# Patient Record
Sex: MALE | Race: WHITE | Employment: OTHER | URBAN - METROPOLITAN AREA
[De-identification: names, ages, dates, MRNs, and addresses within clinical notes are randomized per-mention and may not be internally consistent; named-entity substitution may affect disease eponyms.]

---

## 2017-01-01 PROBLEM — E11.621 DIABETIC FOOT ULCER WITH OSTEOMYELITIS (HCC): Status: ACTIVE | Noted: 2017-01-01

## 2017-01-01 PROBLEM — L97.509 DIABETIC FOOT ULCER WITH OSTEOMYELITIS (HCC): Status: ACTIVE | Noted: 2017-01-01

## 2017-01-01 PROBLEM — E11.69 DIABETIC FOOT ULCER WITH OSTEOMYELITIS (HCC): Status: ACTIVE | Noted: 2017-01-01

## 2017-01-01 PROBLEM — L03.116 CELLULITIS OF FOOT, LEFT: Status: ACTIVE | Noted: 2017-01-01

## 2017-01-01 PROBLEM — M86.9 DIABETIC FOOT ULCER WITH OSTEOMYELITIS (HCC): Status: ACTIVE | Noted: 2017-01-01

## 2017-01-01 PROBLEM — I70.209 FEMORAL-POPLITEAL ATHEROSCLEROSIS (HCC): Status: ACTIVE | Noted: 2017-01-01

## 2017-01-11 PROBLEM — R33.9 URINARY RETENTION: Status: ACTIVE | Noted: 2017-01-11

## 2018-08-26 ENCOUNTER — HOSPITAL ENCOUNTER (INPATIENT)
Age: 78
LOS: 1 days | Discharge: LEFT BEFORE DISCHARGE | DRG: 300 | End: 2018-08-28
Attending: EMERGENCY MEDICINE | Admitting: INTERNAL MEDICINE
Payer: COMMERCIAL

## 2018-08-26 ENCOUNTER — APPOINTMENT (OUTPATIENT)
Dept: GENERAL RADIOLOGY | Age: 78
DRG: 300 | End: 2018-08-26
Payer: COMMERCIAL

## 2018-08-26 DIAGNOSIS — E11.622 TYPE 2 DIABETES MELLITUS WITH OTHER SKIN ULCER, UNSPECIFIED WHETHER LONG TERM INSULIN USE (HCC): Primary | ICD-10-CM

## 2018-08-26 DIAGNOSIS — E11.8 DIABETIC FOOT (HCC): ICD-10-CM

## 2018-08-26 DIAGNOSIS — I73.9 PERIPHERAL VASCULAR DISEASE OF LOWER EXTREMITY (HCC): ICD-10-CM

## 2018-08-26 DIAGNOSIS — L98.499 TYPE 2 DIABETES MELLITUS WITH OTHER SKIN ULCER, UNSPECIFIED WHETHER LONG TERM INSULIN USE (HCC): Primary | ICD-10-CM

## 2018-08-26 DIAGNOSIS — I16.0 ASYMPTOMATIC HYPERTENSIVE URGENCY: ICD-10-CM

## 2018-08-26 PROBLEM — L08.9 DIABETIC FOOT INFECTION (HCC): Status: ACTIVE | Noted: 2018-08-26

## 2018-08-26 PROBLEM — E11.628 DIABETIC FOOT INFECTION (HCC): Status: ACTIVE | Noted: 2018-08-26

## 2018-08-26 LAB
ANION GAP SERPL CALCULATED.3IONS-SCNC: 8 MMOL/L (ref 7–16)
BASOPHILS ABSOLUTE: 0.05 E9/L (ref 0–0.2)
BASOPHILS RELATIVE PERCENT: 0.5 % (ref 0–2)
BUN BLDV-MCNC: 17 MG/DL (ref 8–23)
CALCIUM SERPL-MCNC: 9.3 MG/DL (ref 8.6–10.2)
CHLORIDE BLD-SCNC: 97 MMOL/L (ref 98–107)
CO2: 28 MMOL/L (ref 22–29)
CREAT SERPL-MCNC: 0.8 MG/DL (ref 0.7–1.2)
EKG ATRIAL RATE: 68 BPM
EKG P AXIS: 69 DEGREES
EKG P-R INTERVAL: 170 MS
EKG Q-T INTERVAL: 388 MS
EKG QRS DURATION: 86 MS
EKG QTC CALCULATION (BAZETT): 412 MS
EKG R AXIS: 43 DEGREES
EKG T AXIS: 83 DEGREES
EKG VENTRICULAR RATE: 68 BPM
EOSINOPHILS ABSOLUTE: 0.16 E9/L (ref 0.05–0.5)
EOSINOPHILS RELATIVE PERCENT: 1.5 % (ref 0–6)
GFR AFRICAN AMERICAN: >60
GFR NON-AFRICAN AMERICAN: >60 ML/MIN/1.73
GLUCOSE BLD-MCNC: 199 MG/DL (ref 74–109)
HBA1C MFR BLD: 7.3 % (ref 4–5.6)
HCT VFR BLD CALC: 34.1 % (ref 37–54)
HEMOGLOBIN: 11.7 G/DL (ref 12.5–16.5)
IMMATURE GRANULOCYTES #: 0.03 E9/L
IMMATURE GRANULOCYTES %: 0.3 % (ref 0–5)
LACTIC ACID: 0.8 MMOL/L (ref 0.5–2.2)
LYMPHOCYTES ABSOLUTE: 1.36 E9/L (ref 1.5–4)
LYMPHOCYTES RELATIVE PERCENT: 13 % (ref 20–42)
MCH RBC QN AUTO: 30 PG (ref 26–35)
MCHC RBC AUTO-ENTMCNC: 34.3 % (ref 32–34.5)
MCV RBC AUTO: 87.4 FL (ref 80–99.9)
METER GLUCOSE: 215 MG/DL (ref 70–110)
MONOCYTES ABSOLUTE: 0.79 E9/L (ref 0.1–0.95)
MONOCYTES RELATIVE PERCENT: 7.6 % (ref 2–12)
NEUTROPHILS ABSOLUTE: 8.05 E9/L (ref 1.8–7.3)
NEUTROPHILS RELATIVE PERCENT: 77.1 % (ref 43–80)
PDW BLD-RTO: 13.6 FL (ref 11.5–15)
PLATELET # BLD: 169 E9/L (ref 130–450)
PMV BLD AUTO: 9.6 FL (ref 7–12)
POTASSIUM SERPL-SCNC: 4.8 MMOL/L (ref 3.5–5)
RBC # BLD: 3.9 E12/L (ref 3.8–5.8)
SEDIMENTATION RATE, ERYTHROCYTE: 65 MM/HR (ref 0–15)
SODIUM BLD-SCNC: 133 MMOL/L (ref 132–146)
TROPONIN: <0.01 NG/ML (ref 0–0.03)
WBC # BLD: 10.4 E9/L (ref 4.5–11.5)

## 2018-08-26 PROCEDURE — 99284 EMERGENCY DEPT VISIT MOD MDM: CPT

## 2018-08-26 PROCEDURE — 83605 ASSAY OF LACTIC ACID: CPT

## 2018-08-26 PROCEDURE — 80048 BASIC METABOLIC PNL TOTAL CA: CPT

## 2018-08-26 PROCEDURE — G0378 HOSPITAL OBSERVATION PER HR: HCPCS

## 2018-08-26 PROCEDURE — 85025 COMPLETE CBC W/AUTO DIFF WBC: CPT

## 2018-08-26 PROCEDURE — 87040 BLOOD CULTURE FOR BACTERIA: CPT

## 2018-08-26 PROCEDURE — 87186 SC STD MICRODIL/AGAR DIL: CPT

## 2018-08-26 PROCEDURE — 96375 TX/PRO/DX INJ NEW DRUG ADDON: CPT

## 2018-08-26 PROCEDURE — 73660 X-RAY EXAM OF TOE(S): CPT

## 2018-08-26 PROCEDURE — 87075 CULTR BACTERIA EXCEPT BLOOD: CPT

## 2018-08-26 PROCEDURE — 96365 THER/PROPH/DIAG IV INF INIT: CPT

## 2018-08-26 PROCEDURE — 93005 ELECTROCARDIOGRAM TRACING: CPT | Performed by: EMERGENCY MEDICINE

## 2018-08-26 PROCEDURE — 84484 ASSAY OF TROPONIN QUANT: CPT

## 2018-08-26 PROCEDURE — 82962 GLUCOSE BLOOD TEST: CPT

## 2018-08-26 PROCEDURE — 87070 CULTURE OTHR SPECIMN AEROBIC: CPT

## 2018-08-26 PROCEDURE — 73610 X-RAY EXAM OF ANKLE: CPT

## 2018-08-26 PROCEDURE — 73620 X-RAY EXAM OF FOOT: CPT

## 2018-08-26 PROCEDURE — 83036 HEMOGLOBIN GLYCOSYLATED A1C: CPT

## 2018-08-26 PROCEDURE — 2580000003 HC RX 258: Performed by: INTERNAL MEDICINE

## 2018-08-26 PROCEDURE — 2580000003 HC RX 258: Performed by: EMERGENCY MEDICINE

## 2018-08-26 PROCEDURE — 6360000002 HC RX W HCPCS: Performed by: EMERGENCY MEDICINE

## 2018-08-26 PROCEDURE — 85651 RBC SED RATE NONAUTOMATED: CPT

## 2018-08-26 RX ORDER — DEXTROSE MONOHYDRATE 25 G/50ML
12.5 INJECTION, SOLUTION INTRAVENOUS PRN
Status: DISCONTINUED | OUTPATIENT
Start: 2018-08-26 | End: 2018-08-28 | Stop reason: HOSPADM

## 2018-08-26 RX ORDER — NICOTINE POLACRILEX 4 MG
15 LOZENGE BUCCAL PRN
Status: DISCONTINUED | OUTPATIENT
Start: 2018-08-26 | End: 2018-08-28 | Stop reason: HOSPADM

## 2018-08-26 RX ORDER — ONDANSETRON 2 MG/ML
4 INJECTION INTRAMUSCULAR; INTRAVENOUS EVERY 6 HOURS PRN
Status: DISCONTINUED | OUTPATIENT
Start: 2018-08-26 | End: 2018-08-28 | Stop reason: HOSPADM

## 2018-08-26 RX ORDER — TAMSULOSIN HYDROCHLORIDE 0.4 MG/1
0.4 CAPSULE ORAL DAILY
Status: DISCONTINUED | OUTPATIENT
Start: 2018-08-26 | End: 2018-08-28 | Stop reason: HOSPADM

## 2018-08-26 RX ORDER — LISINOPRIL 10 MG/1
10 TABLET ORAL DAILY
Status: DISCONTINUED | OUTPATIENT
Start: 2018-08-26 | End: 2018-08-27

## 2018-08-26 RX ORDER — CILOSTAZOL 50 MG/1
100 TABLET ORAL 2 TIMES DAILY
Status: DISCONTINUED | OUTPATIENT
Start: 2018-08-26 | End: 2018-08-28 | Stop reason: HOSPADM

## 2018-08-26 RX ORDER — SODIUM CHLORIDE 0.9 % (FLUSH) 0.9 %
10 SYRINGE (ML) INJECTION PRN
Status: DISCONTINUED | OUTPATIENT
Start: 2018-08-26 | End: 2018-08-26 | Stop reason: SDUPTHER

## 2018-08-26 RX ORDER — INSULIN GLARGINE 100 [IU]/ML
30 INJECTION, SOLUTION SUBCUTANEOUS NIGHTLY
Status: DISCONTINUED | OUTPATIENT
Start: 2018-08-26 | End: 2018-08-28 | Stop reason: HOSPADM

## 2018-08-26 RX ORDER — FAMOTIDINE 20 MG/1
20 TABLET, FILM COATED ORAL 2 TIMES DAILY
Status: DISCONTINUED | OUTPATIENT
Start: 2018-08-26 | End: 2018-08-27

## 2018-08-26 RX ORDER — ACETAMINOPHEN 325 MG/1
650 TABLET ORAL EVERY 4 HOURS PRN
Status: DISCONTINUED | OUTPATIENT
Start: 2018-08-26 | End: 2018-08-28 | Stop reason: HOSPADM

## 2018-08-26 RX ORDER — HYDRALAZINE HYDROCHLORIDE 20 MG/ML
5 INJECTION INTRAMUSCULAR; INTRAVENOUS ONCE
Status: COMPLETED | OUTPATIENT
Start: 2018-08-26 | End: 2018-08-26

## 2018-08-26 RX ORDER — SODIUM CHLORIDE 9 MG/ML
INJECTION, SOLUTION INTRAVENOUS CONTINUOUS
Status: DISCONTINUED | OUTPATIENT
Start: 2018-08-26 | End: 2018-08-27

## 2018-08-26 RX ORDER — SODIUM CHLORIDE 0.9 % (FLUSH) 0.9 %
10 SYRINGE (ML) INJECTION EVERY 12 HOURS SCHEDULED
Status: DISCONTINUED | OUTPATIENT
Start: 2018-08-26 | End: 2018-08-28 | Stop reason: HOSPADM

## 2018-08-26 RX ORDER — SODIUM CHLORIDE 0.9 % (FLUSH) 0.9 %
10 SYRINGE (ML) INJECTION PRN
Status: DISCONTINUED | OUTPATIENT
Start: 2018-08-26 | End: 2018-08-28 | Stop reason: HOSPADM

## 2018-08-26 RX ORDER — SODIUM CHLORIDE 0.9 % (FLUSH) 0.9 %
10 SYRINGE (ML) INJECTION EVERY 12 HOURS SCHEDULED
Status: DISCONTINUED | OUTPATIENT
Start: 2018-08-26 | End: 2018-08-26 | Stop reason: SDUPTHER

## 2018-08-26 RX ORDER — DEXTROSE MONOHYDRATE 50 MG/ML
100 INJECTION, SOLUTION INTRAVENOUS PRN
Status: DISCONTINUED | OUTPATIENT
Start: 2018-08-26 | End: 2018-08-28 | Stop reason: HOSPADM

## 2018-08-26 RX ADMIN — HYDRALAZINE HYDROCHLORIDE 5 MG: 20 INJECTION INTRAMUSCULAR; INTRAVENOUS at 16:42

## 2018-08-26 RX ADMIN — MEROPENEM 1 G: 1 INJECTION, POWDER, FOR SOLUTION INTRAVENOUS at 18:18

## 2018-08-26 RX ADMIN — SODIUM CHLORIDE: 9 INJECTION, SOLUTION INTRAVENOUS at 18:52

## 2018-08-26 RX ADMIN — VANCOMYCIN HYDROCHLORIDE 1.5 G: 10 INJECTION, POWDER, LYOPHILIZED, FOR SOLUTION INTRAVENOUS at 16:10

## 2018-08-26 ASSESSMENT — ENCOUNTER SYMPTOMS
DIARRHEA: 0
ABDOMINAL PAIN: 0
BLOOD IN STOOL: 0
RHINORRHEA: 0
COLOR CHANGE: 0
BACK PAIN: 0
NAUSEA: 0
CHEST TIGHTNESS: 0
SHORTNESS OF BREATH: 0
VOMITING: 0
SORE THROAT: 0
EYE PAIN: 0
COUGH: 0

## 2018-08-26 ASSESSMENT — PAIN SCALES - GENERAL
PAINLEVEL_OUTOF10: 0
PAINLEVEL_OUTOF10: 0

## 2018-08-26 NOTE — ED PROVIDER NOTES
the hospital encounter of 08/26/18   CBC Auto Differential   Result Value Ref Range    WBC 10.4 4.5 - 11.5 E9/L    RBC 3.90 3.80 - 5.80 E12/L    Hemoglobin 11.7 (L) 12.5 - 16.5 g/dL    Hematocrit 34.1 (L) 37.0 - 54.0 %    MCV 87.4 80.0 - 99.9 fL    MCH 30.0 26.0 - 35.0 pg    MCHC 34.3 32.0 - 34.5 %    RDW 13.6 11.5 - 15.0 fL    Platelets 071 262 - 166 E9/L    MPV 9.6 7.0 - 12.0 fL    Neutrophils % 77.1 43.0 - 80.0 %    Immature Granulocytes % 0.3 0.0 - 5.0 %    Lymphocytes % 13.0 (L) 20.0 - 42.0 %    Monocytes % 7.6 2.0 - 12.0 %    Eosinophils % 1.5 0.0 - 6.0 %    Basophils % 0.5 0.0 - 2.0 %    Neutrophils # 8.05 (H) 1.80 - 7.30 E9/L    Immature Granulocytes # 0.03 E9/L    Lymphocytes # 1.36 (L) 1.50 - 4.00 E9/L    Monocytes # 0.79 0.10 - 0.95 E9/L    Eosinophils # 0.16 0.05 - 0.50 E9/L    Basophils # 0.05 0.00 - 0.20 E8/I   Basic Metabolic Panel   Result Value Ref Range    Sodium 133 132 - 146 mmol/L    Potassium 4.8 3.5 - 5.0 mmol/L    Chloride 97 (L) 98 - 107 mmol/L    CO2 28 22 - 29 mmol/L    Anion Gap 8 7 - 16 mmol/L    Glucose 199 (H) 74 - 109 mg/dL    BUN 17 8 - 23 mg/dL    CREATININE 0.8 0.7 - 1.2 mg/dL    GFR Non-African American >60 >=60 mL/min/1.73    GFR African American >60     Calcium 9.3 8.6 - 10.2 mg/dL   Lactic Acid, Plasma   Result Value Ref Range    Lactic Acid 0.8 0.5 - 2.2 mmol/L   Troponin   Result Value Ref Range    Troponin <0.01 0.00 - 0.03 ng/mL   EKG 12 Lead   Result Value Ref Range    Ventricular Rate 68 BPM    Atrial Rate 68 BPM    P-R Interval 170 ms    QRS Duration 86 ms    Q-T Interval 388 ms    QTc Calculation (Bazett) 412 ms    P Axis 69 degrees    R Axis 43 degrees    T Axis 83 degrees       Radiology  XR ANKLE RIGHT (MIN 3 VIEWS)    (Results Pending)   XR TOE RIGHT (MIN 2 VIEWS)    (Results Pending)       EKG: This EKG is signed and interpreted by me.     Rate: 68  Rhythm: Sinus  Interpretation: Sinus rhythm, nonspecific ST-T wave changes  Comparison: stable as compared to

## 2018-08-26 NOTE — ED NOTES
Skin abnormal:  Several ulcers to right and left feet. Right lower leg 4x2- proximal, right distal leg 4x3, right lateral ankle 10x5, right top of foot 6 x1, left bottom of foot 4 x 3. Patient states that he has been treating these at home.       Hope Cedillo RN  08/26/18 [id number]

## 2018-08-27 ENCOUNTER — APPOINTMENT (OUTPATIENT)
Dept: MRI IMAGING | Age: 78
DRG: 300 | End: 2018-08-27
Payer: COMMERCIAL

## 2018-08-27 PROBLEM — I10 HTN (HYPERTENSION), BENIGN: Chronic | Status: ACTIVE | Noted: 2018-08-27

## 2018-08-27 PROBLEM — I70.209 FEMORAL-POPLITEAL ATHEROSCLEROSIS (HCC): Chronic | Status: ACTIVE | Noted: 2017-01-01

## 2018-08-27 PROBLEM — E11.628 DIABETIC FOOT INFECTION (HCC): Status: ACTIVE | Noted: 2018-08-27

## 2018-08-27 PROBLEM — R33.9 URINARY RETENTION: Status: RESOLVED | Noted: 2017-01-11 | Resolved: 2018-08-27

## 2018-08-27 PROBLEM — L08.9 DIABETIC FOOT INFECTION (HCC): Status: ACTIVE | Noted: 2018-08-27

## 2018-08-27 LAB
ANION GAP SERPL CALCULATED.3IONS-SCNC: 6 MMOL/L (ref 7–16)
BASOPHILS ABSOLUTE: 0.06 E9/L (ref 0–0.2)
BASOPHILS RELATIVE PERCENT: 0.7 % (ref 0–2)
BUN BLDV-MCNC: 14 MG/DL (ref 8–23)
C-REACTIVE PROTEIN: 3.7 MG/DL (ref 0–0.4)
CALCIUM SERPL-MCNC: 8.8 MG/DL (ref 8.6–10.2)
CHLORIDE BLD-SCNC: 102 MMOL/L (ref 98–107)
CO2: 29 MMOL/L (ref 22–29)
CREAT SERPL-MCNC: 0.9 MG/DL (ref 0.7–1.2)
EOSINOPHILS ABSOLUTE: 0.16 E9/L (ref 0.05–0.5)
EOSINOPHILS RELATIVE PERCENT: 2 % (ref 0–6)
GFR AFRICAN AMERICAN: >60
GFR NON-AFRICAN AMERICAN: >60 ML/MIN/1.73
GLUCOSE BLD-MCNC: 172 MG/DL (ref 74–109)
HBA1C MFR BLD: 7.2 % (ref 4–5.6)
HCT VFR BLD CALC: 32.7 % (ref 37–54)
HEMOGLOBIN: 11.2 G/DL (ref 12.5–16.5)
IMMATURE GRANULOCYTES #: 0.03 E9/L
IMMATURE GRANULOCYTES %: 0.4 % (ref 0–5)
LYMPHOCYTES ABSOLUTE: 1.38 E9/L (ref 1.5–4)
LYMPHOCYTES RELATIVE PERCENT: 16.9 % (ref 20–42)
MCH RBC QN AUTO: 30.1 PG (ref 26–35)
MCHC RBC AUTO-ENTMCNC: 34.3 % (ref 32–34.5)
MCV RBC AUTO: 87.9 FL (ref 80–99.9)
METER GLUCOSE: 138 MG/DL (ref 70–110)
METER GLUCOSE: 168 MG/DL (ref 70–110)
METER GLUCOSE: 170 MG/DL (ref 70–110)
METER GLUCOSE: 206 MG/DL (ref 70–110)
MONOCYTES ABSOLUTE: 0.67 E9/L (ref 0.1–0.95)
MONOCYTES RELATIVE PERCENT: 8.2 % (ref 2–12)
NEUTROPHILS ABSOLUTE: 5.87 E9/L (ref 1.8–7.3)
NEUTROPHILS RELATIVE PERCENT: 71.8 % (ref 43–80)
PDW BLD-RTO: 13.6 FL (ref 11.5–15)
PLATELET # BLD: 160 E9/L (ref 130–450)
PMV BLD AUTO: 9.8 FL (ref 7–12)
POTASSIUM REFLEX MAGNESIUM: 4.1 MMOL/L (ref 3.5–5)
RBC # BLD: 3.72 E12/L (ref 3.8–5.8)
SODIUM BLD-SCNC: 137 MMOL/L (ref 132–146)
WBC # BLD: 8.2 E9/L (ref 4.5–11.5)

## 2018-08-27 PROCEDURE — 97162 PT EVAL MOD COMPLEX 30 MIN: CPT

## 2018-08-27 PROCEDURE — 36415 COLL VENOUS BLD VENIPUNCTURE: CPT

## 2018-08-27 PROCEDURE — 6360000004 HC RX CONTRAST MEDICATION: Performed by: RADIOLOGY

## 2018-08-27 PROCEDURE — 83036 HEMOGLOBIN GLYCOSYLATED A1C: CPT

## 2018-08-27 PROCEDURE — G0378 HOSPITAL OBSERVATION PER HR: HCPCS

## 2018-08-27 PROCEDURE — 73723 MRI JOINT LWR EXTR W/O&W/DYE: CPT

## 2018-08-27 PROCEDURE — 86140 C-REACTIVE PROTEIN: CPT

## 2018-08-27 PROCEDURE — 99221 1ST HOSP IP/OBS SF/LOW 40: CPT | Performed by: SURGERY

## 2018-08-27 PROCEDURE — 6370000000 HC RX 637 (ALT 250 FOR IP): Performed by: SPECIALIST

## 2018-08-27 PROCEDURE — 1200000000 HC SEMI PRIVATE

## 2018-08-27 PROCEDURE — 80048 BASIC METABOLIC PNL TOTAL CA: CPT

## 2018-08-27 PROCEDURE — 2580000003 HC RX 258: Performed by: INTERNAL MEDICINE

## 2018-08-27 PROCEDURE — G8979 MOBILITY GOAL STATUS: HCPCS

## 2018-08-27 PROCEDURE — 82962 GLUCOSE BLOOD TEST: CPT

## 2018-08-27 PROCEDURE — G8978 MOBILITY CURRENT STATUS: HCPCS

## 2018-08-27 PROCEDURE — A9579 GAD-BASE MR CONTRAST NOS,1ML: HCPCS | Performed by: RADIOLOGY

## 2018-08-27 PROCEDURE — 85025 COMPLETE CBC W/AUTO DIFF WBC: CPT

## 2018-08-27 RX ORDER — LEVOFLOXACIN 500 MG/1
500 TABLET, FILM COATED ORAL DAILY
Status: DISCONTINUED | OUTPATIENT
Start: 2018-08-27 | End: 2018-08-28 | Stop reason: HOSPADM

## 2018-08-27 RX ORDER — HYDRALAZINE HYDROCHLORIDE 20 MG/ML
10 INJECTION INTRAMUSCULAR; INTRAVENOUS EVERY 6 HOURS PRN
Status: DISCONTINUED | OUTPATIENT
Start: 2018-08-27 | End: 2018-08-28 | Stop reason: HOSPADM

## 2018-08-27 RX ORDER — LISINOPRIL 20 MG/1
20 TABLET ORAL DAILY
Status: DISCONTINUED | OUTPATIENT
Start: 2018-08-28 | End: 2018-08-28 | Stop reason: HOSPADM

## 2018-08-27 RX ORDER — DOXYCYCLINE HYCLATE 100 MG/1
100 CAPSULE ORAL EVERY 12 HOURS SCHEDULED
Status: DISCONTINUED | OUTPATIENT
Start: 2018-08-27 | End: 2018-08-28 | Stop reason: HOSPADM

## 2018-08-27 RX ADMIN — Medication 10 ML: at 20:55

## 2018-08-27 RX ADMIN — LEVOFLOXACIN 500 MG: 500 TABLET, FILM COATED ORAL at 16:21

## 2018-08-27 RX ADMIN — DOXYCYCLINE HYCLATE 100 MG: 100 CAPSULE, GELATIN COATED ORAL at 20:55

## 2018-08-27 RX ADMIN — GADOTERIDOL 13 ML: 279.3 INJECTION, SOLUTION INTRAVENOUS at 11:31

## 2018-08-27 ASSESSMENT — PAIN SCALES - GENERAL
PAINLEVEL_OUTOF10: 0

## 2018-08-27 NOTE — CARE COORDINATION
Social Work / Discharge planning : SW noted consult for discharge planning: SW met with patient and explained role as discharge planner/ transition of care. Patient states he resides with his spouse. Patient does not use a device and independent. Patient has a hx at [facility] and with [facility]. Patient PCP is DR Pasha Rose and he uuses [facility] in [place]. Therapy is ordered. Patient states plan is to return home with [facility]. SW did make referral [facility] [place] [facility]. Patient has wounds on his legs. ID is also following and await ID recommendations as well.  SW to follow Electronically signed by SILVIA Pendleton on 8/27/2018 at 3:25 PM

## 2018-08-27 NOTE — CONSULTS
Negative  GI: Negative  : Negative  Musculoskeletal: As in the HPI  Skin: As in the HPI . The patient has a chronic rash on this patient was seen in dermatologist for this. PHYSICAL EXAM:    Vitals:   BP (!) 167/73   Pulse 67   Temp 98 °F (36.7 °C) (Oral)   Resp 18   Ht 5' 7\" (1.702 m)   Wt 141 lb 3.2 oz (64 kg)   SpO2 96%   BMI 22.12 kg/m²   Constitutional: The patient is awake, alert, and oriented. No distress. Skin: Warm and dry. Erythematous, maculopapular rash over face and forehead. HEENT: Eyes show round, and reactive pupils. No jaundice. Moist mucous membranes, no ulcerations, no thrush. Neck: Supple to movements. No lymphadenopathy. Chest: No use of accessory muscles to breathe. Symmetrical expansion. Auscultation reveals no wheezing, crackles, or rhonchi. Cardiovascular: S1 and S2 are rhythmic and regular. No murmurs appreciated. Abdomen: Positive bowel sounds to auscultation. Benign to palpation. No masses felt. No hepatosplenomegaly. Extremities: No clubbing, no cyanosis, no edema. Poor pulses. We will demarcated ulcerated lesions on right leg. The largest one is above the medial malleolus. He has a yellow slough on it. It does not undermined. The edges are raised. Couple of wounds above the right lateral malleolus. Diffuse erythema with desquamation noted on right leg. The left 2nd toe is missing.  There is a chronic ulcer on the bottom of the left foot under the head of the 2nd metatarsal.  Lines: peripheral      CBC+dif:  Recent Labs      08/26/18   1546  08/27/18   0600   WBC  10.4  8.2   HGB  11.7*  11.2*   HCT  34.1*  32.7*   MCV  87.4  87.9   PLT  169  160   NEUTROABS  8.05*  5.87     Lab Results   Component Value Date    CRP <0.1 03/16/2017    CRP 12.3 (H) 12/30/2016      No results found for: CRPHS  Lab Results   Component Value Date    SEDRATE 65 (H) 08/26/2018    SEDRATE 14 03/16/2017    SEDRATE 84 (H) 12/30/2016     Lab Results   Component Value Date    ALT 17 01/12/2017 discussing this case with the treating physicians.     Idania Paz  3:43 PM  8/27/2018

## 2018-08-27 NOTE — PROGRESS NOTES
unsteady gait to bathroom. Pt performed stand <> sit transfers from commode with Supervision. Pt ambulated back to bedside and refused to ambulate any further. Pt performed sit to supine transfer and was left in bed with alarm activated at conclusion of session. Pts/ family goals   To return home. Patient and or family understand(s) diagnosis, prognosis, and plan of care:  Yes. PLAN  PT care will be provided in accordance with the objectives noted above. Whenever appropriate, clear delegation orders will be provided for nursing staff. Exercises and functional mobility practice will be used as well as appropriate assistive devices or modalities to obtain goals. Patient and family education will also be administered as needed. Frequency of treatments: 2-5x/week x 2-3 days.     Time in: 1440  Time out: [address], [address] [place] [address]  [id number]

## 2018-08-27 NOTE — PROGRESS NOTES
Spoke to Dr. Saeid Zuniga via perfect serve regarding consult. Stated to let residents know. Message sent to surgical resident.

## 2018-08-27 NOTE — PLAN OF CARE
Problem: Falls - Risk of:  Goal: Will remain free from falls  Will remain free from falls   Outcome: Met This Shift      Problem: Skin Integrity:  Goal: Will show no infection signs and symptoms  Will show no infection signs and symptoms  Outcome: Met This Shift

## 2018-08-27 NOTE — PROGRESS NOTES
ADL retraining []   Equipment needs []   Neuromuscular re-education [] Energy Conservation Techniques []  Functional Transfer training [] Patient and/or Family Education []  Functional Mobility training []  Environmental Modifications []  Cognitive re-training []   Compensatory techniques for ADLs []  Splinting Needs []                             Therapeutic Activities []  Positioning/joint protection []             Therapeutic Strengthening  []      Other: []      Rehab Potential: Good    Patient / Family Goal: Return Home       Eval Complexity: Low     Patient and/or family educated on safety awareness.    NO family present     Time in: [address]  Time out: Suzanna Knight 4 OTR/L [id number]

## 2018-08-27 NOTE — H&P
Latex; Aspirin; Pcn [penicillins]; and Other    Social History:    reports that he has quit smoking. He has never used smokeless tobacco. He reports that he does not drink alcohol or use drugs. Family History:   family history includes Heart Disease in his father and mother. REVIEW OF SYSTEMS:  As above in the HPI, otherwise negative    PHYSICAL EXAM:    Vitals:  BP (!) 167/73   Pulse 67   Temp 98 °F (36.7 °C) (Oral)   Resp 18   Ht 5' 7\" (1.702 m)   Wt 141 lb 3.2 oz (64 kg)   SpO2 96%   BMI 22.12 kg/m²     General:  Awake, alert, oriented X 3. Well developed, well nourished, well groomed. No apparent distress. HEENT:  Normocephalic, atraumatic. No scleral icterus. No conjunctival injection. Normal lips, teeth, and gums. No nasal discharge. Neck:  Supple  Heart:  RRR, no murmurs, gallops, or rubs  Lungs:  CTA bilaterally, bilat symmetrical expansion, no wheeze, rales, or rhonchi  Abdomen:   Bowel sounds present, soft, nontender, no masses, no organomegaly, no peritoneal signs  Extremities:  No clubbing, cyanosis, or edema; legs thin and atrophic  Skin:  Warm and dry, large open ulcer with underlying subcutaneous layer exposed on right medial lower leg with other areas of skin breakdown in lateral right lower leg; callus with skin breakdown on plantar aspect left foot at 2nd/3rd metatarsal  Neuro:  Cranial nerves 2-12 intact, no focal deficits  Breast: deferred  Rectal: deferred  Genitalia:  deferred    LABS:  CBC:   Lab Results   Component Value Date    WBC 8.2 08/27/2018    RBC 3.72 08/27/2018    HGB 11.2 08/27/2018    HCT 32.7 08/27/2018    MCV 87.9 08/27/2018    MCH 30.1 08/27/2018    MCHC 34.3 08/27/2018    RDW 13.6 08/27/2018     08/27/2018    MPV 9.8 08/27/2018     BMP:    Lab Results   Component Value Date     08/27/2018    K 4.1 08/27/2018     08/27/2018    CO2 29 08/27/2018    BUN 14 08/27/2018    CREATININE 0.9 08/27/2018    CALCIUM 8.8 08/27/2018    GFRAA >60

## 2018-08-27 NOTE — PROGRESS NOTES
Attempted to call podiatry consult. Was told by answering service Dr. Henok Hill is no longer affiliated with the hospital. Called and left message with Dr. Luis Garza. Awaiting call back/ orders.

## 2018-08-27 NOTE — CONSULTS
Vascular Surgery Consultation Note    Reason for Consult:  Non healing leg wounds    HPI:    This is a 65 y.o. male who is admitted to the hospital for treatment of diabetic leg wounds. Vascular surgery is consulted for evaluation and treatment of non healing leg wounds. He came into the ED for wound check and was admitted due to possible wound infection. He has a history of uncontrolled diabetes and previous osteomyelitis of left toe s/p amputation in 2017. Vascular saw him at that time and had no intervention. He has never had stents in the past. He is a previous smoker. He had ABIs at that time which showed . 64 on the left and .82 on the right. He states he continues to hit his right leg against corners and that's why the wounds have no healed correctly. He has a small pressure wound on hie left foot which has been healing and he has been taking weight off of that. He denies fevers or chills or drainage from the area.      ROS: Negative if blank [], Positive if [x]  General Vascular   [] Fevers [x] Claudication (Blocks)   [] Chills [] Rest Pain   [] Weight Loss [x] Tissue Loss   [] Chest Pain [] Clotting Disorder   [] SOB at rest [x] Leg Swelling   [x] SOB with exertion [] DVT/PE      [] Nausea    [] Vomiting [] Stroke/TIA   [] Abdominal Pain [] Focal weakness   [] Melena [] Slurred Speech   [] Hematochezia [] Vision Changes   [] Hematuria    [] Dysuria [] Hx of Central Catheters   [x] Wears Glasses/Contacts [] Dialysis and If so date initiated   [] Blindness    [] Right Hand Dominant   [] Difficulty swallowing        Past Medical History:   Diagnosis Date    Blood circulation, collateral     Cellulitis of foot, left 1/1/2017    Diabetes mellitus ([address]     Diabetic foot ulcer with osteomyelitis ([address] 1/1/2017    Femoral-popliteal atherosclerosis ([address] 1/1/2017    Hypertension         Past Surgical History:   Procedure Laterality Date    FOOT DEBRIDEMENT Left 01/04/2017    wound debridement and delayed absent       5/5 Strength    R LE Edema present     Incisions absent    Varicose veins absent    Wounds present- x3 chronic venous stasis ulcers, stage 1, no purulent drainage, surround erythema    normalcaprefill   4/5 Strength   Neuropathy is present    L LE Edema present     Incisions absent    Varicose veins absent    Wounds present - healing third metatarsal    normalcaprefill   4/5 Strength   Neuropathy is present    R brachial 2 L brachial 2   R radial 2 L radial 2   R femoral 1 L femoral 1   R popliteal biphasic L popliteal biphasic   R posterior tibial monophasic L posterior tibial biphasic   R dorsalis pedis monophasic L dorsalis pedis biphasic       LABS:    Lab Results   Component Value Date    WBC 8.2 2018    HGB 11.2 (L) 2018    HCT 32.7 (L) 2018     2018    PROTIME 15.0 (H) 2016    INR 1.4 2016    K 4.1 2018    BUN 14 2018    CREATININE 0.9 2018       RADIOLOGY:  Xr Ankle Right (min 3 Views)    Result Date: 2018  Patient MRN:  [id number] : 1940 Age: 65 years Gender: Male Order Date:  2018 5:00 PM EXAM: XR ANKLE RIGHT (MIN 3 VIEWS) NUMBER OF IMAGES:  4 views INDICATION: Diabetic soft tissue ulcerations COMPARISON: None FINDINGS: Bony structures are intact with preservation of alignment and joint spacing. There appears to be a degree of diffuse osteopenia, but there is no evidence of focal bony ulceration or sclerotic change to suggest advanced osteomyelitis involvement. No focal soft tissue abnormalities are identified. There is generalized soft tissue swelling about the ankle, which may be infectious or represent dependent edema. Findings are most prominent on the medial aspect. Osteopenia and soft tissue swelling about the ankle without evidence of trauma, infectious bony changes, or soft tissue gas.     Xr Foot Right (2 Views)    Result Date: 2018  Patient MRN:  [id number] : 1940 Age: 65 years Gender: Male months of intermittent improvement and regression. Secondary to these issues he sought medical attention. He also has a history of a left 2nd toe amputation which is healed. He has a small malperforans ulcer with callus on the plantar surface. He denies any significant drainage or pain to the area. He follows up with podiatry for his venous stasis disease as well as his recent digital amputation. Gen. : Alert and oriented answers questions appropriately he is in no acute distress  Skin: Is warm and dry: No jaundice is noted. No sclera icterus. On the right lower extremity he has multiple ulcers with a fairly large ulcer located over the medial malleolus with exudate its present. Left lower extremity demonstrates a malperforans ulcer with callus around the edges. There is no evidence of draining. There is no tenderness there is no fluctuance. HEENT head is normocephalic atraumatic trachea is midline no jugular venous distention  Cardiac: Is currently regular rate and rhythm  Pulmonary: Currently demonstrates no labored breathing no audible wheezing  Abdomen: Soft nontender no rebound or guarding I cannot appreciate any pulsatile mass, positive bowel sounds  Extremities: Palpable bilateral femoral pulses are noted. DP and PT signals are present. I think I can feel up palpable posterior tibial pulse. Sensation and motor are grossly intact bilaterally. He has multiple ulcerations of the right lower extremity please refer to the skin examination as well as on the left extremity please refer to the skin examination. Neuro: Is grossly intact bilaterally without any gross focal deficits that are appreciated. Prior KO dating 1/1/17 demonstrated bilateral ABIs in the 0.8 range. Please disregard the residence above statement of an KO 0.63    The current KO from date 8/28/2018 demonstrates on the right side and KO 0.50 and a digital pressure 43.  On the left side he has an KO 0.98 and a digital pressure 77  This

## 2018-08-28 ENCOUNTER — APPOINTMENT (OUTPATIENT)
Dept: ULTRASOUND IMAGING | Age: 78
DRG: 300 | End: 2018-08-28
Payer: COMMERCIAL

## 2018-08-28 ENCOUNTER — APPOINTMENT (OUTPATIENT)
Dept: INTERVENTIONAL RADIOLOGY/VASCULAR | Age: 78
DRG: 300 | End: 2018-08-28
Payer: COMMERCIAL

## 2018-08-28 VITALS
TEMPERATURE: 97.7 F | WEIGHT: 138.19 LBS | RESPIRATION RATE: 16 BRPM | HEIGHT: 70 IN | BODY MASS INDEX: 19.78 KG/M2 | SYSTOLIC BLOOD PRESSURE: 164 MMHG | DIASTOLIC BLOOD PRESSURE: 70 MMHG | HEART RATE: 63 BPM | OXYGEN SATURATION: 98 %

## 2018-08-28 LAB
ANAEROBIC CULTURE: NORMAL
ANION GAP SERPL CALCULATED.3IONS-SCNC: 9 MMOL/L (ref 7–16)
ANTISTREPTOLYSIN-O: 27 IU/ML (ref 0–200)
BASOPHILS ABSOLUTE: 0.07 E9/L (ref 0–0.2)
BASOPHILS RELATIVE PERCENT: 0.9 % (ref 0–2)
BUN BLDV-MCNC: 22 MG/DL (ref 8–23)
CALCIUM SERPL-MCNC: 9 MG/DL (ref 8.6–10.2)
CHLORIDE BLD-SCNC: 102 MMOL/L (ref 98–107)
CO2: 27 MMOL/L (ref 22–29)
CREAT SERPL-MCNC: 0.8 MG/DL (ref 0.7–1.2)
EOSINOPHILS ABSOLUTE: 0.21 E9/L (ref 0.05–0.5)
EOSINOPHILS RELATIVE PERCENT: 2.8 % (ref 0–6)
GFR AFRICAN AMERICAN: >60
GFR NON-AFRICAN AMERICAN: >60 ML/MIN/1.73
GLUCOSE BLD-MCNC: 150 MG/DL (ref 74–109)
HCT VFR BLD CALC: 33.3 % (ref 37–54)
HEMOGLOBIN: 11.3 G/DL (ref 12.5–16.5)
IMMATURE GRANULOCYTES #: 0.03 E9/L
IMMATURE GRANULOCYTES %: 0.4 % (ref 0–5)
LYMPHOCYTES ABSOLUTE: 1.62 E9/L (ref 1.5–4)
LYMPHOCYTES RELATIVE PERCENT: 21.3 % (ref 20–42)
MCH RBC QN AUTO: 29.9 PG (ref 26–35)
MCHC RBC AUTO-ENTMCNC: 33.9 % (ref 32–34.5)
MCV RBC AUTO: 88.1 FL (ref 80–99.9)
METER GLUCOSE: 148 MG/DL (ref 70–110)
METER GLUCOSE: 176 MG/DL (ref 70–110)
METER GLUCOSE: 189 MG/DL (ref 70–110)
MONOCYTES ABSOLUTE: 0.61 E9/L (ref 0.1–0.95)
MONOCYTES RELATIVE PERCENT: 8 % (ref 2–12)
NEUTROPHILS ABSOLUTE: 5.08 E9/L (ref 1.8–7.3)
NEUTROPHILS RELATIVE PERCENT: 66.6 % (ref 43–80)
ORGANISM: ABNORMAL
PDW BLD-RTO: 13.7 FL (ref 11.5–15)
PLATELET # BLD: 174 E9/L (ref 130–450)
PMV BLD AUTO: 10.1 FL (ref 7–12)
POTASSIUM SERPL-SCNC: 4.5 MMOL/L (ref 3.5–5)
RBC # BLD: 3.78 E12/L (ref 3.8–5.8)
SEDIMENTATION RATE, ERYTHROCYTE: 39 MM/HR (ref 0–15)
SODIUM BLD-SCNC: 138 MMOL/L (ref 132–146)
WBC # BLD: 7.6 E9/L (ref 4.5–11.5)
WOUND/ABSCESS: ABNORMAL
WOUND/ABSCESS: ABNORMAL

## 2018-08-28 PROCEDURE — 36415 COLL VENOUS BLD VENIPUNCTURE: CPT

## 2018-08-28 PROCEDURE — 93925 LOWER EXTREMITY STUDY: CPT

## 2018-08-28 PROCEDURE — 85651 RBC SED RATE NONAUTOMATED: CPT

## 2018-08-28 PROCEDURE — 86060 ANTISTREPTOLYSIN O TITER: CPT

## 2018-08-28 PROCEDURE — G0378 HOSPITAL OBSERVATION PER HR: HCPCS

## 2018-08-28 PROCEDURE — 82962 GLUCOSE BLOOD TEST: CPT

## 2018-08-28 PROCEDURE — 85025 COMPLETE CBC W/AUTO DIFF WBC: CPT

## 2018-08-28 PROCEDURE — 2580000003 HC RX 258: Performed by: INTERNAL MEDICINE

## 2018-08-28 PROCEDURE — 6370000000 HC RX 637 (ALT 250 FOR IP): Performed by: INTERNAL MEDICINE

## 2018-08-28 PROCEDURE — 93922 UPR/L XTREMITY ART 2 LEVELS: CPT

## 2018-08-28 PROCEDURE — 80048 BASIC METABOLIC PNL TOTAL CA: CPT

## 2018-08-28 PROCEDURE — 6370000000 HC RX 637 (ALT 250 FOR IP): Performed by: SPECIALIST

## 2018-08-28 RX ORDER — DOXYCYCLINE HYCLATE 100 MG/1
100 CAPSULE ORAL EVERY 12 HOURS SCHEDULED
Qty: 28 CAPSULE | Refills: 0 | Status: SHIPPED | OUTPATIENT
Start: 2018-08-28 | End: 2018-09-11

## 2018-08-28 RX ORDER — LEVOFLOXACIN 500 MG/1
500 TABLET, FILM COATED ORAL DAILY
Qty: 10 TABLET | Refills: 0 | Status: SHIPPED | OUTPATIENT
Start: 2018-08-29 | End: 2018-09-08

## 2018-08-28 RX ADMIN — DOXYCYCLINE HYCLATE 100 MG: 100 CAPSULE, GELATIN COATED ORAL at 09:47

## 2018-08-28 RX ADMIN — LEVOFLOXACIN 500 MG: 500 TABLET, FILM COATED ORAL at 09:47

## 2018-08-28 RX ADMIN — Medication 10 ML: at 09:48

## 2018-08-28 RX ADMIN — LISINOPRIL 20 MG: 20 TABLET ORAL at 09:47

## 2018-08-28 ASSESSMENT — PAIN SCALES - GENERAL: PAINLEVEL_OUTOF10: 0

## 2018-08-28 NOTE — PROGRESS NOTES
hepatosplenomegaly. Extremities: No clubbing, no cyanosis, no edema. Poor pulses. We will demarcated ulcerated lesions on right leg. The largest one is above the medial malleolus. He has a yellow slough on it. It does not undermined. The edges are raised. Couple of wounds above the right lateral malleolus. Diffuse erythema with desquamation noted on right leg. The left 2nd toe is missing. There is a chronic ulcer on the bottom of the left foot under the head of the 2nd metatarsal.  Lines: peripheral    Laboratory and Tests Review:  Lab Results   Component Value Date    WBC 7.6 08/28/2018    WBC 8.2 08/27/2018    WBC 10.4 08/26/2018    HGB 11.3 (L) 08/28/2018    HCT 33.3 (L) 08/28/2018    MCV 88.1 08/28/2018     08/28/2018     Lab Results   Component Value Date    NEUTROABS 5.08 08/28/2018    NEUTROABS 5.87 08/27/2018    NEUTROABS 8.05 (H) 08/26/2018     Lab Results   Component Value Date    CRP 3.7 (H) 08/27/2018    CRP <0.1 03/16/2017    CRP 12.3 (H) 12/30/2016     No results found for: CRPHS  Lab Results   Component Value Date    SEDRATE 39 (H) 08/28/2018    SEDRATE 65 (H) 08/26/2018    SEDRATE 14 03/16/2017     Lab Results   Component Value Date    ALT 17 01/12/2017    AST 18 01/12/2017    ALKPHOS 84 01/12/2017    BILITOT 0.5 01/12/2017     Lab Results   Component Value Date     08/28/2018    K 4.5 08/28/2018    K 4.1 08/27/2018     08/28/2018    CO2 27 08/28/2018    BUN 22 08/28/2018    CREATININE 0.8 08/28/2018    CREATININE 0.9 08/27/2018    CREATININE 0.8 08/26/2018    GFRAA >60 08/28/2018    LABGLOM >60 08/28/2018    GLUCOSE 150 08/28/2018    PROT 6.8 01/12/2017    LABALBU 2.8 01/12/2017    CALCIUM 9.0 08/28/2018    BILITOT 0.5 01/12/2017    ALKPHOS 84 01/12/2017    AST 18 01/12/2017    ALT 17 01/12/2017     Radiology:      Microbiology:   Culture MRSA, Proteus, Corynebacterium    ASSESSMENT:  · Chronic ulcers right leg. Ulcers seem to be ischemic.  Colonized with Staphylococcus aureus  · Possible cellulitis  · Chronic left foot diabetic ulcer ulcer  · Peripheral vascular disease    PLAN:  · Continue Doxycycline and Levofloxacin.  Reconciled  · Patient can be discharged from [facility]  1:57 PM  8/28/2018

## 2018-08-28 NOTE — PROGRESS NOTES
are intact with preservation of alignment and joint spacing. There appears to be a degree of diffuse osteopenia, but there is no evidence of focal bony ulceration or sclerotic change to suggest advanced osteomyelitis involvement. No focal soft tissue abnormalities are identified. There is generalized soft tissue swelling about the ankle, which may be infectious or represent dependent edema. Findings are most prominent on the medial aspect. Osteopenia and soft tissue swelling about the ankle without evidence of trauma, infectious bony changes, or soft tissue gas. Xr Foot Right (2 Views)    Result Date: 2018  Patient MRN:  [id number] : 1940 Age: 65 years Gender: Male Order Date:  2018 5:30 PM EXAM: XR FOOT RIGHT (2 VIEWS) NUMBER OF IMAGES:  3 views INDICATION: Diabetic soft tissue ulceration COMPARISON: None FINDINGS: Bony structures are intact with preservation of alignment and joint spacing. There appears to be diffuse osteopenia, with degenerative change of the first MTP joint and mild flexion changes of the second through fourth MTP joints. There is a small plantar traction spur of the calcaneus. No discrete/advanced osteomyelitic changes are identified. No focal soft tissue abnormalities are identified. No evidence of traumatic or infectious skeletal findings. Diffuse osteopenia. No evidence of soft tissue gas or radiopaque foreign bod there is mild forefoot soft tissue swelling. Y.    Nallely Media Toe Left (min 2 Views)    Result Date: 2018  Patient MRN:  [id number] : 1940 Age: 65 years Gender: Male Order Date:  2018 5:30 PM EXAM: XR TOE LEFT (MIN 2 VIEWS) NUMBER OF IMAGES: views INDICATION: E11.8 Diabetic foot (HCC) soft tissue ulcerations COMPARISON: None FINDINGS: There is evidence of distal second metatarsal amputation, but remaining digits show normal mineralization, alignment, and joint spacing.  There is some generalized forefoot soft tissue swelling with a regular

## 2018-08-28 NOTE — PATIENT CARE CONFERENCE
[facility] Quality Flow/Interdisciplinary Rounds Progress Note        Quality Flow Rounds held on August 28, 2018    Disciplines Attending:  Bedside Nurse, ,  and Nursing Unit Leadership    Yas Pappas was admitted on 8/26/2018  3:15 PM    Anticipated Discharge Date:  Expected Discharge Date: 08/27/18    Disposition:    Renaldo Score:  Renaldo Scale Score: 19    Readmission Risk              Risk of Unplanned Readmission:        11             Discussed patient goal for the day, patient clinical progression, and barriers to discharge.   The following Goal(s) of the Day/Commitment(s) have been identified:  Labs - Report Results      Angela Horton  August 28, 2018

## 2018-08-31 LAB
BLOOD CULTURE, ROUTINE: NORMAL
CULTURE, BLOOD 2: NORMAL

## 2019-04-05 ENCOUNTER — HOSPITAL ENCOUNTER (OUTPATIENT)
Dept: HOSPITAL 83 - WOUNDCARE | Age: 79
Discharge: HOME | End: 2019-04-05
Attending: NURSE PRACTITIONER
Payer: COMMERCIAL

## 2019-04-05 DIAGNOSIS — Z89.422: ICD-10-CM

## 2019-04-05 DIAGNOSIS — E11.621: ICD-10-CM

## 2019-04-05 DIAGNOSIS — E11.51: ICD-10-CM

## 2019-04-05 DIAGNOSIS — E11.622: Primary | ICD-10-CM

## 2019-04-05 DIAGNOSIS — L97.511: ICD-10-CM

## 2019-04-05 DIAGNOSIS — L97.812: ICD-10-CM

## 2019-04-05 DIAGNOSIS — L97.522: ICD-10-CM

## 2019-04-05 DIAGNOSIS — L97.412: ICD-10-CM

## 2019-04-05 DIAGNOSIS — L97.311: ICD-10-CM

## 2019-04-12 ENCOUNTER — HOSPITAL ENCOUNTER (OUTPATIENT)
Dept: GENERAL RADIOLOGY | Age: 79
Discharge: HOME | End: 2019-04-14
Payer: MEDICARE

## 2019-04-12 ENCOUNTER — HOSPITAL ENCOUNTER (OUTPATIENT)
Dept: ULTRASOUND IMAGING | Age: 79
Discharge: HOME | End: 2019-04-14
Payer: MEDICARE

## 2019-04-12 ENCOUNTER — HOSPITAL ENCOUNTER (OUTPATIENT)
Dept: INTERVENTIONAL RADIOLOGY/VASCULAR | Age: 79
Discharge: HOME | End: 2019-04-14
Payer: MEDICARE

## 2019-04-12 DIAGNOSIS — L97.519 ULCER OF RIGHT FOOT, UNSPECIFIED ULCER STAGE (HCC): ICD-10-CM

## 2019-04-12 DIAGNOSIS — I73.9 PERIPHERAL VASCULAR DISEASE, UNSPECIFIED (HCC): ICD-10-CM

## 2019-04-12 PROCEDURE — 93970 EXTREMITY STUDY: CPT

## 2019-04-12 PROCEDURE — 93925 LOWER EXTREMITY STUDY: CPT

## 2019-04-12 PROCEDURE — 73630 X-RAY EXAM OF FOOT: CPT

## 2019-04-20 ENCOUNTER — HOSPITAL ENCOUNTER (INPATIENT)
Age: 79
LOS: 3 days | Discharge: ANOTHER HOSPITAL | DRG: 638 | End: 2019-04-23
Attending: EMERGENCY MEDICINE | Admitting: INTERNAL MEDICINE
Payer: MEDICARE

## 2019-04-20 DIAGNOSIS — E11.621 DIABETIC ULCER OF OTHER PART OF RIGHT FOOT ASSOCIATED WITH TYPE 2 DIABETES MELLITUS, UNSPECIFIED ULCER STAGE (HCC): Primary | ICD-10-CM

## 2019-04-20 DIAGNOSIS — L97.519 DIABETIC ULCER OF OTHER PART OF RIGHT FOOT ASSOCIATED WITH TYPE 2 DIABETES MELLITUS, UNSPECIFIED ULCER STAGE (HCC): Primary | ICD-10-CM

## 2019-04-20 PROBLEM — M86.9 OSTEOMYELITIS (HCC): Status: ACTIVE | Noted: 2019-04-20

## 2019-04-20 PROBLEM — L08.9 DIABETIC FOOT INFECTION (HCC): Status: RESOLVED | Noted: 2018-08-27 | Resolved: 2019-04-20

## 2019-04-20 PROBLEM — M86.9 DIABETIC FOOT ULCER WITH OSTEOMYELITIS (HCC): Status: RESOLVED | Noted: 2017-01-01 | Resolved: 2019-04-20

## 2019-04-20 PROBLEM — L97.509 DIABETIC FOOT ULCER WITH OSTEOMYELITIS (HCC): Status: RESOLVED | Noted: 2017-01-01 | Resolved: 2019-04-20

## 2019-04-20 PROBLEM — E11.69 DIABETIC FOOT ULCER WITH OSTEOMYELITIS (HCC): Status: RESOLVED | Noted: 2017-01-01 | Resolved: 2019-04-20

## 2019-04-20 PROBLEM — E11.628 DIABETIC FOOT INFECTION (HCC): Status: RESOLVED | Noted: 2018-08-27 | Resolved: 2019-04-20

## 2019-04-20 LAB
ANION GAP SERPL CALCULATED.3IONS-SCNC: 9 MMOL/L (ref 7–16)
BASOPHILS ABSOLUTE: 0.05 E9/L (ref 0–0.2)
BASOPHILS RELATIVE PERCENT: 0.3 % (ref 0–2)
BUN BLDV-MCNC: 24 MG/DL (ref 8–23)
C-REACTIVE PROTEIN: 13 MG/DL (ref 0–0.4)
CALCIUM SERPL-MCNC: 9.5 MG/DL (ref 8.6–10.2)
CHLORIDE BLD-SCNC: 99 MMOL/L (ref 98–107)
CO2: 30 MMOL/L (ref 22–29)
CREAT SERPL-MCNC: 1 MG/DL (ref 0.7–1.2)
EOSINOPHILS ABSOLUTE: 0.21 E9/L (ref 0.05–0.5)
EOSINOPHILS RELATIVE PERCENT: 1.3 % (ref 0–6)
GFR AFRICAN AMERICAN: >60
GFR NON-AFRICAN AMERICAN: >60 ML/MIN/1.73
GLUCOSE BLD-MCNC: 222 MG/DL (ref 74–99)
HCT VFR BLD CALC: 33.2 % (ref 37–54)
HEMOGLOBIN: 10.7 G/DL (ref 12.5–16.5)
IMMATURE GRANULOCYTES #: 0.11 E9/L
IMMATURE GRANULOCYTES %: 0.7 % (ref 0–5)
LYMPHOCYTES ABSOLUTE: 1.29 E9/L (ref 1.5–4)
LYMPHOCYTES RELATIVE PERCENT: 8.1 % (ref 20–42)
MCH RBC QN AUTO: 30.1 PG (ref 26–35)
MCHC RBC AUTO-ENTMCNC: 32.2 % (ref 32–34.5)
MCV RBC AUTO: 93.3 FL (ref 80–99.9)
METER GLUCOSE: 207 MG/DL (ref 74–99)
MONOCYTES ABSOLUTE: 0.9 E9/L (ref 0.1–0.95)
MONOCYTES RELATIVE PERCENT: 5.6 % (ref 2–12)
NEUTROPHILS ABSOLUTE: 13.4 E9/L (ref 1.8–7.3)
NEUTROPHILS RELATIVE PERCENT: 84 % (ref 43–80)
PDW BLD-RTO: 13.5 FL (ref 11.5–15)
PLATELET # BLD: 244 E9/L (ref 130–450)
PMV BLD AUTO: 9.9 FL (ref 7–12)
POTASSIUM REFLEX MAGNESIUM: 4.4 MMOL/L (ref 3.5–5)
RBC # BLD: 3.56 E12/L (ref 3.8–5.8)
SEDIMENTATION RATE, ERYTHROCYTE: 90 MM/HR (ref 0–15)
SODIUM BLD-SCNC: 138 MMOL/L (ref 132–146)
WBC # BLD: 16 E9/L (ref 4.5–11.5)

## 2019-04-20 PROCEDURE — 87070 CULTURE OTHR SPECIMN AEROBIC: CPT

## 2019-04-20 PROCEDURE — 2580000003 HC RX 258: Performed by: INTERNAL MEDICINE

## 2019-04-20 PROCEDURE — 87040 BLOOD CULTURE FOR BACTERIA: CPT

## 2019-04-20 PROCEDURE — 80048 BASIC METABOLIC PNL TOTAL CA: CPT

## 2019-04-20 PROCEDURE — 85651 RBC SED RATE NONAUTOMATED: CPT

## 2019-04-20 PROCEDURE — 87186 SC STD MICRODIL/AGAR DIL: CPT

## 2019-04-20 PROCEDURE — 1200000000 HC SEMI PRIVATE

## 2019-04-20 PROCEDURE — 85025 COMPLETE CBC W/AUTO DIFF WBC: CPT

## 2019-04-20 PROCEDURE — 99284 EMERGENCY DEPT VISIT MOD MDM: CPT

## 2019-04-20 PROCEDURE — 86140 C-REACTIVE PROTEIN: CPT

## 2019-04-20 PROCEDURE — 82962 GLUCOSE BLOOD TEST: CPT

## 2019-04-20 RX ORDER — TAMSULOSIN HYDROCHLORIDE 0.4 MG/1
0.4 CAPSULE ORAL DAILY
Status: DISCONTINUED | OUTPATIENT
Start: 2019-04-21 | End: 2019-04-23 | Stop reason: HOSPADM

## 2019-04-20 RX ORDER — SODIUM CHLORIDE 0.9 % (FLUSH) 0.9 %
10 SYRINGE (ML) INJECTION EVERY 12 HOURS SCHEDULED
Status: DISCONTINUED | OUTPATIENT
Start: 2019-04-20 | End: 2019-04-23 | Stop reason: HOSPADM

## 2019-04-20 RX ORDER — DEXTROSE MONOHYDRATE 25 G/50ML
12.5 INJECTION, SOLUTION INTRAVENOUS PRN
Status: DISCONTINUED | OUTPATIENT
Start: 2019-04-20 | End: 2019-04-23 | Stop reason: HOSPADM

## 2019-04-20 RX ORDER — CILOSTAZOL 100 MG/1
100 TABLET ORAL 2 TIMES DAILY
Status: DISCONTINUED | OUTPATIENT
Start: 2019-04-20 | End: 2019-04-23 | Stop reason: HOSPADM

## 2019-04-20 RX ORDER — NICOTINE POLACRILEX 4 MG
15 LOZENGE BUCCAL PRN
Status: DISCONTINUED | OUTPATIENT
Start: 2019-04-20 | End: 2019-04-23 | Stop reason: HOSPADM

## 2019-04-20 RX ORDER — SODIUM CHLORIDE 9 MG/ML
INJECTION, SOLUTION INTRAVENOUS CONTINUOUS
Status: ACTIVE | OUTPATIENT
Start: 2019-04-20 | End: 2019-04-21

## 2019-04-20 RX ORDER — INSULIN GLARGINE 100 [IU]/ML
30 INJECTION, SOLUTION SUBCUTANEOUS NIGHTLY
Status: DISCONTINUED | OUTPATIENT
Start: 2019-04-20 | End: 2019-04-23 | Stop reason: HOSPADM

## 2019-04-20 RX ORDER — DEXTROSE MONOHYDRATE 50 MG/ML
100 INJECTION, SOLUTION INTRAVENOUS PRN
Status: DISCONTINUED | OUTPATIENT
Start: 2019-04-20 | End: 2019-04-23 | Stop reason: HOSPADM

## 2019-04-20 RX ORDER — LISINOPRIL 10 MG/1
10 TABLET ORAL DAILY
Status: DISCONTINUED | OUTPATIENT
Start: 2019-04-21 | End: 2019-04-23 | Stop reason: HOSPADM

## 2019-04-20 RX ORDER — SODIUM CHLORIDE 0.9 % (FLUSH) 0.9 %
10 SYRINGE (ML) INJECTION PRN
Status: DISCONTINUED | OUTPATIENT
Start: 2019-04-20 | End: 2019-04-23 | Stop reason: HOSPADM

## 2019-04-20 RX ADMIN — Medication 10 ML: at 22:28

## 2019-04-20 RX ADMIN — SODIUM CHLORIDE: 9 INJECTION, SOLUTION INTRAVENOUS at 22:28

## 2019-04-20 ASSESSMENT — ENCOUNTER SYMPTOMS
EYE PAIN: 0
VOMITING: 0
DIARRHEA: 0
SORE THROAT: 0
BACK PAIN: 0
NAUSEA: 0
COUGH: 0
EYE REDNESS: 0
SINUS PRESSURE: 0
EYE DISCHARGE: 0
ABDOMINAL PAIN: 0
SHORTNESS OF BREATH: 0
WHEEZING: 0

## 2019-04-20 ASSESSMENT — PAIN SCALES - GENERAL: PAINLEVEL_OUTOF10: 0

## 2019-04-20 NOTE — ED PROVIDER NOTES
Patient is a 16-year-old male with a history of diabetes. He is presenting with multiple diabetic foot ulcers on the right foot. He is currently being treated by wound care specialist. They have a follow-up appointment on Tuesday. However, the wife was concerned because his left toe turned \"black overnight\". She was afraid it needs to be seen prior to her appointment on Tuesday. Patient denies any fevers, chills or any systemic symptoms. The history is provided by the patient. Wound Check    He was treated in the ED 5 to 10 days ago. Prior ED Treatment: Diabetic Foot Ulcers. Treatments since wound repair include a wound recheck. Fever duration: No Fevers. Review of Systems   Constitutional: Negative for chills and fever. HENT: Negative for ear pain, sinus pressure and sore throat. Eyes: Negative for pain, discharge and redness. Respiratory: Negative for cough, shortness of breath and wheezing. Cardiovascular: Negative for chest pain. Gastrointestinal: Negative for abdominal pain, diarrhea, nausea and vomiting. Genitourinary: Negative for dysuria and frequency. Musculoskeletal: Negative for arthralgias and back pain. Skin: Negative for rash and wound. Neurological: Negative for weakness and headaches. Hematological: Negative for adenopathy. All other systems reviewed and are negative. Physical Exam   Constitutional: He is oriented to person, place, and time. He appears well-developed and well-nourished. HENT:   Head: Normocephalic and atraumatic. Eyes: Pupils are equal, round, and reactive to light. Neck: Normal range of motion. Neck supple. Cardiovascular: Normal rate, regular rhythm and normal heart sounds. Pulmonary/Chest: Effort normal and breath sounds normal. No respiratory distress. He has no wheezes. He has no rales. Abdominal: Soft. Bowel sounds are normal. There is no tenderness. There is no rebound and no guarding.    Musculoskeletal: He exhibits no edema. Neurological: He is alert and oriented to person, place, and time. No cranial nerve deficit. Coordination normal.   Skin: Skin is warm and dry. Multiple foot ulcers on the right foot. Malodorous. 2nd Metatarsal is purple and 2nd/3rd metatarsals have small necrotic looking patches on the dorsal aspect of them. Nursing note and vitals reviewed. Procedures    MDM  Number of Diagnoses or Management Options  Diagnosis management comments: Patient is a 26-year-old male with history of diabetes. He is presenting with multiple diabetic foot ulcers on the right foot. He has been seen by wound care was here on the 12th have an x-ray and ultrasound of the foot. Wife states that overnight his 2nd metatarsal turn black. On exam it appears more purple and just appears little devascularized. The 2nd 3rd toe have black possibly necrotic patches on the dorsal aspect of them. Wife states that those have been there and have looked like that before. Patient denies any systemic symptoms. We will get some basic labs as well as ESR, CRP and some blood cultures to r/o acute infection. ED Course as of Apr 20 2020   Sat Apr 20, 2019 1928 Will obtain blood and wound cultures and provide the patient with a dose of Vanc/Zosyn. Consult out to Ariel Aponte for admission. [AL]      ED Course User Index  [AL] Unique Mora DO     ED Course as of Apr 20 2021   Sat Apr 20, 2019 1928 Will obtain blood and wound cultures and provide the patient with a dose of Vanc/Zosyn. Consult out to Ariel Aponte for admission. [AL]      ED Course User Index  [AL] Unique Mora DO       --------------------------------------------- PAST HISTORY ---------------------------------------------  Past Medical History:  has a past medical history of Blood circulation, collateral, Cellulitis of foot, left, Diabetes mellitus ([address] Diabetic foot ulcer with osteomyelitis ([facility] [address] Femoral-popliteal atherosclerosis ([facility] [address] and Hypertension.     Past Surgical mg/dL       RADIOLOGY:  No orders to display     ------------------------- NURSING NOTES AND VITALS REVIEWED ---------------------------  Date / Time Roomed:  4/20/2019  6:16 PM  ED Bed Assignment:  05/05    The nursing notes within the ED encounter and vital signs as below have been reviewed. Patient Vitals for the past 24 hrs:   BP Temp Temp src Pulse Resp SpO2 Height Weight   04/20/19 1929 (!) 163/74 98.5 °F (36.9 °C) Oral 77 16 97 % -- --   04/20/19 1814 (!) 146/66 98.7 °F (37.1 °C) -- 83 14 96 % 5' 10\" (1.778 m) 138 lb (62.6 kg)       Oxygen Saturation Interpretation: Normal    Counseling:  I have spoken with the patient and discussed todays results, in addition to providing specific details for the plan of care and counseling regarding the diagnosis and prognosis. Their questions are answered at this time and they are agreeable with the plan of admission.    --------------------------------- ADDITIONAL PROVIDER NOTES ---------------------------------  Consultations:  Time: 2015. Spoke with Dr. Camila Arora. Discussed case. They will admit the patient. This patient's ED course included: a personal history and physicial examination, re-evaluation prior to disposition, multiple bedside re-evaluations and IV medications    This patient has remained hemodynamically stable during their ED course. Diagnosis:  1. Diabetic ulcer of other part of right foot associated with type 2 diabetes mellitus, unspecified ulcer stage ([facility] [address]        Disposition:  Patient's disposition: Admit to med/surg floor  Patient's condition is good.          Alfa Espino DO  Resident  04/20/19 2021

## 2019-04-21 ENCOUNTER — APPOINTMENT (OUTPATIENT)
Dept: GENERAL RADIOLOGY | Age: 79
DRG: 638 | End: 2019-04-21
Payer: MEDICARE

## 2019-04-21 LAB
ALBUMIN SERPL-MCNC: 2.6 G/DL (ref 3.5–5.2)
ALP BLD-CCNC: 116 U/L (ref 40–129)
ALT SERPL-CCNC: 10 U/L (ref 0–40)
ANION GAP SERPL CALCULATED.3IONS-SCNC: 8 MMOL/L (ref 7–16)
AST SERPL-CCNC: 12 U/L (ref 0–39)
BASOPHILS ABSOLUTE: 0.04 E9/L (ref 0–0.2)
BASOPHILS RELATIVE PERCENT: 0.3 % (ref 0–2)
BILIRUB SERPL-MCNC: 0.5 MG/DL (ref 0–1.2)
BUN BLDV-MCNC: 21 MG/DL (ref 8–23)
CALCIUM SERPL-MCNC: 8.7 MG/DL (ref 8.6–10.2)
CHLORIDE BLD-SCNC: 101 MMOL/L (ref 98–107)
CO2: 27 MMOL/L (ref 22–29)
CREAT SERPL-MCNC: 0.8 MG/DL (ref 0.7–1.2)
EOSINOPHILS ABSOLUTE: 0.22 E9/L (ref 0.05–0.5)
EOSINOPHILS RELATIVE PERCENT: 1.8 % (ref 0–6)
GFR AFRICAN AMERICAN: >60
GFR NON-AFRICAN AMERICAN: >60 ML/MIN/1.73
GLUCOSE BLD-MCNC: 287 MG/DL (ref 74–99)
HBA1C MFR BLD: 7 % (ref 4–5.6)
HCT VFR BLD CALC: 28.3 % (ref 37–54)
HEMOGLOBIN: 9 G/DL (ref 12.5–16.5)
IMMATURE GRANULOCYTES #: 0.05 E9/L
IMMATURE GRANULOCYTES %: 0.4 % (ref 0–5)
LACTIC ACID, SEPSIS: 0.9 MMOL/L (ref 0.5–1.9)
LYMPHOCYTES ABSOLUTE: 1.43 E9/L (ref 1.5–4)
LYMPHOCYTES RELATIVE PERCENT: 11.4 % (ref 20–42)
MAGNESIUM: 1.9 MG/DL (ref 1.6–2.6)
MCH RBC QN AUTO: 29.4 PG (ref 26–35)
MCHC RBC AUTO-ENTMCNC: 31.8 % (ref 32–34.5)
MCV RBC AUTO: 92.5 FL (ref 80–99.9)
METER GLUCOSE: 209 MG/DL (ref 74–99)
METER GLUCOSE: 251 MG/DL (ref 74–99)
METER GLUCOSE: 257 MG/DL (ref 74–99)
METER GLUCOSE: 314 MG/DL (ref 74–99)
MONOCYTES ABSOLUTE: 0.82 E9/L (ref 0.1–0.95)
MONOCYTES RELATIVE PERCENT: 6.5 % (ref 2–12)
NEUTROPHILS ABSOLUTE: 10 E9/L (ref 1.8–7.3)
NEUTROPHILS RELATIVE PERCENT: 79.6 % (ref 43–80)
PDW BLD-RTO: 13.6 FL (ref 11.5–15)
PLATELET # BLD: 203 E9/L (ref 130–450)
PMV BLD AUTO: 9.6 FL (ref 7–12)
POTASSIUM SERPL-SCNC: 4 MMOL/L (ref 3.5–5)
RBC # BLD: 3.06 E12/L (ref 3.8–5.8)
SODIUM BLD-SCNC: 136 MMOL/L (ref 132–146)
TOTAL PROTEIN: 6.7 G/DL (ref 6.4–8.3)
WBC # BLD: 12.6 E9/L (ref 4.5–11.5)

## 2019-04-21 PROCEDURE — 82962 GLUCOSE BLOOD TEST: CPT

## 2019-04-21 PROCEDURE — 80053 COMPREHEN METABOLIC PANEL: CPT

## 2019-04-21 PROCEDURE — 83605 ASSAY OF LACTIC ACID: CPT

## 2019-04-21 PROCEDURE — 73630 X-RAY EXAM OF FOOT: CPT

## 2019-04-21 PROCEDURE — 99223 1ST HOSP IP/OBS HIGH 75: CPT | Performed by: SURGERY

## 2019-04-21 PROCEDURE — 83735 ASSAY OF MAGNESIUM: CPT

## 2019-04-21 PROCEDURE — 6360000002 HC RX W HCPCS: Performed by: SPECIALIST

## 2019-04-21 PROCEDURE — 83036 HEMOGLOBIN GLYCOSYLATED A1C: CPT

## 2019-04-21 PROCEDURE — 6370000000 HC RX 637 (ALT 250 FOR IP): Performed by: INTERNAL MEDICINE

## 2019-04-21 PROCEDURE — 36415 COLL VENOUS BLD VENIPUNCTURE: CPT

## 2019-04-21 PROCEDURE — 1200000000 HC SEMI PRIVATE

## 2019-04-21 PROCEDURE — 2580000003 HC RX 258: Performed by: INTERNAL MEDICINE

## 2019-04-21 PROCEDURE — 2580000003 HC RX 258: Performed by: SPECIALIST

## 2019-04-21 PROCEDURE — 85025 COMPLETE CBC W/AUTO DIFF WBC: CPT

## 2019-04-21 RX ADMIN — MEROPENEM 1 G: 1 INJECTION, POWDER, FOR SOLUTION INTRAVENOUS at 23:13

## 2019-04-21 RX ADMIN — MEROPENEM 1 G: 1 INJECTION, POWDER, FOR SOLUTION INTRAVENOUS at 15:16

## 2019-04-21 RX ADMIN — SODIUM CHLORIDE: 9 INJECTION, SOLUTION INTRAVENOUS at 03:59

## 2019-04-21 RX ADMIN — VANCOMYCIN HYDROCHLORIDE 1250 MG: 10 INJECTION, POWDER, LYOPHILIZED, FOR SOLUTION INTRAVENOUS at 20:23

## 2019-04-21 RX ADMIN — SODIUM CHLORIDE: 9 INJECTION, SOLUTION INTRAVENOUS at 17:26

## 2019-04-21 RX ADMIN — LISINOPRIL 10 MG: 10 TABLET ORAL at 09:49

## 2019-04-21 ASSESSMENT — PAIN SCALES - GENERAL: PAINLEVEL_OUTOF10: 0

## 2019-04-21 NOTE — CONSULTS
Vascular Surgery Inpatient Consultation Note      Reason for Consultation:  PVD    HISTORY OF PRESENT ILLNESS:                The patient is a 77 y.o. male who is admitted to the hospital for treatment of right foot infection. Pt has history of long-standing diabetes mellitus. 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 His wife noticed discoloration of his right second toe and he came into the hospital.  He was hyperglycemic. He had an elevated white count and a culture was obtained. The patient is being treated with antibiotics. He is followed by podiatry. Vascular surgery is consulted for evaluation and treatment. The patient is known to our practice and has seen Dr. Elias Cortés in the past for peripheral vascular disease. IMPRESSION:  Diabetic foot infection involving the right second toe. I agree with the current treatment with intravenous antibiotics and local wound care. The patient has popliteal pulses bilaterally and arterial studies that show flow to both feet. He certainly has tibial disease from his long-standing diabetes however I do not feel that his infection is due to arterial insufficiency in origin. Healing will certainly be affected by perfusion. The patient does not require any urgent vascular intervention at this time. Okay for toe amputation from vascular surgery standpoint if required. I will have him follow up with Dr. Elias Cortés in the office or Dr. Giovanni Kee for an arteriogram if needed.         Patient Active Problem List   Diagnosis Code    Diabetes mellitus type 2, uncontrolled ([address] E11.65    Femoral-popliteal atherosclerosis ([address] I70.209    HTN (hypertension), benign I10    Osteomyelitis ([address] M86.9       Past Medical History:   Diagnosis Date    Blood circulation, collateral     Cellulitis of foot, left 1/1/2017    Diabetes mellitus ([address]     Diabetic foot ulcer with osteomyelitis ([address] 1/1/2017    Femoral-popliteal atherosclerosis ([facility] [address] 1/1/2017    Hypertension         Past Surgical History:   Procedure Laterality Date    FOOT DEBRIDEMENT Left 01/04/2017    wound debridement and delayed primary closure    PROSTATE BIOPSY  04/2016    TOE AMPUTATION Left 12/31/2016    2nd       Current Medications:    dextrose      sodium chloride 150 mL/hr at 04/21/19 0359      sodium chloride flush, glucose, dextrose, glucagon (rDNA), dextrose    vancomycin  1,250 mg Intravenous Q24H    meropenem  1 g Intravenous Q8H    aspirin  325 mg Oral Daily    cilostazol  100 mg Oral BID    insulin glargine  30 Units Subcutaneous Nightly    lisinopril  10 mg Oral Daily    tamsulosin  0.4 mg Oral Daily    sodium chloride flush  10 mL Intravenous 2 times per day    enoxaparin  40 mg Subcutaneous Daily    insulin lispro  0-12 Units Subcutaneous TID WC    insulin lispro  0-6 Units Subcutaneous Nightly        Allergies:  Latex;  Aspirin; Pcn [penicillins]; and Other    Social History     Socioeconomic History    Marital status:      Spouse name: Not on file    Number of children: Not on file    Years of education: Not on file    Highest education level: Not on file   Occupational History    Not on file   Social Needs    Financial resource strain: Not on file    Food insecurity:     Worry: Not on file     Inability: Not on file    Transportation needs:     Medical: Not on file     Non-medical: Not on file   Tobacco Use    Smoking status: Former Smoker    Smokeless tobacco: Never Used   Substance and Sexual Activity    Alcohol use: No    Drug use: No    Sexual activity: Not on file   Lifestyle    Physical activity:     Days per week: Not on file     Minutes per session: Not on file    Stress: Not on file   Relationships    Social connections:     Talks on phone: Not on file     Gets together: Not on file     Attends Worship service: Not on file     Active member of club or organization: Not on file     Attends meetings of clubs or organizations: Not on file     Relationship status: Not on file    Intimate partner violence:     Fear of current or ex partner: Not on file     Emotionally abused: Not on file     Physically abused: Not on file     Forced sexual activity: Not on file   Other Topics Concern    Not on file   Social History Narrative    Not on file        Family History   Problem Relation Age of Onset    Heart Disease Mother     Heart Disease Father        REVIEW OF SYSTEMS:      Eyes:      Blurred vision:  No [x]/Yes []               Diplopia:   No [x]/Yes []               Vision loss:       No [x]/Yes []   Ears, nose, throat:             Hearing loss:    No [x]/Yes []      Vertigo:   No [x]/Yes []                       Swallowing problem:  No [x]/Yes []               Nose bleeds:   No [x]/Yes []      Voice hoarseness:  No [x]/Yes []  Respiratory:             Cough:   No [x]/Yes []      Pleuritic chest pain:  No [x]/Yes []                        Dyspnea:   No [x]/Yes []      Wheezing:   No [x]/Yes []  Cardiovascular:             Angina:   No [x]/Yes []      Palpitations:   No [x]/Yes []          Claudication:    No [x]/Yes []      Leg swelling:   No [x]/Yes []  Gastrointestinal:             Nausea or vomiting:  No [x]/Yes []               Abdominal pain:  No [x]/Yes []                     Intestinal bleeding: No [x]/Yes []  Musculoskeletal:             Leg pain:   No [x]/Yes []      Back pain:   No [x]/Yes []                    Weakness:   No []/Yes [x]  Neurologic:             Numbness:   No []/Yes [x]      Paralysis:   No [x]/Yes []                       Headaches:   No [x]/Yes []  Hematologic, lymphatic:   Anemia:   No [x]/Yes []              Bleeding or bruising:  No [x]/Yes []              Fevers or chills: No [x]/Yes []  Endocrine:             Temp intolerance:   No [x]/Yes []                       Polydipsia, polyuria:  No [x]/Yes []  Skin:              Rash:    No [x]/Yes []      Ulcers:   No []/Yes [x] Abnorm pigment: No [x]/Yes []  :              Frequency/urgency:  No [x]/Yes []      Hematuria:    No [x]/Yes []                      Incontinence:    No [x]/Yes []    PHYSICAL EXAM:  Vitals:    04/21/19 0745   BP: (!) 172/80   Pulse: 75   Resp: 16   Temp: 98.4 °F (36.9 °C)   SpO2: 97%     General Appearance: alert and oriented to person, place and time, well developed and well- nourished, in no acute distress  Skin: warm and dry, no rash or erythema, brownish pigmentation lower legs. Head: normocephalic and atraumatic  Eyes: extraocular eye movements intact, conjunctivae normal  ENT: external ear and ear canal normal bilaterally, nose without deformity  Pulmonary/Chest: normal air movement, no respiratory distress  Cardiovascular: normal rate, regular rhythm  Abdomen: soft, non-tender, non-distended, no masses or organomegaly  Musculoskeletal: normal range of motion, no joint deformity or tenderness  Neurologic: no cranial nerve deficit, speech normal  Extremities: right lower leg edema, amputated left 2nd toe. Right foot wrapped. 2nd toe tip cyanotic. PULSE EXAM      Right      Left   Brachial     Radial 3 3   Femoral     Popliteal 2 2   Dorsalis Pedis  0   Posterior Tibial 0 0   (3=normal, 2=diminished, 1=barely palpable, 4=widened)      LABS:    Lab Results   Component Value Date    WBC 12.6 (H) 04/21/2019    HGB 9.0 (L) 04/21/2019    HCT 28.3 (L) 04/21/2019     04/21/2019    PROTIME 15.0 (H) 12/30/2016    INR 1.4 12/30/2016    K 4.0 04/21/2019    BUN 21 04/21/2019    CREATININE 0.8 04/21/2019       RADIOLOGY:    Arterial duplex reviewed.

## 2019-04-21 NOTE — H&P
[address] [facility]  Attending History and Physical      CHIEF COMPLAINT:  Right foot infection      HISTORY OF PRESENT ILLNESS:      The patient is a 77 y.o. male patient of dr Xiomara babcock who presents with complains of right foot infection. Patient has been battling wounds and infections to his lower extremities for awhile. He follows with podiatry, ID and vascular surgery as an outpatient. He is currently having his lower extremity vascular supply evaluated. Nonetheless, he had worsening discoloration of his toes. He had discharge from wounds. He presented to the ED. He denied chest pain, shortness of breath, abdominal pain, nausea, vomiting, fevers, chills and diaphoresis. He did not take anything at home for the symptoms. He is resting comfortably in bed at this time.          Past Medical History:    Past Medical History:   Diagnosis Date    Blood circulation, collateral     Cellulitis of foot, left 1/1/2017    Diabetes mellitus ([address]     Diabetic foot ulcer with osteomyelitis ([facility] [address] 1/1/2017    Femoral-popliteal atherosclerosis ([facility] [address] 1/1/2017    Hypertension        Past Surgical History:    Past Surgical History:   Procedure Laterality Date    FOOT DEBRIDEMENT Left 01/04/2017    wound debridement and delayed primary closure    PROSTATE BIOPSY  04/2016    TOE AMPUTATION Left 12/31/2016    2nd       Medications Prior to Admission:    Medications Prior to Admission: [DISCONTINUED] aspirin 325 MG EC tablet, Take 1 tablet by mouth daily  [DISCONTINUED] insulin glargine (LANTUS) 100 UNIT/ML injection vial, Inject 30 Units into the skin nightly  [DISCONTINUED] insulin lispro (HUMALOG) 100 UNIT/ML injection vial, Inject 0-12 Units into the skin 3 times daily (with meals)  [DISCONTINUED] insulin lispro (HUMALOG) 100 UNIT/ML injection vial, Inject 0-6 Units into the skin nightly  [DISCONTINUED] tamsulosin (FLOMAX) 0.4 MG capsule, Take 1 capsule by mouth daily  [DISCONTINUED] cilostazol Component Value Date     04/21/2019    K 4.0 04/21/2019    K 4.4 04/20/2019     04/21/2019    CO2 27 04/21/2019    BUN 21 04/21/2019    CREATININE 0.8 04/21/2019    GFRAA >60 04/21/2019    LABGLOM >60 04/21/2019    GLUCOSE 287 04/21/2019    PROT 6.7 04/21/2019    LABALBU 2.6 04/21/2019    CALCIUM 8.7 04/21/2019    BILITOT 0.5 04/21/2019    ALKPHOS 116 04/21/2019    AST 12 04/21/2019    ALT 10 04/21/2019     BMP:    Lab Results   Component Value Date     04/21/2019    K 4.0 04/21/2019    K 4.4 04/20/2019     04/21/2019    CO2 27 04/21/2019    BUN 21 04/21/2019    LABALBU 2.6 04/21/2019    CREATININE 0.8 04/21/2019    CALCIUM 8.7 04/21/2019    GFRAA >60 04/21/2019    LABGLOM >60 04/21/2019    GLUCOSE 287 04/21/2019     Magnesium:    Lab Results   Component Value Date    MG 1.9 04/21/2019     Phosphorus:  No results found for: PHOS  PT/INR:    Lab Results   Component Value Date    PROTIME 15.0 12/30/2016    INR 1.4 12/30/2016     PTT:    Lab Results   Component Value Date    APTT 26.1 12/30/2016   [APTT}  Troponin:    Lab Results   Component Value Date    TROPONINI <0.01 08/26/2018     Last 3 Troponin:    Lab Results   Component Value Date    TROPONINI <0.01 08/26/2018     U/A:    Lab Results   Component Value Date    COLORU Yellow 01/11/2017    PROTEINU 30 01/11/2017    PHUR 6.5 01/11/2017    LABCAST FEW 12/30/2016    WBCUA 1-3 01/11/2017    RBCUA >20 01/11/2017    BACTERIA RARE 01/11/2017    CLARITYU SL CLOUDY 01/11/2017    SPECGRAV 1.020 01/11/2017    LEUKOCYTESUR Negative 01/11/2017    UROBILINOGEN 0.2 01/11/2017    BILIRUBINUR Negative 01/11/2017    BLOODU LARGE 01/11/2017    GLUCOSEU 250 01/11/2017     HgBA1c:    Lab Results   Component Value Date    LABA1C 7.0 04/21/2019     FLP:  No results found for: TRIG, HDL, LDLCALC, LDLDIRECT, LABVLDL  TSH:  No results found for: TSH    ASSESSMENT:      Patient Active Problem List   Diagnosis    [facility]    Femoral-popliteal atherosclerosis ([facility]    HTN (hypertension), benign    Osteomyelitis ([facility])         PLAN:    Blood glucose ok, continue to adjust basal/bolus insulin therapy  Vascular surgery to evaluate blood supply. Blood pressure ok, continue current medications  ID and podiatry evaluations.     Pt/Ot evaluations for discharge planning    Sawyer Lo MD  9:03 AM  4/21/2019

## 2019-04-21 NOTE — CONSULTS
Department of Podiatry  Resident Consult Note      Reason for Consult:  R foot wounds  Requesting Physician: Dr. Cora Lockhart:  R foot wounds    HISTORY OF PRESENT ILLNESS:                The patient is a 77 y.o.diabetic male who presented for chronic right foot wounds and a recent development of his R second digit turning dark. The patient says that he has been walking around with his padded dressings (which his wife changed 2xdaily) in his CAM boot still while at home. He says that he is in no pain when in bed and off his feet. He recently went to [facility] for evaluation of a new fish wound dressing he saw advertised because he felt that he needed to try something new. They had informed him that he needed continued work-up by vascular surgery and infectious disease. The patient had a follow-up for evaluation of his R foot on Tuesday but his wife felt that he needed to be seen more urgently so he came to the ER at [facility] on 4/20 for work-up. He says that he normally controls his BS by his diet but has had difficulty with keeping it controlled over the past several weeks. He has a history of a left 2nd digit amputation-no current complaints with the left foot. He denies any N/V/F but relates recently having diaphoresis.       Past Medical History:        Diagnosis Date    Blood circulation, collateral     Cellulitis of foot, left 1/1/2017    Diabetes mellitus ([facility]     Diabetic foot ulcer with osteomyelitis ([facility] [address] 1/1/2017    Femoral-popliteal atherosclerosis ([facility] [address] 1/1/2017    Hypertension      Past Surgical History:        Procedure Laterality Date    FOOT DEBRIDEMENT Left 01/04/2017    wound debridement and delayed primary closure    PROSTATE BIOPSY  04/2016    TOE AMPUTATION Left 12/31/2016    2nd     Current Medications:    Current Facility-Administered Medications: vancomycin (VANCOCIN) 1,250 mg in dextrose 5 % 250 mL IVPB, 1,250 mg, Intravenous, Q24H  meropenem (MERREM) 1 base and serous drainage   Encompassing entire heel with a mixed fibro/necrotic woundbed and + probe to bone with serous drainage  Dorso-medial arch with fibrotic base measuring 3.0x2.0x0.2 and 1.5x0.5x0.2, serous drainage   Dorsal 1st IPJ measuring 1.5x0.8.0.2 with dry fibrotic woundbed. Plantar-medial 1st MTPJ 1.0x0.7x0.2 with fibrotic base  Lateral to R 5th metatarsal head measuring 1.8x1.0x0.3 with mixed fibro-granular base and serous drainage  Dorsal 2nd and third digits across the PIPJ each measuring 1.0x0.5x0.2. R 2nd digit appears cyanotic in nature and is cool to the touch to level of the MTPJ- no purulent drainage or streaking proximal on the foot noted, + probe to bone through woundbed   NAILS:  Remaining nails to toes 1-5R and 1,3-5L appear thickened with subunugal debris   NEUROLOGIC: Diminished to fine touch micyk  VASCULAR: Non-palpable DP micky, R non-palpable PT, L PT artery lightly palpable. Digital hair diminished. CFT's are sluggish >3s to remaining digits. MUSCULOSKELETAL:  Strength at LE muscle groups crossing the ankle are 5/5 in sagittal and frontal plans. 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   - Recommend ABX per ID- currently merrem and vanc   - Re-dressed with betadine, aquacell, and DSD  - Ordered offloading heel boots to be worn by patient at all times in bed   - Patient made NWB to R Foot   - Pending vascular surgery input to guide further treatment.    - Micky arterial US studies performed on 4/12 showing multiple levels of LE stenosis micky.   - Ordered additional arterial studies; segmental's, KO, TBI's   - Patient is aware that surgery will likely be required in the near future for either vascular intervention in addition to podiatric intervention.   - New radiographs ordered for R foot, as previous study was taken 1 week prior with no suspected osteomyelitis mentioned by radiology (supicion for lysis of bone at the PIPJ of R 2nd digit)     Patient's care discussed with Dr. Melanie Valverde

## 2019-04-21 NOTE — CONSULTS
Inpatient consult to Infectious Diseases  Consult performed by: Kathi Bose MD  Consult ordered by: Mikey Jason MD        [address] [place] [address] [facility]  Consultation Note     Admit Date: 4/20/2019  6:16 PM    Reason for Consult:   Diabetic ulcer, osteomyelitis    Attending Physician:  Mikey Jason MD    HISTORY OF PRESENT ILLNESS:             The history is obtained from extensive review of available past medical records. The patient is a 77 y.o. male who is known to the service. The patient has a history of ischemic ulcers on legs. Last treated in August 2018. The patient presents to the ED on 4/20/19 with multiple ulcers on the right foot. He was being treated by a his podiatrist with med E Honey and collagen pads. He says he was doing well and had not been on any antibiotics for the last few months. The right 2nd toe turned black overnight so he decided to come to the hospital. His blood sugars were running high for the last couple of days. The patient was evaluated in the ED. His white count was elevated. A superficial wound culture was taken. Podiatry is here to see him. He will be going to the or for probable amputation of the right 2nd toe. Past Medical History:        Diagnosis Date    Blood circulation, collateral     Cellulitis of foot, left 1/1/2017    Diabetes mellitus ([address]     Diabetic foot ulcer with osteomyelitis ([address] 1/1/2017    Femoral-popliteal atherosclerosis ([facility] 1/1/2017    Hypertension      August 2018. Patient was nursing ulcers at home on his own. Encouraged by his wife to come to the hospital. Admitted to [facility] and seen by ID for diabetic foot ulcers. The ulcers looked ischemic and/or colonized with multiple organisms, including MRSA, Proteus and Corynebacterium. Treated with Doxycycline and Levofloxacin. December 2016.  Admitted to [facility] with a limb threatening left diabetic foot infection and impending gangrene of the Lifestyle    Physical activity:     Days per week: None     Minutes per session: None    Stress: None   Relationships    Social connections:     Talks on phone: None     Gets together: None     Attends Muslim service: None     Active member of club or organization: None     Attends meetings of clubs or organizations: None     Relationship status: None    Intimate partner violence:     Fear of current or ex partner: None     Emotionally abused: None     Physically abused: None     Forced sexual activity: None   Other Topics Concern    None   Social History Narrative    None     Pets: No  Travel: None  Patient lives at home with his wife. He retired from [employer] [id number] History:       Problem Relation Age of Onset    Heart Disease Mother     Heart Disease Father    . Otherwise non-pertinent to the chief complaint. REVIEW OF SYSTEMS:    Constitutional: Negative for fevers, chills, occasional diaphoresis  Neurologic: Neuropathy  Psychiatric: Negative  Rheumatologic: Negative   Endocrine: Diabetes  Hematologic: Negative  Immunologic: Negative. Did not get influenza vaccine this season  ENT: Negative  Respiratory: Negative   Cardiovascular: Peripheral arterial disease  GI: Negative  : Negative  Musculoskeletal: As in HPI  Skin: No rashes. PHYSICAL EXAM:    Vitals:   BP (!) 172/80   Pulse 75   Temp 98.4 °F (36.9 °C) (Oral)   Resp 16   Ht 5' 10\" (1.778 m)   Wt 138 lb 2 oz (62.7 kg)   SpO2 97%   BMI 19.82 kg/m²   Constitutional: The patient is awake, alert, and oriented. No distress. Seen with podiatry. Skin: Warm and dry. No rashes were noted. HEENT: Eyes show round, and reactive pupils. No jaundice. Moist mucous membranes, no ulcerations, no thrush. Neck: Supple to movements. No lymphadenopathy. Chest: No use of accessory muscles to breathe. Symmetrical expansion. Auscultation reveals no wheezing, crackles, or rhonchi. Cardiovascular: S1 and S2 are rhythmic and regular.  No murmurs appreciated. Abdomen: Positive bowel sounds to auscultation. Benign to palpation. No masses felt. No hepatosplenomegaly. Extremities: The right leg and foot show multiple punched out ischemic ulcers. No surrounding erythema. A few ulcers on the toes noted. The right 2nd toe is almost necrotic. No odor. No drainage. Lines: peripheral      CBC+dif:  Recent Labs     04/20/19  1903 04/21/19  0401   WBC 16.0* 12.6*   HGB 10.7* 9.0*   HCT 33.2* 28.3*   MCV 93.3 92.5    203   NEUTROABS 13.40* 10.00*     Lab Results   Component Value Date    CRP 13.0 (H) 04/20/2019    CRP 3.7 (H) 08/27/2018    CRP <0.1 03/16/2017      No results found for: CRPHS  Lab Results   Component Value Date    SEDRATE 90 (H) 04/20/2019    SEDRATE 39 (H) 08/28/2018    SEDRATE 65 (H) 08/26/2018     Lab Results   Component Value Date    ALT 10 04/21/2019    AST 12 04/21/2019    ALKPHOS 116 04/21/2019    BILITOT 0.5 04/21/2019     Lab Results   Component Value Date     04/21/2019    K 4.0 04/21/2019    K 4.4 04/20/2019     04/21/2019    CO2 27 04/21/2019    BUN 21 04/21/2019    CREATININE 0.8 04/21/2019    GFRAA >60 04/21/2019    LABGLOM >60 04/21/2019    GLUCOSE 287 04/21/2019    PROT 6.7 04/21/2019    LABALBU 2.6 04/21/2019    CALCIUM 8.7 04/21/2019    BILITOT 0.5 04/21/2019    ALKPHOS 116 04/21/2019    AST 12 04/21/2019    ALT 10 04/21/2019       Lab Results   Component Value Date    PROTIME 15.0 12/30/2016    INR 1.4 12/30/2016       No results found for: TSH    Lab Results   Component Value Date    COLORU Yellow 01/11/2017    PHUR 6.5 01/11/2017    LABCAST FEW 12/30/2016    WBCUA 1-3 01/11/2017    RBCUA >20 01/11/2017    BACTERIA RARE 01/11/2017    CLARITYU SL CLOUDY 01/11/2017    SPECGRAV 1.020 01/11/2017    LEUKOCYTESUR Negative 01/11/2017    UROBILINOGEN 0.2 01/11/2017    BILIRUBINUR Negative 01/11/2017    BLOODU LARGE 01/11/2017    GLUCOSEU 250 01/11/2017       Radiology:  No X-rays were done.     Microbiology:  Pending  No results for input(s): BC in the last 72 hours. No results for input(s): ORG in the last 72 hours. No results for input(s): Swathi Alu in the last 72 hours. No results for input(s): STREPNEUMAGU in the last 72 hours. No results for input(s): LP1UAG in the last 72 hours. No results for input(s): ASO in the last 72 hours. No results for input(s): CULTRESP in the last 72 hours. No results for input(s): PROCAL in the last 72 hours. Assessment:  · Severe peripheral arterial disease right leg  · Ischemic ulcers right leg and foot  · Gangrene right 2nd toe  · Leukocytosis associated to the above  · Uncontrolled diabetes    Plan:    · Start Meropenem and Vancomycin to treat Pseudomonas, anaerobes and MRSA  · Check cultures, baseline ESR, CRP  · Monitor labs  · Will follow with you    Thank you for having us see this patient in consultation. The case was discussed with Podiatry.     Kathi Vanegas  12:31 PM  4/21/2019

## 2019-04-21 NOTE — PROGRESS NOTES
Explained to patient the process of having an infection and it affecting his blood sugar level. Patient still refusing insulin     Patient also states he takes a variety of natural supplements like garlic and charcoal . He no longer takes any of the pharmaceutical medications he was prescribed.

## 2019-04-22 ENCOUNTER — APPOINTMENT (OUTPATIENT)
Dept: INTERVENTIONAL RADIOLOGY/VASCULAR | Age: 79
DRG: 638 | End: 2019-04-22
Payer: MEDICARE

## 2019-04-22 ENCOUNTER — ANESTHESIA EVENT (OUTPATIENT)
Dept: OPERATING ROOM | Age: 79
End: 2019-04-22

## 2019-04-22 LAB
ANTISTREPTOLYSIN-O: 40 IU/ML (ref 0–200)
AT-III ACTIVITY: 62 % ACTIVITY (ref 83–121)
INR BLD: 1.3
METER GLUCOSE: 236 MG/DL (ref 74–99)
METER GLUCOSE: 246 MG/DL (ref 74–99)
METER GLUCOSE: 294 MG/DL (ref 74–99)
METER GLUCOSE: 394 MG/DL (ref 74–99)
PROTHROMBIN TIME: 14.7 SEC (ref 9.3–12.4)

## 2019-04-22 PROCEDURE — 93923 UPR/LXTR ART STDY 3+ LVLS: CPT

## 2019-04-22 PROCEDURE — 82962 GLUCOSE BLOOD TEST: CPT

## 2019-04-22 PROCEDURE — 2580000003 HC RX 258: Performed by: SPECIALIST

## 2019-04-22 PROCEDURE — 6360000002 HC RX W HCPCS: Performed by: SPECIALIST

## 2019-04-22 PROCEDURE — 2580000003 HC RX 258: Performed by: INTERNAL MEDICINE

## 2019-04-22 PROCEDURE — 1200000000 HC SEMI PRIVATE

## 2019-04-22 PROCEDURE — 85300 ANTITHROMBIN III ACTIVITY: CPT

## 2019-04-22 PROCEDURE — 85610 PROTHROMBIN TIME: CPT

## 2019-04-22 PROCEDURE — 86060 ANTISTREPTOLYSIN O TITER: CPT

## 2019-04-22 PROCEDURE — 99232 SBSQ HOSP IP/OBS MODERATE 35: CPT | Performed by: SURGERY

## 2019-04-22 PROCEDURE — 36415 COLL VENOUS BLD VENIPUNCTURE: CPT

## 2019-04-22 RX ADMIN — Medication 10 ML: at 21:23

## 2019-04-22 RX ADMIN — VANCOMYCIN HYDROCHLORIDE 1250 MG: 10 INJECTION, POWDER, LYOPHILIZED, FOR SOLUTION INTRAVENOUS at 21:23

## 2019-04-22 RX ADMIN — Medication 10 ML: at 07:03

## 2019-04-22 RX ADMIN — MEROPENEM 1 G: 1 INJECTION, POWDER, FOR SOLUTION INTRAVENOUS at 16:29

## 2019-04-22 RX ADMIN — Medication 10 ML: at 16:28

## 2019-04-22 RX ADMIN — MEROPENEM 1 G: 1 INJECTION, POWDER, FOR SOLUTION INTRAVENOUS at 07:03

## 2019-04-22 ASSESSMENT — PAIN SCALES - GENERAL
PAINLEVEL_OUTOF10: 0
PAINLEVEL_OUTOF10: 0

## 2019-04-22 NOTE — PROGRESS NOTES
Podiatry Progress Note  4/22/2019   [facility]       SUBJECTIVE: This is a 77 y.o. male seen bedside for multiple right foot wounds. Patient denies any acute overnight events. Patient denies F,N,V,D,CP,SOB. Patient has no other pedal complaints          OBJECTIVE:      Pt is AAOx3    Vitals:    04/21/19 0745 04/21/19 2015 04/22/19 0424 04/22/19 0835   BP: (!) 172/80 (!) 164/72  (!) 155/81   Pulse: 75 71  80   Resp: 16 18  16   Temp: 98.4 °F (36.9 °C) 98.3 °F (36.8 °C)  98.5 °F (36.9 °C)   TempSrc: Oral Oral  Oral   SpO2: 97% 96%  97%   Weight:   140 lb 4.8 oz (63.6 kg)    Height:             EXAM:  VASCULAR: Non-palpable DP humberto, R non-palpable PT, L PT artery lightly palpable. Digital hair diminished. CFT's are sluggish >3s to remaining digits. DERM:  Cool, dry, mildly xerotic humberto. There are multiple ulcerations to the R foot: Mild localized erythema is present to each woundbed however no purulent drainage or proximal erythematous streaking noted. Medial lower leg just proximal and anterior to the medial malleolus measuring approximaly 4.0x3.0x0.3 with fibrotic base and serous drainage   Encompassing entire heel with a mixed fibro/necrotic woundbed and + probe to bone with serous drainage  Dorso-medial arch with fibrotic base measuring 3.0x2.0x0.2 and 1.5x0.5x0.2, serous drainage   Dorsal 1st IPJ measuring 1.5x0.8.0.2 with dry fibrotic woundbed. Plantar-medial 1st MTPJ 1.0x0.7x0.2 with fibrotic base  Lateral to R 5th metatarsal head measuring 1.8x1.0x0.3 with mixed fibro-granular base and serous drainage  Dorsal 2nd and third digits across the PIPJ each measuring 1.0x0.5x0.2.    R 2nd digit appears cyanotic in nature and is cool to the touch to level of the MTPJ- no purulent drainage or streaking proximal on the foot noted, + probe to bone through woundbed   NEUROLOGIC: Diminished to fine touch humberto   MUSCULOSKELETAL:  Strength at LE muscle groups crossing the ankle are 5/5 in sagittal and frontal plans. 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 appreciated           Current Facility-Administered Medications   Medication Dose Route Frequency Provider Last Rate Last Dose    vancomycin (VANCOCIN) 1,250 mg in dextrose 5 % 250 mL IVPB  1,250 mg Intravenous Q24H Marivel Andino MD   Stopped at 04/21/19 2310    meropenem (MERREM) 1 g in sodium chloride 0.9 % 100 mL IVPB (mini-bag)  1 g Intravenous Q8H Marivel Andino MD 25 mL/hr at 04/22/19 1629 1 g at 04/22/19 1629    aspirin EC tablet 325 mg  325 mg Oral Daily Prudencio Lyons MD        cilostazol (PLETAL) tablet 100 mg  100 mg Oral BID Prudencio Lyons MD        insulin glargine (LANTUS) injection vial 30 Units  30 Units Subcutaneous Nightly Prudencio Lyons MD        lisinopril (PRINIVIL;ZESTRIL) tablet 10 mg  10 mg Oral Daily Prudencio Lyons MD   10 mg at 04/21/19 0949    tamsulosin (FLOMAX) capsule 0.4 mg  0.4 mg Oral Daily Prudencio Lyons MD        sodium chloride flush 0.9 % injection 10 mL  10 mL Intravenous 2 times per day Prudencio Lyons MD   10 mL at 04/22/19 0703    sodium chloride flush 0.9 % injection 10 mL  10 mL Intravenous PRN Prudencio Lyons MD   10 mL at 04/22/19 1628    enoxaparin (LOVENOX) injection 40 mg  40 mg Subcutaneous Daily Prudencio Lyons MD        glucose (GLUTOSE) 40 % oral gel 15 g  15 g Oral PRN Prudencio Lyons MD        dextrose 50 % solution 12.5 g  12.5 g Intravenous PRN Prudencio Lyons MD        glucagon (rDNA) injection 1 mg  1 mg Intramuscular PRN Prudencio Lyons MD        dextrose 5 % solution  100 mL/hr Intravenous PRN Prudecnio Lyons MD        insulin lispro (HUMALOG) injection vial 0-12 Units  0-12 Units Subcutaneous TID WC Prudencio Lyons MD        insulin lispro (HUMALOG) injection vial 0-6 Units  0-6 Units Subcutaneous Nightly Prudencio Lyons MD            Lab Results   Component Value Date    WBC 12.6 (H) 04/21/2019    HCT 28.3 (L) 04/21/2019    HGB 9.0 (L) 04/21/2019     04/21/2019     04/21/2019    K 4.0 04/21/2019     04/21/2019    CO2 27 04/21/2019    BUN 21 04/21/2019    CREATININE 0.8 04/21/2019    GLUCOSE 287 (H) 04/21/2019    CRP 13.0 (H) 04/20/2019         Radiographs:    ASSESEMENT:  -multiple right foot wound  -cyanotic right 2nd toe  -PVD        PLAN:  Evaluation and Management  -Patient was evaluated, all service notes reviewed  -patient was initially scheduled for surgery tomorrow, but will be going for angiogram tomorrow at [facility] tomorrow.  -surgery is cancelled and will be rescheduled for Wednesday or Thursday.   -ID following  -patient discussed with DAYRON Washington   4/22/2019   5:48 PM

## 2019-04-22 NOTE — PATIENT CARE CONFERENCE
[facility] Quality Flow/Interdisciplinary Rounds Progress Note        Quality Flow Rounds held on April 22, 2019    Disciplines Attending:  Bedside Nurse, ,  and Nursing Unit Leadership    Nanci Phillips was admitted on 4/20/2019  6:16 PM    Anticipated Discharge Date:  Expected Discharge Date: 04/24/19    Disposition:    Renaldo Score:  Renaldo Scale Score: 19    Readmission Risk              Risk of Unplanned Readmission:        8           Discussed patient goal for the day, patient clinical progression, and barriers to discharge.   The following Goal(s) of the Day/Commitment(s) have been identified:  Labs - Report Results      Jo Ann Ibrahim  April 22, 2019

## 2019-04-22 NOTE — PROGRESS NOTES
Spoke with Per Ureña at [facility], instructed that pt needed picked up to be downtown at main in cath lab for 1100 am tomorrow, he states Nevaeh Cabezas will be here tomorrow at 1030, for pick-up

## 2019-04-22 NOTE — PROGRESS NOTES
Wound / ostomy consulted for this Pt admitted with Osteomyelitis. Podiatry is following and planning surgery tomorrow.

## 2019-04-22 NOTE — PROGRESS NOTES
[address] [place] [address] [facility]  Progress Note    SUBJECTIVE:  Chief Complaint   Patient presents with    Other     right 2nd metatarsal discoloration(blackening), loss of sensation, had XRAY & US on      No new complaints today. Tolerating antibiotics. Remains afebrile. Going to the OR tomorrow  Nausea or vomiting. Review of systems:  As stated above in the chief complaint, otherwise negative. Medications:  Scheduled Meds:   vancomycin  1,250 mg Intravenous Q24H    meropenem  1 g Intravenous Q8H    aspirin  325 mg Oral Daily    cilostazol  100 mg Oral BID    insulin glargine  30 Units Subcutaneous Nightly    lisinopril  10 mg Oral Daily    tamsulosin  0.4 mg Oral Daily    sodium chloride flush  10 mL Intravenous 2 times per day    enoxaparin  40 mg Subcutaneous Daily    insulin lispro  0-12 Units Subcutaneous TID WC    insulin lispro  0-6 Units Subcutaneous Nightly     Continuous Infusions:   dextrose       PRN Meds:sodium chloride flush, glucose, dextrose, glucagon (rDNA), dextrose    OBJECTIVE:  BP (!) 155/81   Pulse 80   Temp 98.5 °F (36.9 °C) (Oral)   Resp 16   Ht 5' 10\" (1.778 m)   Wt 140 lb 4.8 oz (63.6 kg)   SpO2 97%   BMI 20.13 kg/m²   Temp  Av.4 °F (36.9 °C)  Min: 98.3 °F (36.8 °C)  Max: 98.5 °F (36.9 °C)  Constitutional: The patient is awake, alert, and oriented. Distress. Skin: No rash. HEENT: Pupils. No jaundice. No ulcerations or thrush. Neck: Supple. Chest: Good breath sounds bilaterally. No wheezing, crackles or rhonchi. Cardiovascular: Heart sounds with thickened regular. Abdomen: Round, soft and benign to palpation. Extremities: Multiple punched-out ischemic ulcers over the right leg and foot. Right 2nd toe becoming necrotic. No odor. No drainage.   Lines: peripheral    Laboratory and Tests Review:  Lab Results   Component Value Date    WBC 12.6 (H) 2019    WBC 16.0 (H) 2019    WBC 7.6 2018    HGB 9.0 (L) 2019 HCT 28.3 (L) 04/21/2019    MCV 92.5 04/21/2019     04/21/2019     Lab Results   Component Value Date    NEUTROABS 10.00 (H) 04/21/2019    NEUTROABS 13.40 (H) 04/20/2019    NEUTROABS 5.08 08/28/2018     Lab Results   Component Value Date    CRP 13.0 (H) 04/20/2019    CRP 3.7 (H) 08/27/2018    CRP <0.1 03/16/2017     No results found for: CRPHS  Lab Results   Component Value Date    SEDRATE 90 (H) 04/20/2019    SEDRATE 39 (H) 08/28/2018    SEDRATE 65 (H) 08/26/2018     Lab Results   Component Value Date    ALT 10 04/21/2019    AST 12 04/21/2019    ALKPHOS 116 04/21/2019    BILITOT 0.5 04/21/2019     Lab Results   Component Value Date     04/21/2019    K 4.0 04/21/2019    K 4.4 04/20/2019     04/21/2019    CO2 27 04/21/2019    BUN 21 04/21/2019    CREATININE 0.8 04/21/2019    CREATININE 1.0 04/20/2019    CREATININE 0.8 08/28/2018    GFRAA >60 04/21/2019    LABGLOM >60 04/21/2019    GLUCOSE 287 04/21/2019    PROT 6.7 04/21/2019    LABALBU 2.6 04/21/2019    CALCIUM 8.7 04/21/2019    BILITOT 0.5 04/21/2019    ALKPHOS 116 04/21/2019    AST 12 04/21/2019    ALT 10 04/21/2019     Radiology:      Microbiology:   Blood cultures 4/2017 negative so far  Wound culture right 2nd toe GPO, M_SA     ASSESSMENT:  · Severe peripheral arterial disease right lower extremity  · Ischemic ulcers of the right leg and foot associated to PAD  · Gangrene right 2nd toe  · Probable chronic osteomyelitis right 2nd toe  · Leukocytosis associated to gangrene and right 2nd toe infection     PLAN:  · Continue Meropenem and Vancomycin.  Check trough tomorrow  · Check final cultures  · Patient going for amputation tomorrow  · Wound care per podiatry  · Repeat CBC and BMP for tomorrow    Discussed with vascular    Marisol Howe  1:06 PM  4/22/2019

## 2019-04-22 NOTE — PROGRESS NOTES
Subjective:  Feeling better No CP, SOB, F, V, D, P     Objective:    BP (!) 155/81   Pulse 80   Temp 98.5 °F (36.9 °C) (Oral)   Resp 16   Ht 5' 10\" (1.778 m)   Wt 140 lb 4.8 oz (63.6 kg)   SpO2 97%   BMI 20.13 kg/m²     24HR INTAKE/OUTPUT:      Intake/Output Summary (Last 24 hours) at 4/22/2019 1008  Last data filed at 4/22/2019 0330  Gross per 24 hour   Intake 3410 ml   Output 100 ml   Net 3310 ml     nad  Heart:  RRR, no murmurs, gallops, or rubs. Lungs:  CTA bilaterally, no wheeze, rales or rhonchi  Abd: bowel sounds present, nontender, nondistended, no masses  Extrem:  No clubbing, cyanosis,+ edema  -wounds dressed    Most Recent Labs  Lab Results   Component Value Date    WBC 12.6 (H) 04/21/2019    HGB 9.0 (L) 04/21/2019    HCT 28.3 (L) 04/21/2019     04/21/2019     04/21/2019    K 4.0 04/21/2019     04/21/2019    CREATININE 0.8 04/21/2019    BUN 21 04/21/2019    CO2 27 04/21/2019    GLUCOSE 287 (H) 04/21/2019    ALT 10 04/21/2019    AST 12 04/21/2019    INR 1.3 04/22/2019    LABA1C 7.0 (H) 04/21/2019     Recent Labs     04/21/19  0401   MG 1.9     Lab Results   Component Value Date    CALCIUM 8.7 04/21/2019        XR FOOT RIGHT (MIN 3 VIEWS)   Final Result      NO ACUTE FRACTURE OR DISLOCATION RIGHT FOOT      Other findings as described above. VL LOWER EXTREMITY ARTERIAL SEGMENTAL PRESSURES W PPG BILATERAL    (Results Pending)       Assessment    Principal Problem:    Osteomyelitis ([address]  Active Problems:    Diabetes mellitus type 2, uncontrolled (HCC)    Femoral-popliteal atherosclerosis (HCC)    HTN (hypertension), benign  Resolved Problems:    * No resolved hospital problems.  *      Plan:  Adm  IV abx per ID  vasc surg- no interventions  Podiatry for OR I&D  Increase sliding scale  PT/OT  DVT PPx  DC planning       Electronically signed by Frank Arias MD on 4/22/2019 at 10:08 AM

## 2019-04-22 NOTE — CONSULTS
bleeding disorders  Psych: Negative for Depression or anxiety  Ortho: Negative for pain in the joints, please see history of present illness  Vascular: Positive for bilateral lower extremity wounds. The left lower extremity has healed. He had a left plantar wound is essentially is resolved. He now has persistent and worsening right lower extremity wounds. PHYSICAL EXAM:    Vitals:    04/22/19 0835   BP: (!) 155/81   Pulse: 80   Resp: 16   Temp: 98.5 °F (36.9 °C)   SpO2: 97%     GENERAL APPEARANCE:  Well-developed well-nourished alert and oriented answers questions appropriately the patient does not appear in any acute distress. HEAD: Head is normocephalic atraumatic, with Normal range of motion. EYES: Inspection of the conjunctiva and lids demonstrated no abnormalities, no jaundice, no scleral icterus, PERRL, EOMI, and vision are grossly intact. EARS:  External auditory canals demonstrate no abnormalities. Ears are well set hearing is grossly intact. SKIN: On the right side he has a medial malleolar wound with exudates present. Some granulation tissues noted. He has dry gangrenous changes of his right heel, he has a right 2nd and 3rd toe the demonstrate gangrene and drainage. He has additional multiple wounds of his right foot of varying depths. There is a fair amount of drainage from most of the wounds. Currently there is no active signs of cellulitis above the level of the ankle  NECK: Supple, nontender no lymphadenopathy trachea is midline no jugular venous distention no carotid bruits auscultated. LUNGS:  Clear to auscultation bilaterally no wheezes rales or rhonchi good respiratory changes noted. CARDIOVASCULAR: Currently regular rate and rhythm no murmur rub or gallop that I could appreciate. ABDOMEN: Soft nontender no rebound or guarding, positive bowel sounds no pulsatile abdominal masses. No organosplenomegaly that I can appreciate.   EXTREMITIES: Bilateral palpable brachial and radial venous and arterial ulcerations. From our standpoint his ABIs are decreased on the right side my suspicion based on the physical examination most of this is tibial arterial disease. He has extensive tissue loss of the right extremity far worse than he had in the past. My concern is whether or not this is going to be a salvageable extremity based on the multiple ulcerations and depth of the ulcerations. Were planning on arteriogram with possible intervention secondary to his extensive tissue loss tomorrow. I have read my partner Dr. Gabriela Babb consultation. I have also talked with Dr. Jacky Magallon. I have discussed risk benefits and alternatives with the patient including but not limited to bleeding, infection, arteriovenous nerve injury, myocardial infarction, respiratory failure, anesthetic reaction, pain swelling or tenderness, injury to the left leg or the right leg distal embolization contrast reaction kidney failure limb loss and/or death he understands and wishes to proceed.             Electronically signed by Deuce Grover MD on 4/22/2019 at 7:49 PM

## 2019-04-22 NOTE — PROGRESS NOTES
Spoke with Dr Bailee Bernal updated him that Dr Mabel Cheung placed orders for pt to go to [facility] tomorrow for an angiogram, stated ok and he will cancel his procedure for tomorrow and resced Wednesday or thursday

## 2019-04-22 NOTE — ANESTHESIA PRE PROCEDURE
Department of Anesthesiology  Preprocedure Note       Name:  Juliana Whyte   Age:  77 y.o.  :  1940                                          MRN:  [id number]         Date:  2019      Surgeon: Wander La):  Cassius Nogueira DPM    Procedure: RIGHT SECOND TOE AMPUTATION RIGHT HEEL WOUND DEBRIDEMENT BONE BIOPSY WOUND VAC  ++LATEX ALLERGY++ (Right )    Medications prior to admission:   Prior to Admission medications    Not on File       Current medications:    Current Facility-Administered Medications   Medication Dose Route Frequency Provider Last Rate Last Dose    vancomycin (VANCOCIN) 1,250 mg in dextrose 5 % 250 mL IVPB  1,250 mg Intravenous Q24H Mohinder Fisher MD   Stopped at 19 2310    meropenem (MERREM) 1 g in sodium chloride 0.9 % 100 mL IVPB (mini-bag)  1 g Intravenous Q8H Mohinder Fisehr MD   Stopped at 19 1103    aspirin EC tablet 325 mg  325 mg Oral Daily Minor Acevedo MD        cilostazol (PLETAL) tablet 100 mg  100 mg Oral BID Minor Acevedo MD        insulin glargine (LANTUS) injection vial 30 Units  30 Units Subcutaneous Nightly Minor Acevedo MD        lisinopril (PRINIVIL;ZESTRIL) tablet 10 mg  10 mg Oral Daily Minor Acevedo MD   10 mg at 19 0949    tamsulosin (FLOMAX) capsule 0.4 mg  0.4 mg Oral Daily Minor Acevedo MD        sodium chloride flush 0.9 % injection 10 mL  10 mL Intravenous 2 times per day Minor Acevedo MD   10 mL at 19 0703    sodium chloride flush 0.9 % injection 10 mL  10 mL Intravenous PRN Minor Acevedo MD        enoxaparin (LOVENOX) injection 40 mg  40 mg Subcutaneous Daily Minor Acevedo MD        glucose (GLUTOSE) 40 % oral gel 15 g  15 g Oral PRN Minor Acevedo MD        dextrose 50 % solution 12.5 g  12.5 g Intravenous PRN Minor Acevedo MD        glucagon (rDNA) injection 1 mg  1 mg Intramuscular PRN Minor Acevedo MD        dextrose 5 % solution  100 mL/hr Intravenous PRN Minor Acevedo MD        insulin lispro (HUMALOG) injection vial 0-12 Units  0-12 Units Subcutaneous TID WC Emelia Santana MD        insulin lispro (HUMALOG) injection vial 0-6 Units  0-6 Units Subcutaneous Nightly Emelia Santana MD           Allergies: Allergies   Allergen Reactions    Latex Other (See Comments)     Pt unsure of reaction    Aspirin Other (See Comments)     \"It affects my kidneys. \"   Concepcion Pointer [Penicillins] Other (See Comments)     \"I just know my body doesn't like it. \" \"I had a reaction a long time ago, I know it affects my kidneys. \"    Other Rash     \"I get a rash on just my face if I eat Cumin or Chili. \"       Problem List:    Patient Active Problem List   Diagnosis Code    Diabetes mellitus type 2, uncontrolled ([address] E11.65    Femoral-popliteal atherosclerosis ([address] I70.209    HTN (hypertension), benign I10    Osteomyelitis ([address] M86.9       Past Medical History:        Diagnosis Date    Blood circulation, collateral     Cellulitis of foot, left 1/1/2017    Diabetes mellitus ([address]     Diabetic foot ulcer with osteomyelitis ([address] 1/1/2017    Femoral-popliteal atherosclerosis ([address] 1/1/2017    Hypertension        Past Surgical History:        Procedure Laterality Date    FOOT DEBRIDEMENT Left 01/04/2017    wound debridement and delayed primary closure    PROSTATE BIOPSY  04/2016    TOE AMPUTATION Left 12/31/2016    2nd       Social History:    Social History     Tobacco Use    Smoking status: Former Smoker    Smokeless tobacco: Never Used   Substance Use Topics    Alcohol use:  No                                Counseling given: Not Answered      Vital Signs (Current):   Vitals:    04/21/19 0745 04/21/19 2015 04/22/19 0424 04/22/19 0835   BP: (!) 172/80 (!) 164/72  (!) 155/81   Pulse: 75 71  80   Resp: 16 18  16   Temp: 36.9 °C (98.4 °F) 36.8 °C (98.3 °F)  36.9 °C (98.5 °F)   TempSrc: Oral Oral  Oral   SpO2: 97% 96%  97%   Weight:   140 lb 4.8 oz (63.6 kg)    Height: BP Readings from Last 3 Encounters:   04/22/19 (!) 155/81   08/28/18 (!) 164/70   01/12/17 132/61       NPO Status:                                                                                 BMI:   Wt Readings from Last 3 Encounters:   04/22/19 140 lb 4.8 oz (63.6 kg)   08/28/18 138 lb 3 oz (62.7 kg)   01/12/17 149 lb (67.6 kg)     Body mass index is 20.13 kg/m². CBC:   Lab Results   Component Value Date    WBC 12.6 04/21/2019    RBC 3.06 04/21/2019    HGB 9.0 04/21/2019    HCT 28.3 04/21/2019    MCV 92.5 04/21/2019    RDW 13.6 04/21/2019     04/21/2019       CMP:   Lab Results   Component Value Date     04/21/2019    K 4.0 04/21/2019    K 4.4 04/20/2019     04/21/2019    CO2 27 04/21/2019    BUN 21 04/21/2019    CREATININE 0.8 04/21/2019    GFRAA >60 04/21/2019    LABGLOM >60 04/21/2019    GLUCOSE 287 04/21/2019    PROT 6.7 04/21/2019    CALCIUM 8.7 04/21/2019    BILITOT 0.5 04/21/2019    ALKPHOS 116 04/21/2019    AST 12 04/21/2019    ALT 10 04/21/2019       POC Tests: No results for input(s): POCGLU, POCNA, POCK, POCCL, POCBUN, POCHEMO, POCHCT in the last 72 hours. Coags:   Lab Results   Component Value Date    PROTIME 14.7 04/22/2019    INR 1.3 04/22/2019    APTT 26.1 12/30/2016       HCG (If Applicable): No results found for: PREGTESTUR, PREGSERUM, HCG, HCGQUANT     ABGs: No results found for: PHART, PO2ART, TAS6CLS, BED3ILH, BEART, A9LTTFFA     Type & Screen (If Applicable):  No results found for: LABABO, [address]    EKG 8/26/18  Narrative   Performed by: [facility]   Normal sinus rhythm  Nonspecific T wave abnormality  Abnormal ECG  When compared with ECG of 30-DEC-2016 12:34,  No significant change was found     CXR Limited 12/30/16  Impression   No evidence of acute pulmonary disease.      XR R foot 4/21/19  Impression       NO ACUTE FRACTURE OR DISLOCATION RIGHT FOOT       Other findings as described above.           Anesthesia Evaluation  Patient summary reviewed and Nursing notes reviewed no history of anesthetic complications:   Airway: Mallampati: III  TM distance: >3 FB   Neck ROM: full  Mouth opening: > = 3 FB Dental:      Comment: Pt has poor dentition     Pulmonary:Negative Pulmonary ROS   (+) decreased breath sounds,                             Cardiovascular:    (+) hypertension:,       ECG reviewed  Rhythm: regular  Rate: normal           Beta Blocker:  Not on Beta Blocker         Neuro/Psych:   Negative Neuro/Psych ROS              GI/Hepatic/Renal: Neg GI/Hepatic/Renal ROS            Endo/Other:    (+) DiabetesType II DM, poorly controlled, using insulin, . ROS comment: Osteomyelitis  Abdominal:       Abdomen: soft. Vascular:   + PVD, aortic or cerebral, . Anesthesia Plan      MAC     ASA 3       Induction: intravenous. MIPS: Prophylactic antiemetics administered. Anesthetic plan and risks discussed with patient. Use of blood products discussed with patient whom consented to blood products. Plan discussed with CRNA and attending.               Leonora Arredondo RN   4/22/2019

## 2019-04-22 NOTE — PROGRESS NOTES
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 appreciated        Current Facility-Administered Medications   Medication Dose Route Frequency Provider Last Rate Last Dose    vancomycin (VANCOCIN) 1,250 mg in dextrose 5 % 250 mL IVPB  1,250 mg Intravenous Q24H Sacha Wilder MD   Stopped at 04/21/19 2310    meropenem (MERREM) 1 g in sodium chloride 0.9 % 100 mL IVPB (mini-bag)  1 g Intravenous Q8H Sacha Wilder MD   Stopped at 04/22/19 1103    aspirin EC tablet 325 mg  325 mg Oral Daily Emelia Santana MD        cilostazol (PLETAL) tablet 100 mg  100 mg Oral BID Emelia Santana MD        insulin glargine (LANTUS) injection vial 30 Units  30 Units Subcutaneous Nightly Emelia Santana MD        lisinopril (PRINIVIL;ZESTRIL) tablet 10 mg  10 mg Oral Daily Emelia Santana MD   10 mg at 04/21/19 0949    tamsulosin (FLOMAX) capsule 0.4 mg  0.4 mg Oral Daily Emelia Santana MD        sodium chloride flush 0.9 % injection 10 mL  10 mL Intravenous 2 times per day Emelia Santana MD   10 mL at 04/22/19 0703    sodium chloride flush 0.9 % injection 10 mL  10 mL Intravenous PRN Emelia Santana MD        enoxaparin (LOVENOX) injection 40 mg  40 mg Subcutaneous Daily Emelia Santana MD        glucose (GLUTOSE) 40 % oral gel 15 g  15 g Oral PRN Emelia Santana MD        dextrose 50 % solution 12.5 g  12.5 g Intravenous PRN Emelia Santana MD        glucagon (rDNA) injection 1 mg  1 mg Intramuscular PRN Emelia Santana MD        dextrose 5 % solution  100 mL/hr Intravenous PRN Emelia Santana MD        insulin lispro (HUMALOG) injection vial 0-12 Units  0-12 Units Subcutaneous TID WC Emelia Santana MD        insulin lispro (HUMALOG) injection vial 0-6 Units  0-6 Units Subcutaneous Nightly Emelia Santana MD            Lab Results   Component Value Date    WBC 12.6 (H) 04/21/2019    HCT 28.3 (L) 04/21/2019    HGB 9.0 (L) 04/21/2019     04/21/2019     04/21/2019    K 4.0 04/21/2019     04/21/2019    CO2 27 04/21/2019    BUN 21 04/21/2019    CREATININE 0.8 04/21/2019    GLUCOSE 287 (H) 04/21/2019    CRP 13.0 (H) 04/20/2019         Radiographs:    ASSESEMENT:  -multiple right foot wound  -cyanotic right 2nd toe  -PVD       PLAN:  Evaluation and Management  -Patient was evaluated, all service notes reviewed  -patient cleared for toe amputation by vascular surgery  -patient will be scheduled for right 2nd toe amputation, wound debridement, bone biopsy and application of wound vac to the right heel tomorrow by Dr. Debra Drew after midnight  -ID following  -patient discussed with DAYRON Marin   4/22/2019   10:07 AM

## 2019-04-23 ENCOUNTER — ANESTHESIA (OUTPATIENT)
Dept: OPERATING ROOM | Age: 79
End: 2019-04-23

## 2019-04-23 ENCOUNTER — HOSPITAL ENCOUNTER (OUTPATIENT)
Age: 79
Discharge: HOME | End: 2019-04-23
Payer: MEDICARE

## 2019-04-23 ENCOUNTER — HOSPITAL ENCOUNTER (INPATIENT)
Dept: CARDIAC CATH/INVASIVE PROCEDURES | Age: 79
LOS: 4 days | Discharge: NURSING, REHAB OR LONG-TERM CARE | DRG: 253 | End: 2019-04-29
Attending: SURGERY | Admitting: INTERNAL MEDICINE
Payer: MEDICARE

## 2019-04-23 ENCOUNTER — HOSPITAL ENCOUNTER (OUTPATIENT)
Dept: WOUND CARE | Age: 79
Discharge: HOME | End: 2019-04-23
Payer: MEDICARE

## 2019-04-23 VITALS
WEIGHT: 140.3 LBS | SYSTOLIC BLOOD PRESSURE: 169 MMHG | HEIGHT: 70 IN | HEART RATE: 62 BPM | TEMPERATURE: 97.6 F | DIASTOLIC BLOOD PRESSURE: 70 MMHG | OXYGEN SATURATION: 96 % | BODY MASS INDEX: 20.09 KG/M2 | RESPIRATION RATE: 16 BRPM

## 2019-04-23 DIAGNOSIS — I73.9 PVD (PERIPHERAL VASCULAR DISEASE) (HCC): ICD-10-CM

## 2019-04-23 PROBLEM — E44.0 MODERATE PROTEIN-CALORIE MALNUTRITION (HCC): Chronic | Status: ACTIVE | Noted: 2019-04-23

## 2019-04-23 LAB
ABO/RH: NORMAL
ANION GAP SERPL CALCULATED.3IONS-SCNC: 7 MMOL/L (ref 7–16)
ANTIBODY SCREEN: NORMAL
APTT: 29.8 SEC (ref 24.5–35.1)
BUN BLDV-MCNC: 17 MG/DL (ref 8–23)
CALCIUM SERPL-MCNC: 8.8 MG/DL (ref 8.6–10.2)
CHLORIDE BLD-SCNC: 101 MMOL/L (ref 98–107)
CO2: 27 MMOL/L (ref 22–29)
CREAT SERPL-MCNC: 0.8 MG/DL (ref 0.7–1.2)
GFR AFRICAN AMERICAN: >60
GFR NON-AFRICAN AMERICAN: >60 ML/MIN/1.73
GLUCOSE BLD-MCNC: 265 MG/DL (ref 74–99)
HCT VFR BLD CALC: 34.2 % (ref 37–54)
HEMOGLOBIN: 10.9 G/DL (ref 12.5–16.5)
MCH RBC QN AUTO: 29.4 PG (ref 26–35)
MCHC RBC AUTO-ENTMCNC: 31.9 % (ref 32–34.5)
MCV RBC AUTO: 92.2 FL (ref 80–99.9)
METER GLUCOSE: 199 MG/DL (ref 74–99)
METER GLUCOSE: 255 MG/DL (ref 74–99)
METER GLUCOSE: 279 MG/DL (ref 74–99)
ORGANISM: ABNORMAL
PDW BLD-RTO: 13.4 FL (ref 11.5–15)
PLATELET # BLD: 250 E9/L (ref 130–450)
PMV BLD AUTO: 9.6 FL (ref 7–12)
POC ACT LR: 286 SECONDS
POTASSIUM SERPL-SCNC: 3.9 MMOL/L (ref 3.5–5)
RBC # BLD: 3.71 E12/L (ref 3.8–5.8)
SODIUM BLD-SCNC: 135 MMOL/L (ref 132–146)
WBC # BLD: 12.1 E9/L (ref 4.5–11.5)
WOUND/ABSCESS: ABNORMAL
WOUND/ABSCESS: ABNORMAL

## 2019-04-23 PROCEDURE — 6360000002 HC RX W HCPCS

## 2019-04-23 PROCEDURE — G0378 HOSPITAL OBSERVATION PER HR: HCPCS

## 2019-04-23 PROCEDURE — 75716 ARTERY X-RAYS ARMS/LEGS: CPT | Performed by: SURGERY

## 2019-04-23 PROCEDURE — A0425 GROUND MILEAGE: HCPCS

## 2019-04-23 PROCEDURE — 75625 CONTRAST EXAM ABDOMINL AORTA: CPT | Performed by: SURGERY

## 2019-04-23 PROCEDURE — C1769 GUIDE WIRE: HCPCS

## 2019-04-23 PROCEDURE — 85730 THROMBOPLASTIN TIME PARTIAL: CPT

## 2019-04-23 PROCEDURE — 80048 BASIC METABOLIC PNL TOTAL CA: CPT

## 2019-04-23 PROCEDURE — 6360000002 HC RX W HCPCS: Performed by: INTERNAL MEDICINE

## 2019-04-23 PROCEDURE — 2580000003 HC RX 258: Performed by: INTERNAL MEDICINE

## 2019-04-23 PROCEDURE — 2500000003 HC RX 250 WO HCPCS

## 2019-04-23 PROCEDURE — 37224 PR REVSC OPN/PRG FEM/POP W/ANGIOPLASTY UNI: CPT | Performed by: SURGERY

## 2019-04-23 PROCEDURE — 85347 COAGULATION TIME ACTIVATED: CPT

## 2019-04-23 PROCEDURE — 96366 THER/PROPH/DIAG IV INF ADDON: CPT

## 2019-04-23 PROCEDURE — 82962 GLUCOSE BLOOD TEST: CPT

## 2019-04-23 PROCEDURE — 96365 THER/PROPH/DIAG IV INF INIT: CPT

## 2019-04-23 PROCEDURE — 6360000002 HC RX W HCPCS: Performed by: SPECIALIST

## 2019-04-23 PROCEDURE — 2580000003 HC RX 258: Performed by: SURGERY

## 2019-04-23 PROCEDURE — 36415 COLL VENOUS BLD VENIPUNCTURE: CPT

## 2019-04-23 PROCEDURE — 37228 HC TIB PER TERRITORY PLASTY: CPT | Performed by: SURGERY

## 2019-04-23 PROCEDURE — 6370000000 HC RX 637 (ALT 250 FOR IP): Performed by: INTERNAL MEDICINE

## 2019-04-23 PROCEDURE — 85027 COMPLETE CBC AUTOMATED: CPT

## 2019-04-23 PROCEDURE — 86850 RBC ANTIBODY SCREEN: CPT

## 2019-04-23 PROCEDURE — A0428 BLS: HCPCS

## 2019-04-23 PROCEDURE — 2709999900 HC NON-CHARGEABLE SUPPLY

## 2019-04-23 PROCEDURE — 96372 THER/PROPH/DIAG INJ SC/IM: CPT

## 2019-04-23 PROCEDURE — 86901 BLOOD TYPING SEROLOGIC RH(D): CPT

## 2019-04-23 PROCEDURE — C1894 INTRO/SHEATH, NON-LASER: HCPCS

## 2019-04-23 PROCEDURE — 37228 PR REVSC OPN/PRQ TIB/PERO W/ANGIOPLASTY UNI: CPT | Performed by: SURGERY

## 2019-04-23 PROCEDURE — C1760 CLOSURE DEV, VASC: HCPCS

## 2019-04-23 PROCEDURE — 2580000003 HC RX 258: Performed by: SPECIALIST

## 2019-04-23 PROCEDURE — 86900 BLOOD TYPING SEROLOGIC ABO: CPT

## 2019-04-23 PROCEDURE — 37224 HC FEM POP TERRITORY PLASTY: CPT | Performed by: SURGERY

## 2019-04-23 PROCEDURE — 75774 ARTERY X-RAY EACH VESSEL: CPT | Performed by: SURGERY

## 2019-04-23 PROCEDURE — C1725 CATH, TRANSLUMIN NON-LASER: HCPCS

## 2019-04-23 PROCEDURE — C1887 CATHETER, GUIDING: HCPCS

## 2019-04-23 RX ORDER — SODIUM CHLORIDE 0.9 % (FLUSH) 0.9 %
10 SYRINGE (ML) INJECTION EVERY 12 HOURS SCHEDULED
Status: DISCONTINUED | OUTPATIENT
Start: 2019-04-23 | End: 2019-04-29 | Stop reason: HOSPADM

## 2019-04-23 RX ORDER — ONDANSETRON 2 MG/ML
4 INJECTION INTRAMUSCULAR; INTRAVENOUS EVERY 6 HOURS PRN
Status: DISCONTINUED | OUTPATIENT
Start: 2019-04-23 | End: 2019-04-29 | Stop reason: HOSPADM

## 2019-04-23 RX ORDER — ACETAMINOPHEN 325 MG/1
650 TABLET ORAL EVERY 4 HOURS PRN
Status: DISCONTINUED | OUTPATIENT
Start: 2019-04-23 | End: 2019-04-29 | Stop reason: HOSPADM

## 2019-04-23 RX ORDER — SODIUM CHLORIDE 0.9 % (FLUSH) 0.9 %
10 SYRINGE (ML) INJECTION PRN
Status: DISCONTINUED | OUTPATIENT
Start: 2019-04-23 | End: 2019-04-29 | Stop reason: SDUPTHER

## 2019-04-23 RX ORDER — DEXTROSE MONOHYDRATE 25 G/50ML
12.5 INJECTION, SOLUTION INTRAVENOUS PRN
Status: DISCONTINUED | OUTPATIENT
Start: 2019-04-23 | End: 2019-04-29 | Stop reason: HOSPADM

## 2019-04-23 RX ORDER — DEXTROSE MONOHYDRATE 50 MG/ML
100 INJECTION, SOLUTION INTRAVENOUS PRN
Status: DISCONTINUED | OUTPATIENT
Start: 2019-04-23 | End: 2019-04-29 | Stop reason: HOSPADM

## 2019-04-23 RX ORDER — SODIUM CHLORIDE 9 MG/ML
INJECTION, SOLUTION INTRAVENOUS ONCE
Status: COMPLETED | OUTPATIENT
Start: 2019-04-23 | End: 2019-04-23

## 2019-04-23 RX ORDER — NICOTINE POLACRILEX 4 MG
15 LOZENGE BUCCAL PRN
Status: DISCONTINUED | OUTPATIENT
Start: 2019-04-23 | End: 2019-04-29 | Stop reason: HOSPADM

## 2019-04-23 RX ADMIN — VANCOMYCIN HYDROCHLORIDE 1000 MG: 1 INJECTION, POWDER, LYOPHILIZED, FOR SOLUTION INTRAVENOUS at 20:29

## 2019-04-23 RX ADMIN — MEROPENEM 1 G: 1 INJECTION, POWDER, FOR SOLUTION INTRAVENOUS at 20:29

## 2019-04-23 RX ADMIN — SODIUM CHLORIDE: 9 INJECTION, SOLUTION INTRAVENOUS at 11:51

## 2019-04-23 RX ADMIN — INSULIN LISPRO 2 UNITS: 100 INJECTION, SOLUTION INTRAVENOUS; SUBCUTANEOUS at 18:19

## 2019-04-23 RX ADMIN — Medication 10 ML: at 20:29

## 2019-04-23 RX ADMIN — MEROPENEM 1 G: 1 INJECTION, POWDER, FOR SOLUTION INTRAVENOUS at 09:21

## 2019-04-23 RX ADMIN — ACETAMINOPHEN 650 MG: 325 TABLET, FILM COATED ORAL at 20:40

## 2019-04-23 RX ADMIN — Medication 10 ML: at 09:22

## 2019-04-23 RX ADMIN — MEROPENEM 1 G: 1 INJECTION, POWDER, FOR SOLUTION INTRAVENOUS at 00:51

## 2019-04-23 RX ADMIN — ENOXAPARIN SODIUM 40 MG: 40 INJECTION SUBCUTANEOUS at 18:19

## 2019-04-23 ASSESSMENT — PAIN SCALES - GENERAL
PAINLEVEL_OUTOF10: 0
PAINLEVEL_OUTOF10: 1

## 2019-04-23 ASSESSMENT — PAIN DESCRIPTION - DESCRIPTORS: DESCRIPTORS: ACHING;CONSTANT;DISCOMFORT

## 2019-04-23 ASSESSMENT — PAIN DESCRIPTION - LOCATION: LOCATION: FOOT

## 2019-04-23 ASSESSMENT — PAIN DESCRIPTION - PAIN TYPE: TYPE: CHRONIC PAIN

## 2019-04-23 ASSESSMENT — PAIN DESCRIPTION - ORIENTATION: ORIENTATION: RIGHT

## 2019-04-23 NOTE — PROGRESS NOTES
Subjective:  Feeling better No CP, SOB, F, V, D, P     Objective:    BP (!) 169/70   Pulse 62   Temp 97.6 °F (36.4 °C) (Oral)   Resp 16   Ht 5' 10\" (1.778 m)   Wt 140 lb 4.8 oz (63.6 kg)   SpO2 96%   BMI 20.13 kg/m²     24HR INTAKE/OUTPUT:      Intake/Output Summary (Last 24 hours) at 4/23/2019 0907  Last data filed at 4/22/2019 1221  Gross per 24 hour   Intake 180 ml   Output --   Net 180 ml     nad  Heart:  RRR, no murmurs, gallops, or rubs. Lungs:  CTA bilaterally, no wheeze, rales or rhonchi  Abd: bowel sounds present, nontender, nondistended, no masses  Extrem:  No clubbing, cyanosis,+ edema  -wounds dressed    Most Recent Labs  Lab Results   Component Value Date    WBC 12.1 (H) 04/23/2019    HGB 10.9 (L) 04/23/2019    HCT 34.2 (L) 04/23/2019     04/23/2019     04/23/2019    K 3.9 04/23/2019     04/23/2019    CREATININE 0.8 04/23/2019    BUN 17 04/23/2019    CO2 27 04/23/2019    GLUCOSE 265 (H) 04/23/2019    ALT 10 04/21/2019    AST 12 04/21/2019    INR 1.3 04/22/2019    LABA1C 7.0 (H) 04/21/2019     Recent Labs     04/21/19  0401   MG 1.9     Lab Results   Component Value Date    CALCIUM 8.8 04/23/2019        VL LOWER EXTREMITY ARTERIAL SEGMENTAL PRESSURES W PPG BILATERAL   Final Result   1. Right leg: KO 0.55 with distribution of disease suggested to be   infrapopliteal and possibly popliteal.   2. Left leg: KO 0.84 with distribution of disease suggested to be   infrapopliteal    3. TBI 0.56 and 0.53 right and left respectively. XR FOOT RIGHT (MIN 3 VIEWS)   Final Result      NO ACUTE FRACTURE OR DISLOCATION RIGHT FOOT      Other findings as described above.              Assessment    Principal Problem:    Osteomyelitis ([address]  Active Problems:    Diabetic ulcer of right foot associated with type 2 diabetes mellitus ([address]    Diabetes mellitus type 2, uncontrolled ([address]    Femoral-popliteal atherosclerosis ([facility])    HTN (hypertension), benign  Resolved Problems:    * No resolved hospital problems.  *      Plan:  Adm  IV abx per ID  vasc surg- plan for arteriogram  Podiatry for OR I&D after PAD addressed  Increase sliding scale  PT/OT  DVT PPx  DC planning       Electronically signed by Chel Bhat MD on 4/23/2019 at 9:07 AM

## 2019-04-23 NOTE — PROGRESS NOTES
Nutrition Assessment    Type and Reason for Visit: Initial, Positive Nutrition Screen, Consult(Wounds)    Nutrition Recommendations: Re-Start Diet once NPO no longer needed for test/procedure. Start Ajred Wound Healing ONS BID once on diet. Pt declines all other supplement options at this time. Nutrition Assessment: Pt moderately malnourished AEB mild muscle loss and mild fat loss d/t insufficient energy intake. At further risk d/t increased metabolic demand for energy/protein 2/2 healing of multiple diabetic ulcers. Malnutrition Assessment:  · Malnutrition Status: Meets the criteria for moderate malnutrition  · Context: Chronic illness  · Findings of the 6 clinical characteristics of malnutrition (Minimum of 2 out of 6 clinical characteristics is required to make the diagnosis of moderate or severe Protein Calorie Malnutrition based on AND/ASPEN Guidelines):  1. Energy Intake-Less than or equal to 75% of estimated energy requirement, Greater than or equal to 1 month    2. Weight Loss-No significant weight loss    3. Fat Loss-Mild subcutaneous fat loss, Orbital, Triceps, Fat overlying the ribs  4. Muscle Loss-Mild muscle mass loss, Temples (temporalis muscle), Clavicles (pectoralis and deltoids), Scapula (trapezius)  5. Fluid Accumulation-No significant fluid accumulation    6.  Strength-Not measured    Nutrition Risk Level: Moderate    Nutrient Needs:  · Estimated Daily Total Kcal: 4598-6704(65-57 kcals/kg CBW)  · Estimated Daily Protein (g): 85-95(1.3-1.5 gm/kg CBW)  · Estimated Daily Total Fluid (ml/day): 7975-3876(1 ml/kcal)    Nutrition Diagnosis:   · Problem:  Moderate malnutrition, In context of chronic illness  · Etiology: related to Insufficient energy/nutrient consumption     Signs and symptoms:  as evidenced by Diet history of poor intake, Presence of wounds, Mild loss of subcutaneous fat, Mild muscle loss, Localized or generalized fluid accumulation, Lab values    Objective Information:  · Nutrition-Focused Physical Findings: A&O, poor dentition, Abd/BS WDL, +2 RLE edema, +I/O's  · Wound Type: Multiple, Diabetic Ulcer  · Current Nutrition Therapies:  · Oral Diet Orders: NPO   · Oral Diet intake: %(average p/t NPO)  · Oral Nutrition Supplement (ONS) Orders: None  · ONS intake: (none)  · Anthropometric Measures:  · Ht: 5' 10\" (177.8 cm)   · Current Body Wt: 140 lb (63.5 kg)(act 4/22)  · Admission Body Wt: 138 lb (62.6 kg)(bed 4/20)  · Usual Body Wt: 138 lb (62.6 kg)(act 8/26/18 per EMR hx, pt confirms)  · % Weight Change: no noted wt loss per EMR and per pt  · Ideal Body Wt: 166 lb (75.3 kg), % Ideal Body 84%  · BMI Classification: BMI 18.5 - 24.9 Normal Weight    Nutrition Interventions:   Start oral diet, Start ONS(Re-Start Diet once NPO no longer needed for test/procedure. Start Jared Wound Healing ONS BID once on diet.   Pt declines all other supplement options at this time.  )  Continued Inpatient Monitoring, Education Initiated, Coordination of Care(ONS options/benefits)    Nutrition Evaluation:   · Evaluation: Goals set   · Goals: PO intake >75% of meals/ONS    · Monitoring: Meal Intake, Supplement Intake, Diet Tolerance, Skin Integrity, Wound Healing, I&O, Weight, Pertinent Labs, Chewing/Swallowing, Monitor Hemodynamic Status      Electronically signed by Minna Hathaway RD, LD on 4/23/19 at 1:03 PM    Contact Number: ext [phone]

## 2019-04-23 NOTE — OP NOTE
were discussed with the patient including but not limited to bleeding, infection, arterial venous nerve injury, myocardial infarction, respiratory failure, anesthetic reaction, pain swelling or tenderness, arterial injury, distal embolization, failure the procedure, recurrence of disease, wound complications need for further intervention, contrast reaction contrast reaction leading to kidney failure, hemodialysis, inability inhale still require amputation limb loss of either extremity and her death he understood and wished to proceed. The day of the procedures brought to the Cath Lab, placed in supine position. He was prepped and draped in sterile sterile fashion     A timeout was taken to verify the person the operative site as well as the procedure. Ultrasound was used identify the common femoral vessel on the left side this was free from disease. Fluoroscopy was used identify the femoral head. Local infiltration of skin and subcutaneous tissue was performed with lidocaine and Marcaine mixture. We then proceeded with access in the common femoral vessel with a micropuncture needle followed by placement micropuncture wire micropuncture sheath. A Rai wire was then placed the abdominal aorta followed by upsizing to a 4-Citizen of [place] sheath flash and flushed with heparinized saline solution. A pigtail catheter was brought up to level super renal aortic position images were taken brought down to the infrarenal aorta with a left oblique images taken no significant disease was noted. See above we crossed over the aortic bifurcation with a Rai wire and a pigtail catheter images were then taken via the right common femoral vessel. At this point this demonstrated popliteal artery occlusion and proximal tibial vessel disease.  With occlusion of the posterior tibial dominant runoff being the anterior tibial followed by the peroneal.     We then placed a Glidewire advantage into the superficial femoral vessel exchange the 4-Tamazight for a 7-Tamazight Ansell sheath followed by placing the hands on the contralateral common femoral. The patient was then also heparinized with 6000 units of intravenous heparin once ACT was appropriate we then delivered a angled Trailblazer catheter. Within [facility] I was able to traverse into the anterior tibial vessel followed by the child laser catheter confirming placement. Once this was done I exchange the 035 wire for 014 PT choice wire. We then placed a bre wire into the peroneal vessel as well. This also consisted of a 014 PT choice wire balloon angioplasty was performed at the anterior tibial vessel with 3 x 4 balloon in multiple segments. This appeared to be adequate size for the popliteal segment-complete wall apposition as well as the anterior tibial vessel. Initial balloon angioplasty was done for 2 minutes at a pressure of 12-14 garcia. Completion images demonstrating a popliteal to demonstrated no evidence of stenosis. The anterior tibial was patent in the prone he was patent. In the proximal anterior tibial vessel there was an additional stenoses. The balloon was then advanced into the anterior tibial proximal one 3rd of the vessel and balloon angioplasty was performed in a similar fashion. Completion images demonstrated no significant stenosis no evidence of complicating features the popliteal was widely patent with no evidence of recurrent stenosis the anterior tibial was widely patent and the peroneal is widely patent. Distally there is disease is present in the anterior tibial and peroneal but nonocclusive and it was brisk runoff onto the arch. At this point I was very pleased with the results all catheters and wires were then removed images were taken of the left extremity via the sheath in the common femoral extremity iliac vessel. We in good position for a Pro-glide closure.  This was done over wire without any difficulty with good apposition and no evidence of complicating

## 2019-04-23 NOTE — CARE COORDINATION
Social Work / Discharge Planning : SW and CM met with patient and explained role as discharge planner/ transition of care. Patient recalled SW from previous visits. Patient states his plan at discharge is either : Home with spouse with [facility] [place] [facility] or SNF. Patient has had a past stay at [facility] [place] and was very happy. However, if he needs a SNF at discharge, this time  he prefers [facility]. Patient states he has to wait and see exact plan before he can determine his finalized discharge plan. Today he is going downtown to have vascular studies done. Patient also has ID on board as well as podiatry. Patient is aware that wound vac may need to be placed and patient stated DR Kenneth Mcdonald is following him as well and he is currently on two IV antibiotics. Await ID and Podiatry plan. SW will follow and assist wiht finalizing discharge plan once all treatments have been finalized and put in place. [facility] [place] [facility] choices discussed but patient has not decided yet of choice. SW to follow.  Electronically signed by SILVIA Carreno on 4/23/19 at 9:45 AM

## 2019-04-23 NOTE — PLAN OF CARE
Problem: Falls - Risk of:  Goal: Will remain free from falls  Description  Will remain free from falls  Outcome: Met This Shift  Goal: Absence of physical injury  Description  Absence of physical injury  Outcome: Met This Shift
Problem: Malnutrition  (NI-5.2)  Goal: Food and/or Nutrient Delivery  Re-Start Diet once NPO no longer needed for test/procedure. Start Jared Wound Healing ONS BID once on diet. Description  Individualized approach for food/nutrient provision.   Outcome: Not Met This Shift
Problem: Skin Integrity:  Goal: Absence of new skin breakdown  Description  Absence of new skin breakdown  4/23/2019 0048 by Yazmin Giordano RN  Outcome: Met This Shift  4/22/2019 1856 by Leandra Sandhu RN  Outcome: Met This Shift  Goal: Skin integrity will be maintained  Description  Skin integrity will be maintained  4/23/2019 0048 by Yazmin Giordano RN  Outcome: Met This Shift  4/22/2019 1856 by Leandra Sandhu RN  Outcome: Met This Shift  Goal: Skin integrity will improve  Description  Skin integrity will improve  4/23/2019 0048 by Yazmin Giordano RN  Outcome: Met This Shift  4/22/2019 1856 by Leandra Sandhu RN  Outcome: Not Met This Shift     Problem: Physical Regulation:  Goal: Complications related to the disease process, condition or treatment will be avoided or minimized  Description  Complications related to the disease process, condition or treatment will be avoided or minimized  4/23/2019 0048 by Yazmin Giordano RN  Outcome: Met This Shift  4/22/2019 1856 by Leandra Sandhu RN  Outcome: Ongoing     Problem: Sensory:  Goal: Pain level will decrease  Description  Pain level will decrease  4/23/2019 0048 by Yazmin Giordano RN  Outcome: Met This Shift  4/22/2019 1856 by Leandra Sandhu RN  Outcome: Met This Shift     Problem: Falls - Risk of:  Goal: Will remain free from falls  Description  Will remain free from falls  4/23/2019 0048 by Yazmin Giordano RN  Outcome: Met This Shift  4/22/2019 1856 by Leandra Sandhu RN  Outcome: Met This Shift  Goal: Absence of physical injury  Description  Absence of physical injury  4/23/2019 0048 by Yazmin Giordano RN  Outcome: Met This Shift  4/22/2019 1856 by Leandra Sandhu RN  Outcome: Met This Shift
Problem: Skin Integrity:  Goal: Will show no infection signs and symptoms  Description  Will show no infection signs and symptoms  Outcome: Not Met This Shift  Goal: Absence of new skin breakdown  Description  Absence of new skin breakdown  Outcome: Met This Shift  Goal: Skin integrity will be maintained  Description  Skin integrity will be maintained  Outcome: Met This Shift  Goal: Skin integrity will improve  Description  Skin integrity will improve  Outcome: Not Met This Shift     Problem: Falls - Risk of:  Goal: Will remain free from falls  Description  Will remain free from falls  Outcome: Met This Shift  Goal: Absence of physical injury  Description  Absence of physical injury  Outcome: Met This Shift
Problem: Risk for Impaired Skin Integrity  Goal: Tissue integrity - skin and mucous membranes  Description  Structural intactness and normal physiological function of skin and  mucous membranes.   4/23/2019 1254 by Jovita Landon RN  Outcome: Met This Shift

## 2019-04-23 NOTE — PATIENT CARE CONFERENCE
[facility] Quality Flow/Interdisciplinary Rounds Progress Note        Quality Flow Rounds held on April 23, 2019     Disciplines Attending:  Bedside Nurse, ,  and Nursing Unit Leadership    Santiago Issa was admitted on 4/20/2019  6:16 PM    Anticipated Discharge Date:  Expected Discharge Date: 04/24/19    Disposition:    Renaldo Score:  Renaldo Scale Score: 19    Readmission Risk              Risk of Unplanned Readmission:        9           Discussed patient goal for the day, patient clinical progression, and barriers to discharge.   The following Goal(s) of the Day/Commitment(s) have been identified:  IV Antibiotics    Gigi Mcadams  April 23, 2019

## 2019-04-23 NOTE — PROGRESS NOTES
Patient transferred from [facility], [place]. According to nurses and Dr. Alanna Florence they were under the impression the patient would be transferring back after vascular procedure. Dr. Alanna Florence notified of patient's new room number and need for admission orders.

## 2019-04-24 LAB
ANION GAP SERPL CALCULATED.3IONS-SCNC: 7 MMOL/L (ref 7–16)
BUN BLDV-MCNC: 15 MG/DL (ref 8–23)
CALCIUM SERPL-MCNC: 8.9 MG/DL (ref 8.6–10.2)
CHLORIDE BLD-SCNC: 104 MMOL/L (ref 98–107)
CO2: 29 MMOL/L (ref 22–29)
CREAT SERPL-MCNC: 0.8 MG/DL (ref 0.7–1.2)
GFR AFRICAN AMERICAN: >60
GFR NON-AFRICAN AMERICAN: >60 ML/MIN/1.73
GLUCOSE BLD-MCNC: 210 MG/DL (ref 74–99)
HCT VFR BLD CALC: 30.7 % (ref 37–54)
HEMOGLOBIN: 9.8 G/DL (ref 12.5–16.5)
MCH RBC QN AUTO: 29.2 PG (ref 26–35)
MCHC RBC AUTO-ENTMCNC: 31.9 % (ref 32–34.5)
MCV RBC AUTO: 91.4 FL (ref 80–99.9)
METER GLUCOSE: 165 MG/DL (ref 74–99)
METER GLUCOSE: 181 MG/DL (ref 74–99)
METER GLUCOSE: 210 MG/DL (ref 74–99)
METER GLUCOSE: 241 MG/DL (ref 74–99)
PDW BLD-RTO: 13.6 FL (ref 11.5–15)
PLATELET # BLD: 217 E9/L (ref 130–450)
PMV BLD AUTO: 9.6 FL (ref 7–12)
POTASSIUM REFLEX MAGNESIUM: 3.9 MMOL/L (ref 3.5–5)
RBC # BLD: 3.36 E12/L (ref 3.8–5.8)
SODIUM BLD-SCNC: 140 MMOL/L (ref 132–146)
WBC # BLD: 9 E9/L (ref 4.5–11.5)

## 2019-04-24 PROCEDURE — 6360000002 HC RX W HCPCS: Performed by: INTERNAL MEDICINE

## 2019-04-24 PROCEDURE — 97162 PT EVAL MOD COMPLEX 30 MIN: CPT

## 2019-04-24 PROCEDURE — 99232 SBSQ HOSP IP/OBS MODERATE 35: CPT | Performed by: SURGERY

## 2019-04-24 PROCEDURE — 97530 THERAPEUTIC ACTIVITIES: CPT

## 2019-04-24 PROCEDURE — 36415 COLL VENOUS BLD VENIPUNCTURE: CPT

## 2019-04-24 PROCEDURE — 6370000000 HC RX 637 (ALT 250 FOR IP): Performed by: INTERNAL MEDICINE

## 2019-04-24 PROCEDURE — 82962 GLUCOSE BLOOD TEST: CPT

## 2019-04-24 PROCEDURE — 96372 THER/PROPH/DIAG INJ SC/IM: CPT

## 2019-04-24 PROCEDURE — 2580000003 HC RX 258: Performed by: INTERNAL MEDICINE

## 2019-04-24 PROCEDURE — G0378 HOSPITAL OBSERVATION PER HR: HCPCS

## 2019-04-24 PROCEDURE — 85027 COMPLETE CBC AUTOMATED: CPT

## 2019-04-24 PROCEDURE — 97166 OT EVAL MOD COMPLEX 45 MIN: CPT

## 2019-04-24 PROCEDURE — 80048 BASIC METABOLIC PNL TOTAL CA: CPT

## 2019-04-24 PROCEDURE — 96376 TX/PRO/DX INJ SAME DRUG ADON: CPT

## 2019-04-24 RX ORDER — LISINOPRIL 10 MG/1
10 TABLET ORAL DAILY
Status: DISCONTINUED | OUTPATIENT
Start: 2019-04-24 | End: 2019-04-29 | Stop reason: HOSPADM

## 2019-04-24 RX ORDER — TAMSULOSIN HYDROCHLORIDE 0.4 MG/1
0.4 CAPSULE ORAL NIGHTLY
Status: DISCONTINUED | OUTPATIENT
Start: 2019-04-24 | End: 2019-04-29 | Stop reason: HOSPADM

## 2019-04-24 RX ORDER — CLOPIDOGREL BISULFATE 75 MG/1
75 TABLET ORAL DAILY
Status: DISCONTINUED | OUTPATIENT
Start: 2019-04-24 | End: 2019-04-29 | Stop reason: HOSPADM

## 2019-04-24 RX ADMIN — MEROPENEM 1 G: 1 INJECTION, POWDER, FOR SOLUTION INTRAVENOUS at 17:08

## 2019-04-24 RX ADMIN — CLOPIDOGREL 75 MG: 75 TABLET, FILM COATED ORAL at 17:08

## 2019-04-24 RX ADMIN — Medication 10 ML: at 21:03

## 2019-04-24 RX ADMIN — MEROPENEM 1 G: 1 INJECTION, POWDER, FOR SOLUTION INTRAVENOUS at 00:44

## 2019-04-24 RX ADMIN — MEROPENEM 1 G: 1 INJECTION, POWDER, FOR SOLUTION INTRAVENOUS at 08:53

## 2019-04-24 RX ADMIN — INSULIN LISPRO 4 UNITS: 100 INJECTION, SOLUTION INTRAVENOUS; SUBCUTANEOUS at 18:19

## 2019-04-24 RX ADMIN — INSULIN LISPRO 1 UNITS: 100 INJECTION, SOLUTION INTRAVENOUS; SUBCUTANEOUS at 21:07

## 2019-04-24 RX ADMIN — INSULIN LISPRO 4 UNITS: 100 INJECTION, SOLUTION INTRAVENOUS; SUBCUTANEOUS at 06:38

## 2019-04-24 RX ADMIN — TAMSULOSIN HYDROCHLORIDE 0.4 MG: 0.4 CAPSULE ORAL at 21:03

## 2019-04-24 RX ADMIN — Medication 10 ML: at 00:44

## 2019-04-24 RX ADMIN — Medication 10 ML: at 08:53

## 2019-04-24 RX ADMIN — LISINOPRIL 10 MG: 10 TABLET ORAL at 17:56

## 2019-04-24 RX ADMIN — ENOXAPARIN SODIUM 40 MG: 40 INJECTION SUBCUTANEOUS at 09:26

## 2019-04-24 ASSESSMENT — PAIN SCALES - GENERAL
PAINLEVEL_OUTOF10: 0

## 2019-04-24 NOTE — PROGRESS NOTES
progressed)  Standing: Mod A x2 w/ ww     Activity Tolerance Fair-  Fair+   Visual/  Perceptual Glasses: readers                Hand dominance: R   Strength ROM Additional Info:    RUE  3+/5  WFL good  and wfl FMC/dexterity noted during ADL tasks       LUE 3+/5  WFL good  and wfl FMC/dexterity noted during ADL tasks       Hearing: WFL   Sensation:  No c/o numbness or tingling   Tone: WFL   Edema: none noted                            Comments/Treatment: Upon arrival, patient lying in bed. Prior to activity, reinforced NWB R LE. Therapist facilitated bed mobility (supine><sit EOB, scooting. Education/cues for body mechanics and to maintain NWB R LE. Increased time and effort for scooting to EOB), sitting balance training EOB (heavy posterior lean - education/cues to correct provided), functional transfers (education/cues for safety/hand placement and NWB R LE), standing tolerance tasks (addressing posture, balance, NWB R LE and activity tolerance w/ ww) and light functional ambulation task with ww (Facilitated steps forward/backward w/ skilled cuing on ww management, NWB R LE, posture, body mechanics and safety). Therapist facilitated self-care retraining: UB/LB self-care tasks (gown, sock) and seated simulated grooming task while educating pt on modified techniques, posture, safety and energy conservation techniques. Skilled monitoring of HR, O2 sats and pts response to treatment. At end of session, patient lying in bed (B LE's elevated. Bed alarm on) with call light and phone within reach, all lines and tubes intact. Pt would benefit from continued skilled OT to increase functional independence and quality of life.     mod  Profile and History- med (extensive chart review)  Assessment of Occupational Performance and Identification of Deficits- med  Clinical Decision Making- med    Evaluation time includes thorough review of current medical information, gathering information on past medical history/social history and prior level of function, completion of standardized testing/informal observation of tasks, assessment of data, and development of POC/Goals. Assessment of current deficits   Functional mobility [x]  ADLs [x] Strength [x]  Cognition []  Functional transfers  [x] IADLs [] Safety Awareness [x]  Endurance [x]  Fine Motor Coordination [] Balance [x] Vision/perception [] Sensation []   Gross Motor Coordination [] ROM [] Delirium []                  Motor Control []    Plan of Care:   ADL retraining [x]   Equipment needs [x]   Neuromuscular re-education [x] Energy Conservation Techniques [x]  Functional Transfer training [x] Patient and/or Family Education [x]  Functional Mobility training [x]  Environmental Modifications [x]  Cognitive re-training []   Compensatory techniques for ADLs [x]  Splinting Needs []   Positioning to improve overall function [x]   Therapeutic Activity [x]  Therapeutic Exercise  [x]  Visual/Perceptual: []    Delirium prevention/treatment  []   Other:  []    Rehab Potential: Good for established goals    Patient / Family Goal:  Not stated     Patient and/or family were instructed diagnosis, prognosis/goals and plan of care. Demonstrated fair understanding. [] Malnutrition indicators have been identified and nursing has been notified to ensure a dietitian consult is ordered.        Mod Evaluation completed +  Timed Treatment: 10 minutes  Tx Time in: 09:11  TxTime out: 09:21        Juju Hernandez OTR/L #[id number]

## 2019-04-24 NOTE — PROGRESS NOTES
Daily Progress Note      SUBJECTIVE:  Seen in follow up righ foot wounds, awake, alert, no N/V/F/C, SOB or CP. No right le pain at present. OBJECTIVE:  Dressings intact, skin warm, loss of protective sensation. Areas right foot stable at present, no active drainage or odor. No pain. Medications    Current Facility-Administered Medications: meropenem (MERREM) 1 g in sodium chloride 0.9 % 100 mL IVPB (mini-bag), 1 g, Intravenous, Q8H  sodium chloride flush 0.9 % injection 10 mL, 10 mL, Intravenous, 2 times per day  sodium chloride flush 0.9 % injection 10 mL, 10 mL, Intravenous, PRN  magnesium hydroxide (MILK OF MAGNESIA) 400 MG/5ML suspension 30 mL, 30 mL, Oral, Daily PRN  ondansetron (ZOFRAN) injection 4 mg, 4 mg, Intravenous, Q6H PRN  enoxaparin (LOVENOX) injection 40 mg, 40 mg, Subcutaneous, Daily  acetaminophen (TYLENOL) tablet 650 mg, 650 mg, Oral, Q4H PRN  insulin lispro (HUMALOG) injection vial 0-12 Units, 0-12 Units, Subcutaneous, TID WC  insulin lispro (HUMALOG) injection vial 0-6 Units, 0-6 Units, Subcutaneous, Nightly  glucose (GLUTOSE) 40 % oral gel 15 g, 15 g, Oral, PRN  dextrose 50 % solution 12.5 g, 12.5 g, Intravenous, PRN  glucagon (rDNA) injection 1 mg, 1 mg, Intramuscular, PRN  dextrose 5 % solution, 100 mL/hr, Intravenous, PRN  Physical    VITALS:  BP (!) 144/70   Pulse 68   Temp 98.4 °F (36.9 °C)   Resp 18   Ht 5' 10\" (1.778 m)   Wt 140 lb (63.5 kg)   SpO2 96%   BMI 20.09 kg/m²   TEMPERATURE:  Current - Temp: 98.4 °F (36.9 °C);  Max - Temp  Av °F (36.7 °C)  Min: 97.6 °F (36.4 °C)  Max: 98.4 °F (36.9 °C)  RESPIRATIONS RANGE: Resp  Av.3  Min: 16  Max: 18  PULSE RANGE: Pulse  Av.7  Min: 68  Max: 71  BLOOD PRESSURE RANGE:  Systolic (51TDZ), GDJ:333 , Min:140 , ALS:223   ; Diastolic (05NHZ), KZR:34, Min:64, Max:89    PULSE OXIMETRY RANGE: SpO2  Av.3 %  Min: 96 %  Max: 97 %  24HR INTAKE/OUTPUT:      Intake/Output Summary (Last 24 hours) at 2019 1451  Last data filed at 4/24/2019 1432  Gross per 24 hour   Intake 560 ml   Output 1000 ml   Net -440 ml           Data    CBC with Differential:    Lab Results   Component Value Date    WBC 9.0 04/24/2019    RBC 3.36 04/24/2019    HGB 9.8 04/24/2019    HCT 30.7 04/24/2019     04/24/2019    MCV 91.4 04/24/2019    MCH 29.2 04/24/2019    MCHC 31.9 04/24/2019    RDW 13.6 04/24/2019    LYMPHOPCT 11.4 04/21/2019    MONOPCT 6.5 04/21/2019    BASOPCT 0.3 04/21/2019    MONOSABS 0.82 04/21/2019    LYMPHSABS 1.43 04/21/2019    EOSABS 0.22 04/21/2019    BASOSABS 0.04 04/21/2019     CMP:    Lab Results   Component Value Date     04/24/2019    K 3.9 04/24/2019     04/24/2019    CO2 29 04/24/2019    BUN 15 04/24/2019    CREATININE 0.8 04/24/2019    GFRAA >60 04/24/2019    LABGLOM >60 04/24/2019    GLUCOSE 210 04/24/2019    PROT 6.7 04/21/2019    LABALBU 2.6 04/21/2019    CALCIUM 8.9 04/24/2019    BILITOT 0.5 04/21/2019    ALKPHOS 116 04/21/2019    AST 12 04/21/2019    ALT 10 04/21/2019       ASSESSMENT AND PLAN:  Wounds gangrenous changes, PVD. DM, neuropathy. Dressing changed. Offload heel, will get prevalon. OR soon.

## 2019-04-24 NOTE — H&P
History and Physical      CHIEF COMPLAINT:  Wound right foot      HISTORY OF PRESENT ILLNESS:      The patient is a 77 y.o. male patient of Dr Pasha Rose who presents with right foot wound. Has a history of lower extremity wounds related to long-standing diabetes. Recently had seen a podiatrist and did not feel that the wounds were regressing satisfactorily. He recently presented as an outpatient with right lower extremity ulcerations and infected gangrenous toes. An outpatient evaluation. Has significant peripheral vascular disease and was admitted here for vascular evaluation and potential intervention. Angiography revealed short segment popliteal artery occlusion with reconstitution. No high-grade stenoses. He was seen by podiatry and local wound care was initiated with plans for surgery in the near future. He is also been seen by infectious disease for antibiotic management. He denies any fever or chills nausea vomiting chest pain palpitations or diarrhea. Past Medical History:    Past Medical History:   Diagnosis Date    Blood circulation, collateral     Cellulitis of foot, left 1/1/2017    Diabetes mellitus ([address]     Diabetic foot ulcer with osteomyelitis ([address] 1/1/2017    Femoral-popliteal atherosclerosis ([facility] [facility] 1/1/2017    Hypertension        Past Surgical History:    Past Surgical History:   Procedure Laterality Date    FOOT DEBRIDEMENT Left 01/04/2017    wound debridement and delayed primary closure    OTHER SURGICAL HISTORY  04/23/2019    Dr. Jessica Arteaga- PTA ant tibial    PROSTATE BIOPSY  04/2016    TOE AMPUTATION Left 12/31/2016    2nd       Medications Prior to Admission:    No medications prior to admission. Allergies:    Latex; Aspirin; Pcn [penicillins]; and Other    Social History:    reports that he has quit smoking. He has never used smokeless tobacco. He reports that he does not drink alcohol or use drugs.     Family History:   family history includes Heart Disease in his father and mother. REVIEW OF SYSTEMS    Constitutional: negative for chills and fevers  Eyes: negative for icterus and redness  Ears, nose, mouth, throat, and face: negative for epistaxis, hearing loss, nasal congestion, sore mouth, sore throat and tinnitus  Respiratory: negative for cough and hemoptysis  Cardiovascular: negative for chest pain and palpitations  Gastrointestinal: negative for abdominal pain and vomiting  Genitourinary:negative for dysuria and frequency  Integument/breast: negative for pruritus and rash  Hematologic/lymphatic: negative for bleeding and easy bruising  Musculoskeletal:negative for arthralgias and back pain  Neurological: negative for coordination problems and dizziness  Behavioral/Psych: negative for anxiety and depression  Endocrine: negative for temperature intolerance  Allergic/Immunologic: negative for anaphylaxis and angioedema    PHYSICAL EXAM:    Vitals:  BP (!) 144/70   Pulse 68   Temp 98.4 °F (36.9 °C)   Resp 18   Ht 5' 10\" (1.778 m)   Wt 140 lb (63.5 kg)   SpO2 96%   BMI 20.09 kg/m²     General appearance: alert, appears stated age and cooperative  Head: Normocephalic, without obvious abnormality, atraumatic  Eyes: conjunctivae/corneas clear. PERRL, EOM's intact. Fundi benign. Ears: normal TM's and external ear canals both ears  Nose: Nares normal. Septum midline. Mucosa normal. No drainage or sinus tenderness.   Throat: lips, mucosa, and tongue normal; teeth and gums normal  Neck: no adenopathy, no carotid bruit, no JVD, supple, symmetrical, trachea midline and thyroid not enlarged, symmetric, no tenderness/mass/nodules  Lungs: clear to auscultation bilaterally  Heart: regular rate and rhythm, S1, S2 normal, no murmur, click, rub or gallop  Abdomen: soft, non-tender; bowel sounds normal; no masses,  no organomegaly  Extremities: Right lower extremity dressing in place  Pulses: Diminished bilaterally  Skin: Skin color, texture, turgor normal. No rashes or lesions  Neurologic: Grossly normal    LABS:    CBC with Differential:    Lab Results   Component Value Date    WBC 9.0 2019    RBC 3.36 2019    HGB 9.8 2019    HCT 30.7 2019     2019    MCV 91.4 2019    MCH 29.2 2019    MCHC 31.9 2019    RDW 13.6 2019    LYMPHOPCT 11.4 2019    MONOPCT 6.5 2019    BASOPCT 0.3 2019    MONOSABS 0.82 2019    LYMPHSABS 1.43 2019    EOSABS 0.22 2019    BASOSABS 0.04 2019     CMP:    Lab Results   Component Value Date     2019    K 3.9 2019     2019    CO2 29 2019    BUN 15 2019    CREATININE 0.8 2019    GFRAA >60 2019    LABGLOM >60 2019    GLUCOSE 210 2019    PROT 6.7 2019    LABALBU 2.6 2019    CALCIUM 8.9 2019    BILITOT 0.5 2019    ALKPHOS 116 2019    AST 12 2019    ALT 10 2019     Narrative   Patient MRN: [id number]   : 1940   Age:  71 years   Gender: Male   Order Date: 2019 12:45 PM   Exam: XR FOOT RIGHT (MIN 3 VIEWS)   Number of Images: 3 views   Indication:   Multiple chronic wounds; Heel, prox to medial mal.,   dorsal 2nd&3rd digits, lateral 5th met-head, sub 1st MTPJ    Multiple chronic wounds; Heel, prox to medial mal., dorsal 2nd&3rd   digits, lateral 5th met-head, sub 1st MTPJ    Comparison: Prior study from 2019 is available       Findings: There is severe juxta-articular osteopenia with erosive changes most   pronounced in the metatarsal phalangeal joint of the fifth fourth and   third digit. There is lateral displacement of the all 5 metatarsal   phalangeal joint. . There is no evidence of fracture. . There is soft   tissue swelling. .            Impression       NO ACUTE FRACTURE OR DISLOCATION RIGHT FOOT       Other findings as described above.          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 Discussed with vascular surgery, we'll start Plavix    2. Continue lisinopril has a home    3. Continue Flomax    4.  Await further surgical intervention per podiatry    Billy Lara D.O., [facility]  3:19 PM  4/24/2019

## 2019-04-24 NOTE — PROGRESS NOTES
Podiatry  Attending Consult Note      Reason for Consult:  Right foot wounds     Requesting Physician:  Dr. Anuj Reyes:  Right foot wounds    HISTORY OF PRESENT ILLNESS:      The patient is a 77 y.o. male with significant past medical history of diabetes, PVD seen today for evaluation and treatment right foot wounds, chronic, patient had a-gram with angioplasty today. At present denies any pain, no N/V/F/C. He has had multiple treatment to his wounds including self which he was applying medihoney that he purchased at a health food store. Past Medical History:    Past Medical History:   Diagnosis Date    Blood circulation, collateral     Cellulitis of foot, left 1/1/2017    Diabetes mellitus ([address]     Diabetic foot ulcer with osteomyelitis [address] 1/1/2017    Femoral-popliteal atherosclerosis ([facility] 1/1/2017    Hypertension      Past Surgical History:    Past Surgical History:   Procedure Laterality Date    FOOT DEBRIDEMENT Left 01/04/2017    wound debridement and delayed primary closure    OTHER SURGICAL HISTORY  04/23/2019    Dr. Powers [facility]- PTA ant tibial    PROSTATE BIOPSY  04/2016    TOE AMPUTATION Left 12/31/2016    2nd     Current Medications:     meropenem  1 g Intravenous Q8H    sodium chloride flush  10 mL Intravenous 2 times per day    enoxaparin  40 mg Subcutaneous Daily    insulin lispro  0-12 Units Subcutaneous TID WC    insulin lispro  0-6 Units Subcutaneous Nightly      dextrose       sodium chloride flush, magnesium hydroxide, ondansetron, acetaminophen, glucose, dextrose, glucagon (rDNA), dextrose    Allergies:  Latex;  Aspirin; Pcn [penicillins]; and Other    Social History:    Social History     Socioeconomic History    Marital status:      Spouse name: Not on file    Number of children: Not on file    Years of education: Not on file    Highest education level: Not on file   Occupational History    Not on file   Social Needs    Financial resource strain: Not on file    Food insecurity:     Worry: Not on file     Inability: Not on file    Transportation needs:     Medical: Not on file     Non-medical: Not on file   Tobacco Use    Smoking status: Former Smoker    Smokeless tobacco: Never Used   Substance and Sexual Activity    Alcohol use: No    Drug use: No    Sexual activity: Not on file   Lifestyle    Physical activity:     Days per week: Not on file     Minutes per session: Not on file    Stress: Not on file   Relationships    Social connections:     Talks on phone: Not on file     Gets together: Not on file     Attends Jehovah's witness service: Not on file     Active member of club or organization: Not on file     Attends meetings of clubs or organizations: Not on file     Relationship status: Not on file    Intimate partner violence:     Fear of current or ex partner: Not on file     Emotionally abused: Not on file     Physically abused: Not on file     Forced sexual activity: Not on file   Other Topics Concern    Not on file   Social History Narrative    Not on file     Family History:       Problem Relation Age of Onset    Heart Disease Mother     Heart Disease Father      PHYSICAL EXAM:      Vitals:    BP (!) 197/81   Pulse 69   Temp 98.4 °F (36.9 °C) (Temporal)   Resp 18   Ht 5' 10\" (1.778 m)   Wt 140 lb (63.5 kg)   BMI 20.09 kg/m²     DERM: multiple areas right foot, medial ankle, stable, heel with some beefy tissue, medial ankle fibrous base, remainder eschar. 2nd toe gangrenous, dorsal medial 3rd with dry eschar at presnt. NEUROLOGIC: LOPS  VASCULAR:  Pedal pulses noted  MUSCULOSKELETAL: MS 4/5    Wound Care Documentation:  Wound 12/30/16 Other (Comment) (Active)   Number of days: 844       Wound 12/30/16 Foot Plantar (Active)   Number of days: 844       Incision 12/31/16 Other (Comment) Left (Active)   Number of days: 843       Wound 04/20/19 Foot Right;Plantar (Active)   Dressing Status Clean;Dry; Intact 4/23/2019  8:15 PM Dressing Changed Changed/New 4/23/2019  6:00 PM   Dressing/Treatment ABD; Non adherent 4/23/2019  8:15 PM   Wound Cleansed Rinsed/Irrigated with saline 4/23/2019  6:00 PM   Dressing Change Due 04/24/19 4/23/2019  8:15 PM   Wound Length (cm) 1 cm 4/23/2019  6:00 PM   Wound Width (cm) 2.5 cm 4/23/2019  6:00 PM   Wound Depth (cm) 0.1 cm 4/23/2019  6:00 PM   Wound Surface Area (cm^2) 2.5 cm^2 4/23/2019  6:00 PM   Change in Wound Size % (l*w) 1.19 4/23/2019  6:00 PM   Wound Volume (cm^3) 0.25 cm^3 4/23/2019  6:00 PM   Wound Assessment SENIA 4/23/2019  8:15 PM   Drainage Amount None 4/22/2019  4:38 AM   Odor Strong 4/22/2019  8:35 AM   Latonya-wound Assessment Swelling;Red 4/20/2019  9:13 PM   Number of days: 3       Wound 04/20/19 Foot Right;Mid (Active)   Dressing Status Clean;Dry; Intact 4/23/2019  8:15 PM   Dressing Changed Changed/New 4/23/2019  6:00 PM   Dressing/Treatment ABD; Non adherent 4/23/2019  8:15 PM   Wound Cleansed Rinsed/Irrigated with saline 4/23/2019  6:00 PM   Dressing Change Due 04/24/19 4/23/2019  8:15 PM   Wound Length (cm) 2 cm 4/23/2019  6:00 PM   Wound Width (cm) 4 cm 4/23/2019  6:00 PM   Wound Depth (cm) 0.2 cm 4/23/2019  6:00 PM   Wound Surface Area (cm^2) 8 cm^2 4/23/2019  6:00 PM   Change in Wound Size % (l*w) -122.22 4/23/2019  6:00 PM   Wound Volume (cm^3) 1.6 cm^3 4/23/2019  6:00 PM   Wound Assessment SENIA 4/23/2019  8:15 PM   Drainage Amount None 4/22/2019  4:38 AM   Odor Strong 4/22/2019  8:00 PM   Latonya-wound Assessment Pink 4/20/2019  9:13 PM   Number of days: 2       Wound 04/20/19 Ankle Anterior;Right (Active)   Wound Diabetic 4/20/2019  9:13 PM   Dressing Status Clean;Dry; Intact 4/23/2019  8:15 PM   Dressing Changed Changed/New 4/23/2019  6:00 PM   Dressing/Treatment ABD; Non adherent 4/23/2019  8:15 PM   Wound Cleansed Rinsed/Irrigated with saline 4/23/2019  6:00 PM   Dressing Change Due 04/24/19 4/23/2019  8:15 PM   Wound Length (cm) 5 cm 4/23/2019  6:00 PM   Wound Width (cm) 3 cm 4/23/2019  6:00 PM   Wound Depth (cm) 0.2 cm 4/23/2019  6:00 PM   Wound Surface Area (cm^2) 15 cm^2 4/23/2019  6:00 PM   Change in Wound Size % (l*w) -4.17 4/23/2019  6:00 PM   Wound Volume (cm^3) 3 cm^3 4/23/2019  6:00 PM   Wound Assessment SENIA 4/23/2019  8:15 PM   Drainage Amount None 4/22/2019  8:00 PM   Odor Strong 4/22/2019  8:35 AM   Number of days: 3       Wound 04/20/19 Foot Anterior;Right (Active)   Wound Diabetic 4/20/2019  9:13 PM   Dressing Status Clean;Dry; Intact 4/23/2019  8:15 PM   Dressing Changed Changed/New 4/23/2019  6:00 PM   Dressing/Treatment ABD; Non adherent 4/23/2019  8:15 PM   Wound Cleansed Rinsed/Irrigated with saline 4/23/2019  6:00 PM   Dressing Change Due 04/24/19 4/23/2019  8:15 PM   Wound Length (cm) 2.5 cm 4/23/2019  6:00 PM   Wound Width (cm) 1 cm 4/23/2019  6:00 PM   Wound Depth (cm) 0.2 cm 4/23/2019  6:00 PM   Wound Surface Area (cm^2) 2.5 cm^2 4/23/2019  6:00 PM   Change in Wound Size % (l*w) 50.98 4/23/2019  6:00 PM   Wound Volume (cm^3) 0.5 cm^3 4/23/2019  6:00 PM   Wound Assessment SENIA 4/23/2019  8:15 PM   Drainage Amount None 4/22/2019  8:00 PM   Odor Strong 4/22/2019  8:35 AM   Number of days: 3       Wound 04/20/19 Toe (Comment  which one) Anterior;Right BIG TOE (Active)   Wound Diabetic 4/20/2019  9:13 PM   Dressing Status Clean;Dry; Intact 4/23/2019  8:15 PM   Dressing Changed Changed/New 4/23/2019  6:00 PM   Dressing/Treatment ABD; Non adherent 4/23/2019  8:15 PM   Wound Cleansed Rinsed/Irrigated with saline 4/23/2019  6:00 PM   Dressing Change Due 04/24/19 4/23/2019  8:15 PM   Wound Length (cm) 0.5 cm 4/23/2019  6:00 PM   Wound Width (cm) 0.5 cm 4/23/2019  6:00 PM   Wound Depth (cm) 0.1 cm 4/23/2019  6:00 PM   Wound Surface Area (cm^2) 0.25 cm^2 4/23/2019  6:00 PM   Change in Wound Size % (l*w) 93.39 4/23/2019  6:00 PM   Wound Volume (cm^3) 0.02 cm^3 4/23/2019  6:00 PM   Wound Assessment SENIA 4/23/2019  8:15 PM   Drainage Amount None 4/22/2019  8:00 PM   Odor Strong 4/22/2019  8:35 AM   Number of days: 3       Wound 04/20/19 Toe (Comment  which one) Anterior;Right SECOND TOE (Active)   Wound Diabetic 4/20/2019  9:13 PM   Dressing Status Clean;Dry; Intact 4/23/2019  8:15 PM   Dressing Changed Changed/New 4/23/2019  6:00 PM   Dressing/Treatment ABD; Non adherent 4/23/2019  8:15 PM   Wound Cleansed Rinsed/Irrigated with saline 4/23/2019  6:00 PM   Dressing Change Due 04/24/19 4/23/2019  8:15 PM   Wound Length (cm) 1.5 cm 4/23/2019  6:00 PM   Wound Width (cm) 1 cm 4/23/2019  6:00 PM   Wound Depth (cm) 0.1 cm 4/23/2019  6:00 PM   Wound Surface Area (cm^2) 1.5 cm^2 4/23/2019  6:00 PM   Change in Wound Size % (l*w) -114.29 4/23/2019  6:00 PM   Wound Volume (cm^3) 0.15 cm^3 4/23/2019  6:00 PM   Wound Assessment SENIA 4/23/2019  8:15 PM   Drainage Amount None 4/22/2019  4:38 AM   Number of days: 3       Wound 04/20/19 Toe (Comment  which one) Anterior;Right THIRD TOE (Active)   Wound Diabetic 4/20/2019  9:13 PM   Dressing Status Clean;Dry; Intact 4/23/2019  8:15 PM   Dressing Changed Changed/New 4/23/2019  6:00 PM   Dressing/Treatment ABD; Non adherent 4/23/2019  8:15 PM   Wound Cleansed Rinsed/Irrigated with saline 4/23/2019  6:00 PM   Dressing Change Due 04/24/19 4/23/2019  8:15 PM   Wound Length (cm) 0.5 cm 4/23/2019  6:00 PM   Wound Width (cm) 0.5 cm 4/23/2019  6:00 PM   Wound Depth (cm) 0.1 cm 4/23/2019  6:00 PM   Wound Surface Area (cm^2) 0.25 cm^2 4/23/2019  6:00 PM   Change in Wound Size % (l*w) 93.75 4/23/2019  6:00 PM   Wound Volume (cm^3) 0.02 cm^3 4/23/2019  6:00 PM   Wound Assessment SENIA 4/23/2019  8:15 PM   Drainage Amount Small 4/22/2019  8:35 AM   Drainage Description Yellow 4/22/2019  8:35 AM   Odor Strong 4/22/2019  8:35 AM   Latonya-wound Assessment Pink 4/20/2019  9:13 PM   Number of days: 3       Wound 04/20/19 Toe (Comment  which one) Anterior;Right (Active)   Wound Diabetic 4/20/2019  9:13 PM   Dressing Status Clean;Dry; Intact 4/23/2019  8:15 PM   Dressing Changed Changed/New 4/22/2019  8:35 AM   Dressing/Treatment ABD; Non adherent 4/23/2019  8:15 PM   Wound Cleansed Rinsed/Irrigated with saline 4/22/2019  8:35 AM   Dressing Change Due 04/24/19 4/23/2019  8:15 PM   Wound Length (cm) 1.9 cm 4/20/2019  9:13 PM   Wound Width (cm) 0.4 cm 4/20/2019  9:13 PM   Wound Surface Area (cm^2) 0.76 cm^2 4/20/2019  9:13 PM   Wound Assessment SENIA 4/23/2019  8:15 PM   Drainage Amount Moderate 4/22/2019  8:35 AM   Drainage Description Yellow 4/22/2019  8:35 AM   Odor Strong 4/20/2019  9:13 PM   Latonya-wound Assessment Red 4/20/2019  9:13 PM   Number of days: 3       Wound 04/20/19 Ankle Right;Dorsal (Active)   Wound Diabetic 4/20/2019  9:13 PM   Dressing Status Clean;Dry; Intact 4/23/2019  8:15 PM   Dressing Changed Changed/New 4/23/2019  6:00 PM   Dressing/Treatment ABD; Non adherent 4/23/2019  8:15 PM   Wound Cleansed Rinsed/Irrigated with saline 4/23/2019  6:00 PM   Dressing Change Due 04/24/19 4/23/2019  8:15 PM   Wound Length (cm) 3 cm 4/23/2019  6:00 PM   Wound Width (cm) 3.5 cm 4/23/2019  6:00 PM   Wound Depth (cm) 0.2 cm 4/23/2019  6:00 PM   Wound Surface Area (cm^2) 10.5 cm^2 4/23/2019  6:00 PM   Change in Wound Size % (l*w) 23.25 4/23/2019  6:00 PM   Wound Volume (cm^3) 2.1 cm^3 4/23/2019  6:00 PM   Wound Assessment SENIA 4/23/2019  8:15 PM   Drainage Amount Moderate 4/22/2019  8:35 AM   Drainage Description Yellow 4/22/2019  8:35 AM   Odor Strong 4/22/2019  8:35 AM   Latonya-wound Assessment Pink 4/20/2019  9:13 PM   Number of days: 3       Wound 04/20/19 Heel Right (Active)   Wound Diabetic 4/20/2019  9:13 PM   Dressing Status Clean;Dry; Intact 4/23/2019  8:15 PM   Dressing Changed Changed/New 4/23/2019  6:00 PM   Dressing/Treatment ABD; Non adherent 4/23/2019  8:15 PM   Wound Cleansed Rinsed/Irrigated with saline 4/23/2019  6:00 PM   Dressing Change Due 04/24/19 4/23/2019  8:15 PM   Wound Length (cm) 5 cm 4/23/2019  6:00 PM   Wound Width (cm) 5 cm 4/23/2019  6:00 PM   Wound Depth (cm) 0.3 cm

## 2019-04-24 NOTE — PROGRESS NOTES
Physical Therapy    Initial Assessment     Name: Joslyn White  : 1940  MRN: [id number]    Date of Service: 2019    Evaluating PT:  Carolyn Brandon PT, DPT [id number]    Room #:  9157/6757-T  Diagnosis:  PVD  PMHx:  Cellulitis of L foot, DM, diabetic foot ulcer with osteomyelitis, femoral-popliteal atherosclerosis, HTN   Precautions:  Falls, NWB R foot, Heels offloaded in bed, Bed alarm   Equipment Needs:  TBD    Pt lives with wife in a 1 story home with ramp entry. Bedroom and bathroom are on the main level. Pt ambulated with no AD and with Unna boot PTA. Pt states he has not been driving but is independent with ADLs. Pt owns [employer]. Initial Evaluation  Date: 19 Treatment Short Term/ Long Term   Goals   AM-PAC 6 Clicks 87/22     Was pt agreeable to Eval/treatment? Yes     Does pt have pain? No c/o pain      Bed Mobility  Rolling: NT  Supine to sit: Mod A  Sit to supine: Min A   Scooting: Mod A to EOB  Independent    Transfers Sit to stand: Mod A x2  Stand to sit: Mod A x2  Stand pivot: NT  Sit to stand: Independent   Stand to sit: Independent   Stand pivot: Modified Independent     Ambulation    5 feet fwd/bwd with [employer] Mod A x2  >100 feet with AAD Modified Independent    Stair negotiation: ascended and descended  NT  >2 steps with 2 rail Min A   ROM BUE:  Per OT eval   BLE:  WFL     Strength BUE:  Per OT eval   BLE:  Grossly 4/5      Balance Sitting EOB:  Min A  Dynamic Standing:   Mod A x2 with [employer]  Sitting EOB:  Independent   Dynamic Standing:  Modified Independent       Pt is A & O x 4  Sensation:  Pt denies numbness and tingling to extremities  Edema:  Unremarkable     Patient education  Pt educated on PT role, safety during functional mobility, hand placement during bed mobility and transfers, sitting balance, sitting posture, NWB precautions of R foot, [employer] approximation/negotiation/sequencing, gait training     Patient response to education:   Pt verbalized understanding Pt demonstrated skill Pt requires further education in this area   Yes Partial with  Cues  Yes      Comments:  Pt received supine and agreeable to PT evaluation with OT collaboration. Pt cleared for participation by RN prior to session. Pt instructed on NWB of R foot prior to mobility. Assist of trunk during supine>sit. Poor sitting balance with posterior lean noted, verbal cues and tactile cues to correct. Pt educated on sequencing of gait and using [employer] prior to mobility. STS performed with assistance. Pt ambulated forward and backward with cues for [employer] management, physical assist of [employer], and physical assist to steady. Returned to sitting and assisted back to supine. Pt Able to scoot up in bed using bridge and scoot with BUE and L foot. Pt positioned in supine with HOB raised and BLE elevated off bed. Pt left with call button in reach, lines attached, and needs met. Pts/ family goals   1. Home     Patient and or family understand(s) diagnosis, prognosis, and plan of care. Yes     PLAN  PT care will be provided in accordance with the objectives noted above. Whenever appropriate, clear delegation orders will be provided for nursing staff. Exercises and functional mobility practice will be used as well as appropriate assistive devices or modalities to obtain goals. Patient and family education will also be administered as needed. Frequency of treatments: 2-5x/week x 7-10 days.     Time in  0900  Time out  [address], PT, DPKENNETH  [id number]

## 2019-04-24 NOTE — CONSULTS
[facility]     Consult Note                                 [address] [facility] [address], [place], [address]                   Phone [phone]     Fax [phone]        Date:   4/24/2019  Patient name:  Santiago Issa  Date of admission:  4/23/2019  4:40 PM  MRN:   [id number]  YOB: 1940    Reason for Consult:   Right lower extremity ulcerations, infection    CC: No chief complaint on file. Right foot pain    HISTORY OF PRESENT ILLNESS:                The patient is a 77 y.o. male known to infectious disease service, seen by Dr. Marty Araujo recently at [facility] presenting right foot ulcerations. He has a right foot ischemic ulcers and some of them including his right 2nd toe have been turning black and he decided to come to the hospital he has been on and off antibiotics in the last few months. Started on vancomycin plus meropenem because of concern for systemic infection as well as raised ESR and CRP. He was recommended by podiatry for amputation of his toes however he refused  and then requested for another podiatry opinion. Yesterday on 4/23/2019 he underwent alone angioplasty of the left popliteal and anterior tibial artery. Superficial culture showed growth of MSSA and gram-negative rods    Past Medical History:   has a past medical history of Blood circulation, collateral, Cellulitis of foot, left, Diabetes mellitus ([address] Diabetic foot ulcer with osteomyelitis ([address] Femoral-popliteal atherosclerosis ([facility] and Hypertension. August 2018. Patient was nursing ulcers at home on his own. Encouraged by his wife to come to the hospital. Admitted to [facility] and seen by ID for diabetic foot ulcers. The ulcers looked ischemic and/or colonized with multiple organisms, including MRSA, Proteus and Corynebacterium. Treated with Doxycycline and Levofloxacin.     December 2016. Admitted to [facility] with a limb threatening left diabetic foot infection and impending gangrene of the left 2nd toe. Treated with Zosyn and Vancomycin. Cultures of the wound grew Streptococcus viridans, Corynebacterium, MSSA and Enterobacter cloacae. Blood cultures were positive for Streptococcus mitis/oralis. Patient eventually underwent debridement biopsy amputation of the left 2nd toe. He was treated with Meropenem and discharged to a nursing facility on 1/6/174. Patient returned to the ER with urinary retention on 1/11/17. Antibiotics were resumed and completed around 1/21/17        Past Surgical History:   has a past surgical history that includes Prostate biopsy (04/2016); Toe amputation (Left, 12/31/2016); Foot Debridement (Left, 01/04/2017); and other surgical history (04/23/2019). Home Medications:    Prior to Admission medications    Not on File       Allergies:  Latex; Aspirin; Pcn [penicillins]; and Other    Social History:   reports that he has quit smoking. He has never used smokeless tobacco. He reports that he does not drink alcohol or use drugs. Family History: family history includes Heart Disease in his father and mother. REVIEW OF SYSTEMS:    12 point ROS has been done and pertinent neg and positive has been included in HPI and rest are non contributory          PHYSICAL EXAM:    BP (!) 180/80   Pulse 69   Temp 97.1 °F (36.2 °C) (Temporal)   Resp 18   Ht 5' 10\" (1.778 m)   Wt 140 lb (63.5 kg)   SpO2 96%   BMI 20.09 kg/m²    VENT SETTINGS:        General appearance: Alert, Awake, Oriented times 3, no distress  Skin: Warm and dry  Eyes: Pink palpebral conjunctivae. PERRL  Ears: External ears normal.  Nose/Sinuses: Nares normal. Septum midline. Mucosa normal. No sinus tenderness. Oropharynx: Oropharynx clear with no exudates seen  Neck: Neck supple. No jugular venous distension, lymphadenopathy or thyromegaly Trachea midline  Lungs: Lungs clear to auscultation bilaterally. microbiology                    Luan Booth MD

## 2019-04-24 NOTE — PROGRESS NOTES
Department of Podiatry  Resident Progress Note    Subjective  Patient seen bedside for multiple wounds on the right foot. Patient had his angio done today. No acute events events during the day. Patient states that he would like to be transferred to another podiatrist. Patient denies any N/V/D/F/C/SOB/CP and has no other complaints at this time. Objective    Past Medical History:   Diagnosis Date    Blood circulation, collateral     Cellulitis of foot, left 1/1/2017    Diabetes mellitus ([facility]     Diabetic foot ulcer with osteomyelitis ([facility] [address] 1/1/2017    Femoral-popliteal atherosclerosis ([facility] 1/1/2017    Hypertension        Past Surgical History:   Procedure Laterality Date    FOOT DEBRIDEMENT Left 01/04/2017    wound debridement and delayed primary closure    OTHER SURGICAL HISTORY  04/23/2019    Dr. Eugene Gonzalez- PTA ant tibial    PROSTATE BIOPSY  04/2016    TOE AMPUTATION Left 12/31/2016    2nd       Family History   Problem Relation Age of Onset    Heart Disease Mother     Heart Disease Father        Allergies   Allergen Reactions    Latex Other (See Comments)     Pt unsure of reaction    Aspirin Other (See Comments)     \"It affects my kidneys. \"   Sarah Simran [Penicillins] Other (See Comments)     \"I just know my body doesn't like it. \" \"I had a reaction a long time ago, I know it affects my kidneys. \"    Other Rash     \"I get a rash on just my face if I eat Cumin or Chili. \"       EXAM:  VASCULAR: Non-palpable DP humberto, R non-palpable PT, L PT artery lightly palpable. Digital hair diminished. CFT's are sluggish >3s to remaining digits. DERM:  Cool, dry, mildly xerotic humberto. There are multiple ulcerations to the R foot: Mild localized erythema is present to each woundbed however no purulent drainage or proximal erythematous streaking noted.    Medial lower leg just proximal and anterior to the medial malleolus measuring approximaly 4.0x3.0x0.3 with fibrotic base and serous drainage   Encompassing entire heel with a mixed fibro/necrotic woundbed and + probe to bone with serous drainage  Dorso-medial arch with fibrotic base measuring 3.0x2.0x0.2 and 1.5x0.5x0.2, serous drainage   Dorsal 1st IPJ measuring 1.5x0.8.0.2 with dry fibrotic woundbed. Plantar-medial 1st MTPJ 1.0x0.7x0.2 with fibrotic base  Lateral to R 5th metatarsal head measuring 1.8x1.0x0.3 with mixed fibro-granular base and serous drainage  Dorsal 2nd and third digits across the PIPJ each measuring 1.0x0.5x0.2. R 2nd digit appears cyanotic in nature and is cool to the touch to level of the MTPJ- no purulent drainage or streaking proximal on the foot noted, + probe to bone through woundbed   NEUROLOGIC: Diminished to fine touch humberto   MUSCULOSKELETAL:  Strength at LE muscle groups crossing the ankle are 5/5 in sagittal and frontal plans.  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 appreciated        CBC with Differential:    Lab Results   Component Value Date    WBC 12.1 04/23/2019    RBC 3.71 04/23/2019    HGB 10.9 04/23/2019    HCT 34.2 04/23/2019     04/23/2019    MCV 92.2 04/23/2019    MCH 29.4 04/23/2019    MCHC 31.9 04/23/2019    RDW 13.4 04/23/2019    LYMPHOPCT 11.4 04/21/2019    MONOPCT 6.5 04/21/2019    BASOPCT 0.3 04/21/2019    MONOSABS 0.82 04/21/2019    LYMPHSABS 1.43 04/21/2019    EOSABS 0.22 04/21/2019    BASOSABS 0.04 04/21/2019     Hemoglobin/Hematocrit:    Lab Results   Component Value Date    HGB 10.9 04/23/2019    HCT 34.2 04/23/2019     BMP:    Lab Results   Component Value Date     04/23/2019    K 3.9 04/23/2019    K 4.4 04/20/2019     04/23/2019    CO2 27 04/23/2019    BUN 17 04/23/2019    LABALBU 2.6 04/21/2019    CREATININE 0.8 04/23/2019    CALCIUM 8.8 04/23/2019    GFRAA >60 04/23/2019    LABGLOM >60 04/23/2019    GLUCOSE 265 04/23/2019       Scheduled Meds:   meropenem  1 g Intravenous Q8H    vancomycin  1,000 mg Intravenous Once    sodium chloride flush  10 mL Intravenous 2 times per day    enoxaparin  40 mg Subcutaneous Daily    insulin lispro  0-12 Units Subcutaneous TID WC    insulin lispro  0-6 Units Subcutaneous Nightly     Continuous Infusions:   dextrose       PRN Meds:.sodium chloride flush, magnesium hydroxide, ondansetron, acetaminophen, glucose, dextrose, glucagon (rDNA), dextrose    Diagnostics      ASSESEMENT:  -multiple right foot wound  -cyanotic right 2nd toe  -PVD        PLAN:  Evaluation and Management  -Patient was evaluated, all service notes reviewed  -we spoke with patient regarding his surgery, and we are transferring his care to Dr. Keaton Duarte  -surgery by Dr. Tyree Arredondo DPM is cancelled   -patient discussed with Dr. Tyree Arredondo DPM

## 2019-04-25 ENCOUNTER — APPOINTMENT (OUTPATIENT)
Dept: INTERVENTIONAL RADIOLOGY/VASCULAR | Age: 79
DRG: 253 | End: 2019-04-25
Attending: SURGERY
Payer: MEDICARE

## 2019-04-25 ENCOUNTER — APPOINTMENT (OUTPATIENT)
Dept: GENERAL RADIOLOGY | Age: 79
DRG: 253 | End: 2019-04-25
Attending: SURGERY
Payer: MEDICARE

## 2019-04-25 ENCOUNTER — APPOINTMENT (OUTPATIENT)
Dept: ULTRASOUND IMAGING | Age: 79
DRG: 253 | End: 2019-04-25
Attending: SURGERY
Payer: MEDICARE

## 2019-04-25 PROBLEM — I70.229 CRITICAL LOWER LIMB ISCHEMIA (HCC): Status: ACTIVE | Noted: 2019-04-25

## 2019-04-25 PROBLEM — I70.239 ATHEROSCLEROSIS OF NATIVE ARTERY OF RIGHT LOWER EXTREMITY WITH ULCERATION (HCC): Status: ACTIVE | Noted: 2019-04-25

## 2019-04-25 LAB
BLOOD CULTURE, ROUTINE: NORMAL
CULTURE, BLOOD 2: NORMAL
METER GLUCOSE: 168 MG/DL (ref 74–99)
METER GLUCOSE: 218 MG/DL (ref 74–99)
METER GLUCOSE: 231 MG/DL (ref 74–99)
METER GLUCOSE: 258 MG/DL (ref 74–99)
PROSTATE SPECIFIC ANTIGEN: 2.97 NG/ML (ref 0–4)

## 2019-04-25 PROCEDURE — 2580000003 HC RX 258: Performed by: INTERNAL MEDICINE

## 2019-04-25 PROCEDURE — 51702 INSERT TEMP BLADDER CATH: CPT

## 2019-04-25 PROCEDURE — 6360000002 HC RX W HCPCS: Performed by: INTERNAL MEDICINE

## 2019-04-25 PROCEDURE — 6370000000 HC RX 637 (ALT 250 FOR IP): Performed by: INTERNAL MEDICINE

## 2019-04-25 PROCEDURE — G0103 PSA SCREENING: HCPCS

## 2019-04-25 PROCEDURE — 93922 UPR/L XTREMITY ART 2 LEVELS: CPT

## 2019-04-25 PROCEDURE — 82962 GLUCOSE BLOOD TEST: CPT

## 2019-04-25 PROCEDURE — 36415 COLL VENOUS BLD VENIPUNCTURE: CPT

## 2019-04-25 PROCEDURE — 74018 RADEX ABDOMEN 1 VIEW: CPT

## 2019-04-25 PROCEDURE — 99232 SBSQ HOSP IP/OBS MODERATE 35: CPT | Performed by: SURGERY

## 2019-04-25 PROCEDURE — 6370000000 HC RX 637 (ALT 250 FOR IP): Performed by: UROLOGY

## 2019-04-25 PROCEDURE — 2060000000 HC ICU INTERMEDIATE R&B

## 2019-04-25 PROCEDURE — 76770 US EXAM ABDO BACK WALL COMP: CPT

## 2019-04-25 RX ORDER — ATROPA BELLADONNA AND OPIUM 16.2; 6 MG/1; MG/1
60 SUPPOSITORY RECTAL EVERY 8 HOURS PRN
Status: DISCONTINUED | OUTPATIENT
Start: 2019-04-25 | End: 2019-04-29 | Stop reason: HOSPADM

## 2019-04-25 RX ORDER — FINASTERIDE 5 MG/1
5 TABLET, FILM COATED ORAL DAILY
Status: DISCONTINUED | OUTPATIENT
Start: 2019-04-25 | End: 2019-04-29 | Stop reason: HOSPADM

## 2019-04-25 RX ADMIN — Medication 10 ML: at 01:42

## 2019-04-25 RX ADMIN — TAMSULOSIN HYDROCHLORIDE 0.4 MG: 0.4 CAPSULE ORAL at 21:17

## 2019-04-25 RX ADMIN — Medication 10 ML: at 21:17

## 2019-04-25 RX ADMIN — CLOPIDOGREL 75 MG: 75 TABLET, FILM COATED ORAL at 10:19

## 2019-04-25 RX ADMIN — MEROPENEM 1 G: 1 INJECTION, POWDER, FOR SOLUTION INTRAVENOUS at 11:05

## 2019-04-25 RX ADMIN — ENOXAPARIN SODIUM 40 MG: 40 INJECTION SUBCUTANEOUS at 10:19

## 2019-04-25 RX ADMIN — MEROPENEM 1 G: 1 INJECTION, POWDER, FOR SOLUTION INTRAVENOUS at 17:03

## 2019-04-25 RX ADMIN — LISINOPRIL 10 MG: 10 TABLET ORAL at 10:19

## 2019-04-25 RX ADMIN — INSULIN LISPRO 2 UNITS: 100 INJECTION, SOLUTION INTRAVENOUS; SUBCUTANEOUS at 06:16

## 2019-04-25 RX ADMIN — Medication 10 ML: at 11:05

## 2019-04-25 RX ADMIN — INSULIN LISPRO 2 UNITS: 100 INJECTION, SOLUTION INTRAVENOUS; SUBCUTANEOUS at 21:18

## 2019-04-25 RX ADMIN — FINASTERIDE 5 MG: 5 TABLET, FILM COATED ORAL at 16:34

## 2019-04-25 RX ADMIN — INSULIN LISPRO 6 UNITS: 100 INJECTION, SOLUTION INTRAVENOUS; SUBCUTANEOUS at 16:27

## 2019-04-25 RX ADMIN — MEROPENEM 1 G: 1 INJECTION, POWDER, FOR SOLUTION INTRAVENOUS at 01:41

## 2019-04-25 ASSESSMENT — PAIN SCALES - GENERAL
PAINLEVEL_OUTOF10: 0
PAINLEVEL_OUTOF10: 0

## 2019-04-25 NOTE — PLAN OF CARE
Problem: Risk for Impaired Skin Integrity  Goal: Tissue integrity - skin and mucous membranes  Description  Structural intactness and normal physiological function of skin and  mucous membranes.   Outcome: Met This Shift     Problem: Falls - Risk of:  Goal: Will remain free from falls  Description  Will remain free from falls  Outcome: Met This Shift     Problem: Pain:  Goal: Control of acute pain  Description  Control of acute pain  Outcome: Met This Shift

## 2019-04-25 NOTE — CONSULTS
[address]                  [address] [facility],  [facility]                                  CONSULTATION    PATIENT NAME: Adeline BAIRES                   :        1940  MED REC NO:   [id number]                            ROOM:  ACCOUNT NO:   [de-identified]                           ADMIT DATE: 2019  PROVIDER:     Paul Carson MD    CONSULT DATE:  2019    CHIEF COMPLAINT:  Inability to empty bladder. HISTORY OF PRESENT ILLNESS:  This 69-year-old male who has been admitted  to the hospital because of vascular problems and ulceration on his right  foot. The patient is to have debridement of his foot tomorrow on  2019. The patient has been noticing that he has been having increasing  difficulty emptying his bladder. He was found to have urinary retention  with measured 1750 mL of urine removed when the Bocanegra catheter was  inserted. The urine was clear. There was no difficulty passing the  catheter. The patient is known to our office since . At that time he had a  biopsy of his prostate for a hard-feeling prostate, was found to have  chronic prostatitis. He also was seen in 2017 for urinary retention. He was treated with Bocanegra catheter, was started on Flomax. The catheter  was removed. He was able to void but was carrying a large amount of  postvoid residual.  He was advised to consider intermittent self  catheterization which he did not do. He also discontinued his Flomax. He was not seen in the office again since . His PSA at that time  was in the range of 2.6. The patient is now with the serum creatinine  of 0.8. PAST MEDICAL HISTORY:  Significant for diabetes mellitus. He has had  multiple vascular problems and has had a femoral-popliteal bypass. He  is hypertensive and has been treated for cellulitis of his right foot. PAST SURGICAL TREATMENT:  He has had foot debridement on the left.

## 2019-04-25 NOTE — PROGRESS NOTES
Patient complained of not being able to fully empty his bladder. Abdomen is hard and distended. Will bladder scan.

## 2019-04-25 NOTE — PROGRESS NOTES
Patient is complaining of pain in his penis and lower abdomen. Bocanegra is still draining urine. Urology has been consulted.   (Dr Austin Whitfield)

## 2019-04-25 NOTE — PROGRESS NOTES
Daily Progress Note      SUBJECTIVE:  [facility], awake, alert, no N/V/F/C, SOB or CP. No right le pain at present. OBJECTIVE:  Dressings intact, skin warm, loss of protective sensation. Areas right foot stable at present, no active drainage or odor. No pain. Medications    Current Facility-Administered Medications: opium-belladonna (B&O SUPPRETTES) 16.2-60 MG suppository 60 mg, 60 mg, Rectal, Q8H PRN  finasteride (PROSCAR) tablet 5 mg, 5 mg, Oral, Daily  clopidogrel (PLAVIX) tablet 75 mg, 75 mg, Oral, Daily  lisinopril (PRINIVIL;ZESTRIL) tablet 10 mg, 10 mg, Oral, Daily  tamsulosin (FLOMAX) capsule 0.4 mg, 0.4 mg, Oral, Nightly  meropenem (MERREM) 1 g in sodium chloride 0.9 % 100 mL IVPB (mini-bag), 1 g, Intravenous, Q8H  sodium chloride flush 0.9 % injection 10 mL, 10 mL, Intravenous, 2 times per day  sodium chloride flush 0.9 % injection 10 mL, 10 mL, Intravenous, PRN  magnesium hydroxide (MILK OF MAGNESIA) 400 MG/5ML suspension 30 mL, 30 mL, Oral, Daily PRN  ondansetron (ZOFRAN) injection 4 mg, 4 mg, Intravenous, Q6H PRN  enoxaparin (LOVENOX) injection 40 mg, 40 mg, Subcutaneous, Daily  acetaminophen (TYLENOL) tablet 650 mg, 650 mg, Oral, Q4H PRN  insulin lispro (HUMALOG) injection vial 0-12 Units, 0-12 Units, Subcutaneous, TID WC  insulin lispro (HUMALOG) injection vial 0-6 Units, 0-6 Units, Subcutaneous, Nightly  glucose (GLUTOSE) 40 % oral gel 15 g, 15 g, Oral, PRN  dextrose 50 % solution 12.5 g, 12.5 g, Intravenous, PRN  glucagon (rDNA) injection 1 mg, 1 mg, Intramuscular, PRN  dextrose 5 % solution, 100 mL/hr, Intravenous, PRN  Physical    VITALS:  BP (!) 177/77   Pulse 68   Temp 98.1 °F (36.7 °C) (Temporal)   Resp 18   Ht 5' 10\" (1.778 m)   Wt 140 lb (63.5 kg)   SpO2 97%   BMI 20.09 kg/m²   TEMPERATURE:  Current - Temp: 98.1 °F (36.7 °C);  Max - Temp  Av.7 °F (36.5 °C)  Min: 97.1 °F (36.2 °C)  Max: 98.2 °F (36.8 °C)  RESPIRATIONS RANGE: Resp  Av  Min: 18 Max: 18  PULSE RANGE: Pulse  Av  Min: 65  Max: 69  BLOOD PRESSURE RANGE:  Systolic (87FYT), AEF:480 , Min:177 , RTF:674   ; Diastolic (13OIJ), LIX:18, Min:75, Max:86    PULSE OXIMETRY RANGE: SpO2  Av.3 %  Min: 96 %  Max: 97 %  24HR INTAKE/OUTPUT:      Intake/Output Summary (Last 24 hours) at 2019 1638  Last data filed at 2019 1343  Gross per 24 hour   Intake 585 ml   Output 4050 ml   Net -3465 ml           Data    CBC with Differential:    Lab Results   Component Value Date    WBC 9.0 2019    RBC 3.36 2019    HGB 9.8 2019    HCT 30.7 2019     2019    MCV 91.4 2019    MCH 29.2 2019    MCHC 31.9 2019    RDW 13.6 2019    LYMPHOPCT 11.4 2019    MONOPCT 6.5 2019    BASOPCT 0.3 2019    MONOSABS 0.82 2019    LYMPHSABS 1.43 2019    EOSABS 0.22 2019    BASOSABS 0.04 2019     CMP:    Lab Results   Component Value Date     2019    K 3.9 2019     2019    CO2 29 2019    BUN 15 2019    CREATININE 0.8 2019    GFRAA >60 2019    LABGLOM >60 2019    GLUCOSE 210 2019    PROT 6.7 2019    LABALBU 2.6 2019    CALCIUM 8.9 2019    BILITOT 0.5 2019    ALKPHOS 116 2019    AST 12 2019    ALT 10 2019       ASSESSMENT AND PLAN:  Wounds gangrenous changes, PVD. DM, neuropathy. OR tomorrow for amputation right 2nd toe, debridement of wounds. Discussed in detail with patient, salvage type procedure, end result loss of limb. Discussed with Dr. Raegan Sanchez.

## 2019-04-25 NOTE — PROGRESS NOTES
Nutrition Assessment    Type and Reason for Visit: Initial, Positive Nutrition Screen    Nutrition Recommendations: Continue current diet, Continue current ONS. Nutrition Assessment: Pt. moderately malnourished on admit,muscle and fat wasting, decreased appetite now at further nutritional compromise d/t increased nutrient needs for multiple wound healing. Malnutrition Assessment:  · Malnutrition Status: Meets the criteria for moderate malnutrition  · Context: Chronic illness  · Findings of the 6 clinical characteristics of malnutrition (Minimum of 2 out of 6 clinical characteristics is required to make the diagnosis of moderate or severe Protein Calorie Malnutrition based on AND/ASPEN Guidelines):  1. Energy Intake-Less than or equal to 75% of estimated energy requirement, Greater than or equal to 1 month    2. Weight Loss-No significant weight loss,    3. Fat Loss-Mild subcutaneous fat loss, Orbital  4. Muscle Loss-Mild muscle mass loss, Temples (temporalis muscle), Clavicles (pectoralis and deltoids)  5. Fluid Accumulation-No significant fluid accumulation,    6.  Strength-Not measured    Nutrition Risk Level: Moderate    Nutrient Needs:  · Estimated Daily Total Kcal: 1448-0222(CBW REE 1363 x 1.2 SF)  · Estimated Daily Protein (g): 85 - 100(CBW 1.3-1.5 g/kg)  · Estimated Daily Total Fluid (ml/day): 5616-0580(1 ml/kcal)    Nutrition Diagnosis:   · Problem:  Moderate malnutrition, In context of chronic illness  · Etiology: related to Increased demand for energy/nutrients     Signs and symptoms:  as evidenced by Presence of wounds, BMI, Diet history of poor intake, Mild loss of subcutaneous fat, Mild muscle loss    Objective Information:  · Nutrition-Focused Physical Findings: alert, poor dentition, Abdomen WDL, + BS, + 2 pitting,  non healing wounds, - I/O  · Wound Type: Multiple, Diabetic Ulcer  · Current Nutrition Therapies:  · Oral Diet Orders: Carb Control 5 Carbs/Meal   · Oral Diet intake: %  · Oral Nutrition Supplement (ONS) Orders: Diabetic Oral Supplement  · ONS intake: 0%  · Anthropometric Measures:  · Ht: 5' 10\" (177.8 cm)   · Current Body Wt: 140 lb (63.5 kg)(4/25 bed scale)  · Admission Body Wt: 140 lb (63.5 kg)  · Usual Body Wt: 140 lb (63.5 kg)(from EMR 8/2018 bed scale)  · % Weight Change:  ,  no significant weight loss x ~ 8 months  · Ideal Body Wt: 166 lb (75.3 kg), % Ideal Body 84%  · BMI Classification: BMI 18.5 - 24.9 Normal Weight    Nutrition Interventions:   Continue current diet, Continue current ONS  Continued Inpatient Monitoring, Education not appropriate at this time, Coordination of Care    Nutrition Evaluation:   · Evaluation: Goals set   · Goals: Consume >75% of meals/ONS.     · Monitoring: Meal Intake, Supplement Intake, Diet Tolerance, Skin Integrity, Wound Healing, I&O, Weight, Pertinent Labs, Chewing/Swallowing, Monitor Bowel Function      Electronically signed by Tony Bocanegra RD, LD on 4/25/19 at 2:43 PM    Contact Number:  Ext [phone]

## 2019-04-25 NOTE — PROGRESS NOTES
Vascular        Chief complaint : Patient known to me from the past, a few years ago, admitted with a diabetic wounds of the right foot, with cellulitis, ischemic 2nd toe, transfer from [facility], underwent balloon angioplasty of the popliteal artery and anterior tibial artery on 23 April by Dr. Alvin Warren, underwent evaluation by her podiatrist Dr. Clara Bonilla    Has history of diabetes mellitus with neuropathy      Subjective: No new C/O, patient feeling better, still has some discomfort    Objective:    BP (!) 177/77   Pulse 68   Temp 98.1 °F (36.7 °C) (Temporal)   Resp 18   Ht 5' 10\" (1.778 m)   Wt 140 lb (63.5 kg)   SpO2 97%   BMI 20.09 kg/m²     General: alert and oriented. Neck:  Carotid bruits: Right No  Left No    Respiratory:  No rales or rhonchi     Cardiovascular:  Normal Sinus Rhythm. No murmur    Abdomen:  Soft, no masses palpable. No tenderness    Extremities:  No lower extremity edema. Both the feet are warm to touch    Patient does have multiple superficial wounds of the right foot along with the discoloration, but to the 2nd toe with ulceration with erythema, but overall dry without any significant drainage at the present time     The patient has multiple ulcers, over the dorsal aspect of the right foot, also has a heel eschar, the ulcers of the foot to have some exudate          Pulses Right  Left    Radial       Brachial 3 3  3=normal   Femoral 2-3 2-3  2=diminished   Popliteal 2   1=barely palpable   Dorsalis pedis    0=absent   Posterior tibial 0 1  4=aneurysmal         Neuro: The speech is intact. Moving all extremities. No evidence of any acute focal lateralizing neurological deficit. Other pertinent information:    1. The angiogram and angioplasty reports were reviewed    2. The vascular surgery and podiatry progress towards were reviewed    3.   The lower extremity artery Doppler study was personally reviewed by me    Narrative   Normal ankle arm index with Doppler evidence of bilateral femoral   popliteal arterial occlusive disease, with the adequate collateral   flow to both feet based upon the pulse volume recordings of the   metatarsals        Adequate collateral flow noted to the toes also based upon the pulse   volume recording, except it is significantly diminished over the right   second toe     Plan:    Discussed in detail with the patient regarding the results of the ankle brachial index, overall improved collateral flow, with good pulse volume recordings or the metatarsals on the right side and adequate collaterals noted to the toes slightly except the 2nd toe    Okay to proceed with amputation 2nd toe by podiatry service, patient scheduled for tomorrow, patient was recommended to offload the right foot    All his questions were answered    Discussed with his podiatrist Dr. Chata Johnson and with Bartlett Bamberger

## 2019-04-25 NOTE — PROGRESS NOTES
ID Progress Note                [address] [facility], [address] [facility]            Phone [phone]     Fax [phone]      Chief complaint   Left foot ulcers    Subjective: The patient is awake and alert. Tolerating medications. Reports no side effects. Afebrile. 10 ROS otherwise negative unless otherwise specified above. Objective:    Vitals:    04/25/19 1015   BP: (!) 189/86   Pulse: 66   Resp: 18   Temp: 98.2 °F (36.8 °C)   SpO2: 96%     General appearance: Alert, Awake, Oriented times 3, no distress  Skin: Warm and dry  Eyes: Pink palpebral conjunctivae. PERRL  Ears: External ears normal.  Nose/Sinuses: Nares normal. Septum midline. Mucosa normal. No sinus tenderness. Oropharynx: Oropharynx clear with no exudates seen  Neck: Neck supple. No jugular venous distension, lymphadenopathy or thyromegaly Trachea midline  Lungs: Lungs clear to auscultation bilaterally. No rhonchi, crackles or wheezes  Heart: S1 S2  Regular rate and rhythm. No rub, murmur or gallop  Abdomen: Abdomen soft, non-tender.  BS normal. No masses, organomegaly  Extremities: No edema, Peripheral pulses palpable  Musculoskeletal:      Left foot with a different ulcers  Medial lower leg has a clean-based sharp demarcated ulcer around 4x 3 cm, no discharge, no smell  Left heel partly fibrotic, unstageable, foul-smelling, old healed blood  Left 2nd toe mostly necrotic/fibrotic, probes to bone  Other small superficial ulcers both on the plantar and dorsal aspect, not infected        Labs:  Recent Labs     04/23/19  0600 04/24/19  0845   WBC 12.1* 9.0   RBC 3.71* 3.36*   HGB 10.9* 9.8*   HCT 34.2* 30.7*   MCV 92.2 91.4   MCH 29.4 29.2   MCHC 31.9* 31.9*   RDW 13.4 13.6    217   MPV 9.6 9.6     CMP:    Lab Results   Component Value Date     04/24/2019    K 3.9 04/24/2019     04/24/2019    CO2 29 04/24/2019    BUN 15 04/24/2019    CREATININE 0.8 04/24/2019    GFRAA >60 04/24/2019    LABGLOM >60

## 2019-04-26 ENCOUNTER — ANESTHESIA EVENT (OUTPATIENT)
Dept: OPERATING ROOM | Age: 79
DRG: 253 | End: 2019-04-26
Payer: MEDICARE

## 2019-04-26 ENCOUNTER — ANESTHESIA (OUTPATIENT)
Dept: OPERATING ROOM | Age: 79
DRG: 253 | End: 2019-04-26
Payer: MEDICARE

## 2019-04-26 VITALS — DIASTOLIC BLOOD PRESSURE: 65 MMHG | OXYGEN SATURATION: 100 % | SYSTOLIC BLOOD PRESSURE: 134 MMHG

## 2019-04-26 LAB
ALBUMIN SERPL-MCNC: 2.4 G/DL (ref 3.5–5.2)
ALP BLD-CCNC: 104 U/L (ref 40–129)
ALT SERPL-CCNC: 10 U/L (ref 0–40)
ANION GAP SERPL CALCULATED.3IONS-SCNC: 8 MMOL/L (ref 7–16)
AST SERPL-CCNC: 15 U/L (ref 0–39)
BILIRUB SERPL-MCNC: 0.4 MG/DL (ref 0–1.2)
BUN BLDV-MCNC: 16 MG/DL (ref 8–23)
CALCIUM SERPL-MCNC: 8.7 MG/DL (ref 8.6–10.2)
CHLORIDE BLD-SCNC: 103 MMOL/L (ref 98–107)
CO2: 31 MMOL/L (ref 22–29)
CREAT SERPL-MCNC: 0.8 MG/DL (ref 0.7–1.2)
GFR AFRICAN AMERICAN: >60
GFR NON-AFRICAN AMERICAN: >60 ML/MIN/1.73
GLUCOSE BLD-MCNC: 185 MG/DL (ref 74–99)
HCT VFR BLD CALC: 30.8 % (ref 37–54)
HEMOGLOBIN: 9.9 G/DL (ref 12.5–16.5)
MCH RBC QN AUTO: 29.2 PG (ref 26–35)
MCHC RBC AUTO-ENTMCNC: 32.1 % (ref 32–34.5)
MCV RBC AUTO: 90.9 FL (ref 80–99.9)
METER GLUCOSE: 174 MG/DL (ref 74–99)
METER GLUCOSE: 186 MG/DL (ref 74–99)
METER GLUCOSE: 222 MG/DL (ref 74–99)
PDW BLD-RTO: 13.7 FL (ref 11.5–15)
PLATELET # BLD: 212 E9/L (ref 130–450)
PMV BLD AUTO: 9.9 FL (ref 7–12)
POTASSIUM SERPL-SCNC: 4.2 MMOL/L (ref 3.5–5)
RBC # BLD: 3.39 E12/L (ref 3.8–5.8)
SODIUM BLD-SCNC: 142 MMOL/L (ref 132–146)
TOTAL PROTEIN: 6.4 G/DL (ref 6.4–8.3)
WBC # BLD: 6.7 E9/L (ref 4.5–11.5)

## 2019-04-26 PROCEDURE — 7100000000 HC PACU RECOVERY - FIRST 15 MIN: Performed by: PODIATRIST

## 2019-04-26 PROCEDURE — 3700000000 HC ANESTHESIA ATTENDED CARE: Performed by: PODIATRIST

## 2019-04-26 PROCEDURE — 36415 COLL VENOUS BLD VENIPUNCTURE: CPT

## 2019-04-26 PROCEDURE — 87206 SMEAR FLUORESCENT/ACID STAI: CPT

## 2019-04-26 PROCEDURE — 87205 SMEAR GRAM STAIN: CPT

## 2019-04-26 PROCEDURE — 88311 DECALCIFY TISSUE: CPT

## 2019-04-26 PROCEDURE — 2060000000 HC ICU INTERMEDIATE R&B

## 2019-04-26 PROCEDURE — 88305 TISSUE EXAM BY PATHOLOGIST: CPT

## 2019-04-26 PROCEDURE — 6370000000 HC RX 637 (ALT 250 FOR IP): Performed by: INTERNAL MEDICINE

## 2019-04-26 PROCEDURE — 3700000001 HC ADD 15 MINUTES (ANESTHESIA): Performed by: PODIATRIST

## 2019-04-26 PROCEDURE — 6370000000 HC RX 637 (ALT 250 FOR IP): Performed by: PODIATRIST

## 2019-04-26 PROCEDURE — 3600000012 HC SURGERY LEVEL 2 ADDTL 15MIN: Performed by: PODIATRIST

## 2019-04-26 PROCEDURE — 2580000003 HC RX 258: Performed by: INTERNAL MEDICINE

## 2019-04-26 PROCEDURE — 6360000002 HC RX W HCPCS: Performed by: INTERNAL MEDICINE

## 2019-04-26 PROCEDURE — 6360000002 HC RX W HCPCS: Performed by: PODIATRIST

## 2019-04-26 PROCEDURE — 2500000003 HC RX 250 WO HCPCS: Performed by: PODIATRIST

## 2019-04-26 PROCEDURE — 97535 SELF CARE MNGMENT TRAINING: CPT

## 2019-04-26 PROCEDURE — 6360000002 HC RX W HCPCS

## 2019-04-26 PROCEDURE — 2580000003 HC RX 258: Performed by: PODIATRIST

## 2019-04-26 PROCEDURE — 85027 COMPLETE CBC AUTOMATED: CPT

## 2019-04-26 PROCEDURE — 87075 CULTR BACTERIA EXCEPT BLOOD: CPT

## 2019-04-26 PROCEDURE — 80053 COMPREHEN METABOLIC PANEL: CPT

## 2019-04-26 PROCEDURE — 2709999900 HC NON-CHARGEABLE SUPPLY: Performed by: PODIATRIST

## 2019-04-26 PROCEDURE — 87070 CULTURE OTHR SPECIMN AEROBIC: CPT

## 2019-04-26 PROCEDURE — 87015 SPECIMEN INFECT AGNT CONCNTJ: CPT

## 2019-04-26 PROCEDURE — 047M3ZZ DILATION OF RIGHT POPLITEAL ARTERY, PERCUTANEOUS APPROACH: ICD-10-PCS | Performed by: SURGERY

## 2019-04-26 PROCEDURE — 0JBQ0ZZ EXCISION OF RIGHT FOOT SUBCUTANEOUS TISSUE AND FASCIA, OPEN APPROACH: ICD-10-PCS | Performed by: PODIATRIST

## 2019-04-26 PROCEDURE — 3600000002 HC SURGERY LEVEL 2 BASE: Performed by: PODIATRIST

## 2019-04-26 PROCEDURE — 87186 SC STD MICRODIL/AGAR DIL: CPT

## 2019-04-26 PROCEDURE — 2580000003 HC RX 258

## 2019-04-26 PROCEDURE — 87116 MYCOBACTERIA CULTURE: CPT

## 2019-04-26 PROCEDURE — 82962 GLUCOSE BLOOD TEST: CPT

## 2019-04-26 PROCEDURE — 0Y6R0Z0 DETACHMENT AT RIGHT 2ND TOE, COMPLETE, OPEN APPROACH: ICD-10-PCS | Performed by: PODIATRIST

## 2019-04-26 PROCEDURE — 7100000001 HC PACU RECOVERY - ADDTL 15 MIN: Performed by: PODIATRIST

## 2019-04-26 PROCEDURE — 6370000000 HC RX 637 (ALT 250 FOR IP): Performed by: UROLOGY

## 2019-04-26 RX ORDER — PROPOFOL 10 MG/ML
INJECTION, EMULSION INTRAVENOUS CONTINUOUS PRN
Status: DISCONTINUED | OUTPATIENT
Start: 2019-04-26 | End: 2019-04-26 | Stop reason: SDUPTHER

## 2019-04-26 RX ORDER — SODIUM CHLORIDE 9 MG/ML
INJECTION, SOLUTION INTRAVENOUS CONTINUOUS PRN
Status: DISCONTINUED | OUTPATIENT
Start: 2019-04-26 | End: 2019-04-26 | Stop reason: SDUPTHER

## 2019-04-26 RX ORDER — FENTANYL CITRATE 50 UG/ML
INJECTION, SOLUTION INTRAMUSCULAR; INTRAVENOUS PRN
Status: DISCONTINUED | OUTPATIENT
Start: 2019-04-26 | End: 2019-04-26 | Stop reason: SDUPTHER

## 2019-04-26 RX ORDER — MEPERIDINE HYDROCHLORIDE 50 MG/ML
12.5 INJECTION INTRAMUSCULAR; INTRAVENOUS; SUBCUTANEOUS EVERY 5 MIN PRN
Status: DISCONTINUED | OUTPATIENT
Start: 2019-04-26 | End: 2019-04-26 | Stop reason: HOSPADM

## 2019-04-26 RX ORDER — BUPIVACAINE HYDROCHLORIDE 5 MG/ML
INJECTION, SOLUTION EPIDURAL; INTRACAUDAL PRN
Status: DISCONTINUED | OUTPATIENT
Start: 2019-04-26 | End: 2019-04-26 | Stop reason: ALTCHOICE

## 2019-04-26 RX ORDER — PROMETHAZINE HYDROCHLORIDE 25 MG/ML
6.25 INJECTION, SOLUTION INTRAMUSCULAR; INTRAVENOUS
Status: DISCONTINUED | OUTPATIENT
Start: 2019-04-26 | End: 2019-04-26 | Stop reason: HOSPADM

## 2019-04-26 RX ORDER — MORPHINE SULFATE 2 MG/ML
2 INJECTION, SOLUTION INTRAMUSCULAR; INTRAVENOUS EVERY 5 MIN PRN
Status: DISCONTINUED | OUTPATIENT
Start: 2019-04-26 | End: 2019-04-26 | Stop reason: HOSPADM

## 2019-04-26 RX ORDER — ONDANSETRON 2 MG/ML
4 INJECTION INTRAMUSCULAR; INTRAVENOUS
Status: DISCONTINUED | OUTPATIENT
Start: 2019-04-26 | End: 2019-04-26 | Stop reason: HOSPADM

## 2019-04-26 RX ADMIN — SODIUM CHLORIDE: 9 INJECTION, SOLUTION INTRAVENOUS at 13:20

## 2019-04-26 RX ADMIN — ACETAMINOPHEN 650 MG: 325 TABLET, FILM COATED ORAL at 01:49

## 2019-04-26 RX ADMIN — TAMSULOSIN HYDROCHLORIDE 0.4 MG: 0.4 CAPSULE ORAL at 20:10

## 2019-04-26 RX ADMIN — Medication 10 ML: at 10:12

## 2019-04-26 RX ADMIN — FINASTERIDE 5 MG: 5 TABLET, FILM COATED ORAL at 08:43

## 2019-04-26 RX ADMIN — MEROPENEM 1 G: 1 INJECTION, POWDER, FOR SOLUTION INTRAVENOUS at 10:12

## 2019-04-26 RX ADMIN — PROPOFOL 75 MCG/KG/MIN: 10 INJECTION, EMULSION INTRAVENOUS at 13:30

## 2019-04-26 RX ADMIN — INSULIN LISPRO 2 UNITS: 100 INJECTION, SOLUTION INTRAVENOUS; SUBCUTANEOUS at 16:30

## 2019-04-26 RX ADMIN — LISINOPRIL 10 MG: 10 TABLET ORAL at 08:43

## 2019-04-26 RX ADMIN — MEROPENEM 1 G: 1 INJECTION, POWDER, FOR SOLUTION INTRAVENOUS at 01:42

## 2019-04-26 RX ADMIN — INSULIN LISPRO 2 UNITS: 100 INJECTION, SOLUTION INTRAVENOUS; SUBCUTANEOUS at 20:10

## 2019-04-26 RX ADMIN — MEROPENEM 1 G: 1 INJECTION, POWDER, FOR SOLUTION INTRAVENOUS at 16:30

## 2019-04-26 RX ADMIN — Medication 10 ML: at 20:10

## 2019-04-26 RX ADMIN — FENTANYL CITRATE 25 MCG: 50 INJECTION, SOLUTION INTRAMUSCULAR; INTRAVENOUS at 13:30

## 2019-04-26 RX ADMIN — Medication: at 17:53

## 2019-04-26 ASSESSMENT — PULMONARY FUNCTION TESTS
PIF_VALUE: 0
PIF_VALUE: 1
PIF_VALUE: 0
PIF_VALUE: 1
PIF_VALUE: 0

## 2019-04-26 ASSESSMENT — PAIN SCALES - GENERAL
PAINLEVEL_OUTOF10: 0
PAINLEVEL_OUTOF10: 1
PAINLEVEL_OUTOF10: 0

## 2019-04-26 NOTE — PROGRESS NOTES
ID Progress Note                [address] [facility] [address] [place], [address] [facility]            Phone [phone]     Fax [phone]      Chief complaint   Left foot ulcers    Subjective:  S/p amputation of the right 2nd digit MTP joint  The patient is awake and alert. Tolerating medications. Reports no side effects. Afebrile. 10 ROS otherwise negative unless otherwise specified above. Objective:    Vitals:    04/26/19 1545   BP: (!) 172/80   Pulse: 61   Resp: 18   Temp: 97 °F (36.1 °C)   SpO2: 97%     General appearance: Alert, Awake, Oriented times 3, no distress  Skin: Warm and dry  Eyes: Pink palpebral conjunctivae. PERRL  Ears: External ears normal.  Nose/Sinuses: Nares normal. Septum midline. Mucosa normal. No sinus tenderness. Oropharynx: Oropharynx clear with no exudates seen  Neck: Neck supple. No jugular venous distension, lymphadenopathy or thyromegaly Trachea midline  Lungs: Lungs clear to auscultation bilaterally. No rhonchi, crackles or wheezes  Heart: S1 S2  Regular rate and rhythm. No rub, murmur or gallop  Abdomen: Abdomen soft, non-tender.  BS normal. No masses, organomegaly  Extremities: No edema, Peripheral pulses palpable  Musculoskeletal:      Left foot in dressing postop        Labs:  Recent Labs     04/24/19  0845 04/26/19  0658   WBC 9.0 6.7   RBC 3.36* 3.39*   HGB 9.8* 9.9*   HCT 30.7* 30.8*   MCV 91.4 90.9   MCH 29.2 29.2   MCHC 31.9* 32.1   RDW 13.6 13.7    212   MPV 9.6 9.9     CMP:    Lab Results   Component Value Date     04/26/2019    K 4.2 04/26/2019    K 3.9 04/24/2019     04/26/2019    CO2 31 04/26/2019    BUN 16 04/26/2019    CREATININE 0.8 04/26/2019    GFRAA >60 04/26/2019    LABGLOM >60 04/26/2019    GLUCOSE 185 04/26/2019    PROT 6.4 04/26/2019    LABALBU 2.4 04/26/2019    CALCIUM 8.7 04/26/2019    BILITOT 0.4 04/26/2019    ALKPHOS 104 04/26/2019    AST 15 04/26/2019    ALT 10 04/26/2019          Microbiology :  No results for input(s):

## 2019-04-26 NOTE — PROGRESS NOTES
OT BEDSIDE TREATMENT NOTE      Date:2019  Patient Name: Juliana Whyte  MRN: [id number]  : 1940  Room: [address], OTR/L [id number]     AM-PAC Daily Activity Raw Score:   Recommended Adaptive Equipment: TBD      Diagnosis: PVD (peripheral vascular disease) ([facility] [address] [I73.9]     Procedure: angioplasty of the popliteal artery and anterior tibial artery on 19     Pertinent Medical History: cellulitis L foot, DM, diabetic foot ulcer w/ OM, HTN     Precautions:  Falls, NWB R foot, bed alarm     Home Living: Pt lives with spouse in 1 floor condo. Ramped entry  Bath/bed on 1st floor  Bathroom setup: walk in shower with built in chair   Equipment owned: ww     Prior Level of Function: independent with ADLs , independent with IADLs; ambulated independently w/o AD  Driving: no        Per OT Eval:      Pain: no c/o pain     Cognition: A&O: /; Follows basic step directions              Memory:  good               Sequencing:  fair               Problem solving:  fair -              Judgement/safety:  fair -        Functional Assessment:     Initial Eval Status  Date: 19 Treatment Status  Date: 19 Short Term Goals  Treatment frequency: PRN    Feeding Independent  N/t   -   Grooming Minimal Assist  MIN A  pt long sitting in bed to wash face  Supervision    UB Dressing Minimal Assist   n/t Supervision    LB Dressing Dependent   Socks n/t Moderate Assist    Bathing Maximal Assist n/t Moderate Assist    Toileting Dependent  n/t Moderate Assist    Bed Mobility  Supine to sit: Moderate Assist   Sit to supine: Minimal Assist  N/t  Supine to sit: Supervision   Sit to supine: Supervision    Functional Transfers Mod A x2  Sit><stand EOB n/t Min A   Functional Mobility Mod A x2  Facilitated steps forward/backward w/ ww. Education/cues for safety, ww management and to maintain NWB R LE.  Assist also provided for management of AD n/t Min A   Balance Sitting: varying assist levels (Initially pt required min to mod A d/t posterior lean. Improved as progressed)  Standing: Mod A x2 w/ ww n/t     Activity Tolerance Fair-   Fair+   Visual/  Perceptual Glasses: readers            Pt declined to sit EOB and participate in functional therapy tasks due to pending procedure. Comments: Upon arrival pt supine in bed. Pt educated with regards to grooming tasks, importance of participation in therapy. At end of session pt remained supine in bed all lines and tubes intact, call light within reach. · Pt has made fair- progress towards set goals.    · Continue with current plan of care    Time in: 0930   Time KRISTOPHER:0281  Total Tx Time: 15 minutes     Yael NEW/L [id number]

## 2019-04-26 NOTE — ANESTHESIA PRE PROCEDURE
Department of Anesthesiology  Preprocedure Note       Name:  Hawa Ca   Age:  77 y.o.  :  1940                                          MRN:  [id number]         Date:  2019      Surgeon: Sheridan Cortez):  Bella Monaco DPM    Procedure: AMPUTATION RIGHT SECOND TOE,FOOT DEBRIDEMENT (Right )    Medications prior to admission:   Prior to Admission medications    Not on File       Current medications:    Current Facility-Administered Medications   Medication Dose Route Frequency Provider Last Rate Last Dose    opium-belladonna (B&O SUPPRETTES) 16.2-60 MG suppository 60 mg  60 mg Rectal Q8H PRN Josefina Baker MD        finasteride (PROSCAR) tablet 5 mg  5 mg Oral Daily Nkechi Bustamante MD   5 mg at 19 0843    clopidogrel (PLAVIX) tablet 75 mg  75 mg Oral Daily Pat Villasenor, DO   75 mg at 19 1019    lisinopril (PRINIVIL;ZESTRIL) tablet 10 mg  10 mg Oral Daily Pat Villasenor, DO   10 mg at 19 0843    tamsulosin (FLOMAX) capsule 0.4 mg  0.4 mg Oral Nightly Pat Villasenor, DO   0.4 mg at 19    meropenem (MERREM) 1 g in sodium chloride 0.9 % 100 mL IVPB (mini-bag)  1 g Intravenous Q8H Mihaela Ramirez MD   Stopped at 19 0253    sodium chloride flush 0.9 % injection 10 mL  10 mL Intravenous 2 times per day Mihaela Ramirez MD   10 mL at 19    sodium chloride flush 0.9 % injection 10 mL  10 mL Intravenous PRN Mihaela Ramirez MD   10 mL at 19 0142    magnesium hydroxide (MILK OF MAGNESIA) 400 MG/5ML suspension 30 mL  30 mL Oral Daily PRN Mihaela Ramirez MD        ondansetron [facility]) injection 4 mg  4 mg Intravenous Q6H PRN Mihaela Ramirez MD        enoxaparin (LOVENOX) injection 40 mg  40 mg Subcutaneous Daily Mihaela Ramirez MD   40 mg at 19 1019    acetaminophen (TYLENOL) tablet 650 mg  650 mg Oral Q4H PRN Mihaela Ramirez MD   650 mg at 19 0149    insulin lispro (HUMALOG) injection vial 0-12 Units 104 04/26/2019    AST 15 04/26/2019    ALT 10 04/26/2019       POC Tests: No results for input(s): POCGLU, POCNA, POCK, POCCL, POCBUN, POCHEMO, POCHCT in the last 72 hours. Coags:   Lab Results   Component Value Date    PROTIME 14.7 04/22/2019    INR 1.3 04/22/2019    APTT 29.8 04/23/2019       HCG (If Applicable): No results found for: PREGTESTUR, PREGSERUM, HCG, HCGQUANT     ABGs: No results found for: PHART, PO2ART, BOC1DYK, ZQB5ENS, BEART, G2WLXLCK     Type & Screen (If Applicable):  No results found for: LABABO, [address]     EKG 8/26/2018  Narrative   Normal sinus rhythm- rate 68  Nonspecific T wave abnormality  Abnormal ECG  When compared with ECG of 30-DEC-2016 12:34,  No significant change was found  Confirmed by Kesha Beach ([id number]) on 8/26/2018 5:40:13 PM     XR Right foot 4/21/2019  Findings: There is severe juxta-articular osteopenia with erosive changes most   pronounced in the metatarsal phalangeal joint of the fifth fourth and   third digit. There is lateral displacement of the all 5 metatarsal   phalangeal joint. . There is no evidence of fracture. . There is soft   tissue swelling. Alok Gu MRI Foot right 8/27/2018      Impression       1. No evidence for acute fracture or dislocation or bone marrow   contusion. 2. No evidence for osteomyelitis or septic arthritis. 3. Diffuse subcutaneous venous collaterals. 4. Diffuse skin thickening and mild subcutaneous signal changes could   represent edema. No identifiable abscess or hematoma.          Anesthesia Evaluation  Patient summary reviewed and Nursing notes reviewed no history of anesthetic complications:   Airway: Mallampati: II  TM distance: >3 FB   Neck ROM: full  Mouth opening: > = 3 FB Dental:      Comment: Overall poor dentition, denies loose teeth     Pulmonary:normal exam                              ROS comment: Former smoker   Cardiovascular:    (+) hypertension: no interval change,       ECG reviewed  Rhythm: regular  Rate: normal           Beta Blocker:  Not on Beta Blocker         Neuro/Psych:                ROS comment: Bilateral lower extremity neuropathy GI/Hepatic/Renal: Neg GI/Hepatic/Renal ROS            Endo/Other:    (+) DiabetesType II DM, poorly controlled, , .                  ROS comment: Osteomyelitis Abdominal:           Vascular:   + PVD, aortic or cerebral, . ROS comment: Diabetic ulcer of right foot associated with type 2 diabetes mellitus   Femoral-popliteal atherosclerosis  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  Critical lower limb ischemia. Anesthesia Plan      MAC     ASA 3     (R FA 20)  Induction: intravenous. Anesthetic plan and risks discussed with patient. Use of blood products discussed with patient whom consented to blood products. Plan discussed with CRNA and attending.                 Elizabeth Pike RN   4/26/2019

## 2019-04-26 NOTE — OP NOTE
Operative Note    Patient Name  Regina Pina   Sex  male     1940  [id number]       Author Type: Physician     DATE OF PROCEDURE:  2019      SURGEON:  Candace Cerrato D.P.M. ASSISTANT:None      PREOPERATIVE DIAGNOSIS:   Gangrene right 2nd toe. Multiple wounds/ulcers right foot and ankle      POSTOPERATIVE DIAGNOSIS:   Same      OPERATION:   Amputation right 2nd toe. Excisional debridement multiple wounds/ulcers right foot and ankle    ANESTHESIA:  LMAC        ESTIMATED BLOOD LOSS:  Minimal, less than 50 mL. COMPLICATIONS: None         HISTORY: Patient with multiple medical problems including diabetes with neuropathy chronic wounds history of osteomyelitis critical limb ischemia was brought to the OR for the above-noted procedures. Patient initially underwent vascular intervention by Dr. Roman Ibanez. Physical exam demonstrated audible pulses. Skin temperature warm. Multiple wounds appreciated right medial heel, anterior foot, as well as right ankle with devitalized nonviable tissue. The right 2nd toe is gangrenous, contractures of the digits. 1st webspace there is also an open wound that does probe down to the 2nd MPJ. There is no purulence however. No odor. The foot is without any erythema. Risks were discussed with patient in detail which included but not limited to persistence, recurrence, multiple procedures, delayed healing, loss of limb/life. Patient is fully aware that this is a limb salvage type procedure and due to the multiple wounds as well as his vascular issues that ultimately an result may be a more proximal type amputation. Fully understood all still wished to proceed. PROCEDURE IN DETAIL:  Following satisfactory preop evaluation patient was brought to OR and placed on the OR table in supine position. Anesthesia was administered per anesthesia department. 0.5% Marcaine plain was utilized for local anesthesia and a regional block type fashion.  The right lower extremity wife was not present. Specimen will be sent to microbiology as well as pathology for appropriate workup. I will discuss with vascular as well.           Electronically signed by Ivory Faulkner DPM on 4/26/2019 at 2:20 PM

## 2019-04-26 NOTE — PROGRESS NOTES
Collected Lab   PSA 2.97  0.00 - 4.00 ng/mL Final 2019  2:57 PM [address] [place] [address] Retroperitoneal Complete 19  Narrative   Patient MRN:  [id number]   : 1940   Age: 77 years   Gender: Male       Order Date:  2019 8:15 PM       EXAM: US RETROPERITONEAL COMPLETE       COMPARISON: None       INDICATION: Neurogenic bladder        FINDINGS:       Right kidney measures 9.6 x 4.4 x 4.9 cm. Left kidney measures 10.7 x   4.3 x 4.6 cm. No evidence of hydronephrosis or shadowing calculus. Rubio catheter is seen in the urinary bladder. Spleen appears mildly   enlarged measuring up to 15 cm.           Impression       1. No hydronephrosis or shadowing renal calculus.       2. Spleen appears enlarged measuring up to 15 cm. Clinical correlation   recommended. Assessment/Plan:  1.  Urinary Retention probably associated with neurogenic bladder      - continue rubio catheter until patient is more ambulatory after his       surgery and at that time he can undergo a voiding trial      - continue flomax and proscar     Manuel Elena APRN - CNP   [facility]

## 2019-04-26 NOTE — FLOWSHEET NOTE
Inpatient Wound Care(initial evaluation) 7413    Admit Date: 4/23/2019  4:40 PM    Reason for consult:  Fight foot and heel wounds    Wound history:    Admitted with wounds from nursing home  Appointment scheduled in wound care center but did not arrive  Patient scheduled for surgery today by podiatry  Wound care center faxed face sheet to [phone]    Findings:    04/26/19 0900   Skin Integrity   Location BLE   Skin Integrity   (vascular discoloration, dry flaky)   Skin Integrity Site 2   Skin Integrity Location 2   (thick dry skin right dorsal toes and foot, BLE)   Skin Integrity Site 3   Skin Integrity Location 3   (callous, fissure)    Location 3 left plantar foot   Wound 04/20/19 Foot Right;Plantar   Date First Assessed/Time First Assessed: 04/20/19 2218   Present on Hospital Admission: Yes  Primary Wound Type: Diabetic Ulcer  Location: Foot  Wound Location Orientation: Right;Plantar   Wound Image    Wound Diabetic   Dressing/Treatment Dry Dressing   Wound Length (cm) 1.2 cm   Wound Width (cm) 2.8 cm   Wound Depth (cm)   (unable to determine)   Wound Surface Area (cm^2) 3.36 cm^2   Change in Wound Size % (l*w) -32.81   Wound Assessment   (black eschar)   Drainage Amount None   Latonya-wound Assessment Intact   Black%Wound Bed 100   Wound 04/20/19 Foot Right;Medial   Date First Assessed/Time First Assessed: 04/20/19 2221   Present on Hospital Admission: Yes  Location: Foot  Wound Location Orientation: Right;Medial   Wound Image    Dressing/Treatment Dry Dressing;Xeroform   Wound Cleansed Rinsed/Irrigated with saline   Wound Length (cm) 2.6 cm   Wound Width (cm) 1.6 cm   Wound Depth (cm) 0.1 cm   Wound Surface Area (cm^2) 4.16 cm^2   Change in Wound Size % (l*w) -15.56   Wound Volume (cm^3) 0.42 cm^3   Wound Healing % 74   Wound Assessment Red   Drainage Amount Scant   Drainage Description Serosanguinous   Odor None   Latonya-wound Assessment   (dry)   Red%Wound Bed 100   Wound 04/20/19 Ankle Right;Medial   Date First Assessment   (unable to visualize wound bed)   Drainage Amount Scant   Drainage Description Serosanguinous   Odor None   Latonya-wound Assessment Fragile   Wound 04/20/19  Right SECOND TOE   Date First Assessed/Time First Assessed: 04/20/19 2113   Present on Hospital Admission: Yes  Location: (c)   Wound Location Orientation: Right  Wound Description (Comments): SECOND TOE   Dressing/Treatment Dry Dressing   Wound Assessment   (gangrene- purplish red, soft)   Wound 04/20/19  Right THIRD TOE   Date First Assessed/Time First Assessed: 04/20/19 2113   Present on Hospital Admission: Yes  Location: (c)   Wound Location Orientation: Right  Wound Description (Comments): THIRD TOE   Dressing Changed Changed/New   Dressing/Treatment Dry Dressing   Wound Cleansed Rinsed/Irrigated with saline   Wound Length (cm) 1 cm   Wound Width (cm) 1 cm   Wound Depth (cm)   (dried eschar)   Wound Surface Area (cm^2) 1 cm^2   Change in Wound Size % (l*w) 75   Wound Assessment   (black eschar)   Drainage Amount None   Black%Wound Bed 100   Wound 04/20/19 Foot Right;Lateral   Date First Assessed/Time First Assessed: 04/20/19 2113   Present on Hospital Admission: Yes  Primary Wound Type: Diabetic Ulcer  Location: Foot  Wound Location Orientation: Right;Lateral   Wound Image    Dressing Changed Changed/New   Dressing/Treatment Dry Dressing   Wound Cleansed Rinsed/Irrigated with saline   Wound Length (cm) 2 cm   Wound Width (cm) 1.8 cm   Wound Depth (cm)   (black eschar)   Wound Surface Area (cm^2) 3.6 cm^2   Change in Wound Size % (l*w) 73.68   Wound Assessment Black   Drainage Amount None   Latonya-wound Assessment Dry   Black%Wound Bed 100   Wound 04/20/19 Heel Right   Date First Assessed/Time First Assessed: 04/20/19 2133   Present on Hospital Admission: Yes  Primary Wound Type: Diabetic Ulcer  Location: Heel  Wound Location Orientation: Right   Wound Image    Wound Pressure Stage  3   Dressing Changed Changed/New   Dressing/Treatment Dry Dressing;Xeroform   Wound Cleansed Rinsed/Irrigated with saline   Wound Length (cm) 9 cm   Wound Width (cm) 5.6 cm   Wound Depth (cm) 0.8 cm   Wound Surface Area (cm^2) 50.4 cm^2   Change in Wound Size % (l*w) -64.71   Wound Volume (cm^3) 40.32 cm^3   Wound Healing % -438   Wound Assessment Red;Yellow   Drainage Amount Small   Drainage Description Serosanguinous   Odor None   Latonya-wound Assessment Fragile   Red%Wound Bed 40   Yellow%Wound Bed 30   Black%Wound Bed 30     **Informed Consent**    The patient has given verbal consent to have photos taken of wounds and inserted into their chart as part of their permanent medical record for purposes of documentation, treatment management and/or medical review. All Images taken on 4/26/19 of patient name: Jenni Vieira were transmitted and stored on secured [facility] by a registered [employer] Mobile Application Device.      Plan:  Patient scheduled for surgery opticell placed between toes and in wound first web space  xeroform applied to heel and foot wounds  podiatry will write for order after surgery  dietary consult  Will follow as needed      Williams Vincent 4/26/2019 12:07 PM

## 2019-04-26 NOTE — PROGRESS NOTES
Subjective:    Complains of lower abdominal pain     Developed urinary retention    Objective:    BP (!) 177/77   Pulse 68   Temp 98.1 °F (36.7 °C) (Temporal)   Resp 18   Ht 5' 10\" (1.778 m)   Wt 140 lb (63.5 kg)   SpO2 97%   BMI 20.09 kg/m²   Skin: Warm and dry  Neck: Supple, no JVD  Heart:  RRR, no murmurs, gallops, or rubs. Lungs:  CTA bilaterally, no wheeze, rales or rhonchi  Abd: bowel sounds present, slightly tender, distended, no masses  Extrem:  Wounds   unchanged    I/O last 3 completed shifts: In: 972 [P.O.:585]  Out: 1375 [Urine:4050]    Results      Component Value Units   US RETROPERITONEAL COMPLETE [[id number]] Resulted: 19   Updated: 19    Narrative:     Patient MRN:  [id number]  : 1940  Age: 77 years  Gender: Male    Order Date:  2019 8:15 PM    EXAM: US RETROPERITONEAL COMPLETE    COMPARISON: None    INDICATION: Neurogenic bladder     FINDINGS:    Right kidney measures 9.6 x 4.4 x 4.9 cm. Left kidney measures 10.7 x  4.3 x 4.6 cm. No evidence of hydronephrosis or shadowing calculus. Bocanegra catheter is seen in the urinary bladder. Spleen appears mildly  enlarged measuring up to 15 cm. Impression:       1. No hydronephrosis or shadowing renal calculus. 2. Spleen appears enlarged measuring up to 15 cm. Clinical correlation  recommended.        VL KO BILATERAL LIMITED 1-2 LEVELS [[id number]] Resulted: 19   Updated: 19    Narrative:     Normal ankle arm index with Doppler evidence of bilateral femoral  popliteal arterial occlusive disease, with the adequate collateral  flow to both feet based upon the pulse volume recordings of the  metatarsals     Adequate collateral flow noted to the toes also based upon the pulse  volume recording, except it is significantly diminished over the right  second toe   Psa screening [[id number]] Collected: 19 2477   Updated: 19 1630    Specimen Source: Blood     PSA 2.97 ng/mL   POCT Glucose [[id number]] (Abnormal) Collected: 04/25/19 1622   Updated: 04/25/19 1626     Meter Glucose 258High  mg/dL   POCT Glucose [[id number]] (Abnormal) Collected: 04/25/19 1212   Updated: 04/25/19 1214     Meter Glucose 218High  mg/dL   POCT Glucose [[id number]] (Abnormal) Collected: 04/25/19 0615   Updated: 04/25/19 0619     Meter Glucose 168High  mg/dL   POCT Glucose [[id number]] (Abnormal) Collected: 04/24/19 2103   Updated: 04/24/19 2107     Meter Glucose 165High       Results for Ralph Mcrae (MRN [id number]) as of 4/25/2019 20:47   Ref.  Range 4/21/2019 04:01   Hemoglobin A1C Latest Ref Range: 4.0 - 5.6 % 7.0 (H)       Current Facility-Administered Medications   Medication Dose Route Frequency Provider Last Rate Last Dose    opium-belladonna (B&O SUPPRETTES) 16.2-60 MG suppository 60 mg  60 mg Rectal Q8H PRN Muriel Figueroa MD        finasteride (PROSCAR) tablet 5 mg  5 mg Oral Daily Dannial Jose Bustamante MD   5 mg at 04/25/19 1634    clopidogrel (PLAVIX) tablet 75 mg  75 mg Oral Daily Aleja Perch, DO   75 mg at 04/25/19 1019    lisinopril (PRINIVIL;ZESTRIL) tablet 10 mg  10 mg Oral Daily Aleja Perch, DO   10 mg at 04/25/19 1019    tamsulosin (FLOMAX) capsule 0.4 mg  0.4 mg Oral Nightly Aleja Perch, DO   0.4 mg at 04/24/19 2103    meropenem (MERREM) 1 g in sodium chloride 0.9 % 100 mL IVPB (mini-bag)  1 g Intravenous Q8H Ponce Nash MD   Stopped at 04/25/19 1747    sodium chloride flush 0.9 % injection 10 mL  10 mL Intravenous 2 times per day Ponce Nash MD   10 mL at 04/25/19 1105    sodium chloride flush 0.9 % injection 10 mL  10 mL Intravenous PRN Ponce Nash MD   10 mL at 04/25/19 0142    magnesium hydroxide (MILK OF MAGNESIA) 400 MG/5ML suspension 30 mL  30 mL Oral Daily PRN Ponce Nash MD        ondansetron [facility]) injection 4 mg  4 mg Intravenous Q6H PRN Ponce Nash MD        enoxaparin (LOVENOX) injection 40 mg  40 mg Subcutaneous Daily Valentino Osuna MD   40 mg at 04/25/19 1019    acetaminophen (TYLENOL) tablet 650 mg  650 mg Oral Q4H PRN Valentino Osuna MD   650 mg at 04/23/19 2040    insulin lispro (HUMALOG) injection vial 0-12 Units  0-12 Units Subcutaneous TID WC Valentino Osuna MD   6 Units at 04/25/19 1627    insulin lispro (HUMALOG) injection vial 0-6 Units  0-6 Units Subcutaneous Nightly Valentino Osuna MD   1 Units at 04/24/19 2107    glucose (GLUTOSE) 40 % oral gel 15 g  15 g Oral PRN Valentino Osuna MD        dextrose 50 % solution 12.5 g  12.5 g Intravenous PRN Valentino Osuna MD        glucagon (rDNA) injection 1 mg  1 mg Intramuscular PRN Valentino Osuna MD        dextrose 5 % solution  100 mL/hr Intravenous PRN Valentino Osuna MD           Problem list:    Patient Active Problem List   Diagnosis    Diabetic ulcer of right foot associated with type 2 diabetes mellitus ([address]    Diabetes mellitus type 2, uncontrolled ([address]    Femoral-popliteal atherosclerosis ([address]    HTN (hypertension), benign    Osteomyelitis ([address]    Moderate protein-calorie malnutrition ([address]    PVD (peripheral vascular disease) ([address]    Critical lower limb ischemia    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 ([address]       Assessment:        Diabetic ulcer of right foot associated with type 2 diabetes mellitus ([address]    Diabetes mellitus type 2, uncontrolled ([address]    Femoral-popliteal atherosclerosis ([address]    HTN (hypertension), benign    Osteomyelitis ([address]    Moderate protein-calorie malnutrition ([address]    PVD (peripheral vascular disease) ([address]   Urinary retention due to DM neurogenic bladder        Plan:    1. Continue Bocanegra    2. Antibiotics per ID    3. For OR tomorrow    4.  Continue SSI      Chris Collins D.O.  8:43 PM  4/25/2019

## 2019-04-26 NOTE — PROGRESS NOTES
FLOOR CALLED. NURSE TO NURSE GIVEN. SPOKE WITH AMYTHE PATIENT'S TEST RESULTS REVIEWED, VITAL SIGNS REPORTED TO RECEIVING NURSE. ANY AND ALL IMPORTANT INFORMATION REGARDING PT DISCLOSED.

## 2019-04-27 LAB
GRAM STAIN ORDERABLE: NORMAL
METER GLUCOSE: 150 MG/DL (ref 74–99)
METER GLUCOSE: 220 MG/DL (ref 74–99)
METER GLUCOSE: 225 MG/DL (ref 74–99)
METER GLUCOSE: 236 MG/DL (ref 74–99)

## 2019-04-27 PROCEDURE — 6370000000 HC RX 637 (ALT 250 FOR IP): Performed by: PODIATRIST

## 2019-04-27 PROCEDURE — 2580000003 HC RX 258: Performed by: PODIATRIST

## 2019-04-27 PROCEDURE — 6360000002 HC RX W HCPCS: Performed by: INTERNAL MEDICINE

## 2019-04-27 PROCEDURE — 2060000000 HC ICU INTERMEDIATE R&B

## 2019-04-27 PROCEDURE — 82962 GLUCOSE BLOOD TEST: CPT

## 2019-04-27 PROCEDURE — 6360000002 HC RX W HCPCS: Performed by: PODIATRIST

## 2019-04-27 PROCEDURE — 2580000003 HC RX 258: Performed by: INTERNAL MEDICINE

## 2019-04-27 RX ADMIN — MEROPENEM 1 G: 1 INJECTION, POWDER, FOR SOLUTION INTRAVENOUS at 09:23

## 2019-04-27 RX ADMIN — INSULIN LISPRO 1 UNITS: 100 INJECTION, SOLUTION INTRAVENOUS; SUBCUTANEOUS at 11:39

## 2019-04-27 RX ADMIN — Medication 10 ML: at 09:23

## 2019-04-27 RX ADMIN — Medication 10 ML: at 19:34

## 2019-04-27 RX ADMIN — MEROPENEM 1 G: 1 INJECTION, POWDER, FOR SOLUTION INTRAVENOUS at 01:22

## 2019-04-27 RX ADMIN — FINASTERIDE 5 MG: 5 TABLET, FILM COATED ORAL at 09:23

## 2019-04-27 RX ADMIN — INSULIN LISPRO 4 UNITS: 100 INJECTION, SOLUTION INTRAVENOUS; SUBCUTANEOUS at 18:21

## 2019-04-27 RX ADMIN — ACETAMINOPHEN 650 MG: 325 TABLET, FILM COATED ORAL at 03:34

## 2019-04-27 RX ADMIN — TAMSULOSIN HYDROCHLORIDE 0.4 MG: 0.4 CAPSULE ORAL at 19:33

## 2019-04-27 RX ADMIN — CLOPIDOGREL 75 MG: 75 TABLET, FILM COATED ORAL at 09:23

## 2019-04-27 RX ADMIN — ENOXAPARIN SODIUM 40 MG: 40 INJECTION SUBCUTANEOUS at 09:24

## 2019-04-27 RX ADMIN — LISINOPRIL 10 MG: 10 TABLET ORAL at 09:23

## 2019-04-27 RX ADMIN — Medication 10 ML: at 01:22

## 2019-04-27 RX ADMIN — VANCOMYCIN HYDROCHLORIDE 1000 MG: 1 INJECTION, POWDER, LYOPHILIZED, FOR SOLUTION INTRAVENOUS at 17:12

## 2019-04-27 RX ADMIN — INSULIN LISPRO 2 UNITS: 100 INJECTION, SOLUTION INTRAVENOUS; SUBCUTANEOUS at 21:47

## 2019-04-27 RX ADMIN — INSULIN LISPRO 4 UNITS: 100 INJECTION, SOLUTION INTRAVENOUS; SUBCUTANEOUS at 07:12

## 2019-04-27 ASSESSMENT — PAIN SCALES - GENERAL
PAINLEVEL_OUTOF10: 0
PAINLEVEL_OUTOF10: 5

## 2019-04-27 ASSESSMENT — PAIN DESCRIPTION - PAIN TYPE: TYPE: CHRONIC PAIN

## 2019-04-27 ASSESSMENT — PAIN DESCRIPTION - PROGRESSION: CLINICAL_PROGRESSION: NOT CHANGED

## 2019-04-27 ASSESSMENT — PAIN DESCRIPTION - DESCRIPTORS: DESCRIPTORS: ACHING;DISCOMFORT;NAGGING

## 2019-04-27 ASSESSMENT — PAIN DESCRIPTION - LOCATION: LOCATION: GROIN

## 2019-04-27 ASSESSMENT — PAIN DESCRIPTION - ONSET: ONSET: PROGRESSIVE

## 2019-04-27 ASSESSMENT — PAIN - FUNCTIONAL ASSESSMENT: PAIN_FUNCTIONAL_ASSESSMENT: ACTIVITIES ARE NOT PREVENTED

## 2019-04-27 ASSESSMENT — PAIN DESCRIPTION - FREQUENCY: FREQUENCY: INTERMITTENT

## 2019-04-27 ASSESSMENT — PAIN DESCRIPTION - ORIENTATION: ORIENTATION: MID

## 2019-04-27 NOTE — PROGRESS NOTES
Subjective:    Right foot pain tolerable    Bladder spasms resolved    Complains of intermittent sensation of being hot    No shortness of breath or chest pain    Tolerating diet    Would like to be out of bed    Objective:    BP (!) 146/60 Comment: manual  Pulse 69   Temp 98.3 °F (36.8 °C) (Temporal)   Resp 16   Ht 5' 10\" (1.778 m)   Wt 129 lb 3 oz (58.6 kg)   SpO2 97%   BMI 18.54 kg/m²   Skin: Warm and dry  Neck: Supple, no JVD  Heart:  RRR, no murmurs, gallops, or rubs. Lungs:  CTA bilaterally, no wheeze, rales or rhonchi  Abd: bowel sounds present, nontender, nondistended, no masses  Extrem:  Dressing right foot clean dry and intact    I/O last 3 completed shifts: In: 80 [P.O.:180; I.V.:300; IV Piggyback:100]  Out: 2120 [Urine:2120]    Results      Component Value Units   Surgical Culture [[id number]] Collected: 04/26/19 1347   Updated: 04/27/19 0710    Specimen Type: Bone    Specimen Source: Toe     Gram Stain Result Refer to ordered Gram stain for results   Narrative:     Source: TOE       Site: &Bone             Gram Stain [[id number]] Collected: 04/26/19 1347   Updated: 04/27/19 0710    Specimen Source:  Toe     Gram Stain Orderable --    Gram stain performed from tissue touch prep   Polymorphonuclear leukocytes not seen   Epithelial cells not seen   No organisms seen    Narrative:     Source: TOE       Site: &Bone             POCT Glucose [[id number]] (Abnormal) Collected: 04/27/19 0654   Updated: 04/27/19 0701     Meter Glucose 220High  mg/dL   POCT Glucose [[id number]] (Abnormal) Collected: 04/26/19 2009   Updated: 04/26/19 2017     Meter Glucose 222High  mg/dL   POCT Glucose [[id number]] (Abnormal) Collected: 04/26/19 1555   Updated: 04/26/19 1607     Meter Glucose 174High  mg/dL         Current Facility-Administered Medications   Medication Dose Route Frequency Provider Last Rate Last Dose    opium-belladonna (B&O SUPPRETTES) 16.2-60 MG suppository 60 mg  60 mg Rectal Q8H PRN Bree Caldera DPM        finasteride (PROSCAR) tablet 5 mg  5 mg Oral Daily Jay Skiff, DPM   5 mg at 04/27/19 7438    clopidogrel (PLAVIX) tablet 75 mg  75 mg Oral Daily Jay Skiff, DPM   75 mg at 04/27/19 3938    lisinopril (PRINIVIL;ZESTRIL) tablet 10 mg  10 mg Oral Daily Jay Skiff, DPM   10 mg at 04/27/19 7391    tamsulosin (FLOMAX) capsule 0.4 mg  0.4 mg Oral Nightly Jay Skiff, DPM   0.4 mg at 04/26/19 2010    meropenem (MERREM) 1 g in sodium chloride 0.9 % 100 mL IVPB (mini-bag)  1 g Intravenous Q8H Randon Skiff,  mL/hr at 04/27/19 0923 1 g at 04/27/19 5088    sodium chloride flush 0.9 % injection 10 mL  10 mL Intravenous 2 times per day Randon Skiff, DPM   10 mL at 04/27/19 3756    sodium chloride flush 0.9 % injection 10 mL  10 mL Intravenous PRN Randon Skiff, DPM   10 mL at 04/27/19 0122    magnesium hydroxide (MILK OF MAGNESIA) 400 MG/5ML suspension 30 mL  30 mL Oral Daily PRN Jay Gutierrezff, DPM        ondansetron [facility]) injection 4 mg  4 mg Intravenous Q6H PRN Jay Skiff, DPM        enoxaparin (LOVENOX) injection 40 mg  40 mg Subcutaneous Daily Randon Skiff, DPM   40 mg at 04/27/19 0924    acetaminophen (TYLENOL) tablet 650 mg  650 mg Oral Q4H PRN Jay Skiff, DPM   650 mg at 04/27/19 0334    insulin lispro (HUMALOG) injection vial 0-12 Units  0-12 Units Subcutaneous TID WC Jay Skiff, DPM   4 Units at 04/27/19 7056    insulin lispro (HUMALOG) injection vial 0-6 Units  0-6 Units Subcutaneous Nightly Randon Skiff, DPM   2 Units at 04/26/19 2010    glucose (GLUTOSE) 40 % oral gel 15 g  15 g Oral PRN Jay Gutierrezff, DPM        dextrose 50 % solution 12.5 g  12.5 g Intravenous PRN Jay Skiff, DPM        glucagon (rDNA) injection 1 mg  1 mg Intramuscular PRN Jay Skiff, DPM        dextrose 5 % solution  100 mL/hr Intravenous PRN Jay Skiff, DPM           Problem list:    Patient Active Problem List   Diagnosis    Diabetic ulcer of right foot

## 2019-04-27 NOTE — PROGRESS NOTES
[facility]  PROGRESS NOTE    Chief Complaint:  Urinary retention/BPH/Possible neurogenic bladder  HPI:  He is very tired. He is not ambulatory since his toe surgery. His Bocanegra catheter was placed for approximately 1700 mL PVR. He felt relief and is currently comfortable with the catheter. He hopes to go to rehab soon. Vitals:    04/27/19 0750   BP: (!) 146/60   Pulse: 69   Resp: 16   Temp: 98.3 °F (36.8 °C)   SpO2: 97%       Allergies: Latex;  Aspirin; Pcn [penicillins]; and Other    PAST MEDICAL HISTORY:   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 ([facility] 4/25/2019    Blood circulation, collateral     Cellulitis of foot, left 1/1/2017    Diabetes mellitus ([address]     Diabetic foot ulcer with osteomyelitis ([address] 1/1/2017    Femoral-popliteal atherosclerosis ([facility] 1/1/2017    Hypertension    BPH    PAST SURGICAL HISTORY:   Past Surgical History:   Procedure Laterality Date    FOOT DEBRIDEMENT Left 01/04/2017    wound debridement and delayed primary closure    OTHER SURGICAL HISTORY  04/23/2019    Dr. Donna Cole- PTA ant tibial    PROSTATE BIOPSY  04/2016    TOE AMPUTATION Left 12/31/2016    2nd        PAST FAMILY HISTORY:    Family History   Problem Relation Age of Onset    Heart Disease Mother     Heart Disease Father        PAST SOCIAL HISTORY:    Social History     Socioeconomic History    Marital status:      Spouse name: None    Number of children: None    Years of education: None    Highest education level: None   Occupational History    None   Social Needs    Financial resource strain: None    Food insecurity:     Worry: None     Inability: None    Transportation needs:     Medical: None     Non-medical: None   Tobacco Use    Smoking status: Former Smoker    Smokeless tobacco: Never Used   Substance and Sexual Activity    Alcohol use: No    Drug use: No    Sexual activity: None   Lifestyle    Physical urine      Assessment and Plan:  Urinary retention/BPH/Possible neurogenic bladder  -Renal ultrasound was normal. No evidence of upper tract stone disease or hydronephrosis. Creatinine is stable at 0.8  -Continue both Flomax and Proscar  -He will be given a voiding trial at the rehab facility once he is more ambulatory medically stable. -Stable for discharge.  Follow-up as an outpatient        Cecil Reyes MD  4/27/2019  11:46 AM

## 2019-04-27 NOTE — PLAN OF CARE
Problem: Pain:  Goal: Pain level will decrease  Description  Pain level will decrease  4/26/2019 1804 by Paola Ruiz RN  Outcome: Met This Shift

## 2019-04-27 NOTE — PROGRESS NOTES
Daily Progress Note      SUBJECTIVE:  Patient seen in follow-up. Doing well. Currently no pain. OBJECTIVE:  Alert and oriented. I see intact, with removal surgical site distal aspect ×2 sutures intact. No active drainage. Wounds wounds foot, heel as well as medial ankle are relatively clean. No surrounding erythema or drainage. Medications    Current Facility-Administered Medications: opium-belladonna (B&O SUPPRETTES) 16.2-60 MG suppository 60 mg, 60 mg, Rectal, Q8H PRN  finasteride (PROSCAR) tablet 5 mg, 5 mg, Oral, Daily  clopidogrel (PLAVIX) tablet 75 mg, 75 mg, Oral, Daily  lisinopril (PRINIVIL;ZESTRIL) tablet 10 mg, 10 mg, Oral, Daily  tamsulosin (FLOMAX) capsule 0.4 mg, 0.4 mg, Oral, Nightly  meropenem (MERREM) 1 g in sodium chloride 0.9 % 100 mL IVPB (mini-bag), 1 g, Intravenous, Q8H  sodium chloride flush 0.9 % injection 10 mL, 10 mL, Intravenous, 2 times per day  sodium chloride flush 0.9 % injection 10 mL, 10 mL, Intravenous, PRN  magnesium hydroxide (MILK OF MAGNESIA) 400 MG/5ML suspension 30 mL, 30 mL, Oral, Daily PRN  ondansetron (ZOFRAN) injection 4 mg, 4 mg, Intravenous, Q6H PRN  enoxaparin (LOVENOX) injection 40 mg, 40 mg, Subcutaneous, Daily  acetaminophen (TYLENOL) tablet 650 mg, 650 mg, Oral, Q4H PRN  insulin lispro (HUMALOG) injection vial 0-12 Units, 0-12 Units, Subcutaneous, TID WC  insulin lispro (HUMALOG) injection vial 0-6 Units, 0-6 Units, Subcutaneous, Nightly  glucose (GLUTOSE) 40 % oral gel 15 g, 15 g, Oral, PRN  dextrose 50 % solution 12.5 g, 12.5 g, Intravenous, PRN  glucagon (rDNA) injection 1 mg, 1 mg, Intramuscular, PRN  dextrose 5 % solution, 100 mL/hr, Intravenous, PRN  Physical    VITALS:  BP (!) 146/60 Comment: manual  Pulse 69   Temp 98.3 °F (36.8 °C) (Temporal)   Resp 16   Ht 5' 10\" (1.778 m)   Wt 129 lb 3 oz (58.6 kg)   SpO2 97%   BMI 18.54 kg/m²   TEMPERATURE:  Current - Temp: 98.3 °F (36.8 °C);  Max - Temp  Av.4 °F (36.3 °C)  Min: 97 °F (36.1 °C)  Max: 98.3 °F (36.8 °C)  RESPIRATIONS RANGE: Resp  Av.1  Min: 0  Max: 22  PULSE RANGE: Pulse  Av.9  Min: 56  Max: 74  BLOOD PRESSURE RANGE:  Systolic (28ABF), CJN:165 , Min:118 , PBQ:000   ; Diastolic (25IFU), SIE:31, Min:60, Max:107    PULSE OXIMETRY RANGE: SpO2  Av.4 %  Min: 96 %  Max: 100 %  24HR INTAKE/OUTPUT:      Intake/Output Summary (Last 24 hours) at 2019 1242  Last data filed at 2019 0947  Gross per 24 hour   Intake 1000 ml   Output 1220 ml   Net -220 ml           Data    CBC with Differential:    Lab Results   Component Value Date    WBC 8.1 2019    RBC 3.35 2019    HGB 9.9 2019    HCT 30.7 2019     2019    MCV 91.6 2019    MCH 29.6 2019    MCHC 32.2 2019    RDW 13.5 2019    LYMPHOPCT 11.4 2019    MONOPCT 6.5 2019    BASOPCT 0.3 2019    MONOSABS 0.82 2019    LYMPHSABS 1.43 2019    EOSABS 0.22 2019    BASOSABS 0.04 2019     CMP:    Lab Results   Component Value Date     2019    K 4.2 2019    K 3.9 2019     2019    CO2 28 2019    BUN 20 2019    CREATININE 0.9 2019    GFRAA >60 2019    LABGLOM >60 2019    GLUCOSE 180 2019    PROT 6.4 2019    LABALBU 2.4 2019    CALCIUM 8.6 2019    BILITOT 0.4 2019    ALKPHOS 104 2019    AST 15 2019    ALT 10 2019       ASSESSMENT AND PLAN:  Dressing changed. Continue same, orders written. Reinforced no pressure. Continue to follow.  We'll discuss with vascular

## 2019-04-27 NOTE — PROGRESS NOTES
Pharmacy Consultation Note  (Antibiotic Dosing and Monitoring)    Initial consult date: 19   Consulting physician: Finn Fulton  Drug(s):  Vancomycin   Indication:  Osteomyelitis     Ht Readings from Last 1 Encounters:   19 5' 10\" (1.778 m)     Wt Readings from Last 1 Encounters:   19 129 lb 3 oz (58.6 kg)       Age/  Gender IBW DW  Allergy Information   77 y.o.     male 73 kg 58.6 kg  Latex; Aspirin; Pcn [penicillins]; and Other                 Date  WBC BUN/CR Drug/Dose Time   Given Level(s)   (Time) Comments    6.7 16/0.8 Vancomycin 1000 IV IV q18h 1640                                  Estimated Creatinine Clearance: 62 mL/min (based on SCr of 0.8 mg/dL). Intake/Output Summary (Last 24 hours) at 2019 1519  Last data filed at 2019 1449  Gross per 24 hour   Intake 1240 ml   Output 1625 ml   Net -385 ml       Temp max: Temp (24hrs), Av.5 °F (36.4 °C), Min:97 °F (36.1 °C), Max:98.3 °F (36.8 °C)      Cultures:  available culture and sensitivity results were reviewed in [facility]  Wound Culture (): Staph aureus (sensitive: oxacillin, vanco, clinda, doxy, gentamicin, bactrim, erythromycin)  Bone Culture (): Staph aureus (sensitivities to follow)  Anaerobic Culture (): Pending    Assessment:  · Patient presented with diabetic ulcer of right foot with gangrene  · Possible osteomyelitis of calcaneal bone  · Patient underwent amputation of right 2nd toe   · Bone culture positive for Staph aureus  · Goal trough level:  15-20 mcg/mL    Plan:  · Vancomycin 1000mg IV Q18H  · Continue to monitor renal function and draw trough if necessary  · Pharmacist will follow and monitor/adjust dosing as necessary      Bishop Angel PharmD.  Candidate 2020 2019 3:49 PM

## 2019-04-27 NOTE — PROGRESS NOTES
Subjective:    Awake and alert. No problems overnight. Denies chest pain or dyspnea. Resolved abdominal pain. Tolerating diet. No nausea or vomiting. Objective:    BP (!) 172/80   Pulse 61   Temp 97 °F (36.1 °C) (Temporal)   Resp 18   Ht 5' 10\" (1.778 m)   Wt 140 lb (63.5 kg)   SpO2 97%   BMI 20.09 kg/m²   Skin: Warm and dry  Neck: Supple, no JVD  Heart:  RRR, no murmurs, gallops, or rubs. Lungs:  CTA bilaterally, no wheeze, rales or rhonchi  Abd: bowel sounds present, nontender, nondistended, no masses  Extrem:  Dressing clean and dry    I/O last 3 completed shifts: In: 5 [P.O.:120;  I.V.:300]  Out: 5835 [Urine:3020]    Laboratory:     CBC with Differential:    Lab Results   Component Value Date    WBC 6.7 04/26/2019    RBC 3.39 04/26/2019    HGB 9.9 04/26/2019    HCT 30.8 04/26/2019     04/26/2019    MCV 90.9 04/26/2019    MCH 29.2 04/26/2019    MCHC 32.1 04/26/2019    RDW 13.7 04/26/2019    LYMPHOPCT 11.4 04/21/2019    MONOPCT 6.5 04/21/2019    BASOPCT 0.3 04/21/2019    MONOSABS 0.82 04/21/2019    LYMPHSABS 1.43 04/21/2019    EOSABS 0.22 04/21/2019    BASOSABS 0.04 04/21/2019     CMP:    Lab Results   Component Value Date     04/26/2019    K 4.2 04/26/2019    K 3.9 04/24/2019     04/26/2019    CO2 31 04/26/2019    BUN 16 04/26/2019    CREATININE 0.8 04/26/2019    GFRAA >60 04/26/2019    LABGLOM >60 04/26/2019    GLUCOSE 185 04/26/2019    PROT 6.4 04/26/2019    LABALBU 2.4 04/26/2019    CALCIUM 8.7 04/26/2019    BILITOT 0.4 04/26/2019    ALKPHOS 104 04/26/2019    AST 15 04/26/2019    ALT 10 04/26/2019     Hepatic Function Panel:    Lab Results   Component Value Date    ALKPHOS 104 04/26/2019    ALT 10 04/26/2019    AST 15 04/26/2019    PROT 6.4 04/26/2019    BILITOT 0.4 04/26/2019    LABALBU 2.4 04/26/2019        Current Facility-Administered Medications   Medication Dose Route Frequency Provider Last Rate Last Dose    opium-belladonna (B&O SUPPRETTES) 16.2-60 MG suppository 60 mg  60 mg Rectal Q8H PRN Cleveland Beer, DPM        finasteride (PROSCAR) tablet 5 mg  5 mg Oral Daily Cleveland Beer, DPM   5 mg at 04/26/19 0843    clopidogrel (PLAVIX) tablet 75 mg  75 mg Oral Daily Cleveland Beer, DPM   75 mg at 04/25/19 1019    lisinopril (PRINIVIL;ZESTRIL) tablet 10 mg  10 mg Oral Daily Cleveland Beer, DPM   10 mg at 04/26/19 0843    tamsulosin (FLOMAX) capsule 0.4 mg  0.4 mg Oral Nightly Cleveland Beer, DPM   0.4 mg at 04/26/19 2010    meropenem (MERREM) 1 g in sodium chloride 0.9 % 100 mL IVPB (mini-bag)  1 g Intravenous Q8H David Beer, DPM   Stopped at 04/26/19 1700    sodium chloride flush 0.9 % injection 10 mL  10 mL Intravenous 2 times per day Cleveland Beer, DPM   10 mL at 04/26/19 2010    sodium chloride flush 0.9 % injection 10 mL  10 mL Intravenous PRN Cleveland Beer, DPM   10 mL at 04/25/19 0142    magnesium hydroxide (MILK OF MAGNESIA) 400 MG/5ML suspension 30 mL  30 mL Oral Daily PRN David Beer, DPM        ondansetron [facility]) injection 4 mg  4 mg Intravenous Q6H PRN Cleveland Beer, DPM        enoxaparin (LOVENOX) injection 40 mg  40 mg Subcutaneous Daily David Beer, DPM   40 mg at 04/25/19 1019    acetaminophen (TYLENOL) tablet 650 mg  650 mg Oral Q4H PRN Cleveland Beer, DPM   650 mg at 04/26/19 0149    insulin lispro (HUMALOG) injection vial 0-12 Units  0-12 Units Subcutaneous TID WC David Beer, DPM   2 Units at 04/26/19 1630    insulin lispro (HUMALOG) injection vial 0-6 Units  0-6 Units Subcutaneous Nightly David Beer, DPM   2 Units at 04/26/19 2010    glucose (GLUTOSE) 40 % oral gel 15 g  15 g Oral PRN Cleveland Beer, DPM        dextrose 50 % solution 12.5 g  12.5 g Intravenous PRN David Beer, DPM        glucagon (rDNA) injection 1 mg  1 mg Intramuscular PRN Cleveland Beer, DPM        dextrose 5 % solution  100 mL/hr Intravenous PRN Cleveland Beer, DPM           Problem list:    Patient Active Problem List   Diagnosis  Diabetic ulcer of right foot associated with type 2 diabetes mellitus ([address]    Diabetes mellitus type 2, uncontrolled ([address]    Femoral-popliteal atherosclerosis ([address]    HTN (hypertension), benign    Osteomyelitis (HCC)    Moderate protein-calorie malnutrition ([address]    PVD (peripheral vascular disease) ([address]    Critical lower limb ischemia    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 ([address]       Assessment:     Diabetic ulcer of right foot associated with type 2 diabetes mellitus ([address]    Diabetes mellitus type 2, uncontrolled ([address]    Femoral-popliteal atherosclerosis ([address]    HTN (hypertension), benign    Osteomyelitis ([address]    Moderate protein-calorie malnutrition ([address]    PVD (peripheral vascular disease) ([address]   Urinary retention due to DM neurogenic bladder   Status post right second excisional debridement wounds right foot and ankle             Plan:    1. Continue Bocanegra     2. Meropenum  per ID    3. Continue post op care     4.  Continue SSI          Dilip Silva D.O., [facility]  9:42 PM  4/26/2019

## 2019-04-27 NOTE — PROGRESS NOTES
ID Progress Note                [address] [address], [facility], [id number] [facility]            Phone [phone]     Fax [phone]      Chief complaint   Left foot ulcers - improving     Subjective:  S/p amputation of the right 2nd digit MTP joint  Seen and examined at bedside with Dr Estrellita Hall for dressing change  The patient is awake, alert and tolerating medications well  He reports no side effects and denies pain. The wounds to left foot are improving with local wound care and amputation site is healing well. Afebrile with no leukocytosis. 10 ROS otherwise negative unless otherwise specified above. Objective:    Vitals:    04/27/19 0750   BP: (!) 146/60   Pulse: 69   Resp: 16   Temp: 98.3 °F (36.8 °C)   SpO2: 97%     General appearance: Alert, Awake, Oriented times 3, no distress  Skin: Warm and dry  Eyes: Pink palpebral conjunctivae. PERRL  Ears: External ears normal.  Nose/Sinuses: Nares normal. Septum midline. Mucosa normal. No sinus tenderness. Oropharynx: Oropharynx clear with no exudates seen  Neck: Neck supple. No jugular venous distension, lymphadenopathy or thyromegaly Trachea midline  Lungs: Lungs clear to auscultation bilaterally. No rhonchi, crackles or wheezes  Heart: S1 S2  Regular rate and rhythm. No rub, murmur or gallop  Abdomen: Abdomen soft, non-tender. BS normal. No masses, organomegaly  Extremities: No edema, Peripheral pulses palpable  Musculoskeletal: normalROM, sensation intact. Left foot with multiple ulcers of varying degrees - healing well per podiatry. 2nd digit MTP joint amputation to left foot - sutures intact, healing well. Calcaneal ulcer visualized - no bone exposed.     Labs:  Recent Labs     04/26/19  0658   WBC 6.7   RBC 3.39*   HGB 9.9*   HCT 30.8*   MCV 90.9   MCH 29.2   MCHC 32.1   RDW 13.7      MPV 9.9     CMP:    Lab Results   Component Value Date     04/26/2019    K 4.2 04/26/2019    K 3.9 04/24/2019     04/26/2019    CO2 31 04/26/2019    BUN 16 04/26/2019    CREATININE 0.8 04/26/2019    GFRAA >60 04/26/2019    LABGLOM >60 04/26/2019    GLUCOSE 185 04/26/2019    PROT 6.4 04/26/2019    LABALBU 2.4 04/26/2019    CALCIUM 8.7 04/26/2019    BILITOT 0.4 04/26/2019    ALKPHOS 104 04/26/2019    AST 15 04/26/2019    ALT 10 04/26/2019          Microbiology :  4/26/19 Bone culture STAPH AUREUS     Radiology :  XR ABDOMEN (KUB) (SINGLE AP VIEW)   Final Result   No evidence of bowel obstruction or free air. US RETROPERITONEAL COMPLETE   Final Result      1. No hydronephrosis or shadowing renal calculus. 2. Spleen appears enlarged measuring up to 15 cm. Clinical correlation   recommended. VL KO BILATERAL LIMITED 1-2 LEVELS   Final Result            URINARY CATHETER OUTPUT (Bocanegra):  Urethral Catheter Non-latex 16 fr-Output (mL): 400 mL    Assessment and Plan:      · Severe peripheral arterial disease right lower extremity  · Ischemic ulcers of the right leg and foot associated to PAD  · Gangrene right 2nd toe with concern for osteomyelitis   · BONE CULTURE + STAPH AUREUS  · Leukocytosis associated to gangrene and right 2nd toe infection      PLAN:  · D/C merrem due to cultures + Staph aureus, start on Vancomycin and monitor trough  · Discussed with podiatry he underwent right 2nd digit amputation at the MTP joint however he has multiple other ulcers especially his calcaneal ulcer is concerning for osteomyelitis. patient refused further amputation. · Will treat for calcaneal osteomyelitis, monitor bone biopsy and cultures - will need PICC      Electronically signed by JUNE Martines NP on 4/27/2019 at 2:37 PM            I had a face-to-face encounter with the patient at the bedside. I agree with the nurse practitioner's note and assessment and plan as detailed above. Necessary editing and changes made to the note by myself.     Mumtaz PORTILLO

## 2019-04-28 LAB
ANAEROBIC CULTURE: NORMAL
ANION GAP SERPL CALCULATED.3IONS-SCNC: 8 MMOL/L (ref 7–16)
BUN BLDV-MCNC: 20 MG/DL (ref 8–23)
CALCIUM SERPL-MCNC: 8.6 MG/DL (ref 8.6–10.2)
CHLORIDE BLD-SCNC: 101 MMOL/L (ref 98–107)
CO2: 28 MMOL/L (ref 22–29)
CREAT SERPL-MCNC: 0.9 MG/DL (ref 0.7–1.2)
CULTURE SURGICAL: ABNORMAL
GFR AFRICAN AMERICAN: >60
GFR NON-AFRICAN AMERICAN: >60 ML/MIN/1.73
GLUCOSE BLD-MCNC: 180 MG/DL (ref 74–99)
GRAM STAIN RESULT: ABNORMAL
HCT VFR BLD CALC: 30.7 % (ref 37–54)
HEMOGLOBIN: 9.9 G/DL (ref 12.5–16.5)
MCH RBC QN AUTO: 29.6 PG (ref 26–35)
MCHC RBC AUTO-ENTMCNC: 32.2 % (ref 32–34.5)
MCV RBC AUTO: 91.6 FL (ref 80–99.9)
METER GLUCOSE: 154 MG/DL (ref 74–99)
METER GLUCOSE: 173 MG/DL (ref 74–99)
METER GLUCOSE: 184 MG/DL (ref 74–99)
METER GLUCOSE: 215 MG/DL (ref 74–99)
ORGANISM: ABNORMAL
ORGANISM: ABNORMAL
PDW BLD-RTO: 13.5 FL (ref 11.5–15)
PLATELET # BLD: 197 E9/L (ref 130–450)
PMV BLD AUTO: 9.7 FL (ref 7–12)
POTASSIUM SERPL-SCNC: 4.2 MMOL/L (ref 3.5–5)
RBC # BLD: 3.35 E12/L (ref 3.8–5.8)
SODIUM BLD-SCNC: 137 MMOL/L (ref 132–146)
WBC # BLD: 8.1 E9/L (ref 4.5–11.5)

## 2019-04-28 PROCEDURE — 2580000003 HC RX 258: Performed by: PODIATRIST

## 2019-04-28 PROCEDURE — 6370000000 HC RX 637 (ALT 250 FOR IP): Performed by: PODIATRIST

## 2019-04-28 PROCEDURE — 97535 SELF CARE MNGMENT TRAINING: CPT

## 2019-04-28 PROCEDURE — 2060000000 HC ICU INTERMEDIATE R&B

## 2019-04-28 PROCEDURE — 80048 BASIC METABOLIC PNL TOTAL CA: CPT

## 2019-04-28 PROCEDURE — 82962 GLUCOSE BLOOD TEST: CPT

## 2019-04-28 PROCEDURE — 2580000003 HC RX 258: Performed by: INTERNAL MEDICINE

## 2019-04-28 PROCEDURE — 6360000002 HC RX W HCPCS: Performed by: INTERNAL MEDICINE

## 2019-04-28 PROCEDURE — 97530 THERAPEUTIC ACTIVITIES: CPT

## 2019-04-28 PROCEDURE — 6360000002 HC RX W HCPCS: Performed by: PODIATRIST

## 2019-04-28 PROCEDURE — 85027 COMPLETE CBC AUTOMATED: CPT

## 2019-04-28 PROCEDURE — 36415 COLL VENOUS BLD VENIPUNCTURE: CPT

## 2019-04-28 RX ORDER — DIPHENHYDRAMINE HCL 25 MG
25 TABLET ORAL EVERY 6 HOURS PRN
Status: DISCONTINUED | OUTPATIENT
Start: 2019-04-28 | End: 2019-04-29 | Stop reason: HOSPADM

## 2019-04-28 RX ADMIN — CLOPIDOGREL 75 MG: 75 TABLET, FILM COATED ORAL at 07:54

## 2019-04-28 RX ADMIN — VANCOMYCIN HYDROCHLORIDE 1000 MG: 1 INJECTION, POWDER, LYOPHILIZED, FOR SOLUTION INTRAVENOUS at 12:25

## 2019-04-28 RX ADMIN — INSULIN LISPRO 4 UNITS: 100 INJECTION, SOLUTION INTRAVENOUS; SUBCUTANEOUS at 12:25

## 2019-04-28 RX ADMIN — ENOXAPARIN SODIUM 40 MG: 40 INJECTION SUBCUTANEOUS at 07:54

## 2019-04-28 RX ADMIN — INSULIN LISPRO 2 UNITS: 100 INJECTION, SOLUTION INTRAVENOUS; SUBCUTANEOUS at 07:07

## 2019-04-28 RX ADMIN — FINASTERIDE 5 MG: 5 TABLET, FILM COATED ORAL at 07:54

## 2019-04-28 RX ADMIN — INSULIN LISPRO 1 UNITS: 100 INJECTION, SOLUTION INTRAVENOUS; SUBCUTANEOUS at 21:40

## 2019-04-28 RX ADMIN — Medication 2 G: at 15:55

## 2019-04-28 RX ADMIN — Medication 10 ML: at 21:53

## 2019-04-28 RX ADMIN — TAMSULOSIN HYDROCHLORIDE 0.4 MG: 0.4 CAPSULE ORAL at 21:40

## 2019-04-28 RX ADMIN — ACETAMINOPHEN 650 MG: 325 TABLET, FILM COATED ORAL at 00:26

## 2019-04-28 RX ADMIN — INSULIN LISPRO 2 UNITS: 100 INJECTION, SOLUTION INTRAVENOUS; SUBCUTANEOUS at 18:22

## 2019-04-28 RX ADMIN — LISINOPRIL 10 MG: 10 TABLET ORAL at 07:54

## 2019-04-28 RX ADMIN — Medication 2 G: at 21:40

## 2019-04-28 ASSESSMENT — PAIN DESCRIPTION - DIRECTION: RADIATING_TOWARDS: LEGS

## 2019-04-28 ASSESSMENT — PAIN SCALES - GENERAL
PAINLEVEL_OUTOF10: 0
PAINLEVEL_OUTOF10: 6
PAINLEVEL_OUTOF10: 0

## 2019-04-28 ASSESSMENT — PAIN DESCRIPTION - PROGRESSION: CLINICAL_PROGRESSION: NOT CHANGED

## 2019-04-28 ASSESSMENT — PAIN DESCRIPTION - FREQUENCY: FREQUENCY: INTERMITTENT

## 2019-04-28 ASSESSMENT — PAIN DESCRIPTION - DESCRIPTORS: DESCRIPTORS: ACHING;DISCOMFORT;DULL

## 2019-04-28 ASSESSMENT — PAIN DESCRIPTION - ORIENTATION: ORIENTATION: RIGHT;LEFT

## 2019-04-28 ASSESSMENT — PAIN - FUNCTIONAL ASSESSMENT: PAIN_FUNCTIONAL_ASSESSMENT: PREVENTS OR INTERFERES WITH MANY ACTIVE NOT PASSIVE ACTIVITIES

## 2019-04-28 ASSESSMENT — PAIN DESCRIPTION - PAIN TYPE: TYPE: CHRONIC PAIN;ACUTE PAIN

## 2019-04-28 ASSESSMENT — PAIN DESCRIPTION - ONSET: ONSET: GRADUAL

## 2019-04-28 ASSESSMENT — PAIN DESCRIPTION - LOCATION: LOCATION: FOOT

## 2019-04-28 NOTE — PROGRESS NOTES
Pharmacy Consultation Note  (Antibiotic Dosing and Monitoring)     Initial consult date: 04/27/19     Consulting physician: Georgi Will  Drug(s):  Vancomycin   Indication:  Osteomyelitis      Bone culture positive for MSSA. Vancomycin was D/C'd and de-escalated to Cefazolin 2g . Defer further abx management to ID service. Please re-consult pharmacy if needed. Manuel Rodarte, Pharm. D Candidate 2020 4/28/2019 2:54 PM

## 2019-04-28 NOTE — PROGRESS NOTES
Subjective:    Awake and alert. Glad to be sitting up in a chair out of bed  Requesting Benadryl due to pruritus at IV site  Denies chest pain or dyspnea. Denies abdominal pain. Tolerating diet. No nausea or vomiting. Objective:    BP (!) 148/78 Comment: manual  Pulse 76   Temp 97.1 °F (36.2 °C) (Temporal)   Resp 15   Ht 5' 10\" (1.778 m)   Wt 132 lb 7.9 oz (60.1 kg)   SpO2 97%   BMI 19.01 kg/m²   Skin: Warm and dry  Neck: Supple, no JVD  Heart:  RRR, no murmurs, gallops, or rubs. Lungs:  CTA bilaterally, no wheeze, rales or rhonchi  Abd: bowel sounds present, nontender, nondistended, no masses  Extrem:  Dressing right foot clean dry and intact    I/O last 3 completed shifts: In: 5813 [P.O.:1560]  Out: 2200 [Urine:2200]    Results      Component Value Units   POCT Glucose [[id number]] (Abnormal) Collected: 04/28/19 1153   Updated: 04/28/19 1157     Meter Glucose 215High  mg/dL   Surgical Culture [[id number]] (Abnormal)  Collected: 04/26/19 1347   Updated: 04/28/19 0747    Specimen Type: Bone    Specimen Source: Toe     Culture Surgical --    Gram Stain Result Refer to ordered Gram stain for results    Organism Staphylococcus aureusAbnormal     Culture Surgical Rare growth    Organism Corynebacterium speciesAbnormal     Culture Surgical Rare growth   Narrative:     Source: TOE       Site: &Bone             POCT Glucose [[id number]] (Abnormal) Collected: 04/28/19 0708   Updated: 04/28/19 0712     Meter Glucose 184High  mg/dL   Basic Metabolic Panel [[id number]] (Abnormal) Collected: 04/28/19 0524   Updated: 04/28/19 7931    Specimen Source: Blood     Sodium 137 mmol/L    Potassium 4.2 mmol/L    Chloride 101 mmol/L    CO2 28 mmol/L    Anion Gap 8 mmol/L    Glucose 180High  mg/dL    BUN 20 mg/dL    CREATININE 0.9 mg/dL    GFR Non-African American >60 mL/min/1.73    Comment: Chronic Kidney Disease: less than 60 ml/min/1.73 sq. m.         Kidney Failure: less than 15 ml/min/1.73 sq.m.    Results valid for patients 18 years and older.         GFR African American >60    Calcium 8.6 mg/dL   CBC [[id number]] (Abnormal) Collected: 04/28/19 0524   Updated: 04/28/19 0606    Specimen Source: Blood     WBC 8.1 E9/L    RBC 3.35Low  E12/L    Hemoglobin 9.9Low  g/dL    Hematocrit 30.7Low  %    MCV 91.6 fL    MCH 29.6 pg    MCHC 32.2 %    RDW 13.5 fL    Platelets 865 V3/J    MPV 9.7 fL         Current Facility-Administered Medications   Medication Dose Route Frequency Provider Last Rate Last Dose    diphenhydrAMINE (BENADRYL) tablet 25 mg  25 mg Oral Q6H PRN Lucreciaevangelina Kennedymo, DO        vancomycin 1000 mg IVPB in 250 mL D5W addavial  1,000 mg Intravenous Q18H JUNE Lancaster - NP   Stopped at 04/27/19 1827    opium-belladonna (B&O SUPPRETTES) 16.2-60 MG suppository 60 mg  60 mg Rectal Q8H PRN Brenda Belt, DPM        finasteride (PROSCAR) tablet 5 mg  5 mg Oral Daily Brenda Belt, DPM   5 mg at 04/28/19 0754    clopidogrel (PLAVIX) tablet 75 mg  75 mg Oral Daily Brenda Belt, DPM   75 mg at 04/28/19 0754    lisinopril (PRINIVIL;ZESTRIL) tablet 10 mg  10 mg Oral Daily Rosston Belt, DPM   10 mg at 04/28/19 0754    tamsulosin (FLOMAX) capsule 0.4 mg  0.4 mg Oral Nightly Rosston Belt, DPM   0.4 mg at 04/27/19 1933    sodium chloride flush 0.9 % injection 10 mL  10 mL Intravenous 2 times per day Rosston Belt, DPM   10 mL at 04/27/19 1934    sodium chloride flush 0.9 % injection 10 mL  10 mL Intravenous PRN Rosston Belt, DPM   10 mL at 04/27/19 0122    magnesium hydroxide (MILK OF MAGNESIA) 400 MG/5ML suspension 30 mL  30 mL Oral Daily PRN Rosston Belt, DPM        ondansetron [facility]) injection 4 mg  4 mg Intravenous Q6H PRN Brenda Belt, DPM        enoxaparin (LOVENOX) injection 40 mg  40 mg Subcutaneous Daily Rosston Gary, DPM   40 mg at 04/28/19 0754    acetaminophen (TYLENOL) tablet 650 mg  650 mg Oral Q4H PRN Brenda Vega, DPM   650 mg at 04/28/19 0026    insulin lispro (HUMALOG) injection vial 0-12 Units  0-12 Units Subcutaneous TID WC Sequeira Motto, DPM   2 Units at 04/28/19 3876    insulin lispro (HUMALOG) injection vial 0-6 Units  0-6 Units Subcutaneous Nightly Sequeira Motto, DPM   2 Units at 04/27/19 2147    glucose (GLUTOSE) 40 % oral gel 15 g  15 g Oral PRN Sequeira Motto, DPM        dextrose 50 % solution 12.5 g  12.5 g Intravenous PRN Sequeira Motto, DPM        glucagon (rDNA) injection 1 mg  1 mg Intramuscular PRN Sequeira Motto, DPM        dextrose 5 % solution  100 mL/hr Intravenous PRN Sequeira Motto, DPM           Problem list:    Patient Active Problem List   Diagnosis    Diabetic ulcer of right foot associated with type 2 diabetes mellitus ([address]    Diabetes mellitus type 2, uncontrolled ([address]    Femoral-popliteal atherosclerosis ([address]    HTN (hypertension), benign    Osteomyelitis ([address]    Moderate protein-calorie malnutrition ([address]    PVD (peripheral vascular disease) ([address]    Critical lower limb ischemia    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 ([address]       Assessment:     Diabetic ulcer of right foot associated with type 2 diabetes mellitus ([address]    Diabetes mellitus type 2, uncontrolled ([address]    Femoral-popliteal atherosclerosis ([address]    HTN (hypertension), benign    Osteomyelitis ([address]    Moderate protein-calorie malnutrition ([address]    PVD (peripheral vascular disease) ([address]   Urinary retention due to DM neurogenic bladder   Status post right second excisional debridement wounds right foot and ankle   Gangrene right 2nd toe with osteomyelitis-staph aureus          Plan:    1. Anticipate voiding trial at rehab facility    2. Continue Flomax and Proscar    3. Antibiotic changed from meropenem to vancomycin per ID, monitor levels    4. Podiatry to reassess wound prior to discharge    5.  Benadryl 25 mg every 6 hours as needed      Daly Rowland D.O., [facility]  12:06 PM  4/28/2019

## 2019-04-28 NOTE — PROGRESS NOTES
OT BEDSIDE TREATMENT NOTE      Date:2019  Patient Name: Chayito Garcia  MRN: [id number]  : 1940  Room: [address] [place], [place] [address]B     Evaluating [facility], OTR/L #[id number]     AM-PAC Daily Activity Raw Score:   Recommended Adaptive Equipment: TBD      Diagnosis: PVD (peripheral vascular disease) ([facility] [I73.9]     Procedure: angioplasty of the popliteal artery and anterior tibial artery on 19     Pertinent Medical History: cellulitis L foot, DM, diabetic foot ulcer w/ OM, HTN     Precautions:  Falls, NWB R foot, bed alarm     Home Living: Pt lives with spouse in 1 floor condo.  Ramped entry  Bath/bed on 1st floor  Bathroom setup: walk in shower with built in chair   Equipment owned: ww     Prior Level of Function: independent with ADLs , independent with IADLs; ambulated independently w/o AD  Driving: no    Pain: denied any pain, but reports \"I know my R foot is there\"    Cognition: A&O: ; Follows basic step directions, appears flat, required encouragement to get out bed & increased his overall activity to progress towards his goals               Memory:  good               Sequencing:  fair               Problem solving:  fair -              Judgement/safety: Fair (Good recall & follow through with NWB R LE)    Functional Assessment:   Initial Eval Status  Date: 19 Treatment Status  Date: 19 Short Term Goals  Treatment frequency: PRN    Feeding Independent  ---- -   Grooming Minimal Assist  Set up  Simulated grooming tasks seated in chair  Supervision    UB Dressing Minimal Assist  Min A  Around the back to manage his gown & to tie  Supervision    LB Dressing Dependent   Socks Max A   Joshua L sock while supine in bed Moderate Assist    Bathing Maximal Assist n/t Moderate Assist    Toileting Dependent  Max A  Catheter present, recommending BSC, instead of bed pan Moderate Assist    Bed Mobility  Supine to sit: Moderate Assist   Sit to supine: Minimal Assist  SBA  Supine to sit using bed rail Supine to sit: Supervision   Sit to supine: Supervision    Functional Transfers Mod A x2  Sit><stand EOB Min A  Stand < > sit  Stand pivot using walker cues for technique  Min A   Functional Mobility Mod A x2  Facilitated steps forward/backward w/ ww. Education/cues for safety, ww management and to maintain NWB R LE. Assist also provided for management of AD Min A  Using walker, shuffling & small hop from EOB to bedside chair with cues for technique, Good follow through with NWB R LE Min A   Balance Sitting: varying assist levels (Initially pt required min to mod A d/t posterior lean. Improved as progressed)  Standing: Mod A x2 w/ ww Sitting: SBA   Standing: Min A  Using walker      Activity Tolerance Fair-  Fair  With light tasks  Fair+   Visual/  Perceptual Glasses: readers            Comments: Upon arrival pt supine in bed & agreeable to therapy & participation in tasks after encouragement & education on importance of getting out of bed, increasing activities & completing ADL tasks as much as possible on his own with Fair understanding, continue to educate. At end of session pt requested to return to bed, again educated pt on importance of getting out of bed, R LE was elevated with stool & pillow positioned underneath for comfort, all lines and tubes intact, call light within reach. Nsg aware pt was up in the chair & encouraged to stay up for an hour. Requested a walker for pt to use & BSC. · Pt has made Good progress towards set goals. · Continue with current plan of care    Time in: 10:05am  Time out: 10:28am  Total Tx Time: 23 minutes     Yojana Hood.  [address] [place] [address], [address] [place] [address]

## 2019-04-28 NOTE — PROGRESS NOTES
ID Progress Note                [address] [address], [facility], [address] [facility]            Phone [phone]     Fax [phone]      Chief complaint   Left foot ulcers - improving     Subjective:  S/p amputation of the right 2nd digit MTP joint  Seen and examined at bedside  The patient is awake, alert and tolerating medications well  He reports no side effects and denies pain. The wounds to left foot are improving with local wound care and amputation site is healing well with sutures intact, no drainage. Afebrile with no leukocytosis. 10 ROS otherwise negative unless otherwise specified above. Objective:    Vitals:    04/28/19 1030   BP: (!) 148/78   Pulse:    Resp:    Temp:    SpO2:      General appearance: Alert, Awake, Oriented times 3, no distress  Skin: Warm and dry  Eyes: Pink palpebral conjunctivae. PERRL  Ears: External ears normal.  Nose/Sinuses: Nares normal. Septum midline. Mucosa normal. No sinus tenderness. Oropharynx: Oropharynx clear with no exudates seen  Neck: Neck supple. No jugular venous distension, lymphadenopathy or thyromegaly Trachea midline  Lungs: Lungs clear to auscultation bilaterally. No rhonchi, crackles or wheezes  Heart: S1 S2  Regular rate and rhythm. No rub, murmur or gallop  Abdomen: Abdomen soft, non-tender. BS normal. No masses, organomegaly  Extremities: No edema, Peripheral pulses palpable  Musculoskeletal: normalROM, sensation intact. Left foot with multiple ulcers of varying degrees - healing well per podiatry. 2nd digit MTP joint amputation to left foot - sutures intact, healing well. Calcaneal ulcer visualized - no bone exposed.     Labs:  Recent Labs     04/26/19  0658 04/28/19  0524   WBC 6.7 8.1   RBC 3.39* 3.35*   HGB 9.9* 9.9*   HCT 30.8* 30.7*   MCV 90.9 91.6   MCH 29.2 29.6   MCHC 32.1 32.2   RDW 13.7 13.5    197   MPV 9.9 9.7     CMP:    Lab Results   Component Value Date     04/28/2019    K 4.2 04/28/2019    K 3.9 04/24/2019  04/28/2019    CO2 28 04/28/2019    BUN 20 04/28/2019    CREATININE 0.9 04/28/2019    GFRAA >60 04/28/2019    LABGLOM >60 04/28/2019    GLUCOSE 180 04/28/2019    PROT 6.4 04/26/2019    LABALBU 2.4 04/26/2019    CALCIUM 8.6 04/28/2019    BILITOT 0.4 04/26/2019    ALKPHOS 104 04/26/2019    AST 15 04/26/2019    ALT 10 04/26/2019          Microbiology :  4/26/19 Bone culture MSSA/ corynebacterium     Radiology :  XR ABDOMEN (KUB) (SINGLE AP VIEW)   Final Result   No evidence of bowel obstruction or free air. US RETROPERITONEAL COMPLETE   Final Result      1. No hydronephrosis or shadowing renal calculus. 2. Spleen appears enlarged measuring up to 15 cm. Clinical correlation   recommended. VL KO BILATERAL LIMITED 1-2 LEVELS   Final Result            URINARY CATHETER OUTPUT (Bocanegra):  Urethral Catheter Non-latex 16 fr-Output (mL): 500 mL    Assessment and Plan:      · Severe peripheral arterial disease right lower extremity  · Ischemic ulcers of the right leg and foot associated to PAD  · Gangrene right 2nd toe with concern for osteomyelitis   · BONE CULTURE + mssa/corynebacterium  · Leukocytosis associated to gangrene and right 2nd toe infection      PLAN:  · D/c vanc and start Ancef 2 g q8. Dr Jeremias Jain to decide on long term ATB or po options for home. · Follow cultures   · Discussed with podiatry he underwent right 2nd digit amputation at the MTP joint however he has multiple other ulcers especially his calcaneal ulcer is concerning for osteomyelitis. patient refused further amputation. Electronically signed by JUNE Calvo NP on 4/28/2019 at 12:32 PM    I had a face-to-face encounter with the patient at the bedside. I agree with the nurse practitioner's note and assessment and plan as detailed above. Necessary editing and changes made to the note by myself.     Hector PORTILLO

## 2019-04-28 NOTE — PROGRESS NOTES
Physical Therapy  Facility/Department: [place]  Daily Treatment Note  NAME: Nati Del Angel  : 1940  MRN: [id number]    Date of Service: 2019    Evaluating PT:  Yuliana Pacheco, PT, DPT [id number]     Room #:  4863/6313-Y  Diagnosis:  PVD  PMHx:  Cellulitis of L foot, DM, diabetic foot ulcer with osteomyelitis, femoral-popliteal atherosclerosis, HTN   Procedures: Amputation R second toe ()  Precautions:  Falls, NWB R foot, Heels offloaded in bed, Bed alarm   Equipment Needs:  TBD     Pt lives with wife in a 1 story home with ramp entry. Bedroom and bathroom are on the main level. Pt ambulated with no AD and with Unna boot PTA. Pt states he has not been driving but is independent with ADLs. Pt owns [employer].                 Initial Evaluation  Date: 19 Treatment Date: 19 Short Term/ Long Term   Goals   AM-PAC 6 Clicks 66/31       Was pt agreeable to Eval/treatment? Yes Yes     Does pt have pain? No c/o pain  No current complaints of pain     Bed Mobility  Rolling: NT  Supine to sit: Mod A  Sit to supine: Min A   Scooting: Mod A to EOB Rolling: SBA  Supine to sit: SBA  Sit to supine: NT  Scooting: SBA toward EOB Independent    Transfers Sit to stand: Mod A x2  Stand to sit: Mod A x2  Stand pivot: NT Sit to stand: Min A  Stand to sit: Min A  Stand pivot: Min A with [employer] Sit to stand: Independent   Stand to sit: Independent   Stand pivot: Modified Independent     Ambulation    5 feet fwd/bwd with [employer] Mod A x2 2 feet forward with [employer] with Min A >100 feet with AAD Modified Independent    Stair negotiation: ascended and descended  NT NT  >2 steps with 2 rail Min A   ROM BUE:  Per OT eval   BLE:  WFL NT     Strength BUE:  Per OT eval   BLE:  Grossly 4/5  NT     Balance Sitting EOB:  Min A  Dynamic Standing:   Mod A x2 with [employer] Sitting EOB: SBA  Dynamic Standing: Min A with [employer] Sitting EOB:  Independent   Dynamic Standing:  Modified Independent       Patient education  Pt educated on NWB status of R LE    Patient response to education:   Pt verbalized understanding Pt demonstrated skill Pt requires further education in this area   Yes Yes Reinforce     Comments:   Pt was supine in bed upon room entry, agreeable to PT treatment with OT collaboration. Pt performed supine to sit transfer without need for physical assistance. Pt performed sit to stand transfer with good technique. Pt pivoted to bedside chair and was good at maintaining NWB R LE. Pt was seated in bedside chair. Pt verbalized understanding to call for assistance when he wishes to return to bed. Pt was left seated in bedside chair with all needs met at conclusion of session. Patient is making good progress toward established physical therapy goals. Continue with physical therapy current plan of care.     Time in: 1005  Time out: [address], [place]  [id number]

## 2019-04-29 VITALS
HEART RATE: 71 BPM | RESPIRATION RATE: 18 BRPM | SYSTOLIC BLOOD PRESSURE: 168 MMHG | HEIGHT: 70 IN | BODY MASS INDEX: 19.06 KG/M2 | DIASTOLIC BLOOD PRESSURE: 64 MMHG | TEMPERATURE: 97.6 F | OXYGEN SATURATION: 99 % | WEIGHT: 133.16 LBS

## 2019-04-29 LAB
METER GLUCOSE: 160 MG/DL (ref 74–99)
METER GLUCOSE: 194 MG/DL (ref 74–99)
METER GLUCOSE: 284 MG/DL (ref 74–99)

## 2019-04-29 PROCEDURE — 76937 US GUIDE VASCULAR ACCESS: CPT

## 2019-04-29 PROCEDURE — 2580000003 HC RX 258: Performed by: INTERNAL MEDICINE

## 2019-04-29 PROCEDURE — 6360000002 HC RX W HCPCS: Performed by: PODIATRIST

## 2019-04-29 PROCEDURE — 82962 GLUCOSE BLOOD TEST: CPT

## 2019-04-29 PROCEDURE — 97530 THERAPEUTIC ACTIVITIES: CPT

## 2019-04-29 PROCEDURE — 2580000003 HC RX 258: Performed by: PODIATRIST

## 2019-04-29 PROCEDURE — 99231 SBSQ HOSP IP/OBS SF/LOW 25: CPT | Performed by: SURGERY

## 2019-04-29 PROCEDURE — 36569 INSJ PICC 5 YR+ W/O IMAGING: CPT

## 2019-04-29 PROCEDURE — C1751 CATH, INF, PER/CENT/MIDLINE: HCPCS

## 2019-04-29 PROCEDURE — 6370000000 HC RX 637 (ALT 250 FOR IP): Performed by: INTERNAL MEDICINE

## 2019-04-29 PROCEDURE — 6370000000 HC RX 637 (ALT 250 FOR IP): Performed by: PODIATRIST

## 2019-04-29 PROCEDURE — 6360000002 HC RX W HCPCS: Performed by: INTERNAL MEDICINE

## 2019-04-29 RX ORDER — FINASTERIDE 5 MG/1
5 TABLET, FILM COATED ORAL DAILY
Qty: 30 TABLET | Refills: 3 | DISCHARGE
Start: 2019-04-30 | End: 2019-06-27 | Stop reason: ALTCHOICE

## 2019-04-29 RX ORDER — SODIUM CHLORIDE 0.9 % (FLUSH) 0.9 %
10 SYRINGE (ML) INJECTION PRN
Status: DISCONTINUED | OUTPATIENT
Start: 2019-04-29 | End: 2019-04-29 | Stop reason: HOSPADM

## 2019-04-29 RX ORDER — LIDOCAINE HYDROCHLORIDE 10 MG/ML
5 INJECTION, SOLUTION EPIDURAL; INFILTRATION; INTRACAUDAL; PERINEURAL ONCE
Status: DISCONTINUED | OUTPATIENT
Start: 2019-04-29 | End: 2019-04-29 | Stop reason: HOSPADM

## 2019-04-29 RX ORDER — LISINOPRIL 10 MG/1
10 TABLET ORAL DAILY
Qty: 30 TABLET | Refills: 3 | DISCHARGE
Start: 2019-04-30 | End: 2019-06-27 | Stop reason: ALTCHOICE

## 2019-04-29 RX ORDER — CLOPIDOGREL BISULFATE 75 MG/1
75 TABLET ORAL DAILY
Qty: 30 TABLET | Refills: 3 | DISCHARGE
Start: 2019-04-30 | End: 2019-07-11

## 2019-04-29 RX ORDER — HEPARIN SODIUM (PORCINE) LOCK FLUSH IV SOLN 100 UNIT/ML 100 UNIT/ML
3 SOLUTION INTRAVENOUS PRN
Status: DISCONTINUED | OUTPATIENT
Start: 2019-04-29 | End: 2019-04-29 | Stop reason: HOSPADM

## 2019-04-29 RX ORDER — HEPARIN SODIUM (PORCINE) LOCK FLUSH IV SOLN 100 UNIT/ML 100 UNIT/ML
3 SOLUTION INTRAVENOUS EVERY 12 HOURS SCHEDULED
Status: DISCONTINUED | OUTPATIENT
Start: 2019-04-29 | End: 2019-04-29 | Stop reason: HOSPADM

## 2019-04-29 RX ORDER — DIPHENHYDRAMINE HCL 25 MG
25 TABLET ORAL EVERY 6 HOURS PRN
DISCHARGE
Start: 2019-04-29 | End: 2019-05-29

## 2019-04-29 RX ORDER — TAMSULOSIN HYDROCHLORIDE 0.4 MG/1
0.4 CAPSULE ORAL NIGHTLY
Qty: 30 CAPSULE | Refills: 3 | DISCHARGE
Start: 2019-04-29 | End: 2019-11-21 | Stop reason: ALTCHOICE

## 2019-04-29 RX ADMIN — CLOPIDOGREL 75 MG: 75 TABLET, FILM COATED ORAL at 08:24

## 2019-04-29 RX ADMIN — Medication 10 ML: at 08:25

## 2019-04-29 RX ADMIN — INSULIN LISPRO 2 UNITS: 100 INJECTION, SOLUTION INTRAVENOUS; SUBCUTANEOUS at 06:47

## 2019-04-29 RX ADMIN — LISINOPRIL 10 MG: 10 TABLET ORAL at 08:24

## 2019-04-29 RX ADMIN — DIPHENHYDRAMINE HCL 25 MG: 25 TABLET ORAL at 00:01

## 2019-04-29 RX ADMIN — DIPHENHYDRAMINE HCL 25 MG: 25 TABLET ORAL at 08:24

## 2019-04-29 RX ADMIN — ACETAMINOPHEN 650 MG: 325 TABLET, FILM COATED ORAL at 00:02

## 2019-04-29 RX ADMIN — FINASTERIDE 5 MG: 5 TABLET, FILM COATED ORAL at 08:25

## 2019-04-29 RX ADMIN — Medication 2 G: at 06:43

## 2019-04-29 RX ADMIN — INSULIN LISPRO 6 UNITS: 100 INJECTION, SOLUTION INTRAVENOUS; SUBCUTANEOUS at 16:09

## 2019-04-29 RX ADMIN — ENOXAPARIN SODIUM 40 MG: 40 INJECTION SUBCUTANEOUS at 08:25

## 2019-04-29 RX ADMIN — INSULIN LISPRO 2 UNITS: 100 INJECTION, SOLUTION INTRAVENOUS; SUBCUTANEOUS at 11:30

## 2019-04-29 RX ADMIN — Medication 2 G: at 14:01

## 2019-04-29 ASSESSMENT — PAIN SCALES - GENERAL
PAINLEVEL_OUTOF10: 4
PAINLEVEL_OUTOF10: 0
PAINLEVEL_OUTOF10: 0

## 2019-04-29 ASSESSMENT — PAIN - FUNCTIONAL ASSESSMENT: PAIN_FUNCTIONAL_ASSESSMENT: PREVENTS OR INTERFERES WITH MANY ACTIVE NOT PASSIVE ACTIVITIES

## 2019-04-29 ASSESSMENT — PAIN DESCRIPTION - PAIN TYPE: TYPE: CHRONIC PAIN;SURGICAL PAIN

## 2019-04-29 ASSESSMENT — PAIN DESCRIPTION - ORIENTATION: ORIENTATION: RIGHT

## 2019-04-29 ASSESSMENT — PAIN DESCRIPTION - ONSET: ONSET: GRADUAL

## 2019-04-29 ASSESSMENT — PAIN DESCRIPTION - DIRECTION: RADIATING_TOWARDS: LEGS

## 2019-04-29 ASSESSMENT — PAIN DESCRIPTION - DESCRIPTORS: DESCRIPTORS: ACHING;DISCOMFORT;TENDER

## 2019-04-29 ASSESSMENT — PAIN DESCRIPTION - FREQUENCY: FREQUENCY: INTERMITTENT

## 2019-04-29 ASSESSMENT — PAIN DESCRIPTION - LOCATION: LOCATION: FOOT

## 2019-04-29 ASSESSMENT — PAIN DESCRIPTION - PROGRESSION: CLINICAL_PROGRESSION: NOT CHANGED

## 2019-04-29 NOTE — PROGRESS NOTES
Physical Therapy  Facility/Department: [facility]  Daily Treatment Note  NAME: Nati Del Angel  : 1940  MRN: [id number]    Date of Service: 2019    Evaluating PT:  Yuliana Pacheco, PT, DPT [id number]     Room #:  6400/4202-A  Diagnosis:  PVD  PMHx:  Cellulitis of L foot, DM, diabetic foot ulcer with osteomyelitis, femoral-popliteal atherosclerosis, HTN   Procedures: Amputation R second toe ()  Precautions:  Falls, NWB R foot, Heels offloaded in bed, Bed alarm   Equipment Needs:  TBD     Pt lives with wife in a 1 story home with ramp entry. Bedroom and bathroom are on the main level. Pt ambulated with no AD and with Unna boot PTA. Pt states he has not been driving but is independent with ADLs. Pt owns [employer].                 Initial Evaluation  Date: 19 Treatment Date: 19 Short Term/ Long Term   Goals   AM-PAC 6 Clicks 41/43 60/73      Was pt agreeable to Eval/treatment? Yes Yes     Does pt have pain? No c/o pain  No current complaints of pain     Bed Mobility  Rolling: NT  Supine to sit: Mod A  Sit to supine: Min A   Scooting: Mod A to EOB Rolling: SBA  Supine to sit: SBA  Sit to supine: SBA  Scooting: SBA toward EOB Independent    Transfers Sit to stand: Mod A x2  Stand to sit: Mod A x2  Stand pivot: NT Sit to stand: Min A  Stand to sit: Min A  Stand pivot: Min A with [employer] Sit to stand: Independent   Stand to sit: Independent   Stand pivot: Modified Independent     Ambulation    5 feet fwd/bwd with [employer] Mod A x2 3 feet forward and lateral with WW with Min A >100 feet with AAD Modified Independent    Stair negotiation: ascended and descended  NT NT  >2 steps with 2 rail Min A   ROM BUE:  Per OT eval   BLE:  WFL NT     Strength BUE:  Per OT eval   BLE:  Grossly 4/5  NT     Balance Sitting EOB:  Min A  Dynamic Standing:   Mod A x2 with [employer] Sitting EOB: supervision  Dynamic Standing: Min A with [employer] Sitting EOB:  Independent   Dynamic Standing:  Modified Independent       Patient education  Pt

## 2019-04-29 NOTE — PROGRESS NOTES
ID Progress Note                [address] [facility] [address], [place], [address] [facility]            Phone [phone]     Fax [phone]      Chief complaint   Left foot ulcers - improving     Subjective:  S/p amputation of the right 2nd digit MTP joint  Seen and examined at bedside  The patient is awake, alert and tolerating medications well  He reports no side effects and denies pain. The wounds to left foot are improving with local wound care and amputation site is healing well with sutures intact, no drainage. Afebrile with no leukocytosis. 10 ROS otherwise negative unless otherwise specified above. Objective:    Vitals:    04/29/19 0745   BP: (!) 164/72   Pulse: 65   Resp: 18   Temp: 97.7 °F (36.5 °C)   SpO2: 98%     General appearance: Alert, Awake, Oriented times 3, no distress  Skin: Warm and dry  Eyes: Pink palpebral conjunctivae. PERRL  Ears: External ears normal.  Nose/Sinuses: Nares normal. Septum midline. Mucosa normal. No sinus tenderness. Oropharynx: Oropharynx clear with no exudates seen  Neck: Neck supple. No jugular venous distension, lymphadenopathy or thyromegaly Trachea midline  Lungs: Lungs clear to auscultation bilaterally. No rhonchi, crackles or wheezes  Heart: S1 S2  Regular rate and rhythm. No rub, murmur or gallop  Abdomen: Abdomen soft, non-tender. BS normal. No masses, organomegaly  Extremities: No edema, Peripheral pulses palpable  Musculoskeletal: normalROM, sensation intact. Left foot with multiple ulcers of varying degrees - healing well per podiatry. 2nd digit MTP joint amputation to left foot - sutures intact, healing well. Calcaneal ulcer visualized - no bone exposed.     Labs:  Recent Labs     04/28/19  0524   WBC 8.1   RBC 3.35*   HGB 9.9*   HCT 30.7*   MCV 91.6   MCH 29.6   MCHC 32.2   RDW 13.5      MPV 9.7     CMP:    Lab Results   Component Value Date     04/28/2019    K 4.2 04/28/2019    K 3.9 04/24/2019     04/28/2019    CO2 28 04/28/2019 BUN 20 04/28/2019    CREATININE 0.9 04/28/2019    GFRAA >60 04/28/2019    LABGLOM >60 04/28/2019    GLUCOSE 180 04/28/2019    PROT 6.4 04/26/2019    LABALBU 2.4 04/26/2019    CALCIUM 8.6 04/28/2019    BILITOT 0.4 04/26/2019    ALKPHOS 104 04/26/2019    AST 15 04/26/2019    ALT 10 04/26/2019          Microbiology :  4/26/19 Bone culture MSSA/ corynebacterium     Radiology :  XR ABDOMEN (KUB) (SINGLE AP VIEW)   Final Result   No evidence of bowel obstruction or free air. US RETROPERITONEAL COMPLETE   Final Result      1. No hydronephrosis or shadowing renal calculus. 2. Spleen appears enlarged measuring up to 15 cm. Clinical correlation   recommended.                VL KO BILATERAL LIMITED 1-2 LEVELS   Final Result            URINARY CATHETER OUTPUT (Bocanegra):  Urethral Catheter Non-latex 16 fr-Output (mL): 775 mL    Assessment and Plan:      · Severe peripheral arterial disease right lower extremity  · Ischemic ulcers of the right leg and foot associated to PAD  · Gangrene right 2nd toe with concern for osteomyelitis   · BONE CULTURE + mssa/corynebacterium  · Leukocytosis associated to gangrene and right 2nd toe infection      PLAN:  Will treat for calcaneal osteomyelitis, follow bone path   PICC line  Cefazolin for 6 weeks from the day of debridement (4/26/2019)  Med rec done  end date- 6/5/2019  Pt follows up with Dr Yaneth Ashton            Electronically signed by Francesco Sands MD on 4/29/2019 at 12:23 PM

## 2019-04-29 NOTE — CARE COORDINATION
4/29/2019 social work:discharge planning  Patient received insurance approval to go to [facility].

## 2019-04-29 NOTE — PROGRESS NOTES
Sent message to dr Mirtha Call regarding cath removal. Keep the cath until discharge and follow up with voiding trials at a later time per urology

## 2019-04-29 NOTE — PROGRESS NOTES
POWER PICC Placement 4/29/2019    Product number: [id number]   Lot Number: [id number]      Ultrasound: YES WITH VPS   Anatomy; iv placement [id number] R Brachial      Upper Arm Circumference: 23 CM    Size: 4 FR SL    Exposed Length: 0 CM    Internal Length: 37 CM   Cut: 13 CM   Vein Measurement: 0.53 CM    Christelle Barber  4/29/2019  3:38 PM        PLACED WITH ULTRASOUND AND VPS TIP CONFIRMATION SYSTEM. Stevo Mcintosh TIP IN LOWER 1/3 SVC/CAJ. Stevo Mcintosh

## 2019-04-29 NOTE — PROGRESS NOTES
Vascular        Chief complaint : Patient known to me from the past, a few years ago, admitted with a diabetic wounds of the right foot, with cellulitis, ischemic 2nd toe, transfer from [facility], underwent balloon angioplasty of the popliteal artery and anterior tibial artery on 23 April by Dr. Per Cano, underwent evaluation by her podiatrist Dr. Andre Mcknight , status post amputation of the 2nd toe last week   Has history of diabetes mellitus with neuropathy      Subjective: No new C/O, patient feeling better, still has some discomfort    Objective:    BP (!) 168/64 Comment: manual  Pulse 71   Temp 97.6 °F (36.4 °C) (Temporal)   Resp 18   Ht 5' 10\" (1.778 m)   Wt 133 lb 2.5 oz (60.4 kg)   SpO2 99%   BMI 19.11 kg/m²     General: alert and oriented. Neck:  Carotid bruits: Right No  Left No    Respiratory:  No rales or rhonchi     Cardiovascular:  Normal Sinus Rhythm. No murmur    Abdomen:  Soft, no masses palpable. No tenderness    Extremities:  No lower extremity edema. Both the feet are warm to touch     The 2nd toe amputation site is healing well   The patient has multiple ulcers, over the dorsal aspect of the right foot, also has a heel eschar, also the dorsal aspect of the 3rd toe with black eschar without any obvious cellulitis           Pulses Right  Left    Radial       Brachial 3 3  3=normal   Femoral 2-3 2-3  2=diminished   Popliteal 2   1=barely palpable   Dorsalis pedis    0=absent   Posterior tibial 0 1  4=aneurysmal         Neuro: The speech is intact. Moving all extremities. No evidence of any acute focal lateralizing neurological deficit. Other pertinent information:    1. The angiogram and angioplasty reports were reviewed    2. The vascular surgery and podiatry progress towards were reviewed    3.   The lower extremity artery Doppler study was personally reviewed by me    Narrative   Normal ankle arm index with Doppler evidence of bilateral femoral   popliteal arterial occlusive disease, with the adequate collateral   flow to both feet based upon the pulse volume recordings of the   metatarsals        Adequate collateral flow noted to the toes also based upon the pulse   volume recording, except it is significantly diminished over the right   second toe     Plan:    Discussed in detail with the patient regarding all options, for now follow conservatively from a vascular point with wound care of the foot by Dr. Justo Gallo, antibiotics per ID consultants    All his questions were answered          Bhavik Botello

## 2019-04-29 NOTE — PROGRESS NOTES
Daily Progress Note      SUBJECTIVE:  Patient seen in follow-up. Doing well. Currently no pain. OBJECTIVE:  Alert and oriented. Dressing intact. Clean, dry at present, just changed by nursing.     Medications    Current Facility-Administered Medications: lidocaine PF 1 % injection 5 mL, 5 mL, Intradermal, Once  sodium chloride flush 0.9 % injection 10 mL, 10 mL, Intravenous, PRN  heparin flush 100 UNIT/ML injection 300 Units, 3 mL, Intravenous, 2 times per day  heparin flush 100 UNIT/ML injection 300 Units, 3 mL, Intracatheter, PRN  diphenhydrAMINE (BENADRYL) tablet 25 mg, 25 mg, Oral, Q6H PRN  ceFAZolin (ANCEF) 2 g in dextrose 5 % 50 mL IVPB, 2 g, Intravenous, Q8H  opium-belladonna (B&O SUPPRETTES) 16.2-60 MG suppository 60 mg, 60 mg, Rectal, Q8H PRN  finasteride (PROSCAR) tablet 5 mg, 5 mg, Oral, Daily  clopidogrel (PLAVIX) tablet 75 mg, 75 mg, Oral, Daily  lisinopril (PRINIVIL;ZESTRIL) tablet 10 mg, 10 mg, Oral, Daily  tamsulosin (FLOMAX) capsule 0.4 mg, 0.4 mg, Oral, Nightly  sodium chloride flush 0.9 % injection 10 mL, 10 mL, Intravenous, 2 times per day  magnesium hydroxide (MILK OF MAGNESIA) 400 MG/5ML suspension 30 mL, 30 mL, Oral, Daily PRN  ondansetron (ZOFRAN) injection 4 mg, 4 mg, Intravenous, Q6H PRN  enoxaparin (LOVENOX) injection 40 mg, 40 mg, Subcutaneous, Daily  acetaminophen (TYLENOL) tablet 650 mg, 650 mg, Oral, Q4H PRN  insulin lispro (HUMALOG) injection vial 0-12 Units, 0-12 Units, Subcutaneous, TID WC  insulin lispro (HUMALOG) injection vial 0-6 Units, 0-6 Units, Subcutaneous, Nightly  glucose (GLUTOSE) 40 % oral gel 15 g, 15 g, Oral, PRN  dextrose 50 % solution 12.5 g, 12.5 g, Intravenous, PRN  glucagon (rDNA) injection 1 mg, 1 mg, Intramuscular, PRN  dextrose 5 % solution, 100 mL/hr, Intravenous, PRN  Physical    VITALS:  BP (!) 168/64 Comment: manual  Pulse 71   Temp 97.6 °F (36.4 °C) (Temporal)   Resp 18   Ht 5' 10\" (1.778 m)   Wt 133 lb 2.5 oz (60.4 kg)   SpO2 99%   BMI 19.11

## 2019-04-29 NOTE — DISCHARGE SUMMARY
Physician Discharge Summary     Patient ID:  Hawa Ca  [id number]  8 y.o.  1940    Admit date: 4/23/2019    Discharge date and time: 4/29/2019  6:01 PM     Admission Diagnoses: PVD (peripheral vascular disease) ([facility] [I73.9]  Critical lower limb ischemia [I99.8]    Discharge Diagnoses:      Diabetes mellitus type 2, uncontrolled [facility]    Femoral-popliteal atherosclerosis ([facility]    HTN (hypertension), benign    Osteomyelitis ([facility]    Moderate protein-calorie malnutrition ([facility]    PVD (peripheral vascular disease) ([facility]   Urinary retention due to DM neurogenic bladder   Status post right second excisional debridement wounds right foot and ankle   Gangrene right 2nd toe with osteomyelitis-staph aureus             Consults: ID, vascular surgery and podiatry    Procedures:   Bella Monaco DPM   Podiatrist   Podiatry   Brief Op Note   Signed   Date of Service:  4/26/2019  1:20 PM            Procedure: AMPUTATION RIGHT SECOND TOE, FOOT DEBRIDEMENT Case Time: 4/26/2019  1:20 PM Surgeon: Bella Monaco DPM            Signed                   Show:Clear all  [x]Manual[x]Template[]Copied    Added by:  [x]Antonio Orozco      []Adamaris for details      Brief Postoperative Note  ______________________________________________________________     Patient: Hawa Ca  YOB: 1940  MRN: [id number]  Date of Procedure: 4/26/2019     Pre-Op Diagnosis: GANGRENE RIGHT 2ND TOE, MULTIPLE WOUNDS      Post-Op Diagnosis: Same       Procedure(s):  AMPUTATION RIGHT SECOND EXCSIIONAL DEBRIDEMENT WOUNDS RIGHT FOOT AND ANKLE     Anesthesia: Monitor Anesthesia Care     Surgeon(s):  Bella Monaco DPM     Assistant:      Estimated Blood Loss (mL): less than 50      Complications: None     Specimens:   ID Type Source Tests Collected by Time Destination   1 : 2nd toe Bone Toe ANAEROBIC CULTURE, GRAM STAIN, SURGICAL CULTURE, ACID FAST CULTURE WITH SMEAR Bella Monaco DPM 4/26/2019 1347     A : 2nd toe Specimen debridement; underwent debridement with good results; seen by ID and IV antibiotics recommended; improved and discharged to rehab for ongoing management of wound and for IV antibiotics    Discharge Exam:  See progress note from today    Disposition: SNF    Patient Instructions:   Current Discharge Medication List      START taking these medications    Details   ceFAZolin (ANCEF) infusion Infuse 2,000 mg intravenously every 8 hours Compound per protocol  Qty: 222 g, Refills: 0      clopidogrel (PLAVIX) 75 MG tablet Take 1 tablet by mouth daily  Qty: 30 tablet, Refills: 3      tamsulosin (FLOMAX) 0.4 MG capsule Take 1 capsule by mouth nightly  Qty: 30 capsule, Refills: 3      finasteride (PROSCAR) 5 MG tablet Take 1 tablet by mouth daily  Qty: 30 tablet, Refills: 3      lisinopril (PRINIVIL;ZESTRIL) 10 MG tablet Take 1 tablet by mouth daily  Qty: 30 tablet, Refills: 3      diphenhydrAMINE (BENADRYL) 25 MG tablet Take 1 tablet by mouth every 6 hours as needed for Itching      !! insulin lispro (HUMALOG) 100 UNIT/ML injection vial Inject 0-12 Units into the skin 3 times daily (with meals)  Qty: 1 vial, Refills: 3      !! insulin lispro (HUMALOG) 100 UNIT/ML injection vial Inject 0-6 Units into the skin nightly  Qty: 1 vial, Refills: 3       !! - Potential duplicate medications found. Please discuss with provider. Activity: activity as tolerated  Diet: diabetic diet    Follow-up with Dr Lyle Malone in 1 month. Note that over 30 minutes was spent in preparing discharge papers, discussing discharge with patient, medication review, etc.    Signed:  MARY Soares  4/29/2019  3:54 PM

## 2019-04-29 NOTE — CARE COORDINATION
4/29/2019  Patient to discharge to [place] [address] today at 6 pm via [address]( [facility] patient informed and wishes to notify family

## 2019-04-29 NOTE — DISCHARGE INSTR - COC
Continuity of Care Form    Patient Name: Chandni Preciado   :  1940  MRN:  [id number]    Admit date:  2019  Discharge date:  ***    Code Status Order: Full Code   Advance Directives:   Advance Care Flowsheet Documentation     Date/Time Healthcare Directive Type of Healthcare Directive Copy in [address] Agent's Name Healthcare Agent's Phone Number    19 [phone]  No, patient does not have an advance directive for healthcare treatment -- -- -- -- --    19 2221  No, patient does not have an advance directive for healthcare treatment -- -- -- -- --          Admitting Physician:  Allen Wade DO  PCP: Nathalie Bay MD    Discharging Nurse: [facility] Unit/Room#: 8606/4260-W  Discharging Unit Phone Number: ***    Emergency Contact:   Extended Emergency Contact Information  Primary Emergency Contact: Mickie Locke  Address: [address] [place] [address] Phone: [phone]  Mobile Phone: [phone]  Relation: Spouse    Past Surgical History:  Past Surgical History:   Procedure Laterality Date    FOOT DEBRIDEMENT Left 2017    wound debridement and delayed primary closure    OTHER SURGICAL HISTORY  2019    Dr. Dulce Caruso- PTA ant tibial    PROSTATE BIOPSY  2016    TOE AMPUTATION Left 2016    2nd    TOE AMPUTATION Right 2019    AMPUTATION RIGHT SECOND TOE, FOOT DEBRIDEMENT performed by Ivory Faulkner DPM at [address] [place] [address]       Immunization History: There is no immunization history on file for this patient.     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 ([facility]) I70.239       Isolation/Infection:   Isolation          No Isolation            Nurse Assessment:  Last Vital Signs: BP (!) 168/64 Comment: manual  Pulse 71   Temp 97.6 °F (36.4 °C) (Temporal)   Resp 18   Ht 5' 10\" (1.778 m)   Wt 133 lb 2.5 oz (60.4 kg)   SpO2 99%   BMI 19.11 kg/m²     Last [facility] pain score (0-10 scale): Pain Level: 0  Last Weight:   Wt Readings from Last 1 Encounters:   04/29/19 133 lb 2.5 oz (60.4 kg)     Mental Status:  {IP PT MENTAL STATUS:33543}    IV Access:  { REYNA IV ACCESS:351854062}    Nursing Mobility/ADLs:  Walking   {P DME JCIS:487087356}  Transfer  {P DME KXFB:995889119}  Bathing  {P DME EFVD:775872612}  Dressing  {CHP DME GDWY:913294116}  Toileting  {CHP DME HCFK:804116316}  Feeding  {P DME YKQL:516444267}  Med Admin  {P DME SSLN:382191832}  Med Delivery   { REYNA MED Delivery:376806637}    Wound Care Documentation and Therapy:  Wound 12/30/16 Other (Comment) (Active)   Number of days: 850       Wound 12/30/16 Foot Plantar (Active)   Number of days: 849       Incision 12/31/16 Other (Comment) Left (Active)   Number of days: 849       Puncture 04/27/19 Groin Anterior; Left (Active)   Wound Assessment Light purple 4/27/2019  3:53 AM   Number of days: 2       Wound 04/20/19 Foot Right;Plantar (Active)   Wound Image   4/26/2019  9:00 AM   Wound Diabetic 4/26/2019  9:00 AM   Dressing Status Clean;Dry; Intact; Changed 4/29/2019 12:00 PM   Dressing Changed Changed/New 4/29/2019 12:00 PM   Dressing/Treatment Other (comment) 4/29/2019 12:00 PM   Wound Cleansed Rinsed/Irrigated with saline 4/28/2019  1:04 PM   Dressing Change Due 04/29/19 4/28/2019  1:04 PM   Wound Length (cm) 1.2 cm 4/26/2019  9:00 AM   Wound Width (cm) 2.8 cm 4/26/2019  9:00 AM   Wound Depth (cm) 0.1 cm 4/23/2019  6:00 PM Wound Surface Area (cm^2) 3.36 cm^2 4/26/2019  9:00 AM   Change in Wound Size % (l*w) -32.81 4/26/2019  9:00 AM   Wound Volume (cm^3) 0.25 cm^3 4/23/2019  6:00 PM   Wound Assessment Clean; Intact 4/29/2019 12:00 PM   Drainage Amount Small 4/29/2019 12:00 PM   Drainage Description Sanguinous 4/29/2019 12:00 PM   Odor None 4/28/2019  1:04 PM   Latonya-wound Assessment Intact 4/26/2019  9:00 AM   Black%Wound Bed 100 4/26/2019  9:00 AM   Number of days: 8       Wound 04/20/19 Foot Right;Medial (Active)   Wound Image   4/26/2019  9:00 AM   Dressing Status Clean;Dry; Intact 4/29/2019 12:00 PM   Dressing Changed Changed/New 4/29/2019 12:00 PM   Dressing/Treatment Other (comment) 4/29/2019 12:00 PM   Wound Cleansed Rinsed/Irrigated with saline 4/28/2019  1:04 PM   Dressing Change Due 04/29/19 4/28/2019  1:04 PM   Wound Length (cm) 2.6 cm 4/26/2019  9:00 AM   Wound Width (cm) 1.6 cm 4/26/2019  9:00 AM   Wound Depth (cm) 0.1 cm 4/26/2019  9:00 AM   Wound Surface Area (cm^2) 4.16 cm^2 4/26/2019  9:00 AM   Change in Wound Size % (l*w) -15.56 4/26/2019  9:00 AM   Wound Volume (cm^3) 0.42 cm^3 4/26/2019  9:00 AM   Wound Healing % 74 4/26/2019  9:00 AM   Wound Assessment Red 4/26/2019  9:00 AM   Drainage Amount Small 4/29/2019 12:00 PM   Drainage Description Sanguinous 4/29/2019 12:00 PM   Odor None 4/26/2019  9:00 AM   Latonya-wound Assessment Pink 4/20/2019  9:13 PM   Red%Wound Bed 100 4/26/2019  9:00 AM   Number of days: 8       Wound 04/20/19 Ankle Right;Medial (Active)   Wound Image   4/26/2019  9:00 AM   Wound Pressure Stage  3 4/26/2019  9:00 AM   Dressing Status Clean;Dry; Intact; Changed 4/29/2019 12:00 PM   Dressing Changed Changed/New 4/29/2019 12:00 PM   Dressing/Treatment Other (comment) 4/29/2019 12:00 PM   Wound Cleansed Rinsed/Irrigated with saline 4/28/2019  1:04 PM   Dressing Change Due 04/29/19 4/28/2019  1:04 PM   Wound Length (cm) 5 cm 4/26/2019  9:00 AM   Wound Width (cm) 2.6 cm 4/26/2019  9:00 AM   Wound Depth (cm) 0.2 cm 4/26/2019  9:00 AM   Wound Surface Area (cm^2) 13 cm^2 4/26/2019  9:00 AM   Change in Wound Size % (l*w) 9.72 4/26/2019  9:00 AM   Wound Volume (cm^3) 2.6 cm^3 4/26/2019  9:00 AM   Wound Healing % 13 4/26/2019  9:00 AM   Wound Assessment SENIA 4/26/2019  7:45 AM   Drainage Amount Small 4/29/2019 12:00 PM   Drainage Description Sanguinous 4/29/2019 12:00 PM   Odor None 4/28/2019  1:04 PM   Latonya-wound Assessment Dry 4/26/2019  9:00 AM   Red%Wound Bed 100 4/26/2019  9:00 AM   Number of days: 8       Wound 04/20/19 Foot Right;Dorsal (Active)   Wound Image   4/26/2019  9:00 AM   Wound Diabetic 4/20/2019  9:13 PM   Dressing Status Clean;Dry; Intact; Changed 4/29/2019 12:00 PM   Dressing Changed Changed/New 4/29/2019 12:00 PM   Dressing/Treatment Other (comment) 4/29/2019 12:00 PM   Wound Cleansed Rinsed/Irrigated with saline 4/28/2019  1:04 PM   Dressing Change Due 04/29/19 4/28/2019  1:04 PM   Wound Length (cm) 2 cm 4/26/2019  9:00 AM   Wound Width (cm) 4 cm 4/26/2019  9:00 AM   Wound Depth (cm) 0.1 cm 4/26/2019  9:00 AM   Wound Surface Area (cm^2) 8 cm^2 4/26/2019  9:00 AM   Change in Wound Size % (l*w) -56.86 4/26/2019  9:00 AM   Wound Volume (cm^3) 0.8 cm^3 4/26/2019  9:00 AM   Wound Healing % -60 4/26/2019  9:00 AM   Wound Assessment Red 4/26/2019  9:00 AM   Drainage Amount Small 4/29/2019 12:00 PM   Drainage Description Sanguinous 4/29/2019 12:00 PM   Odor None 4/28/2019  1:04 PM   Latonya-wound Assessment Dry 4/26/2019  9:00 AM   Red%Wound Bed 95 4/26/2019  9:00 AM   Yellow%Wound Bed 5 4/26/2019  9:00 AM   Number of days: 9       Wound 04/20/19  Right first web space (Active)   Wound Diabetic 4/20/2019  9:13 PM   Dressing Status Clean;Dry; Intact; Changed 4/29/2019 12:00 PM   Dressing Changed Changed/New 4/29/2019 12:00 PM   Dressing/Treatment Other (comment) 4/29/2019 12:00 PM   Wound Cleansed Rinsed/Irrigated with saline 4/28/2019  1:04 PM   Dressing Change Due 04/29/19 4/28/2019  1:04 PM   Wound Length (cm) 0.6 cm 9:00 AM   Change in Wound Size % (l*w) 75 4/26/2019  9:00 AM   Wound Volume (cm^3) 0.02 cm^3 4/23/2019  6:00 PM   Wound Assessment SENIA 4/26/2019  7:45 AM   Drainage Amount Small 4/29/2019 12:00 PM   Drainage Description Sanguinous 4/29/2019 12:00 PM   Odor None 4/28/2019  1:04 PM   Latonya-wound Assessment Pink 4/20/2019  9:13 PM   Black%Wound Bed 100 4/26/2019  9:00 AM   Number of days: 8       Wound 04/20/19 Foot Right;Lateral (Active)   Wound Image   4/26/2019  9:00 AM   Wound Diabetic 4/20/2019  9:13 PM   Dressing Status Clean;Dry; Intact 4/29/2019 12:00 PM   Dressing Changed Changed/New 4/29/2019 12:00 PM   Dressing/Treatment Other (comment) 4/29/2019 12:00 PM   Wound Cleansed Rinsed/Irrigated with saline 4/26/2019  9:00 AM   Dressing Change Due 04/26/19 4/26/2019  7:45 AM   Wound Length (cm) 2 cm 4/26/2019  9:00 AM   Wound Width (cm) 1.8 cm 4/26/2019  9:00 AM   Wound Depth (cm) 0.2 cm 4/23/2019  6:00 PM   Wound Surface Area (cm^2) 3.6 cm^2 4/26/2019  9:00 AM   Change in Wound Size % (l*w) 73.68 4/26/2019  9:00 AM   Wound Volume (cm^3) 2.1 cm^3 4/23/2019  6:00 PM   Wound Assessment Black 4/26/2019  9:00 AM   Drainage Amount Small 4/29/2019 12:00 PM   Drainage Description Sanguinous 4/29/2019 12:00 PM   Odor Strong 4/22/2019  8:35 AM   Latonya-wound Assessment Dry 4/26/2019  9:00 AM   Black%Wound Bed 100 4/26/2019  9:00 AM   Number of days: 8       Wound 04/20/19 Heel Right (Active)   Wound Image   4/26/2019  9:00 AM   Wound Pressure Stage  3 4/26/2019  9:00 AM   Dressing Status Clean;Dry; Intact 4/29/2019 12:00 PM   Dressing Changed Changed/New 4/29/2019 12:00 PM   Dressing/Treatment Dry Dressing;Xeroform 4/29/2019 12:00 PM   Wound Cleansed Rinsed/Irrigated with saline 4/26/2019  9:00 AM   Dressing Change Due 04/26/19 4/26/2019  7:45 AM   Wound Length (cm) 9 cm 4/26/2019  9:00 AM   Wound Width (cm) 5.6 cm 4/26/2019  9:00 AM   Wound Depth (cm) 0.8 cm 4/26/2019  9:00 AM   Wound Surface Area (cm^2) 50.4 cm^2 4/26/2019 9:00 AM   Change in Wound Size % (l*w) -64.71 2019  9:00 AM   Wound Volume (cm^3) 40.32 cm^3 2019  9:00 AM   Wound Healing % -438 2019  9:00 AM   Wound Assessment Red;Yellow 2019  9:00 AM   Drainage Amount Small 2019 12:00 PM   Drainage Description Sanguinous 2019 12:00 PM   Odor None 2019  9:00 AM   Latonya-wound Assessment Fragile 2019  9:00 AM   Red%Wound Bed 40 2019  9:00 AM   Yellow%Wound Bed 30 2019  9:00 AM   Black%Wound Bed 30 2019  9:00 AM   Number of days: 8        Elimination:  Continence:   · Bowel: {YES / AS:58832}  · Bladder: {YES / GF:83746}  Urinary Catheter: {Urinary Catheter:469172526}   Colostomy/Ileostomy/Ileal Conduit: {YES / VD:05694}       Date of Last BM: ***    Intake/Output Summary (Last 24 hours) at 2019 1554  Last data filed at 2019 1445  Gross per 24 hour   Intake 940 ml   Output 4250 ml   Net -3310 ml     I/O last 3 completed shifts:   In: 940 [P.O.:840; IV Piggyback:100]  Out: 4250 [Urine:4250]    Safety Concerns:     [address] Safety Concerns:720437125}    Impairments/Disabilities:      [address] Impairments/Disabilities:604096525}    Nutrition Therapy:  Current Nutrition Therapy:   [address] Diet List:864260970}    Routes of Feeding: {CHP DME Other Feedings:422002862}  Liquids: {Slp liquid thickness:76152}  Daily Fluid Restriction: {CHP DME Yes amt example:823611048}  Last Modified Barium Swallow with Video (Video Swallowing Test): {Done Not Done XAKY:740929528}    Treatments at the Time of Hospital Discharge:   Respiratory Treatments: ***  Oxygen Therapy:  {Therapy; copd oxygen:95463}  Ventilator:    {MH CC Vent QJQA:026886020}    Rehab Therapies: {THERAPEUTIC INTERVENTION:9928688636}  Weight Bearing Status/Restrictions: [address] JACKSON Weight Bearin}  Other Medical Equipment (for information only, NOT a DME order):  {EQUIPMENT:173432016}  Other Treatments: ***    Patient's personal belongings (please select all that are sent with patient):  {CHP DME Belongings:572653719}    RN SIGNATURE:  {Esignature:633995424}    CASE MANAGEMENT/SOCIAL WORK SECTION    Inpatient Status Date: ***    Readmission Risk Assessment Score:  Readmission Risk              Risk of Unplanned Readmission:        13           Discharging to Facility/ Agency   · Name:   · Address:  · Phone:  · Fax:    Dialysis Facility (if applicable)   · Name:  · Address:  · Dialysis Schedule:  · Phone:  · Fax:    / signature: {Esignature:151001086}    PHYSICIAN SECTION    Prognosis: {Prognosis:9554391287}    Condition at Discharge: [facility] Patient Condition:528625461}    Rehab Potential (if transferring to Rehab): {Prognosis:2838901556}    Recommended Labs or Other Treatments After Discharge: ***    Physician Certification: I certify the above information and transfer of Pan Vital  is necessary for the continuing treatment of the diagnosis listed and that he requires {Admit to Appropriate Level of Care:89679} for {GREATER/LESS:360715479} 30 days.      Update Admission H&P: {CHP DME Changes in YJPRS:752687099}    PHYSICIAN SIGNATURE:  Electronically signed by Brandy Burkett DO on 4/29/19 at 3:54 PM

## 2019-05-06 ENCOUNTER — HOSPITAL ENCOUNTER (OUTPATIENT)
Dept: WOUND CARE | Age: 79
Discharge: HOME | End: 2019-05-06
Payer: MEDICARE

## 2019-05-06 ENCOUNTER — TELEPHONE (OUTPATIENT)
Dept: VASCULAR SURGERY | Age: 79
End: 2019-05-06

## 2019-05-06 VITALS
BODY MASS INDEX: 19.04 KG/M2 | RESPIRATION RATE: 16 BRPM | HEIGHT: 70 IN | TEMPERATURE: 97.7 F | DIASTOLIC BLOOD PRESSURE: 60 MMHG | SYSTOLIC BLOOD PRESSURE: 168 MMHG | WEIGHT: 133 LBS | HEART RATE: 80 BPM

## 2019-05-06 DIAGNOSIS — L97.412 HEEL ULCER, RIGHT, WITH FAT LAYER EXPOSED (HCC): ICD-10-CM

## 2019-05-06 DIAGNOSIS — L97.912 ULCER OF RIGHT LEG, WITH FAT LAYER EXPOSED (HCC): ICD-10-CM

## 2019-05-06 PROCEDURE — 99214 OFFICE O/P EST MOD 30 MIN: CPT | Performed by: SURGERY

## 2019-05-06 PROCEDURE — 11045 DBRDMT SUBQ TISS EACH ADDL: CPT

## 2019-05-06 PROCEDURE — 99214 OFFICE O/P EST MOD 30 MIN: CPT

## 2019-05-06 PROCEDURE — 11045 DBRDMT SUBQ TISS EACH ADDL: CPT | Performed by: SURGERY

## 2019-05-06 PROCEDURE — 11042 DBRDMT SUBQ TIS 1ST 20SQCM/<: CPT | Performed by: SURGERY

## 2019-05-06 PROCEDURE — 11042 DBRDMT SUBQ TIS 1ST 20SQCM/<: CPT

## 2019-05-06 RX ORDER — CILOSTAZOL 100 MG/1
100 TABLET ORAL 2 TIMES DAILY
Qty: 60 TABLET | Refills: 11 | Status: SHIPPED | OUTPATIENT
Start: 2019-05-06 | End: 2019-06-13

## 2019-05-06 RX ORDER — LIDOCAINE HYDROCHLORIDE 20 MG/ML
JELLY TOPICAL ONCE
Status: DISCONTINUED | OUTPATIENT
Start: 2019-05-06 | End: 2019-05-07 | Stop reason: HOSPADM

## 2019-05-06 RX ORDER — ACETAMINOPHEN 325 MG/1
650 TABLET ORAL EVERY 4 HOURS PRN
COMMUNITY
End: 2019-08-08

## 2019-05-06 NOTE — TELEPHONE ENCOUNTER
----- Message from Oral Landon MD sent at 5/5/2019  5:52 PM EDT -----  Please give him appt to see me 2 months  In the meanwhile follow with Juhi Reyna at the wound care center and call me PRN if the foot is any worse

## 2019-05-06 NOTE — PROGRESS NOTES
 Heart Disease Father      Social History     Tobacco Use    Smoking status: Former Smoker    Smokeless tobacco: Never Used   Substance Use Topics    Alcohol use: No    Drug use: No     Allergies   Allergen Reactions    Latex Other (See Comments)     Pt unsure of reaction    Aspirin Other (See Comments)     \"It affects my kidneys. \"   Dianaie Curie [Penicillins] Other (See Comments)     \"I just know my body doesn't like it. \" \"I had a reaction a long time ago, I know it affects my kidneys. \"    Other Rash     \"I get a rash on just my face if I eat Cumin or Chili. \"     Current Outpatient Medications on File Prior to Encounter   Medication Sig Dispense Refill    acetaminophen (TYLENOL) 325 MG tablet Take 650 mg by mouth every 4 hours as needed for Pain or Fever      ceFAZolin (ANCEF) infusion Infuse 2,000 mg intravenously every 8 hours Compound per protocol 222 g 0    clopidogrel (PLAVIX) 75 MG tablet Take 1 tablet by mouth daily 30 tablet 3    tamsulosin (FLOMAX) 0.4 MG capsule Take 1 capsule by mouth nightly 30 capsule 3    finasteride (PROSCAR) 5 MG tablet Take 1 tablet by mouth daily 30 tablet 3    lisinopril (PRINIVIL;ZESTRIL) 10 MG tablet Take 1 tablet by mouth daily (Patient taking differently: Take 10 mg by mouth daily Hold for SBP<100 or pulse <60) 30 tablet 3    diphenhydrAMINE (BENADRYL) 25 MG tablet Take 1 tablet by mouth every 6 hours as needed for Itching      insulin lispro (HUMALOG) 100 UNIT/ML injection vial Inject 0-12 Units into the skin 3 times daily (with meals) 1 vial 3    insulin lispro (HUMALOG) 100 UNIT/ML injection vial Inject 0-6 Units into the skin nightly 1 vial 3    magnesium hydroxide (MILK OF MAGNESIA) 400 MG/5ML suspension Take 30 mLs by mouth daily as needed for Constipation       No current facility-administered medications on file prior to encounter.         REVIEW OF SYSTEMS   ROS : All others Negative if blank [], Positive if [x]  General Urinary   [] Fevers [] Hematuria [] Chills [] Dysuria   [] Weight Loss Vascular   Skin [] Claudication   [x] Tissue Loss [x] Rest Pain   Eyes Neurologic   [] Wears Glasses/Contacts [] Stroke/TIA   [] Vision Changes [] Focal weakness   Respiratory [] Slurred Speech    [] Shortness of breath ENT   Cardiovascular [] Difficulty swallowing   [] Chest Pain Gastrointestinal   [] Shortness of breath with exertion [] Abdominal Pain   Patient has diabetes mellitus, retinopathy, peripheral vascular disease, post angioplasty of the popliteal and tibial arteries, 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, post amputation, multiple ulcers or wounds of right foot, with fat layer exposed  [] Melena       [] Hematochezia               Objective:    BP (!) 168/60   Pulse 80   Temp 97.7 °F (36.5 °C) (Oral)   Resp 16   Ht 5' 10\" (1.778 m)   Wt 133 lb (60.3 kg)   BMI 19.08 kg/m²   Wt Readings from Last 3 Encounters:   05/06/19 133 lb (60.3 kg)   04/29/19 133 lb 2.5 oz (60.4 kg)   04/22/19 140 lb 4.8 oz (63.6 kg)       PHYSICAL EXAM  CONSTITUTIONAL:   Awake, alert, cooperative   PSYCHIATRIC :  Oriented to time, place and person      normal insight to disease process  ENT:  External ears and nose without lesions    Hearing deficits is not noted  NECK: Supple, symmetrical, trachea midline    Thyroid goiter not appreciated    Carotid bruit is not noted bilaterally  LUNGS:  No increased work of breathing                  Clear to auscultation bilaterally   CARDIOVASCULAR:  regular rate and rhythm   ABDOMEN:  soft, non-distended, non-tender    Hernias is not noted   Aorta is not palpable   Lymphatics : Cervical lymphadenopathy is not noted     Femoral lymphadenopathy is not noted  SKIN:   Skin color is normal   Texture and turgor is  normal   Induration is not noted  EXTREMITIES:   R UE Edema is not noted  L UE Edema is not noted  R LE Edema is not noted  L LE Edema is not noted  R femoral 2 L femoral 2   R dorsalis pedis 0 L dorsalis pedis 1   R posterior tibial 0 L posterior tibial 0 Patient has multiple ulcers right foot, large right heel plantar ulcer 5x5 cm, multiple sutures dorsum of the foot, please look at the wound care center pictures and notes, 2nd toe amputation site is healing      Assessment:     1. The right femoral angiogram and the procedure notes were reviewed    2. Lower extremity arterial Doppler study was reviewed personally by me    Narrative   Normal ankle arm index with Doppler evidence of bilateral femoral   popliteal arterial occlusive disease, with the adequate collateral   flow to both feet based upon the pulse volume recordings of the   metatarsals        Adequate collateral flow noted to the toes also based upon the pulse   volume recording, except it is significantly diminished over the right   second toe         Problem List Items Addressed This Visit     Ulcer of right leg, with fat layer exposed ([address]    Heel ulcer, right, with fat layer exposed ([address]      3. 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 Performed by: Kaushal Warner MD    Consent obtained:  Yes    Time out taken:  Yes    Pain Control: Anesthetic  Anesthetic: 2% Lidocaine Gel Topical     Debridement:Excisional Debridement    Using curette, #15 blade scalpel, scissors and forceps the wound(s)/ulcer(s) was/were sharply debrided down through and including the removal of subcutaneous tissue.         Devitalized Tissue Debrided:  biofilm    Pre Debridement Measurements:  Are located in the Wound/Ulcer Documentation Flow Sheet    Wound/Ulcer #: 1, 2, 3, 4, 5, 6, 7 and 8    Post Debridement Measurements:  Wound/Ulcer Descriptions are Pre Debridement except measurements:    Wound 12/30/16 Other (Comment) (Active)   Number of days: 856 Wound 12/30/16 Foot Plantar (Active)   Number of days: 856       Incision 12/31/16 Other (Comment) Left (Active)   Number of days: 855       Puncture 04/27/19 Groin Anterior; Left (Active)   Wound Assessment Light purple 4/27/2019  3:53 AM   Number of days: 9       Wound 05/06/19 Foot Right;Dorsal #1 acquired 8-1-18 (Active)   Wound Image   5/6/2019  9:28 AM   Wound Arterial 5/6/2019  9:28 AM   Wound Length (cm) 1.4 cm 5/6/2019  9:28 AM   Wound Width (cm) 2.7 cm 5/6/2019  9:28 AM   Wound Depth (cm) 0.2 cm 5/6/2019  9:28 AM   Wound Surface Area (cm^2) 3.78 cm^2 5/6/2019  9:28 AM   Wound Volume (cm^3) 0.76 cm^3 5/6/2019  9:28 AM   Post-Procedure Length (cm) 1.5 cm 5/6/2019 10:39 AM   Post-Procedure Width (cm) 2.8 cm 5/6/2019 10:39 AM   Post-Procedure Depth (cm) 0.2 cm 5/6/2019 10:39 AM   Post-Procedure Surface Area (cm^2) 4.2 cm^2 5/6/2019 10:39 AM   Post-Procedure Volume (cm^3) 0.84 cm^3 5/6/2019 10:39 AM   Wound Assessment Red;Yellow 5/6/2019  9:28 AM   Drainage Amount Moderate 5/6/2019  9:28 AM   Drainage Description Serosanguinous; Serous 5/6/2019  9:28 AM   Odor None 5/6/2019  9:28 AM   Latonya-wound Assessment Dry; Intact 5/6/2019  9:28 AM   Number of days: 0       Wound 05/06/19 Ankle Right;Medial #2 acquired 8-1-18 (Active)   Wound Image   5/6/2019  9:28 AM   Wound Arterial 5/6/2019  9:28 AM   Wound Length (cm) 4.3 cm 5/6/2019  9:28 AM   Wound Width (cm) 2.1 cm 5/6/2019  9:28 AM   Wound Depth (cm) 0.1 cm 5/6/2019  9:28 AM   Wound Surface Area (cm^2) 9.03 cm^2 5/6/2019  9:28 AM   Wound Volume (cm^3) 0.9 cm^3 5/6/2019  9:28 AM   Post-Procedure Length (cm) 4.5 cm 5/6/2019 10:39 AM   Post-Procedure Width (cm) 2.2 cm 5/6/2019 10:39 AM   Post-Procedure Depth (cm) 0.1 cm 5/6/2019 10:39 AM   Post-Procedure Surface Area (cm^2) 9.9 cm^2 5/6/2019 10:39 AM   Post-Procedure Volume (cm^3) 0.99 cm^3 5/6/2019 10:39 AM   Wound Assessment Brown;Pink;Red;Yellow 5/6/2019  9:28 AM   Drainage Amount Small 5/6/2019  9:28 AM   Drainage acquired 8-1-18 (Active)   Wound Image   5/6/2019  9:28 AM   Wound Arterial 5/6/2019  9:28 AM   Wound Length (cm) 4 cm 5/6/2019  9:28 AM   Wound Width (cm) 5.3 cm 5/6/2019  9:28 AM   Wound Depth (cm) 0.3 cm 5/6/2019  9:28 AM   Wound Surface Area (cm^2) 21.2 cm^2 5/6/2019  9:28 AM   Wound Volume (cm^3) 6.36 cm^3 5/6/2019  9:28 AM   Post-Procedure Length (cm) 4.2 cm 5/6/2019 10:39 AM   Post-Procedure Width (cm) 5.5 cm 5/6/2019 10:39 AM   Post-Procedure Depth (cm) 0.3 cm 5/6/2019 10:39 AM   Post-Procedure Surface Area (cm^2) 23.1 cm^2 5/6/2019 10:39 AM   Post-Procedure Volume (cm^3) 6.93 cm^3 5/6/2019 10:39 AM   Wound Assessment Red;Yellow;Black; Tan 5/6/2019  9:28 AM   Drainage Amount Large 5/6/2019  9:28 AM   Drainage Description Serosanguinous; Serous 5/6/2019  9:28 AM   Odor None 5/6/2019  9:28 AM   Latonya-wound Assessment Black;Calloused;Dry 5/6/2019  9:28 AM   Number of days: 0       Wound 05/06/19 Toe (Comment  which one) Right;Dorsal #8 acquired 8-1-18 3rd toe (Active)   Wound Image   5/6/2019  9:28 AM   Wound Arterial 5/6/2019  9:28 AM   Wound Length (cm) 1 cm 5/6/2019  9:28 AM   Wound Width (cm) 1.3 cm 5/6/2019  9:28 AM   Wound Depth (cm) 0.3 cm 5/6/2019  9:28 AM   Wound Surface Area (cm^2) 1.3 cm^2 5/6/2019  9:28 AM   Wound Volume (cm^3) 0.39 cm^3 5/6/2019  9:28 AM   Post-Procedure Length (cm) 1.2 cm 5/6/2019 10:39 AM   Post-Procedure Width (cm) 1.5 cm 5/6/2019 10:39 AM   Post-Procedure Depth (cm) 0.3 cm 5/6/2019 10:39 AM   Post-Procedure Surface Area (cm^2) 1.8 cm^2 5/6/2019 10:39 AM   Post-Procedure Volume (cm^3) 0.54 cm^3 5/6/2019 10:39 AM   Wound Assessment Red;Yellow 5/6/2019  9:28 AM   Drainage Amount Small 5/6/2019  9:28 AM   Drainage Description Serosanguinous 5/6/2019  9:28 AM   Odor None 5/6/2019  9:28 AM   Latonya-wound Assessment Intact 5/6/2019  9:28 AM   Number of days: 0       Percent of Wound/Ulcer Debrided: 80%    Total Surface Area Debrided:  40 sq cm     Estimated Blood Loss: Minimal    Hemostasis Achieved:  by pressure    Procedural Pain:  2  / 10     Post Procedural Pain:  3 / 10     Response to treatment:  Well tolerated by patient. A culture was not done. Plan:     I had a long and detailed discussion the patient, and his wife in the room, who is unaware of the seriousness of the situation of the compromise of blood flow to the right foot with extensive ulcerations, 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, proximal amputation 2nd toe, multiple wounds  Very clearly explained to the patient and his wife in spite of angioplasty, wound care, antibiotic therapy, if the situation worsens, this may necessitate a proximal amputation    As the patient has no cardiac history, no history of congestive heart failure, recommend a trial of Pletal also to help the microcirculation    All the questions were answered    Patient will continue with the  under the supervision his podiatrist Dr. Delores Mchugh    . Also I'll ask Mr. Emir Ramos to see the patient next week to monitor the status of the right foot to see in the future, patient might be a candidate for hyperbaric oxygen therapy    Pt is not a smoker     In my professional opinion and based off the information that is available at this time this patient has appropriate indication for HBO Therapy: No    Treatment Note please see attached Discharge Instructions    Written patient dismissal instructions given to patient and signed by patient or POA. Discharge Instructions       Visit Discharge/Physician Orders    Discharge condition: Stable    Assessment of pain at discharge:[id number]    Anesthetic used: LIDOCAINE 2%    Discharge to: [facility]    Left via:ambulance    Accompanied by: WIFE    [facility]: [facility]    Dressing Orders:WOUND LOCATION TO RIGHT FOOT, TOES, AND LEG CLEANSE WOUND WITH NORMAL SALINE APPLY XEROFORM TO ALL AREAS, PAD WELL AND SECURE. CHANGE DAILY. MOISTURIZE DAILY.     Treatment Orders:FOLLOW A NUTRITIOUS DIET HIGH IN PROTEIN AND

## 2019-05-16 ENCOUNTER — HOSPITAL ENCOUNTER (OUTPATIENT)
Dept: WOUND CARE | Age: 79
Discharge: HOME | End: 2019-05-16
Payer: MEDICARE

## 2019-05-16 VITALS
HEART RATE: 85 BPM | BODY MASS INDEX: 19.04 KG/M2 | WEIGHT: 133 LBS | SYSTOLIC BLOOD PRESSURE: 130 MMHG | HEIGHT: 70 IN | DIASTOLIC BLOOD PRESSURE: 66 MMHG | RESPIRATION RATE: 20 BRPM | TEMPERATURE: 97.6 F

## 2019-05-16 DIAGNOSIS — I73.9 PVD (PERIPHERAL VASCULAR DISEASE) (HCC): ICD-10-CM

## 2019-05-16 DIAGNOSIS — E11.621 DIABETIC ULCER OF OTHER PART OF RIGHT FOOT ASSOCIATED WITH TYPE 2 DIABETES MELLITUS, WITH FAT LAYER EXPOSED (HCC): ICD-10-CM

## 2019-05-16 DIAGNOSIS — L97.412 HEEL ULCER, RIGHT, WITH FAT LAYER EXPOSED (HCC): ICD-10-CM

## 2019-05-16 DIAGNOSIS — L97.912 ULCER OF RIGHT LEG, WITH FAT LAYER EXPOSED (HCC): Primary | ICD-10-CM

## 2019-05-16 DIAGNOSIS — L97.512 DIABETIC ULCER OF OTHER PART OF RIGHT FOOT ASSOCIATED WITH TYPE 2 DIABETES MELLITUS, WITH FAT LAYER EXPOSED (HCC): ICD-10-CM

## 2019-05-16 PROCEDURE — 11042 DBRDMT SUBQ TIS 1ST 20SQCM/<: CPT

## 2019-05-16 ASSESSMENT — PAIN SCALES - GENERAL: PAINLEVEL_OUTOF10: 0

## 2019-05-16 NOTE — PROGRESS NOTES
[facility] Followup Visit Note    Referring Physician : Vonda Viera MD  [address] RECORD NUMBER:  [id number]  AGE: 77 y.o. GENDER: male  : 1940  EPISODE DATE:  2019    Subjective:     Chief Complaint   Patient presents with    Wound Check     rt leg       HISTORY of PRESENT ILLNESS DELLA Whyte is a 77 y.o. male who presents today in regards to follow up evaluation and treatment of wound/ulcer. That patient's past medical, family and social hx were reviewed and changes were made if present. Patient at present no complaints. Currently at [facility]. Tolerating dressing changes as well as physical therapy. He denies any nausea, vomiting, fever or chills. No shortness of breath or chest pain.     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 ([address] 2019    Blood circulation, collateral     Cellulitis of foot, left 2017    Diabetes mellitus ([address]     Diabetic foot ulcer with osteomyelitis ([address] 2017    Femoral-popliteal atherosclerosis ([address] 2017    Heel ulcer, right, with fat layer exposed ([address] 2019    Hypertension     Ulcer of right leg, with fat layer exposed ([address] 2019     Past Surgical History:   Procedure Laterality Date    COLONOSCOPY      EYE SURGERY  's    lense implants bilaterally    FOOT DEBRIDEMENT Left 2017    wound debridement and delayed primary closure    OTHER SURGICAL HISTORY  2019    Dr. Gio Byrnes- [facility] ant tibial    PROSTATE BIOPSY  04/2016    SKIN BIOPSY  sept of 2018 last time    head and ears    TOE AMPUTATION Left 12/31/2016    2nd    TOE AMPUTATION Right 4/26/2019    AMPUTATION RIGHT SECOND TOE, FOOT DEBRIDEMENT performed by Gris Alvarez DPM at [facility] OR    VASCULAR SURGERY       Family History   Problem Relation Age of Onset    Heart Disease Mother     Heart Disease Father      Social History     Tobacco Use    Smoking status: Former Smoker    Smokeless tobacco: Never Used   Substance Use Topics    Alcohol use: No    Drug use: No     Allergies   Allergen Reactions    Latex Other (See Comments)     Pt unsure of reaction    Aspirin Other (See Comments)     \"It affects my kidneys. \"   Solmon Yisel [Penicillins] Other (See Comments)     \"I just know my body doesn't like it. \" \"I had a reaction a long time ago, I know it affects my kidneys. \"    Other Rash     \"I get a rash on just my face if I eat Cumin or Chili. \"    Peanut-Containing Drug Products Itching     Current Outpatient Medications on File Prior to Encounter   Medication Sig Dispense Refill    acetaminophen (TYLENOL) 325 MG tablet Take 650 mg by mouth every 4 hours as needed for Pain or Fever      cilostazol (PLETAL) 100 MG tablet Take 1 tablet by mouth 2 times daily 60 tablet 11    ceFAZolin (ANCEF) infusion Infuse 2,000 mg intravenously every 8 hours Compound per protocol 222 g 0    clopidogrel (PLAVIX) 75 MG tablet Take 1 tablet by mouth daily 30 tablet 3    tamsulosin (FLOMAX) 0.4 MG capsule Take 1 capsule by mouth nightly 30 capsule 3    finasteride (PROSCAR) 5 MG tablet Take 1 tablet by mouth daily 30 tablet 3    lisinopril (PRINIVIL;ZESTRIL) 10 MG tablet Take 1 tablet by mouth daily (Patient taking differently: Take 10 mg by mouth daily Hold for SBP<100 or pulse <60) 30 tablet 3    diphenhydrAMINE (BENADRYL) 25 MG tablet Take 1 tablet by mouth every 6 hours as needed for Itching      insulin lispro (HUMALOG) 100 UNIT/ML injection vial Inject 0-12 Units into the skin 3 times daily (with meals) 1 vial 3    insulin lispro (HUMALOG) 100 UNIT/ML injection vial Inject 0-6 Units into the skin nightly 1 vial 3    magnesium hydroxide (MILK OF MAGNESIA) 400 MG/5ML suspension Take 30 mLs by mouth daily as needed for Constipation       No current facility-administered medications on file prior to encounter. REVIEW OF SYSTEMS See HPI    Objective:    /66   Pulse 85   Temp 97.6 °F (36.4 °C) (Oral)   Resp 20   Ht 5' 10\" (1.778 m)   Wt 133 lb (60.3 kg)   BMI 19.08 kg/m²   Wt Readings from Last 3 Encounters:   05/16/19 133 lb (60.3 kg)   05/06/19 133 lb (60.3 kg)   04/29/19 133 lb 2.5 oz (60.4 kg)     PHYSICAL EXAM  CONSTITUTIONAL:   Awake, alert, cooperative   EYES:  lids and lashes normal   ENT: external ears and nose without lesions   NECK:  supple, symmetrical, trachea midline   SKIN:  Open wounds right lower extremity including foot, heel and leg. Areas with approximately 80% devitalized nonviable tissue. There is no purulence or odor. No surrounding erythema, fluctuance, crepitus or increase in temperature. Through subcutaneous tissue. 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     Performed by: Palak Herron DPM    Consent obtained:  Yes    Time out taken:  Yes    Pain Control: Anesthetic  Anesthetic: 2% Lidocaine Gel Topical     Debridement:Excisional Debridement    Using curette and # 10 blade scalpel the wound(s)/ulcer(s) was/were sharply debrided down through and including the removal of subcutaneous tissue. Devitalized Tissue Debrided:  fibrin, biofilm, slough and necrotic/eschar to stimulate bleeding to promote healing, post debridement good bleeding base and wound edges noted    Pre Debridement Measurements:  Are located in the Wound/Ulcer Documentation Flow Sheet    Wound/Ulcer #: 1, 2, 3, 5 and 6    Post Debridement Measurements:  Wound/Ulcer Descriptions are Pre Debridement except measurements:    Wound 05/06/19 Foot Right;Dorsal #1 acquired 8-1-18 (Active)   Wound Image   5/16/2019  1:33 PM   Wound Arterial 5/6/2019  9:28 AM   Dressing Status Clean;Dry; Intact 5/6/2019 11:11 AM   Dressing Changed Changed/New 5/6/2019 11:11 AM   Dressing/Treatment Xeroform;Dry Dressing 5/6/2019 11:11 AM   Wound Cleansed Rinsed/Irrigated with saline 5/6/2019 11:11 AM   Wound Length (cm) 0.8 cm 5/16/2019  1:33 PM   Wound Width (cm) 1.7 cm 5/16/2019  1:33 PM   Wound Depth (cm) 0.1 cm 5/16/2019  1:33 PM   Wound Surface Area (cm^2) 1.36 cm^2 5/16/2019  1:33 PM   Change in Wound Size % (l*w) 64.02 5/16/2019  1:33 PM   Wound Volume (cm^3) 0.14 cm^3 5/16/2019  1:33 PM   Wound Healing % 82 5/16/2019  1:33 PM   Post-Procedure Length (cm) 0.8 cm 5/16/2019  2:19 PM   Post-Procedure Width (cm) 1.7 cm 5/16/2019  2:19 PM   Post-Procedure Depth (cm) 0.2 cm 5/16/2019  2:19 PM   Post-Procedure Surface Area (cm^2) 1.36 cm^2 5/16/2019  2:19 PM   Post-Procedure Volume (cm^3) 0.27 cm^3 5/16/2019  2:19 PM   Wound Assessment Red 5/16/2019  1:33 PM   Drainage Amount Moderate 5/16/2019  1:33 PM   Drainage Description Serosanguinous 5/16/2019  1:33 PM   Odor None 5/16/2019  1:33 PM   Latonya-wound Assessment Dry; Intact 5/16/2019  1:33 PM   Number of days: 10       Wound 05/06/19 Ankle Right;Medial #2 acquired 8-1-18 (Active)   Wound Image   5/16/2019  1:33 PM   Wound Arterial 5/6/2019  9:28 AM   Dressing Status Clean;Dry; Intact 5/6/2019 11:11 AM   Dressing Changed Changed/New 5/6/2019 11:11 AM Dressing/Treatment Xeroform;Dry Dressing 5/6/2019 11:11 AM   Wound Cleansed Rinsed/Irrigated with saline 5/6/2019 11:11 AM   Wound Length (cm) 2.7 cm 5/16/2019  1:33 PM   Wound Width (cm) 1.1 cm 5/16/2019  1:33 PM   Wound Depth (cm) 0.1 cm 5/16/2019  1:33 PM   Wound Surface Area (cm^2) 2.97 cm^2 5/16/2019  1:33 PM   Change in Wound Size % (l*w) 67.11 5/16/2019  1:33 PM   Wound Volume (cm^3) 0.3 cm^3 5/16/2019  1:33 PM   Wound Healing % 67 5/16/2019  1:33 PM   Post-Procedure Length (cm) 2.7 cm 5/16/2019  2:19 PM   Post-Procedure Width (cm) 1.1 cm 5/16/2019  2:19 PM   Post-Procedure Depth (cm) 0.2 cm 5/16/2019  2:19 PM   Post-Procedure Surface Area (cm^2) 2.97 cm^2 5/16/2019  2:19 PM   Post-Procedure Volume (cm^3) 0.59 cm^3 5/16/2019  2:19 PM   Wound Assessment Red 5/16/2019  1:33 PM   Drainage Amount Small 5/16/2019  1:33 PM   Drainage Description Serosanguinous 5/16/2019  1:33 PM   Odor None 5/16/2019  1:33 PM   Latonya-wound Assessment Clean;Dry 5/16/2019  1:33 PM   Number of days: 10       Wound 05/06/19 Foot Right;Medial #3 acquired 8-1-18 (Active)   Wound Image   5/16/2019  1:33 PM   Wound Arterial 5/6/2019  9:28 AM   Dressing Status Clean;Dry; Intact 5/6/2019 11:11 AM   Dressing Changed Changed/New 5/6/2019 11:11 AM   Dressing/Treatment Xeroform;Dry Dressing 5/6/2019 11:11 AM   Wound Cleansed Rinsed/Irrigated with saline 5/6/2019 11:11 AM   Wound Length (cm) 1.2 cm 5/16/2019  1:33 PM   Wound Width (cm) 1 cm 5/16/2019  1:33 PM   Wound Depth (cm) 0.1 cm 5/16/2019  1:33 PM   Wound Surface Area (cm^2) 1.2 cm^2 5/16/2019  1:33 PM   Change in Wound Size % (l*w) -185.71 5/16/2019  1:33 PM   Wound Volume (cm^3) 0.12 cm^3 5/16/2019  1:33 PM   Wound Healing % -200 5/16/2019  1:33 PM   Post-Procedure Length (cm) 1.2 cm 5/16/2019  2:19 PM   Post-Procedure Width (cm) 1 cm 5/16/2019  2:19 PM   Post-Procedure Depth (cm) 0.1 cm 5/16/2019  2:19 PM   Post-Procedure Surface Area (cm^2) 1.2 cm^2 5/16/2019  2:19 PM   Post-Procedure Volume (cm^3) 0.12 cm^3 5/16/2019  2:19 PM   Wound Assessment Black 5/16/2019  1:33 PM   Drainage Amount None 5/16/2019  1:33 PM   Drainage Description Serosanguinous 5/6/2019  9:28 AM   Odor None 5/16/2019  1:33 PM   Latonya-wound Assessment Dry 5/16/2019  1:33 PM   Number of days: 10       Wound 05/06/19 Other (Comment) Right #4 acquired 4-25-19 2nd toe amp site (Active)   Wound Image   5/16/2019  1:33 PM   Wound Arterial 5/6/2019  9:28 AM   Dressing Status Clean;Dry; Intact 5/6/2019 11:11 AM   Dressing Changed Changed/New 5/6/2019 11:11 AM   Dressing/Treatment Xeroform;Dry Dressing 5/6/2019 11:11 AM   Wound Cleansed Rinsed/Irrigated with saline 5/6/2019 11:11 AM   Wound Length (cm) 1.2 cm 5/16/2019  1:33 PM   Wound Width (cm) 0.3 cm 5/16/2019  1:33 PM   Wound Depth (cm) 1.4 cm 5/16/2019  1:33 PM   Wound Surface Area (cm^2) 0.36 cm^2 5/16/2019  1:33 PM   Change in Wound Size % (l*w) 85 5/16/2019  1:33 PM   Wound Volume (cm^3) 0.5 cm^3 5/16/2019  1:33 PM   Wound Healing % 88 5/16/2019  1:33 PM   Post-Procedure Length (cm) 1.2 cm 5/16/2019  2:19 PM   Post-Procedure Width (cm) 0.3 cm 5/16/2019  2:19 PM   Post-Procedure Depth (cm) 1.4 cm 5/16/2019  2:19 PM   Post-Procedure Surface Area (cm^2) 0.36 cm^2 5/16/2019  2:19 PM   Post-Procedure Volume (cm^3) 0.5 cm^3 5/16/2019  2:19 PM   Distance Tunneling (cm) 2.3 cm 5/6/2019  9:28 AM   Tunneling Position ___ O'Clock 12 5/6/2019  9:28 AM   Wound Assessment Red;Yellow 5/16/2019  1:33 PM   Drainage Amount Small 5/16/2019  1:33 PM   Drainage Description Serous; Serosanguinous 5/16/2019  1:33 PM   Odor None 5/16/2019  1:33 PM   Latonya-wound Assessment Dry; Intact 5/6/2019  9:28 AM   Number of days: 10       Wound 05/06/19 Foot Plantar;Right #5 acquired 8-1-18 1st met (Active)   Wound Image   5/16/2019  1:33 PM   Wound Arterial 5/6/2019  9:28 AM   Dressing Status Clean;Dry; Intact 5/6/2019 11:11 AM   Dressing Changed Changed/New 5/6/2019 11:11 AM   Dressing/Treatment Xeroform;Dry Dressing 5/6/2019 11:11 AM   Wound Cleansed Rinsed/Irrigated with saline 5/6/2019 11:11 AM   Wound Length (cm) 1.7 cm 5/16/2019  1:33 PM   Wound Width (cm) 2.8 cm 5/16/2019  1:33 PM   Wound Depth (cm) 0.2 cm 5/16/2019  1:33 PM   Wound Surface Area (cm^2) 4.76 cm^2 5/16/2019  1:33 PM   Change in Wound Size % (l*w) -30.77 5/16/2019  1:33 PM   Wound Volume (cm^3) 0.95 cm^3 5/16/2019  1:33 PM   Wound Healing % -164 5/16/2019  1:33 PM   Post-Procedure Length (cm) 1.7 cm 5/16/2019  2:19 PM   Post-Procedure Width (cm) 2.8 cm 5/16/2019  2:19 PM   Post-Procedure Depth (cm) 0.2 cm 5/16/2019  2:19 PM   Post-Procedure Surface Area (cm^2) 4.76 cm^2 5/16/2019  2:19 PM   Post-Procedure Volume (cm^3) 0.95 cm^3 5/16/2019  2:19 PM   Wound Assessment Black; Red 5/16/2019  1:33 PM   Drainage Amount Moderate 5/16/2019  1:33 PM   Drainage Description Serosanguinous 5/16/2019  1:33 PM   Odor None 5/16/2019  1:33 PM   Latonya-wound Assessment Fragile 5/16/2019  1:33 PM   Number of days: 10       Wound 05/06/19 Foot Right;Lateral #6 acquired 8-1-18 lateral 5th met. (Active)   Wound Image   5/16/2019  1:33 PM   Wound Arterial 5/6/2019  9:28 AM   Dressing Status Clean;Dry; Intact 5/6/2019 11:11 AM   Dressing Changed Changed/New 5/6/2019 11:11 AM   Dressing/Treatment Xeroform;Dry Dressing 5/6/2019 11:11 AM   Wound Cleansed Rinsed/Irrigated with saline 5/6/2019 11:11 AM   Wound Length (cm) 3 cm 5/16/2019  1:33 PM   Wound Width (cm) 3 cm 5/16/2019  1:33 PM   Wound Depth (cm) 0.1 cm 5/16/2019  1:33 PM   Wound Surface Area (cm^2) 9 cm^2 5/16/2019  1:33 PM   Change in Wound Size % (l*w) -108.33 5/16/2019  1:33 PM   Wound Volume (cm^3) 0.9 cm^3 5/16/2019  1:33 PM   Wound Healing % 31 5/16/2019  1:33 PM   Post-Procedure Length (cm) 3 cm 5/16/2019  2:19 PM   Post-Procedure Width (cm) 3 cm 5/16/2019  2:19 PM   Post-Procedure Depth (cm) 0.2 cm 5/16/2019  2:19 PM   Post-Procedure Surface Area (cm^2) 9 cm^2 5/16/2019  2:19 PM   Post-Procedure Volume (cm^3) 1.8 cm^3 5/16/2019  2:19 PM   Wound Assessment Black; Red 5/16/2019  1:33 PM   Drainage Amount Moderate 5/16/2019  1:33 PM   Drainage Description Serosanguinous; Serous 5/16/2019  1:33 PM   Odor None 5/16/2019  1:33 PM   Latonya-wound Assessment Pink 5/16/2019  1:33 PM   Number of days: 10       Wound 05/06/19 Heel Right;Plantar #7 acquired 8-1-18 (Active)   Wound Image   5/16/2019  1:33 PM   Wound Arterial 5/6/2019  9:28 AM   Dressing Status Clean;Dry; Intact 5/6/2019 11:11 AM   Dressing Changed Changed/New 5/6/2019 11:11 AM   Dressing/Treatment Xeroform;ABD;Dry Dressing 5/6/2019 11:11 AM   Wound Cleansed Rinsed/Irrigated with saline 5/6/2019 11:11 AM   Wound Length (cm) 4 cm 5/16/2019  1:33 PM   Wound Width (cm) 5 cm 5/16/2019  1:33 PM   Wound Depth (cm) 0.1 cm 5/16/2019  1:33 PM   Wound Surface Area (cm^2) 20 cm^2 5/16/2019  1:33 PM   Change in Wound Size % (l*w) 5.66 5/16/2019  1:33 PM   Wound Volume (cm^3) 2 cm^3 5/16/2019  1:33 PM   Wound Healing % 69 5/16/2019  1:33 PM   Post-Procedure Length (cm) 4 cm 5/16/2019  2:19 PM   Post-Procedure Width (cm) 5 cm 5/16/2019  2:19 PM   Post-Procedure Depth (cm) 0.2 cm 5/16/2019  2:19 PM   Post-Procedure Surface Area (cm^2) 20 cm^2 5/16/2019  2:19 PM   Post-Procedure Volume (cm^3) 4 cm^3 5/16/2019  2:19 PM   Wound Assessment Red;Yellow 5/16/2019  1:33 PM   Drainage Amount Large 5/16/2019  1:33 PM   Drainage Description Serosanguinous; Serous 5/16/2019  1:33 PM   Odor None 5/16/2019  1:33 PM   Latonya-wound Assessment Calloused 5/16/2019  1:33 PM   Number of days: 10       Wound 05/06/19 Toe (Comment  which one) Right;Dorsal #8 acquired 8-1-18 3rd toe (Active)   Wound Image   5/16/2019  1:33 PM   Wound Arterial 5/6/2019  9:28 AM   Dressing Status Clean;Dry; Intact 5/6/2019 11:11 AM   Dressing Changed Changed/New 5/6/2019 11:11 AM   Dressing/Treatment Xeroform;Dry Dressing 5/6/2019 11:11 AM   Wound Cleansed Rinsed/Irrigated with saline 5/6/2019 11:11 AM   Wound Length (cm) 0.7 cm 5/16/2019  1:33 PM   Wound Width (cm) 1.1 cm 5/16/2019  1:33 PM   Wound Depth (cm) 0.1 cm 5/16/2019  1:33 PM   Wound Surface Area (cm^2) 0.77 cm^2 5/16/2019  1:33 PM   Change in Wound Size % (l*w) 40.77 5/16/2019  1:33 PM   Wound Volume (cm^3) 0.08 cm^3 5/16/2019  1:33 PM   Wound Healing % 79 5/16/2019  1:33 PM   Post-Procedure Length (cm) 0.7 cm 5/16/2019  2:19 PM   Post-Procedure Width (cm) 1.1 cm 5/16/2019  2:19 PM   Post-Procedure Depth (cm) 0.2 cm 5/16/2019  2:19 PM   Post-Procedure Surface Area (cm^2) 0.77 cm^2 5/16/2019  2:19 PM   Post-Procedure Volume (cm^3) 0.15 cm^3 5/16/2019  2:19 PM   Wound Assessment Red;Yellow 5/16/2019  1:33 PM   Drainage Amount Small 5/16/2019  1:33 PM   Drainage Description Serosanguinous 5/16/2019  1:33 PM   Odor None 5/16/2019  1:33 PM   Latonya-wound Assessment Intact 5/16/2019  1:33 PM   Number of days: 10       Wound 05/16/19 Heel Left # 9 aquired 4-16-19 (Active)   Wound Image   5/16/2019  1:33 PM   Wound Pressure Stage  2 5/16/2019  1:33 PM   Post-Procedure Length (cm) 1.5 cm 5/16/2019  1:33 PM   Post-Procedure Width (cm) 1.5 cm 5/16/2019  1:33 PM   Post-Procedure Depth (cm) 0.2 cm 5/16/2019  1:33 PM   Post-Procedure Surface Area (cm^2) 2.25 cm^2 5/16/2019  1:33 PM   Post-Procedure Volume (cm^3) 0.45 cm^3 5/16/2019  1:33 PM   Distance Tunneling (cm) 1.5 cm 5/16/2019  2:19 PM   Tunneling Position ___ O'Clock 1.5 5/16/2019  2:19 PM   Undermining Starts ___ O'Clock 0.3 5/16/2019  2:19 PM   Wound Assessment Pink;Red 5/16/2019  1:33 PM   Drainage Amount Moderate 5/16/2019  1:33 PM   Drainage Description Serosanguinous 5/16/2019  1:33 PM   Odor None 5/16/2019  1:33 PM   Latonya-wound Assessment Calloused 5/16/2019  1:33 PM   Number of days: 0     Percent of Wound/Ulcer Debrided: 100%    Total Surface Area Debrided:  19 sq cm     Estimated Blood Loss:  Minimal  Hemostasis Achieved:  by pressure    Procedural Pain:  0  / 10   Post Procedural Pain:  0 / 10     Response to treatment:  Well tolerated by patient. Plan:   Treatment Note please see attached Discharge Instructions. Patient advised if any questions or concerns contact wound care center or myself. Written patient dismissal instructions given to patient and signed by patient or POA. Discharge Instructions         Visit Discharge/Physician Orders     Discharge condition: Stable     Assessment of pain at discharge:N0NE     Anesthetic used: LIDOCAINE 2%     Discharge to: ECF     Left via:ambulance     Accompanied by: WIFE     [facility]     Dressing Orders:WOUND LOCATION TO RIGHT FOOT, TOES, AND LEG CLEANSE WOUND WITH NORMAL SALINE APPLY XEROFORM TO ALL AREAS, PAD WELL AND SECURE. CHANGE DAILY.     MOISTURIZE DAILY.     Treatment Orders:FOLLOW A NUTRITIOUS DIET HIGH IN PROTEIN AND VITAMIN C TO PROMOTE WOUND HEALING. TAKE A MULTIVITAMIN DAILY.     KEEP PRESSURE OFF ALL WOUNDS AT ALL TIMES      PLETAL AS ORDERED      SEND TO DR Patrick Simon AS NEEDED        [facility] followup visit _______1 WEEK DR [facility]ILL[facility]______________________  (Please note your next appointment above and if you are unable to keep, kindly give a 24 hour notice.  Thank you.)     Physician signature:__________________________        If you experience any of the following, please call the [facility] during business hours:     * Increase in Pain  * Temperature over 101  * Increase in drainage from your wound  * Drainage with a foul odor  * Bleeding  * Increase in swelling  * Need for compression bandage changes due to slippage, breakthrough drainage.     If you need medical attention outside of the business hours of the [facility] please contact your PCP or go to the nearest emergency room.                   Electronically signed by Marce Dietz DPM on 5/16/2019 at 4:45 PM

## 2019-05-23 ENCOUNTER — HOSPITAL ENCOUNTER (OUTPATIENT)
Dept: WOUND CARE | Age: 79
Discharge: HOME | End: 2019-05-23
Payer: MEDICARE

## 2019-05-23 VITALS
SYSTOLIC BLOOD PRESSURE: 160 MMHG | HEART RATE: 88 BPM | TEMPERATURE: 98.9 F | BODY MASS INDEX: 19.76 KG/M2 | RESPIRATION RATE: 18 BRPM | WEIGHT: 138 LBS | HEIGHT: 70 IN | DIASTOLIC BLOOD PRESSURE: 60 MMHG

## 2019-05-23 DIAGNOSIS — L97.412 HEEL ULCER, RIGHT, WITH FAT LAYER EXPOSED (HCC): ICD-10-CM

## 2019-05-23 DIAGNOSIS — I73.9 PVD (PERIPHERAL VASCULAR DISEASE) (HCC): ICD-10-CM

## 2019-05-23 DIAGNOSIS — L97.912 ULCER OF RIGHT LEG, WITH FAT LAYER EXPOSED (HCC): Primary | ICD-10-CM

## 2019-05-23 DIAGNOSIS — L97.511 DIABETIC ULCER OF OTHER PART OF RIGHT FOOT ASSOCIATED WITH TYPE 2 DIABETES MELLITUS, LIMITED TO BREAKDOWN OF SKIN (HCC): ICD-10-CM

## 2019-05-23 DIAGNOSIS — E11.621 DIABETIC ULCER OF OTHER PART OF RIGHT FOOT ASSOCIATED WITH TYPE 2 DIABETES MELLITUS, LIMITED TO BREAKDOWN OF SKIN (HCC): ICD-10-CM

## 2019-05-23 PROCEDURE — 11042 DBRDMT SUBQ TIS 1ST 20SQCM/<: CPT

## 2019-05-23 RX ORDER — LIDOCAINE HYDROCHLORIDE 20 MG/ML
JELLY TOPICAL ONCE
Status: DISCONTINUED | OUTPATIENT
Start: 2019-05-23 | End: 2019-05-24 | Stop reason: HOSPADM

## 2019-05-23 ASSESSMENT — PAIN DESCRIPTION - PROGRESSION: CLINICAL_PROGRESSION: NOT CHANGED

## 2019-05-23 ASSESSMENT — PAIN SCALES - GENERAL: PAINLEVEL_OUTOF10: 0

## 2019-05-23 NOTE — PROGRESS NOTES
[facility] Followup Visit Note    Referring Physician : Alysa Raphael MD  [address] RECORD NUMBER:  [id number]  AGE: 77 y.o. GENDER: male  : 1940  EPISODE DATE:  2019    Subjective:     Chief Complaint   Patient presents with    Wound Check     rt foot       HISTORY of PRESENT ILLNESS HPI   Olivia Webster is a 77 y.o. male who presents today with his wife in regards to follow up evaluation and treatment of wound/ulcer. That patient's past medical, family and social hx were reviewed and changes were made if present. Patient overall doing well. Tolerating dressing changes without pain. Tolerating physical therapy. No nausea, vomiting, fever or chills. No shortness of breath or chest pain.     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 ([address] 2019    Blood circulation, collateral     Cellulitis of foot, left 2017    Diabetes mellitus ([address]     Diabetic foot ulcer with osteomyelitis ([address] 2017    Femoral-popliteal atherosclerosis ([address] 2017    Heel ulcer, right, with fat layer exposed ([address] 2019    Hypertension     Ulcer of right leg, with fat layer exposed ([address] 2019     Past Surgical History:   Procedure Laterality Date    COLONOSCOPY      EYE SURGERY      lense implants bilaterally    FOOT DEBRIDEMENT Left 2017    wound debridement and delayed primary closure    OTHER SURGICAL HISTORY  2019    Dr. Alvin Warren- [facility] ant tablet Take 1 tablet by mouth every 6 hours as needed for Itching      insulin lispro (HUMALOG) 100 UNIT/ML injection vial Inject 0-12 Units into the skin 3 times daily (with meals) 1 vial 3    insulin lispro (HUMALOG) 100 UNIT/ML injection vial Inject 0-6 Units into the skin nightly 1 vial 3     No current facility-administered medications on file prior to encounter. REVIEW OF SYSTEMS See HPI    Objective:    BP (!) 160/60   Pulse 88   Temp 98.9 °F (37.2 °C) (Oral)   Resp 18   Ht 5' 10\" (1.778 m)   Wt 138 lb (62.6 kg)   BMI 19.80 kg/m²   Wt Readings from Last 3 Encounters:   05/23/19 138 lb (62.6 kg)   05/16/19 133 lb (60.3 kg)   05/06/19 133 lb (60.3 kg)     PHYSICAL EXAM  CONSTITUTIONAL:   Awake, alert, cooperative   EYES:  lids and lashes normal   ENT: external ears and nose without lesions   NECK:  supple, symmetrical, trachea midline   SKIN:  Open wounds right lower extremity including foot, heel and leg. Areas with approximately 50% devitalized nonviable tissue. Some are actually improved quite well. There is no purulence or odor. No surrounding erythema, fluctuance, crepitus or increase in temperature. Through subcutaneous tissue. 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     Performed by: Domingo Scott DPM    Consent obtained:  Yes    Time out taken:  Yes    Pain Control: Anesthetic  Anesthetic: 2% Lidocaine Gel Topical     Debridement:Excisional Debridement    Using curette the wound(s)/ulcer(s) was/were sharply debrided down through and including the removal of subcutaneous tissue. Devitalized Tissue Debrided:  biofilm, slough and necrotic/eschar to stimulate bleeding to promote healing, post debridement good bleeding base and wound edges noted    Pre Debridement Measurements:  Are located in the Wound/Ulcer Documentation Flow Sheet    Wound/Ulcer #: 2, 5, 6 and 7    Post Debridement Measurements:  Wound/Ulcer Descriptions are Pre Debridement except measurements:    Wound 05/06/19 Foot Right;Dorsal #1 acquired 8-1-18 (Active)   Wound Image   5/16/2019  1:33 PM   Wound Arterial 5/6/2019  9:28 AM   Dressing Status Clean;Dry; Intact 5/23/2019 10:08 AM   Dressing Changed Changed/New 5/23/2019 10:08 AM   Dressing/Treatment Xeroform;Dry Dressing 5/23/2019 10:08 AM   Wound Cleansed Rinsed/Irrigated with saline 5/23/2019 10:08 AM   Wound Length (cm) 0.2 cm 5/23/2019  9:40 AM   Wound Width (cm) 0.4 cm 5/23/2019  9:40 AM   Wound Depth (cm) 0.1 cm 5/23/2019  9:40 AM   Wound Surface Area (cm^2) 0.08 cm^2 5/23/2019  9:40 AM   Change in Wound Size % (l*w) 97.88 5/23/2019  9:40 AM   Wound Volume (cm^3) 0.01 cm^3 5/23/2019  9:40 AM   Wound Healing % 99 5/23/2019  9:40 AM   Post-Procedure Length (cm) 0.2 cm 5/23/2019  9:57 AM   Post-Procedure Width (cm) 0.4 cm 5/23/2019  9:57 AM   Post-Procedure Depth (cm) 0.2 cm 5/23/2019  9:57 AM   Post-Procedure Surface Area (cm^2) 0.08 cm^2 5/23/2019  9:57 AM   Post-Procedure Volume (cm^3) 0.02 cm^3 5/23/2019  9:57 AM   Wound Assessment Red 5/23/2019  9:40 AM   Drainage Amount None 5/23/2019  9:40 AM   Drainage Description Serosanguinous 5/16/2019  1:33 PM   Odor None 5/23/2019  9:40 AM   Latonya-wound Assessment Dry; Intact 5/23/2019  9:40 AM   Number of days: 17       Wound 05/06/19 Ankle Right;Medial #2 acquired 8-1-18 (Active)   Wound Image   5/16/2019  1:33 PM   Wound Arterial 5/6/2019  9:28 AM   Dressing Status Clean;Dry; Intact 5/23/2019 10:08 AM   Dressing Changed Changed/New 5/23/2019 10:08 AM   Dressing/Treatment Xeroform;Dry Dressing 5/23/2019 10:08 AM   Wound Cleansed Rinsed/Irrigated with saline 5/23/2019 10:08 AM   Wound Length (cm) 2.2 cm 5/23/2019  9:40 AM   Wound Width (cm) 0.3 cm 5/23/2019  9:40 AM   Wound Depth (cm) 0.1 cm 5/23/2019  9:40 AM   Wound Surface Area (cm^2) 0.66 cm^2 5/23/2019  9:40 AM   Change in Wound Size % (l*w) 92.69 5/23/2019  9:40 AM   Wound Volume (cm^3) 0.07 cm^3 5/23/2019  9:40 AM   Wound Healing % 92 5/23/2019  9:40 AM   Post-Procedure Length (cm) 2.2 cm 5/23/2019  9:57 AM   Post-Procedure Width (cm) 0.3 cm 5/23/2019  9:57 AM   Post-Procedure Depth (cm) 0.1 cm 5/23/2019  9:57 AM   Post-Procedure Surface Area (cm^2) 0.66 cm^2 5/23/2019  9:57 AM   Post-Procedure Volume (cm^3) 0.07 cm^3 5/23/2019  9:57 AM   Wound Assessment Red 5/23/2019  9:40 AM   Drainage Amount Small 5/23/2019  9:40 AM   Drainage Description Serosanguinous 5/23/2019  9:40 AM   Odor None 5/23/2019  9:40 AM   Latonya-wound Assessment Clean;Dry 5/23/2019  9:40 AM   Number of days: 17       Wound 05/06/19 Other (Comment) Right #4 acquired 4-25-19 2nd toe amp site (Active)   Wound Image   5/16/2019  1:33 PM   Wound Arterial 5/6/2019  9:28 AM   Dressing Status Clean;Dry; Intact 5/23/2019 10:08 AM   Dressing Changed Changed/New 5/23/2019 10:08 AM   Dressing/Treatment Xeroform;Dry Dressing 5/23/2019 10:08 AM   Wound Cleansed Rinsed/Irrigated with saline; Wound cleanser 5/23/2019 10:08 AM   Wound Length (cm) 0.2 cm 5/23/2019  9:40 AM   Wound Width (cm) 0.3 cm 5/23/2019  9:40 AM   Wound Depth (cm) 0.1 cm 5/23/2019  9:40 AM   Wound Surface Area (cm^2) 0.06 cm^2 5/23/2019  9:40 AM   Change in Wound Size % (l*w) 97.5 5/23/2019  9:40 AM   Wound Volume (cm^3) 0.01 cm^3 5/23/2019  9:40 AM   Wound Healing % 100 5/23/2019  9:40 AM   Post-Procedure Length (cm) 0.2 cm 5/23/2019  9:57 AM   Post-Procedure Width (cm) 0.3 cm 5/23/2019  9:57 AM   Post-Procedure Depth (cm) 0.2 cm 5/23/2019  9:57 AM   Post-Procedure Surface Area (cm^2) 0.06 cm^2 5/23/2019  9:57 AM   Post-Procedure Volume (cm^3) 0.01 cm^3 5/23/2019  9:57 AM   Distance Tunneling (cm) 2.3 cm 5/6/2019  9:28 AM   Tunneling Position ___ O'Clock 12 5/6/2019  9:28 AM   Wound Assessment Red 5/23/2019  9:40 AM   Drainage Amount None 5/23/2019  9:40 AM   Drainage Description Serous; Serosanguinous 5/16/2019  1:33 PM   Odor None 5/23/2019  9:40 AM   Latonya-wound Assessment Dry; Intact 5/23/2019  9:40 AM   Number of days: 17       Wound 05/06/19 Foot Plantar;Right #5 acquired 8-1-18 1st met (Active)   Wound Image   5/16/2019  1:33 PM   Wound Arterial 5/6/2019  9:28 AM   Dressing Status Clean;Dry; Intact 5/23/2019 10:08 AM   Dressing Changed Changed/New 5/23/2019 10:08 AM   Dressing/Treatment Xeroform;Dry Dressing 5/23/2019 10:08 AM   Wound Cleansed Rinsed/Irrigated with saline 5/23/2019 10:08 AM   Wound Length (cm) 1.5 cm 5/23/2019  9:40 AM   Wound Width (cm) 2.5 cm 5/23/2019  9:40 AM   Wound Depth (cm) 0.2 cm 5/23/2019  9:40 AM   Wound Surface Area (cm^2) 3.75 cm^2 5/23/2019  9:40 AM   Change in Wound Size % (l*w) -3.02 5/23/2019  9:40 AM   Wound Volume (cm^3) 0.75 cm^3 5/23/2019  9:40 AM   Wound Healing % -108 5/23/2019  9:40 AM   Post-Procedure Length (cm) 1.5 cm 5/23/2019  9:57 AM   Post-Procedure Width (cm) 2.5 cm 5/23/2019  9:57 AM   Post-Procedure Depth (cm) 0.2 cm 5/23/2019  9:57 AM   Post-Procedure Surface Area (cm^2) 3.75 cm^2 5/23/2019  9:57 AM   Post-Procedure Volume (cm^3) 0.75 cm^3 5/23/2019  9:57 AM   Wound Assessment Black; Red 5/23/2019  9:40 AM   Drainage Amount Moderate 5/23/2019  9:40 AM   Drainage Description Serosanguinous 5/23/2019  9:40 AM   Odor None 5/23/2019  9:40 AM   Latonya-wound Assessment Fragile 5/23/2019  9:40 AM   Number of days: 17       Wound 05/06/19 Foot Right;Lateral #6 acquired 8-1-18 lateral 5th met. (Active)   Wound Image   5/16/2019  1:33 PM   Wound Arterial 5/6/2019  9:28 AM   Dressing Status Clean;Dry; Intact 5/23/2019 10:08 AM   Dressing Changed Changed/New 5/23/2019 10:08 AM   Dressing/Treatment Xeroform;Dry Dressing 5/23/2019 10:08 AM   Wound Cleansed Rinsed/Irrigated with saline 5/23/2019 10:08 AM   Wound Length (cm) 2.2 cm 5/23/2019  9:40 AM   Wound Width (cm) 1.1 cm 5/23/2019  9:40 AM   Wound Depth (cm) 0.3 cm 5/23/2019  9:40 AM   Wound Surface Area (cm^2) 2.42 cm^2 5/23/2019  9:40 AM   Change in Wound Size % (l*w) 43.98 5/23/2019  9:40 AM   Wound Volume (cm^3) 0.73 cm^3 5/23/2019  9:40 AM   Wound Healing % 44 5/23/2019  9:40 AM   Post-Procedure Length (cm) 2.2 cm 5/23/2019  9:57 AM   Post-Procedure Width (cm) 1.1 cm 5/23/2019  9:57 AM   Post-Procedure Depth (cm) 0.3 cm 5/23/2019  9:57 AM   Post-Procedure Surface Area (cm^2) 2.42 cm^2 5/23/2019  9:57 AM   Post-Procedure Volume (cm^3) 0.73 cm^3 5/23/2019  9:57 AM   Wound Assessment Black; Red 5/23/2019  9:40 AM   Drainage Amount Moderate 5/23/2019  9:40 AM   Drainage Description Serosanguinous; Serous 5/23/2019  9:40 AM   Odor None 5/23/2019  9:40 AM   Latonya-wound Assessment Pink 5/23/2019  9:40 AM   Number of days: 17       Wound 05/06/19 Heel Right;Plantar #7 acquired 8-1-18 (Active)   Wound Image   5/16/2019  1:33 PM   Wound Arterial 5/6/2019  9:28 AM   Dressing Status Clean;Dry; Intact 5/23/2019 10:08 AM   Dressing Changed Changed/New 5/23/2019 10:08 AM   Dressing/Treatment Xeroform;ABD;Dry Dressing 5/23/2019 10:08 AM   Wound Cleansed Rinsed/Irrigated with saline 5/23/2019 10:08 AM   Wound Length (cm) 3.4 cm 5/23/2019  9:40 AM   Wound Width (cm) 4.6 cm 5/23/2019  9:40 AM   Wound Depth (cm) 0.2 cm 5/23/2019  9:40 AM   Wound Surface Area (cm^2) 15.64 cm^2 5/23/2019  9:40 AM   Change in Wound Size % (l*w) 26.23 5/23/2019  9:40 AM   Wound Volume (cm^3) 3.13 cm^3 5/23/2019  9:40 AM   Wound Healing % 51 5/23/2019  9:40 AM   Post-Procedure Length (cm) 3.4 cm 5/23/2019  9:57 AM   Post-Procedure Width (cm) 4.6 cm 5/23/2019  9:57 AM   Post-Procedure Depth (cm) 0.2 cm 5/23/2019  9:57 AM   Post-Procedure Surface Area (cm^2) 15.64 cm^2 5/23/2019  9:57 AM   Post-Procedure Volume (cm^3) 3.13 cm^3 5/23/2019  9:57 AM   Wound Assessment Red;Yellow;Black 5/23/2019  9:40 AM   Drainage Amount Large 5/23/2019  9:40 AM   Drainage Description Serosanguinous; Serous 5/23/2019  9:40 AM   Odor None 5/23/2019  9:40 AM   Latonya-wound Assessment Calloused 5/23/2019  9:40 AM   Number of days: 17       Wound 05/06/19 Toe (Comment  which one) Right;Dorsal #8 acquired 8-1-18 3rd toe (Active)   Wound Image   5/16/2019  1:33 PM   Wound Arterial 5/6/2019  9:28 AM   Dressing Status Clean;Dry; Intact 5/23/2019 10:08 AM   Dressing Changed Changed/New 5/23/2019 10:08 AM   Dressing/Treatment Xeroform;Dry Dressing 5/23/2019 10:08 AM   Wound Cleansed Wound cleanser 5/23/2019 10:08 AM   Wound Length (cm) 1 cm 5/23/2019  9:40 AM   Wound Width (cm) 0.5 cm 5/23/2019  9:40 AM   Wound Depth (cm) 1.4 cm 5/23/2019  9:40 AM   Wound Surface Area (cm^2) 0.5 cm^2 5/23/2019  9:40 AM   Change in Wound Size % (l*w) 61.54 5/23/2019  9:40 AM   Wound Volume (cm^3) 0.7 cm^3 5/23/2019  9:40 AM   Wound Healing % -79 5/23/2019  9:40 AM   Post-Procedure Length (cm) 1 cm 5/23/2019  9:57 AM   Post-Procedure Width (cm) 0.5 cm 5/23/2019  9:57 AM   Post-Procedure Depth (cm) 1.4 cm 5/23/2019  9:57 AM   Post-Procedure Surface Area (cm^2) 0.5 cm^2 5/23/2019  9:57 AM   Post-Procedure Volume (cm^3) 0.7 cm^3 5/23/2019  9:57 AM   Wound Assessment Red;Yellow 5/23/2019  9:40 AM   Drainage Amount Moderate 5/23/2019  9:40 AM   Drainage Description Serosanguinous 5/23/2019  9:40 AM   Odor None 5/23/2019  9:40 AM   Latonya-wound Assessment Intact 5/23/2019  9:40 AM   Number of days: 17       Wound 05/16/19 Heel Left # 9 aquired 4-16-19 (Active)   Wound Image   5/16/2019  1:33 PM   Wound Pressure Stage  2 5/16/2019  1:33 PM   Dressing Status Old drainage 5/23/2019  9:40 AM   Dressing Changed Changed/New 5/23/2019 10:08 AM   Dressing/Treatment ABD;Xeroform 5/23/2019 10:08 AM   Wound Cleansed Rinsed/Irrigated with saline 5/23/2019 10:08 AM   Wound Length (cm) 0.8 cm 5/23/2019  9:40 AM   Wound Width (cm) 1.4 cm 5/23/2019  9:40 AM   Wound Depth (cm) 0.1 cm 5/23/2019  9:40 AM   Wound Surface Area (cm^2) 1.12 cm^2 5/23/2019  9:40 AM   Wound Volume (cm^3) 0.11 cm^3 5/23/2019  9:40 AM   Post-Procedure Length (cm) 0.8 cm 5/23/2019  9:57 AM   Post-Procedure Width (cm) 1.4 cm 5/23/2019  9:57 AM   Post-Procedure Depth (cm) 0.1 cm 5/23/2019  9:57 AM   Post-Procedure Surface Area (cm^2) 1.12 cm^2 5/23/2019  9:57 AM   Post-Procedure Volume (cm^3) 0.11 cm^3 5/23/2019  9:57 AM   Distance Tunneling (cm) 1.5 cm 5/16/2019  2:19 PM   Tunneling Position ___ O'Clock 1.5 5/16/2019  2:19 PM   Undermining Starts ___ O'Clock 0.3 5/16/2019  2:19 PM   Wound Assessment Red 5/23/2019  9:40 AM   Drainage Amount Moderate 5/23/2019  9:40 AM   Drainage Description Serosanguinous 5/23/2019  9:40 AM   Odor None 5/23/2019  9:40 AM   Latonya-wound Assessment Clean 5/23/2019  9:40 AM   Number of days: 6     Percent of Wound/Ulcer Debrided: 60%    Total Surface Area Debrided:  12 sq cm     Estimated Blood Loss:  Minimal  Hemostasis Achieved:  by pressure    Procedural Pain:  0  / 10   Post Procedural Pain:  0 / 10     Response to treatment:  Well tolerated by patient. Plan:   Treatment Note please see attached Discharge Instructions. Continue local care. Reinforced no pressure. Questions or concerns contact wound care center    Written patient dismissal instructions given to patient and signed by patient or POA. Discharge Instructions         Visit Discharge/Physician Orders     Discharge condition: Stable     Assessment of pain at discharge:N0NE     Anesthetic used: LIDOCAINE 2%     Discharge to: [facility]CF     Left via:ambulance     Accompanied by: WIFE     [facility]: [facility]     Dressing Orders:WOUND LOCATION TO RIGHT FOOT, TOES, AND LEG CLEANSE WOUND WITH NORMAL SALINE APPLY XEROFORM TO ALL AREAS, PAD WELL AND SECURE.  CHANGE DAILY.     MOISTURIZE DAILY.     Treatment Orders:FOLLOW A NUTRITIOUS DIET HIGH IN PROTEIN AND VITAMIN C TO PROMOTE WOUND HEALING. TAKE A MULTIVITAMIN DAILY.     KEEP PRESSURE OFF ALL WOUNDS AT ALL TIMES      PLETAL AS ORDERED      SEND TO DR Zaid Damico AS NEEDED        [facility] followup visit _______1 WEEK DR MILLER______________________  (Please note your next appointment above and if you are unable to keep, kindly give a 24 hour notice.  Thank you.)     Physician signature:__________________________        If you experience any of the following, please call the [facility] during business hours:     * Increase in Pain  * Temperature over 101  * Increase in drainage from your wound  * Drainage with a foul odor  * Bleeding  * Increase in swelling  * Need for compression bandage changes due to slippage, breakthrough drainage.     If you need medical attention outside of the business hours of the [facility] please contact your PCP or go to the nearest emergency room.                                Electronically signed by Lonnie Walsh DPM on 5/23/2019 at 10:33 AM

## 2019-05-30 ENCOUNTER — HOSPITAL ENCOUNTER (OUTPATIENT)
Dept: WOUND CARE | Age: 79
Discharge: HOME | End: 2019-05-30
Payer: MEDICARE

## 2019-05-30 VITALS
DIASTOLIC BLOOD PRESSURE: 72 MMHG | SYSTOLIC BLOOD PRESSURE: 140 MMHG | WEIGHT: 138 LBS | TEMPERATURE: 98.7 F | BODY MASS INDEX: 19.8 KG/M2 | RESPIRATION RATE: 18 BRPM | HEART RATE: 80 BPM

## 2019-05-30 DIAGNOSIS — L97.512 DIABETIC ULCER OF OTHER PART OF RIGHT FOOT ASSOCIATED WITH TYPE 2 DIABETES MELLITUS, WITH FAT LAYER EXPOSED (HCC): ICD-10-CM

## 2019-05-30 DIAGNOSIS — L97.412 HEEL ULCER, RIGHT, WITH FAT LAYER EXPOSED (HCC): Primary | ICD-10-CM

## 2019-05-30 DIAGNOSIS — E11.621 DIABETIC ULCER OF OTHER PART OF RIGHT FOOT ASSOCIATED WITH TYPE 2 DIABETES MELLITUS, WITH FAT LAYER EXPOSED (HCC): ICD-10-CM

## 2019-05-30 DIAGNOSIS — I73.9 PVD (PERIPHERAL VASCULAR DISEASE) (HCC): ICD-10-CM

## 2019-05-30 DIAGNOSIS — L97.912 ULCER OF RIGHT LEG, WITH FAT LAYER EXPOSED (HCC): ICD-10-CM

## 2019-05-30 PROCEDURE — 11042 DBRDMT SUBQ TIS 1ST 20SQCM/<: CPT

## 2019-05-30 RX ORDER — LIDOCAINE HYDROCHLORIDE 20 MG/ML
JELLY TOPICAL ONCE
Status: DISCONTINUED | OUTPATIENT
Start: 2019-05-30 | End: 2019-05-31 | Stop reason: HOSPADM

## 2019-05-30 NOTE — PROGRESS NOTES
[facility] Followup Visit Note    Referring Physician : Alysa Raphael MD  [address] RECORD NUMBER:  [id number]  AGE: 77 y.o. GENDER: male  : 1940  EPISODE DATE:  2019    Subjective:     Chief Complaint   Patient presents with    Wound Check     MULTIPLE WOUNDS      HISTORY of PRESENT ILLNESS HPI   Olivia Webster is a 77 y.o. male who presents today with his wife in regards to follow up evaluation and treatment of wound/ulcer. That patient's past medical, family and social hx were reviewed and changes were made if present. Patient continues to tolerate physical therapy. No nausea, vomiting, fever or chills. No shortness of breath or chest pain. He relates a callused area on his left foot, he has been maintaining weight to the left due to wounds on her right.     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 ([address] 2019    Blood circulation, collateral     Cellulitis of foot, left 2017    Diabetes mellitus ([address]     Diabetic foot ulcer with osteomyelitis ([address] 2017    Femoral-popliteal atherosclerosis ([address] 2017    Heel ulcer, right, with fat layer exposed ([address] 2019    Hypertension     Ulcer of right leg, with fat layer exposed ([address] 2019     Past Surgical History:   Procedure Laterality Date    COLONOSCOPY      EYE SURGERY      lense implants bilaterally    FOOT DEBRIDEMENT Left 2017    wound debridement and delayed primary closure    OTHER SURGICAL HISTORY  04/23/2019    Dr. Florentin Arroyo- PTA ant tibial    PROSTATE BIOPSY  04/2016    SKIN BIOPSY  sept of 2018 last time    head and ears    TOE AMPUTATION Left 12/31/2016    2nd    TOE AMPUTATION Right 4/26/2019    AMPUTATION RIGHT SECOND TOE, FOOT DEBRIDEMENT performed [facility] Justo Drew DPM [facility] OR    VASCULAR SURGERY       Family History   Problem Relation Age of Onset    Heart Disease Mother     Heart Disease Father      Social History     Tobacco Use    Smoking status: Former Smoker    Smokeless tobacco: Never Used   Substance Use Topics    Alcohol use: No    Drug use: No     Allergies   Allergen Reactions    Latex Other (See Comments)     Pt unsure of reaction    Aspirin Other (See Comments)     \"It affects my kidneys. \"   [place] [Penicillins] Other (See Comments)     \"I just know my body doesn't like it. \" \"I had a reaction a long time ago, I know it affects my kidneys. \"    Other Rash     \"I get a rash on just my face if I eat Cumin or Chili. \"    Peanut-Containing Drug Products Itching     Current Outpatient Medications on File Prior to Encounter   Medication Sig Dispense Refill    acetaminophen (TYLENOL) 325 MG tablet Take 650 mg by mouth every 4 hours as needed for Pain or Fever      cilostazol (PLETAL) 100 MG tablet Take 1 tablet by mouth 2 times daily 60 tablet 11    ceFAZolin (ANCEF) infusion Infuse 2,000 mg intravenously every 8 hours Compound per protocol 222 g 0    clopidogrel (PLAVIX) 75 MG tablet Take 1 tablet by mouth daily 30 tablet 3    tamsulosin (FLOMAX) 0.4 MG capsule Take 1 capsule by mouth nightly 30 capsule 3    finasteride (PROSCAR) 5 MG tablet Take 1 tablet by mouth daily 30 tablet 3    lisinopril (PRINIVIL;ZESTRIL) 10 MG tablet Take 1 tablet by mouth daily (Patient taking differently: Take 10 mg by mouth daily Hold for SBP<100 or pulse <60) 30 tablet 3    insulin lispro (HUMALOG) 100 UNIT/ML injection vial Inject 0-12 Units into the skin DPM    Consent obtained:  Yes    Time out taken:  Yes    Pain Control: Anesthetic  Anesthetic: 2% Lidocaine Gel Topical     Debridement:Excisional Debridement    Using curette and # 10 blade scalpel the wound(s)/ulcer(s) was/were sharply debrided down through and including the removal of subcutaneous tissue. Devitalized Tissue Debrided:  slough, necrotic/eschar and callus to stimulate bleeding to promote healing, post debridement good bleeding base and wound edges noted    Pre Debridement Measurements:  Are located in the Wound/Ulcer Documentation Flow Sheet    Wound/Ulcer #: 5, 6, 7 and 9    Post Debridement Measurements:  Wound/Ulcer Descriptions are Pre Debridement except measurements:    Wound 05/06/19 Foot Right;Dorsal #1 acquired 8-1-18 (Active)   Wound Image   5/16/2019  1:33 PM   Wound Arterial 5/6/2019  9:28 AM   Dressing Status Clean;Dry; Intact 5/30/2019 10:32 AM   Dressing Changed Changed/New 5/30/2019 10:32 AM   Dressing/Treatment Dry Dressing;Xeroform 5/30/2019 10:32 AM   Wound Cleansed Rinsed/Irrigated with saline 5/30/2019 10:32 AM   Wound Length (cm) 0.3 cm 5/30/2019  9:51 AM   Wound Width (cm) 0.3 cm 5/30/2019  9:51 AM   Wound Depth (cm) 0.1 cm 5/30/2019  9:51 AM   Wound Surface Area (cm^2) 0.09 cm^2 5/30/2019  9:51 AM   Change in Wound Size % (l*w) 97.62 5/30/2019  9:51 AM   Wound Volume (cm^3) 0.01 cm^3 5/30/2019  9:51 AM   Wound Healing % 99 5/30/2019  9:51 AM   Post-Procedure Length (cm) 0.3 cm 5/30/2019 10:14 AM   Post-Procedure Width (cm) 0.3 cm 5/30/2019 10:14 AM   Post-Procedure Depth (cm) 0.1 cm 5/30/2019 10:14 AM   Post-Procedure Surface Area (cm^2) 0.09 cm^2 5/30/2019 10:14 AM   Post-Procedure Volume (cm^3) 0.01 cm^3 5/30/2019 10:14 AM   Wound Assessment Red 5/30/2019  9:51 AM   Drainage Amount None 5/30/2019  9:51 AM   Drainage Description Serosanguinous 5/16/2019  1:33 PM   Odor None 5/30/2019  9:51 AM   Latonya-wound Assessment Intact 5/30/2019  9:51 AM   Number of days: 24 Wound 05/06/19 Ankle Right;Medial #2 acquired 8-1-18 (Active)   Wound Image   5/16/2019  1:33 PM   Wound Arterial 5/6/2019  9:28 AM   Dressing Status Clean;Dry; Intact 5/30/2019 10:32 AM   Dressing Changed Changed/New 5/30/2019 10:32 AM   Dressing/Treatment Dry Dressing;Xeroform 5/30/2019 10:32 AM   Wound Cleansed Rinsed/Irrigated with saline 5/30/2019 10:32 AM   Wound Length (cm) 0.2 cm 5/30/2019  9:51 AM   Wound Width (cm) 0.3 cm 5/30/2019  9:51 AM   Wound Depth (cm) 0.1 cm 5/30/2019  9:51 AM   Wound Surface Area (cm^2) 0.06 cm^2 5/30/2019  9:51 AM   Change in Wound Size % (l*w) 99.34 5/30/2019  9:51 AM   Wound Volume (cm^3) 0.01 cm^3 5/30/2019  9:51 AM   Wound Healing % 99 5/30/2019  9:51 AM   Post-Procedure Length (cm) 0.2 cm 5/30/2019 10:14 AM   Post-Procedure Width (cm) 0.3 cm 5/30/2019 10:14 AM   Post-Procedure Depth (cm) 0.2 cm 5/30/2019 10:14 AM   Post-Procedure Surface Area (cm^2) 0.06 cm^2 5/30/2019 10:14 AM   Post-Procedure Volume (cm^3) 0.01 cm^3 5/30/2019 10:14 AM   Wound Assessment Red 5/30/2019  9:51 AM   Drainage Amount None 5/30/2019  9:51 AM   Drainage Description Serosanguinous 5/23/2019  9:40 AM   Odor None 5/30/2019  9:51 AM   Latonya-wound Assessment Pink 5/30/2019  9:51 AM   Number of days: 24       Wound 05/06/19 Other (Comment) Right #4 acquired 4-25-19 2nd toe amp site (Active)   Wound Image   5/16/2019  1:33 PM   Wound Arterial 5/6/2019  9:28 AM   Dressing Status Clean;Dry; Intact 5/30/2019 10:32 AM   Dressing Changed Changed/New 5/30/2019 10:32 AM   Dressing/Treatment Dry Dressing;Xeroform 5/30/2019 10:32 AM   Wound Cleansed Rinsed/Irrigated with saline 5/30/2019 10:32 AM   Wound Length (cm) 0.6 cm 5/30/2019  9:51 AM   Wound Width (cm) 0.5 cm 5/30/2019  9:51 AM   Wound Depth (cm) 0.1 cm 5/30/2019  9:51 AM   Wound Surface Area (cm^2) 0.3 cm^2 5/30/2019  9:51 AM   Change in Wound Size % (l*w) 87.5 5/30/2019  9:51 AM   Wound Volume (cm^3) 0.03 cm^3 5/30/2019  9:51 AM   Wound Healing % 99 5/30/2019  9:51 AM   Post-Procedure Length (cm) 0.6 cm 5/30/2019 10:14 AM   Post-Procedure Width (cm) 0.5 cm 5/30/2019 10:14 AM   Post-Procedure Depth (cm) 0.1 cm 5/30/2019 10:14 AM   Post-Procedure Surface Area (cm^2) 0.3 cm^2 5/30/2019 10:14 AM   Post-Procedure Volume (cm^3) 0.03 cm^3 5/30/2019 10:14 AM   Distance Tunneling (cm) 2.3 cm 5/6/2019  9:28 AM   Tunneling Position ___ O'Clock 12 5/6/2019  9:28 AM   Wound Assessment Red;Yellow 5/30/2019  9:51 AM   Drainage Amount Scant 5/30/2019  9:51 AM   Drainage Description [place];Yellow 5/30/2019  9:51 AM   Odor None 5/30/2019  9:51 AM   Latonya-wound Assessment Intact 5/30/2019  9:51 AM   Number of days: 24       Wound 05/06/19 Foot Plantar;Right #5 acquired 8-1-18 1st met (Active)   Wound Image   5/16/2019  1:33 PM   Wound Arterial 5/6/2019  9:28 AM   Dressing Status Clean;Dry; Intact 5/30/2019 10:32 AM   Dressing Changed Changed/New 5/30/2019 10:32 AM   Dressing/Treatment Dry Dressing;Xeroform 5/30/2019 10:32 AM   Wound Cleansed Rinsed/Irrigated with saline 5/30/2019 10:32 AM   Wound Length (cm) 1.2 cm 5/30/2019  9:51 AM   Wound Width (cm) 2.5 cm 5/30/2019  9:51 AM   Wound Depth (cm) 0.2 cm 5/30/2019  9:51 AM   Wound Surface Area (cm^2) 3 cm^2 5/30/2019  9:51 AM   Change in Wound Size % (l*w) 17.58 5/30/2019  9:51 AM   Wound Volume (cm^3) 0.6 cm^3 5/30/2019  9:51 AM   Wound Healing % -67 5/30/2019  9:51 AM   Post-Procedure Length (cm) 1.2 cm 5/30/2019 10:14 AM   Post-Procedure Width (cm) 2.5 cm 5/30/2019 10:14 AM   Post-Procedure Depth (cm) 0.2 cm 5/30/2019 10:14 AM   Post-Procedure Surface Area (cm^2) 3 cm^2 5/30/2019 10:14 AM   Post-Procedure Volume (cm^3) 0.6 cm^3 5/30/2019 10:14 AM   Wound Assessment Black; Red 5/30/2019  9:51 AM   Drainage Amount Small 5/30/2019  9:51 AM   Drainage Description Serosanguinous 5/30/2019  9:51 AM   Odor None 5/30/2019  9:51 AM   Latonya-wound Assessment Intact 5/30/2019  9:51 AM   Number of days: 24       Wound 05/06/19 Foot Right;Lateral #6 acquired 8-1-18 lateral [facility] met. (Active)   Wound Image   5/16/2019  1:33 PM   Wound Arterial 5/6/2019  9:28 AM   Dressing Status Clean;Dry; Intact 5/30/2019 10:32 AM   Dressing Changed Changed/New 5/30/2019 10:32 AM   Dressing/Treatment Dry Dressing;Xeroform 5/30/2019 10:32 AM   Wound Cleansed Rinsed/Irrigated with saline 5/30/2019 10:32 AM   Wound Length (cm) 1.8 cm 5/30/2019  9:51 AM   Wound Width (cm) 3 cm 5/30/2019  9:51 AM   Wound Depth (cm) 0.2 cm 5/30/2019  9:51 AM   Wound Surface Area (cm^2) 5.4 cm^2 5/30/2019  9:51 AM   Change in Wound Size % (l*w) -25 5/30/2019  9:51 AM   Wound Volume (cm^3) 1.08 cm^3 5/30/2019  9:51 AM   Wound Healing % 17 5/30/2019  9:51 AM   Post-Procedure Length (cm) 1.8 cm 5/30/2019 10:14 AM   Post-Procedure Width (cm) 3 cm 5/30/2019 10:14 AM   Post-Procedure Depth (cm) 0.2 cm 5/30/2019 10:14 AM   Post-Procedure Surface Area (cm^2) 5.4 cm^2 5/30/2019 10:14 AM   Post-Procedure Volume (cm^3) 1.08 cm^3 5/30/2019 10:14 AM   Wound Assessment Black;Red;Yellow 5/30/2019  9:51 AM   Drainage Amount Small 5/30/2019  9:51 AM   Drainage Description Serosanguinous 5/30/2019  9:51 AM   Odor None 5/30/2019  9:51 AM   Latonya-wound Assessment Dry 5/30/2019  9:51 AM   Number of days: 24       Wound 05/06/19 Heel Right;Plantar #7 acquired 8-1-18 (Active)   Wound Image   5/16/2019  1:33 PM   Wound Arterial 5/6/2019  9:28 AM   Dressing Status Clean;Dry; Intact 5/30/2019 10:32 AM   Dressing Changed Changed/New 5/30/2019 10:32 AM   Dressing/Treatment Dry Dressing;Xeroform 5/30/2019 10:32 AM   Wound Cleansed Rinsed/Irrigated with saline 5/30/2019 10:32 AM   Wound Length (cm) 3 cm 5/30/2019  9:51 AM   Wound Width (cm) 4.6 cm 5/30/2019  9:51 AM   Wound Depth (cm) 0.2 cm 5/30/2019  9:51 AM   Wound Surface Area (cm^2) 13.8 cm^2 5/30/2019  9:51 AM   Change in Wound Size % (l*w) 34.91 5/30/2019  9:51 AM   Wound Volume (cm^3) 2.76 cm^3 5/30/2019  9:51 AM   Wound Healing % 57 5/30/2019  9:51 AM   Post-Procedure Length (cm) 3 cm 5/30/2019 10:14 AM   Post-Procedure Width (cm) 4.6 cm 5/30/2019 10:14 AM   Post-Procedure Depth (cm) 0.2 cm 5/30/2019 10:14 AM   Post-Procedure Surface Area (cm^2) 13.8 cm^2 5/30/2019 10:14 AM   Post-Procedure Volume (cm^3) 2.76 cm^3 5/30/2019 10:14 AM   Wound Assessment Red;Yellow;Gray 5/30/2019  9:51 AM   Drainage Amount Large 5/30/2019  9:51 AM   Drainage Description Serosanguinous 5/30/2019  9:51 AM   Odor None 5/23/2019  9:40 AM   Latonya-wound Assessment Calloused 5/30/2019  9:51 AM   Number of days: 24       Wound 05/06/19 Toe (Comment  which one) Right;Dorsal #8 acquired 8-1-18 3rd toe (Active)   Wound Image   5/16/2019  1:33 PM   Wound Arterial 5/6/2019  9:28 AM   Dressing Status Clean;Dry; Intact 5/30/2019 10:32 AM   Dressing Changed Changed/New 5/30/2019 10:32 AM   Dressing/Treatment Dry Dressing;Xeroform 5/30/2019 10:32 AM   Wound Cleansed Rinsed/Irrigated with saline 5/30/2019 10:32 AM   Wound Length (cm) 0.9 cm 5/30/2019  9:51 AM   Wound Width (cm) 1.2 cm 5/30/2019  9:51 AM   Wound Depth (cm) 0 cm 5/30/2019  9:51 AM   Wound Surface Area (cm^2) 1.08 cm^2 5/30/2019  9:51 AM   Change in Wound Size % (l*w) 16.92 5/30/2019  9:51 AM   Wound Volume (cm^3) 0 cm^3 5/30/2019  9:51 AM   Wound Healing % 100 5/30/2019  9:51 AM   Post-Procedure Length (cm) 0.9 cm 5/30/2019 10:14 AM   Post-Procedure Width (cm) 1.2 cm 5/30/2019 10:14 AM   Post-Procedure Depth (cm) 0.1 cm 5/30/2019 10:14 AM   Post-Procedure Surface Area (cm^2) 1.08 cm^2 5/30/2019 10:14 AM   Post-Procedure Volume (cm^3) 0.11 cm^3 5/30/2019 10:14 AM   Wound Assessment Brown 5/30/2019  9:51 AM   Drainage Amount Small 5/30/2019  9:51 AM   Drainage Description Serosanguinous 5/30/2019  9:51 AM   Odor None 5/30/2019  9:51 AM   Latonya-wound Assessment Intact 5/30/2019  9:51 AM   Number of days: 24       Wound 05/16/19 Heel Left # 9 aquired 4-16-19 (Active)   Wound Image   5/16/2019  1:33 PM   Wound Pressure Stage  2 5/16/2019  1:33 PM   Dressing Post-Procedure Width (cm) 0.8 cm 5/30/2019 10:32 AM   Post-Procedure Depth (cm) 0.2 cm 5/30/2019 10:32 AM   Post-Procedure Surface Area (cm^2) 0.48 cm^2 5/30/2019 10:32 AM   Post-Procedure Volume (cm^3) 0.1 cm^3 5/30/2019 10:32 AM   Wound Assessment Red 5/30/2019 10:14 AM   Drainage Amount None 5/30/2019 10:14 AM   Odor None 5/30/2019 10:14 AM   Latonya-wound Assessment Calloused 5/30/2019 10:14 AM   Number of days: 0     Percent of Wound/Ulcer Debrided: 100%    Total Surface Area Debrided:  7 sq cm     Estimated Blood Loss:  Minimal  Hemostasis Achieved:  by pressure    Procedural Pain:  0  / 10   Post Procedural Pain:  0 / 10     Response to treatment:  Well tolerated by patient. Plan:   Treatment Note please see attached Discharge Instructions. We'll start dressing changes to the left foot. See orders. Questions or concerns contact wound care center    Written patient dismissal instructions given to patient and signed by patient or POA. Discharge Instructions         Visit Discharge/Physician Orders     Discharge condition: Stable     Assessment of pain at discharge:N0NE     Anesthetic used: LIDOCAINE 2%     Discharge to: ECF     Left via:ambulance     Accompanied by: WIFE     [facility]: [facility]     Dressing Orders:WOUND LOCATION TO LEFT FOOT/ RIGHT FOOT, TOES, AND LEG CLEANSE WOUND WITH NORMAL SALINE APPLY XEROFORM TO ALL AREAS, PAD WELL AND SECURE. CHANGE DAILY.     MOISTURIZE DAILY.     Treatment Orders:FOLLOW A NUTRITIOUS DIET HIGH IN PROTEIN AND VITAMIN C TO PROMOTE WOUND HEALING. TAKE A MULTIVITAMIN DAILY.     KEEP PRESSURE OFF ALL WOUNDS AT ALL TIMES      PLETAL AS ORDERED      SEND TO DR Arna Boxer AS NEEDED        [facility] followup visit _______1 WEEK DR MILLER______________________  (Please note your next appointment above and if you are unable to keep, kindly give a 24 hour notice.  Thank you.)     Physician signature:__________________________        If you experience any of the following, please call the [facility] during business hours:     * Increase in Pain  * Temperature over 101  * Increase in drainage from your wound  * Drainage with a foul odor  * Bleeding  * Increase in swelling  * Need for compression bandage changes due to slippage, breakthrough drainage.     If you need medical attention outside of the business hours of the [facility] please contact your PCP or go to the nearest emergency room.                                               Electronically signed by Ishaan Pereyra DPM on 5/30/2019 at 11:15 AM

## 2019-06-06 ENCOUNTER — HOSPITAL ENCOUNTER (OUTPATIENT)
Dept: WOUND CARE | Age: 79
Discharge: HOME | End: 2019-06-06
Payer: MEDICARE

## 2019-06-06 VITALS — SYSTOLIC BLOOD PRESSURE: 132 MMHG | RESPIRATION RATE: 18 BRPM | HEART RATE: 88 BPM | DIASTOLIC BLOOD PRESSURE: 70 MMHG

## 2019-06-06 PROCEDURE — 11042 DBRDMT SUBQ TIS 1ST 20SQCM/<: CPT

## 2019-06-06 RX ORDER — M-VIT,TX,IRON,MINS/CALC/FOLIC 27MG-0.4MG
1 TABLET ORAL DAILY
COMMUNITY

## 2019-06-06 NOTE — PROGRESS NOTES
04/2016    SKIN BIOPSY  sept of 2018 last time    head and ears    TOE AMPUTATION Left 12/31/2016    2nd    TOE AMPUTATION Right 4/26/2019    AMPUTATION RIGHT SECOND TOE, FOOT DEBRIDEMENT performed by Lonnie Walsh DPM at [facility] OR    VASCULAR SURGERY       Family History   Problem Relation Age of Onset    Heart Disease Mother     Heart Disease Father      Social History     Tobacco Use    Smoking status: Former Smoker    Smokeless tobacco: Never Used   Substance Use Topics    Alcohol use: No    Drug use: No     Allergies   Allergen Reactions    Latex Other (See Comments)     Pt unsure of reaction    Aspirin Other (See Comments)     \"It affects my kidneys. \"   [place] [Penicillins] Other (See Comments)     \"I just know my body doesn't like it. \" \"I had a reaction a long time ago, I know it affects my kidneys. \"    Other Rash     \"I get a rash on just my face if I eat Cumin or Chili. \"    Peanut-Containing Drug Products Itching     Current Outpatient Medications on File Prior to Encounter   Medication Sig Dispense Refill    Multiple Vitamins-Minerals (THERAPEUTIC MULTIVITAMIN-MINERALS) tablet Take 1 tablet by mouth daily      magnesium hydroxide (MILK OF MAGNESIA) 400 MG/5ML suspension Take 30 mLs by mouth daily as needed for Constipation      acetaminophen (TYLENOL) 325 MG tablet Take 650 mg by mouth every 4 hours as needed for Pain or Fever      clopidogrel (PLAVIX) 75 MG tablet Take 1 tablet by mouth daily 30 tablet 3    tamsulosin (FLOMAX) 0.4 MG capsule Take 1 capsule by mouth nightly 30 capsule 3    finasteride (PROSCAR) 5 MG tablet Take 1 tablet by mouth daily 30 tablet 3    lisinopril (PRINIVIL;ZESTRIL) 10 MG tablet Take 1 tablet by mouth daily (Patient taking differently: Take 10 mg by mouth daily Hold for SBP<100 or pulse <60) 30 tablet 3    insulin lispro (HUMALOG) 100 UNIT/ML injection vial Inject 0-12 Units into the skin 3 times daily (with meals) 1 vial 3    insulin lispro (HUMALOG) 100 located in the [place]  Documentation Flow Sheet    Wound/Ulcer #: 5.6.7    Post Debridement Measurements:  Wound/Ulcer Descriptions are Pre Debridement except measurements:    Wound 05/06/19 Ankle Right;Medial #2 acquired 8-1-18 (Active)   Wound Image   5/16/2019  1:33 PM   Wound Arterial 5/6/2019  9:28 AM   Dressing Status Clean;Dry; Intact 6/6/2019  2:17 PM   Dressing Changed Changed/New 6/6/2019  2:17 PM   Dressing/Treatment Dry Dressing;Xeroform 6/6/2019  2:17 PM   Wound Cleansed Rinsed/Irrigated with saline 6/6/2019  2:17 PM   Wound Length (cm) 0.8 cm 6/6/2019  1:34 PM   Wound Width (cm) 0.4 cm 6/6/2019  1:34 PM   Wound Depth (cm) 0.1 cm 6/6/2019  1:34 PM   Wound Surface Area (cm^2) 0.32 cm^2 6/6/2019  1:34 PM   Change in Wound Size % (l*w) 96.46 6/6/2019  1:34 PM   Wound Volume (cm^3) 0.03 cm^3 6/6/2019  1:34 PM   Wound Healing % 97 6/6/2019  1:34 PM   Post-Procedure Length (cm) 0.8 cm 6/6/2019  2:02 PM   Post-Procedure Width (cm) 0.4 cm 6/6/2019  2:02 PM   Post-Procedure Depth (cm) 0.2 cm 6/6/2019  2:02 PM   Post-Procedure Surface Area (cm^2) 0.32 cm^2 6/6/2019  2:02 PM   Post-Procedure Volume (cm^3) 0.06 cm^3 6/6/2019  2:02 PM   Wound Assessment Brown;Dry 6/6/2019  1:34 PM   Drainage Amount None 5/30/2019  9:51 AM   Drainage Description Serosanguinous 5/23/2019  9:40 AM   Odor None 5/30/2019  9:51 AM   Latonya-wound Assessment Purple 6/6/2019  1:34 PM   Number of days: 31       Wound 05/06/19 Other (Comment) Right #4 acquired 4-25-19 2nd toe amp site (Active)   Wound Image   5/16/2019  1:33 PM   Wound Arterial 5/6/2019  9:28 AM   Dressing Status Clean;Dry; Intact 6/6/2019  2:17 PM   Dressing Changed Changed/New 6/6/2019  2:17 PM   Dressing/Treatment Dry Dressing;Xeroform 6/6/2019  2:17 PM   Wound Cleansed Rinsed/Irrigated with saline 6/6/2019  2:17 PM   Wound Length (cm) 0.7 cm 6/6/2019  1:34 PM   Wound Width (cm) 0.6 cm 6/6/2019  1:34 PM   Wound Depth (cm) 1.2 cm 6/6/2019  1:34 PM   Wound Surface Area (cm^2) 0.42 cm^2 6/6/2019  1:34 PM   Change in Wound Size % (l*w) 82.5 6/6/2019  1:34 PM   Wound Volume (cm^3) 0.5 cm^3 6/6/2019  1:34 PM   Wound Healing % 88 6/6/2019  1:34 PM   Post-Procedure Length (cm) 0.7 cm 6/6/2019  2:02 PM   Post-Procedure Width (cm) 0.6 cm 6/6/2019  2:02 PM   Post-Procedure Depth (cm) 1.2 cm 6/6/2019  2:02 PM   Post-Procedure Surface Area (cm^2) 0.42 cm^2 6/6/2019  2:02 PM   Post-Procedure Volume (cm^3) 0.5 cm^3 6/6/2019  2:02 PM   Distance Tunneling (cm) 2.3 cm 5/6/2019  9:28 AM   Tunneling Position ___ O'Clock 12 5/6/2019  9:28 AM   Wound Assessment Yellow;Pink;Slough 6/6/2019  1:34 PM   Drainage Amount Small 6/6/2019  1:34 PM   Drainage Description Serosanguinous 6/6/2019  1:34 PM   Odor None 6/6/2019  1:34 PM   Latonya-wound Assessment Intact 6/6/2019  1:34 PM   Number of days: 31       Wound 05/06/19 Foot Plantar;Right #5 acquired 8-1-18 1st met (Active)   Wound Image   5/16/2019  1:33 PM   Wound Arterial 5/6/2019  9:28 AM   Dressing Status Clean;Dry; Intact 6/6/2019  2:17 PM   Dressing Changed Changed/New 6/6/2019  2:17 PM   Dressing/Treatment Dry Dressing;Xeroform 6/6/2019  2:17 PM   Wound Cleansed Rinsed/Irrigated with saline 6/6/2019  2:17 PM   Wound Length (cm) 1.4 cm 6/6/2019  1:34 PM   Wound Width (cm) 1.8 cm 6/6/2019  1:34 PM   Wound Depth (cm) 0.3 cm 6/6/2019  1:34 PM   Wound Surface Area (cm^2) 2.52 cm^2 6/6/2019  1:34 PM   Change in Wound Size % (l*w) 30.77 6/6/2019  1:34 PM   Wound Volume (cm^3) 0.76 cm^3 6/6/2019  1:34 PM   Wound Healing % -111 6/6/2019  1:34 PM   Post-Procedure Length (cm) 1.4 cm 6/6/2019  2:02 PM   Post-Procedure Width (cm) 1.8 cm 6/6/2019  2:02 PM   Post-Procedure Depth (cm) 0.3 cm 6/6/2019  2:02 PM   Post-Procedure Surface Area (cm^2) 2.52 cm^2 6/6/2019  2:02 PM   Post-Procedure Volume (cm^3) 0.76 cm^3 6/6/2019  2:02 PM   Wound Assessment Red;Pink;Black 6/6/2019  1:34 PM   Drainage Amount Small 5/30/2019  9:51 AM   Drainage Description Serosanguinous 5/30/2019 PM   Wound Volume (cm^3) 1.35 cm^3 6/6/2019  1:34 PM   Wound Healing % 79 6/6/2019  1:34 PM   Post-Procedure Length (cm) 3 cm 6/6/2019  2:02 PM   Post-Procedure Width (cm) 4.5 cm 6/6/2019  2:02 PM   Post-Procedure Depth (cm) 0.2 cm 6/6/2019  2:02 PM   Post-Procedure Surface Area (cm^2) 13.5 cm^2 6/6/2019  2:02 PM   Post-Procedure Volume (cm^3) 2.7 cm^3 6/6/2019  2:02 PM   Wound Assessment Red 6/6/2019  1:34 PM   Drainage Amount Moderate 6/6/2019  1:34 PM   Drainage Description Serosanguinous 6/6/2019  1:34 PM   Odor None 5/23/2019  9:40 AM   Latonya-wound Assessment Black 6/6/2019  1:34 PM   Number of days: 31       Wound 05/06/19 Toe (Comment  which one) Right;Dorsal #8 acquired 8-1-18 3rd toe (Active)   Wound Image   5/16/2019  1:33 PM   Wound Arterial 5/6/2019  9:28 AM   Dressing Status Clean;Dry; Intact 6/6/2019  2:17 PM   Dressing Changed Changed/New 6/6/2019  2:17 PM   Dressing/Treatment Dry Dressing;Xeroform 6/6/2019  2:17 PM   Wound Cleansed Rinsed/Irrigated with saline 6/6/2019  2:17 PM   Wound Length (cm) 0.5 cm 6/6/2019  1:34 PM   Wound Width (cm) 0.7 cm 6/6/2019  1:34 PM   Wound Depth (cm) 0 cm 6/6/2019  1:34 PM   Wound Surface Area (cm^2) 0.35 cm^2 6/6/2019  1:34 PM   Change in Wound Size % (l*w) 73.08 6/6/2019  1:34 PM   Wound Volume (cm^3) 0 cm^3 6/6/2019  1:34 PM   Wound Healing % 100 6/6/2019  1:34 PM   Post-Procedure Length (cm) 0.5 cm 6/6/2019  2:02 PM   Post-Procedure Width (cm) 0.74 cm 6/6/2019  2:02 PM   Post-Procedure Depth (cm) 0.1 cm 6/6/2019  2:02 PM   Post-Procedure Surface Area (cm^2) 0.37 cm^2 6/6/2019  2:02 PM   Post-Procedure Volume (cm^3) 0.04 cm^3 6/6/2019  2:02 PM   Wound Assessment Brown 6/6/2019  1:34 PM   Drainage Amount Small 6/6/2019  1:34 PM   Drainage Description Serosanguinous 6/6/2019  1:34 PM   Odor None 6/6/2019  1:34 PM   Latonya-wound Assessment Intact 6/6/2019  1:34 PM   Number of days: 31       Wound 05/30/19 Foot Plantar;Left #10 ACQUIRE 5-30-19 (Active)   Wound Image SECURE. CHANGE DAILY.     MOISTURIZE DAILY.     Treatment Orders:FOLLOW A NUTRITIOUS DIET HIGH IN PROTEIN AND VITAMIN C TO PROMOTE WOUND HEALING. TAKE A MULTIVITAMIN DAILY.     KEEP PRESSURE OFF ALL WOUNDS AT ALL TIMES      PLETAL AS ORDERED      SEND TO DR Oleksandr Toledo AS NEEDED        [facility] followup visit _______1 WEEK DR MILLER______________________  (Please note your next appointment above and if you are unable to keep, kindly give a 24 hour notice. Thank you.)    Physician signature:__________________________      If you experience any of the following, please call the [facility] during business hours:    * Increase in Pain  * Temperature over 101  * Increase in drainage from your wound  * Drainage with a foul odor  * Bleeding  * Increase in swelling  * Need for compression bandage changes due to slippage, breakthrough drainage. If you need medical attention outside of the business hours of the [facility] please contact your PCP or go to the nearest emergency room.         Electronically signed by Eduardo Uribe DPM on 6/6/2019 at 2:21 PM

## 2019-06-13 ENCOUNTER — HOSPITAL ENCOUNTER (OUTPATIENT)
Dept: WOUND CARE | Age: 79
Discharge: HOME | End: 2019-06-13
Payer: MEDICARE

## 2019-06-13 VITALS
DIASTOLIC BLOOD PRESSURE: 60 MMHG | TEMPERATURE: 97.9 F | SYSTOLIC BLOOD PRESSURE: 150 MMHG | BODY MASS INDEX: 19.76 KG/M2 | RESPIRATION RATE: 16 BRPM | HEIGHT: 70 IN | WEIGHT: 138 LBS | HEART RATE: 84 BPM

## 2019-06-13 PROCEDURE — 11042 DBRDMT SUBQ TIS 1ST 20SQCM/<: CPT

## 2019-06-13 RX ORDER — LIDOCAINE HYDROCHLORIDE 20 MG/ML
JELLY TOPICAL ONCE
Status: DISCONTINUED | OUTPATIENT
Start: 2019-06-13 | End: 2019-06-14 | Stop reason: HOSPADM

## 2019-06-13 NOTE — PROGRESS NOTES
[facility] Followup Visit Note    Referring Physician : Caro Gonzalez MD  [address] RECORD NUMBER:  [id number]  AGE: 77 y.o. GENDER: male  : 1940  EPISODE DATE:  2019    Subjective:     Chief Complaint   Patient presents with    Wound Check     patient here for treatment of  bilateral plantar ulcers, right heel and multiple ulcers of right foot and toes      HISTORY of PRESENT ILLNESS HPI   Jenni Vieira is a 77 y.o. male who presents today with his wife in regards to follow up evaluation and treatment of wound/ulcer. That patient's past medical, family and social hx were reviewed and changes were made if present. She is tolerating dressing changes without issues. No pain. No nausea, vomiting, fever or chills. No shortness of breath or chest pain. Wife relates that insurance company wants patient discharged to home. Pt. has been strict nonweightbearing as instructed.     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 ([address] 2019    Blood circulation, collateral     Cellulitis of foot, left 2017    Diabetes mellitus ([address]     Diabetic foot ulcer with osteomyelitis ([address] 2017    Femoral-popliteal atherosclerosis ([address] 2017    Heel ulcer, right, with fat layer exposed ([address] 2019    Hypertension     Ulcer of right leg, with fat layer exposed ([address] 2019     Past Surgical History:   Procedure Laterality Date    COLONOSCOPY      EYE SURGERY Inject 0-12 Units into the skin 3 times daily (with meals) 1 vial 3    insulin lispro (HUMALOG) 100 UNIT/ML injection vial Inject 0-6 Units into the skin nightly 1 vial 3     No current facility-administered medications on file prior to encounter. REVIEW OF SYSTEMS See HPI    Objective:    BP (!) 150/60   Pulse 84   Temp 97.9 °F (36.6 °C) (Oral)   Resp 16   Ht 5' 10\" (1.778 m)   Wt 138 lb (62.6 kg)   BMI 19.80 kg/m²   Wt Readings from Last 3 Encounters:   06/13/19 138 lb (62.6 kg)   05/30/19 138 lb (62.6 kg)   05/23/19 138 lb (62.6 kg)     PHYSICAL EXAM  CONSTITUTIONAL:   Awake, alert, cooperative   EYES:  lids and lashes normal   ENT: external ears and nose without lesions   NECK:  supple, symmetrical, trachea midline   SKIN:  Plantar heel approximately 20% devitalized nonviable tissue. No purulence or odor. No surrounding erythema or fluctuance. Through subcutaneous tissue. Left plantar distal foot, 50% as well as lateral 70% with devitalized nonviable tissue. Through subcutaneous tissue. No purulence or odor. Vascular intact. 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 Performed by: Raad Abel DPM    Consent obtained:  Yes    Time out taken:  Yes    Pain Control: Anesthetic  Anesthetic: 2% Lidocaine Gel Topical     Debridement:Excisional Debridement    Using curette and tissue nippers the wound(s)/ulcer(s) was/were sharply debrided down through and including the removal of subcutaneous tissue.         Devitalized Tissue Debrided:  fibrin, biofilm, slough and necrotic/eschar to stimulate bleeding to promote healing, post debridement good bleeding base and wound edges noted    Pre Debridement Measurements:  Are located in the Wound/Ulcer Documentation Flow Sheet    Wound/Ulcer #: 5, 6 and 7    Post Debridement Measurements:  Wound/Ulcer Descriptions are Pre Debridement except measurements:    Wound 05/06/19 Ankle Right;Medial #2 acquired 8-1-18 (Active)   Wound Image   6/13/2019 10:18 AM   Wound Arterial 5/6/2019  9:28 AM   Dressing Status Clean;Dry; Intact 6/6/2019  2:17 PM   Dressing Changed Changed/New 6/6/2019  2:17 PM   Dressing/Treatment Dry Dressing;Xeroform 6/6/2019  2:17 PM   Wound Cleansed Rinsed/Irrigated with saline 6/6/2019  2:17 PM   Wound Length (cm) 0 cm 6/13/2019 10:18 AM   Wound Width (cm) 0 cm 6/13/2019 10:18 AM   Wound Depth (cm) 0 cm 6/13/2019 10:18 AM   Wound Surface Area (cm^2) 0 cm^2 6/13/2019 10:18 AM   Change in Wound Size % (l*w) 100 6/13/2019 10:18 AM   Wound Volume (cm^3) 0 cm^3 6/13/2019 10:18 AM   Wound Healing % 100 6/13/2019 10:18 AM   Post-Procedure Length (cm) 0.8 cm 6/6/2019  2:02 PM   Post-Procedure Width (cm) 0.4 cm 6/6/2019  2:02 PM   Post-Procedure Depth (cm) 0.2 cm 6/6/2019  2:02 PM   Post-Procedure Surface Area (cm^2) 0.32 cm^2 6/6/2019  2:02 PM   Post-Procedure Volume (cm^3) 0.06 cm^3 6/6/2019  2:02 PM   Wound Assessment Brown;Dry 6/6/2019  1:34 PM   Drainage Amount None 5/30/2019  9:51 AM   Drainage Description Serosanguinous 5/23/2019  9:40 AM   Odor None 5/30/2019  9:51 AM   Latonya-wound Assessment Purple 6/6/2019  1:34 PM   Number of days: 38       Wound 05/06/19 Other (Comment) Right #4 acquired 4-25-19 2nd toe amp site (Active)   Wound Image   5/16/2019  1:33 PM   Wound Arterial 5/6/2019  9:28 AM   Dressing Status Clean;Dry; Intact 6/6/2019  2:17 PM   Dressing Changed Changed/New 6/6/2019  2:17 PM   Dressing/Treatment Dry Dressing;Xeroform 6/6/2019  2:17 PM   Wound Cleansed Rinsed/Irrigated with saline 6/6/2019  2:17 PM   Wound Length (cm) 1 cm 6/13/2019 10:18 AM   Wound Width (cm) 0.6 cm 6/13/2019 10:18 AM   Wound Depth (cm) 0.7 cm 6/13/2019 10:18 AM   Wound Surface Area (cm^2) 0.6 cm^2 6/13/2019 10:18 AM   Change in Wound Size % (l*w) 75 6/13/2019 10:18 AM   Wound Volume (cm^3) 0.42 cm^3 6/13/2019 10:18 AM   Wound Healing % 90 6/13/2019 10:18 AM   Post-Procedure Length (cm) 1 cm 6/13/2019 10:40 AM   Post-Procedure Width (cm) 0.6 cm 6/13/2019 10:40 AM   Post-Procedure Depth (cm) 0.7 cm 6/13/2019 10:40 AM   Post-Procedure Surface Area (cm^2) 0.6 cm^2 6/13/2019 10:40 AM   Post-Procedure Volume (cm^3) 0.42 cm^3 6/13/2019 10:40 AM   Distance Tunneling (cm) 2.3 cm 5/6/2019  9:28 AM   Tunneling Position ___ O'Clock 12 5/6/2019  9:28 AM   Wound Assessment Pink;Yellow 6/13/2019 10:18 AM   Drainage Amount Moderate 6/13/2019 10:18 AM   Drainage Description Serosanguinous 6/13/2019 10:18 AM   Odor None 6/13/2019 10:18 AM   Latonya-wound Assessment Intact 6/13/2019 10:18 AM   Number of days: 38       Wound 05/06/19 Foot Plantar;Right #5 acquired 8-1-18 1st met (Active)   Wound Image   5/16/2019  1:33 PM   Wound Arterial 5/6/2019  9:28 AM   Dressing Status Clean;Dry; Intact 6/6/2019  2:17 PM   Dressing Changed Changed/New 6/6/2019  2:17 PM   Dressing/Treatment Dry Dressing;Xeroform 6/6/2019  2:17 PM   Wound Cleansed Rinsed/Irrigated with saline 6/6/2019  2:17 PM   Wound Length (cm) 1.2 cm 6/13/2019 10:18 AM   Wound Width (cm) 1.7 cm 6/13/2019 10:18 AM   Wound Depth (cm) 0.3 cm 6/13/2019 10:18 AM   Wound Surface Area (cm^2) 2.04 cm^2 6/13/2019 10:18 AM   Change in Wound Size % (l*w) 43.96 6/13/2019 10:18 AM   Wound Volume (cm^3) 0.61 cm^3 6/13/2019 10:18 AM   Wound Healing % -69 6/13/2019 10:18 AM   Post-Procedure Length (cm) 1.2 cm 6/13/2019 10:40 AM   Post-Procedure Width (cm) 1.7 cm 6/13/2019 10:40 AM   Post-Procedure Depth (cm) 0.3 cm 6/13/2019 10:40 AM   Post-Procedure Surface Area (cm^2) 2.04 cm^2 6/13/2019 10:40 AM   Post-Procedure Volume (cm^3) Changed/New 6/6/2019  2:17 PM   Dressing/Treatment Dry Dressing;Xeroform 6/6/2019  2:17 PM   Wound Cleansed Rinsed/Irrigated with saline 6/6/2019  2:17 PM   Wound Length (cm) 2 cm 6/13/2019 10:18 AM   Wound Width (cm) 3.8 cm 6/13/2019 10:18 AM   Wound Depth (cm) 0 cm 6/13/2019 10:18 AM   Wound Surface Area (cm^2) 7.6 cm^2 6/13/2019 10:18 AM   Change in Wound Size % (l*w) 64.15 6/13/2019 10:18 AM   Wound Volume (cm^3) 0 cm^3 6/13/2019 10:18 AM   Wound Healing % 100 6/13/2019 10:18 AM   Post-Procedure Length (cm) 2 cm 6/13/2019 10:40 AM   Post-Procedure Width (cm) 3.8 cm 6/13/2019 10:40 AM   Post-Procedure Depth (cm) 0.1 cm 6/13/2019 10:40 AM   Post-Procedure Surface Area (cm^2) 7.6 cm^2 6/13/2019 10:40 AM   Post-Procedure Volume (cm^3) 0.76 cm^3 6/13/2019 10:40 AM   Wound Assessment Red 6/13/2019 10:18 AM   Drainage Amount Moderate 6/13/2019 10:18 AM   Drainage Description Serosanguinous 6/13/2019 10:18 AM   Odor None 5/23/2019  9:40 AM   Latonya-wound Assessment Dark edges;Dry; Intact 6/13/2019 10:18 AM   Number of days: 38       Wound 05/06/19 Toe (Comment  which one) Right;Dorsal #8 acquired 8-1-18 3rd toe (Active)   Wound Image   5/16/2019  1:33 PM   Wound Arterial 5/6/2019  9:28 AM   Dressing Status Clean;Dry; Intact 6/6/2019  2:17 PM   Dressing Changed Changed/New 6/6/2019  2:17 PM   Dressing/Treatment Dry Dressing;Xeroform 6/6/2019  2:17 PM   Wound Cleansed Rinsed/Irrigated with saline 6/6/2019  2:17 PM   Wound Length (cm) 0.3 cm 6/13/2019 10:18 AM   Wound Width (cm) 0.5 cm 6/13/2019 10:18 AM   Wound Depth (cm) 0.1 cm 6/13/2019 10:18 AM   Wound Surface Area (cm^2) 0.15 cm^2 6/13/2019 10:18 AM   Change in Wound Size % (l*w) 88.46 6/13/2019 10:18 AM   Wound Volume (cm^3) 0.02 cm^3 6/13/2019 10:18 AM   Wound Healing % 95 6/13/2019 10:18 AM   Post-Procedure Length (cm) 0.3 cm 6/13/2019 10:40 AM   Post-Procedure Width (cm) 0.5 cm 6/13/2019 10:40 AM   Post-Procedure Depth (cm) 0.2 cm 6/13/2019 10:40 AM   Post-Procedure Surface Area (cm^2) 0.15 cm^2 6/13/2019 10:40 AM   Post-Procedure Volume (cm^3) 0.03 cm^3 6/13/2019 10:40 AM   Wound Assessment Pink 6/13/2019 10:18 AM   Drainage Amount Small 6/13/2019 10:18 AM   Drainage Description Serous 6/13/2019 10:18 AM   Odor None 6/13/2019 10:18 AM   Latonya-wound Assessment Dry; Intact 6/13/2019 10:18 AM   Number of days: 38       Wound 05/30/19 Foot Plantar;Left #10 ACQUIRE 5-30-19 (Active)   Wound Image   5/30/2019 10:14 AM   Wound Diabetic Dale 1 5/30/2019 10:14 AM   Dressing Status Clean;Dry; Intact 6/6/2019  2:16 PM   Dressing Changed Changed/New 6/6/2019  2:16 PM   Dressing/Treatment Xeroform;Dry Dressing 6/6/2019  2:16 PM   Wound Cleansed Rinsed/Irrigated with saline 6/6/2019  2:16 PM   Wound Length (cm) 0.3 cm 6/13/2019 10:18 AM   Wound Width (cm) 0.3 cm 6/13/2019 10:18 AM   Wound Depth (cm) 0.1 cm 6/13/2019 10:18 AM   Wound Surface Area (cm^2) 0.09 cm^2 6/13/2019 10:18 AM   Change in Wound Size % (l*w) 81.25 6/13/2019 10:18 AM   Wound Volume (cm^3) 0.01 cm^3 6/13/2019 10:18 AM   Wound Healing % 90 6/13/2019 10:18 AM   Post-Procedure Length (cm) 0.3 cm 6/13/2019 10:40 AM   Post-Procedure Width (cm) 0.3 cm 6/13/2019 10:40 AM   Post-Procedure Depth (cm) 0.2 cm 6/13/2019 10:40 AM   Post-Procedure Surface Area (cm^2) 0.09 cm^2 6/13/2019 10:40 AM   Post-Procedure Volume (cm^3) 0.02 cm^3 6/13/2019 10:40 AM   Wound Assessment Pink 6/13/2019 10:18 AM   Drainage Amount None 6/13/2019 10:18 AM   Odor None 6/13/2019 10:18 AM   Latonya-wound Assessment Dry;Calloused 6/13/2019 10:18 AM   Number of days: 14     Percent of Wound/Ulcer Debrided: 100%    Total Surface Area Debrided:  10 sq cm     Estimated Blood Loss:  Minimal  Hemostasis Achieved:  by pressure    Procedural Pain:  0  / 10   Post Procedural Pain:  0 / 10     Response to treatment:  Well tolerated by patient. Plan:   Treatment Note please see attached Discharge Instructions.   At this present time patient responding and doing quite well at facility, he is strict nonweightbearing. He is not a candidate to be discharged to home, wife is unable to take this on on her own. She herself actually utilizes assistance with ambulation. Written patient dismissal instructions given to patient and signed by patient or POA. Discharge Instructions         Visit Discharge/Physician Orders     Discharge condition: Stable     Assessment of pain at discharge:N0NE     Anesthetic used: LIDOCAINE 2%     Discharge to: ECF     Left via:ambulance     Accompanied by: WIFE     [facility]: [facility]     Dressing Orders:WOUND LOCATION TO LEFT FOOT/ RIGHT FOOT, TOES, AND LEG CLEANSE WOUND WITH NORMAL SALINE APPLY XEROFORM TO ALL AREAS, PAD WELL AND SECURE. CHANGE DAILY.     MOISTURIZE DAILY.     Treatment Orders:FOLLOW A NUTRITIOUS DIET HIGH IN PROTEIN AND VITAMIN C TO PROMOTE WOUND HEALING. TAKE A MULTIVITAMIN DAILY.     KEEP PRESSURE OFF ALL WOUNDS AT ALL TIMES      PLETAL AS ORDERED      SEND TO DR Ariel Walsh AS NEEDED      Strict non-weightbearing right heeel    [facility] followup visit _______1 WEEK DR MILLER______________________  (Please note your next appointment above and if you are unable to keep, kindly give a 24 hour notice.  Thank you.)     Physician signature:__________________________        If you experience any of the following, please call the [facility] during business hours:     * Increase in Pain  * Temperature over 101  * Increase in drainage from your wound  * Drainage with a foul odor  * Bleeding  * Increase in swelling  * Need for compression bandage changes due to slippage, breakthrough drainage.     If you need medical attention outside of the business hours of the [facility] please contact your PCP or go to the nearest emergency room.                   Electronically signed by Vivi Masters DPM on 6/13/2019 at 10:59 AM

## 2019-06-18 LAB
AFB CULTURE (MYCOBACTERIA): NORMAL
AFB SMEAR: NORMAL

## 2019-06-20 ENCOUNTER — HOSPITAL ENCOUNTER (OUTPATIENT)
Dept: WOUND CARE | Age: 79
Discharge: HOME | End: 2019-06-20
Payer: MEDICARE

## 2019-06-20 VITALS
BODY MASS INDEX: 19.76 KG/M2 | DIASTOLIC BLOOD PRESSURE: 64 MMHG | WEIGHT: 138 LBS | TEMPERATURE: 97.9 F | HEIGHT: 70 IN | RESPIRATION RATE: 18 BRPM | SYSTOLIC BLOOD PRESSURE: 138 MMHG | HEART RATE: 78 BPM

## 2019-06-20 PROCEDURE — 11042 DBRDMT SUBQ TIS 1ST 20SQCM/<: CPT

## 2019-06-20 RX ORDER — LIDOCAINE HYDROCHLORIDE 20 MG/ML
JELLY TOPICAL ONCE
Status: DISCONTINUED | OUTPATIENT
Start: 2019-06-20 | End: 2019-06-21 | Stop reason: HOSPADM

## 2019-06-20 ASSESSMENT — PAIN SCALES - GENERAL: PAINLEVEL_OUTOF10: 0

## 2019-06-20 NOTE — PROGRESS NOTES
[facility] Followup Visit Note    Referring Physician : Rafael Viveros MD  [address] RECORD NUMBER:  [id number]  AGE: 77 y.o. GENDER: male  : 1940  EPISODE DATE:  2019    Subjective:     Chief Complaint   Patient presents with    Wound Check     RIGHT AND LEFT FOOT WOUND      HISTORY of PRESENT ILLNESS HPI   Nanci Phillips is a 77 y.o. male who presents today in regards to follow up evaluation and treatment of wound/ulcer. That patient's past medical, family and social hx were reviewed and changes were made if present.     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 ([address] 2019    Blood circulation, collateral     Cellulitis of foot, left 2017    Diabetes mellitus ([address]     Diabetic foot ulcer with osteomyelitis ([address] 2017    Femoral-popliteal atherosclerosis ([address] 2017    Heel ulcer, right, with fat layer exposed ([address] 2019    Hypertension     Ulcer of right leg, with fat layer exposed ([address] 2019     Past Surgical History:   Procedure Laterality Date    COLONOSCOPY      EYE SURGERY      lense implants bilaterally    FOOT DEBRIDEMENT Left 2017    wound debridement and delayed primary closure    OTHER SURGICAL HISTORY  2019    Dr. Aden Edwards- PTA ant tibial    PROSTATE BIOPSY  2016    SKIN BIOPSY   last time    head and ears    TOE AMPUTATION Left 2016    2nd    TOE AMPUTATION Right 2019    AMPUTATION RIGHT SECOND TOE, FOOT DEBRIDEMENT performed by Raad Abel DPM at [address] OR    VASCULAR SURGERY       Family History   Problem Relation Age of Onset    Heart Disease Mother     Heart Disease Father      Social History     Tobacco Use    Smoking status: Former Smoker    Smokeless tobacco: Never Used   Substance Use Topics    Alcohol use: No    Drug use: No     Allergies   Allergen Reactions    Latex Other (See Comments)     Pt unsure of reaction    Aspirin Other (See Comments)     \"It affects my kidneys. \"   Olvin Dung [Penicillins] Other (See Comments)     \"I just know my body doesn't like it. \" \"I had a reaction a long time ago, I know it affects my kidneys. \"    Other Rash     \"I get a rash on just my face if I eat Cumin or Chili. \"    Peanut-Containing Drug Products Itching     Current Outpatient Medications on File Prior to Encounter   Medication Sig Dispense Refill    Multiple Vitamins-Minerals (THERAPEUTIC MULTIVITAMIN-MINERALS) tablet Take 1 tablet by mouth daily      acetaminophen (TYLENOL) 325 MG tablet Take 650 mg by mouth every 4 hours as needed for Pain or Fever      clopidogrel (PLAVIX) 75 MG tablet Take 1 tablet by mouth daily 30 tablet 3    tamsulosin (FLOMAX) 0.4 MG capsule Take 1 capsule by mouth nightly 30 capsule 3    finasteride (PROSCAR) 5 MG tablet Take 1 tablet by mouth daily 30 tablet 3    lisinopril (PRINIVIL;ZESTRIL) 10 MG tablet Take 1 tablet by mouth daily (Patient taking differently: Take 10 mg by mouth daily Hold for SBP<100 or pulse <60) 30 tablet 3    insulin lispro (HUMALOG) 100 UNIT/ML injection vial Inject 0-12 Units into the skin 3 times daily (with meals) 1 vial 3    insulin lispro (HUMALOG) 100 UNIT/ML injection vial Inject 0-6 Units into the skin nightly 1 vial 3     No current facility-administered medications on file prior to encounter.         REVIEW OF SYSTEMS See HPI    Objective:    /64   Pulse 78   Temp 97.9 °F (36.6 °C) (Oral)   Resp 18   Ht 5' 10\" 6/13/2019 10:18 AM   Wound Volume (cm^3) 0 cm^3 6/13/2019 10:18 AM   Wound Healing % 100 6/13/2019 10:18 AM   Post-Procedure Length (cm) 0.8 cm 6/6/2019  2:02 PM   Post-Procedure Width (cm) 0.4 cm 6/6/2019  2:02 PM   Post-Procedure Depth (cm) 0.2 cm 6/6/2019  2:02 PM   Post-Procedure Surface Area (cm^2) 0.32 cm^2 6/6/2019  2:02 PM   Post-Procedure Volume (cm^3) 0.06 cm^3 6/6/2019  2:02 PM   Wound Assessment Brown;Dry 6/6/2019  1:34 PM   Drainage Amount None 5/30/2019  9:51 AM   Drainage Description Serosanguinous 5/23/2019  9:40 AM   Odor None 5/30/2019  9:51 AM   Latonya-wound Assessment Purple 6/6/2019  1:34 PM   Number of days: 45       Wound 05/06/19 Other (Comment) Right #4 acquired 4-25-19 2nd toe amp site (Active)   Wound Image   5/16/2019  1:33 PM   Wound Arterial 5/6/2019  9:28 AM   Dressing Status Clean;Dry; Intact 6/6/2019  2:17 PM   Dressing Changed Changed/New 6/6/2019  2:17 PM   Dressing/Treatment Dry Dressing;Xeroform 6/6/2019  2:17 PM   Wound Cleansed Rinsed/Irrigated with saline 6/6/2019  2:17 PM   Wound Length (cm) 0.9 cm 6/20/2019 10:29 AM   Wound Width (cm) 0.8 cm 6/20/2019 10:29 AM   Wound Depth (cm) 0.7 cm 6/20/2019 10:29 AM   Wound Surface Area (cm^2) 0.72 cm^2 6/20/2019 10:29 AM   Change in Wound Size % (l*w) 70 6/20/2019 10:29 AM   Wound Volume (cm^3) 0.5 cm^3 6/20/2019 10:29 AM   Wound Healing % 88 6/20/2019 10:29 AM   Post-Procedure Length (cm) 0.9 cm 6/20/2019 11:17 AM   Post-Procedure Width (cm) 0.8 cm 6/20/2019 11:17 AM   Post-Procedure Depth (cm) 0.7 cm 6/20/2019 11:17 AM   Post-Procedure Surface Area (cm^2) 0.72 cm^2 6/20/2019 11:17 AM   Post-Procedure Volume (cm^3) 0.5 cm^3 6/20/2019 11:17 AM   Distance Tunneling (cm) 2.3 cm 5/6/2019  9:28 AM   Tunneling Position ___ O'Clock 12 5/6/2019  9:28 AM   Wound Assessment Pale;Pink;Yellow 6/20/2019 10:29 AM   Drainage Amount Moderate 6/20/2019 10:29 AM   Drainage Description Serosanguinous 6/20/2019 10:29 AM   Odor None 6/20/2019 10:29 AM Latonya-wound Assessment Maceration 6/20/2019 10:29 AM   Number of days: 45       Wound 05/06/19 Foot Plantar;Right #5 acquired 8-1-18 1st met (Active)   Wound Image   5/16/2019  1:33 PM   Wound Arterial 5/6/2019  9:28 AM   Dressing Status Clean;Dry; Intact 6/6/2019  2:17 PM   Dressing Changed Changed/New 6/6/2019  2:17 PM   Dressing/Treatment Dry Dressing;Xeroform 6/6/2019  2:17 PM   Wound Cleansed Rinsed/Irrigated with saline 6/6/2019  2:17 PM   Wound Length (cm) 1.3 cm 6/20/2019 10:29 AM   Wound Width (cm) 1.8 cm 6/20/2019 10:29 AM   Wound Depth (cm) 0.2 cm 6/20/2019 10:29 AM   Wound Surface Area (cm^2) 2.34 cm^2 6/20/2019 10:29 AM   Change in Wound Size % (l*w) 35.71 6/20/2019 10:29 AM   Wound Volume (cm^3) 0.47 cm^3 6/20/2019 10:29 AM   Wound Healing % -31 6/20/2019 10:29 AM   Post-Procedure Length (cm) 1.3 cm 6/20/2019 11:17 AM   Post-Procedure Width (cm) 1.8 cm 6/20/2019 11:17 AM   Post-Procedure Depth (cm) 0.3 cm 6/20/2019 11:17 AM   Post-Procedure Surface Area (cm^2) 2.34 cm^2 6/20/2019 11:17 AM   Post-Procedure Volume (cm^3) 0.7 cm^3 6/20/2019 11:17 AM   Wound Assessment Brown;Pink;Slough; Tan 6/20/2019 10:29 AM   Drainage Amount Moderate 6/20/2019 10:29 AM   Drainage Description Serosanguinous; Yellow 6/20/2019 10:29 AM   Odor None 6/20/2019 10:29 AM   Latonya-wound Assessment Maceration 6/20/2019 10:29 AM   Number of days: 45       Wound 05/06/19 Foot Right;Lateral #6 acquired 8-1-18 lateral 5th met. (Active)   Wound Image   5/16/2019  1:33 PM   Wound Arterial 5/6/2019  9:28 AM   Dressing Status Clean;Dry; Intact 6/6/2019  2:17 PM   Dressing Changed Changed/New 6/6/2019  2:17 PM   Dressing/Treatment Dry Dressing;Xeroform 6/6/2019  2:17 PM   Wound Cleansed Rinsed/Irrigated with saline 6/6/2019  2:17 PM   Wound Length (cm) 2 cm 6/20/2019 10:29 AM   Wound Width (cm) 2.5 cm 6/20/2019 10:29 AM   Wound Depth (cm) 0.2 cm 6/20/2019 10:29 AM   Wound Surface Area (cm^2) 5 cm^2 6/20/2019 10:29 AM   Change in Wound Size % (l*w) -15.74 6/20/2019 10:29 AM   Wound Volume (cm^3) 1 cm^3 6/20/2019 10:29 AM   Wound Healing % 23 6/20/2019 10:29 AM   Post-Procedure Length (cm) 2 cm 6/20/2019 11:17 AM   Post-Procedure Width (cm) 2.5 cm 6/20/2019 11:17 AM   Post-Procedure Depth (cm) 0.3 cm 6/20/2019 11:17 AM   Post-Procedure Surface Area (cm^2) 5 cm^2 6/20/2019 11:17 AM   Post-Procedure Volume (cm^3) 1.5 cm^3 6/20/2019 11:17 AM   Undermining Starts ___ O'Clock 9 6/13/2019 10:18 AM   Undermining Ends___ O'Clock 3 6/13/2019 10:18 AM   Undermining Maxium Distance (cm) 1.1 6/13/2019 10:18 AM   Wound Assessment Brown;Red;Tan;Yellow 6/20/2019 10:29 AM   Drainage Amount Moderate 6/20/2019 10:29 AM   Drainage Description Serosanguinous; Yellow 6/20/2019 10:29 AM   Odor None 6/20/2019 10:29 AM   Latonya-wound Assessment Maceration 6/20/2019 10:29 AM   Number of days: 45       Wound 05/06/19 Heel Right;Plantar #7 acquired 8-1-18 (Active)   Wound Image   5/16/2019  1:33 PM   Wound Arterial 5/6/2019  9:28 AM   Dressing Status Clean;Dry; Intact 6/6/2019  2:17 PM   Dressing Changed Changed/New 6/6/2019  2:17 PM   Dressing/Treatment Dry Dressing;Xeroform 6/6/2019  2:17 PM   Wound Cleansed Rinsed/Irrigated with saline 6/6/2019  2:17 PM   Wound Length (cm) 2.2 cm 6/20/2019 10:29 AM   Wound Width (cm) 3.2 cm 6/20/2019 10:29 AM   Wound Depth (cm) 0.1 cm 6/20/2019 10:29 AM   Wound Surface Area (cm^2) 7.04 cm^2 6/20/2019 10:29 AM   Change in Wound Size % (l*w) 66.79 6/20/2019 10:29 AM   Wound Volume (cm^3) 0.7 cm^3 6/20/2019 10:29 AM   Wound Healing % 89 6/20/2019 10:29 AM   Post-Procedure Length (cm) 2.2 cm 6/20/2019 11:17 AM   Post-Procedure Width (cm) 3.3 cm 6/20/2019 11:17 AM   Post-Procedure Depth (cm) 0.2 cm 6/20/2019 11:17 AM   Post-Procedure Surface Area (cm^2) 7.26 cm^2 6/20/2019 11:17 AM   Post-Procedure Volume (cm^3) 1.45 cm^3 6/20/2019 11:17 AM   Wound Assessment Pink;Red;Yellow 6/20/2019 10:29 AM   Drainage Amount Moderate 6/20/2019 10:29 AM   Drainage Description Serosanguinous; Yellow 6/20/2019 10:29 AM   Odor None 6/20/2019 10:29 AM   Latonya-wound Assessment Calloused 6/20/2019 10:29 AM   Number of days: 45       Wound 05/06/19 Toe (Comment  which one) Right;Dorsal #8 acquired 8-1-18 3rd toe (Active)   Wound Image   5/16/2019  1:33 PM   Wound Arterial 5/6/2019  9:28 AM   Dressing Status Clean;Dry; Intact 6/6/2019  2:17 PM   Dressing Changed Changed/New 6/6/2019  2:17 PM   Dressing/Treatment Dry Dressing;Xeroform 6/6/2019  2:17 PM   Wound Cleansed Rinsed/Irrigated with saline 6/6/2019  2:17 PM   Wound Length (cm) 0.3 cm 6/20/2019 10:29 AM   Wound Width (cm) 0.5 cm 6/20/2019 10:29 AM   Wound Depth (cm) 0.1 cm 6/20/2019 10:29 AM   Wound Surface Area (cm^2) 0.15 cm^2 6/20/2019 10:29 AM   Change in Wound Size % (l*w) 88.46 6/20/2019 10:29 AM   Wound Volume (cm^3) 0.02 cm^3 6/20/2019 10:29 AM   Wound Healing % 95 6/20/2019 10:29 AM   Post-Procedure Length (cm) 0.3 cm 6/20/2019 11:17 AM   Post-Procedure Width (cm) 0.5 cm 6/20/2019 11:17 AM   Post-Procedure Depth (cm) 0.2 cm 6/20/2019 11:17 AM   Post-Procedure Surface Area (cm^2) 0.15 cm^2 6/20/2019 11:17 AM   Post-Procedure Volume (cm^3) 0.03 cm^3 6/20/2019 11:17 AM   Wound Assessment Yellow 6/20/2019 10:29 AM   Drainage Amount Small 6/20/2019 10:29 AM   Drainage Description Serosanguinous 6/20/2019 10:29 AM   Odor None 6/20/2019 10:29 AM   Latonya-wound Assessment Purple 6/20/2019 10:29 AM   Number of days: 45       Wound 05/30/19 Foot Plantar;Left #10 ACQUIRE 5-30-19 (Active)   Wound Image   5/30/2019 10:14 AM   Wound Diabetic Dale 1 5/30/2019 10:14 AM   Dressing Status Clean;Dry; Intact 6/6/2019  2:16 PM   Dressing Changed Changed/New 6/6/2019  2:16 PM   Dressing/Treatment Xeroform;Dry Dressing 6/6/2019  2:16 PM   Wound Cleansed Rinsed/Irrigated with saline 6/6/2019  2:16 PM   Wound Length (cm) 0.2 cm 6/20/2019 10:29 AM   Wound Width (cm) 0.2 cm 6/20/2019 10:29 AM   Wound Depth (cm) 0.3 cm 6/20/2019 10:29 AM   Wound Surface Area (cm^2) 0.04 cm^2 6/20/2019 10:29 AM   Change in Wound Size % (l*w) 91.67 6/20/2019 10:29 AM   Wound Volume (cm^3) 0.01 cm^3 6/20/2019 10:29 AM   Wound Healing % 90 6/20/2019 10:29 AM   Post-Procedure Length (cm) 0.2 cm 6/20/2019 11:17 AM   Post-Procedure Width (cm) 0.2 cm 6/20/2019 11:17 AM   Post-Procedure Depth (cm) 0.3 cm 6/20/2019 11:17 AM   Post-Procedure Surface Area (cm^2) 0.04 cm^2 6/20/2019 11:17 AM   Post-Procedure Volume (cm^3) 0.01 cm^3 6/20/2019 11:17 AM   Wound Assessment Red 6/20/2019 10:29 AM   Drainage Amount Moderate 6/20/2019 10:29 AM   Drainage Description Serosanguinous 6/20/2019 10:29 AM   Odor None 6/20/2019 10:29 AM   Latonya-wound Assessment Calloused 6/20/2019 10:29 AM   Number of days: 21       Wound 06/20/19 Leg Left;Posterior #11  (Active)   Wound Length (cm) 2.3 cm 6/20/2019 10:29 AM   Wound Width (cm) 2.3 cm 6/20/2019 10:29 AM   Wound Depth (cm) 0.1 cm 6/20/2019 10:29 AM   Wound Surface Area (cm^2) 5.29 cm^2 6/20/2019 10:29 AM   Wound Volume (cm^3) 0.53 cm^3 6/20/2019 10:29 AM   Post-Procedure Length (cm) 2.3 cm 6/20/2019 11:17 AM   Post-Procedure Width (cm) 2.3 cm 6/20/2019 11:17 AM   Post-Procedure Depth (cm) 0.2 cm 6/20/2019 11:17 AM   Post-Procedure Surface Area (cm^2) 5.29 cm^2 6/20/2019 11:17 AM   Post-Procedure Volume (cm^3) 1.06 cm^3 6/20/2019 11:17 AM   Wound Assessment Pink;Yellow 6/20/2019 10:29 AM   Drainage Amount Moderate 6/20/2019 10:29 AM   Drainage Description Serosanguinous 6/20/2019 10:29 AM   Odor None 6/20/2019 10:29 AM   Latonya-wound Assessment Intact 6/20/2019 10:29 AM   Number of days: 0     Percent of Wound/Ulcer Debrided: 100%    Total Surface Area Debrided:  20 sq cm     Estimated Blood Loss:  Minimal  Hemostasis Achieved:  by pressure    Procedural Pain:  0  / 10   Post Procedural Pain:  0 / 10     Response to treatment:  Well tolerated by patient.      Plan:   Treatment Note please see attached Discharge Instructions    Written patient dismissal instructions given to patient and signed by patient or POA. Discharge Instructions         Visit Discharge/Physician Orders     Discharge condition: Stable     Assessment of pain at discharge:N0NE     Anesthetic used: LIDOCAINE 2%     Discharge to: ECF     Left via:ambulance     Accompanied by: WIFE     ECF/H[facility]: [facility]     Dressing Orders:WOUND LOCATION TO LEFT FOOT/ RIGHT FOOT, TOES, AND LEG CLEANSE WOUND WITH NORMAL SALINE APPLY XEROFORM TO ALL AREAS, PAD WELL AND SECURE. CHANGE DAILY.     MOISTURIZE DAILY.     Treatment Orders:FOLLOW A NUTRITIOUS DIET HIGH IN PROTEIN AND VITAMIN C TO PROMOTE WOUND HEALING. TAKE A MULTIVITAMIN DAILY.     KEEP PRESSURE OFF ALL WOUNDS AT ALL TIMES      PLETAL AS ORDERED      SEND TO DR Bonnie Lewis AS NEEDED      Strict non-weightbearing right heeel     [facility] followup visit _______1 WEEK DR REYNOLDS_____________________  (Please note your next appointment above and if you are unable to keep, kindly give a 24 hour notice.  Thank you.)     Physician signature:__________________________        If you experience any of the following, please call the [facility] during business hours:     * Increase in Pain  * Temperature over 101  * Increase in drainage from your wound  * Drainage with a foul odor  * Bleeding  * Increase in swelling  * Need for compression bandage changes due to slippage, breakthrough drainage.     If you need medical attention outside of the business hours of the [facility] please contact your PCP or go to the nearest emergency room.                                Electronically signed by Bryce Alvarado DPM on 6/20/2019 at 11:29 AM

## 2019-06-26 NOTE — DISCHARGE SUMMARY
Physician Discharge Summary     Patient ID:  Roscoe Rojas  [id number]  35 y.o.  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  *      Consults: IP CONSULT TO INTERNAL MEDICINE  IP CONSULT TO VASCULAR SURGERY  IP CONSULT TO PODIATRY  IP CONSULT TO INFECTIOUS DISEASES  IP CONSULT TO DIETITIAN  IP CONSULT TO Vamsi Buitrago Course: see chart    Discharge Exam:  See progress note from today    Condition:  Stable    Disposition: [facility]    Patient Instructions:   Discharge Medication List as of 4/23/2019  4:34 PM      STOP taking these medications       aspirin 325 MG EC tablet Comments:   Reason for Stopping:         insulin glargine (LANTUS) 100 UNIT/ML injection vial Comments:   Reason for Stopping:         insulin lispro (HUMALOG) 100 UNIT/ML injection vial Comments:   Reason for Stopping:         insulin lispro (HUMALOG) 100 UNIT/ML injection vial Comments:   Reason for Stopping:

## 2019-06-27 ENCOUNTER — HOSPITAL ENCOUNTER (OUTPATIENT)
Dept: WOUND CARE | Age: 79
Discharge: HOME | End: 2019-06-27
Payer: MEDICARE

## 2019-06-27 VITALS
DIASTOLIC BLOOD PRESSURE: 58 MMHG | HEART RATE: 82 BPM | TEMPERATURE: 97.9 F | SYSTOLIC BLOOD PRESSURE: 138 MMHG | RESPIRATION RATE: 18 BRPM

## 2019-06-27 PROCEDURE — 11042 DBRDMT SUBQ TIS 1ST 20SQCM/<: CPT

## 2019-06-27 NOTE — PROGRESS NOTES
[facility] Followup Visit Note    Referring Physician : Caro Gonzalez MD  [address] RECORD NUMBER:  [id number]  AGE: 77 y.o. GENDER: male  : 1940  EPISODE DATE:  2019    Subjective:     No chief complaint on file. HISTORY of PRESENT ILLNESS HPI   Jenni Vieira is a 77 y.o. male who presents today in regards to follow up evaluation and treatment of wound/ulcer. That patient's past medical, family and social hx were reviewed and changes were made if present.     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 ([address] 2019    Blood circulation, collateral     Cellulitis of foot, left 2017    Diabetes mellitus ([address]     Diabetic foot ulcer with osteomyelitis ([address] 2017    Femoral-popliteal atherosclerosis ([address] 2017    Heel ulcer, right, with fat layer exposed ([address] 2019    Hypertension     Ulcer of right leg, with fat layer exposed ([address] 2019     Past Surgical History:   Procedure Laterality Date    COLONOSCOPY      EYE SURGERY      lense implants bilaterally    FOOT DEBRIDEMENT Left 2017    wound debridement and delayed primary closure    OTHER SURGICAL HISTORY  2019    Dr. Sam [facility] ant tibial    PROSTATE BIOPSY  2016    SKIN BIOPSY   last time    head and ears    TOE AMPUTATION Left 2016    2nd    TOE AMPUTATION Right 2019    AMPUTATION RIGHT SECOND TOE, FOOT DEBRIDEMENT performed by Ivory Faulkner DPM at [facility] OR    VASCULAR SURGERY       Family History   Problem Relation Age of Onset    Heart Disease Mother     Heart Disease Father      Social History     Tobacco Use    Smoking status: Former Smoker    Smokeless tobacco: Never Used   Substance Use Topics    Alcohol use: No    Drug use: No     Allergies   Allergen Reactions    Latex Other (See Comments)     Pt unsure of reaction    Aspirin Other (See Comments)     \"It affects my kidneys. \"   Quillian Gurdeep [Penicillins] Other (See Comments)     \"I just know my body doesn't like it. \" \"I had a reaction a long time ago, I know it affects my kidneys. \"    Other Rash     \"I get a rash on just my face if I eat Cumin or Chili. \"    Peanut-Containing Drug Products Itching     Current Outpatient Medications on File Prior to Encounter   Medication Sig Dispense Refill    Multiple Vitamins-Minerals (THERAPEUTIC MULTIVITAMIN-MINERALS) tablet Take 1 tablet by mouth daily      tamsulosin (FLOMAX) 0.4 MG capsule Take 1 capsule by mouth nightly 30 capsule 3    acetaminophen (TYLENOL) 325 MG tablet Take 650 mg by mouth every 4 hours as needed for Pain or Fever      clopidogrel (PLAVIX) 75 MG tablet Take 1 tablet by mouth daily (Patient not taking: Reported on 6/27/2019) 30 tablet 3    insulin lispro (HUMALOG) 100 UNIT/ML injection vial Inject 0-12 Units into the skin 3 times daily (with meals) 1 vial 3    insulin lispro (HUMALOG) 100 UNIT/ML injection vial Inject 0-6 Units into the skin nightly 1 vial 3     No current facility-administered medications on file prior to encounter.         REVIEW OF SYSTEMS See HPI    Objective:    BP (!) 138/58   Pulse 82   Temp 97.9 °F (36.6 °C) (Oral)   Resp 18   Wt Readings from Last 3 Encounters:   06/20/19 138 lb (62.6 kg)   06/13/19 138 lb (62.6 kg)   05/30/19 138 lb (62.6 kg)     PHYSICAL EXAM  CONSTITUTIONAL:   Awake, alert, cooperative   EYES:  lids and lashes normal   ENT: external ears and nose Post-Procedure Surface Area (cm^2) 0.9 cm^2 6/27/2019 11:07 AM   Post-Procedure Volume (cm^3) 0.54 cm^3 6/27/2019 11:07 AM   Distance Tunneling (cm) 2.3 cm 5/6/2019  9:28 AM   Tunneling Position ___ O'Clock 12 5/6/2019  9:28 AM   Wound Assessment Red;Black 6/27/2019 10:17 AM   Drainage Amount Moderate 6/27/2019 10:17 AM   Drainage Description Serosanguinous 6/27/2019 10:17 AM   Odor None 6/27/2019 10:17 AM   Latonya-wound Assessment Intact 6/27/2019 10:17 AM   Number of days: 52       Wound 05/06/19 Foot Plantar;Right #5 acquired 8-1-18 1st met (Active)   Wound Image   6/27/2019 10:17 AM   Wound Arterial 5/6/2019  9:28 AM   Dressing Status Clean;Dry; Intact 6/6/2019  2:17 PM   Dressing Changed Changed/New 6/6/2019  2:17 PM   Dressing/Treatment Dry Dressing;Xeroform 6/6/2019  2:17 PM   Wound Cleansed Rinsed/Irrigated with saline 6/6/2019  2:17 PM   Wound Length (cm) 0.7 cm 6/27/2019 10:17 AM   Wound Width (cm) 1.2 cm 6/27/2019 10:17 AM   Wound Depth (cm) 0.1 cm 6/27/2019 10:17 AM   Wound Surface Area (cm^2) 0.84 cm^2 6/27/2019 10:17 AM   Change in Wound Size % (l*w) 76.92 6/27/2019 10:17 AM   Wound Volume (cm^3) 0.08 cm^3 6/27/2019 10:17 AM   Wound Healing % 78 6/27/2019 10:17 AM   Post-Procedure Length (cm) 0.7 cm 6/27/2019 11:07 AM   Post-Procedure Width (cm) 1.2 cm 6/27/2019 11:07 AM   Post-Procedure Depth (cm) 0.1 cm 6/27/2019 11:07 AM   Post-Procedure Surface Area (cm^2) 0.84 cm^2 6/27/2019 11:07 AM   Post-Procedure Volume (cm^3) 0.08 cm^3 6/27/2019 11:07 AM   Wound Assessment Brown;Pink;Slough; Tan 6/20/2019 10:29 AM   Drainage Amount Moderate 6/20/2019 10:29 AM   Drainage Description Serosanguinous; Yellow 6/20/2019 10:29 AM   Odor None 6/20/2019 10:29 AM   Latonya-wound Assessment Dry;Calloused 6/27/2019 10:17 AM   Number of days: 52       Wound 05/06/19 Foot Right;Lateral #6 acquired 8-1-18 lateral 5th met.  (Active)   Wound Image   6/27/2019 10:17 AM   Wound Arterial 5/6/2019  9:28 AM   Dressing Status Clean;Dry; Intact 6/6/2019  2:17 PM   Dressing Changed Changed/New 6/6/2019  2:17 PM   Dressing/Treatment Dry Dressing;Xeroform 6/6/2019  2:17 PM   Wound Cleansed Rinsed/Irrigated with saline 6/6/2019  2:17 PM   Wound Length (cm) 1.9 cm 6/27/2019 10:17 AM   Wound Width (cm) 2.5 cm 6/27/2019 10:17 AM   Wound Depth (cm) 0.1 cm 6/27/2019 10:17 AM   Wound Surface Area (cm^2) 4.75 cm^2 6/27/2019 10:17 AM   Change in Wound Size % (l*w) -9.95 6/27/2019 10:17 AM   Wound Volume (cm^3) 0.48 cm^3 6/27/2019 10:17 AM   Wound Healing % 63 6/27/2019 10:17 AM   Post-Procedure Length (cm) 1.9 cm 6/27/2019 11:07 AM   Post-Procedure Width (cm) 2.5 cm 6/27/2019 11:07 AM   Post-Procedure Depth (cm) 0.1 cm 6/27/2019 11:07 AM   Post-Procedure Surface Area (cm^2) 4.75 cm^2 6/27/2019 11:07 AM   Post-Procedure Volume (cm^3) 0.48 cm^3 6/27/2019 11:07 AM   Undermining Starts ___ O'Clock 9 6/13/2019 10:18 AM   Undermining Ends___ O'Clock 3 6/13/2019 10:18 AM   Undermining Maxium Distance (cm) 1.1 6/13/2019 10:18 AM   Wound Assessment Brown;Red;Pink 6/27/2019 10:17 AM   Drainage Amount Small 6/27/2019 10:17 AM   Drainage Description Serosanguinous; Yellow 6/27/2019 10:17 AM   Odor None 6/27/2019 10:17 AM   Latonya-wound Assessment Intact; Pink 6/27/2019 10:17 AM   Number of days: 52       Wound 05/06/19 Heel Right;Plantar #7 acquired 8-1-18 (Active)   Wound Image   6/27/2019 10:17 AM   Wound Arterial 5/6/2019  9:28 AM   Dressing Status Clean;Dry; Intact 6/6/2019  2:17 PM   Dressing Changed Changed/New 6/6/2019  2:17 PM   Dressing/Treatment Dry Dressing;Xeroform 6/6/2019  2:17 PM   Wound Cleansed Rinsed/Irrigated with saline 6/6/2019  2:17 PM   Wound Length (cm) 4 cm 6/27/2019 10:17 AM   Wound Width (cm) 4.4 cm 6/27/2019 10:17 AM   Wound Depth (cm) 0.1 cm 6/27/2019 10:17 AM   Wound Surface Area (cm^2) 17.6 cm^2 6/27/2019 10:17 AM   Change in Wound Size % (l*w) 16.98 6/27/2019 10:17 AM   Wound Volume (cm^3) 1.76 cm^3 6/27/2019 10:17 AM   Wound Healing % Diabetic Dale 1 5/30/2019 10:14 AM   Dressing Status Clean;Dry; Intact 6/6/2019  2:16 PM   Dressing Changed Changed/New 6/6/2019  2:16 PM   Dressing/Treatment Xeroform;Dry Dressing 6/6/2019  2:16 PM   Wound Cleansed Rinsed/Irrigated with saline 6/6/2019  2:16 PM   Wound Length (cm) 0.2 cm 6/27/2019 10:17 AM   Wound Width (cm) 0.2 cm 6/27/2019 10:17 AM   Wound Depth (cm) 0.3 cm 6/27/2019 10:17 AM   Wound Surface Area (cm^2) 0.04 cm^2 6/27/2019 10:17 AM   Change in Wound Size % (l*w) 91.67 6/27/2019 10:17 AM   Wound Volume (cm^3) 0.01 cm^3 6/27/2019 10:17 AM   Wound Healing % 90 6/27/2019 10:17 AM   Post-Procedure Length (cm) 0.2 cm 6/20/2019 11:17 AM   Post-Procedure Width (cm) 0.2 cm 6/20/2019 11:17 AM   Post-Procedure Depth (cm) 0.3 cm 6/20/2019 11:17 AM   Post-Procedure Surface Area (cm^2) 0.04 cm^2 6/20/2019 11:17 AM   Post-Procedure Volume (cm^3) 0.01 cm^3 6/20/2019 11:17 AM   Wound Assessment Red 6/27/2019 10:17 AM   Drainage Amount Small 6/27/2019 10:17 AM   Drainage Description Serosanguinous 6/27/2019 10:17 AM   Odor None 6/27/2019 10:17 AM   Latonya-wound Assessment Dark edges;Calloused 6/27/2019 10:17 AM   Number of days: 28       Wound 06/20/19 Leg Left;Posterior #11  (Active)   Wound Image   6/27/2019 10:17 AM   Wound Length (cm) 2.4 cm 6/27/2019 10:17 AM   Wound Width (cm) 2.3 cm 6/27/2019 10:17 AM   Wound Depth (cm) 0.1 cm 6/27/2019 10:17 AM   Wound Surface Area (cm^2) 5.52 cm^2 6/27/2019 10:17 AM   Change in Wound Size % (l*w) -4.35 6/27/2019 10:17 AM   Wound Volume (cm^3) 0.55 cm^3 6/27/2019 10:17 AM   Wound Healing % -4 6/27/2019 10:17 AM   Post-Procedure Length (cm) 2.4 cm 6/27/2019 11:07 AM   Post-Procedure Width (cm) 2.3 cm 6/27/2019 11:07 AM   Post-Procedure Depth (cm) 0.1 cm 6/27/2019 11:07 AM   Post-Procedure Surface Area (cm^2) 5.52 cm^2 6/27/2019 11:07 AM   Post-Procedure Volume (cm^3) 0.55 cm^3 6/27/2019 11:07 AM   Wound Assessment Pink;Yellow 6/27/2019 10:17 AM   Drainage Amount Small 6/27/2019 10:17 AM   Drainage Description Serosanguinous 6/27/2019 10:17 AM   Odor None 6/27/2019 10:17 AM   Latonya-wound Assessment Intact; Purple 6/27/2019 10:17 AM   Number of days: 7     Percent of Wound/Ulcer Debrided: 100%    Total Surface Area Debrided:  30 sq cm     Estimated Blood Loss:  Minimal  Hemostasis Achieved:  by pressure    Procedural Pain:  0  / 10   Post Procedural Pain:  0 / 10     Response to treatment:  Well tolerated by patient. Plan:   Treatment Note please see attached Discharge Instructions    Written patient dismissal instructions given to patient and signed by patient or POA. Discharge Instructions       Visit Discharge/Physician Orders     Discharge condition: Stable     Assessment of pain at discharge:N0NE     Anesthetic used: LIDOCAINE 2%     Discharge to: ECF     Left via:ambulance     Accompanied by: WIFE     [facility]     Dressing Orders:WOUND LOCATION TO LEFT FOOT/ RIGHT FOOT, TOES, AND LEG CLEANSE WOUND WITH NORMAL SALINE APPLY XEROFORM TO ALL AREAS, PAD WELL AND SECURE. CHANGE DAILY.     MOISTURIZE DAILY.     Treatment Orders:FOLLOW A NUTRITIOUS DIET HIGH IN PROTEIN AND VITAMIN C TO PROMOTE WOUND HEALING. TAKE A MULTIVITAMIN DAILY.     KEEP PRESSURE OFF ALL WOUNDS AT ALL TIMES      PLETAL AS ORDERED      SEND TO DR Laura Call AS NEEDED      Strict non-weightbearing right heeel     C followup visit _______2 WEEKS DR REYNOLDS_____________________  (Please note your next appointment above and if you are unable to keep, kindly give a 24 hour notice.  Thank you.)     Physician signature:__________________________        If you experience any of the following, please call the [address] [place] [address] during business hours:     * Increase in Pain  * Temperature over 101  * Increase in drainage from your wound  * Drainage with a foul odor  * Bleeding  * Increase in swelling  * Need for compression bandage changes due to slippage, breakthrough drainage.     If you need

## 2019-07-10 ENCOUNTER — OFFICE VISIT (OUTPATIENT)
Dept: VASCULAR SURGERY | Age: 79
End: 2019-07-10
Payer: MEDICARE

## 2019-07-10 DIAGNOSIS — I70.235 ATHEROSCLEROSIS OF NATIVE ARTERY OF RIGHT LOWER EXTREMITY WITH ULCERATION OF OTHER PART OF FOOT (HCC): ICD-10-CM

## 2019-07-10 DIAGNOSIS — I70.209 FEMORAL-POPLITEAL ATHEROSCLEROSIS (HCC): Primary | Chronic | ICD-10-CM

## 2019-07-10 PROCEDURE — 99213 OFFICE O/P EST LOW 20 MIN: CPT | Performed by: SURGERY

## 2019-07-11 ENCOUNTER — HOSPITAL ENCOUNTER (OUTPATIENT)
Dept: WOUND CARE | Age: 79
Discharge: HOME | End: 2019-07-11
Payer: MEDICARE

## 2019-07-11 VITALS
TEMPERATURE: 97.5 F | HEART RATE: 88 BPM | HEIGHT: 70 IN | BODY MASS INDEX: 19.76 KG/M2 | SYSTOLIC BLOOD PRESSURE: 132 MMHG | DIASTOLIC BLOOD PRESSURE: 54 MMHG | RESPIRATION RATE: 16 BRPM | WEIGHT: 138 LBS

## 2019-07-11 PROCEDURE — 11042 DBRDMT SUBQ TIS 1ST 20SQCM/<: CPT

## 2019-07-11 RX ORDER — LIDOCAINE HYDROCHLORIDE 20 MG/ML
JELLY TOPICAL ONCE
Status: DISCONTINUED | OUTPATIENT
Start: 2019-07-11 | End: 2019-07-12 | Stop reason: HOSPADM

## 2019-07-11 NOTE — PROGRESS NOTES
feet, covered with devitalized nonviable tissue. No purulence or odor. Vasc intact, LOPS. Assessment:     DM with ulcers. Neuropathy. PVD. Procedure Note  Indications:  Based on my examination of this patient's wound(s)/ulcer(s) today, debridement is required to promote healing and evaluate the wound base. Performed by: Wendi Carrel, DPM    Consent obtained:  Yes    Time out taken:  Yes    Pain Control: Anesthetic  Anesthetic: 2% Lidocaine Gel Topical     Debridement:Excisional Debridement    Using curette and # 10 blade scalpel the wound(s)/ulcer(s) was/were sharply debrided down through and including the removal of subcutaneous tissue. Devitalized Tissue Debrided:  biofilm, slough and necrotic/eschar to stimulate bleeding to promote healing, post debridement good bleeding base and wound edges noted    Pre Debridement Measurements:  Are located in the [place]  Documentation Flow Sheet    Wound/Ulcer #: 7 and 10    Post Debridement Measurements:  Wound/Ulcer Descriptions are Pre Debridement except measurements:    Wound 05/06/19 Other (Comment) Right #4 acquired 4-25-19 2nd toe amp site (Active)   Wound Image   6/27/2019 10:17 AM   Wound Arterial 5/6/2019  9:28 AM   Dressing Status Clean;Dry; Intact 6/6/2019  2:17 PM   Dressing Changed Changed/New 7/11/2019  2:37 PM   Dressing/Treatment Xeroform 7/11/2019  2:37 PM   Wound Cleansed Rinsed/Irrigated with saline 7/11/2019  2:37 PM   Wound Length (cm) 0.7 cm 7/11/2019 11:06 AM   Wound Width (cm) 0.8 cm 7/11/2019 11:06 AM   Wound Depth (cm) 0.7 cm 7/11/2019 11:06 AM   Wound Surface Area (cm^2) 0.56 cm^2 7/11/2019 11:06 AM   Change in Wound Size % (l*w) 76.67 7/11/2019 11:06 AM   Wound Volume (cm^3) 0.39 cm^3 7/11/2019 11:06 AM   Wound Healing % 91 7/11/2019 11:06 AM   Post-Procedure Length (cm) 1 cm 6/27/2019 11:07 AM   Post-Procedure Width (cm) 0.9 cm 6/27/2019 11:07 AM   Post-Procedure Depth (cm) 0.6 cm 6/27/2019 11:07 AM   Post-Procedure Surface

## 2019-07-18 ENCOUNTER — HOSPITAL ENCOUNTER (OUTPATIENT)
Dept: WOUND CARE | Age: 79
Discharge: HOME | End: 2019-07-18
Payer: MEDICARE

## 2019-07-18 VITALS
HEIGHT: 70 IN | SYSTOLIC BLOOD PRESSURE: 118 MMHG | RESPIRATION RATE: 16 BRPM | DIASTOLIC BLOOD PRESSURE: 68 MMHG | HEART RATE: 72 BPM | TEMPERATURE: 97.6 F | BODY MASS INDEX: 19.76 KG/M2 | WEIGHT: 138 LBS

## 2019-07-18 PROCEDURE — 11042 DBRDMT SUBQ TIS 1ST 20SQCM/<: CPT

## 2019-07-18 RX ORDER — LIDOCAINE HYDROCHLORIDE 20 MG/ML
JELLY TOPICAL ONCE
Status: DISCONTINUED | OUTPATIENT
Start: 2019-07-18 | End: 2019-07-19 | Stop reason: HOSPADM

## 2019-07-18 ASSESSMENT — PAIN SCALES - GENERAL: PAINLEVEL_OUTOF10: 0

## 2019-07-18 ASSESSMENT — PAIN DESCRIPTION - PROGRESSION: CLINICAL_PROGRESSION: NOT CHANGED

## 2019-07-18 NOTE — PROGRESS NOTES
12/31/2016    2nd    TOE AMPUTATION Right 4/26/2019    AMPUTATION RIGHT SECOND TOE, FOOT DEBRIDEMENT performed by Dorthy Snellen, DPM at [facility] OR    VASCULAR SURGERY       Family History   Problem Relation Age of Onset    Heart Disease Mother     Heart Disease Father      Social History     Tobacco Use    Smoking status: Former Smoker    Smokeless tobacco: Never Used   Substance Use Topics    Alcohol use: No    Drug use: No     Allergies   Allergen Reactions    Latex Other (See Comments)     Pt unsure of reaction    Aspirin Other (See Comments)     \"It affects my kidneys. \"   Ofilia Hussein [Penicillins] Other (See Comments)     \"I just know my body doesn't like it. \" \"I had a reaction a long time ago, I know it affects my kidneys. \"    Other Rash     \"I get a rash on just my face if I eat Cumin or Chili. \"    Peanut-Containing Drug Products Itching     Current Outpatient Medications on File Prior to Encounter   Medication Sig Dispense Refill    Multiple Vitamins-Minerals (THERAPEUTIC MULTIVITAMIN-MINERALS) tablet Take 1 tablet by mouth daily      acetaminophen (TYLENOL) 325 MG tablet Take 650 mg by mouth every 4 hours as needed for Pain or Fever      tamsulosin (FLOMAX) 0.4 MG capsule Take 1 capsule by mouth nightly 30 capsule 3     No current facility-administered medications on file prior to encounter. REVIEW OF SYSTEMS See HPI    Objective:    /68   Pulse 72   Temp 97.6 °F (36.4 °C) (Oral)   Resp 16   Ht 5' 10\" (1.778 m)   Wt 138 lb (62.6 kg)   BMI 19.80 kg/m²   Wt Readings from Last 3 Encounters:   07/18/19 138 lb (62.6 kg)   07/11/19 138 lb (62.6 kg)   06/20/19 138 lb (62.6 kg)     PHYSICAL EXAM  CONSTITUTIONAL:   Awake, alert, cooperative   EYES:  lids and lashes normal   ENT: external ears and nose without lesions   NECK:  supple, symmetrical, trachea midline   SKIN:  Open wounds right lateral, 20% devitalized nonviable tissue. Left plantar 50% devitalized nonviable tissue.   Both areas Width (cm) 0.4 cm 7/18/2019 11:05 AM   Post-Procedure Depth (cm) 0.2 cm 7/18/2019 11:05 AM   Post-Procedure Surface Area (cm^2) 0.12 cm^2 7/18/2019 11:05 AM   Post-Procedure Volume (cm^3) 0.02 cm^3 7/18/2019 11:05 AM   Distance Tunneling (cm) 2.3 cm 5/6/2019  9:28 AM   Tunneling Position ___ O'Clock 12 5/6/2019  9:28 AM   Wound Assessment Pale;Pink;Red 7/18/2019 10:29 AM   Drainage Amount Moderate 7/18/2019 10:29 AM   Drainage Description Serosanguinous 7/18/2019 10:29 AM   Odor None 7/18/2019 10:29 AM   Latonya-wound Assessment Brown; Intact; [place] 7/18/2019 10:29 AM   Number of days: 73       Wound 05/06/19 Foot Plantar;Right #5 acquired 8-1-18 1st met (Active)   Wound Image   6/27/2019 10:17 AM   Wound Arterial 5/6/2019  9:28 AM   Dressing Status Clean;Dry; Intact 6/6/2019  2:17 PM   Dressing Changed Changed/New 7/11/2019  2:37 PM   Dressing/Treatment Xeroform 7/11/2019  2:37 PM   Wound Cleansed Rinsed/Irrigated with saline 7/11/2019  2:37 PM   Wound Length (cm) 0.5 cm 7/18/2019 10:29 AM   Wound Width (cm) 0.4 cm 7/18/2019 10:29 AM   Wound Depth (cm) 0.1 cm 7/18/2019 10:29 AM   Wound Surface Area (cm^2) 0.2 cm^2 7/18/2019 10:29 AM   Change in Wound Size % (l*w) 94.51 7/18/2019 10:29 AM   Wound Volume (cm^3) 0.02 cm^3 7/18/2019 10:29 AM   Wound Healing % 94 7/18/2019 10:29 AM   Post-Procedure Length (cm) 0.5 cm 7/18/2019 11:05 AM   Post-Procedure Width (cm) 0.4 cm 7/18/2019 11:05 AM   Post-Procedure Depth (cm) 0.1 cm 7/18/2019 11:05 AM   Post-Procedure Surface Area (cm^2) 0.2 cm^2 7/18/2019 11:05 AM   Post-Procedure Volume (cm^3) 0.02 cm^3 7/18/2019 11:05 AM   Wound Assessment Red 7/18/2019 10:29 AM   Drainage Amount None 7/18/2019 10:29 AM   Drainage Description Serosanguinous; Yellow 6/20/2019 10:29 AM   Odor None 7/18/2019 10:29 AM   Latonya-wound Assessment Dry;Calloused 7/18/2019 10:29 AM   Number of days: 73       Wound 05/06/19 Foot Right;Lateral #6 acquired 8-1-18 lateral 5th met.  (Active)   Wound Image   6/27/2019 7/18/2019 10:29 AM   Wound Healing % 100 7/18/2019 10:29 AM   Post-Procedure Length (cm) 0.4 cm 7/18/2019 11:05 AM   Post-Procedure Width (cm) 0.4 cm 7/18/2019 11:05 AM   Post-Procedure Depth (cm) 0.1 cm 7/18/2019 11:05 AM   Post-Procedure Surface Area (cm^2) 0.16 cm^2 7/18/2019 11:05 AM   Post-Procedure Volume (cm^3) 0.02 cm^3 7/18/2019 11:05 AM   Wound Assessment Red;Pink;Yellow 7/18/2019 10:29 AM   Drainage Amount Small 7/18/2019 10:29 AM   Drainage Description Serosanguinous 7/18/2019 10:29 AM   Odor None 7/18/2019 10:29 AM   Latonya-wound Assessment Intact 7/18/2019 10:29 AM   Number of days: 73       Wound 05/06/19 Toe (Comment  which one) Right;Dorsal #8 acquired 8-1-18 3rd toe (Active)   Wound Image   6/27/2019 10:17 AM   Wound Arterial 5/6/2019  9:28 AM   Dressing Status Clean;Dry; Intact 6/6/2019  2:17 PM   Dressing Changed Changed/New 7/11/2019  2:37 PM   Dressing/Treatment Xeroform 7/11/2019  2:37 PM   Wound Cleansed Rinsed/Irrigated with saline 7/11/2019  2:37 PM   Wound Length (cm) 0.1 cm 7/18/2019 10:29 AM   Wound Width (cm) 0.1 cm 7/18/2019 10:29 AM   Wound Depth (cm) 0.1 cm 7/18/2019 10:29 AM   Wound Surface Area (cm^2) 0.01 cm^2 7/18/2019 10:29 AM   Change in Wound Size % (l*w) 99.23 7/18/2019 10:29 AM   Wound Volume (cm^3) 0 cm^3 7/18/2019 10:29 AM   Wound Healing % 100 7/18/2019 10:29 AM   Post-Procedure Length (cm) 0.1 cm 7/18/2019 11:05 AM   Post-Procedure Width (cm) 0.1 cm 7/18/2019 11:05 AM   Post-Procedure Depth (cm) 0.1 cm 7/18/2019 11:05 AM   Post-Procedure Surface Area (cm^2) 0.01 cm^2 7/18/2019 11:05 AM   Post-Procedure Volume (cm^3) 0 cm^3 7/18/2019 11:05 AM   Wound Assessment Pink 7/18/2019 10:29 AM   Drainage Amount None 7/11/2019 11:06 AM   Drainage Description Serosanguinous 6/27/2019 10:17 AM   Odor None 7/18/2019 10:29 AM   Latonya-wound Assessment Intact;Dry 7/18/2019 10:29 AM   Number of days: 73       Wound 05/30/19 Foot Plantar;Left #10 ACQUIRE 5-30-19 (Active)   Wound Image 6/27/2019 10:17 AM   Wound Diabetic Dale 1 5/30/2019 10:14 AM   Dressing Status Clean;Dry; Intact 6/6/2019  2:16 PM   Dressing Changed Changed/New 7/11/2019  2:37 PM   Dressing/Treatment Xeroform 7/11/2019  2:37 PM   Wound Cleansed Rinsed/Irrigated with saline 7/11/2019  2:37 PM   Wound Length (cm) 0.1 cm 7/18/2019 10:29 AM   Wound Width (cm) 0.3 cm 7/18/2019 10:29 AM   Wound Depth (cm) 0.1 cm 7/18/2019 10:29 AM   Wound Surface Area (cm^2) 0.03 cm^2 7/18/2019 10:29 AM   Change in Wound Size % (l*w) 93.75 7/18/2019 10:29 AM   Wound Volume (cm^3) 0 cm^3 7/18/2019 10:29 AM   Wound Healing % 100 7/18/2019 10:29 AM   Post-Procedure Length (cm) 0.1 cm 7/18/2019 11:05 AM   Post-Procedure Width (cm) 0.3 cm 7/18/2019 11:05 AM   Post-Procedure Depth (cm) 0.1 cm 7/18/2019 11:05 AM   Post-Procedure Surface Area (cm^2) 0.03 cm^2 7/18/2019 11:05 AM   Post-Procedure Volume (cm^3) 0 cm^3 7/18/2019 11:05 AM   Wound Assessment Red 7/18/2019 10:29 AM   Drainage Amount None 7/18/2019 10:29 AM   Drainage Description Serosanguinous 7/11/2019 10:56 AM   Odor None 7/18/2019 10:29 AM   Latonya-wound Assessment Calloused;Dark edges 7/18/2019 10:29 AM   Number of days: 49     Percent of Wound/Ulcer Debrided: 100%    Total Surface Area Debrided:  2 sq cm     Estimated Blood Loss:  Minimal  Hemostasis Achieved:  by pressure    Procedural Pain:  0  / 10   Post Procedural Pain:  0 / 10     Response to treatment:  Well tolerated by patient. Plan:   Treatment Note please see attached Discharge Instructions. If any questions or concerns contact wound care center    Written patient dismissal instructions given to patient and signed by patient or POA.          Discharge Instructions       Visit Discharge/Physician Orders     Discharge condition: Stable     Assessment of pain at discharge:N0NE     Anesthetic used: LIDOCAINE 2%     Discharge to: ECF     Left via:ambulance     Accompanied by: WIFE     ECF/A: [id number]

## 2019-07-25 ENCOUNTER — HOSPITAL ENCOUNTER (OUTPATIENT)
Dept: WOUND CARE | Age: 79
Discharge: HOME | End: 2019-07-25
Payer: MEDICARE

## 2019-07-25 VITALS
DIASTOLIC BLOOD PRESSURE: 68 MMHG | RESPIRATION RATE: 14 BRPM | HEART RATE: 72 BPM | BODY MASS INDEX: 18.18 KG/M2 | TEMPERATURE: 97.5 F | HEIGHT: 70 IN | WEIGHT: 127 LBS | SYSTOLIC BLOOD PRESSURE: 132 MMHG

## 2019-07-25 PROCEDURE — 11042 DBRDMT SUBQ TIS 1ST 20SQCM/<: CPT

## 2019-07-25 NOTE — PROGRESS NOTES
fluctuance. No increase in temperature or pain. Vascular intact. Loss of protective sensation. Assessment:     DM ulcer, neuropathy. PVD. Procedure Note  Indications:  Based on my examination of this patient's wound(s)/ulcer(s) today, debridement is required to promote healing and evaluate the wound base. Performed by: Lyle Burroughs DPM    Consent obtained:  Yes    Time out taken:  Yes    Pain Control: Anesthetic  Anesthetic: 2% Lidocaine Gel Topical     Debridement:Excisional Debridement    Using curette and tissue nippers the wound(s)/ulcer(s) was/were sharply debrided down through and including the removal of subcutaneous tissue. Devitalized Tissue Debrided:  slough and necrotic/eschar to stimulate bleeding to promote healing, post debridement good bleeding base and wound edges noted    Pre Debridement Measurements:  Are located in the [facility]  Documentation Flow Sheet    Wound/Ulcer #: 6    Post Debridement Measurements:  Wound/Ulcer Descriptions are Pre Debridement except measurements:    Wound 05/06/19 Other (Comment) Right #4 acquired 4-25-19 2nd toe amp site (Active)   Wound Image   7/25/2019 11:59 AM   Wound Arterial 5/6/2019  9:28 AM   Dressing Status Clean;Dry; Intact 6/6/2019  2:17 PM   Dressing Changed Changed/New 7/18/2019 11:25 AM   Dressing/Treatment Xeroform;Dry Dressing 7/18/2019 11:25 AM   Wound Cleansed Rinsed/Irrigated with saline 7/18/2019 11:25 AM   Wound Length (cm) 0.7 cm 7/25/2019 11:59 AM   Wound Width (cm) 0.6 cm 7/25/2019 11:59 AM   Wound Depth (cm) 0.6 cm 7/25/2019 11:59 AM   Wound Surface Area (cm^2) 0.42 cm^2 7/25/2019 11:59 AM   Change in Wound Size % (l*w) 82.5 7/25/2019 11:59 AM   Wound Volume (cm^3) 0.25 cm^3 7/25/2019 11:59 AM   Wound Healing % 94 7/25/2019 11:59 AM   Post-Procedure Length (cm) 0.7 cm 7/25/2019 12:20 PM   Post-Procedure Width (cm) 0.7 cm 7/25/2019 12:20 PM   Post-Procedure Depth (cm) 0.6 cm 7/25/2019 12:20 PM   Post-Procedure Surface Area 7/25/2019 12:20 PM   Post-Procedure Width (cm) 0.6 cm 7/25/2019 12:20 PM   Post-Procedure Depth (cm) 0.1 cm 7/25/2019 12:20 PM   Post-Procedure Surface Area (cm^2) 0.3 cm^2 7/25/2019 12:20 PM   Post-Procedure Volume (cm^3) 0.03 cm^3 7/25/2019 12:20 PM   Wound Assessment Pink;Red;Yellow 7/25/2019 11:59 AM   Drainage Amount Small 7/25/2019 11:59 AM   Drainage Description Serosanguinous 7/25/2019 11:59 AM   Odor None 7/25/2019 11:59 AM   Latonya-wound Assessment Intact 7/18/2019 10:29 AM   Number of days: 80       Wound 05/06/19 Toe (Comment  which one) Right;Dorsal #8 acquired 8-1-18 3rd toe (Active)   Wound Image   7/25/2019 11:59 AM   Wound Arterial 5/6/2019  9:28 AM   Dressing Status Clean;Dry; Intact 6/6/2019  2:17 PM   Dressing Changed Changed/New 7/18/2019 11:25 AM   Dressing/Treatment Xeroform;Dry Dressing 7/18/2019 11:25 AM   Wound Cleansed Rinsed/Irrigated with saline 7/18/2019 11:25 AM   Wound Length (cm) 0.2 cm 7/25/2019 11:59 AM   Wound Width (cm) 0.3 cm 7/25/2019 11:59 AM   Wound Depth (cm) 0.1 cm 7/25/2019 11:59 AM   Wound Surface Area (cm^2) 0.06 cm^2 7/25/2019 11:59 AM   Change in Wound Size % (l*w) 95.38 7/25/2019 11:59 AM   Wound Volume (cm^3) 0.01 cm^3 7/25/2019 11:59 AM   Wound Healing % 97 7/25/2019 11:59 AM   Post-Procedure Length (cm) 0.2 cm 7/25/2019 12:20 PM   Post-Procedure Width (cm) 0.3 cm 7/25/2019 12:20 PM   Post-Procedure Depth (cm) 0.1 cm 7/25/2019 12:20 PM   Post-Procedure Surface Area (cm^2) 0.06 cm^2 7/25/2019 12:20 PM   Post-Procedure Volume (cm^3) 0.01 cm^3 7/25/2019 12:20 PM   Wound Assessment Pink;Yellow 7/25/2019 11:59 AM   Drainage Amount Scant 7/25/2019 11:59 AM   Drainage Description Yellow 7/25/2019 11:59 AM   Odor None 7/25/2019 11:59 AM   Latonya-wound Assessment Pink 7/25/2019 11:59 AM   Number of days: 80       Wound 05/30/19 Foot Plantar;Left #10 ACQUIRE 5-30-19 (Active)   Wound Image   7/25/2019 11:59 AM   Wound Diabetic Dale 1 5/30/2019 10:14 AM   Dressing Status

## 2019-08-01 ENCOUNTER — HOSPITAL ENCOUNTER (OUTPATIENT)
Dept: WOUND CARE | Age: 79
Discharge: HOME | End: 2019-08-01
Payer: MEDICARE

## 2019-08-08 ENCOUNTER — HOSPITAL ENCOUNTER (OUTPATIENT)
Dept: WOUND CARE | Age: 79
Discharge: HOME | End: 2019-08-08
Payer: MEDICARE

## 2019-08-08 VITALS
TEMPERATURE: 98.2 F | DIASTOLIC BLOOD PRESSURE: 68 MMHG | HEART RATE: 76 BPM | BODY MASS INDEX: 18.18 KG/M2 | HEIGHT: 70 IN | SYSTOLIC BLOOD PRESSURE: 108 MMHG | WEIGHT: 127 LBS | RESPIRATION RATE: 16 BRPM

## 2019-08-08 PROCEDURE — 11042 DBRDMT SUBQ TIS 1ST 20SQCM/<: CPT

## 2019-08-15 ENCOUNTER — HOSPITAL ENCOUNTER (OUTPATIENT)
Dept: WOUND CARE | Age: 79
Discharge: HOME | End: 2019-08-15
Payer: MEDICARE

## 2019-08-15 VITALS
TEMPERATURE: 97.6 F | HEART RATE: 84 BPM | WEIGHT: 127 LBS | DIASTOLIC BLOOD PRESSURE: 62 MMHG | RESPIRATION RATE: 16 BRPM | SYSTOLIC BLOOD PRESSURE: 124 MMHG | HEIGHT: 70 IN | BODY MASS INDEX: 18.18 KG/M2

## 2019-08-15 PROCEDURE — 11042 DBRDMT SUBQ TIS 1ST 20SQCM/<: CPT

## 2019-08-15 RX ORDER — LIDOCAINE HYDROCHLORIDE 20 MG/ML
JELLY TOPICAL ONCE
Status: DISCONTINUED | OUTPATIENT
Start: 2019-08-15 | End: 2019-08-16 | Stop reason: HOSPADM

## 2019-08-15 NOTE — PROGRESS NOTES
(cm^3) 1.76 cm^3 8/15/2019  9:54 AM   Wound Healing % 72 8/15/2019  9:54 AM   Post-Procedure Length (cm) 2.4 cm 8/15/2019 10:22 AM   Post-Procedure Width (cm) 4 cm 8/15/2019 10:22 AM   Post-Procedure Depth (cm) 0.3 cm 8/15/2019 10:22 AM   Post-Procedure Surface Area (cm^2) 9.6 cm^2 8/15/2019 10:22 AM   Post-Procedure Volume (cm^3) 2.88 cm^3 8/15/2019 10:22 AM   Wound Assessment Red;Tan;Yellow 8/15/2019  9:54 AM   Drainage Amount Small 8/15/2019  9:54 AM   Drainage Description Serosanguinous; Yellow 8/15/2019  9:54 AM   Odor None 8/15/2019  9:54 AM   Latonya-wound Assessment Black;Dry 8/15/2019  9:54 AM   Number of days: 101     Percent of Wound/Ulcer Debrided: 100%    Total Surface Area Debrided:  10 sq cm     Estimated Blood Loss:  Minimal  Hemostasis Achieved:  by pressure    Procedural Pain:  0  / 10   Post Procedural Pain:  0 / 10     Response to treatment:  Well tolerated by patient. Plan:   Treatment Note please see attached Discharge Instructions. Discussed compliance, have to follow wound care directions no Betadine. Written patient dismissal instructions given to patient and signed by patient or POA. Discharge Instructions         Visit Discharge/Physician Orders     Discharge condition: Stable     Assessment of pain at discharge:N0NE     Anesthetic used: LIDOCAINE 2%     Discharge to: home     Left via:ambulance     Accompanied by: WIFE     ECF/HHA:       Dressing Orders:WOUND LOCATION  RIGHT FOOT, TOES CLEANSE WOUND WITH NORMAL SALINE APPLY XEROFORM TO ALL AREAS, PAD WELL AND SECURE. CHANGE DAILY.     MOISTURIZE DAILY.     Treatment Orders:FOLLOW A NUTRITIOUS DIET HIGH IN PROTEIN AND VITAMIN C TO PROMOTE WOUND HEALING.  TAKE A MULTIVITAMIN DAILY.     KEEP PRESSURE OFF ALL WOUNDS AT ALL TIMES      PLETAL AS ORDERED      SEND TO DR Tracy Garcia AS NEEDED      Strict non-weightbearing right heeel     [facility] followup visit _______1 WEEK Dr. Whitfield_____________________  (Please note your next

## 2019-08-22 ENCOUNTER — HOSPITAL ENCOUNTER (OUTPATIENT)
Dept: WOUND CARE | Age: 79
Discharge: HOME | End: 2019-08-22
Payer: MEDICARE

## 2019-08-22 VITALS
HEIGHT: 70 IN | SYSTOLIC BLOOD PRESSURE: 142 MMHG | TEMPERATURE: 97.4 F | HEART RATE: 85 BPM | WEIGHT: 137 LBS | BODY MASS INDEX: 19.61 KG/M2 | DIASTOLIC BLOOD PRESSURE: 60 MMHG | RESPIRATION RATE: 16 BRPM

## 2019-08-22 PROCEDURE — 11042 DBRDMT SUBQ TIS 1ST 20SQCM/<: CPT

## 2019-08-22 RX ORDER — LIDOCAINE HYDROCHLORIDE 20 MG/ML
JELLY TOPICAL ONCE
Status: DISCONTINUED | OUTPATIENT
Start: 2019-08-22 | End: 2019-08-23 | Stop reason: HOSPADM

## 2019-08-22 ASSESSMENT — PAIN SCALES - GENERAL: PAINLEVEL_OUTOF10: 0

## 2019-08-22 NOTE — PROGRESS NOTES
purulence or odor. No surrounding erythema. Vascular intact. Loss of protective sensation. Assessment:     Problem List Items Addressed This Visit     None        Procedure Note  Indications:  Based on my examination of this patient's wound(s)/ulcer(s) today, debridement is required to promote healing and evaluate the wound base. Performed by: Brooks Lacy DPM    Consent obtained:  Yes    Time out taken:  Yes    Pain Control: Anesthetic  Anesthetic: 2% Lidocaine Gel Topical     Debridement:Excisional Debridement    Using curette and #15 blade scalpel the wound(s)/ulcer(s) was/were sharply debrided down through and including the removal of subcutaneous tissue. Devitalized Tissue Debrided:  fibrin, biofilm, slough and necrotic/eschar to stimulate bleeding to promote healing, post debridement good bleeding base and wound edges noted    Pre Debridement Measurements:  Are located in the [facility]  Documentation Flow Sheet    Wound/Ulcer #: 6 and 7    Post Debridement Measurements:  Wound/Ulcer Descriptions are Pre Debridement except measurements:    Wound 05/06/19 Other (Comment) Right #4 acquired 4-25-19 2nd toe amp site (Active)   Wound Image   8/8/2019 11:36 AM   Wound Arterial 5/6/2019  9:28 AM   Dressing Status Clean;Dry; Intact 8/15/2019 11:02 AM   Dressing Changed Changed/New 8/15/2019 11:02 AM   Dressing/Treatment Xeroform;Dry Dressing 8/15/2019 11:02 AM   Wound Cleansed Rinsed/Irrigated with saline 8/15/2019 11:02 AM   Wound Length (cm) 0.3 cm 8/22/2019 10:55 AM   Wound Width (cm) 0.3 cm 8/22/2019 10:55 AM   Wound Depth (cm) 0.3 cm 8/22/2019 10:55 AM   Wound Surface Area (cm^2) 0.09 cm^2 8/22/2019 10:55 AM   Change in Wound Size % (l*w) 96.25 8/22/2019 10:55 AM   Wound Volume (cm^3) 0.03 cm^3 8/22/2019 10:55 AM   Wound Healing % 99 8/22/2019 10:55 AM   Post-Procedure Length (cm) 0.3 cm 8/22/2019 11:14 AM   Post-Procedure Width (cm) 0.3 cm 8/22/2019 11:14 AM   Post-Procedure Depth (cm) 0.3 cm 8/22/2019 11:14 AM   Post-Procedure Surface Area (cm^2) 0.09 cm^2 8/22/2019 11:14 AM   Post-Procedure Volume (cm^3) 0.03 cm^3 8/22/2019 11:14 AM   Distance Tunneling (cm) 2.3 cm 5/6/2019  9:28 AM   Tunneling Position ___ O'Clock 12 5/6/2019  9:28 AM   Wound Assessment Pink 8/22/2019 10:55 AM   Drainage Amount Scant 8/22/2019 10:55 AM   Drainage Description Serous 8/22/2019 10:55 AM   Odor None 8/22/2019 10:55 AM   Latonya-wound Assessment Intact 8/22/2019 10:55 AM   Number of days: 108       Wound 05/06/19 Foot Right;Lateral #6 acquired 8-1-18 lateral 5th met. (Active)   Wound Image   8/8/2019 11:36 AM   Wound Arterial 5/6/2019  9:28 AM   Dressing Status Clean;Dry; Intact 8/15/2019 11:02 AM   Dressing Changed Changed/New 8/15/2019 11:02 AM   Dressing/Treatment Dry Dressing;Xeroform 8/15/2019 11:02 AM   Wound Cleansed Rinsed/Irrigated with saline 8/15/2019 11:02 AM   Wound Length (cm) 0.8 cm 8/22/2019 10:55 AM   Wound Width (cm) 0.7 cm 8/22/2019 10:55 AM   Wound Depth (cm) 0.1 cm 8/22/2019 10:55 AM   Wound Surface Area (cm^2) 0.56 cm^2 8/22/2019 10:55 AM   Change in Wound Size % (l*w) 87.04 8/22/2019 10:55 AM   Wound Volume (cm^3) 0.06 cm^3 8/22/2019 10:55 AM   Wound Healing % 95 8/22/2019 10:55 AM   Post-Procedure Length (cm) 0.8 cm 8/22/2019 11:14 AM   Post-Procedure Width (cm) 0.7 cm 8/22/2019 11:14 AM   Post-Procedure Depth (cm) 0.2 cm 8/22/2019 11:14 AM   Post-Procedure Surface Area (cm^2) 0.56 cm^2 8/22/2019 11:14 AM   Post-Procedure Volume (cm^3) 0.11 cm^3 8/22/2019 11:14 AM   Undermining Starts ___ O'Clock 9 6/13/2019 10:18 AM   Undermining Ends___ O'Clock 3 6/13/2019 10:18 AM   Undermining Maxium Distance (cm) 0 8/8/2019 11:36 AM   Wound Assessment Purple;Red 8/22/2019 10:55 AM   Drainage Amount Scant 8/22/2019 10:55 AM   Drainage Description Serous 8/22/2019 10:55 AM   Odor None 8/22/2019 10:55 AM   Latonya-wound Assessment Dark edges 8/22/2019 10:55 AM   Number of days: 108       Wound 05/06/19 Heel Right;Plantar TOES CLEANSE WOUND WITH NORMAL SALINE APPLY XEROFORM TO ALL AREAS, PAD WELL AND SECURE. CHANGE DAILY.     MOISTURIZE DAILY.     Treatment Orders:FOLLOW A NUTRITIOUS DIET HIGH IN PROTEIN AND VITAMIN C TO PROMOTE WOUND HEALING. TAKE A MULTIVITAMIN DAILY.     KEEP PRESSURE OFF ALL WOUNDS AT ALL TIMES      PLETAL AS ORDERED      SEND TO DR Hesham Fuller AS NEEDED      Strict non-weightbearing right heeel     [facility] followup visit _______1 WEEK Dr. Whitfield_____________________  (Please note your next appointment above and if you are unable to keep, kindly give a 24 hour notice.  Thank you.)     Physician signature:__________________________        If you experience any of the following, please call the [facility] during business hours:     * Increase in Pain  * Temperature over 101  * Increase in drainage from your wound  * Drainage with a foul odor  * Bleeding  * Increase in swelling  * Need for compression bandage changes due to slippage, breakthrough drainage.     If you need medical attention outside of the business hours of the [facility] please contact your PCP or go to the nearest emergency room.                                                                                               Electronically signed by Isabell Kapadia DPM on 8/22/2019 at 11:47 AM

## 2019-08-29 ENCOUNTER — HOSPITAL ENCOUNTER (OUTPATIENT)
Dept: WOUND CARE | Age: 79
Discharge: HOME | End: 2019-08-29
Payer: MEDICARE

## 2019-08-29 VITALS
TEMPERATURE: 97.9 F | HEART RATE: 64 BPM | SYSTOLIC BLOOD PRESSURE: 132 MMHG | WEIGHT: 137 LBS | BODY MASS INDEX: 19.61 KG/M2 | HEIGHT: 70 IN | DIASTOLIC BLOOD PRESSURE: 62 MMHG | RESPIRATION RATE: 18 BRPM

## 2019-08-29 PROCEDURE — 11042 DBRDMT SUBQ TIS 1ST 20SQCM/<: CPT

## 2019-08-29 RX ORDER — LIDOCAINE HYDROCHLORIDE 20 MG/ML
JELLY TOPICAL ONCE
Status: DISCONTINUED | OUTPATIENT
Start: 2019-08-29 | End: 2019-08-30 | Stop reason: HOSPADM

## 2019-08-29 NOTE — PROGRESS NOTES
Both areas are without any purulence or odor.  No surrounding erythema.  Vascular intact.  Loss of protective sensation. Assessment:     Diabetic with ulcers. Neuropathy. PVD. Procedure Note  Indications:  Based on my examination of this patient's wound(s)/ulcer(s) today, debridement is required to promote healing and evaluate the wound base. Performed by: Wendi Carrel, DPM    Consent obtained:  Yes    Time out taken:  Yes    Pain Control: Anesthetic  Anesthetic: 2% Lidocaine Gel Topical     Debridement:Excisional Debridement    Using curette and # 10 blade scalpel the wound(s)/ulcer(s) was/were sharply debrided down through and including the removal of subcutaneous tissue. Devitalized Tissue Debrided:  biofilm, slough and necrotic/eschar to stimulate bleeding to promote healing, post debridement good bleeding base and wound edges noted    Pre Debridement Measurements:  Are located in the [facility]  Documentation Flow Sheet    Wound/Ulcer #: 6 and 7    Post Debridement Measurements:  Wound/Ulcer Descriptions are Pre Debridement except measurements:    Wound 05/06/19 Other (Comment) Right #4 acquired 4-25-19 2nd toe amp site (Active)   Wound Image   8/8/2019 11:36 AM   Wound Arterial 5/6/2019  9:28 AM   Offloading for Diabetic Foot Ulcers Other (comment) 8/29/2019 10:54 AM   Dressing Status Clean;Dry; Intact 8/29/2019 11:34 AM   Dressing Changed Changed/New 8/29/2019 11:34 AM   Dressing/Treatment Dry Dressing;Xeroform 8/29/2019 11:34 AM   Wound Cleansed Rinsed/Irrigated with saline 8/29/2019 11:34 AM   Wound Length (cm) 0.4 cm 8/29/2019 10:54 AM   Wound Width (cm) 0.3 cm 8/29/2019 10:54 AM   Wound Depth (cm) 0.3 cm 8/29/2019 10:54 AM   Wound Surface Area (cm^2) 0.12 cm^2 8/29/2019 10:54 AM   Change in Wound Size % (l*w) 95 8/29/2019 10:54 AM   Wound Volume (cm^3) 0.04 cm^3 8/29/2019 10:54 AM   Wound Healing % 99 8/29/2019 10:54 AM   Post-Procedure Length (cm) 0.4 cm 8/29/2019 11:06 AM Serosanguinous 8/29/2019 10:54 AM   Odor None 8/29/2019 10:54 AM   Latonya-wound Assessment Dark edges 8/29/2019 10:54 AM   Number of days: 115       Wound 05/06/19 Heel Right;Plantar #7 acquired 8-1-18 (Active)   Wound Image   8/8/2019 11:36 AM   Wound Arterial 5/6/2019  9:28 AM   Offloading for Diabetic Foot Ulcers Other (comment) 8/29/2019 10:54 AM   Dressing Status Clean;Dry; Intact 8/29/2019 11:34 AM   Dressing Changed Changed/New 8/29/2019 11:34 AM   Dressing/Treatment ABD; Dry Dressing;Xeroform 8/29/2019 11:34 AM   Wound Cleansed Rinsed/Irrigated with saline 8/29/2019 11:34 AM   Wound Length (cm) 1 cm 8/29/2019 10:54 AM   Wound Width (cm) 0.5 cm 8/29/2019 10:54 AM   Wound Depth (cm) 0.1 cm 8/29/2019 10:54 AM   Wound Surface Area (cm^2) 0.5 cm^2 8/29/2019 10:54 AM   Change in Wound Size % (l*w) 97.64 8/29/2019 10:54 AM   Wound Volume (cm^3) 0.05 cm^3 8/29/2019 10:54 AM   Wound Healing % 99 8/29/2019 10:54 AM   Post-Procedure Length (cm) 1 cm 8/29/2019 11:06 AM   Post-Procedure Width (cm) 0.5 cm 8/29/2019 11:06 AM   Post-Procedure Depth (cm) 0.1 cm 8/29/2019 11:06 AM   Post-Procedure Surface Area (cm^2) 0.5 cm^2 8/29/2019 11:06 AM   Post-Procedure Volume (cm^3) 0.05 cm^3 8/29/2019 11:06 AM   Wound Assessment Pink;Yellow 8/29/2019 10:54 AM   Drainage Amount Small 8/29/2019 10:54 AM   Drainage Description Serosanguinous 8/29/2019 10:54 AM   Odor None 8/29/2019 10:54 AM   Latonya-wound Assessment Dark edges;Calloused 8/29/2019 10:54 AM   Number of days: 115     Percent of Wound/Ulcer Debrided: 100%    Total Surface Area Debrided:  1.5 sq cm     Estimated Blood Loss:  Minimal  Hemostasis Achieved:  by pressure    Procedural Pain:  0  / 10   Post Procedural Pain:  0 / 10     Response to treatment:  Well tolerated by patient. Plan:   Treatment Note please see attached Discharge Instructions    Written patient dismissal instructions given to patient and signed by patient or POA.          Discharge Instructions       Visit

## 2019-09-05 ENCOUNTER — HOSPITAL ENCOUNTER (OUTPATIENT)
Dept: WOUND CARE | Age: 79
Discharge: HOME | End: 2019-09-05
Payer: MEDICARE

## 2019-09-05 VITALS
SYSTOLIC BLOOD PRESSURE: 124 MMHG | TEMPERATURE: 97.7 F | HEART RATE: 72 BPM | DIASTOLIC BLOOD PRESSURE: 68 MMHG | BODY MASS INDEX: 19.61 KG/M2 | HEIGHT: 70 IN | RESPIRATION RATE: 18 BRPM | WEIGHT: 137 LBS

## 2019-09-05 PROCEDURE — 11042 DBRDMT SUBQ TIS 1ST 20SQCM/<: CPT

## 2019-09-05 RX ORDER — LIDOCAINE HYDROCHLORIDE 20 MG/ML
JELLY TOPICAL ONCE
Status: DISCONTINUED | OUTPATIENT
Start: 2019-09-05 | End: 2019-09-06 | Stop reason: HOSPADM

## 2019-09-05 NOTE — PROGRESS NOTES
SKIN BIOPSY  sept of 2018 last time    head and ears    TOE AMPUTATION Left 12/31/2016    2nd    TOE AMPUTATION Right 4/26/2019    AMPUTATION RIGHT SECOND TOE, FOOT DEBRIDEMENT performed by Joel [facility] DPM [facility] OR    VASCULAR SURGERY       Family History   Problem Relation Age of Onset    Heart Disease Mother     Heart Disease Father      Social History     Tobacco Use    Smoking status: Former Smoker    Smokeless tobacco: Never Used   Substance Use Topics    Alcohol use: No    Drug use: No     Allergies   Allergen Reactions    Latex Other (See Comments)     Pt unsure of reaction    Aspirin Other (See Comments)     \"It affects my kidneys. \"   Michelle Lav [Penicillins] Other (See Comments)     \"I just know my body doesn't like it. \" \"I had a reaction a long time ago, I know it affects my kidneys. \"    Other Rash     \"I get a rash on just my face if I eat Cumin or Chili. \"    Peanut-Containing Drug Products Itching     Current Outpatient Medications on File Prior to Encounter   Medication Sig Dispense Refill    Multiple Vitamins-Minerals (THERAPEUTIC MULTIVITAMIN-MINERALS) tablet Take 1 tablet by mouth daily      tamsulosin (FLOMAX) 0.4 MG capsule Take 1 capsule by mouth nightly 30 capsule 3     No current facility-administered medications on file prior to encounter. REVIEW OF SYSTEMS See HPI    Objective:    /68   Pulse 72   Temp 97.7 °F (36.5 °C) (Oral)   Resp 18   Ht 5' 10\" (1.778 m)   Wt 137 lb (62.1 kg)   BMI 19.66 kg/m²   Wt Readings from Last 3 Encounters:   09/05/19 137 lb (62.1 kg)   08/29/19 137 lb (62.1 kg)   08/22/19 137 lb (62.1 kg)     PHYSICAL EXAM  CONSTITUTIONAL:   Awake, alert, cooperative   EYES:  lids and lashes normal   ENT: external ears and nose without lesions   NECK:  supple, symmetrical, trachea midline   SKIN:  Open wounds covered with devitalized nonviable tissue, continue to improve. There is no purulence or odor. No surrounding erythema. Vascular intact. Loss of protective sensation. Assessment:     DM.  Ulcer. Neuropathy. PVD. Procedure Note  Indications:  Based on my examination of this patient's wound(s)/ulcer(s) today, debridement is required to promote healing and evaluate the wound base. Performed by: Shaka Spann DPM    Consent obtained:  Yes    Time out taken:  Yes    Pain Control: Anesthetic  Anesthetic: 2% Lidocaine Gel Topical     Debridement:Excisional Debridement    Using curette and # 10 blade scalpel the wound(s)/ulcer(s) was/were sharply debrided down through and including the removal of subcutaneous tissue. Devitalized Tissue Debrided:  fibrin, biofilm, slough and necrotic/eschar to stimulate bleeding to promote healing, post debridement good bleeding base and wound edges noted    Pre Debridement Measurements:  Are located in the [place]  Documentation Flow Sheet    Wound/Ulcer #: 6 and 7    Post Debridement Measurements:  Wound/Ulcer Descriptions are Pre Debridement except measurements:    Wound 05/06/19 Other (Comment) Right #4 acquired 4-25-19 2nd toe amp site (Active)   Wound Image   9/5/2019 11:07 AM   Wound Arterial 5/6/2019  9:28 AM   Offloading for Diabetic Foot Ulcers Other (comment) 8/29/2019 10:54 AM   Dressing Status Clean;Dry; Intact 9/5/2019 12:02 PM   Dressing Changed Changed/New 9/5/2019 12:02 PM   Dressing/Treatment Dry Dressing;Xeroform 9/5/2019 12:02 PM   Wound Cleansed Rinsed/Irrigated with saline 9/5/2019 12:02 PM   Wound Length (cm) 0.2 cm 9/5/2019 11:07 AM   Wound Width (cm) 0.2 cm 9/5/2019 11:07 AM   Wound Depth (cm) 0.2 cm 9/5/2019 11:07 AM   Wound Surface Area (cm^2) 0.04 cm^2 9/5/2019 11:07 AM   Change in Wound Size % (l*w) 98.33 9/5/2019 11:07 AM   Wound Volume (cm^3) 0.01 cm^3 9/5/2019 11:07 AM   Wound Healing % 100 9/5/2019 11:07 AM   Post-Procedure Length (cm) 0.2 cm 9/5/2019 11:28 AM   Post-Procedure Width (cm) 0.2 cm 9/5/2019 11:28 AM   Post-Procedure Depth (cm) 0.2 cm 9/5/2019 11:28 AM Wound 05/06/19 Heel Right;Plantar #7 acquired 8-1-18 (Active)   Wound Image   9/5/2019 11:07 AM   Wound Arterial 5/6/2019  9:28 AM   Offloading for Diabetic Foot Ulcers Other (comment) 8/29/2019 10:54 AM   Dressing Status Clean;Dry; Intact 9/5/2019 12:02 PM   Dressing Changed Changed/New 9/5/2019 12:02 PM   Dressing/Treatment Dry Dressing;Xeroform 9/5/2019 12:02 PM   Wound Cleansed Rinsed/Irrigated with saline 9/5/2019 12:02 PM   Wound Length (cm) 0.3 cm 9/5/2019 11:07 AM   Wound Width (cm) 0.1 cm 9/5/2019 11:07 AM   Wound Depth (cm) 0.2 cm 9/5/2019 11:07 AM   Wound Surface Area (cm^2) 0.03 cm^2 9/5/2019 11:07 AM   Change in Wound Size % (l*w) 99.86 9/5/2019 11:07 AM   Wound Volume (cm^3) 0.01 cm^3 9/5/2019 11:07 AM   Wound Healing % 100 9/5/2019 11:07 AM   Post-Procedure Length (cm) 0.3 cm 9/5/2019 11:28 AM   Post-Procedure Width (cm) 0.2 cm 9/5/2019 11:28 AM   Post-Procedure Depth (cm) 0.2 cm 9/5/2019 11:28 AM   Post-Procedure Surface Area (cm^2) 0.06 cm^2 9/5/2019 11:28 AM   Post-Procedure Volume (cm^3) 0.01 cm^3 9/5/2019 11:28 AM   Wound Assessment Pink 9/5/2019 11:07 AM   Drainage Amount Scant 9/5/2019 11:07 AM   Drainage Description Serous 9/5/2019 11:07 AM   Odor None 9/5/2019 11:07 AM   Latonya-wound Assessment Dark edges 9/5/2019 11:07 AM   Number of days: 122     Percent of Wound/Ulcer Debrided: 100%    Total Surface Area Debrided:  1 sq cm     Estimated Blood Loss:  Minimal  Hemostasis Achieved:  by pressure    Procedural Pain:  0  / 10   Post Procedural Pain:  0 / 10     Response to treatment:  Well tolerated by patient. Plan:   Treatment Note please see attached Discharge Instructions. Reinforced no pressure. Any questions or concerns contact wound care center    Written patient dismissal instructions given to patient and signed by patient or POA.          Discharge Instructions       Visit Discharge/Physician Orders     Discharge condition: Stable     Assessment of pain at discharge:N0NE     Anesthetic used: LIDOCAINE 2%     Discharge to: home     Left via:ambulance     Accompanied by: WIFE     ECF/HHA:       Dressing Orders:WOUND LOCATION  RIGHT FOOT, TOES CLEANSE WOUND WITH NORMAL SALINE APPLY XEROFORM TO ALL AREAS, PAD WELL AND SECURE. CHANGE DAILY.     MOISTURIZE DAILY.     Treatment Orders:FOLLOW A NUTRITIOUS DIET HIGH IN PROTEIN AND VITAMIN C TO PROMOTE WOUND HEALING. TAKE A MULTIVITAMIN DAILY.     KEEP PRESSURE OFF ALL WOUNDS AT ALL TIMES      PLETAL AS ORDERED      SEND TO DR Hesham Fuller AS NEEDED      Strict non-weightbearing right eel     [facility] followup visit _______1 WEEK Dr. Whitfield_____________________  (Please note your next appointment above and if you are unable to keep, kindly give a 24 hour notice.  Thank you.)     Physician signature:__________________________        If you experience any of the following, please call the [facility] during business hours:     * Increase in Pain  * Temperature over 101  * Increase in drainage from your wound  * Drainage with a foul odor  * Bleeding  * Increase in swelling  * Need for compression bandage changes due to slippage, breakthrough drainage.     If you need medical attention outside of the business hours of the [facility] please contact your PCP or go to the nearest emergency room.                                                                                                                                         Electronically signed by Isabell Kapadia DPM on 9/5/2019 at 12:26 PM

## 2019-09-12 ENCOUNTER — HOSPITAL ENCOUNTER (OUTPATIENT)
Dept: WOUND CARE | Age: 79
Discharge: HOME | End: 2019-09-12
Payer: MEDICARE

## 2019-09-12 VITALS
DIASTOLIC BLOOD PRESSURE: 64 MMHG | RESPIRATION RATE: 16 BRPM | BODY MASS INDEX: 19.61 KG/M2 | SYSTOLIC BLOOD PRESSURE: 150 MMHG | WEIGHT: 137 LBS | HEART RATE: 68 BPM | HEIGHT: 70 IN | TEMPERATURE: 97.9 F

## 2019-09-12 PROCEDURE — 11042 DBRDMT SUBQ TIS 1ST 20SQCM/<: CPT

## 2019-09-12 RX ORDER — LIDOCAINE HYDROCHLORIDE 20 MG/ML
JELLY TOPICAL ONCE
Status: DISCONTINUED | OUTPATIENT
Start: 2019-09-12 | End: 2019-09-13 | Stop reason: HOSPADM

## 2019-09-19 ENCOUNTER — HOSPITAL ENCOUNTER (OUTPATIENT)
Dept: WOUND CARE | Age: 79
Discharge: HOME | End: 2019-09-19
Payer: MEDICARE

## 2019-09-19 VITALS
SYSTOLIC BLOOD PRESSURE: 128 MMHG | DIASTOLIC BLOOD PRESSURE: 74 MMHG | TEMPERATURE: 97.6 F | BODY MASS INDEX: 19.61 KG/M2 | RESPIRATION RATE: 16 BRPM | HEIGHT: 70 IN | WEIGHT: 137 LBS | HEART RATE: 76 BPM

## 2019-09-19 PROCEDURE — 11042 DBRDMT SUBQ TIS 1ST 20SQCM/<: CPT

## 2019-09-19 RX ORDER — LIDOCAINE HYDROCHLORIDE 20 MG/ML
JELLY TOPICAL ONCE
Status: DISCONTINUED | OUTPATIENT
Start: 2019-09-19 | End: 2019-09-20 | Stop reason: HOSPADM

## 2019-09-19 RX ORDER — GENTAMICIN SULFATE 1 MG/G
CREAM TOPICAL
Qty: 1 TUBE | Refills: 0 | Status: SHIPPED | OUTPATIENT
Start: 2019-09-19 | End: 2019-10-22

## 2019-09-19 NOTE — PROGRESS NOTES
purulence or odor. No surrounding erythema. Vascular intact. Diminished protective sensation. Medial ankle wound is clean, there is no purulence or odor. No surrounding erythema. Assessment:     Diabetic with ulcer. Neuropathy. PVD. Procedure Note  Indications:  Based on my examination of this patient's wound(s)/ulcer(s) today, debridement is required to promote healing and evaluate the wound base. Performed by: Isidoro Doyle DPM    Consent obtained:  Yes    Time out taken:  Yes    Pain Control: Anesthetic  Anesthetic: 2% Lidocaine Gel Topical     Debridement:Excisional Debridement    Using curette and # 10 blade scalpel the wound(s)/ulcer(s) was/were sharply debrided down through and including the removal of subcutaneous tissue. Devitalized Tissue Debrided:  biofilm, slough and necrotic/eschar to stimulate bleeding to promote healing, post debridement good bleeding base and wound edges noted    Pre Debridement Measurements:  Are located in the [facility]  Documentation Flow Sheet    Wound/Ulcer #: 6 and 7    Post Debridement Measurements:  Wound/Ulcer Descriptions are Pre Debridement except measurements:    Wound 05/06/19 Other (Comment) Right #4 acquired 4-25-19 2nd toe amp site (Active)   Wound Image   9/12/2019  3:36 PM   Wound Arterial 5/6/2019  9:28 AM   Offloading for Diabetic Foot Ulcers Other (comment) 9/12/2019  3:28 PM   Dressing Status Clean;Dry; Intact 9/5/2019 12:02 PM   Dressing Changed Changed/New 9/5/2019 12:02 PM   Dressing/Treatment Dry Dressing;Xeroform 9/5/2019 12:02 PM   Wound Cleansed Rinsed/Irrigated with saline 9/5/2019 12:02 PM   Wound Length (cm) 0.3 cm 9/19/2019  2:57 PM   Wound Width (cm) 0.2 cm 9/19/2019  2:57 PM   Wound Depth (cm) 0.2 cm 9/19/2019  2:57 PM   Wound Surface Area (cm^2) 0.06 cm^2 9/19/2019  2:57 PM   Change in Wound Size % (l*w) 97.5 9/19/2019  2:57 PM   Wound Volume (cm^3) 0.01 cm^3 9/19/2019  2:57 PM   Wound Healing % 100 9/19/2019  2:57 PM Scant 9/19/2019  2:57 PM   Drainage Description Serous 9/19/2019  2:57 PM   Odor None 9/19/2019  2:57 PM   Latonya-wound Assessment Dark edges 9/19/2019  2:57 PM   Number of days: 136       Wound 05/06/19 Heel Right;Plantar #7 acquired 8-1-18 (Active)   Wound Image   9/12/2019  3:36 PM   Wound Arterial 5/6/2019  9:28 AM   Offloading for Diabetic Foot Ulcers Other (comment) 9/12/2019  3:36 PM   Dressing Status Clean;Dry; Intact 9/5/2019 12:02 PM   Dressing Changed Changed/New 9/5/2019 12:02 PM   Dressing/Treatment Dry Dressing;Xeroform 9/5/2019 12:02 PM   Wound Cleansed Rinsed/Irrigated with saline 9/5/2019 12:02 PM   Wound Length (cm) 0.3 cm 9/19/2019  2:57 PM   Wound Width (cm) 0.2 cm 9/19/2019  2:57 PM   Wound Depth (cm) 0.2 cm 9/19/2019  2:57 PM   Wound Surface Area (cm^2) 0.06 cm^2 9/19/2019  2:57 PM   Change in Wound Size % (l*w) 99.72 9/19/2019  2:57 PM   Wound Volume (cm^3) 0.01 cm^3 9/19/2019  2:57 PM   Wound Healing % 100 9/19/2019  2:57 PM   Post-Procedure Length (cm) 0.3 cm 9/19/2019  3:18 PM   Post-Procedure Width (cm) 0.2 cm 9/19/2019  3:18 PM   Post-Procedure Depth (cm) 0.2 cm 9/19/2019  3:18 PM   Post-Procedure Surface Area (cm^2) 0.06 cm^2 9/19/2019  3:18 PM   Post-Procedure Volume (cm^3) 0.01 cm^3 9/19/2019  3:18 PM   Wound Assessment Red 9/19/2019  2:57 PM   Drainage Amount Scant 9/19/2019  2:57 PM   Drainage Description Serosanguinous 9/19/2019  2:57 PM   Odor None 9/19/2019  2:57 PM   Latonya-wound Assessment Calloused;Dark edges 9/19/2019  2:57 PM   Number of days: 136       Wound 09/12/19 Ankle Right;Medial #12 acq: 8-29-19 dale I (Active)   Wound Image   9/12/2019  3:36 PM   Wound Diabetic Dale 1 9/12/2019  3:36 PM   Offloading for Diabetic Foot Ulcers Other (comment) 9/12/2019  3:36 PM   Wound Length (cm) 1.6 cm 9/19/2019  2:57 PM   Wound Width (cm) 1.5 cm 9/19/2019  2:57 PM   Wound Depth (cm) 0.1 cm 9/19/2019  2:57 PM   Wound Surface Area (cm^2) 2.4 cm^2 9/19/2019  2:57 PM   Change in Wound HALEIGH AS NEEDED      Strict non-weightbearing right heeel     C followup visit _______1 WEEK Dr. Whitfield_____________________  (Please note your next appointment above and if you are unable to keep, kindly give a 24 hour notice.  Thank you.)     Physician signature:__________________________        If you experience any of the following, please call the [facility] during business hours:     * Increase in Pain  * Temperature over 101  * Increase in drainage from your wound  * Drainage with a foul odor  * Bleeding  * Increase in swelling  * Need for compression bandage changes due to slippage, breakthrough drainage.     If you need medical attention outside of the business hours of the [facility] please contact your PCP or go to the nearest emergency room.                                                                                                                                                                                   Electronically signed by Claudean Hard, DPM on 9/19/2019 at 3:24 PM

## 2019-09-26 ENCOUNTER — HOSPITAL ENCOUNTER (OUTPATIENT)
Dept: WOUND CARE | Age: 79
Discharge: HOME | End: 2019-09-26
Payer: MEDICARE

## 2019-09-26 VITALS
WEIGHT: 137 LBS | DIASTOLIC BLOOD PRESSURE: 64 MMHG | BODY MASS INDEX: 19.61 KG/M2 | HEIGHT: 70 IN | HEART RATE: 64 BPM | TEMPERATURE: 97.9 F | SYSTOLIC BLOOD PRESSURE: 150 MMHG | RESPIRATION RATE: 16 BRPM

## 2019-09-26 PROCEDURE — 11042 DBRDMT SUBQ TIS 1ST 20SQCM/<: CPT

## 2019-09-26 RX ORDER — LIDOCAINE HYDROCHLORIDE 20 MG/ML
JELLY TOPICAL ONCE
Status: DISCONTINUED | OUTPATIENT
Start: 2019-09-26 | End: 2019-09-27 | Stop reason: HOSPADM

## 2019-09-26 NOTE — PROGRESS NOTES
BIOPSY  04/2016    SKIN BIOPSY  sept of 2018 last time    head and ears    TOE AMPUTATION Left 12/31/2016    2nd    TOE AMPUTATION Right 4/26/2019    AMPUTATION RIGHT SECOND TOE, FOOT DEBRIDEMENT performed [facility] Lyle DPM [facility]    VASCULAR SURGERY       Family History   Problem Relation Age of Onset    Heart Disease Mother     Heart Disease Father      Social History     Tobacco Use    Smoking status: Former Smoker    Smokeless tobacco: Never Used   Substance Use Topics    Alcohol use: No    Drug use: No     Allergies   Allergen Reactions    Latex Other (See Comments)     Pt unsure of reaction    Aspirin Other (See Comments)     \"It affects my kidneys. \"   [place] [Penicillins] Other (See Comments)     \"I just know my body doesn't like it. \" \"I had a reaction a long time ago, I know it affects my kidneys. \"    Other Rash     \"I get a rash on just my face if I eat Cumin or Chili. \"    Peanut-Containing Drug Products Itching     Current Outpatient Medications on File Prior to Encounter   Medication Sig Dispense Refill    gentamicin (GARAMYCIN) 0.1 % cream Apply daily to wounds as instructed. 1 Tube 0    Multiple Vitamins-Minerals (THERAPEUTIC MULTIVITAMIN-MINERALS) tablet Take 1 tablet by mouth daily      tamsulosin (FLOMAX) 0.4 MG capsule Take 1 capsule by mouth nightly 30 capsule 3     No current facility-administered medications on file prior to encounter.         REVIEW OF SYSTEMS See HPI    Objective:    BP (!) 150/64   Pulse 64   Temp 97.9 °F (36.6 °C) (Oral)   Resp 16   Ht 5' 10\" (1.778 m)   Wt 137 lb (62.1 kg)   BMI 19.66 kg/m²   Wt Readings from Last 3 Encounters:   09/26/19 137 lb (62.1 kg)   09/19/19 137 lb (62.1 kg)   09/12/19 137 lb (62.1 kg)     PHYSICAL EXAM  CONSTITUTIONAL:   Awake, alert, cooperative   EYES:  lids and lashes normal   ENT: external ears and nose without lesions   NECK:  supple, symmetrical, trachea midline   SKIN:  Open wounds right foot heel, covered with

## 2019-10-03 ENCOUNTER — HOSPITAL ENCOUNTER (OUTPATIENT)
Dept: WOUND CARE | Age: 79
Discharge: HOME | End: 2019-10-03
Payer: MEDICARE

## 2019-10-03 VITALS
DIASTOLIC BLOOD PRESSURE: 58 MMHG | TEMPERATURE: 98 F | BODY MASS INDEX: 18.61 KG/M2 | RESPIRATION RATE: 18 BRPM | HEART RATE: 70 BPM | WEIGHT: 130 LBS | SYSTOLIC BLOOD PRESSURE: 140 MMHG | HEIGHT: 70 IN

## 2019-10-03 PROCEDURE — 11042 DBRDMT SUBQ TIS 1ST 20SQCM/<: CPT

## 2019-10-03 RX ORDER — LIDOCAINE HYDROCHLORIDE 20 MG/ML
JELLY TOPICAL ONCE
Status: DISCONTINUED | OUTPATIENT
Start: 2019-10-03 | End: 2019-10-04 | Stop reason: HOSPADM

## 2019-10-03 ASSESSMENT — PAIN SCALES - GENERAL: PAINLEVEL_OUTOF10: 0

## 2019-10-10 ENCOUNTER — HOSPITAL ENCOUNTER (OUTPATIENT)
Dept: WOUND CARE | Age: 79
Discharge: HOME | End: 2019-10-10
Payer: MEDICARE

## 2019-10-10 VITALS
SYSTOLIC BLOOD PRESSURE: 120 MMHG | HEIGHT: 70 IN | TEMPERATURE: 97.8 F | HEART RATE: 72 BPM | RESPIRATION RATE: 16 BRPM | DIASTOLIC BLOOD PRESSURE: 52 MMHG | WEIGHT: 130 LBS | BODY MASS INDEX: 18.61 KG/M2

## 2019-10-10 PROCEDURE — 11042 DBRDMT SUBQ TIS 1ST 20SQCM/<: CPT

## 2019-10-10 RX ORDER — LIDOCAINE HYDROCHLORIDE 20 MG/ML
JELLY TOPICAL ONCE
Status: DISCONTINUED | OUTPATIENT
Start: 2019-10-10 | End: 2019-10-11 | Stop reason: HOSPADM

## 2019-10-15 ENCOUNTER — HOSPITAL ENCOUNTER (OUTPATIENT)
Dept: WOUND CARE | Age: 79
Discharge: HOME | End: 2019-10-15
Payer: MEDICARE

## 2019-10-22 ENCOUNTER — HOSPITAL ENCOUNTER (OUTPATIENT)
Dept: WOUND CARE | Age: 79
Discharge: HOME | End: 2019-10-22
Payer: MEDICARE

## 2019-10-22 VITALS
SYSTOLIC BLOOD PRESSURE: 120 MMHG | RESPIRATION RATE: 18 BRPM | HEART RATE: 84 BPM | DIASTOLIC BLOOD PRESSURE: 82 MMHG | TEMPERATURE: 98.5 F

## 2019-10-22 DIAGNOSIS — I73.9 PVD (PERIPHERAL VASCULAR DISEASE) (HCC): Primary | ICD-10-CM

## 2019-10-22 DIAGNOSIS — E11.621 DIABETIC ULCER OF OTHER PART OF RIGHT FOOT ASSOCIATED WITH TYPE 2 DIABETES MELLITUS, WITH FAT LAYER EXPOSED (HCC): ICD-10-CM

## 2019-10-22 DIAGNOSIS — L97.512 DIABETIC ULCER OF OTHER PART OF RIGHT FOOT ASSOCIATED WITH TYPE 2 DIABETES MELLITUS, WITH FAT LAYER EXPOSED (HCC): ICD-10-CM

## 2019-10-22 PROCEDURE — 99213 OFFICE O/P EST LOW 20 MIN: CPT | Performed by: SURGERY

## 2019-10-22 PROCEDURE — 99213 OFFICE O/P EST LOW 20 MIN: CPT

## 2019-10-22 RX ORDER — LIDOCAINE HYDROCHLORIDE 20 MG/ML
JELLY TOPICAL ONCE
Status: DISCONTINUED | OUTPATIENT
Start: 2019-10-22 | End: 2019-10-23 | Stop reason: HOSPADM

## 2019-10-31 ENCOUNTER — HOSPITAL ENCOUNTER (OUTPATIENT)
Dept: WOUND CARE | Age: 79
Discharge: HOME | End: 2019-10-31
Payer: MEDICARE

## 2019-11-07 ENCOUNTER — HOSPITAL ENCOUNTER (OUTPATIENT)
Dept: WOUND CARE | Age: 79
Discharge: HOME | End: 2019-11-07
Payer: MEDICARE

## 2019-11-07 ENCOUNTER — HOSPITAL ENCOUNTER (OUTPATIENT)
Dept: GENERAL RADIOLOGY | Age: 79
Discharge: HOME | End: 2019-11-09
Payer: MEDICARE

## 2019-11-07 VITALS
BODY MASS INDEX: 18.61 KG/M2 | WEIGHT: 130 LBS | SYSTOLIC BLOOD PRESSURE: 144 MMHG | HEIGHT: 70 IN | DIASTOLIC BLOOD PRESSURE: 70 MMHG | TEMPERATURE: 97.5 F | HEART RATE: 80 BPM | RESPIRATION RATE: 20 BRPM

## 2019-11-07 DIAGNOSIS — L97.412 HEEL ULCER, RIGHT, WITH FAT LAYER EXPOSED (HCC): ICD-10-CM

## 2019-11-07 DIAGNOSIS — I73.9 PVD (PERIPHERAL VASCULAR DISEASE) (HCC): Primary | ICD-10-CM

## 2019-11-07 PROCEDURE — 87075 CULTR BACTERIA EXCEPT BLOOD: CPT

## 2019-11-07 PROCEDURE — 87186 SC STD MICRODIL/AGAR DIL: CPT

## 2019-11-07 PROCEDURE — 73630 X-RAY EXAM OF FOOT: CPT

## 2019-11-07 PROCEDURE — 11042 DBRDMT SUBQ TIS 1ST 20SQCM/<: CPT

## 2019-11-07 PROCEDURE — 87070 CULTURE OTHR SPECIMN AEROBIC: CPT

## 2019-11-07 ASSESSMENT — PAIN SCALES - GENERAL: PAINLEVEL_OUTOF10: 0

## 2019-11-09 LAB
ANAEROBIC CULTURE: NORMAL
ORGANISM: ABNORMAL
WOUND/ABSCESS: ABNORMAL

## 2019-11-14 ENCOUNTER — HOSPITAL ENCOUNTER (OUTPATIENT)
Dept: WOUND CARE | Age: 79
Discharge: HOME | End: 2019-11-14
Payer: MEDICARE

## 2019-11-14 VITALS
SYSTOLIC BLOOD PRESSURE: 120 MMHG | HEART RATE: 76 BPM | TEMPERATURE: 97.8 F | DIASTOLIC BLOOD PRESSURE: 70 MMHG | RESPIRATION RATE: 18 BRPM

## 2019-11-14 PROCEDURE — 11042 DBRDMT SUBQ TIS 1ST 20SQCM/<: CPT

## 2019-11-14 ASSESSMENT — PAIN SCALES - GENERAL: PAINLEVEL_OUTOF10: 0

## 2019-11-21 ENCOUNTER — HOSPITAL ENCOUNTER (OUTPATIENT)
Dept: WOUND CARE | Age: 79
Discharge: HOME | End: 2019-11-21
Payer: MEDICARE

## 2019-11-21 VITALS
BODY MASS INDEX: 18.61 KG/M2 | RESPIRATION RATE: 16 BRPM | TEMPERATURE: 97.6 F | HEART RATE: 76 BPM | HEIGHT: 70 IN | WEIGHT: 130 LBS | DIASTOLIC BLOOD PRESSURE: 70 MMHG | SYSTOLIC BLOOD PRESSURE: 140 MMHG

## 2019-11-21 PROCEDURE — 11042 DBRDMT SUBQ TIS 1ST 20SQCM/<: CPT

## 2019-11-21 RX ORDER — LIDOCAINE HYDROCHLORIDE 20 MG/ML
JELLY TOPICAL ONCE
Status: DISCONTINUED | OUTPATIENT
Start: 2019-11-21 | End: 2019-11-22 | Stop reason: HOSPADM

## 2019-12-05 ENCOUNTER — APPOINTMENT (OUTPATIENT)
Dept: INTERVENTIONAL RADIOLOGY/VASCULAR | Age: 79
DRG: 617 | End: 2019-12-05
Payer: MEDICARE

## 2019-12-05 ENCOUNTER — HOSPITAL ENCOUNTER (OUTPATIENT)
Dept: WOUND CARE | Age: 79
Discharge: HOME | DRG: 617 | End: 2019-12-05
Payer: MEDICARE

## 2019-12-05 ENCOUNTER — HOSPITAL ENCOUNTER (INPATIENT)
Age: 79
LOS: 14 days | Discharge: NURSING, REHAB OR LONG-TERM CARE | DRG: 617 | End: 2019-12-19
Attending: EMERGENCY MEDICINE | Admitting: INTERNAL MEDICINE
Payer: MEDICARE

## 2019-12-05 ENCOUNTER — APPOINTMENT (OUTPATIENT)
Dept: GENERAL RADIOLOGY | Age: 79
DRG: 617 | End: 2019-12-05
Payer: MEDICARE

## 2019-12-05 VITALS
HEIGHT: 70 IN | SYSTOLIC BLOOD PRESSURE: 132 MMHG | BODY MASS INDEX: 18.61 KG/M2 | RESPIRATION RATE: 16 BRPM | WEIGHT: 130 LBS | HEART RATE: 72 BPM | TEMPERATURE: 98 F | DIASTOLIC BLOOD PRESSURE: 70 MMHG

## 2019-12-05 DIAGNOSIS — M86.9 OSTEOMYELITIS OF RIGHT FOOT, UNSPECIFIED TYPE (HCC): Primary | ICD-10-CM

## 2019-12-05 PROBLEM — L97.412 HEEL ULCER, RIGHT, WITH FAT LAYER EXPOSED (HCC): Status: RESOLVED | Noted: 2019-05-06 | Resolved: 2019-12-05

## 2019-12-05 PROBLEM — I70.229 CRITICAL LOWER LIMB ISCHEMIA (HCC): Status: RESOLVED | Noted: 2019-04-25 | Resolved: 2019-12-05

## 2019-12-05 PROBLEM — I70.239 ATHEROSCLEROSIS OF NATIVE ARTERY OF RIGHT LOWER EXTREMITY WITH ULCERATION (HCC): Status: RESOLVED | Noted: 2019-04-25 | Resolved: 2019-12-05

## 2019-12-05 PROBLEM — I70.209 FEMORAL-POPLITEAL ATHEROSCLEROSIS (HCC): Chronic | Status: RESOLVED | Noted: 2017-01-01 | Resolved: 2019-12-05

## 2019-12-05 PROBLEM — L97.912 ULCER OF RIGHT LEG, WITH FAT LAYER EXPOSED (HCC): Status: RESOLVED | Noted: 2019-05-06 | Resolved: 2019-12-05

## 2019-12-05 LAB
ANION GAP SERPL CALCULATED.3IONS-SCNC: 9 MMOL/L (ref 7–16)
BASOPHILS ABSOLUTE: 0.06 E9/L (ref 0–0.2)
BASOPHILS RELATIVE PERCENT: 0.7 % (ref 0–2)
BUN BLDV-MCNC: 23 MG/DL (ref 8–23)
CALCIUM SERPL-MCNC: 10 MG/DL (ref 8.6–10.2)
CHLORIDE BLD-SCNC: 97 MMOL/L (ref 98–107)
CO2: 30 MMOL/L (ref 22–29)
CREAT SERPL-MCNC: 0.9 MG/DL (ref 0.7–1.2)
EOSINOPHILS ABSOLUTE: 0.29 E9/L (ref 0.05–0.5)
EOSINOPHILS RELATIVE PERCENT: 3.2 % (ref 0–6)
GFR AFRICAN AMERICAN: >60
GFR NON-AFRICAN AMERICAN: >60 ML/MIN/1.73
GLUCOSE BLD-MCNC: 160 MG/DL (ref 74–99)
HCT VFR BLD CALC: 39 % (ref 37–54)
HEMOGLOBIN: 13 G/DL (ref 12.5–16.5)
IMMATURE GRANULOCYTES #: 0.03 E9/L
IMMATURE GRANULOCYTES %: 0.3 % (ref 0–5)
LACTIC ACID: 1 MMOL/L (ref 0.5–2.2)
LYMPHOCYTES ABSOLUTE: 1.64 E9/L (ref 1.5–4)
LYMPHOCYTES RELATIVE PERCENT: 18.2 % (ref 20–42)
MCH RBC QN AUTO: 30 PG (ref 26–35)
MCHC RBC AUTO-ENTMCNC: 33.3 % (ref 32–34.5)
MCV RBC AUTO: 89.9 FL (ref 80–99.9)
METER GLUCOSE: 135 MG/DL (ref 74–99)
MONOCYTES ABSOLUTE: 0.63 E9/L (ref 0.1–0.95)
MONOCYTES RELATIVE PERCENT: 7 % (ref 2–12)
NEUTROPHILS ABSOLUTE: 6.36 E9/L (ref 1.8–7.3)
NEUTROPHILS RELATIVE PERCENT: 70.6 % (ref 43–80)
PDW BLD-RTO: 13.9 FL (ref 11.5–15)
PLATELET # BLD: 189 E9/L (ref 130–450)
PMV BLD AUTO: 10.4 FL (ref 7–12)
POTASSIUM SERPL-SCNC: 4.3 MMOL/L (ref 3.5–5)
RBC # BLD: 4.34 E12/L (ref 3.8–5.8)
SEDIMENTATION RATE, ERYTHROCYTE: 50 MM/HR (ref 0–15)
SODIUM BLD-SCNC: 136 MMOL/L (ref 132–146)
WBC # BLD: 9 E9/L (ref 4.5–11.5)

## 2019-12-05 PROCEDURE — 80048 BASIC METABOLIC PNL TOTAL CA: CPT

## 2019-12-05 PROCEDURE — 36415 COLL VENOUS BLD VENIPUNCTURE: CPT

## 2019-12-05 PROCEDURE — 87040 BLOOD CULTURE FOR BACTERIA: CPT

## 2019-12-05 PROCEDURE — 1200000000 HC SEMI PRIVATE

## 2019-12-05 PROCEDURE — 6370000000 HC RX 637 (ALT 250 FOR IP): Performed by: STUDENT IN AN ORGANIZED HEALTH CARE EDUCATION/TRAINING PROGRAM

## 2019-12-05 PROCEDURE — 99285 EMERGENCY DEPT VISIT HI MDM: CPT

## 2019-12-05 PROCEDURE — 82962 GLUCOSE BLOOD TEST: CPT

## 2019-12-05 PROCEDURE — 96374 THER/PROPH/DIAG INJ IV PUSH: CPT

## 2019-12-05 PROCEDURE — 6360000002 HC RX W HCPCS: Performed by: INTERNAL MEDICINE

## 2019-12-05 PROCEDURE — 2580000003 HC RX 258: Performed by: STUDENT IN AN ORGANIZED HEALTH CARE EDUCATION/TRAINING PROGRAM

## 2019-12-05 PROCEDURE — 2580000003 HC RX 258: Performed by: INTERNAL MEDICINE

## 2019-12-05 PROCEDURE — 85651 RBC SED RATE NONAUTOMATED: CPT

## 2019-12-05 PROCEDURE — 11042 DBRDMT SUBQ TIS 1ST 20SQCM/<: CPT

## 2019-12-05 PROCEDURE — 73630 X-RAY EXAM OF FOOT: CPT

## 2019-12-05 PROCEDURE — 85025 COMPLETE CBC W/AUTO DIFF WBC: CPT

## 2019-12-05 PROCEDURE — 83605 ASSAY OF LACTIC ACID: CPT

## 2019-12-05 PROCEDURE — 93923 UPR/LXTR ART STDY 3+ LVLS: CPT

## 2019-12-05 PROCEDURE — 6360000002 HC RX W HCPCS: Performed by: STUDENT IN AN ORGANIZED HEALTH CARE EDUCATION/TRAINING PROGRAM

## 2019-12-05 RX ORDER — INSULIN GLARGINE 100 [IU]/ML
20 INJECTION, SOLUTION SUBCUTANEOUS NIGHTLY
Status: DISCONTINUED | OUTPATIENT
Start: 2019-12-05 | End: 2019-12-19 | Stop reason: HOSPADM

## 2019-12-05 RX ORDER — SODIUM CHLORIDE 0.9 % (FLUSH) 0.9 %
10 SYRINGE (ML) INJECTION PRN
Status: DISCONTINUED | OUTPATIENT
Start: 2019-12-05 | End: 2019-12-12 | Stop reason: SDUPTHER

## 2019-12-05 RX ORDER — DEXTROSE MONOHYDRATE 25 G/50ML
12.5 INJECTION, SOLUTION INTRAVENOUS PRN
Status: DISCONTINUED | OUTPATIENT
Start: 2019-12-05 | End: 2019-12-19 | Stop reason: HOSPADM

## 2019-12-05 RX ORDER — NICOTINE POLACRILEX 4 MG
15 LOZENGE BUCCAL PRN
Status: DISCONTINUED | OUTPATIENT
Start: 2019-12-05 | End: 2019-12-19 | Stop reason: HOSPADM

## 2019-12-05 RX ORDER — ONDANSETRON 2 MG/ML
4 INJECTION INTRAMUSCULAR; INTRAVENOUS EVERY 6 HOURS PRN
Status: DISCONTINUED | OUTPATIENT
Start: 2019-12-05 | End: 2019-12-19 | Stop reason: HOSPADM

## 2019-12-05 RX ORDER — DEXTROSE MONOHYDRATE 50 MG/ML
100 INJECTION, SOLUTION INTRAVENOUS PRN
Status: DISCONTINUED | OUTPATIENT
Start: 2019-12-05 | End: 2019-12-19 | Stop reason: HOSPADM

## 2019-12-05 RX ORDER — ACETAMINOPHEN 325 MG/1
650 TABLET ORAL EVERY 4 HOURS PRN
Status: DISCONTINUED | OUTPATIENT
Start: 2019-12-05 | End: 2019-12-19 | Stop reason: HOSPADM

## 2019-12-05 RX ORDER — LIDOCAINE HYDROCHLORIDE 20 MG/ML
JELLY TOPICAL ONCE
Status: DISCONTINUED | OUTPATIENT
Start: 2019-12-05 | End: 2019-12-06 | Stop reason: HOSPADM

## 2019-12-05 RX ORDER — SODIUM CHLORIDE 0.9 % (FLUSH) 0.9 %
10 SYRINGE (ML) INJECTION EVERY 12 HOURS SCHEDULED
Status: DISCONTINUED | OUTPATIENT
Start: 2019-12-05 | End: 2019-12-19 | Stop reason: HOSPADM

## 2019-12-05 RX ADMIN — Medication 10 ML: at 21:29

## 2019-12-05 RX ADMIN — VANCOMYCIN HYDROCHLORIDE 1250 MG: 10 INJECTION, POWDER, LYOPHILIZED, FOR SOLUTION INTRAVENOUS at 21:28

## 2019-12-05 RX ADMIN — CEFEPIME HYDROCHLORIDE 2 G: 2 INJECTION, POWDER, FOR SOLUTION INTRAVENOUS at 15:02

## 2019-12-05 RX ADMIN — COLLAGENASE SANTYL: 250 OINTMENT TOPICAL at 21:28

## 2019-12-05 ASSESSMENT — ENCOUNTER SYMPTOMS
VOMITING: 0
COUGH: 0
WHEEZING: 0
CHEST TIGHTNESS: 0
ABDOMINAL PAIN: 0
SHORTNESS OF BREATH: 0
CONSTIPATION: 0
BACK PAIN: 0
NAUSEA: 0
DIARRHEA: 0
SORE THROAT: 0
BLOOD IN STOOL: 0
RHINORRHEA: 0

## 2019-12-05 ASSESSMENT — PAIN SCALES - GENERAL
PAINLEVEL_OUTOF10: 0
PAINLEVEL_OUTOF10: 0

## 2019-12-06 PROBLEM — I87.2 CHRONIC VENOUS INSUFFICIENCY: Status: ACTIVE | Noted: 2019-12-06

## 2019-12-06 PROBLEM — M86.9 OSTEOMYELITIS OF THIRD TOE OF RIGHT FOOT (HCC): Status: ACTIVE | Noted: 2019-12-06

## 2019-12-06 PROBLEM — I83.891 VARICOSE VEINS OF LEG WITH EDEMA, RIGHT: Status: ACTIVE | Noted: 2019-12-06

## 2019-12-06 LAB
ALBUMIN SERPL-MCNC: 3.4 G/DL (ref 3.5–5.2)
ALP BLD-CCNC: 49 U/L (ref 40–129)
ALT SERPL-CCNC: 11 U/L (ref 0–40)
ANION GAP SERPL CALCULATED.3IONS-SCNC: 13 MMOL/L (ref 7–16)
AST SERPL-CCNC: 14 U/L (ref 0–39)
BASOPHILS ABSOLUTE: 0.07 E9/L (ref 0–0.2)
BASOPHILS RELATIVE PERCENT: 1 % (ref 0–2)
BILIRUB SERPL-MCNC: 0.6 MG/DL (ref 0–1.2)
BUN BLDV-MCNC: 18 MG/DL (ref 8–23)
CALCIUM SERPL-MCNC: 9.2 MG/DL (ref 8.6–10.2)
CHLORIDE BLD-SCNC: 102 MMOL/L (ref 98–107)
CO2: 24 MMOL/L (ref 22–29)
CREAT SERPL-MCNC: 1 MG/DL (ref 0.7–1.2)
EOSINOPHILS ABSOLUTE: 0.28 E9/L (ref 0.05–0.5)
EOSINOPHILS RELATIVE PERCENT: 4.1 % (ref 0–6)
GFR AFRICAN AMERICAN: >60
GFR NON-AFRICAN AMERICAN: >60 ML/MIN/1.73
GLUCOSE BLD-MCNC: 116 MG/DL (ref 74–99)
HBA1C MFR BLD: 6.2 % (ref 4–5.6)
HCT VFR BLD CALC: 36 % (ref 37–54)
HEMOGLOBIN: 11.9 G/DL (ref 12.5–16.5)
IMMATURE GRANULOCYTES #: 0.03 E9/L
IMMATURE GRANULOCYTES %: 0.4 % (ref 0–5)
LYMPHOCYTES ABSOLUTE: 1.39 E9/L (ref 1.5–4)
LYMPHOCYTES RELATIVE PERCENT: 20.3 % (ref 20–42)
MAGNESIUM: 1.8 MG/DL (ref 1.6–2.6)
MCH RBC QN AUTO: 30 PG (ref 26–35)
MCHC RBC AUTO-ENTMCNC: 33.1 % (ref 32–34.5)
MCV RBC AUTO: 90.7 FL (ref 80–99.9)
METER GLUCOSE: 120 MG/DL (ref 74–99)
METER GLUCOSE: 134 MG/DL (ref 74–99)
METER GLUCOSE: 154 MG/DL (ref 74–99)
METER GLUCOSE: 188 MG/DL (ref 74–99)
MONOCYTES ABSOLUTE: 0.58 E9/L (ref 0.1–0.95)
MONOCYTES RELATIVE PERCENT: 8.5 % (ref 2–12)
NEUTROPHILS ABSOLUTE: 4.51 E9/L (ref 1.8–7.3)
NEUTROPHILS RELATIVE PERCENT: 65.7 % (ref 43–80)
PDW BLD-RTO: 13.9 FL (ref 11.5–15)
PLATELET # BLD: 156 E9/L (ref 130–450)
PMV BLD AUTO: 10.2 FL (ref 7–12)
POTASSIUM SERPL-SCNC: 4.1 MMOL/L (ref 3.5–5)
RBC # BLD: 3.97 E12/L (ref 3.8–5.8)
SODIUM BLD-SCNC: 139 MMOL/L (ref 132–146)
TOTAL PROTEIN: 6.8 G/DL (ref 6.4–8.3)
WBC # BLD: 6.9 E9/L (ref 4.5–11.5)

## 2019-12-06 PROCEDURE — 85025 COMPLETE CBC W/AUTO DIFF WBC: CPT

## 2019-12-06 PROCEDURE — 83735 ASSAY OF MAGNESIUM: CPT

## 2019-12-06 PROCEDURE — 83036 HEMOGLOBIN GLYCOSYLATED A1C: CPT

## 2019-12-06 PROCEDURE — 36415 COLL VENOUS BLD VENIPUNCTURE: CPT

## 2019-12-06 PROCEDURE — 1200000000 HC SEMI PRIVATE

## 2019-12-06 PROCEDURE — 99222 1ST HOSP IP/OBS MODERATE 55: CPT | Performed by: SURGERY

## 2019-12-06 PROCEDURE — 2580000003 HC RX 258: Performed by: INTERNAL MEDICINE

## 2019-12-06 PROCEDURE — 82962 GLUCOSE BLOOD TEST: CPT

## 2019-12-06 PROCEDURE — 80053 COMPREHEN METABOLIC PANEL: CPT

## 2019-12-06 RX ADMIN — Medication 10 ML: at 11:21

## 2019-12-06 RX ADMIN — Medication 10 ML: at 23:08

## 2019-12-06 ASSESSMENT — PAIN SCALES - GENERAL: PAINLEVEL_OUTOF10: 0

## 2019-12-07 PROBLEM — I87.2 CHRONIC VENOUS INSUFFICIENCY: Status: RESOLVED | Noted: 2019-12-06 | Resolved: 2019-12-07

## 2019-12-07 PROBLEM — I83.891 VARICOSE VEINS OF LEG WITH EDEMA, RIGHT: Status: RESOLVED | Noted: 2019-12-06 | Resolved: 2019-12-07

## 2019-12-07 PROBLEM — M86.9 OSTEOMYELITIS (HCC): Status: RESOLVED | Noted: 2019-12-05 | Resolved: 2019-12-07

## 2019-12-07 LAB
ANION GAP SERPL CALCULATED.3IONS-SCNC: 13 MMOL/L (ref 7–16)
ANTISTREPTOLYSIN-O: 20 IU/ML (ref 0–200)
BASOPHILS ABSOLUTE: 0.07 E9/L (ref 0–0.2)
BASOPHILS RELATIVE PERCENT: 1 % (ref 0–2)
BUN BLDV-MCNC: 25 MG/DL (ref 8–23)
C-REACTIVE PROTEIN: 1.3 MG/DL (ref 0–0.4)
CALCIUM SERPL-MCNC: 9.2 MG/DL (ref 8.6–10.2)
CHLORIDE BLD-SCNC: 104 MMOL/L (ref 98–107)
CO2: 25 MMOL/L (ref 22–29)
CREAT SERPL-MCNC: 1 MG/DL (ref 0.7–1.2)
EOSINOPHILS ABSOLUTE: 0.32 E9/L (ref 0.05–0.5)
EOSINOPHILS RELATIVE PERCENT: 4.5 % (ref 0–6)
GFR AFRICAN AMERICAN: >60
GFR NON-AFRICAN AMERICAN: >60 ML/MIN/1.73
GLUCOSE BLD-MCNC: 164 MG/DL (ref 74–99)
HCT VFR BLD CALC: 37.1 % (ref 37–54)
HEMOGLOBIN: 12 G/DL (ref 12.5–16.5)
IMMATURE GRANULOCYTES #: 0.04 E9/L
IMMATURE GRANULOCYTES %: 0.6 % (ref 0–5)
LYMPHOCYTES ABSOLUTE: 1.72 E9/L (ref 1.5–4)
LYMPHOCYTES RELATIVE PERCENT: 24.1 % (ref 20–42)
MAGNESIUM: 1.8 MG/DL (ref 1.6–2.6)
MCH RBC QN AUTO: 29.7 PG (ref 26–35)
MCHC RBC AUTO-ENTMCNC: 32.3 % (ref 32–34.5)
MCV RBC AUTO: 91.8 FL (ref 80–99.9)
METER GLUCOSE: 144 MG/DL (ref 74–99)
METER GLUCOSE: 146 MG/DL (ref 74–99)
METER GLUCOSE: 178 MG/DL (ref 74–99)
MONOCYTES ABSOLUTE: 0.6 E9/L (ref 0.1–0.95)
MONOCYTES RELATIVE PERCENT: 8.4 % (ref 2–12)
NEUTROPHILS ABSOLUTE: 4.38 E9/L (ref 1.8–7.3)
NEUTROPHILS RELATIVE PERCENT: 61.4 % (ref 43–80)
PDW BLD-RTO: 14.1 FL (ref 11.5–15)
PLATELET # BLD: 162 E9/L (ref 130–450)
PMV BLD AUTO: 10.4 FL (ref 7–12)
POTASSIUM SERPL-SCNC: 4.6 MMOL/L (ref 3.5–5)
RBC # BLD: 4.04 E12/L (ref 3.8–5.8)
SEDIMENTATION RATE, ERYTHROCYTE: 45 MM/HR (ref 0–15)
SODIUM BLD-SCNC: 142 MMOL/L (ref 132–146)
WBC # BLD: 7.1 E9/L (ref 4.5–11.5)

## 2019-12-07 PROCEDURE — 85651 RBC SED RATE NONAUTOMATED: CPT

## 2019-12-07 PROCEDURE — 1200000000 HC SEMI PRIVATE

## 2019-12-07 PROCEDURE — 80048 BASIC METABOLIC PNL TOTAL CA: CPT

## 2019-12-07 PROCEDURE — 82962 GLUCOSE BLOOD TEST: CPT

## 2019-12-07 PROCEDURE — 86140 C-REACTIVE PROTEIN: CPT

## 2019-12-07 PROCEDURE — 86060 ANTISTREPTOLYSIN O TITER: CPT

## 2019-12-07 PROCEDURE — 2580000003 HC RX 258: Performed by: INTERNAL MEDICINE

## 2019-12-07 PROCEDURE — 36415 COLL VENOUS BLD VENIPUNCTURE: CPT

## 2019-12-07 PROCEDURE — 85025 COMPLETE CBC W/AUTO DIFF WBC: CPT

## 2019-12-07 PROCEDURE — 83735 ASSAY OF MAGNESIUM: CPT

## 2019-12-07 RX ADMIN — Medication 10 ML: at 10:02

## 2019-12-07 RX ADMIN — COLLAGENASE SANTYL: 250 OINTMENT TOPICAL at 10:03

## 2019-12-07 RX ADMIN — Medication 10 ML: at 21:03

## 2019-12-07 ASSESSMENT — PAIN SCALES - GENERAL
PAINLEVEL_OUTOF10: 0

## 2019-12-08 LAB
METER GLUCOSE: 159 MG/DL (ref 74–99)
METER GLUCOSE: 172 MG/DL (ref 74–99)
METER GLUCOSE: 177 MG/DL (ref 74–99)
METER GLUCOSE: 212 MG/DL (ref 74–99)

## 2019-12-08 PROCEDURE — 1200000000 HC SEMI PRIVATE

## 2019-12-08 PROCEDURE — 82962 GLUCOSE BLOOD TEST: CPT

## 2019-12-08 PROCEDURE — 2580000003 HC RX 258: Performed by: INTERNAL MEDICINE

## 2019-12-08 RX ADMIN — COLLAGENASE SANTYL: 250 OINTMENT TOPICAL at 10:50

## 2019-12-08 RX ADMIN — Medication 10 ML: at 10:49

## 2019-12-08 RX ADMIN — Medication 10 ML: at 22:29

## 2019-12-08 ASSESSMENT — PAIN SCALES - GENERAL
PAINLEVEL_OUTOF10: 0
PAINLEVEL_OUTOF10: 0

## 2019-12-09 ENCOUNTER — APPOINTMENT (OUTPATIENT)
Dept: HYPERBARIC MEDICINE | Age: 79
DRG: 617 | End: 2019-12-09
Payer: MEDICARE

## 2019-12-09 PROBLEM — L97.512 SKIN ULCER OF RIGHT FOOT WITH FAT LAYER EXPOSED (HCC): Status: ACTIVE | Noted: 2019-12-09

## 2019-12-09 LAB
ANION GAP SERPL CALCULATED.3IONS-SCNC: 11 MMOL/L (ref 7–16)
BASOPHILS ABSOLUTE: 0.07 E9/L (ref 0–0.2)
BASOPHILS RELATIVE PERCENT: 1 % (ref 0–2)
BUN BLDV-MCNC: 31 MG/DL (ref 8–23)
CALCIUM SERPL-MCNC: 9.7 MG/DL (ref 8.6–10.2)
CHLORIDE BLD-SCNC: 100 MMOL/L (ref 98–107)
CO2: 26 MMOL/L (ref 22–29)
CREAT SERPL-MCNC: 1.1 MG/DL (ref 0.7–1.2)
EOSINOPHILS ABSOLUTE: 0.33 E9/L (ref 0.05–0.5)
EOSINOPHILS RELATIVE PERCENT: 4.9 % (ref 0–6)
GFR AFRICAN AMERICAN: >60
GFR NON-AFRICAN AMERICAN: >60 ML/MIN/1.73
GLUCOSE BLD-MCNC: 189 MG/DL (ref 74–99)
HCT VFR BLD CALC: 35.9 % (ref 37–54)
HEMOGLOBIN: 11.9 G/DL (ref 12.5–16.5)
IMMATURE GRANULOCYTES #: 0.03 E9/L
IMMATURE GRANULOCYTES %: 0.4 % (ref 0–5)
LYMPHOCYTES ABSOLUTE: 2.03 E9/L (ref 1.5–4)
LYMPHOCYTES RELATIVE PERCENT: 30.4 % (ref 20–42)
MAGNESIUM: 1.7 MG/DL (ref 1.6–2.6)
MCH RBC QN AUTO: 30.3 PG (ref 26–35)
MCHC RBC AUTO-ENTMCNC: 33.1 % (ref 32–34.5)
MCV RBC AUTO: 91.3 FL (ref 80–99.9)
METER GLUCOSE: 154 MG/DL (ref 74–99)
METER GLUCOSE: 174 MG/DL (ref 74–99)
METER GLUCOSE: 192 MG/DL (ref 74–99)
METER GLUCOSE: 201 MG/DL (ref 74–99)
MONOCYTES ABSOLUTE: 0.52 E9/L (ref 0.1–0.95)
MONOCYTES RELATIVE PERCENT: 7.8 % (ref 2–12)
NEUTROPHILS ABSOLUTE: 3.7 E9/L (ref 1.8–7.3)
NEUTROPHILS RELATIVE PERCENT: 55.5 % (ref 43–80)
PDW BLD-RTO: 14.1 FL (ref 11.5–15)
PLATELET # BLD: 154 E9/L (ref 130–450)
PMV BLD AUTO: 10 FL (ref 7–12)
POTASSIUM SERPL-SCNC: 4.1 MMOL/L (ref 3.5–5)
RBC # BLD: 3.93 E12/L (ref 3.8–5.8)
SODIUM BLD-SCNC: 137 MMOL/L (ref 132–146)
WBC # BLD: 6.7 E9/L (ref 4.5–11.5)

## 2019-12-09 PROCEDURE — 82962 GLUCOSE BLOOD TEST: CPT

## 2019-12-09 PROCEDURE — 99214 OFFICE O/P EST MOD 30 MIN: CPT

## 2019-12-09 PROCEDURE — 83735 ASSAY OF MAGNESIUM: CPT

## 2019-12-09 PROCEDURE — 1200000000 HC SEMI PRIVATE

## 2019-12-09 PROCEDURE — 2580000003 HC RX 258: Performed by: INTERNAL MEDICINE

## 2019-12-09 PROCEDURE — 80048 BASIC METABOLIC PNL TOTAL CA: CPT

## 2019-12-09 PROCEDURE — 85025 COMPLETE CBC W/AUTO DIFF WBC: CPT

## 2019-12-09 PROCEDURE — 36415 COLL VENOUS BLD VENIPUNCTURE: CPT

## 2019-12-09 PROCEDURE — 99233 SBSQ HOSP IP/OBS HIGH 50: CPT | Performed by: SURGERY

## 2019-12-09 RX ADMIN — Medication 10 ML: at 21:39

## 2019-12-09 RX ADMIN — Medication 10 ML: at 08:44

## 2019-12-09 ASSESSMENT — PAIN SCALES - GENERAL
PAINLEVEL_OUTOF10: 0
PAINLEVEL_OUTOF10: 0

## 2019-12-10 ENCOUNTER — APPOINTMENT (OUTPATIENT)
Dept: GENERAL RADIOLOGY | Age: 79
DRG: 617 | End: 2019-12-10
Payer: MEDICARE

## 2019-12-10 ENCOUNTER — ANESTHESIA EVENT (OUTPATIENT)
Dept: OPERATING ROOM | Age: 79
DRG: 617 | End: 2019-12-10
Payer: MEDICARE

## 2019-12-10 ENCOUNTER — ANESTHESIA (OUTPATIENT)
Dept: OPERATING ROOM | Age: 79
DRG: 617 | End: 2019-12-10
Payer: MEDICARE

## 2019-12-10 VITALS — SYSTOLIC BLOOD PRESSURE: 153 MMHG | OXYGEN SATURATION: 100 % | DIASTOLIC BLOOD PRESSURE: 70 MMHG

## 2019-12-10 LAB
BLOOD CULTURE, ROUTINE: NORMAL
CULTURE, BLOOD 2: NORMAL
METER GLUCOSE: 163 MG/DL (ref 74–99)
METER GLUCOSE: 182 MG/DL (ref 74–99)
METER GLUCOSE: 193 MG/DL (ref 74–99)
METER GLUCOSE: 194 MG/DL (ref 74–99)

## 2019-12-10 PROCEDURE — 0Y6M0ZC DETACHMENT AT RIGHT FOOT, PARTIAL 3RD RAY, OPEN APPROACH: ICD-10-PCS | Performed by: PODIATRIST

## 2019-12-10 PROCEDURE — 82962 GLUCOSE BLOOD TEST: CPT

## 2019-12-10 PROCEDURE — 88305 TISSUE EXAM BY PATHOLOGIST: CPT

## 2019-12-10 PROCEDURE — 87186 SC STD MICRODIL/AGAR DIL: CPT

## 2019-12-10 PROCEDURE — 88304 TISSUE EXAM BY PATHOLOGIST: CPT

## 2019-12-10 PROCEDURE — 87116 MYCOBACTERIA CULTURE: CPT

## 2019-12-10 PROCEDURE — 87077 CULTURE AEROBIC IDENTIFY: CPT

## 2019-12-10 PROCEDURE — 87206 SMEAR FLUORESCENT/ACID STAI: CPT

## 2019-12-10 PROCEDURE — 87102 FUNGUS ISOLATION CULTURE: CPT

## 2019-12-10 PROCEDURE — 3700000001 HC ADD 15 MINUTES (ANESTHESIA): Performed by: PODIATRIST

## 2019-12-10 PROCEDURE — 2580000003 HC RX 258: Performed by: NURSE ANESTHETIST, CERTIFIED REGISTERED

## 2019-12-10 PROCEDURE — 87075 CULTR BACTERIA EXCEPT BLOOD: CPT

## 2019-12-10 PROCEDURE — 87205 SMEAR GRAM STAIN: CPT

## 2019-12-10 PROCEDURE — 6360000002 HC RX W HCPCS: Performed by: SPECIALIST

## 2019-12-10 PROCEDURE — 2580000003 HC RX 258: Performed by: PODIATRIST

## 2019-12-10 PROCEDURE — 3700000000 HC ANESTHESIA ATTENDED CARE: Performed by: PODIATRIST

## 2019-12-10 PROCEDURE — 6360000002 HC RX W HCPCS: Performed by: NURSE ANESTHETIST, CERTIFIED REGISTERED

## 2019-12-10 PROCEDURE — 87015 SPECIMEN INFECT AGNT CONCNTJ: CPT

## 2019-12-10 PROCEDURE — 71045 X-RAY EXAM CHEST 1 VIEW: CPT

## 2019-12-10 PROCEDURE — 1200000000 HC SEMI PRIVATE

## 2019-12-10 PROCEDURE — 93005 ELECTROCARDIOGRAM TRACING: CPT

## 2019-12-10 PROCEDURE — 6370000000 HC RX 637 (ALT 250 FOR IP): Performed by: SPECIALIST

## 2019-12-10 PROCEDURE — 88311 DECALCIFY TISSUE: CPT

## 2019-12-10 PROCEDURE — 2580000003 HC RX 258: Performed by: SPECIALIST

## 2019-12-10 PROCEDURE — 3600000012 HC SURGERY LEVEL 2 ADDTL 15MIN: Performed by: PODIATRIST

## 2019-12-10 PROCEDURE — 7100000001 HC PACU RECOVERY - ADDTL 15 MIN: Performed by: PODIATRIST

## 2019-12-10 PROCEDURE — 2500000003 HC RX 250 WO HCPCS: Performed by: PODIATRIST

## 2019-12-10 PROCEDURE — 87070 CULTURE OTHR SPECIMN AEROBIC: CPT

## 2019-12-10 PROCEDURE — 73630 X-RAY EXAM OF FOOT: CPT

## 2019-12-10 PROCEDURE — 7100000000 HC PACU RECOVERY - FIRST 15 MIN: Performed by: PODIATRIST

## 2019-12-10 PROCEDURE — 3600000002 HC SURGERY LEVEL 2 BASE: Performed by: PODIATRIST

## 2019-12-10 PROCEDURE — 2709999900 HC NON-CHARGEABLE SUPPLY: Performed by: PODIATRIST

## 2019-12-10 RX ORDER — BUPIVACAINE HYDROCHLORIDE 5 MG/ML
INJECTION, SOLUTION EPIDURAL; INTRACAUDAL PRN
Status: DISCONTINUED | OUTPATIENT
Start: 2019-12-10 | End: 2019-12-10 | Stop reason: ALTCHOICE

## 2019-12-10 RX ORDER — MORPHINE SULFATE 2 MG/ML
1 INJECTION, SOLUTION INTRAMUSCULAR; INTRAVENOUS EVERY 5 MIN PRN
Status: DISCONTINUED | OUTPATIENT
Start: 2019-12-10 | End: 2019-12-19 | Stop reason: HOSPADM

## 2019-12-10 RX ORDER — MORPHINE SULFATE 2 MG/ML
1 INJECTION, SOLUTION INTRAMUSCULAR; INTRAVENOUS EVERY 5 MIN PRN
Status: DISCONTINUED | OUTPATIENT
Start: 2019-12-10 | End: 2019-12-10

## 2019-12-10 RX ORDER — MEPERIDINE HYDROCHLORIDE 50 MG/ML
12.5 INJECTION INTRAMUSCULAR; INTRAVENOUS; SUBCUTANEOUS EVERY 5 MIN PRN
Status: DISCONTINUED | OUTPATIENT
Start: 2019-12-10 | End: 2019-12-10

## 2019-12-10 RX ORDER — METRONIDAZOLE 500 MG/1
500 TABLET ORAL EVERY 8 HOURS SCHEDULED
Status: DISCONTINUED | OUTPATIENT
Start: 2019-12-10 | End: 2019-12-19 | Stop reason: HOSPADM

## 2019-12-10 RX ORDER — PROPOFOL 10 MG/ML
INJECTION, EMULSION INTRAVENOUS CONTINUOUS PRN
Status: DISCONTINUED | OUTPATIENT
Start: 2019-12-10 | End: 2019-12-10 | Stop reason: SDUPTHER

## 2019-12-10 RX ORDER — FENTANYL CITRATE 50 UG/ML
INJECTION, SOLUTION INTRAMUSCULAR; INTRAVENOUS PRN
Status: DISCONTINUED | OUTPATIENT
Start: 2019-12-10 | End: 2019-12-10 | Stop reason: SDUPTHER

## 2019-12-10 RX ORDER — HYDROCODONE BITARTRATE AND ACETAMINOPHEN 5; 325 MG/1; MG/1
1 TABLET ORAL PRN
Status: DISCONTINUED | OUTPATIENT
Start: 2019-12-10 | End: 2019-12-10

## 2019-12-10 RX ORDER — MORPHINE SULFATE 2 MG/ML
2 INJECTION, SOLUTION INTRAMUSCULAR; INTRAVENOUS EVERY 5 MIN PRN
Status: DISCONTINUED | OUTPATIENT
Start: 2019-12-10 | End: 2019-12-10

## 2019-12-10 RX ORDER — SODIUM CHLORIDE 0.9 % (FLUSH) 0.9 %
10 SYRINGE (ML) INJECTION EVERY 12 HOURS SCHEDULED
Status: DISCONTINUED | OUTPATIENT
Start: 2019-12-10 | End: 2019-12-19 | Stop reason: HOSPADM

## 2019-12-10 RX ORDER — HYDROCODONE BITARTRATE AND ACETAMINOPHEN 5; 325 MG/1; MG/1
2 TABLET ORAL PRN
Status: DISCONTINUED | OUTPATIENT
Start: 2019-12-10 | End: 2019-12-10

## 2019-12-10 RX ORDER — SODIUM CHLORIDE 9 MG/ML
INJECTION, SOLUTION INTRAVENOUS CONTINUOUS PRN
Status: DISCONTINUED | OUTPATIENT
Start: 2019-12-10 | End: 2019-12-10 | Stop reason: SDUPTHER

## 2019-12-10 RX ORDER — PROMETHAZINE HYDROCHLORIDE 25 MG/ML
6.25 INJECTION, SOLUTION INTRAMUSCULAR; INTRAVENOUS EVERY 10 MIN PRN
Status: DISCONTINUED | OUTPATIENT
Start: 2019-12-10 | End: 2019-12-10

## 2019-12-10 RX ORDER — SODIUM CHLORIDE 0.9 % (FLUSH) 0.9 %
10 SYRINGE (ML) INJECTION PRN
Status: DISCONTINUED | OUTPATIENT
Start: 2019-12-10 | End: 2019-12-19 | Stop reason: HOSPADM

## 2019-12-10 RX ADMIN — FENTANYL CITRATE 20 MCG: 50 INJECTION, SOLUTION INTRAMUSCULAR; INTRAVENOUS at 11:15

## 2019-12-10 RX ADMIN — METRONIDAZOLE 500 MG: 500 TABLET ORAL at 21:06

## 2019-12-10 RX ADMIN — Medication 10 ML: at 21:07

## 2019-12-10 RX ADMIN — FENTANYL CITRATE 20 MCG: 50 INJECTION, SOLUTION INTRAMUSCULAR; INTRAVENOUS at 11:12

## 2019-12-10 RX ADMIN — PROPOFOL 100 MCG/KG/MIN: 10 INJECTION, EMULSION INTRAVENOUS at 11:12

## 2019-12-10 RX ADMIN — SODIUM CHLORIDE: 9 INJECTION, SOLUTION INTRAVENOUS at 11:06

## 2019-12-10 RX ADMIN — CEFTAROLINE FOSAMIL 600 MG: 600 POWDER, FOR SOLUTION INTRAVENOUS at 16:47

## 2019-12-10 ASSESSMENT — PAIN SCALES - GENERAL
PAINLEVEL_OUTOF10: 0

## 2019-12-10 ASSESSMENT — PULMONARY FUNCTION TESTS
PIF_VALUE: 1
PIF_VALUE: 2
PIF_VALUE: 1
PIF_VALUE: 0
PIF_VALUE: 1
PIF_VALUE: 2
PIF_VALUE: 1

## 2019-12-11 LAB
ANION GAP SERPL CALCULATED.3IONS-SCNC: 12 MMOL/L (ref 7–16)
BASOPHILS ABSOLUTE: 0.08 E9/L (ref 0–0.2)
BASOPHILS RELATIVE PERCENT: 1 % (ref 0–2)
BUN BLDV-MCNC: 31 MG/DL (ref 8–23)
CALCIUM SERPL-MCNC: 9.3 MG/DL (ref 8.6–10.2)
CHLORIDE BLD-SCNC: 101 MMOL/L (ref 98–107)
CO2: 23 MMOL/L (ref 22–29)
CREAT SERPL-MCNC: 1 MG/DL (ref 0.7–1.2)
EOSINOPHILS ABSOLUTE: 0.34 E9/L (ref 0.05–0.5)
EOSINOPHILS RELATIVE PERCENT: 4.1 % (ref 0–6)
GFR AFRICAN AMERICAN: >60
GFR NON-AFRICAN AMERICAN: >60 ML/MIN/1.73
GLUCOSE BLD-MCNC: 190 MG/DL (ref 74–99)
GRAM STAIN ORDERABLE: NORMAL
HCT VFR BLD CALC: 36.5 % (ref 37–54)
HEMOGLOBIN: 12 G/DL (ref 12.5–16.5)
IMMATURE GRANULOCYTES #: 0.03 E9/L
IMMATURE GRANULOCYTES %: 0.4 % (ref 0–5)
LYMPHOCYTES ABSOLUTE: 1.62 E9/L (ref 1.5–4)
LYMPHOCYTES RELATIVE PERCENT: 19.4 % (ref 20–42)
MAGNESIUM: 1.6 MG/DL (ref 1.6–2.6)
MCH RBC QN AUTO: 29.7 PG (ref 26–35)
MCHC RBC AUTO-ENTMCNC: 32.9 % (ref 32–34.5)
MCV RBC AUTO: 90.3 FL (ref 80–99.9)
METER GLUCOSE: 192 MG/DL (ref 74–99)
METER GLUCOSE: 196 MG/DL (ref 74–99)
METER GLUCOSE: 203 MG/DL (ref 74–99)
METER GLUCOSE: 207 MG/DL (ref 74–99)
MONOCYTES ABSOLUTE: 0.66 E9/L (ref 0.1–0.95)
MONOCYTES RELATIVE PERCENT: 7.9 % (ref 2–12)
NEUTROPHILS ABSOLUTE: 5.61 E9/L (ref 1.8–7.3)
NEUTROPHILS RELATIVE PERCENT: 67.2 % (ref 43–80)
PDW BLD-RTO: 14.2 FL (ref 11.5–15)
PLATELET # BLD: 174 E9/L (ref 130–450)
PMV BLD AUTO: 10.5 FL (ref 7–12)
POTASSIUM SERPL-SCNC: 4 MMOL/L (ref 3.5–5)
RBC # BLD: 4.04 E12/L (ref 3.8–5.8)
SODIUM BLD-SCNC: 136 MMOL/L (ref 132–146)
WBC # BLD: 8.3 E9/L (ref 4.5–11.5)

## 2019-12-11 PROCEDURE — 97530 THERAPEUTIC ACTIVITIES: CPT | Performed by: PHYSICAL THERAPIST

## 2019-12-11 PROCEDURE — 97161 PT EVAL LOW COMPLEX 20 MIN: CPT | Performed by: PHYSICAL THERAPIST

## 2019-12-11 PROCEDURE — 97530 THERAPEUTIC ACTIVITIES: CPT

## 2019-12-11 PROCEDURE — 6360000002 HC RX W HCPCS: Performed by: SPECIALIST

## 2019-12-11 PROCEDURE — 1200000000 HC SEMI PRIVATE

## 2019-12-11 PROCEDURE — 83735 ASSAY OF MAGNESIUM: CPT

## 2019-12-11 PROCEDURE — 6370000000 HC RX 637 (ALT 250 FOR IP): Performed by: SPECIALIST

## 2019-12-11 PROCEDURE — 80048 BASIC METABOLIC PNL TOTAL CA: CPT

## 2019-12-11 PROCEDURE — 2580000003 HC RX 258: Performed by: SPECIALIST

## 2019-12-11 PROCEDURE — 82962 GLUCOSE BLOOD TEST: CPT

## 2019-12-11 PROCEDURE — 97165 OT EVAL LOW COMPLEX 30 MIN: CPT

## 2019-12-11 PROCEDURE — 36415 COLL VENOUS BLD VENIPUNCTURE: CPT

## 2019-12-11 PROCEDURE — 85025 COMPLETE CBC W/AUTO DIFF WBC: CPT

## 2019-12-11 PROCEDURE — 2580000003 HC RX 258: Performed by: PODIATRIST

## 2019-12-11 RX ADMIN — CEFTAROLINE FOSAMIL 600 MG: 600 POWDER, FOR SOLUTION INTRAVENOUS at 04:14

## 2019-12-11 RX ADMIN — CEFTAROLINE FOSAMIL 600 MG: 600 POWDER, FOR SOLUTION INTRAVENOUS at 16:03

## 2019-12-11 RX ADMIN — SODIUM CHLORIDE, PRESERVATIVE FREE 10 ML: 5 INJECTION INTRAVENOUS at 08:34

## 2019-12-11 RX ADMIN — SODIUM CHLORIDE, PRESERVATIVE FREE 10 ML: 5 INJECTION INTRAVENOUS at 16:05

## 2019-12-11 RX ADMIN — METRONIDAZOLE 500 MG: 500 TABLET ORAL at 05:22

## 2019-12-11 RX ADMIN — SODIUM CHLORIDE, PRESERVATIVE FREE 10 ML: 5 INJECTION INTRAVENOUS at 20:30

## 2019-12-11 RX ADMIN — METRONIDAZOLE 500 MG: 500 TABLET ORAL at 14:05

## 2019-12-11 RX ADMIN — COLLAGENASE SANTYL: 250 OINTMENT TOPICAL at 08:35

## 2019-12-11 RX ADMIN — METRONIDAZOLE 500 MG: 500 TABLET ORAL at 20:30

## 2019-12-11 ASSESSMENT — PAIN SCALES - GENERAL
PAINLEVEL_OUTOF10: 0

## 2019-12-12 LAB
ANAEROBIC CULTURE: NORMAL
ANION GAP SERPL CALCULATED.3IONS-SCNC: 11 MMOL/L (ref 7–16)
BASOPHILS ABSOLUTE: 0.06 E9/L (ref 0–0.2)
BASOPHILS RELATIVE PERCENT: 0.8 % (ref 0–2)
BUN BLDV-MCNC: 27 MG/DL (ref 8–23)
CALCIUM SERPL-MCNC: 9.2 MG/DL (ref 8.6–10.2)
CHLORIDE BLD-SCNC: 103 MMOL/L (ref 98–107)
CO2: 23 MMOL/L (ref 22–29)
CREAT SERPL-MCNC: 1 MG/DL (ref 0.7–1.2)
EOSINOPHILS ABSOLUTE: 0.3 E9/L (ref 0.05–0.5)
EOSINOPHILS RELATIVE PERCENT: 4.1 % (ref 0–6)
GFR AFRICAN AMERICAN: >60
GFR NON-AFRICAN AMERICAN: >60 ML/MIN/1.73
GLUCOSE BLD-MCNC: 181 MG/DL (ref 74–99)
HCT VFR BLD CALC: 35 % (ref 37–54)
HEMOGLOBIN: 11.6 G/DL (ref 12.5–16.5)
IMMATURE GRANULOCYTES #: 0.02 E9/L
IMMATURE GRANULOCYTES %: 0.3 % (ref 0–5)
LYMPHOCYTES ABSOLUTE: 1.59 E9/L (ref 1.5–4)
LYMPHOCYTES RELATIVE PERCENT: 22 % (ref 20–42)
MAGNESIUM: 1.7 MG/DL (ref 1.6–2.6)
MCH RBC QN AUTO: 30.1 PG (ref 26–35)
MCHC RBC AUTO-ENTMCNC: 33.1 % (ref 32–34.5)
MCV RBC AUTO: 90.7 FL (ref 80–99.9)
METER GLUCOSE: 167 MG/DL (ref 74–99)
METER GLUCOSE: 168 MG/DL (ref 74–99)
METER GLUCOSE: 172 MG/DL (ref 74–99)
METER GLUCOSE: 189 MG/DL (ref 74–99)
MONOCYTES ABSOLUTE: 0.65 E9/L (ref 0.1–0.95)
MONOCYTES RELATIVE PERCENT: 9 % (ref 2–12)
NEUTROPHILS ABSOLUTE: 4.62 E9/L (ref 1.8–7.3)
NEUTROPHILS RELATIVE PERCENT: 63.8 % (ref 43–80)
PDW BLD-RTO: 14.4 FL (ref 11.5–15)
PLATELET # BLD: 165 E9/L (ref 130–450)
PMV BLD AUTO: 10.4 FL (ref 7–12)
POTASSIUM SERPL-SCNC: 4.1 MMOL/L (ref 3.5–5)
RBC # BLD: 3.86 E12/L (ref 3.8–5.8)
SODIUM BLD-SCNC: 137 MMOL/L (ref 132–146)
WBC # BLD: 7.2 E9/L (ref 4.5–11.5)

## 2019-12-12 PROCEDURE — 80048 BASIC METABOLIC PNL TOTAL CA: CPT

## 2019-12-12 PROCEDURE — 83735 ASSAY OF MAGNESIUM: CPT

## 2019-12-12 PROCEDURE — 1200000000 HC SEMI PRIVATE

## 2019-12-12 PROCEDURE — 85025 COMPLETE CBC W/AUTO DIFF WBC: CPT

## 2019-12-12 PROCEDURE — 6370000000 HC RX 637 (ALT 250 FOR IP): Performed by: SPECIALIST

## 2019-12-12 PROCEDURE — 82962 GLUCOSE BLOOD TEST: CPT

## 2019-12-12 PROCEDURE — 2580000003 HC RX 258: Performed by: SPECIALIST

## 2019-12-12 PROCEDURE — 36415 COLL VENOUS BLD VENIPUNCTURE: CPT

## 2019-12-12 PROCEDURE — 6360000002 HC RX W HCPCS: Performed by: SPECIALIST

## 2019-12-12 PROCEDURE — 97530 THERAPEUTIC ACTIVITIES: CPT | Performed by: PHYSICAL THERAPIST

## 2019-12-12 PROCEDURE — 2580000003 HC RX 258: Performed by: PODIATRIST

## 2019-12-12 RX ORDER — LIDOCAINE HYDROCHLORIDE 10 MG/ML
5 INJECTION, SOLUTION EPIDURAL; INFILTRATION; INTRACAUDAL; PERINEURAL ONCE
Status: DISCONTINUED | OUTPATIENT
Start: 2019-12-12 | End: 2019-12-19 | Stop reason: HOSPADM

## 2019-12-12 RX ORDER — HEPARIN SODIUM (PORCINE) LOCK FLUSH IV SOLN 100 UNIT/ML 100 UNIT/ML
3 SOLUTION INTRAVENOUS EVERY 12 HOURS SCHEDULED
Status: DISCONTINUED | OUTPATIENT
Start: 2019-12-12 | End: 2019-12-19 | Stop reason: HOSPADM

## 2019-12-12 RX ORDER — HEPARIN SODIUM (PORCINE) LOCK FLUSH IV SOLN 100 UNIT/ML 100 UNIT/ML
3 SOLUTION INTRAVENOUS PRN
Status: DISCONTINUED | OUTPATIENT
Start: 2019-12-12 | End: 2019-12-19 | Stop reason: HOSPADM

## 2019-12-12 RX ORDER — SODIUM CHLORIDE 0.9 % (FLUSH) 0.9 %
10 SYRINGE (ML) INJECTION PRN
Status: DISCONTINUED | OUTPATIENT
Start: 2019-12-12 | End: 2019-12-19 | Stop reason: HOSPADM

## 2019-12-12 RX ORDER — METRONIDAZOLE 500 MG/1
500 TABLET ORAL EVERY 8 HOURS SCHEDULED
Qty: 30 TABLET | Refills: 0 | Status: SHIPPED | OUTPATIENT
Start: 2019-12-12 | End: 2019-12-22

## 2019-12-12 RX ADMIN — METRONIDAZOLE 500 MG: 500 TABLET ORAL at 06:51

## 2019-12-12 RX ADMIN — METRONIDAZOLE 500 MG: 500 TABLET ORAL at 22:15

## 2019-12-12 RX ADMIN — METRONIDAZOLE 500 MG: 500 TABLET ORAL at 14:25

## 2019-12-12 RX ADMIN — SODIUM CHLORIDE, PRESERVATIVE FREE 10 ML: 5 INJECTION INTRAVENOUS at 20:55

## 2019-12-12 RX ADMIN — SODIUM CHLORIDE, PRESERVATIVE FREE 10 ML: 5 INJECTION INTRAVENOUS at 09:23

## 2019-12-12 RX ADMIN — CEFTAROLINE FOSAMIL 600 MG: 600 POWDER, FOR SOLUTION INTRAVENOUS at 17:17

## 2019-12-12 RX ADMIN — CEFTAROLINE FOSAMIL 600 MG: 600 POWDER, FOR SOLUTION INTRAVENOUS at 04:16

## 2019-12-12 ASSESSMENT — PAIN SCALES - GENERAL
PAINLEVEL_OUTOF10: 0
PAINLEVEL_OUTOF10: 0

## 2019-12-13 ENCOUNTER — APPOINTMENT (OUTPATIENT)
Dept: GENERAL RADIOLOGY | Age: 79
DRG: 617 | End: 2019-12-13
Payer: MEDICARE

## 2019-12-13 LAB
CULTURE SURGICAL: ABNORMAL
METER GLUCOSE: 145 MG/DL (ref 74–99)
METER GLUCOSE: 155 MG/DL (ref 74–99)
METER GLUCOSE: 169 MG/DL (ref 74–99)
METER GLUCOSE: 196 MG/DL (ref 74–99)
ORGANISM: ABNORMAL

## 2019-12-13 PROCEDURE — 2580000003 HC RX 258: Performed by: PODIATRIST

## 2019-12-13 PROCEDURE — 97530 THERAPEUTIC ACTIVITIES: CPT

## 2019-12-13 PROCEDURE — 2720000010 HC SURG SUPPLY STERILE

## 2019-12-13 PROCEDURE — 2580000003 HC RX 258: Performed by: SPECIALIST

## 2019-12-13 PROCEDURE — 76937 US GUIDE VASCULAR ACCESS: CPT

## 2019-12-13 PROCEDURE — 99231 SBSQ HOSP IP/OBS SF/LOW 25: CPT | Performed by: SURGERY

## 2019-12-13 PROCEDURE — 71045 X-RAY EXAM CHEST 1 VIEW: CPT

## 2019-12-13 PROCEDURE — 6370000000 HC RX 637 (ALT 250 FOR IP): Performed by: SPECIALIST

## 2019-12-13 PROCEDURE — 1200000000 HC SEMI PRIVATE

## 2019-12-13 PROCEDURE — 02HV33Z INSERTION OF INFUSION DEVICE INTO SUPERIOR VENA CAVA, PERCUTANEOUS APPROACH: ICD-10-PCS | Performed by: INTERNAL MEDICINE

## 2019-12-13 PROCEDURE — 36569 INSJ PICC 5 YR+ W/O IMAGING: CPT

## 2019-12-13 PROCEDURE — C1751 CATH, INF, PER/CENT/MIDLINE: HCPCS

## 2019-12-13 PROCEDURE — 6360000002 HC RX W HCPCS: Performed by: SPECIALIST

## 2019-12-13 PROCEDURE — 82962 GLUCOSE BLOOD TEST: CPT

## 2019-12-13 PROCEDURE — 97535 SELF CARE MNGMENT TRAINING: CPT

## 2019-12-13 RX ADMIN — SODIUM CHLORIDE, PRESERVATIVE FREE 10 ML: 5 INJECTION INTRAVENOUS at 22:19

## 2019-12-13 RX ADMIN — METRONIDAZOLE 500 MG: 500 TABLET ORAL at 05:05

## 2019-12-13 RX ADMIN — CEFTAROLINE FOSAMIL 600 MG: 600 POWDER, FOR SOLUTION INTRAVENOUS at 17:05

## 2019-12-13 RX ADMIN — SODIUM CHLORIDE, PRESERVATIVE FREE 10 ML: 5 INJECTION INTRAVENOUS at 09:07

## 2019-12-13 RX ADMIN — Medication 10 ML: at 09:08

## 2019-12-13 RX ADMIN — SODIUM CHLORIDE, PRESERVATIVE FREE 300 UNITS: 5 INJECTION INTRAVENOUS at 22:14

## 2019-12-13 RX ADMIN — METRONIDAZOLE 500 MG: 500 TABLET ORAL at 13:32

## 2019-12-13 RX ADMIN — METRONIDAZOLE 500 MG: 500 TABLET ORAL at 22:18

## 2019-12-13 RX ADMIN — CEFTAROLINE FOSAMIL 600 MG: 600 POWDER, FOR SOLUTION INTRAVENOUS at 03:30

## 2019-12-13 ASSESSMENT — PAIN SCALES - GENERAL
PAINLEVEL_OUTOF10: 0

## 2019-12-14 LAB
METER GLUCOSE: 165 MG/DL (ref 74–99)
METER GLUCOSE: 174 MG/DL (ref 74–99)
METER GLUCOSE: 177 MG/DL (ref 74–99)
METER GLUCOSE: 232 MG/DL (ref 74–99)

## 2019-12-14 PROCEDURE — 1200000000 HC SEMI PRIVATE

## 2019-12-14 PROCEDURE — 6360000002 HC RX W HCPCS: Performed by: PODIATRIST

## 2019-12-14 PROCEDURE — 6370000000 HC RX 637 (ALT 250 FOR IP): Performed by: SPECIALIST

## 2019-12-14 PROCEDURE — 2580000003 HC RX 258: Performed by: PODIATRIST

## 2019-12-14 PROCEDURE — 82962 GLUCOSE BLOOD TEST: CPT

## 2019-12-14 PROCEDURE — 6360000002 HC RX W HCPCS: Performed by: SPECIALIST

## 2019-12-14 PROCEDURE — 2580000003 HC RX 258: Performed by: SPECIALIST

## 2019-12-14 RX ADMIN — CEFTAROLINE FOSAMIL 600 MG: 600 POWDER, FOR SOLUTION INTRAVENOUS at 04:02

## 2019-12-14 RX ADMIN — Medication 10 ML: at 10:10

## 2019-12-14 RX ADMIN — CEFTAROLINE FOSAMIL 600 MG: 600 POWDER, FOR SOLUTION INTRAVENOUS at 16:43

## 2019-12-14 RX ADMIN — METRONIDAZOLE 500 MG: 500 TABLET ORAL at 20:56

## 2019-12-14 RX ADMIN — SODIUM CHLORIDE, PRESERVATIVE FREE 300 UNITS: 5 INJECTION INTRAVENOUS at 20:54

## 2019-12-14 RX ADMIN — SODIUM CHLORIDE, PRESERVATIVE FREE 300 UNITS: 5 INJECTION INTRAVENOUS at 10:05

## 2019-12-14 RX ADMIN — SODIUM CHLORIDE, PRESERVATIVE FREE 10 ML: 5 INJECTION INTRAVENOUS at 20:54

## 2019-12-14 RX ADMIN — METRONIDAZOLE 500 MG: 500 TABLET ORAL at 14:38

## 2019-12-14 RX ADMIN — METRONIDAZOLE 500 MG: 500 TABLET ORAL at 06:18

## 2019-12-14 RX ADMIN — SODIUM CHLORIDE, PRESERVATIVE FREE 10 ML: 5 INJECTION INTRAVENOUS at 10:09

## 2019-12-14 RX ADMIN — COLLAGENASE SANTYL: 250 OINTMENT TOPICAL at 10:07

## 2019-12-14 ASSESSMENT — PAIN SCALES - GENERAL
PAINLEVEL_OUTOF10: 0

## 2019-12-15 LAB
METER GLUCOSE: 153 MG/DL (ref 74–99)
METER GLUCOSE: 181 MG/DL (ref 74–99)
METER GLUCOSE: 185 MG/DL (ref 74–99)
METER GLUCOSE: 201 MG/DL (ref 74–99)

## 2019-12-15 PROCEDURE — 6360000002 HC RX W HCPCS: Performed by: SPECIALIST

## 2019-12-15 PROCEDURE — 6370000000 HC RX 637 (ALT 250 FOR IP): Performed by: SPECIALIST

## 2019-12-15 PROCEDURE — 1200000000 HC SEMI PRIVATE

## 2019-12-15 PROCEDURE — 2580000003 HC RX 258: Performed by: PODIATRIST

## 2019-12-15 PROCEDURE — 82962 GLUCOSE BLOOD TEST: CPT

## 2019-12-15 PROCEDURE — 2580000003 HC RX 258: Performed by: SPECIALIST

## 2019-12-15 RX ADMIN — SODIUM CHLORIDE, PRESERVATIVE FREE 10 ML: 5 INJECTION INTRAVENOUS at 03:57

## 2019-12-15 RX ADMIN — METRONIDAZOLE 500 MG: 500 TABLET ORAL at 06:33

## 2019-12-15 RX ADMIN — CEFTAROLINE FOSAMIL 600 MG: 600 POWDER, FOR SOLUTION INTRAVENOUS at 03:56

## 2019-12-15 RX ADMIN — Medication 10 ML: at 22:29

## 2019-12-15 RX ADMIN — SODIUM CHLORIDE, PRESERVATIVE FREE 10 ML: 5 INJECTION INTRAVENOUS at 22:28

## 2019-12-15 RX ADMIN — METRONIDAZOLE 500 MG: 500 TABLET ORAL at 21:22

## 2019-12-15 RX ADMIN — METRONIDAZOLE 500 MG: 500 TABLET ORAL at 14:40

## 2019-12-15 RX ADMIN — COLLAGENASE SANTYL: 250 OINTMENT TOPICAL at 10:06

## 2019-12-15 RX ADMIN — SODIUM CHLORIDE, PRESERVATIVE FREE 300 UNITS: 5 INJECTION INTRAVENOUS at 10:05

## 2019-12-15 RX ADMIN — SODIUM CHLORIDE, PRESERVATIVE FREE 300 UNITS: 5 INJECTION INTRAVENOUS at 22:29

## 2019-12-15 RX ADMIN — CEFTAROLINE FOSAMIL 600 MG: 600 POWDER, FOR SOLUTION INTRAVENOUS at 16:12

## 2019-12-15 ASSESSMENT — PAIN SCALES - GENERAL
PAINLEVEL_OUTOF10: 0

## 2019-12-16 ENCOUNTER — TELEPHONE (OUTPATIENT)
Dept: VASCULAR SURGERY | Age: 79
End: 2019-12-16

## 2019-12-16 LAB
METER GLUCOSE: 153 MG/DL (ref 74–99)
METER GLUCOSE: 166 MG/DL (ref 74–99)
METER GLUCOSE: 200 MG/DL (ref 74–99)
METER GLUCOSE: 252 MG/DL (ref 74–99)

## 2019-12-16 PROCEDURE — 97535 SELF CARE MNGMENT TRAINING: CPT

## 2019-12-16 PROCEDURE — 2580000003 HC RX 258: Performed by: SPECIALIST

## 2019-12-16 PROCEDURE — 1200000000 HC SEMI PRIVATE

## 2019-12-16 PROCEDURE — 97530 THERAPEUTIC ACTIVITIES: CPT

## 2019-12-16 PROCEDURE — 6360000002 HC RX W HCPCS: Performed by: PODIATRIST

## 2019-12-16 PROCEDURE — 99232 SBSQ HOSP IP/OBS MODERATE 35: CPT | Performed by: SURGERY

## 2019-12-16 PROCEDURE — 2580000003 HC RX 258: Performed by: PODIATRIST

## 2019-12-16 PROCEDURE — 6370000000 HC RX 637 (ALT 250 FOR IP): Performed by: SPECIALIST

## 2019-12-16 PROCEDURE — 6360000002 HC RX W HCPCS: Performed by: SPECIALIST

## 2019-12-16 PROCEDURE — 82962 GLUCOSE BLOOD TEST: CPT

## 2019-12-16 RX ORDER — SODIUM CHLORIDE AND POTASSIUM CHLORIDE .9; .15 G/100ML; G/100ML
SOLUTION INTRAVENOUS CONTINUOUS
Status: DISCONTINUED | OUTPATIENT
Start: 2019-12-17 | End: 2019-12-19 | Stop reason: HOSPADM

## 2019-12-16 RX ADMIN — COLLAGENASE SANTYL: 250 OINTMENT TOPICAL at 09:53

## 2019-12-16 RX ADMIN — SODIUM CHLORIDE, PRESERVATIVE FREE 300 UNITS: 5 INJECTION INTRAVENOUS at 20:57

## 2019-12-16 RX ADMIN — CEFTAROLINE FOSAMIL 600 MG: 600 POWDER, FOR SOLUTION INTRAVENOUS at 04:44

## 2019-12-16 RX ADMIN — CEFTAROLINE FOSAMIL 600 MG: 600 POWDER, FOR SOLUTION INTRAVENOUS at 17:14

## 2019-12-16 RX ADMIN — METRONIDAZOLE 500 MG: 500 TABLET ORAL at 07:09

## 2019-12-16 RX ADMIN — SODIUM CHLORIDE, PRESERVATIVE FREE 10 ML: 5 INJECTION INTRAVENOUS at 04:44

## 2019-12-16 RX ADMIN — SODIUM CHLORIDE, PRESERVATIVE FREE 300 UNITS: 5 INJECTION INTRAVENOUS at 09:52

## 2019-12-16 RX ADMIN — Medication 10 ML: at 20:58

## 2019-12-16 RX ADMIN — METRONIDAZOLE 500 MG: 500 TABLET ORAL at 13:37

## 2019-12-16 RX ADMIN — METRONIDAZOLE 500 MG: 500 TABLET ORAL at 20:58

## 2019-12-16 ASSESSMENT — PAIN SCALES - GENERAL
PAINLEVEL_OUTOF10: 0
PAINLEVEL_OUTOF10: 0

## 2019-12-17 LAB
ABO/RH: NORMAL
ANION GAP SERPL CALCULATED.3IONS-SCNC: 10 MMOL/L (ref 7–16)
ANTIBODY SCREEN: NORMAL
APTT: 31.5 SEC (ref 24.5–35.1)
BUN BLDV-MCNC: 24 MG/DL (ref 8–23)
CALCIUM SERPL-MCNC: 9.1 MG/DL (ref 8.6–10.2)
CHLORIDE BLD-SCNC: 104 MMOL/L (ref 98–107)
CO2: 24 MMOL/L (ref 22–29)
CREAT SERPL-MCNC: 0.9 MG/DL (ref 0.7–1.2)
GFR AFRICAN AMERICAN: >60
GFR NON-AFRICAN AMERICAN: >60 ML/MIN/1.73
GLUCOSE BLD-MCNC: 177 MG/DL (ref 74–99)
HCT VFR BLD CALC: 35.6 % (ref 37–54)
HEMOGLOBIN: 11.8 G/DL (ref 12.5–16.5)
INR BLD: 1.2
MCH RBC QN AUTO: 29.9 PG (ref 26–35)
MCHC RBC AUTO-ENTMCNC: 33.1 % (ref 32–34.5)
MCV RBC AUTO: 90.4 FL (ref 80–99.9)
METER GLUCOSE: 142 MG/DL (ref 74–99)
METER GLUCOSE: 153 MG/DL (ref 74–99)
METER GLUCOSE: 173 MG/DL (ref 74–99)
PDW BLD-RTO: 14.4 FL (ref 11.5–15)
PLATELET # BLD: 147 E9/L (ref 130–450)
PMV BLD AUTO: 10.7 FL (ref 7–12)
POTASSIUM SERPL-SCNC: 4.2 MMOL/L (ref 3.5–5)
PROTHROMBIN TIME: 13.1 SEC (ref 9.3–12.4)
RBC # BLD: 3.94 E12/L (ref 3.8–5.8)
SODIUM BLD-SCNC: 138 MMOL/L (ref 132–146)
WBC # BLD: 6.2 E9/L (ref 4.5–11.5)

## 2019-12-17 PROCEDURE — 75710 ARTERY X-RAYS ARM/LEG: CPT | Performed by: SURGERY

## 2019-12-17 PROCEDURE — C1725 CATH, TRANSLUMIN NON-LASER: HCPCS

## 2019-12-17 PROCEDURE — 82962 GLUCOSE BLOOD TEST: CPT

## 2019-12-17 PROCEDURE — 37224 HC FEM POP TERRITORY PLASTY: CPT | Performed by: SURGERY

## 2019-12-17 PROCEDURE — 37228 PR REVSC OPN/PRQ TIB/PERO W/ANGIOPLASTY UNI: CPT | Performed by: SURGERY

## 2019-12-17 PROCEDURE — 37224 PR REVSC OPN/PRG FEM/POP W/ANGIOPLASTY UNI: CPT | Performed by: SURGERY

## 2019-12-17 PROCEDURE — 85027 COMPLETE CBC AUTOMATED: CPT

## 2019-12-17 PROCEDURE — 86900 BLOOD TYPING SEROLOGIC ABO: CPT

## 2019-12-17 PROCEDURE — 2580000003 HC RX 258: Performed by: PODIATRIST

## 2019-12-17 PROCEDURE — 6360000002 HC RX W HCPCS: Performed by: SURGERY

## 2019-12-17 PROCEDURE — 047M3D1 DILATION OF RIGHT POPLITEAL ARTERY WITH INTRALUMINAL DEVICE, USING DRUG-COATED BALLOON, PERCUTANEOUS APPROACH: ICD-10-PCS | Performed by: SURGERY

## 2019-12-17 PROCEDURE — 2580000003 HC RX 258: Performed by: SURGERY

## 2019-12-17 PROCEDURE — 75774 ARTERY X-RAY EACH VESSEL: CPT | Performed by: SURGERY

## 2019-12-17 PROCEDURE — 2709999900 HC NON-CHARGEABLE SUPPLY

## 2019-12-17 PROCEDURE — C1887 CATHETER, GUIDING: HCPCS

## 2019-12-17 PROCEDURE — 2580000003 HC RX 258: Performed by: SPECIALIST

## 2019-12-17 PROCEDURE — 6370000000 HC RX 637 (ALT 250 FOR IP): Performed by: SURGERY

## 2019-12-17 PROCEDURE — 85610 PROTHROMBIN TIME: CPT

## 2019-12-17 PROCEDURE — C1760 CLOSURE DEV, VASC: HCPCS

## 2019-12-17 PROCEDURE — 36415 COLL VENOUS BLD VENIPUNCTURE: CPT

## 2019-12-17 PROCEDURE — 97535 SELF CARE MNGMENT TRAINING: CPT

## 2019-12-17 PROCEDURE — 37228 HC TIB PER TERRITORY PLASTY: CPT | Performed by: SURGERY

## 2019-12-17 PROCEDURE — 1200000000 HC SEMI PRIVATE

## 2019-12-17 PROCEDURE — 6360000002 HC RX W HCPCS: Performed by: SPECIALIST

## 2019-12-17 PROCEDURE — 86850 RBC ANTIBODY SCREEN: CPT

## 2019-12-17 PROCEDURE — 85730 THROMBOPLASTIN TIME PARTIAL: CPT

## 2019-12-17 PROCEDURE — 86901 BLOOD TYPING SEROLOGIC RH(D): CPT

## 2019-12-17 PROCEDURE — 97530 THERAPEUTIC ACTIVITIES: CPT

## 2019-12-17 PROCEDURE — 2500000003 HC RX 250 WO HCPCS

## 2019-12-17 PROCEDURE — 6360000002 HC RX W HCPCS

## 2019-12-17 PROCEDURE — C2623 CATH, TRANSLUMIN, DRUG-COAT: HCPCS

## 2019-12-17 PROCEDURE — 80048 BASIC METABOLIC PNL TOTAL CA: CPT

## 2019-12-17 PROCEDURE — 97530 THERAPEUTIC ACTIVITIES: CPT | Performed by: PHYSICAL THERAPIST

## 2019-12-17 PROCEDURE — C1769 GUIDE WIRE: HCPCS

## 2019-12-17 PROCEDURE — C1894 INTRO/SHEATH, NON-LASER: HCPCS

## 2019-12-17 PROCEDURE — 6370000000 HC RX 637 (ALT 250 FOR IP): Performed by: SPECIALIST

## 2019-12-17 PROCEDURE — 047P3D1 DILATION OF RIGHT ANTERIOR TIBIAL ARTERY WITH INTRALUMINAL DEVICE, USING DRUG-COATED BALLOON, PERCUTANEOUS APPROACH: ICD-10-PCS | Performed by: SURGERY

## 2019-12-17 RX ORDER — CLOPIDOGREL BISULFATE 75 MG/1
75 TABLET ORAL DAILY
Status: DISCONTINUED | OUTPATIENT
Start: 2019-12-18 | End: 2019-12-19 | Stop reason: HOSPADM

## 2019-12-17 RX ORDER — CLOPIDOGREL 300 MG/1
300 TABLET, FILM COATED ORAL ONCE
Status: COMPLETED | OUTPATIENT
Start: 2019-12-17 | End: 2019-12-17

## 2019-12-17 RX ORDER — SODIUM CHLORIDE 9 MG/ML
INJECTION, SOLUTION INTRAVENOUS CONTINUOUS
Status: DISCONTINUED | OUTPATIENT
Start: 2019-12-17 | End: 2019-12-19 | Stop reason: HOSPADM

## 2019-12-17 RX ADMIN — METRONIDAZOLE 500 MG: 500 TABLET ORAL at 06:06

## 2019-12-17 RX ADMIN — Medication 10 ML: at 08:41

## 2019-12-17 RX ADMIN — CEFTAROLINE FOSAMIL 600 MG: 600 POWDER, FOR SOLUTION INTRAVENOUS at 04:30

## 2019-12-17 RX ADMIN — Medication 10 ML: at 21:28

## 2019-12-17 RX ADMIN — CLOPIDOGREL BISULFATE 300 MG: 300 TABLET, FILM COATED ORAL at 21:27

## 2019-12-17 RX ADMIN — SODIUM CHLORIDE, PRESERVATIVE FREE 300 UNITS: 5 INJECTION INTRAVENOUS at 08:39

## 2019-12-17 RX ADMIN — METRONIDAZOLE 500 MG: 500 TABLET ORAL at 21:27

## 2019-12-17 RX ADMIN — METRONIDAZOLE 500 MG: 500 TABLET ORAL at 13:19

## 2019-12-17 RX ADMIN — SODIUM CHLORIDE AND POTASSIUM CHLORIDE: .9; .15 SOLUTION INTRAVENOUS at 04:31

## 2019-12-17 RX ADMIN — CEFTAROLINE FOSAMIL 600 MG: 600 POWDER, FOR SOLUTION INTRAVENOUS at 17:34

## 2019-12-17 RX ADMIN — SODIUM CHLORIDE, PRESERVATIVE FREE 300 UNITS: 5 INJECTION INTRAVENOUS at 21:28

## 2019-12-17 RX ADMIN — SODIUM CHLORIDE: 9 INJECTION, SOLUTION INTRAVENOUS at 08:38

## 2019-12-17 RX ADMIN — SODIUM CHLORIDE, PRESERVATIVE FREE 10 ML: 5 INJECTION INTRAVENOUS at 08:39

## 2019-12-17 ASSESSMENT — PAIN SCALES - GENERAL
PAINLEVEL_OUTOF10: 0

## 2019-12-18 ENCOUNTER — APPOINTMENT (OUTPATIENT)
Dept: INTERVENTIONAL RADIOLOGY/VASCULAR | Age: 79
DRG: 617 | End: 2019-12-18
Payer: MEDICARE

## 2019-12-18 LAB
METER GLUCOSE: 150 MG/DL (ref 74–99)
METER GLUCOSE: 151 MG/DL (ref 74–99)
METER GLUCOSE: 152 MG/DL (ref 74–99)
METER GLUCOSE: 158 MG/DL (ref 74–99)

## 2019-12-18 PROCEDURE — 6370000000 HC RX 637 (ALT 250 FOR IP): Performed by: SURGERY

## 2019-12-18 PROCEDURE — 6360000002 HC RX W HCPCS: Performed by: SURGERY

## 2019-12-18 PROCEDURE — 82962 GLUCOSE BLOOD TEST: CPT

## 2019-12-18 PROCEDURE — 99232 SBSQ HOSP IP/OBS MODERATE 35: CPT | Performed by: SURGERY

## 2019-12-18 PROCEDURE — 1200000000 HC SEMI PRIVATE

## 2019-12-18 PROCEDURE — 2580000003 HC RX 258: Performed by: SURGERY

## 2019-12-18 PROCEDURE — 93922 UPR/L XTREMITY ART 2 LEVELS: CPT

## 2019-12-18 RX ADMIN — CLOPIDOGREL 75 MG: 75 TABLET, FILM COATED ORAL at 09:28

## 2019-12-18 RX ADMIN — SODIUM CHLORIDE, PRESERVATIVE FREE 300 UNITS: 5 INJECTION INTRAVENOUS at 19:36

## 2019-12-18 RX ADMIN — METRONIDAZOLE 500 MG: 500 TABLET ORAL at 06:45

## 2019-12-18 RX ADMIN — SODIUM CHLORIDE, PRESERVATIVE FREE 10 ML: 5 INJECTION INTRAVENOUS at 09:27

## 2019-12-18 RX ADMIN — CEFTAROLINE FOSAMIL 600 MG: 600 POWDER, FOR SOLUTION INTRAVENOUS at 16:26

## 2019-12-18 RX ADMIN — SODIUM CHLORIDE, PRESERVATIVE FREE 300 UNITS: 5 INJECTION INTRAVENOUS at 09:28

## 2019-12-18 RX ADMIN — CEFTAROLINE FOSAMIL 600 MG: 600 POWDER, FOR SOLUTION INTRAVENOUS at 04:46

## 2019-12-18 RX ADMIN — METRONIDAZOLE 500 MG: 500 TABLET ORAL at 21:22

## 2019-12-18 RX ADMIN — METRONIDAZOLE 500 MG: 500 TABLET ORAL at 14:38

## 2019-12-18 ASSESSMENT — PAIN SCALES - GENERAL
PAINLEVEL_OUTOF10: 0

## 2019-12-19 ENCOUNTER — HOSPITAL ENCOUNTER (OUTPATIENT)
Dept: WOUND CARE | Age: 79
Discharge: HOME | DRG: 617 | End: 2019-12-19
Payer: MEDICARE

## 2019-12-19 VITALS
BODY MASS INDEX: 19.99 KG/M2 | HEIGHT: 69 IN | WEIGHT: 135 LBS | SYSTOLIC BLOOD PRESSURE: 160 MMHG | TEMPERATURE: 98.4 F | RESPIRATION RATE: 18 BRPM | HEART RATE: 85 BPM | OXYGEN SATURATION: 95 % | DIASTOLIC BLOOD PRESSURE: 64 MMHG

## 2019-12-19 LAB
METER GLUCOSE: 180 MG/DL (ref 74–99)
METER GLUCOSE: 183 MG/DL (ref 74–99)

## 2019-12-19 PROCEDURE — 82962 GLUCOSE BLOOD TEST: CPT

## 2019-12-19 PROCEDURE — 6370000000 HC RX 637 (ALT 250 FOR IP): Performed by: SURGERY

## 2019-12-19 PROCEDURE — 6360000002 HC RX W HCPCS: Performed by: SURGERY

## 2019-12-19 PROCEDURE — 2580000003 HC RX 258: Performed by: SURGERY

## 2019-12-19 RX ORDER — CLOPIDOGREL BISULFATE 75 MG/1
75 TABLET ORAL DAILY
Qty: 30 TABLET | Refills: 0 | Status: SHIPPED | OUTPATIENT
Start: 2019-12-20 | End: 2020-01-02 | Stop reason: ALTCHOICE

## 2019-12-19 RX ADMIN — METRONIDAZOLE 500 MG: 500 TABLET ORAL at 14:01

## 2019-12-19 RX ADMIN — CLOPIDOGREL 75 MG: 75 TABLET, FILM COATED ORAL at 08:10

## 2019-12-19 RX ADMIN — CEFTAROLINE FOSAMIL 600 MG: 600 POWDER, FOR SOLUTION INTRAVENOUS at 04:37

## 2019-12-19 RX ADMIN — SODIUM CHLORIDE, PRESERVATIVE FREE 10 ML: 5 INJECTION INTRAVENOUS at 08:11

## 2019-12-19 RX ADMIN — SODIUM CHLORIDE, PRESERVATIVE FREE 300 UNITS: 5 INJECTION INTRAVENOUS at 08:10

## 2019-12-19 RX ADMIN — METRONIDAZOLE 500 MG: 500 TABLET ORAL at 06:53

## 2019-12-19 ASSESSMENT — PAIN SCALES - GENERAL: PAINLEVEL_OUTOF10: 0

## 2019-12-21 LAB
EKG ATRIAL RATE: 61 BPM
EKG P AXIS: 80 DEGREES
EKG P-R INTERVAL: 188 MS
EKG Q-T INTERVAL: 388 MS
EKG QRS DURATION: 78 MS
EKG QTC CALCULATION (BAZETT): 390 MS
EKG R AXIS: 40 DEGREES
EKG T AXIS: 86 DEGREES
EKG VENTRICULAR RATE: 61 BPM

## 2019-12-26 ENCOUNTER — HOSPITAL ENCOUNTER (OUTPATIENT)
Dept: WOUND CARE | Age: 79
Discharge: HOME | End: 2019-12-26
Payer: MEDICARE

## 2019-12-26 VITALS
HEART RATE: 63 BPM | HEIGHT: 69 IN | BODY MASS INDEX: 19.99 KG/M2 | RESPIRATION RATE: 18 BRPM | WEIGHT: 135 LBS | SYSTOLIC BLOOD PRESSURE: 132 MMHG | TEMPERATURE: 97.9 F | DIASTOLIC BLOOD PRESSURE: 56 MMHG

## 2019-12-26 PROCEDURE — 11042 DBRDMT SUBQ TIS 1ST 20SQCM/<: CPT

## 2019-12-26 RX ORDER — LIDOCAINE HYDROCHLORIDE 40 MG/ML
SOLUTION TOPICAL PRN
Status: DISCONTINUED | OUTPATIENT
Start: 2019-12-26 | End: 2019-12-27 | Stop reason: HOSPADM

## 2019-12-26 ASSESSMENT — PAIN SCALES - GENERAL: PAINLEVEL_OUTOF10: 0

## 2020-01-02 ENCOUNTER — HOSPITAL ENCOUNTER (OUTPATIENT)
Dept: WOUND CARE | Age: 80
Discharge: HOME | End: 2020-01-02
Payer: MEDICARE

## 2020-01-02 VITALS
TEMPERATURE: 98.2 F | SYSTOLIC BLOOD PRESSURE: 140 MMHG | HEART RATE: 80 BPM | WEIGHT: 135 LBS | DIASTOLIC BLOOD PRESSURE: 60 MMHG | BODY MASS INDEX: 19.99 KG/M2 | HEIGHT: 69 IN | RESPIRATION RATE: 16 BRPM

## 2020-01-02 PROCEDURE — 11042 DBRDMT SUBQ TIS 1ST 20SQCM/<: CPT

## 2020-01-02 RX ORDER — LIDOCAINE HYDROCHLORIDE 40 MG/ML
SOLUTION TOPICAL ONCE
Status: DISCONTINUED | OUTPATIENT
Start: 2020-01-02 | End: 2020-01-03 | Stop reason: HOSPADM

## 2020-01-02 NOTE — PROGRESS NOTES
[facility] Followup Visit Note    Referring Physician : Christo Thorpe MD  [address] RECORD NUMBER:  [id number]  AGE: [de-identified] y.o. GENDER: male  : 1940  EPISODE DATE:  2020    Subjective:     Chief Complaint   Patient presents with    Wound Check     right foot, right ankle and right lower  leg  ulcers      HISTORY of PRESENT ILLNESS HPI   Rodrigo Whitaker is a [de-identified] y.o. male who presents today with his wife in regards to follow up evaluation and treatment of wound/ulcer. That patient's past medical, family and social hx were reviewed and changes were made if present. He denies any nausea, vomiting, fever, chills, shortness of breath, chest pain or right lower extremity pain.     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 ([address] 2019    Blood circulation, collateral     Cellulitis of foot, left 2017    Chronic venous insufficiency 2019    Diabetes mellitus ([address]     Diabetic foot ulcer with osteomyelitis ([address] 2017    Femoral-popliteal atherosclerosis ([address] 2017    Heel ulcer, right, with fat layer exposed ([address] 2019    Hypertension     Osteomyelitis of third toe of right foot ([address] 2019    Skin ulcer of right foot with fat layer exposed ([address] 2019    Ulcer of right leg, with fat layer exposed ([address] 2019    Varicose veins of leg with edema, right 2019     Past Surgical History:   Procedure Laterality Date    COLONOSCOPY      EYE SURGERY  1990's    lense implants bilaterally    FOOT DEBRIDEMENT Left 01/04/2017    wound debridement and delayed primary closure    OTHER SURGICAL HISTORY  04/23/2019    Dr. Harpreet Trevizo- PTA ant tibial    OTHER SURGICAL HISTORY  12/17/2019    Dr Harpreet Trevizo - PCI Right popliteal and Anterior tibial    PROSTATE BIOPSY  04/2016    SKIN BIOPSY  sept of 2018 last time    head and ears    TOE AMPUTATION Left 12/31/2016    2nd    TOE AMPUTATION Right 4/26/2019    AMPUTATION RIGHT SECOND TOE, FOOT DEBRIDEMENT performed by Carolyn Keith DPM at [facility] 22 TOE AMPUTATION Right 12/10/2019    AMPUTATION RIGHT 3rd DIGIT WITH PARTIAL RESECTION OF THIRD METATARSAL performed by [facility]yn Keith DPM at [facility] OR    VASCULAR SURGERY       Family History   Problem Relation Age of Onset    Heart Disease Mother     Heart Disease Father      Social History     Tobacco Use    Smoking status: Former Smoker    Smokeless tobacco: Never Used   Substance Use Topics    Alcohol use: No    Drug use: No     Allergies   Allergen Reactions    Latex Other (See Comments)     Pt unsure of reaction    Aspirin Other (See Comments)     \"It affects my kidneys. \"   Carlean Link [Penicillins] Other (See Comments)     \"I just know my body doesn't like it. \" \"I had a reaction a long time ago, I know it affects my kidneys. \"    Other Rash     \"I get a rash on just my face if I eat Cumin or Chili. \"    Peanut-Containing Drug Products Itching     Current Outpatient Medications on File Prior to Encounter   Medication Sig Dispense Refill    Garlic 0466 MG TBEC Take 1 capsule by mouth daily      Multiple Vitamins-Minerals (THERAPEUTIC MULTIVITAMIN-MINERALS) tablet Take 1 tablet by mouth daily       No current facility-administered medications on file prior to encounter.         REVIEW OF SYSTEMS See HPI    Objective:    BP (!) 140/60   Pulse 80   Temp 98.2 °F (36.8 °C) (Temporal)   Resp 16   Ht 5' 9\" (1.753 m)   Wt 135 lb (61.2 kg)   BMI 19.94 kg/m²   Wt Volume (cm^3) 0.42 cm^3 1/2/2020 11:14 AM   Wound Assessment Pink;Yellow 1/2/2020 11:00 AM   Drainage Amount Moderate 1/2/2020 11:00 AM   Drainage Description Serosanguinous; Yellow 1/2/2020 11:00 AM   Odor None 1/2/2020 11:00 AM   Latonya-wound Assessment Lenice Lie; Intact 1/2/2020 11:00 AM   Number of days: 72       Wound 01/02/20 Leg Right; Lower;Medial #15 venous (Active)   Wound Image   1/2/2020 11:00 AM   Wound Length (cm) 0.9 cm 1/2/2020 11:00 AM   Wound Width (cm) 1.3 cm 1/2/2020 11:00 AM   Wound Depth (cm) 0.1 cm 1/2/2020 11:00 AM   Wound Surface Area (cm^2) 1.17 cm^2 1/2/2020 11:00 AM   Wound Volume (cm^3) 0.12 cm^3 1/2/2020 11:00 AM   Post-Procedure Length (cm) 0.9 cm 1/2/2020 11:14 AM   Post-Procedure Width (cm) 1.3 cm 1/2/2020 11:14 AM   Post-Procedure Depth (cm) 0.1 cm 1/2/2020 11:14 AM   Post-Procedure Surface Area (cm^2) 1.17 cm^2 1/2/2020 11:14 AM   Post-Procedure Volume (cm^3) 0.12 cm^3 1/2/2020 11:14 AM   Wound Assessment Pink;Red;Yellow 1/2/2020 11:00 AM   Drainage Amount Small 1/2/2020 11:00 AM   Drainage Description Serosanguinous; Serous 1/2/2020 11:00 AM   Odor None 1/2/2020 11:00 AM   Latonya-wound Assessment Pink; Intact 1/2/2020 11:00 AM   Number of days: 0       Wound 01/02/20 Ankle Right;Medial #16 venous (Active)   Wound Image   1/2/2020 11:00 AM   Wound Length (cm) 0.6 cm 1/2/2020 11:00 AM   Wound Width (cm) 1.1 cm 1/2/2020 11:00 AM   Wound Depth (cm) 0.1 cm 1/2/2020 11:00 AM   Wound Surface Area (cm^2) 0.66 cm^2 1/2/2020 11:00 AM   Wound Volume (cm^3) 0.07 cm^3 1/2/2020 11:00 AM   Post-Procedure Length (cm) 0.6 cm 1/2/2020 11:14 AM   Post-Procedure Width (cm) 1.1 cm 1/2/2020 11:14 AM   Post-Procedure Depth (cm) 0.1 cm 1/2/2020 11:14 AM   Post-Procedure Surface Area (cm^2) 0.66 cm^2 1/2/2020 11:14 AM   Post-Procedure Volume (cm^3) 0.07 cm^3 1/2/2020 11:14 AM   Wound Assessment Pink;Red;Yellow 1/2/2020 11:00 AM   Drainage Amount Small 1/2/2020 11:00 AM   Drainage Description Serosanguinous; Serous 1/2/2020 11:00 AM   Odor None 1/2/2020 11:00 AM   Latonya-wound Assessment Pink; Maceration 1/2/2020 11:00 AM   Number of days: 0          Procedure Note  Indications:  Based on my examination of this patient's wound(s)/ulcer(s) today, debridement is required to promote healing and evaluate the wound base. Performed by: Tonya Allen DPM    Consent obtained:  Yes    Time out taken:  Yes    Pain Control: Anesthetic  Anesthetic: 4% Lidocaine Liquid Topical     Debridement:Excisional Debridement    Using curette the wound(s)/ulcer(s) was/were sharply debrided down through and including the removal of subcutaneous tissue. Devitalized Tissue Debrided:  fibrin, biofilm, slough and necrotic/eschar to stimulate bleeding to promote healing, post debridement good bleeding base and wound edges noted    Wound/Ulcer #: 13    Percent of Wound/Ulcer Debrided: 100%    Total Surface Area Debrided:  1.8 sq cm     Estimated Blood Loss:  Minimal  Hemostasis Achieved:  by pressure    Procedural Pain:  0  / 10   Post Procedural Pain:  0 / 10     Response to treatment:  Well tolerated by patient. Plan:   Treatment Note please see attached Discharge Instructions. Reinforced local care as instructed. No pressure. Any questions or concerns contact wound care center otherwise follow-up as directed    Written patient dismissal instructions given to patient and signed by patient or POA. Discharge Instructions       Visit Discharge/Physician Orders     Discharge condition: Stable     Assessment of pain at discharge:N0NE     Anesthetic used: LIDOCAINE 2%     Discharge to: home     Left via:ambulance     Accompanied by: WIFE     ECF/HHA:       Dressing Orders:WOUND LOCATION  RIGHT ANKLE /FOOT, : CLEANSE WOUND WITH NORMAL SALINE APPLY xeroform  and dry dressing PAD WELL AND SECURE. CHANGE DAILY.        MOISTURIZE DAILY.     Treatment Orders:FOLLOW A NUTRITIOUS DIET HIGH IN PROTEIN AND VITAMIN C TO PROMOTE WOUND HEALING.  TAKE

## 2020-01-13 LAB
FUNGUS (MYCOLOGY) CULTURE: NORMAL
FUNGUS STAIN: NORMAL

## 2020-01-16 ENCOUNTER — HOSPITAL ENCOUNTER (OUTPATIENT)
Dept: WOUND CARE | Age: 80
Discharge: HOME | End: 2020-01-16
Payer: MEDICARE

## 2020-01-16 VITALS
BODY MASS INDEX: 19.99 KG/M2 | HEART RATE: 72 BPM | WEIGHT: 135 LBS | DIASTOLIC BLOOD PRESSURE: 60 MMHG | TEMPERATURE: 97.8 F | RESPIRATION RATE: 16 BRPM | HEIGHT: 69 IN | SYSTOLIC BLOOD PRESSURE: 142 MMHG

## 2020-01-16 PROCEDURE — 11042 DBRDMT SUBQ TIS 1ST 20SQCM/<: CPT

## 2020-01-16 RX ORDER — LIDOCAINE HYDROCHLORIDE 40 MG/ML
SOLUTION TOPICAL ONCE
Status: DISCONTINUED | OUTPATIENT
Start: 2020-01-16 | End: 2020-01-17 | Stop reason: HOSPADM

## 2020-01-16 NOTE — PROGRESS NOTES
bilaterally    FOOT DEBRIDEMENT Left 01/04/2017    wound debridement and delayed primary closure    OTHER SURGICAL HISTORY  04/23/2019    Dr. Vika Stein- PTA ant tibial    OTHER SURGICAL HISTORY  12/17/2019    Dr Vika Stein - PCI Right popliteal and Anterior tibial    PROSTATE BIOPSY  04/2016    SKIN BIOPSY  sept of 2018 last time    head and ears    TOE AMPUTATION Left 12/31/2016    2nd    TOE AMPUTATION Right 4/26/2019    AMPUTATION RIGHT SECOND TOE, FOOT DEBRIDEMENT performed by Taylor Hernandez DPM at [facility] 22 TOE AMPUTATION Right 12/10/2019    AMPUTATION RIGHT 3rd DIGIT WITH PARTIAL RESECTION OF THIRD METATARSAL performed by Taylor [facility] DPM at [facility] OR    VASCULAR SURGERY       Family History   Problem Relation Age of Onset    Heart Disease Mother     Heart Disease Father      Social History     Tobacco Use    Smoking status: Former Smoker    Smokeless tobacco: Never Used   Substance Use Topics    Alcohol use: No    Drug use: No     Allergies   Allergen Reactions    Latex Other (See Comments)     Pt unsure of reaction    Aspirin Other (See Comments)     \"It affects my kidneys. \"   Hong Marcos [Penicillins] Other (See Comments)     \"I just know my body doesn't like it. \" \"I had a reaction a long time ago, I know it affects my kidneys. \"    Other Rash     \"I get a rash on just my face if I eat Cumin or Chili. \"    Peanut-Containing Drug Products Itching     Current Outpatient Medications on File Prior to Encounter   Medication Sig Dispense Refill    Garlic 9100 MG TBEC Take 1 capsule by mouth daily      Multiple Vitamins-Minerals (THERAPEUTIC MULTIVITAMIN-MINERALS) tablet Take 1 tablet by mouth daily       No current facility-administered medications on file prior to encounter.         REVIEW OF SYSTEMS See HPI    Objective:    BP (!) 142/60   Pulse 72   Temp 97.8 °F (36.6 °C) (Temporal)   Resp 16   Ht 5' 9\" (1.753 m)   Wt 135 lb (61.2 kg)   BMI 19.94 kg/m²   Wt Readings from Last 3 Encounters: 01/16/20 135 lb (61.2 kg)   01/02/20 135 lb (61.2 kg)   12/26/19 135 lb (61.2 kg)     PHYSICAL EXAM  CONSTITUTIONAL:   Awake, alert, cooperative   EYES:  lids and lashes normal   ENT: external ears and nose without lesions   NECK:  supple, symmetrical, trachea midline   SKIN:  Open wound medial foot with 50% devitalized nonviable tissue. There is no purulence or odor. No surrounding erythema. Assessment:     Diabetic with ulcer right foot, unrelated to previous surgical procedure/amputation of the right third toe. Neuropathy. Peripheral vascular disease    Pre Debridement Measurements:  Are located in the [place]  Documentation Flow Sheet  Post Debridement Measurements:  Wound/Ulcer Descriptions are Pre Debridement except measurements:     Wound 10/22/19 Foot Right;Medial #13 FIRST MET PLANTAR  (Active)   Wound Image   1/2/2020 11:00 AM   Offloading for Diabetic Foot Ulcers Other (comment) 1/16/2020 11:23 AM   Dressing Status Clean;Dry; Intact 1/16/2020 11:23 AM   Dressing Changed Changed/New 1/16/2020 11:23 AM   Dressing/Treatment Xeroform;4x4;Dry dressing 1/16/2020 11:23 AM   Wound Cleansed Rinsed/Irrigated with saline 1/16/2020 11:23 AM   Dressing Change Due 12/06/19 12/5/2019 10:15 PM   Wound Length (cm) 1.2 cm 1/16/2020 10:51 AM   Wound Width (cm) 1 cm 1/16/2020 10:51 AM   Wound Depth (cm) 0.1 cm 1/16/2020 10:51 AM   Wound Surface Area (cm^2) 1.2 cm^2 1/16/2020 10:51 AM   Change in Wound Size % (l*w) -471.43 1/16/2020 10:51 AM   Wound Volume (cm^3) 0.12 cm^3 1/16/2020 10:51 AM   Wound Healing % -200 1/16/2020 10:51 AM   Post-Procedure Length (cm) 1.2 cm 1/16/2020 11:04 AM   Post-Procedure Width (cm) 1.1 cm 1/16/2020 11:04 AM   Post-Procedure Depth (cm) 0.2 cm 1/16/2020 11:04 AM   Post-Procedure Surface Area (cm^2) 1.32 cm^2 1/16/2020 11:04 AM   Post-Procedure Volume (cm^3) 0.26 cm^3 1/16/2020 11:04 AM   Wound Assessment Pink;Red;Yellow 1/16/2020 10:51 AM   Drainage Amount Moderate 1/16/2020 10:51 AM 1/16/2020 10:51 AM   Wound Depth (cm) 0.1 cm 1/16/2020 10:51 AM   Wound Surface Area (cm^2) 0.06 cm^2 1/16/2020 10:51 AM   Change in Wound Size % (l*w) 90.91 1/16/2020 10:51 AM   Wound Volume (cm^3) 0.01 cm^3 1/16/2020 10:51 AM   Wound Healing % 86 1/16/2020 10:51 AM   Post-Procedure Length (cm) 0.6 cm 1/2/2020 11:14 AM   Post-Procedure Width (cm) 1.1 cm 1/2/2020 11:14 AM   Post-Procedure Depth (cm) 0.1 cm 1/2/2020 11:14 AM   Post-Procedure Surface Area (cm^2) 0.66 cm^2 1/2/2020 11:14 AM   Post-Procedure Volume (cm^3) 0.07 cm^3 1/2/2020 11:14 AM   Wound Assessment Red 1/16/2020 10:51 AM   Drainage Amount Small 1/16/2020 10:51 AM   Drainage Description Serosanguinous; Serous 1/16/2020 10:51 AM   Odor None 1/16/2020 10:51 AM   Latonya-wound Assessment Pink; Intact 1/16/2020 10:51 AM   Number of days: 14          Procedure Note  Indications:  Based on my examination of this patient's wound(s)/ulcer(s) today, debridement is required to promote healing and evaluate the wound base. Performed by: Sabas Powell DPM    Consent obtained:  Yes    Time out taken:  Yes    Pain Control: Anesthetic  Anesthetic: 4% Lidocaine Liquid Topical     Debridement:Excisional Debridement    Using curette and # 10 blade scalpel the wound(s)/ulcer(s) was/were sharply debrided down through and including the removal of subcutaneous tissue. Devitalized Tissue Debrided:  fibrin, biofilm, slough and necrotic/eschar to stimulate bleeding to promote healing, post debridement good bleeding base and wound edges noted    Wound/Ulcer #: 13    Percent of Wound/Ulcer Debrided: 100%    Total Surface Area Debrided:  1.2 sq cm     Estimated Blood Loss:  Minimal  Hemostasis Achieved:  by pressure    Procedural Pain:  0  / 10   Post Procedural Pain:  0 / 10     Response to treatment:  Well tolerated by patient.      Plan:   Treatment Note please see attached Discharge Instructions    Written patient dismissal instructions given to patient and signed by

## 2020-01-23 ENCOUNTER — OFFICE VISIT (OUTPATIENT)
Dept: VASCULAR SURGERY | Age: 80
End: 2020-01-23
Payer: MEDICARE

## 2020-01-23 PROBLEM — M86.9 DIABETIC FOOT ULCER WITH OSTEOMYELITIS (HCC): Status: RESOLVED | Noted: 2017-01-01 | Resolved: 2020-01-23

## 2020-01-23 PROBLEM — L97.509 DIABETIC FOOT ULCER WITH OSTEOMYELITIS (HCC): Status: RESOLVED | Noted: 2017-01-01 | Resolved: 2020-01-23

## 2020-01-23 PROBLEM — M86.9 OSTEOMYELITIS OF THIRD TOE OF RIGHT FOOT (HCC): Status: RESOLVED | Noted: 2019-12-06 | Resolved: 2020-01-23

## 2020-01-23 PROBLEM — E11.69 DIABETIC FOOT ULCER WITH OSTEOMYELITIS (HCC): Status: RESOLVED | Noted: 2017-01-01 | Resolved: 2020-01-23

## 2020-01-23 PROBLEM — E11.621 DIABETIC FOOT ULCER WITH OSTEOMYELITIS (HCC): Status: RESOLVED | Noted: 2017-01-01 | Resolved: 2020-01-23

## 2020-01-23 PROBLEM — Z98.62 S/P PERIPHERAL ARTERY ANGIOPLASTY: Status: ACTIVE | Noted: 2020-01-23

## 2020-01-23 PROCEDURE — 99213 OFFICE O/P EST LOW 20 MIN: CPT | Performed by: SURGERY

## 2020-01-23 NOTE — PROGRESS NOTES
Chief Complaint:   Chief Complaint   Patient presents with    Post-Op Check     s/p angioplasty         HPI: The patient came to the office by himself, walking slowly with the help of a walker, does have a indwelling Bocanegra catheter for the evaluation of nonhealing wounds of the right foot, post recent podiatry surgery by his podiatrist, post angioplasty of the popliteal artery, second time by Dr. Selene Farah, doing better, can walk short distances slowly, denies symptoms of rest pain, follows up at the wound care center every 2 weeks      Patient denies any focal lateralizing neurological symptoms like loss of speech, vision or loss of function of extremity        Allergies   Allergen Reactions    Latex Other (See Comments)     Pt unsure of reaction    Aspirin Other (See Comments)     \"It affects my kidneys. \"   Raven Terry [Penicillins] Other (See Comments)     \"I just know my body doesn't like it. \" \"I had a reaction a long time ago, I know it affects my kidneys. \"    Other Rash     \"I get a rash on just my face if I eat Cumin or Chili. \"    Peanut-Containing Drug Products Itching       Current Outpatient Medications   Medication Sig Dispense Refill    NONFORMULARY Indications: [place]       Garlic 8627 MG TBEC Take 1 capsule by mouth daily      Multiple Vitamins-Minerals (THERAPEUTIC MULTIVITAMIN-MINERALS) tablet Take 1 tablet by mouth daily       No current facility-administered medications for this visit.         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 The past medical records were reviewed. Assessment:    1. 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 Skin ulcer of right foot with fat layer exposed ([address]    3. S/P peripheral artery angioplasty              Plan:       Discussed with the patient, clinically, the wound status are much improved, patient was reassured, was recommended to continue the wound care with his podiatrist at the hospital and I will reevaluate him in 3 months and to call me PRN if any worsening of the ulcers        Patient was instructed to continue walking program and to call if any worsening of symptoms and to call if any focal lateralizing neurological symptoms like loss of speech, vision or loss of function of extremity. All the questions were answered. Indicated follow-up: Return in about 3 months (around 4/23/2020), or if symptoms worsen or fail to improve.

## 2020-01-28 LAB
AFB CULTURE (MYCOBACTERIA): NORMAL
AFB SMEAR: NORMAL

## 2020-01-30 ENCOUNTER — HOSPITAL ENCOUNTER (OUTPATIENT)
Dept: WOUND CARE | Age: 80
Discharge: HOME | End: 2020-01-30
Payer: MEDICARE

## 2020-01-30 VITALS
HEART RATE: 76 BPM | DIASTOLIC BLOOD PRESSURE: 60 MMHG | SYSTOLIC BLOOD PRESSURE: 120 MMHG | TEMPERATURE: 97.6 F | RESPIRATION RATE: 16 BRPM | BODY MASS INDEX: 19.99 KG/M2 | HEIGHT: 69 IN | WEIGHT: 135 LBS

## 2020-01-30 PROCEDURE — 11042 DBRDMT SUBQ TIS 1ST 20SQCM/<: CPT

## 2020-01-30 RX ORDER — LIDOCAINE HYDROCHLORIDE 40 MG/ML
SOLUTION TOPICAL ONCE
Status: DISCONTINUED | OUTPATIENT
Start: 2020-01-30 | End: 2020-01-31 | Stop reason: HOSPADM

## 2020-01-30 NOTE — PROGRESS NOTES
with fat layer exposed ([facility] [address] 5/6/2019    Varicose veins of leg with edema, 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     Past Surgical History:   Procedure Laterality Date    COLONOSCOPY      EYE SURGERY  1990's    lense implants bilaterally    FOOT DEBRIDEMENT Left 01/04/2017    wound debridement and delayed primary closure    OTHER SURGICAL HISTORY  04/23/2019    Dr. Nathanael Ahn- PTA ant tibial    OTHER SURGICAL HISTORY  12/17/2019    Dr Nathanael Ahn - PCI Right popliteal and Anterior tibial    PROSTATE BIOPSY  04/2016    SKIN BIOPSY  sept of 2018 last time    head and ears    TOE AMPUTATION Left 12/31/2016    2nd    TOE AMPUTATION Right 4/26/2019    AMPUTATION RIGHT SECOND TOE, FOOT DEBRIDEMENT performed by Qamar Vaca DPM at [facility] 22 TOE AMPUTATION Right 12/10/2019    AMPUTATION RIGHT 3rd DIGIT WITH PARTIAL RESECTION OF THIRD METATARSAL performed by Qamar Vaca DPM at [facility] OR    VASCULAR SURGERY       Family History   Problem Relation Age of Onset    Heart Disease Mother     Heart Disease Father      Social History     Tobacco Use    Smoking status: Former Smoker    Smokeless tobacco: Never Used   Substance Use Topics    Alcohol use: No    Drug use: No     Allergies   Allergen Reactions    Latex Other (See Comments)     Pt unsure of reaction    Aspirin Other (See Comments)     \"It affects my kidneys. \"   Catherine Feather [Penicillins] Other (See Comments)     \"I just know my body doesn't like it. \" \"I had a reaction a long time ago, I know it affects my kidneys. \"    Other Rash     \"I get a rash on just my face if I eat Cumin or Chili. \"    Peanut-Containing Drug Products Itching     Current Outpatient Medications on File Prior to Encounter   Medication Sig Dispense Refill    NONFORMULARY Indications: [place]oringa       Garlic 3434 MG TBEC Take 1 capsule by mouth daily      Multiple Vitamins-Minerals (THERAPEUTIC MULTIVITAMIN-MINERALS) tablet Take 1 tablet by mouth daily       No current facility-administered medications on file prior to encounter. REVIEW OF SYSTEMS See HPI    Objective:    /60   Pulse 76   Temp 97.6 °F (36.4 °C) (Temporal)   Resp 16   Ht 5' 9\" (1.753 m)   Wt 135 lb (61.2 kg)   BMI 19.94 kg/m²   Wt Readings from Last 3 Encounters:   01/30/20 135 lb (61.2 kg)   01/16/20 135 lb (61.2 kg)   01/02/20 135 lb (61.2 kg)     PHYSICAL EXAM  CONSTITUTIONAL:   Awake, alert, cooperative   EYES:  lids and lashes normal   ENT: external ears and nose without lesions   NECK:  supple, symmetrical, trachea midline   SKIN:  Open wound medial, now plantar foot with 30% devitalized nonviable tissue. There is no purulence or odor. No surrounding erythema. Assessment:     Diabetic with ulcer right foot, unrelated to previous surgical procedure/amputation of the right third toe. Neuropathy. Peripheral vascular disease       Pre Debridement Measurements:  Are located in the [facility]  Documentation Flow Sheet  Post Debridement Measurements:  Wound/Ulcer Descriptions are Pre Debridement except measurements:     Wound 10/22/19 Foot Right;Medial #13 FIRST MET PLANTAR  (Active)   Wound Image   1/30/2020 10:42 AM   Offloading for Diabetic Foot Ulcers Other (comment) 1/30/2020 11:27 AM   Dressing Status Clean;Dry; Intact 1/30/2020 11:27 AM   Dressing Changed Changed/New 1/30/2020 11:27 AM   Dressing/Treatment Alginate;Dry dressing 1/30/2020 11:27 AM   Wound Cleansed Rinsed/Irrigated with saline 1/30/2020 11:27 AM   Wound Length (cm) 1.8 cm 1/30/2020 10:42 AM   Wound Width (cm) 3.7 cm 1/30/2020 10:42 AM   Wound Depth (cm) 0.2 cm 1/30/2020 10:42 AM   Wound Surface Area (cm^2) 6.66 cm^2 1/30/2020 10:42 AM   Change in Wound Size % (l*w) -3071.43 1/30/2020 10:42 AM   Wound Volume (cm^3) 1.33 cm^3 1/30/2020 10:42 AM   Wound Healing % -3225 1/30/2020 10:42 AM   Post-Procedure Length (cm) 1.8 cm 1/30/2020 11:03 AM   Post-Procedure Width (cm) 3.7 cm 1/30/2020 11:03 AM   Post-Procedure Depth (cm) 0.2 cm 1/30/2020 11:03 AM Post-Procedure Surface Area (cm^2) 6.66 cm^2 1/30/2020 11:03 AM   Post-Procedure Volume (cm^3) 1.33 cm^3 1/30/2020 11:03 AM   Wound Assessment Pink;Red;Yellow 1/30/2020 10:42 AM   Drainage Amount Moderate 1/30/2020 10:42 AM   Drainage Description Serosanguinous; Yellow 1/30/2020 10:42 AM   Odor None 1/30/2020 10:42 AM   Latonya-wound Assessment Intact; Pink 1/30/2020 10:42 AM   Number of days: 100          Procedure Note  Indications:  Based on my examination of this patient's wound(s)/ulcer(s) today, debridement is required to promote healing and evaluate the wound base. Performed by: Cristela Meeks DPM    Consent obtained:  Yes    Time out taken:  Yes    Pain Control: Anesthetic  Anesthetic: 4% Lidocaine Liquid Topical     Debridement:Excisional Debridement    Using curette and # 10 blade scalpel the wound(s)/ulcer(s) was/were sharply debrided down through and including the removal of subcutaneous tissue. Devitalized Tissue Debrided:  fibrin, biofilm, slough and necrotic/eschar to stimulate bleeding to promote healing, post debridement good bleeding base and wound edges noted    Wound/Ulcer #: 13    Percent of Wound/Ulcer Debrided: 100%    Total Surface Area Debrided:  6.66 sq cm     Estimated Blood Loss:  Minimal  Hemostasis Achieved:  by pressure    Procedural Pain:  0  / 10   Post Procedural Pain:  0 / 10     Response to treatment:  Well tolerated by patient. Plan:   Treatment Note please see attached Discharge Instructions. Discussed compliance, quite obvious that he must be placing weight on the bottom of the foot as well as they are not following our instructions. He is at risk for additional breakdown as well as loss of limb. Written patient dismissal instructions given to patient and signed by patient or POA.          Discharge Instructions       Visit Discharge/Physician Orders     Discharge condition: Stable     Assessment of pain at discharge:N0NE     Anesthetic used: LIDOCAINE

## 2020-02-13 ENCOUNTER — HOSPITAL ENCOUNTER (OUTPATIENT)
Dept: WOUND CARE | Age: 80
Discharge: HOME | End: 2020-02-13
Payer: MEDICARE

## 2020-02-13 VITALS
HEIGHT: 69 IN | HEART RATE: 67 BPM | WEIGHT: 135 LBS | SYSTOLIC BLOOD PRESSURE: 126 MMHG | TEMPERATURE: 97.9 F | RESPIRATION RATE: 18 BRPM | BODY MASS INDEX: 19.99 KG/M2 | DIASTOLIC BLOOD PRESSURE: 62 MMHG

## 2020-02-13 PROCEDURE — 11042 DBRDMT SUBQ TIS 1ST 20SQCM/<: CPT

## 2020-02-13 RX ORDER — LIDOCAINE HYDROCHLORIDE 40 MG/ML
SOLUTION TOPICAL ONCE
Status: DISCONTINUED | OUTPATIENT
Start: 2020-02-13 | End: 2020-02-14 | Stop reason: HOSPADM

## 2020-02-13 ASSESSMENT — PAIN SCALES - GENERAL: PAINLEVEL_OUTOF10: 0

## 2020-02-13 NOTE — PROGRESS NOTES
01/04/2017    wound debridement and delayed primary closure    OTHER SURGICAL HISTORY  04/23/2019    Dr. Stephane Estrella- PTA ant tibial    OTHER SURGICAL HISTORY  12/17/2019    Dr Stephane Estrella - PCI Right popliteal and Anterior tibial    PROSTATE BIOPSY  04/2016    SKIN BIOPSY  sept of 2018 last time    head and ears    TOE AMPUTATION Left 12/31/2016    2nd    TOE AMPUTATION Right 4/26/2019    AMPUTATION RIGHT SECOND TOE, FOOT DEBRIDEMENT performed by Alaina Marin DPM at [facility] TOE AMPUTATION Right 12/10/2019    AMPUTATION RIGHT 3rd DIGIT WITH PARTIAL RESECTION OF THIRD METATARSAL performed by Alaina Marin DPM at [facility] OR    VASCULAR SURGERY       Family History   Problem Relation Age of Onset    Heart Disease Mother     Heart Disease Father      Social History     Tobacco Use    Smoking status: Former Smoker    Smokeless tobacco: Never Used   Substance Use Topics    Alcohol use: No    Drug use: No     Allergies   Allergen Reactions    Latex Other (See Comments)     Pt unsure of reaction    Aspirin Other (See Comments)     \"It affects my kidneys. \"   [place] [Penicillins] Other (See Comments)     \"I just know my body doesn't like it. \" \"I had a reaction a long time ago, I know it affects my kidneys. \"    Other Rash     \"I get a rash on just my face if I eat Cumin or Chili. \"    Peanut-Containing Drug Products Itching     Current Outpatient Medications on File Prior to Encounter   Medication Sig Dispense Refill    NONFORMULARY Indications: Moringa       Garlic 8998 MG TBEC Take 1 capsule by mouth daily      Multiple Vitamins-Minerals (THERAPEUTIC MULTIVITAMIN-MINERALS) tablet Take 1 tablet by mouth daily       No current facility-administered medications on file prior to encounter.         REVIEW OF SYSTEMS See HPI    Objective:    /62   Pulse 67   Temp 97.9 °F (36.6 °C) (Temporal)   Resp 18   Ht 5' 9\" (1.753 m)   Wt 135 lb (61.2 kg)   BMI 19.94 kg/m²   Wt Readings from Last 3 Encounters: 02/13/20 135 lb (61.2 kg)   01/30/20 135 lb (61.2 kg)   01/16/20 135 lb (61.2 kg)     PHYSICAL EXAM  CONSTITUTIONAL:   Awake, alert, cooperative   EYES:  lids and lashes normal   ENT: external ears and nose without lesions   NECK:  supple, symmetrical, trachea midline   SKIN:  Open wound right plantar foot with 40% devitalized nonviable tissue.  There is no purulence or odor.  No surrounding erythema. Loss of protective sensation. Assessment:     Diabetic with ulcer right foot, unrelated to previous surgical procedure/amputation of the right third toe.  Neuropathy.  Peripheral vascular disease    Pre Debridement Measurements:  Are located in the [facility]  Documentation Flow Sheet  Post Debridement Measurements:  Wound/Ulcer Descriptions are Pre Debridement except measurements:     Wound 10/22/19 Foot Right;Medial #13 FIRST MET PLANTAR  (Active)   Wound Image   1/30/2020 10:42 AM   Offloading for Diabetic Foot Ulcers Other (comment) 1/30/2020 11:27 AM   Dressing Status Clean;Dry; Intact 1/30/2020 11:27 AM   Dressing Changed Changed/New 2/13/2020 11:18 AM   Dressing/Treatment Alginate;ABD 2/13/2020 11:18 AM   Wound Cleansed Rinsed/Irrigated with saline 2/13/2020 11:18 AM   Wound Length (cm) 1.2 cm 2/13/2020 10:48 AM   Wound Width (cm) 3.7 cm 2/13/2020 10:48 AM   Wound Depth (cm) 0.2 cm 2/13/2020 10:48 AM   Wound Surface Area (cm^2) 4.44 cm^2 2/13/2020 10:48 AM   Change in Wound Size % (l*w) -2014.29 2/13/2020 10:48 AM   Wound Volume (cm^3) 0.89 cm^3 2/13/2020 10:48 AM   Wound Healing % -2125 2/13/2020 10:48 AM   Post-Procedure Length (cm) 1.2 cm 2/13/2020 10:57 AM   Post-Procedure Width (cm) 3.7 cm 2/13/2020 10:57 AM   Post-Procedure Depth (cm) 0.2 cm 2/13/2020 10:57 AM   Post-Procedure Surface Area (cm^2) 4.44 cm^2 2/13/2020 10:57 AM   Post-Procedure Volume (cm^3) 0.89 cm^3 2/13/2020 10:57 AM   Wound Assessment Pink;Red;Yellow 2/13/2020 10:48 AM   Drainage Amount Moderate 2/13/2020 10:48 AM   Drainage Description Serosanguinous; Yellow 2/13/2020 10:48 AM   Odor None 2/13/2020 10:48 AM   Latonya-wound Assessment Intact; Pink 2/13/2020 10:48 AM   Number of days: 114          Procedure Note  Indications:  Based on my examination of this patient's wound(s)/ulcer(s) today, debridement is required to promote healing and evaluate the wound base. Performed by: Akira Alcaraz DPM    Consent obtained:  Yes    Time out taken:  Yes    Pain Control: Anesthetic  Anesthetic: 4% Lidocaine Liquid Topical     Debridement:Excisional Debridement    Using curette and # 10 blade scalpel the wound(s)/ulcer(s) was/were sharply debrided down through and including the removal of subcutaneous tissue. Devitalized Tissue Debrided:  fibrin, biofilm, slough and necrotic/eschar to stimulate bleeding to promote healing, post debridement good bleeding base and wound edges noted    Wound/Ulcer #: 13    Percent of Wound/Ulcer Debrided: 100%    Total Surface Area Debrided:  4.44 sq cm     Estimated Blood Loss:  Minimal  Hemostasis Achieved:  by pressure    Procedural Pain:  0  / 10   Post Procedural Pain:  0 / 10     Response to treatment:  Well tolerated by patient. Plan:   Treatment Note please see attached Discharge Instructions. Needs to follow wound care orders, at risk for infection, loss of limb. Written patient dismissal instructions given to patient and signed by patient or POA. Discharge Instructions       Visit Discharge/Physician Orders     Discharge condition: Stable     Assessment of pain at discharge:N0NE     Anesthetic used: LIDOCAINE 2%     Discharge to: home     Left via:  Private car     Accompanied by: WIFE     ECF/HHA:       Dressing Orders:WOUND LOCATION  RIGHT ANKLE /FOOT, : CLEANSE WOUND WITH NORMAL SALINE APPLY alginate  and dry dressing PAD WELL AND SECURE. CHANGE DAILY.        MOISTURIZE DAILY.     Treatment Orders:FOLLOW A NUTRITIOUS DIET HIGH IN PROTEIN AND VITAMIN C TO PROMOTE WOUND HEALING.  TAKE A

## 2020-02-27 ENCOUNTER — HOSPITAL ENCOUNTER (OUTPATIENT)
Dept: WOUND CARE | Age: 80
Discharge: HOME | End: 2020-02-27
Payer: MEDICARE

## 2020-02-27 VITALS
DIASTOLIC BLOOD PRESSURE: 80 MMHG | BODY MASS INDEX: 19.99 KG/M2 | WEIGHT: 135 LBS | SYSTOLIC BLOOD PRESSURE: 150 MMHG | RESPIRATION RATE: 16 BRPM | HEIGHT: 69 IN | HEART RATE: 80 BPM | TEMPERATURE: 97.5 F

## 2020-02-27 PROCEDURE — 11042 DBRDMT SUBQ TIS 1ST 20SQCM/<: CPT

## 2020-02-27 RX ORDER — LIDOCAINE HYDROCHLORIDE 40 MG/ML
SOLUTION TOPICAL ONCE
Status: DISCONTINUED | OUTPATIENT
Start: 2020-02-27 | End: 2020-02-28 | Stop reason: HOSPADM

## 2020-02-27 NOTE — PROGRESS NOTES
[facility] Followup Visit Note    Referring Physician : Christo Thorpe MD  [address] RECORD NUMBER:  [id number]  AGE: [de-identified] y.o. GENDER: male  : 1940  EPISODE DATE:  2020    Subjective:     Chief Complaint   Patient presents with    Wound Check      right foot ulcer       HISTORY of PRESENT ILLNESS DELLA Whitaker is a [de-identified] y.o. male who presents today in regards to follow up evaluation and treatment of wound/ulcer. That patient's past medical, family and social hx were reviewed and changes were made if present. Doing well, no pain at present.      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 ([address] 2019    Blood circulation, collateral     Cellulitis of foot, left 2017    Chronic venous insufficiency 2019    Diabetes mellitus ([address]     Diabetic foot ulcer with osteomyelitis ([address] 2017    Femoral-popliteal atherosclerosis ([address] 2017    Heel ulcer, right, with fat layer exposed ([address] 2019    Hypertension     Osteomyelitis of third toe of right foot ([address] 2019    S/P peripheral artery angioplasty 2020    Skin ulcer of right foot with fat layer exposed ([address] 2019    Ulcer of right leg, with fat layer exposed ([address] 2019    Varicose veins of leg with edema, right 2019     Past Surgical History:   Procedure Laterality Date    COLONOSCOPY      EYE SURGERY  's    lense implants bilaterally    FOOT DEBRIDEMENT Left 01/04/2017    wound debridement and delayed primary closure    OTHER SURGICAL HISTORY  04/23/2019    Dr. Sarai Camejo- PTA ant tibial    OTHER SURGICAL HISTORY  12/17/2019    Dr Sarai Camejo - PCI Right popliteal and Anterior tibial    PROSTATE BIOPSY  04/2016    SKIN BIOPSY  sept of 2018 last time    head and ears    TOE AMPUTATION Left 12/31/2016    2nd    TOE AMPUTATION Right 4/26/2019    AMPUTATION RIGHT SECOND TOE, FOOT DEBRIDEMENT performed by Bailee Horner DPM at [facility] 22 TOE AMPUTATION Right 12/10/2019    AMPUTATION RIGHT 3rd DIGIT WITH PARTIAL RESECTION OF THIRD METATARSAL performed [facility] at [facility] OR    VASCULAR SURGERY       Family History   Problem Relation Age of Onset    Heart Disease Mother     Heart Disease Father      Social History     Tobacco Use    Smoking status: Former Smoker    Smokeless tobacco: Never Used   Substance Use Topics    Alcohol use: No    Drug use: No     Allergies   Allergen Reactions    Latex Other (See Comments)     Pt unsure of reaction    Aspirin Other (See Comments)     \"It affects my kidneys. \"   Jojo Milton [Penicillins] Other (See Comments)     \"I just know my body doesn't like it. \" \"I had a reaction a long time ago, I know it affects my kidneys. \"    Other Rash     \"I get a rash on just my face if I eat Cumin or Chili. \"    Peanut-Containing Drug Products Itching     Current Outpatient Medications on File Prior to Encounter   Medication Sig Dispense Refill    NONFORMULARY Indications: Moringa       Garlic 4599 MG TBEC Take 1 capsule by mouth daily      Multiple Vitamins-Minerals (THERAPEUTIC MULTIVITAMIN-MINERALS) tablet Take 1 tablet by mouth daily       No current facility-administered medications on file prior to encounter.         REVIEW OF SYSTEMS See HPI    Objective:    BP (!) 150/80   Pulse 80   Temp 97.5 °F (36.4 °C) (Temporal)   Resp 16   Ht 5' 9\" (1.753 m)   Wt 135 lb (61.2 kg)   BMI 19.94 Moderate 2/27/2020 10:45 AM   Drainage Description Serosanguinous; Yellow 2/27/2020 10:45 AM   Odor None 2/27/2020 10:45 AM   Latonya-wound Assessment Intact; Pink 2/27/2020 10:45 AM   Number of days: 128          Procedure Note  Indications:  Based on my examination of this patient's wound(s)/ulcer(s) today, debridement is required to promote healing and evaluate the wound base. Performed by: Lupe Rodriguez DPM    Consent obtained:  Yes    Time out taken:  Yes    Pain Control: Anesthetic  Anesthetic: 4% Lidocaine Liquid Topical     Debridement:Excisional Debridement    Using curette the wound(s)/ulcer(s) was/were sharply debrided down through and including the removal of epidermis, dermis and subcutaneous tissue. Devitalized Tissue Debrided:  fibrin, biofilm, slough and necrotic/eschar to stimulate bleeding to promote healing, post debridement good bleeding base and wound edges noted    Wound/Ulcer #: 13    Percent of Wound/Ulcer Debrided: 100%    Total Surface Area Debrided:  2.4 sq cm     Estimated Blood Loss:  Minimal  Hemostasis Achieved:  by pressure    Procedural Pain:  0  / 10   Post Procedural Pain:  0 / 10     Response to treatment:  Well tolerated by patient. Plan:   Treatment Note please see attached Discharge Instructions    Written patient dismissal instructions given to patient and signed by patient or POA. Discharge Instructions       Visit Discharge/Physician Orders     Discharge condition: Stable     Assessment of pain at discharge:N0NE     Anesthetic used: LIDOCAINE 2%     Discharge to: home     Left via:  Private car     Accompanied by: WIFE     ECF/HHA:       Dressing Orders:WOUND LOCATION  RIGHT FOOT, : CLEANSE WOUND WITH NORMAL SALINE APPLY alginate  and dry dressing PAD WELL AND SECURE. CHANGE DAILY.        MOISTURIZE DAILY.     Treatment Orders:FOLLOW A NUTRITIOUS DIET HIGH IN PROTEIN AND VITAMIN C TO PROMOTE WOUND HEALING.  TAKE A MULTIVITAMIN DAILY.     KEEP PRESSURE OFF ALL WOUNDS AT ALL TIMES       Strict non-weightbearing right heel        C followup visit _______2 WEEK   DR. MILLER________________  (Please note your next appointment above and if you are unable to keep, kindly give a 24 hour notice.  Thank you.)     Physician signature:__________________________        If you experience any of the following, please call the [facility] during business hours:     * Increase in Pain  * Temperature over 101  * Increase in drainage from your wound  * Drainage with a foul odor  * Bleeding  * Increase in swelling  * Need for compression bandage changes due to slippage, breakthrough drainage.     If you need medical attention outside of the business hours of the [facility] please contact your PCP or go to the nearest emergency room.                                                                                                                                                                                                        Electronically signed by Vivek Campbell DPM on 2/27/2020 at 11:09 AM

## 2020-03-12 ENCOUNTER — HOSPITAL ENCOUNTER (OUTPATIENT)
Dept: WOUND CARE | Age: 80
Discharge: HOME | End: 2020-03-12
Payer: MEDICARE

## 2020-03-12 VITALS
HEIGHT: 69 IN | HEART RATE: 78 BPM | TEMPERATURE: 97.4 F | WEIGHT: 135 LBS | DIASTOLIC BLOOD PRESSURE: 72 MMHG | RESPIRATION RATE: 16 BRPM | SYSTOLIC BLOOD PRESSURE: 138 MMHG | BODY MASS INDEX: 19.99 KG/M2

## 2020-03-12 PROBLEM — E11.621 DIABETIC ULCER OF RIGHT FOOT ASSOCIATED WITH TYPE 2 DIABETES MELLITUS, LIMITED TO BREAKDOWN OF SKIN (HCC): Status: ACTIVE | Noted: 2020-03-12

## 2020-03-12 PROBLEM — L97.511 DIABETIC ULCER OF RIGHT FOOT ASSOCIATED WITH TYPE 2 DIABETES MELLITUS, LIMITED TO BREAKDOWN OF SKIN (HCC): Status: ACTIVE | Noted: 2020-03-12

## 2020-03-12 PROCEDURE — 99213 OFFICE O/P EST LOW 20 MIN: CPT

## 2020-03-12 RX ORDER — LIDOCAINE HYDROCHLORIDE 40 MG/ML
SOLUTION TOPICAL ONCE
Status: DISCONTINUED | OUTPATIENT
Start: 2020-03-12 | End: 2020-03-13 | Stop reason: HOSPADM

## 2020-03-12 ASSESSMENT — PAIN SCALES - GENERAL: PAINLEVEL_OUTOF10: 0

## 2020-03-12 NOTE — PROGRESS NOTES
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 ([address] 4/25/2019    Blood circulation, collateral     Cellulitis of foot, left 1/1/2017    Chronic venous insufficiency 12/6/2019    Diabetes mellitus ([address]     Diabetic foot ulcer with osteomyelitis ([address] 1/1/2017   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 ([address] 12/6/2019    S/P peripheral artery angioplasty 1/23/2020    Skin ulcer of right foot with fat layer exposed ([address] 12/9/2019    Ulcer of right leg, with fat layer exposed ([address] 5/6/2019    Varicose veins of leg with edema, 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     Past Surgical History:   Procedure Laterality Date    COLONOSCOPY      EYE SURGERY  1990's    lense implants bilaterally    FOOT DEBRIDEMENT Left 01/04/2017    wound debridement and delayed primary closure    OTHER SURGICAL HISTORY  04/23/2019    Dr. Lon Gusman- PTA ant tibial    OTHER SURGICAL HISTORY  12/17/2019    Dr Lon Gusman - PCI Right popliteal and Anterior tibial    PROSTATE BIOPSY  04/2016    SKIN BIOPSY  sept of 2018 last time    head and ears    TOE AMPUTATION Left 12/31/2016    2nd    TOE AMPUTATION Right 4/26/2019    AMPUTATION RIGHT SECOND TOE, FOOT DEBRIDEMENT performed by [facility]el Moon DPM at [facility] 22 TOE AMPUTATION Right 12/10/2019    AMPUTATION RIGHT 3rd DIGIT WITH PARTIAL RESECTION OF THIRD METATARSAL performed by [facility]el [facility]PM at [facility] OR    VASCULAR SURGERY       Family History   Problem Relation Age of Onset    Heart Disease Mother     Heart Disease Father      Social History     Tobacco Use    Smoking status: Former Smoker    Smokeless tobacco: Never Used   Substance Use Topics    Alcohol use: No    Drug use: No     Allergies   Allergen Reactions    Latex Other 1/30/2020 11:27 AM   Dressing Changed Changed/New 3/12/2020 12:46 PM   Dressing/Treatment Alginate;ABD 3/12/2020 12:46 PM   Wound Cleansed Rinsed/Irrigated with saline 3/12/2020 12:46 PM   Wound Length (cm) 0.8 cm 3/12/2020 12:26 PM   Wound Width (cm) 2 cm 3/12/2020 12:26 PM   Wound Depth (cm) 0.1 cm 3/12/2020 12:26 PM   Wound Surface Area (cm^2) 1.6 cm^2 3/12/2020 12:26 PM   Change in Wound Size % (l*w) -661.9 3/12/2020 12:26 PM   Wound Volume (cm^3) 0.16 cm^3 3/12/2020 12:26 PM   Wound Healing % -300 3/12/2020 12:26 PM   Post-Procedure Length (cm) 0.8 cm 3/12/2020 12:35 PM   Post-Procedure Width (cm) 2 cm 3/12/2020 12:35 PM   Post-Procedure Depth (cm) 0.1 cm 3/12/2020 12:35 PM   Post-Procedure Surface Area (cm^2) 1.6 cm^2 3/12/2020 12:35 PM   Post-Procedure Volume (cm^3) 0.16 cm^3 3/12/2020 12:35 PM   Wound Assessment Red;Yellow 3/12/2020 12:26 PM   Drainage Amount Scant 3/12/2020 12:26 PM   Drainage Description Serosanguinous 3/12/2020 12:26 PM   Odor None 3/12/2020 12:26 PM   Latonya-wound Assessment Intact 3/12/2020 12:26 PM   Number of days: 142              Plan:   Treatment Note please see attached Discharge Instructions    Written patient dismissal instructions given to patient and signed by patient or POA. Discharge Instructions       Visit Discharge/Physician Orders     Discharge condition: Stable     Assessment of pain at discharge:N0NE     Anesthetic used: LIDOCAINE 2%     Discharge to: home     Left via:  Private car     Accompanied by: WIFE     ECF/HHA:       Dressing Orders:WOUND LOCATION  RIGHT FOOT, : CLEANSE WOUND WITH NORMAL SALINE APPLY alginate  and dry dressing PAD WELL AND SECURE. CHANGE DAILY.        MOISTURIZE DAILY.     Treatment Orders:FOLLOW A NUTRITIOUS DIET HIGH IN PROTEIN AND VITAMIN C TO PROMOTE WOUND HEALING. TAKE A MULTIVITAMIN DAILY.     KEEP PRESSURE OFF ALL WOUNDS AT ALL TIMES       Strict non-weightbearing right heel        [facility] followup visit _______2 WEEK

## 2020-03-26 ENCOUNTER — HOSPITAL ENCOUNTER (OUTPATIENT)
Dept: WOUND CARE | Age: 80
Discharge: HOME | End: 2020-03-26
Payer: MEDICARE

## 2020-04-15 ENCOUNTER — TELEPHONE (OUTPATIENT)
Dept: VASCULAR SURGERY | Age: 80
End: 2020-04-15

## 2020-04-23 ENCOUNTER — VIRTUAL VISIT (OUTPATIENT)
Dept: VASCULAR SURGERY | Age: 80
End: 2020-04-23
Payer: MEDICARE

## 2020-04-23 VITALS
SYSTOLIC BLOOD PRESSURE: 136 MMHG | HEIGHT: 69 IN | DIASTOLIC BLOOD PRESSURE: 89 MMHG | HEART RATE: 68 BPM | WEIGHT: 135 LBS | BODY MASS INDEX: 19.99 KG/M2

## 2020-04-23 PROCEDURE — 99213 OFFICE O/P EST LOW 20 MIN: CPT | Performed by: SURGERY

## 2020-04-23 NOTE — PROGRESS NOTES
Pursuant to the emergency declaration under the [facility], [address] [place] [address] authority and the [employer] Act, this Virtual Visit was conducted with patient's (and/or legal guardian's) consent, to reduce the patient's risk of exposure to COVID-19 and provide necessary medical care. The patient (and/or legal guardian) has also been advised to contact this office for worsening conditions or problems, and seek emergency medical treatment and/or call 911 if deemed necessary. Patient identification was verified at the start of the visit: Yes    Total time spent on this encounter: 10 minutes plus    Services were provided through a video synchronous discussion virtually to substitute for in-person clinic visit. Patient and provider were located at their individual homes. --Andrew Malik MD on 4/23/2020 at 11:17 AM    An electronic signature was used to authenticate this note.

## 2020-05-04 ENCOUNTER — PREP FOR PROCEDURE (OUTPATIENT)
Dept: UROLOGY | Age: 80
End: 2020-05-04

## 2020-05-04 DIAGNOSIS — Z90.79 S/P TURP (STATUS POST TRANSURETHRAL RESECTION OF PROSTATE): Primary | ICD-10-CM

## 2020-05-04 RX ORDER — SODIUM CHLORIDE 9 MG/ML
INJECTION, SOLUTION INTRAVENOUS CONTINUOUS
Status: CANCELLED | OUTPATIENT
Start: 2020-05-04

## 2020-05-04 RX ORDER — SODIUM CHLORIDE 0.9 % (FLUSH) 0.9 %
10 SYRINGE (ML) INJECTION EVERY 12 HOURS SCHEDULED
Status: CANCELLED | OUTPATIENT
Start: 2020-05-04

## 2020-05-04 RX ORDER — CIPROFLOXACIN 2 MG/ML
400 INJECTION, SOLUTION INTRAVENOUS
Status: CANCELLED | OUTPATIENT
Start: 2020-05-04 | End: 2020-05-04

## 2020-05-04 RX ORDER — SODIUM CHLORIDE 0.9 % (FLUSH) 0.9 %
10 SYRINGE (ML) INJECTION PRN
Status: CANCELLED | OUTPATIENT
Start: 2020-05-04

## 2020-05-14 ENCOUNTER — HOSPITAL ENCOUNTER (OUTPATIENT)
Age: 80
Discharge: HOME | End: 2020-05-16
Payer: MEDICARE

## 2020-05-14 PROCEDURE — U0003 INFECTIOUS AGENT DETECTION BY NUCLEIC ACID (DNA OR RNA); SEVERE ACUTE RESPIRATORY SYNDROME CORONAVIRUS 2 (SARS-COV-2) (CORONAVIRUS DISEASE [COVID-19]), AMPLIFIED PROBE TECHNIQUE, MAKING USE OF HIGH THROUGHPUT TECHNOLOGIES AS DESCRIBED BY CMS-2020-01-R: HCPCS

## 2020-05-14 NOTE — PROGRESS NOTES
Have you been tested for COVID       Yes 5-14-20             Have you been told you were positive for COVID   No  Have you had any known exposure to someone that is positive for COVID   No  Do you have a cough    No              Do you have shortness of breath       No                 Do you have a sore throat       No                Are you having chills          No                Are you having muscle aches     No                    Please come to the hospital wearing a mask and have your significant other wear a mask as well. Both of you should check your temperature before leaving to come here,  if it is 100 or higher please call [phone] for instruction.

## 2020-05-17 LAB
SARS-COV-2: NOT DETECTED
SOURCE: NORMAL

## 2020-05-18 ENCOUNTER — APPOINTMENT (OUTPATIENT)
Dept: GENERAL RADIOLOGY | Age: 80
End: 2020-05-18
Attending: UROLOGY
Payer: MEDICARE

## 2020-05-18 ENCOUNTER — ANESTHESIA EVENT (OUTPATIENT)
Dept: OPERATING ROOM | Age: 80
End: 2020-05-18
Payer: MEDICARE

## 2020-05-18 ENCOUNTER — ANESTHESIA (OUTPATIENT)
Dept: OPERATING ROOM | Age: 80
End: 2020-05-18
Payer: MEDICARE

## 2020-05-18 ENCOUNTER — HOSPITAL ENCOUNTER (OUTPATIENT)
Age: 80
Setting detail: OUTPATIENT SURGERY
Discharge: HOME | End: 2020-05-18
Attending: UROLOGY | Admitting: UROLOGY
Payer: MEDICARE

## 2020-05-18 VITALS
HEIGHT: 69 IN | TEMPERATURE: 98.2 F | RESPIRATION RATE: 18 BRPM | WEIGHT: 135 LBS | BODY MASS INDEX: 19.99 KG/M2 | SYSTOLIC BLOOD PRESSURE: 123 MMHG | OXYGEN SATURATION: 99 % | DIASTOLIC BLOOD PRESSURE: 64 MMHG | HEART RATE: 76 BPM

## 2020-05-18 VITALS
SYSTOLIC BLOOD PRESSURE: 127 MMHG | RESPIRATION RATE: 10 BRPM | DIASTOLIC BLOOD PRESSURE: 57 MMHG | OXYGEN SATURATION: 100 %

## 2020-05-18 LAB
ABO/RH: NORMAL
ANTIBODY SCREEN: NORMAL
HCT VFR BLD CALC: 40.3 % (ref 37–54)
HEMOGLOBIN: 13.8 G/DL (ref 12.5–16.5)
MCH RBC QN AUTO: 31 PG (ref 26–35)
MCHC RBC AUTO-ENTMCNC: 34.2 % (ref 32–34.5)
MCV RBC AUTO: 90.6 FL (ref 80–99.9)
METER GLUCOSE: 147 MG/DL (ref 74–99)
PDW BLD-RTO: 14.2 FL (ref 11.5–15)
PLATELET # BLD: 177 E9/L (ref 130–450)
PMV BLD AUTO: 10.3 FL (ref 7–12)
RBC # BLD: 4.45 E12/L (ref 3.8–5.8)
WBC # BLD: 6.7 E9/L (ref 4.5–11.5)

## 2020-05-18 PROCEDURE — 85027 COMPLETE CBC AUTOMATED: CPT

## 2020-05-18 PROCEDURE — 2580000003 HC RX 258: Performed by: NURSE PRACTITIONER

## 2020-05-18 PROCEDURE — 3600000003 HC SURGERY LEVEL 3 BASE: Performed by: UROLOGY

## 2020-05-18 PROCEDURE — 36415 COLL VENOUS BLD VENIPUNCTURE: CPT

## 2020-05-18 PROCEDURE — 7100000001 HC PACU RECOVERY - ADDTL 15 MIN: Performed by: UROLOGY

## 2020-05-18 PROCEDURE — 6360000002 HC RX W HCPCS

## 2020-05-18 PROCEDURE — 88305 TISSUE EXAM BY PATHOLOGIST: CPT

## 2020-05-18 PROCEDURE — 2500000003 HC RX 250 WO HCPCS: Performed by: NURSE ANESTHETIST, CERTIFIED REGISTERED

## 2020-05-18 PROCEDURE — 7100000010 HC PHASE II RECOVERY - FIRST 15 MIN: Performed by: UROLOGY

## 2020-05-18 PROCEDURE — 7100000000 HC PACU RECOVERY - FIRST 15 MIN: Performed by: UROLOGY

## 2020-05-18 PROCEDURE — 86901 BLOOD TYPING SEROLOGIC RH(D): CPT

## 2020-05-18 PROCEDURE — 86850 RBC ANTIBODY SCREEN: CPT

## 2020-05-18 PROCEDURE — 2709999900 HC NON-CHARGEABLE SUPPLY: Performed by: UROLOGY

## 2020-05-18 PROCEDURE — 6370000000 HC RX 637 (ALT 250 FOR IP): Performed by: UROLOGY

## 2020-05-18 PROCEDURE — 6360000002 HC RX W HCPCS: Performed by: ANESTHESIOLOGY

## 2020-05-18 PROCEDURE — 86900 BLOOD TYPING SEROLOGIC ABO: CPT

## 2020-05-18 PROCEDURE — 6360000002 HC RX W HCPCS: Performed by: UROLOGY

## 2020-05-18 PROCEDURE — 3700000000 HC ANESTHESIA ATTENDED CARE: Performed by: UROLOGY

## 2020-05-18 PROCEDURE — 7100000011 HC PHASE II RECOVERY - ADDTL 15 MIN: Performed by: UROLOGY

## 2020-05-18 PROCEDURE — 6370000000 HC RX 637 (ALT 250 FOR IP): Performed by: ANESTHESIOLOGY

## 2020-05-18 PROCEDURE — 82962 GLUCOSE BLOOD TEST: CPT

## 2020-05-18 PROCEDURE — 2580000003 HC RX 258: Performed by: UROLOGY

## 2020-05-18 PROCEDURE — 2720000010 HC SURG SUPPLY STERILE: Performed by: UROLOGY

## 2020-05-18 PROCEDURE — 6360000002 HC RX W HCPCS: Performed by: NURSE ANESTHETIST, CERTIFIED REGISTERED

## 2020-05-18 PROCEDURE — 3600000013 HC SURGERY LEVEL 3 ADDTL 15MIN: Performed by: UROLOGY

## 2020-05-18 PROCEDURE — 3700000001 HC ADD 15 MINUTES (ANESTHESIA): Performed by: UROLOGY

## 2020-05-18 RX ORDER — ACETAMINOPHEN AND CODEINE PHOSPHATE 300; 30 MG/1; MG/1
1-2 TABLET ORAL EVERY 6 HOURS PRN
Qty: 10 TABLET | Refills: 0 | Status: SHIPPED | OUTPATIENT
Start: 2020-05-18 | End: 2020-05-25

## 2020-05-18 RX ORDER — ACETAMINOPHEN AND CODEINE PHOSPHATE 300; 30 MG/1; MG/1
1 TABLET ORAL ONCE
Status: COMPLETED | OUTPATIENT
Start: 2020-05-18 | End: 2020-05-18

## 2020-05-18 RX ORDER — PROPOFOL 10 MG/ML
INJECTION, EMULSION INTRAVENOUS PRN
Status: DISCONTINUED | OUTPATIENT
Start: 2020-05-18 | End: 2020-05-18 | Stop reason: SDUPTHER

## 2020-05-18 RX ORDER — CIPROFLOXACIN 2 MG/ML
400 INJECTION, SOLUTION INTRAVENOUS
Status: COMPLETED | OUTPATIENT
Start: 2020-05-18 | End: 2020-05-18

## 2020-05-18 RX ORDER — FENTANYL CITRATE 50 UG/ML
25 INJECTION, SOLUTION INTRAMUSCULAR; INTRAVENOUS EVERY 5 MIN PRN
Status: DISCONTINUED | OUTPATIENT
Start: 2020-05-18 | End: 2020-05-18 | Stop reason: HOSPADM

## 2020-05-18 RX ORDER — SODIUM CHLORIDE 9 MG/ML
INJECTION, SOLUTION INTRAVENOUS CONTINUOUS
Status: DISCONTINUED | OUTPATIENT
Start: 2020-05-18 | End: 2020-05-18 | Stop reason: HOSPADM

## 2020-05-18 RX ORDER — CIPROFLOXACIN 2 MG/ML
INJECTION, SOLUTION INTRAVENOUS
Status: COMPLETED
Start: 2020-05-18 | End: 2020-05-18

## 2020-05-18 RX ORDER — ONDANSETRON 2 MG/ML
INJECTION INTRAMUSCULAR; INTRAVENOUS PRN
Status: DISCONTINUED | OUTPATIENT
Start: 2020-05-18 | End: 2020-05-18 | Stop reason: SDUPTHER

## 2020-05-18 RX ORDER — SODIUM CHLORIDE 0.9 % (FLUSH) 0.9 %
10 SYRINGE (ML) INJECTION PRN
Status: DISCONTINUED | OUTPATIENT
Start: 2020-05-18 | End: 2020-05-18 | Stop reason: HOSPADM

## 2020-05-18 RX ORDER — SULFAMETHOXAZOLE AND TRIMETHOPRIM 800; 160 MG/1; MG/1
1 TABLET ORAL 2 TIMES DAILY
Qty: 6 TABLET | Refills: 0 | Status: SHIPPED | OUTPATIENT
Start: 2020-05-18 | End: 2020-05-21

## 2020-05-18 RX ORDER — FENTANYL CITRATE 50 UG/ML
INJECTION, SOLUTION INTRAMUSCULAR; INTRAVENOUS PRN
Status: DISCONTINUED | OUTPATIENT
Start: 2020-05-18 | End: 2020-05-18 | Stop reason: SDUPTHER

## 2020-05-18 RX ORDER — LIDOCAINE HYDROCHLORIDE 20 MG/ML
INJECTION, SOLUTION EPIDURAL; INFILTRATION; INTRACAUDAL; PERINEURAL PRN
Status: DISCONTINUED | OUTPATIENT
Start: 2020-05-18 | End: 2020-05-18 | Stop reason: SDUPTHER

## 2020-05-18 RX ORDER — ROCURONIUM BROMIDE 10 MG/ML
INJECTION, SOLUTION INTRAVENOUS PRN
Status: DISCONTINUED | OUTPATIENT
Start: 2020-05-18 | End: 2020-05-18 | Stop reason: SDUPTHER

## 2020-05-18 RX ORDER — SODIUM CHLORIDE 0.9 % (FLUSH) 0.9 %
10 SYRINGE (ML) INJECTION EVERY 12 HOURS SCHEDULED
Status: DISCONTINUED | OUTPATIENT
Start: 2020-05-18 | End: 2020-05-18 | Stop reason: HOSPADM

## 2020-05-18 RX ORDER — LABETALOL HYDROCHLORIDE 5 MG/ML
INJECTION, SOLUTION INTRAVENOUS PRN
Status: DISCONTINUED | OUTPATIENT
Start: 2020-05-18 | End: 2020-05-18 | Stop reason: SDUPTHER

## 2020-05-18 RX ORDER — ATROPA BELLADONNA AND OPIUM 16.2; 6 MG/1; MG/1
60 SUPPOSITORY RECTAL ONCE
Status: COMPLETED | OUTPATIENT
Start: 2020-05-18 | End: 2020-05-18

## 2020-05-18 RX ADMIN — SUGAMMADEX 200 MG: 100 INJECTION, SOLUTION INTRAVENOUS at 10:52

## 2020-05-18 RX ADMIN — ACETAMINOPHEN AND CODEINE PHOSPHATE 1 TABLET: 300; 30 TABLET ORAL at 13:03

## 2020-05-18 RX ADMIN — ROCURONIUM BROMIDE 10 MG: 10 INJECTION, SOLUTION INTRAVENOUS at 09:42

## 2020-05-18 RX ADMIN — FENTANYL CITRATE 50 MCG: 50 INJECTION, SOLUTION INTRAMUSCULAR; INTRAVENOUS at 08:46

## 2020-05-18 RX ADMIN — PROPOFOL 150 MG: 10 INJECTION, EMULSION INTRAVENOUS at 08:46

## 2020-05-18 RX ADMIN — SODIUM CHLORIDE: 9 INJECTION, SOLUTION INTRAVENOUS at 07:43

## 2020-05-18 RX ADMIN — FENTANYL CITRATE 25 MCG: 50 INJECTION, SOLUTION INTRAMUSCULAR; INTRAVENOUS at 11:41

## 2020-05-18 RX ADMIN — LABETALOL HYDROCHLORIDE 5 MG: 5 INJECTION INTRAVENOUS at 10:20

## 2020-05-18 RX ADMIN — CIPROFLOXACIN 400 MG: 2 INJECTION, SOLUTION INTRAVENOUS at 07:41

## 2020-05-18 RX ADMIN — ONDANSETRON HYDROCHLORIDE 4 MG: 2 INJECTION, SOLUTION INTRAMUSCULAR; INTRAVENOUS at 08:58

## 2020-05-18 RX ADMIN — SODIUM CHLORIDE: 9 INJECTION, SOLUTION INTRAVENOUS at 07:49

## 2020-05-18 RX ADMIN — FENTANYL CITRATE 50 MCG: 50 INJECTION, SOLUTION INTRAMUSCULAR; INTRAVENOUS at 09:15

## 2020-05-18 RX ADMIN — PHENYLEPHRINE HYDROCHLORIDE 100 MCG: 10 INJECTION INTRAVENOUS at 10:16

## 2020-05-18 RX ADMIN — ATROPA BELLADONNA AND OPIUM 60 MG: 16.2; 6 SUPPOSITORY RECTAL at 11:42

## 2020-05-18 RX ADMIN — ROCURONIUM BROMIDE 30 MG: 10 INJECTION, SOLUTION INTRAVENOUS at 08:46

## 2020-05-18 RX ADMIN — LABETALOL HYDROCHLORIDE 10 MG: 5 INJECTION INTRAVENOUS at 09:18

## 2020-05-18 RX ADMIN — ROCURONIUM BROMIDE 10 MG: 10 INJECTION, SOLUTION INTRAVENOUS at 10:08

## 2020-05-18 RX ADMIN — LIDOCAINE HYDROCHLORIDE 40 MG: 20 INJECTION, SOLUTION EPIDURAL; INFILTRATION; INTRACAUDAL; PERINEURAL at 08:46

## 2020-05-18 RX ADMIN — GENTAMICIN SULFATE 240 MG: 40 INJECTION, SOLUTION INTRAMUSCULAR; INTRAVENOUS at 08:32

## 2020-05-18 ASSESSMENT — PULMONARY FUNCTION TESTS
PIF_VALUE: 15
PIF_VALUE: 1
PIF_VALUE: 15
PIF_VALUE: 15
PIF_VALUE: 1
PIF_VALUE: 15
PIF_VALUE: 0
PIF_VALUE: 15
PIF_VALUE: 1
PIF_VALUE: 15
PIF_VALUE: 43
PIF_VALUE: 13
PIF_VALUE: 15
PIF_VALUE: 15
PIF_VALUE: 16
PIF_VALUE: 16
PIF_VALUE: 15
PIF_VALUE: 16
PIF_VALUE: 15
PIF_VALUE: 0
PIF_VALUE: 15
PIF_VALUE: 16
PIF_VALUE: 15
PIF_VALUE: 1
PIF_VALUE: 15
PIF_VALUE: 16
PIF_VALUE: 16
PIF_VALUE: 1
PIF_VALUE: 16
PIF_VALUE: 0
PIF_VALUE: 21
PIF_VALUE: 16
PIF_VALUE: 15
PIF_VALUE: 15
PIF_VALUE: 0
PIF_VALUE: 15
PIF_VALUE: 15
PIF_VALUE: 1
PIF_VALUE: 16
PIF_VALUE: 15
PIF_VALUE: 16
PIF_VALUE: 15
PIF_VALUE: 1
PIF_VALUE: 0
PIF_VALUE: 20
PIF_VALUE: 16
PIF_VALUE: 15
PIF_VALUE: 1
PIF_VALUE: 15
PIF_VALUE: 15
PIF_VALUE: 9
PIF_VALUE: 15
PIF_VALUE: 15
PIF_VALUE: 0
PIF_VALUE: 15
PIF_VALUE: 5
PIF_VALUE: 0
PIF_VALUE: 16
PIF_VALUE: 15
PIF_VALUE: 16
PIF_VALUE: 1
PIF_VALUE: 15
PIF_VALUE: 16
PIF_VALUE: 17
PIF_VALUE: 15
PIF_VALUE: 16
PIF_VALUE: 15
PIF_VALUE: 0
PIF_VALUE: 19
PIF_VALUE: 15
PIF_VALUE: 15
PIF_VALUE: 2
PIF_VALUE: 15
PIF_VALUE: 15
PIF_VALUE: 1
PIF_VALUE: 17
PIF_VALUE: 16
PIF_VALUE: 15
PIF_VALUE: 15
PIF_VALUE: 26
PIF_VALUE: 16
PIF_VALUE: 15
PIF_VALUE: 16
PIF_VALUE: 15
PIF_VALUE: 1
PIF_VALUE: 15
PIF_VALUE: 15
PIF_VALUE: 1
PIF_VALUE: 15
PIF_VALUE: 7
PIF_VALUE: 15
PIF_VALUE: 0
PIF_VALUE: 15
PIF_VALUE: 16
PIF_VALUE: 15
PIF_VALUE: 15
PIF_VALUE: 2

## 2020-05-18 ASSESSMENT — PAIN SCALES - GENERAL
PAINLEVEL_OUTOF10: 4
PAINLEVEL_OUTOF10: 8
PAINLEVEL_OUTOF10: 3
PAINLEVEL_OUTOF10: 5
PAINLEVEL_OUTOF10: 7
PAINLEVEL_OUTOF10: 7
PAINLEVEL_OUTOF10: 3
PAINLEVEL_OUTOF10: 3

## 2020-05-18 ASSESSMENT — PAIN DESCRIPTION - PAIN TYPE
TYPE: SURGICAL PAIN

## 2020-05-18 ASSESSMENT — PAIN DESCRIPTION - PROGRESSION
CLINICAL_PROGRESSION: GRADUALLY IMPROVING
CLINICAL_PROGRESSION: GRADUALLY IMPROVING
CLINICAL_PROGRESSION: GRADUALLY WORSENING

## 2020-05-18 ASSESSMENT — LIFESTYLE VARIABLES: SMOKING_STATUS: 0

## 2020-05-18 ASSESSMENT — PAIN DESCRIPTION - LOCATION
LOCATION: BACK
LOCATION: PENIS
LOCATION: BACK
LOCATION: PENIS

## 2020-05-18 ASSESSMENT — PAIN - FUNCTIONAL ASSESSMENT: PAIN_FUNCTIONAL_ASSESSMENT: 0-10

## 2020-05-18 NOTE — PROGRESS NOTES
ENTERING ROOM TO DO LAST VS AND GET PT DRESSED PT WIFE STATED SHE NEEDED TO GO TO STORE FOR COUPLE THINGS NEEDED BEFORE PT GOES HOME  AND WOULD BE BACK AS SOON AS ABLE TOOK WIFES PHONE NUMBER AND GAVE HER UNITS TO CALL WHEN BACK AND WOULD BRING PT OUT TO HER

## 2020-05-18 NOTE — PROGRESS NOTES
CLINICAL PHARMACY NOTE: MEDS TO [address] Select Patient?: No  Total # of Prescriptions Filled: 2   The following medications were delivered to the patient:  · acetaminophen codeine 300-30  · Sulfamethoxazole 800-160  Total # of Interventions Completed: 6  Time Spent (min): 45    Additional Documentation:

## 2020-05-18 NOTE — OP NOTE
Operative Note      Patient: Jackson Pace  YOB: 1940  MRN: [id number]    Date of Procedure: 5/18/2020    Pre-Op Diagnosis: BPH, urinary retention with large postvoid residual    Post-Op Diagnosis: Same       Procedure(s):  CYSTOSCOPY PLASMA LOOP TRANSURETHRAL RESECTION PROSTATE,  SUPRAPUBIC TUBE PLACEMENT   ++LATEX ALLERGY++    Surgeon(s):  Jossie Arthur MD    Assistant:   * No surgical staff found *    Anesthesia:  General     Estimated Blood Loss (mL): less than 50     Complications: None    Specimens:   ID Type Source Tests Collected by Time Destination   A : PROSTATE TISSUE Tissue Prostate SURGICAL PATHOLOGY Jossie Arthur MD 5/18/2020 6396        Implants:  * No implants in log *      Drains:   Urethral Catheter (Active)   $ Urethral catheter insertion Inserted for procedure 5/18/2020  9:23 AM   Urine Color Bloody 5/18/2020  9:23 AM       Suprapubic Catheter Non-latex 24 fr (Active)       [REMOVED] Urethral Catheter Non-latex 16 fr (Removed)   Urine Color Yellow 5/18/2020  7:33 AM       Detailed Description of Procedure:     INDICATIONS FOR PROCEDURE: Jackson Pace is a [de-identified] y.o. male with urinary retention and large postvoid residual secondary to benign prostatic hyperplasia. He has a large prostate gland and failed medical therapy. He has an indwelling Bocanegra catheter. He presents for transurethral resection of the prostate with suprapubic tube insertion. He understands the risks, benefits and alternatives of the procedure including bleeding, MI, PE, DVT, pneumonia, bladder neck contracture, future irritative voiding symptoms, and incontinence. He signed an informed consent, and agreed to proceed. DESCRIPTION OF PROCEDURE: The patient was brought into the operating room and placed under anesthesia in the dorsal lithotomy position. He was prepped and draped in a sterile fashion. A 21-American cystoscope with a 30-degree lens was passed through the urethra and into the bladder.

## 2020-05-18 NOTE — PROGRESS NOTES
Pt refused to take catheter leg strap home states im not going to use it reminded pt that tape on leg securing catheter is to be removed tomorrow and leg strap applied per dr mata instructions pt refused leg strap

## 2020-05-18 NOTE — ANESTHESIA PRE PROCEDURE
Department of Anesthesiology  Preprocedure Note       Name:  Kaitlyn Snider   Age:  [de-identified] y.o.  :  1940                                          MRN:  [id number]         Date:  2020      Surgeon: Teresa Perry):  Mike Parikh MD    Procedure: Procedure(s):  CYSTOSCOPY PLASMA LOOP TRANSURETHRAL RESECTION PROSTATE  SUPRAPUBIC TUBE PLACEMENT   ++LATEX ALLERGY++    Medications prior to admission:   Prior to Admission medications    Medication Sig Start Date End Date Taking? Authorizing Provider   NONFORMULARY Take 1 tablet by mouth nightly Indications: Moringa    Yes Historical Provider, MD   Garlic 5727 MG TBEC Take 1 capsule by mouth daily   Yes Historical Provider, MD   Multiple Vitamins-Minerals (THERAPEUTIC MULTIVITAMIN-MINERALS) tablet Take 1 tablet by mouth daily   Yes Historical Provider, MD       Current medications:    No current facility-administered medications for this encounter. Allergies: Allergies   Allergen Reactions    Latex Other (See Comments)     Pt unsure of reaction    Aspirin Other (See Comments)     \"It affects my kidneys. \"   Lucinda Bathe [Penicillins] Other (See Comments)     \"I just know my body doesn't like it. \" \"I had a reaction a long time ago, I know it affects my kidneys. \"    Other Rash     \"I get a rash on just my face if I eat Cumin or Chili. 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 ([address] I70.239    Skin ulcer of right foot with fat layer exposed ([address] L97.512    S/P peripheral artery angioplasty Z98.62    Diabetic ulcer of right foot associated with type 2 diabetes mellitus, limited to breakdown of skin 05/14/20 1612   Weight: 135 lb (61.2 kg)   Height: 5' 9\" (1.753 m)                                              BP Readings from Last 3 Encounters:   04/23/20 136/89   03/12/20 138/72   02/27/20 (!) 150/80       NPO Status:                                                                                 BMI:   Wt Readings from Last 3 Encounters:   05/14/20 135 lb (61.2 kg)   04/23/20 135 lb (61.2 kg)   03/12/20 135 lb (61.2 kg)     Body mass index is 19.94 kg/m². CBC:   Lab Results   Component Value Date    WBC 6.2 12/17/2019    RBC 3.94 12/17/2019    HGB 11.8 12/17/2019    HCT 35.6 12/17/2019    MCV 90.4 12/17/2019    RDW 14.4 12/17/2019     12/17/2019       CMP:   Lab Results   Component Value Date     12/17/2019    K 4.2 12/17/2019    K 3.9 04/24/2019     12/17/2019    CO2 24 12/17/2019    BUN 24 12/17/2019    CREATININE 0.9 12/17/2019    GFRAA >60 12/17/2019    LABGLOM >60 12/17/2019    GLUCOSE 177 12/17/2019    PROT 6.8 12/06/2019    CALCIUM 9.1 12/17/2019    BILITOT 0.6 12/06/2019    ALKPHOS 49 12/06/2019    AST 14 12/06/2019    ALT 11 12/06/2019       POC Tests: No results for input(s): POCGLU, POCNA, POCK, POCCL, POCBUN, POCHEMO, POCHCT in the last 72 hours.     Coags:   Lab Results   Component Value Date    PROTIME 13.1 12/17/2019    INR 1.2 12/17/2019    APTT 31.5 12/17/2019       HCG (If Applicable): No results found for: PREGTESTUR, PREGSERUM, HCG, HCGQUANT     ABGs: No results found for: PHART, PO2ART, CNV3JLF, FPX0HED, BEART, K1LRWPKA     Type & Screen (If Applicable):  No results found for: LABABO, LABRH    Drug/Infectious Status (If Applicable):  No results found for: HIV, HEPCAB    COVID-19 Screening (If Applicable):   Lab Results   Component Value Date    COVID19 Not Detected 05/14/2020         Anesthesia Evaluation  Patient summary reviewed no history of anesthetic complications:   Airway: Mallampati: III  TM distance: >3 FB   Neck ROM: full  Mouth opening: > = 3 FB Dental: Pulmonary:Negative Pulmonary ROS breath sounds clear to auscultation      (-) not a current smoker                           Cardiovascular:    (+) hypertension:,       ECG reviewed  Rhythm: regular  Rate: normal                    Neuro/Psych:   Negative Neuro/Psych ROS              GI/Hepatic/Renal:            ROS comment: For TURP. Endo/Other:    (+) DiabetesType II DM, , .                 Abdominal:           Vascular:   + PVD, aortic or cerebral, . ROS comment: S/P peripheral angioplasty. .                             Anesthesia Plan      MAC and general     ASA 3     (Pt agrees to GA--IV induction and ETTube. Covid--not detected.)  Induction: intravenous. Anesthetic plan and risks discussed with patient. Plan discussed with CRNA.     Attending anesthesiologist reviewed and agrees with Pre Eval content          Alfredo Deng MD   5/18/2020

## 2020-05-18 NOTE — ANESTHESIA POSTPROCEDURE EVALUATION
Department of Anesthesiology  Postprocedure Note    Patient: Pedro Pablo Maldonado  MRN: [id number]  YOB: 1940  Date of evaluation: 5/18/2020  Time:  7:57 PM     Procedure Summary     Date:  05/18/20 Room / Location:  [facility] OR 06 / [facility]    Anesthesia Start:  9867 Anesthesia Stop:  1109    Procedures:       CYSTOSCOPY PLASMA LOOP TRANSURETHRAL RESECTION PROSTATE (N/A )      SUPRAPUBIC TUBE PLACEMENT (N/A ) Diagnosis:  (BPH)    Surgeon:  Vicky Montesinos MD Responsible Provider:  Marian Rivera MD    Anesthesia Type:  MAC, general ASA Status:  3          Anesthesia Type: MAC, general    Abelino Phase I: Abelino Score: 10    Abelino Phase II: Abelino Score: 10    Last vitals: Reviewed and per EMR flowsheets.        Anesthesia Post Evaluation    Patient location during evaluation: PACU  Level of consciousness: awake  Airway patency: patent  Nausea & Vomiting: no nausea and no vomiting  Complications: no  Cardiovascular status: hemodynamically stable  Respiratory status: acceptable  Hydration status: euvolemic

## 2020-05-18 NOTE — H&P
Pina Doshi is a [de-identified] y.o. male with urinary retention due to enlarged prostate. He potentially has a nonfunctional bladder as well. 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 Buck Rodriguez- PTA ant tibial    OTHER SURGICAL HISTORY  12/17/2019    Dr Buck Rodriguez - PCI Right popliteal and Anterior tibial    PROSTATE BIOPSY  04/2016    SKIN BIOPSY  sept of 2018 last time    head and ears    TOE AMPUTATION Left 12/31/2016    2nd    TOE AMPUTATION Right 4/26/2019    AMPUTATION RIGHT SECOND TOE, FOOT DEBRIDEMENT performed by Ania Clayton DPM at [place] [address] Right 12/10/2019    AMPUTATION RIGHT 3rd DIGIT WITH PARTIAL RESECTION OF THIRD METATARSAL performed by Ania Clayton DPM at [facility] OR    VASCULAR SURGERY         Family History   Problem Relation Age of Onset    Heart Disease Mother     Heart Disease Father        Prior to Admission medications    Medication Sig Start Date End Date Taking?  Authorizing Provider   NONFORMULARY Take 1 tablet by mouth nightly Indications: Moringa    Yes Historical Provider, MD

## 2020-09-23 ENCOUNTER — ANESTHESIA (OUTPATIENT)
Dept: OPERATING ROOM | Age: 80
DRG: 482 | End: 2020-09-23
Payer: MEDICARE

## 2020-09-23 ENCOUNTER — APPOINTMENT (OUTPATIENT)
Dept: GENERAL RADIOLOGY | Age: 80
DRG: 482 | End: 2020-09-23
Payer: MEDICARE

## 2020-09-23 ENCOUNTER — HOSPITAL ENCOUNTER (INPATIENT)
Age: 80
LOS: 2 days | Discharge: NURSING, REHAB OR LONG-TERM CARE | DRG: 482 | End: 2020-09-25
Attending: EMERGENCY MEDICINE | Admitting: INTERNAL MEDICINE
Payer: MEDICARE

## 2020-09-23 ENCOUNTER — ANESTHESIA EVENT (OUTPATIENT)
Dept: OPERATING ROOM | Age: 80
DRG: 482 | End: 2020-09-23
Payer: MEDICARE

## 2020-09-23 VITALS — DIASTOLIC BLOOD PRESSURE: 68 MMHG | OXYGEN SATURATION: 100 % | SYSTOLIC BLOOD PRESSURE: 151 MMHG

## 2020-09-23 PROBLEM — I10 ESSENTIAL HYPERTENSION: Status: ACTIVE | Noted: 2018-08-27

## 2020-09-23 PROBLEM — S72.145A CLOSED NONDISPLACED INTERTROCHANTERIC FRACTURE OF LEFT FEMUR (HCC): Status: ACTIVE | Noted: 2020-09-23
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
## 2020-09-23 PROBLEM — S72.142A INTERTROCHANTERIC FRACTURE OF LEFT FEMUR, CLOSED, INITIAL ENCOUNTER (HCC): Status: ACTIVE | Noted: 2020-09-23

## 2020-09-23 LAB
ABO/RH: NORMAL
ANION GAP SERPL CALCULATED.3IONS-SCNC: 14 MMOL/L (ref 7–16)
ANTIBODY SCREEN: NORMAL
APTT: 29.5 SEC (ref 24.5–35.1)
BASOPHILS ABSOLUTE: 0.04 E9/L (ref 0–0.2)
BASOPHILS RELATIVE PERCENT: 0.4 % (ref 0–2)
BUN BLDV-MCNC: 20 MG/DL (ref 8–23)
CALCIUM SERPL-MCNC: 9.7 MG/DL (ref 8.6–10.2)
CHLORIDE BLD-SCNC: 97 MMOL/L (ref 98–107)
CO2: 24 MMOL/L (ref 22–29)
CREAT SERPL-MCNC: 0.9 MG/DL (ref 0.7–1.2)
EOSINOPHILS ABSOLUTE: 0.03 E9/L (ref 0.05–0.5)
EOSINOPHILS RELATIVE PERCENT: 0.3 % (ref 0–6)
GFR AFRICAN AMERICAN: >60
GFR NON-AFRICAN AMERICAN: >60 ML/MIN/1.73
GLUCOSE BLD-MCNC: 157 MG/DL (ref 74–99)
HCT VFR BLD CALC: 37.4 % (ref 37–54)
HEMOGLOBIN: 13.1 G/DL (ref 12.5–16.5)
IMMATURE GRANULOCYTES #: 0.04 E9/L
IMMATURE GRANULOCYTES %: 0.4 % (ref 0–5)
INR BLD: 1.1
LYMPHOCYTES ABSOLUTE: 0.91 E9/L (ref 1.5–4)
LYMPHOCYTES RELATIVE PERCENT: 8.1 % (ref 20–42)
MCH RBC QN AUTO: 30.5 PG (ref 26–35)
MCHC RBC AUTO-ENTMCNC: 35 % (ref 32–34.5)
MCV RBC AUTO: 87.2 FL (ref 80–99.9)
MONOCYTES ABSOLUTE: 0.65 E9/L (ref 0.1–0.95)
MONOCYTES RELATIVE PERCENT: 5.8 % (ref 2–12)
NEUTROPHILS ABSOLUTE: 9.51 E9/L (ref 1.8–7.3)
NEUTROPHILS RELATIVE PERCENT: 85 % (ref 43–80)
PDW BLD-RTO: 13.2 FL (ref 11.5–15)
PLATELET # BLD: 164 E9/L (ref 130–450)
PMV BLD AUTO: 10.2 FL (ref 7–12)
POTASSIUM SERPL-SCNC: 4.2 MMOL/L (ref 3.5–5)
PROTHROMBIN TIME: 12 SEC (ref 9.3–12.4)
RBC # BLD: 4.29 E12/L (ref 3.8–5.8)
SODIUM BLD-SCNC: 135 MMOL/L (ref 132–146)
WBC # BLD: 11.2 E9/L (ref 4.5–11.5)

## 2020-09-23 PROCEDURE — 7100000001 HC PACU RECOVERY - ADDTL 15 MIN: Performed by: ORTHOPAEDIC SURGERY

## 2020-09-23 PROCEDURE — C1769 GUIDE WIRE: HCPCS | Performed by: ORTHOPAEDIC SURGERY

## 2020-09-23 PROCEDURE — 36415 COLL VENOUS BLD VENIPUNCTURE: CPT

## 2020-09-23 PROCEDURE — 86850 RBC ANTIBODY SCREEN: CPT

## 2020-09-23 PROCEDURE — 73552 X-RAY EXAM OF FEMUR 2/>: CPT

## 2020-09-23 PROCEDURE — 6360000002 HC RX W HCPCS

## 2020-09-23 PROCEDURE — 80048 BASIC METABOLIC PNL TOTAL CA: CPT

## 2020-09-23 PROCEDURE — 99284 EMERGENCY DEPT VISIT MOD MDM: CPT

## 2020-09-23 PROCEDURE — 3600000005 HC SURGERY LEVEL 5 BASE: Performed by: ORTHOPAEDIC SURGERY

## 2020-09-23 PROCEDURE — 99221 1ST HOSP IP/OBS SF/LOW 40: CPT | Performed by: ORTHOPAEDIC SURGERY

## 2020-09-23 PROCEDURE — 2500000003 HC RX 250 WO HCPCS: Performed by: ORTHOPAEDIC SURGERY

## 2020-09-23 PROCEDURE — 2720000010 HC SURG SUPPLY STERILE: Performed by: ORTHOPAEDIC SURGERY

## 2020-09-23 PROCEDURE — 1200000000 HC SEMI PRIVATE

## 2020-09-23 PROCEDURE — 71045 X-RAY EXAM CHEST 1 VIEW: CPT

## 2020-09-23 PROCEDURE — 2580000003 HC RX 258: Performed by: NURSE PRACTITIONER

## 2020-09-23 PROCEDURE — 85730 THROMBOPLASTIN TIME PARTIAL: CPT

## 2020-09-23 PROCEDURE — 27245 TREAT THIGH FRACTURE: CPT | Performed by: ORTHOPAEDIC SURGERY

## 2020-09-23 PROCEDURE — 73502 X-RAY EXAM HIP UNI 2-3 VIEWS: CPT

## 2020-09-23 PROCEDURE — 6360000002 HC RX W HCPCS: Performed by: ANESTHESIOLOGY

## 2020-09-23 PROCEDURE — 2500000003 HC RX 250 WO HCPCS

## 2020-09-23 PROCEDURE — C1713 ANCHOR/SCREW BN/BN,TIS/BN: HCPCS | Performed by: ORTHOPAEDIC SURGERY

## 2020-09-23 PROCEDURE — 7100000000 HC PACU RECOVERY - FIRST 15 MIN: Performed by: ORTHOPAEDIC SURGERY

## 2020-09-23 PROCEDURE — 2580000003 HC RX 258: Performed by: ORTHOPAEDIC SURGERY

## 2020-09-23 PROCEDURE — 3209999900 FLUORO FOR SURGICAL PROCEDURES

## 2020-09-23 PROCEDURE — 2580000003 HC RX 258: Performed by: STUDENT IN AN ORGANIZED HEALTH CARE EDUCATION/TRAINING PROGRAM

## 2020-09-23 PROCEDURE — 6360000002 HC RX W HCPCS: Performed by: STUDENT IN AN ORGANIZED HEALTH CARE EDUCATION/TRAINING PROGRAM

## 2020-09-23 PROCEDURE — 6370000000 HC RX 637 (ALT 250 FOR IP): Performed by: NURSE PRACTITIONER

## 2020-09-23 PROCEDURE — 99285 EMERGENCY DEPT VISIT HI MDM: CPT

## 2020-09-23 PROCEDURE — 93005 ELECTROCARDIOGRAM TRACING: CPT | Performed by: NURSE PRACTITIONER

## 2020-09-23 PROCEDURE — 3700000000 HC ANESTHESIA ATTENDED CARE: Performed by: ORTHOPAEDIC SURGERY

## 2020-09-23 PROCEDURE — 6360000002 HC RX W HCPCS: Performed by: NURSE PRACTITIONER

## 2020-09-23 PROCEDURE — 0QS704Z REPOSITION LEFT UPPER FEMUR WITH INTERNAL FIXATION DEVICE, OPEN APPROACH: ICD-10-PCS | Performed by: STUDENT IN AN ORGANIZED HEALTH CARE EDUCATION/TRAINING PROGRAM

## 2020-09-23 PROCEDURE — 2580000003 HC RX 258

## 2020-09-23 PROCEDURE — 85025 COMPLETE CBC W/AUTO DIFF WBC: CPT

## 2020-09-23 PROCEDURE — 86901 BLOOD TYPING SEROLOGIC RH(D): CPT

## 2020-09-23 PROCEDURE — 85610 PROTHROMBIN TIME: CPT

## 2020-09-23 PROCEDURE — 86900 BLOOD TYPING SEROLOGIC ABO: CPT

## 2020-09-23 PROCEDURE — 3700000001 HC ADD 15 MINUTES (ANESTHESIA): Performed by: ORTHOPAEDIC SURGERY

## 2020-09-23 PROCEDURE — 6370000000 HC RX 637 (ALT 250 FOR IP): Performed by: STUDENT IN AN ORGANIZED HEALTH CARE EDUCATION/TRAINING PROGRAM

## 2020-09-23 PROCEDURE — 3600000015 HC SURGERY LEVEL 5 ADDTL 15MIN: Performed by: ORTHOPAEDIC SURGERY

## 2020-09-23 PROCEDURE — 2709999900 HC NON-CHARGEABLE SUPPLY: Performed by: ORTHOPAEDIC SURGERY

## 2020-09-23 PROCEDURE — 96374 THER/PROPH/DIAG INJ IV PUSH: CPT

## 2020-09-23 DEVICE — SCREW BNE L42MM DIA4.5MM PROX CORT TIB S STL ST LOK FULL: Type: IMPLANTABLE DEVICE | Site: HIP | Status: FUNCTIONAL

## 2020-09-23 DEVICE — SCREW BNE L38MM DIA4.5MM PROX CORT TIB S STL ST LOK FULL: Type: IMPLANTABLE DEVICE | Site: HIP | Status: FUNCTIONAL

## 2020-09-23 DEVICE — SCREW BNE L36MM CORT S STL ST NONCANNULATED NONLOCKING FULL: Type: IMPLANTABLE DEVICE | Site: HIP | Status: FUNCTIONAL

## 2020-09-23 DEVICE — SCREW BNE L44MM DIA4.5MM PROX CORT TIB S STL ST LOK FULL: Type: IMPLANTABLE DEVICE | Site: HIP | Status: FUNCTIONAL

## 2020-09-23 DEVICE — IMPLANTABLE DEVICE: Type: IMPLANTABLE DEVICE | Site: HIP | Status: FUNCTIONAL

## 2020-09-23 DEVICE — PLATE BNE L62MM BLDE W5.8XL38MM 130DEG 3 H ST BILAT PELV: Type: IMPLANTABLE DEVICE | Site: HIP | Status: FUNCTIONAL

## 2020-09-23 RX ORDER — M-VIT,TX,IRON,MINS/CALC/FOLIC 27MG-0.4MG
1 TABLET ORAL DAILY
Status: DISCONTINUED | OUTPATIENT
Start: 2020-09-23 | End: 2020-09-25 | Stop reason: HOSPADM

## 2020-09-23 RX ORDER — MEPERIDINE HYDROCHLORIDE 25 MG/ML
12.5 INJECTION INTRAMUSCULAR; INTRAVENOUS; SUBCUTANEOUS EVERY 5 MIN PRN
Status: DISCONTINUED | OUTPATIENT
Start: 2020-09-23 | End: 2020-09-23 | Stop reason: HOSPADM

## 2020-09-23 RX ORDER — OXYCODONE HYDROCHLORIDE 10 MG/1
10 TABLET ORAL EVERY 4 HOURS PRN
Status: DISCONTINUED | OUTPATIENT
Start: 2020-09-23 | End: 2020-09-25 | Stop reason: HOSPADM

## 2020-09-23 RX ORDER — FENTANYL CITRATE 50 UG/ML
25 INJECTION, SOLUTION INTRAMUSCULAR; INTRAVENOUS EVERY 5 MIN PRN
Status: DISCONTINUED | OUTPATIENT
Start: 2020-09-23 | End: 2020-09-23 | Stop reason: HOSPADM

## 2020-09-23 RX ORDER — SODIUM CHLORIDE 9 MG/ML
INJECTION, SOLUTION INTRAVENOUS CONTINUOUS PRN
Status: DISCONTINUED | OUTPATIENT
Start: 2020-09-23 | End: 2020-09-23 | Stop reason: SDUPTHER

## 2020-09-23 RX ORDER — ACETAMINOPHEN 325 MG/1
650 TABLET ORAL EVERY 6 HOURS PRN
Status: DISCONTINUED | OUTPATIENT
Start: 2020-09-23 | End: 2020-09-25 | Stop reason: HOSPADM

## 2020-09-23 RX ORDER — MORPHINE SULFATE 2 MG/ML
1 INJECTION, SOLUTION INTRAMUSCULAR; INTRAVENOUS EVERY 4 HOURS PRN
Status: DISCONTINUED | OUTPATIENT
Start: 2020-09-23 | End: 2020-09-25 | Stop reason: HOSPADM

## 2020-09-23 RX ORDER — DEXAMETHASONE SODIUM PHOSPHATE 10 MG/ML
INJECTION, SOLUTION INTRAMUSCULAR; INTRAVENOUS PRN
Status: DISCONTINUED | OUTPATIENT
Start: 2020-09-23 | End: 2020-09-23 | Stop reason: SDUPTHER

## 2020-09-23 RX ORDER — SODIUM CHLORIDE 0.9 % (FLUSH) 0.9 %
10 SYRINGE (ML) INJECTION PRN
Status: DISCONTINUED | OUTPATIENT
Start: 2020-09-23 | End: 2020-09-25 | Stop reason: HOSPADM

## 2020-09-23 RX ORDER — ACETAMINOPHEN 650 MG/1
650 SUPPOSITORY RECTAL EVERY 6 HOURS PRN
Status: DISCONTINUED | OUTPATIENT
Start: 2020-09-23 | End: 2020-09-25 | Stop reason: HOSPADM

## 2020-09-23 RX ORDER — POLYETHYLENE GLYCOL 3350 17 G/17G
17 POWDER, FOR SOLUTION ORAL DAILY PRN
Status: DISCONTINUED | OUTPATIENT
Start: 2020-09-23 | End: 2020-09-25 | Stop reason: HOSPADM

## 2020-09-23 RX ORDER — PROMETHAZINE HYDROCHLORIDE 25 MG/1
12.5 TABLET ORAL EVERY 6 HOURS PRN
Status: DISCONTINUED | OUTPATIENT
Start: 2020-09-23 | End: 2020-09-23

## 2020-09-23 RX ORDER — ROCURONIUM BROMIDE 10 MG/ML
INJECTION, SOLUTION INTRAVENOUS PRN
Status: DISCONTINUED | OUTPATIENT
Start: 2020-09-23 | End: 2020-09-23 | Stop reason: SDUPTHER

## 2020-09-23 RX ORDER — HYDRALAZINE HYDROCHLORIDE 20 MG/ML
10 INJECTION INTRAMUSCULAR; INTRAVENOUS EVERY 6 HOURS PRN
Status: DISCONTINUED | OUTPATIENT
Start: 2020-09-23 | End: 2020-09-25 | Stop reason: HOSPADM

## 2020-09-23 RX ORDER — PROPOFOL 10 MG/ML
INJECTION, EMULSION INTRAVENOUS PRN
Status: DISCONTINUED | OUTPATIENT
Start: 2020-09-23 | End: 2020-09-23 | Stop reason: SDUPTHER

## 2020-09-23 RX ORDER — ONDANSETRON 2 MG/ML
4 INJECTION INTRAMUSCULAR; INTRAVENOUS EVERY 6 HOURS PRN
Status: DISCONTINUED | OUTPATIENT
Start: 2020-09-23 | End: 2020-09-23

## 2020-09-23 RX ORDER — SENNA AND DOCUSATE SODIUM 50; 8.6 MG/1; MG/1
1 TABLET, FILM COATED ORAL 2 TIMES DAILY
Status: DISCONTINUED | OUTPATIENT
Start: 2020-09-23 | End: 2020-09-25 | Stop reason: HOSPADM

## 2020-09-23 RX ORDER — DIPHENHYDRAMINE HYDROCHLORIDE 50 MG/ML
12.5 INJECTION INTRAMUSCULAR; INTRAVENOUS
Status: DISCONTINUED | OUTPATIENT
Start: 2020-09-23 | End: 2020-09-23 | Stop reason: HOSPADM

## 2020-09-23 RX ORDER — OXYCODONE HYDROCHLORIDE AND ACETAMINOPHEN 5; 325 MG/1; MG/1
1 TABLET ORAL
Status: DISCONTINUED | OUTPATIENT
Start: 2020-09-23 | End: 2020-09-23 | Stop reason: HOSPADM

## 2020-09-23 RX ORDER — OXYCODONE HYDROCHLORIDE 5 MG/1
5 TABLET ORAL EVERY 4 HOURS PRN
Status: DISCONTINUED | OUTPATIENT
Start: 2020-09-23 | End: 2020-09-25 | Stop reason: HOSPADM

## 2020-09-23 RX ORDER — LIDOCAINE HYDROCHLORIDE 20 MG/ML
INJECTION, SOLUTION INTRAVENOUS PRN
Status: DISCONTINUED | OUTPATIENT
Start: 2020-09-23 | End: 2020-09-23 | Stop reason: SDUPTHER

## 2020-09-23 RX ORDER — CEFAZOLIN SODIUM 1 G/3ML
INJECTION, POWDER, FOR SOLUTION INTRAMUSCULAR; INTRAVENOUS PRN
Status: DISCONTINUED | OUTPATIENT
Start: 2020-09-23 | End: 2020-09-23 | Stop reason: SDUPTHER

## 2020-09-23 RX ORDER — SODIUM CHLORIDE 9 MG/ML
INJECTION, SOLUTION INTRAVENOUS CONTINUOUS
Status: ACTIVE | OUTPATIENT
Start: 2020-09-23 | End: 2020-09-24

## 2020-09-23 RX ORDER — MIDAZOLAM HYDROCHLORIDE 1 MG/ML
INJECTION INTRAMUSCULAR; INTRAVENOUS PRN
Status: DISCONTINUED | OUTPATIENT
Start: 2020-09-23 | End: 2020-09-23 | Stop reason: SDUPTHER

## 2020-09-23 RX ORDER — SODIUM CHLORIDE 0.9 % (FLUSH) 0.9 %
10 SYRINGE (ML) INJECTION EVERY 12 HOURS SCHEDULED
Status: DISCONTINUED | OUTPATIENT
Start: 2020-09-23 | End: 2020-09-25 | Stop reason: HOSPADM

## 2020-09-23 RX ORDER — FENTANYL CITRATE 50 UG/ML
50 INJECTION, SOLUTION INTRAMUSCULAR; INTRAVENOUS ONCE
Status: COMPLETED | OUTPATIENT
Start: 2020-09-23 | End: 2020-09-23

## 2020-09-23 RX ORDER — DEXTROSE AND SODIUM CHLORIDE 5; .45 G/100ML; G/100ML
INJECTION, SOLUTION INTRAVENOUS CONTINUOUS
Status: DISCONTINUED | OUTPATIENT
Start: 2020-09-23 | End: 2020-09-24

## 2020-09-23 RX ORDER — PROMETHAZINE HYDROCHLORIDE 25 MG/ML
6.25 INJECTION, SOLUTION INTRAMUSCULAR; INTRAVENOUS
Status: DISCONTINUED | OUTPATIENT
Start: 2020-09-23 | End: 2020-09-23 | Stop reason: HOSPADM

## 2020-09-23 RX ORDER — FENTANYL CITRATE 50 UG/ML
50 INJECTION, SOLUTION INTRAMUSCULAR; INTRAVENOUS EVERY 5 MIN PRN
Status: DISCONTINUED | OUTPATIENT
Start: 2020-09-23 | End: 2020-09-23 | Stop reason: HOSPADM

## 2020-09-23 RX ORDER — FENTANYL CITRATE 50 UG/ML
INJECTION, SOLUTION INTRAMUSCULAR; INTRAVENOUS PRN
Status: DISCONTINUED | OUTPATIENT
Start: 2020-09-23 | End: 2020-09-23 | Stop reason: SDUPTHER

## 2020-09-23 RX ORDER — MORPHINE SULFATE 2 MG/ML
2 INJECTION, SOLUTION INTRAMUSCULAR; INTRAVENOUS EVERY 4 HOURS PRN
Status: DISCONTINUED | OUTPATIENT
Start: 2020-09-23 | End: 2020-09-25 | Stop reason: HOSPADM

## 2020-09-23 RX ADMIN — ACETAMINOPHEN 650 MG: 325 TABLET, FILM COATED ORAL at 20:39

## 2020-09-23 RX ADMIN — DOCUSATE SODIUM 50 MG AND SENNOSIDES 8.6 MG 1 TABLET: 8.6; 5 TABLET, FILM COATED ORAL at 20:39

## 2020-09-23 RX ADMIN — MORPHINE SULFATE 1 MG: 2 INJECTION, SOLUTION INTRAMUSCULAR; INTRAVENOUS at 07:35

## 2020-09-23 RX ADMIN — DEXAMETHASONE SODIUM PHOSPHATE 10 MG: 10 INJECTION, SOLUTION INTRAMUSCULAR; INTRAVENOUS at 16:37

## 2020-09-23 RX ADMIN — LIDOCAINE HYDROCHLORIDE 60 MG: 20 INJECTION, SOLUTION INTRAVENOUS at 16:37

## 2020-09-23 RX ADMIN — MIDAZOLAM 1 MG: 1 INJECTION INTRAMUSCULAR; INTRAVENOUS at 16:30

## 2020-09-23 RX ADMIN — FENTANYL CITRATE 50 MCG: 50 INJECTION, SOLUTION INTRAMUSCULAR; INTRAVENOUS at 17:06

## 2020-09-23 RX ADMIN — SODIUM CHLORIDE: 9 INJECTION, SOLUTION INTRAVENOUS at 16:30

## 2020-09-23 RX ADMIN — HYDROMORPHONE HYDROCHLORIDE 0.5 MG: 1 INJECTION, SOLUTION INTRAMUSCULAR; INTRAVENOUS; SUBCUTANEOUS at 18:37

## 2020-09-23 RX ADMIN — DEXTROSE AND SODIUM CHLORIDE: 5; 450 INJECTION, SOLUTION INTRAVENOUS at 10:08

## 2020-09-23 RX ADMIN — FENTANYL CITRATE 50 MCG: 0.05 INJECTION, SOLUTION INTRAMUSCULAR; INTRAVENOUS at 05:26

## 2020-09-23 RX ADMIN — TRANEXAMIC ACID 1000 MG: 1 INJECTION, SOLUTION INTRAVENOUS at 16:43

## 2020-09-23 RX ADMIN — CEFAZOLIN 2000 MG: 1 INJECTION, POWDER, FOR SOLUTION INTRAMUSCULAR; INTRAVENOUS at 16:48

## 2020-09-23 RX ADMIN — HYDROMORPHONE HYDROCHLORIDE 0.5 MG: 1 INJECTION, SOLUTION INTRAMUSCULAR; INTRAVENOUS; SUBCUTANEOUS at 18:42

## 2020-09-23 RX ADMIN — HYDROMORPHONE HYDROCHLORIDE 0.5 MG: 1 INJECTION, SOLUTION INTRAMUSCULAR; INTRAVENOUS; SUBCUTANEOUS at 18:57

## 2020-09-23 RX ADMIN — ROCURONIUM BROMIDE 30 MG: 10 INJECTION, SOLUTION INTRAVENOUS at 16:37

## 2020-09-23 RX ADMIN — PROPOFOL 90 MG: 10 INJECTION, EMULSION INTRAVENOUS at 16:37

## 2020-09-23 RX ADMIN — Medication 10 ML: at 10:08

## 2020-09-23 RX ADMIN — HYDROMORPHONE HYDROCHLORIDE 0.5 MG: 1 INJECTION, SOLUTION INTRAMUSCULAR; INTRAVENOUS; SUBCUTANEOUS at 18:47

## 2020-09-23 RX ADMIN — SODIUM CHLORIDE: 9 INJECTION, SOLUTION INTRAVENOUS at 20:38

## 2020-09-23 RX ADMIN — FENTANYL CITRATE 100 MCG: 50 INJECTION, SOLUTION INTRAMUSCULAR; INTRAVENOUS at 16:37

## 2020-09-23 ASSESSMENT — PAIN DESCRIPTION - LOCATION
LOCATION: HIP
LOCATION: HIP
LOCATION: LEG
LOCATION: HIP

## 2020-09-23 ASSESSMENT — PULMONARY FUNCTION TESTS
PIF_VALUE: 20
PIF_VALUE: 0
PIF_VALUE: 20
PIF_VALUE: 2
PIF_VALUE: 31
PIF_VALUE: 19
PIF_VALUE: 20
PIF_VALUE: 20
PIF_VALUE: 2
PIF_VALUE: 20
PIF_VALUE: 19
PIF_VALUE: 0
PIF_VALUE: 3
PIF_VALUE: 20
PIF_VALUE: 20
PIF_VALUE: 19
PIF_VALUE: 18
PIF_VALUE: 20
PIF_VALUE: 17
PIF_VALUE: 19
PIF_VALUE: 20
PIF_VALUE: 3
PIF_VALUE: 20
PIF_VALUE: 2
PIF_VALUE: 20
PIF_VALUE: 20
PIF_VALUE: 2
PIF_VALUE: 30
PIF_VALUE: 2
PIF_VALUE: 20
PIF_VALUE: 19
PIF_VALUE: 1
PIF_VALUE: 18
PIF_VALUE: 1
PIF_VALUE: 20
PIF_VALUE: 19
PIF_VALUE: 2
PIF_VALUE: 20
PIF_VALUE: 20
PIF_VALUE: 19
PIF_VALUE: 20
PIF_VALUE: 2
PIF_VALUE: 19
PIF_VALUE: 17
PIF_VALUE: 2
PIF_VALUE: 19
PIF_VALUE: 1
PIF_VALUE: 20
PIF_VALUE: 19
PIF_VALUE: 20
PIF_VALUE: 16
PIF_VALUE: 20
PIF_VALUE: 19
PIF_VALUE: 18
PIF_VALUE: 19
PIF_VALUE: 2
PIF_VALUE: 21
PIF_VALUE: 20
PIF_VALUE: 18
PIF_VALUE: 20
PIF_VALUE: 4
PIF_VALUE: 18
PIF_VALUE: 16
PIF_VALUE: 19
PIF_VALUE: 18
PIF_VALUE: 18
PIF_VALUE: 2
PIF_VALUE: 2
PIF_VALUE: 17
PIF_VALUE: 20
PIF_VALUE: 19
PIF_VALUE: 20
PIF_VALUE: 20
PIF_VALUE: 19
PIF_VALUE: 20
PIF_VALUE: 20
PIF_VALUE: 19
PIF_VALUE: 2

## 2020-09-23 ASSESSMENT — ENCOUNTER SYMPTOMS
ABDOMINAL PAIN: 0
COLOR CHANGE: 0
COUGH: 0
NAUSEA: 0
CHEST TIGHTNESS: 0
CONSTIPATION: 0
BACK PAIN: 0
DIARRHEA: 0
VOMITING: 0

## 2020-09-23 ASSESSMENT — PAIN DESCRIPTION - ONSET
ONSET: ON-GOING
ONSET: ON-GOING

## 2020-09-23 ASSESSMENT — PAIN SCALES - GENERAL
PAINLEVEL_OUTOF10: 1
PAINLEVEL_OUTOF10: 8
PAINLEVEL_OUTOF10: 10
PAINLEVEL_OUTOF10: 0
PAINLEVEL_OUTOF10: 4
PAINLEVEL_OUTOF10: 0
PAINLEVEL_OUTOF10: 9
PAINLEVEL_OUTOF10: 1
PAINLEVEL_OUTOF10: 4
PAINLEVEL_OUTOF10: 0
PAINLEVEL_OUTOF10: 0
PAINLEVEL_OUTOF10: 4
PAINLEVEL_OUTOF10: 10
PAINLEVEL_OUTOF10: 2
PAINLEVEL_OUTOF10: 8
PAINLEVEL_OUTOF10: 3

## 2020-09-23 ASSESSMENT — PAIN DESCRIPTION - DESCRIPTORS
DESCRIPTORS: ACHING;DISCOMFORT
DESCRIPTORS: ACHING;CONSTANT;DISCOMFORT
DESCRIPTORS: ACHING
DESCRIPTORS: ACHING;CONSTANT
DESCRIPTORS: ACHING;CONSTANT;DISCOMFORT

## 2020-09-23 ASSESSMENT — PAIN DESCRIPTION - ORIENTATION
ORIENTATION: LEFT

## 2020-09-23 ASSESSMENT — PAIN DESCRIPTION - FREQUENCY
FREQUENCY: CONTINUOUS

## 2020-09-23 ASSESSMENT — PAIN DESCRIPTION - PAIN TYPE
TYPE: SURGICAL PAIN
TYPE: ACUTE PAIN
TYPE: SURGICAL PAIN

## 2020-09-23 ASSESSMENT — LIFESTYLE VARIABLES: SMOKING_STATUS: 0

## 2020-09-23 NOTE — ED NOTES
Bed: 08  Expected date:   Expected time:   Means of arrival:   Comments:  ems     Gennett Cheadle, RN  09/23/20 6769

## 2020-09-23 NOTE — ED PROVIDER NOTES
Breath sounds: Normal breath sounds. Abdominal:      General: Abdomen is flat. Palpations: Abdomen is soft. Tenderness: There is no abdominal tenderness. There is no guarding or rebound. Musculoskeletal:      Left hip: He exhibits decreased range of motion, tenderness and bony tenderness. He exhibits normal strength, no swelling, no crepitus, no deformity and no laceration. Legs:    Skin:     General: Skin is warm and dry. Neurological:      General: No focal deficit present. Mental Status: He is alert and oriented to person, place, and time. Psychiatric:         Mood and Affect: Mood normal.         Behavior: Behavior normal.         Thought Content: Thought content normal.         Judgment: Judgment normal.          Procedures     MDM  Number of Diagnoses or Management Options  Closed nondisplaced intertrochanteric fracture of left femur, initial encounter [facility]):   Fall, initial encounter:   Diagnosis management comments: Patient presents the ED today with chief complaint of left hip pain after fall. X-ray demonstrates left-sided intratrochanteric fracture. Patient given fentanyl for analgesia in the ED and Ortho surgery consulted. Spoke to [facility] who will admit patient. Patient agreeable to admission at this time. All questions been answered.                     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 Past Surgical History:  has a past surgical history that includes Prostate biopsy (04/2016); Toe amputation (Left, 12/31/2016); Foot Debridement (Left, 01/04/2017); other surgical history (04/23/2019); Toe amputation (Right, 4/26/2019); eye surgery (1990's); Colonoscopy; vascular surgery; skin biopsy (sept of 2018 last time); Toe amputation (Right, 12/10/2019); other surgical history (12/17/2019); TURP (N/A, 5/18/2020); and Cystoscopy (N/A, 5/18/2020). Social History:  reports that he quit smoking about 60 years ago. His smoking use included cigarettes. He has a 1.00 pack-year smoking history. He has never used smokeless tobacco. He reports that he does not drink alcohol or use drugs. Family History: family history includes Heart Disease in his father and mother. The patients home medications have been reviewed. Allergies: Latex; Aspirin; Ciprofloxacin in d5w; Pcn [penicillins]; Other; and Peanut-containing drug products    -------------------------------------------------- RESULTS -------------------------------------------------    LABS:  No results found for this visit on 09/23/20. RADIOLOGY:  XR FEMUR LEFT (MIN 2 VIEWS)   Final Result   Intertrochanteric fracture at the left femur seen in the shoot through   lateral projection. XR HIP LEFT (2-3 VIEWS)   Final Result   Intertrochanteric fracture at the left femur seen in the shoot through   lateral projection.            ------------------------- NURSING NOTES AND VITALS REVIEWED ---------------------------  Date / Time Roomed:  9/23/2020  4:21 AM  ED Bed Assignment:  08/08    The nursing notes within the ED encounter and vital signs as below have been reviewed.      Patient Vitals for the past 24 hrs:   BP Temp Pulse Resp SpO2 Height Weight   09/23/20 0421 (!) 192/97 97.5 °F (36.4 °C) 85 14 95 % 5' 10\" (1.778 m) 140 lb (63.5 kg)       Oxygen Saturation Interpretation: Normal    ------------------------------------------ PROGRESS NOTES ------------------------------------------  Re-evaluation(s):  Time: 1  Patients symptoms are improving  Repeat physical examination is improved    Counseling:  I have spoken with the patient and discussed todays results, in addition to providing specific details for the plan of care and counseling regarding the diagnosis and prognosis. Their questions are answered at this time and they are agreeable with the plan of admission.    --------------------------------- ADDITIONAL PROVIDER NOTES ---------------------------------  Consultations:  Time: 4823. Spoke with Dr. Rivas. Discussed case. They will admit the patient. This patient's ED course included: a personal history and physicial examination    This patient has remained hemodynamically stable during their ED course. Diagnosis:  1. Closed nondisplaced intertrochanteric fracture of left femur, initial encounter ([facility] [address]    2. Fall, initial encounter        Disposition:  Patient's disposition: Admit to med/surg floor  Patient's condition is stable. Carlitos Disla DO  Resident  09/23/20 6427  ATTENDING PROVIDER ATTESTATION:     I have personally performed and/or participated in the history, exam, medical decision making, and procedures and agree with all pertinent clinical information. I have also reviewed and agree with the past medical, family and social history unless otherwise noted. I have discussed this patient in detail with the resident, and provided the instruction and education regarding fall hip pain. My findings/Plan: please note I was the primary provider for patient. Patient presenting because of hip pain. Patient reports he slipped and fell he was trying to arrange boxes and slipped and fell. He reports no head injury reports no neck pain or back pain. Patient reports he was unable to walk after the fall he reports no abdominal pain or vomiting.   Patient is awake alert oriented heart lung exam normal abdomen is soft nontender he has no neck or back tenderness on exam.  He has noted shortening of his left leg with tenderness to left lateral upper thigh. Pulses are intact distally is able to get a dorsalis pedis pulse but unable to get posterior due to the fact that the patient could not turn his leg due to the pain from his injury. Patient has also noted ulceration to his right heel. There is no signs of cellulitis or infectious process. X-rays ordered and reviewed radiology read x-rays. It does show a fracture. Patient was made aware of findings. We did speak to on-call internal medicine as well as orthopedics patient will be admitted. We did do baseline labs for patient. Those are still pending.        Narendra Huggins MD  09/23/20 [id number] [facility] Luz Gallegos MD  09/23/20 3733

## 2020-09-23 NOTE — CONSULTS
Department of Orthopedic Surgery  Resident Consult Note          Reason for Consult:  Left hip pain    HISTORY OF PRESENT ILLNESS:       Patient is a 79 y.o. male who presents with left hip pain after a fall at home earlier this morning around 1AM. Patient states he was moving stuff around his house when he lost his balance and fell. He was unable to arise from the ground due to pain. He was brought in and radiographs were performed. Orthopedics was consulted for further evaluation and management. Currently, patient admits to significant pain in his lateral left thigh. He denies numbness/tingling/paresthesias. Denies any other orthopedic complaints at this time. Prior to the fall, patient was a community ambulator without assistive device. He denies any anticoagulation use. His last meal/drink was around 10PM last night.     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 ([address] 4/25/2019    Blood circulation, collateral     Cellulitis of foot, left 1/1/2017    Chronic venous insufficiency 12/6/2019    Femoral-popliteal atherosclerosis ([address] 1/1/2017    Heel ulcer, right, with fat layer exposed ([address] 5/6/2019    Osteomyelitis of third toe of right foot ([address] 12/6/2019    S/P peripheral artery angioplasty 01/23/2020    bilateral legs -Kollipara    Skin ulcer of right foot with fat layer exposed ([address] 12/9/2019    Ulcer of right leg, with fat layer exposed ([address] 5/6/2019    Varicose veins of leg with edema, 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     Past Surgical History:        Procedure Laterality Date    COLONOSCOPY      CYSTOSCOPY N/A 5/18/2020    SUPRAPUBIC TUBE PLACEMENT performed by Evelia Martinez MD at [address] [place] [address]  [address]    lense implants bilaterally    FOOT DEBRIDEMENT Left 01/04/2017    wound debridement and delayed primary closure    OTHER SURGICAL HISTORY  04/23/2019    Dr. Kay Jagdeep- PTA ant tibial    OTHER SURGICAL HISTORY  12/17/2019    Dr Manley Rear - PCI Right popliteal and Anterior tibial    PROSTATE BIOPSY  04/2016    SKIN BIOPSY  sept of 2018 last time    head and ears    TOE AMPUTATION Left 12/31/2016    2nd    TOE AMPUTATION Right 4/26/2019    AMPUTATION RIGHT SECOND TOE, FOOT DEBRIDEMENT performed by Corinne Montane, DPM at [facility] 22 TOE AMPUTATION Right 12/10/2019    AMPUTATION RIGHT 3rd DIGIT WITH PARTIAL RESECTION OF THIRD METATARSAL performed by Corinne Montane, DPM at [facility] 22 TURP N/A 5/18/2020    CYSTOSCOPY PLASMA LOOP TRANSURETHRAL RESECTION PROSTATE performed by Ezio Guzman MD at [address] [place] [address]       Current Medications:   Current Facility-Administered Medications: sodium chloride flush 0.9 % injection 10 mL, 10 mL, Intravenous, 2 times per day  sodium chloride flush 0.9 % injection 10 mL, 10 mL, Intravenous, PRN  acetaminophen (TYLENOL) tablet 650 mg, 650 mg, Oral, Q6H PRN **OR** acetaminophen (TYLENOL) suppository 650 mg, 650 mg, Rectal, Q6H PRN  polyethylene glycol (GLYCOLAX) packet 17 g, 17 g, Oral, Daily PRN  promethazine (PHENERGAN) tablet 12.5 mg, 12.5 mg, Oral, Q6H PRN **OR** ondansetron (ZOFRAN) injection 4 mg, 4 mg, Intravenous, Q6H PRN  morphine (PF) injection 1 mg, 1 mg, Intravenous, Q4H PRN  morphine (PF) injection 2 mg, 2 mg, Intravenous, Q4H PRN  therapeutic multivitamin-minerals 1 tablet, 1 tablet, Oral, Daily  hydrALAZINE (APRESOLINE) injection 10 mg, 10 mg, Intravenous, Q6H PRN  Allergies:  Latex; Aspirin; Ciprofloxacin in d5w; Pcn [penicillins]; Other; and Peanut-containing drug products    Social History:   TOBACCO:   reports that he quit smoking about 60 years ago. His smoking use included cigarettes. He has a 1.00 pack-year smoking history. He has never used smokeless tobacco.  ETOH:   reports no history of alcohol use. DRUGS:   reports no history of drug use.   ACTIVITIES OF DAILY LIVING:    OCCUPATION:    Family History:       Problem Relation Age of Onset  Heart Disease Mother     Heart Disease Father        REVIEW OF SYSTEMS:  CONSTITUTIONAL:  negative for  fevers, chills  EYES:  negative for blurred vision, visual disturbance  HEENT:  negative for  hearing loss, voice change  RESPIRATORY:  negative for  dyspnea, wheezing  CARDIOVASCULAR:  negative for  chest pain, palpitations  GASTROINTESTINAL:  negative for nausea, vomiting  GENITOURINARY:  negative for frequency, urinary incontinence  HEMATOLOGIC/LYMPHATIC:  negative for bleeding and petechiae  MUSCULOSKELETAL:  positive for left hip pain  NEUROLOGICAL:  negative for headaches, dizziness  BEHAVIOR/PSYCH:  negative for increased agitation and anxiety    PHYSICAL EXAM:    VITALS:  BP (!) 192/97   Pulse 85   Temp 97.5 °F (36.4 °C)   Resp 14   Ht 5' 10\" (1.778 m)   Wt 140 lb (63.5 kg)   SpO2 95%   BMI 20.09 kg/m²   CONSTITUTIONAL:  awake, alert, cooperative, no apparent distress, and appears stated age  MUSCULOSKELETAL:  Left lower Extremity:  Leg is slightly shortened  Tenderness about the lateral left thigh  Postive log roll  Patient unable to tolerate flexion at the hip  Nontender about the knee or ankle distally. Compartments soft and compressible  +PF/DF/EHL  +2/4 DP & PT pulses, Brisk Cap refill, Toes warm and perfused  Distal sensation grossly intact to Peroneals, Sural, Saphenous, and tibial nrs      Secondary Exam:   · Bilateral UE: No obvious signs of trauma. -TTP to fingers, hand, wrist, forearm, elbow, humerus, shoulder or clavicle. -- Patient able to flex/extend fingers, wrist, elbow and shoulder with active and passive ROM without pain, +2/4 Radial pulse, cap refill <3sec, +AIN/PIN/Radial/Ulnar/Median N, distal sensation grossly intact to C4-T1 dermatomes, compartments soft and compressible. · Right LE: No obvious signs of trauma.    -TTP to foot, ankle, leg, knee, thigh, hip.-- Patient able to flex/extend toes, ankle, knee and hip with active and passive ROM without pain,+2/4 DP & PT pulses, cap refill <3sec, +5/5 PF/DF/EHL, distal sensation grossly intact to L4-S1 dermatomes, compartments soft and compressible.        DATA:    CBC:   Lab Results   Component Value Date    WBC 11.2 09/23/2020    RBC 4.29 09/23/2020    HGB 13.1 09/23/2020    HCT 37.4 09/23/2020    MCV 87.2 09/23/2020    MCH 30.5 09/23/2020    MCHC 35.0 09/23/2020    RDW 13.2 09/23/2020     09/23/2020    MPV 10.2 09/23/2020     PT/INR:    Lab Results   Component Value Date    PROTIME 13.1 12/17/2019    INR 1.2 12/17/2019       Radiology Review:  XR left hip/femur: Nondisplaced intertrochanteric femur fracture    IMPRESSION:  · Left intertrochanteric femur fracture    PLAN:  · Nonweightbearing left lower extremity  · NPO now  · Treatment consent  · Hold anticoagulation  · Preoperative labs/imaging  · Pain control  · Ice as needed  · Possible plan for orthopedic surgical intervention in the near future  · Discuss with Dr. Lonnie Mazariegos

## 2020-09-23 NOTE — ED NOTES
Dr Anand Pressley at bedside updating patient on new fracture and possible surgical options.             Shana Morgan RN  09/23/20 [id number]

## 2020-09-23 NOTE — PROGRESS NOTES
Date: 2020       Patient Name: Aparna Worley  : 1940      MRN: [id number]    PT order received and chart reviewed. PT evaluation held pending OR this date.    Will follow up as appropriate    Suzan Coleman PT, DPT  [id number]

## 2020-09-23 NOTE — ANESTHESIA PRE PROCEDURE
Department of Anesthesiology  Preprocedure Note       Name:  Kristine Torres   Age:  79 y.o.  :  1940                                          MRN:  [id number]         Date:  2020      Surgeon: Michelle De Dios):  Liban Watson MD    Procedure: Procedure(s):  Hip Open Reduction Internal Fixation (Left)    Medications prior to admission:   Prior to Admission medications    Medication Sig Start Date End Date Taking?  Authorizing Provider   Garlic 3365 MG TBEC Take 1 capsule by mouth daily   Yes Historical Provider, MD   Multiple Vitamins-Minerals (THERAPEUTIC MULTIVITAMIN-MINERALS) tablet Take 1 tablet by mouth daily   Yes Historical Provider, MD   NONFORMULARY Take 1 tablet by mouth nightly Indications: Moringa     Historical Provider, MD       Current medications:    Current Facility-Administered Medications   Medication Dose Route Frequency Provider Last Rate Last Dose    sodium chloride flush 0.9 % injection 10 mL  10 mL Intravenous 2 times per day April Delfina APRN - CNP   10 mL at 20 1008    sodium chloride flush 0.9 % injection 10 mL  10 mL Intravenous PRN April Delfina, APRN - CNP        acetaminophen (TYLENOL) tablet 650 mg  650 mg Oral Q6H PRN April Salimaus, APRN - CNP        Or    acetaminophen (TYLENOL) suppository 650 mg  650 mg Rectal Q6H PRN April Delfina, APRN - CNP        polyethylene glycol (GLYCOLAX) packet 17 g  17 g Oral Daily PRN April Salimaus, APRN - CNP        promethazine (PHENERGAN) tablet 12.5 mg  12.5 mg Oral Q6H PRN April Delfina, APRN - CNP        Or    ondansetron [facility] injection 4 mg  4 mg Intravenous Q6H PRN April Salimaus, APRN - CNP        morphine (PF) injection 1 mg  1 mg Intravenous Q4H PRN April Delfina, APRN - CNP   1 mg at 20 0735    morphine (PF) injection 2 mg  2 mg Intravenous Q4H PRN April Salimaus, APRN - CNP        therapeutic multivitamin-minerals 1 tablet 1 tablet Oral Daily April JUNE Mathis - ALFRED        hydrALAZINE (APRESOLINE) injection 10 mg  10 mg Intravenous Q6H PRN April JUNE Mathis CNP        tranexamic acid (CYKLOKAPRON) 1,000 mg in dextrose 5 % 100 mL IVPB  1,000 mg Intravenous Once Zoë Mota,         dextrose 5 % and 0.45 % sodium chloride infusion   Intravenous Continuous Alma De La Torre  mL/hr at 09/23/20 1008         Allergies: Allergies   Allergen Reactions    Latex Other (See Comments)     Pt unsure of reaction    Aspirin Other (See Comments)     \"It affects my kidneys. \"    Ciprofloxacin In D5w Itching    Pcn [Penicillins] Other (See Comments)     \"I just know my body doesn't like it. \" \"I had a reaction a long time ago, I know it affects my kidneys. \"    Other Rash     \"I get a rash on just my face if I eat Cumin or Chili. 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 ([address] 4/25/2019    Blood circulation, collateral     Cellulitis of foot, left 1/1/2017    Chronic venous insufficiency 12/6/2019    Femoral-popliteal atherosclerosis ([address] 1/1/2017    Heel ulcer, right, with fat layer exposed ([address] 5/6/2019    Osteomyelitis of third toe of right foot ([facility] 2019    S/P peripheral artery angioplasty 2020    bilateral legs -[place]    [facility] of [facility]    Ulcer of right leg, with fat layer exposed [facility] 2019    Varicose veins of leg with edema, right 2019       Past Surgical History:        Procedure Laterality Date    COLONOSCOPY      CYSTOSCOPY N/A 2020    SUPRAPUBIC TUBE PLACEMENT performed by Adam Lang MD at [address] [facility]  [address]    lense implants bilaterally    FOOT DEBRIDEMENT Left 2017    wound debridement and delayed primary closure    OTHER SURGICAL HISTORY  2019    Dr. Jeannette Robles- PTA ant tibial    OTHER SURGICAL HISTORY  2019    Dr Jeannette Robles - PCI Right popliteal and Anterior tibial    PROSTATE BIOPSY  2016    SKIN BIOPSY   last time    head and ears    TOE AMPUTATION Left 2016    2nd    TOE AMPUTATION Right 2019    AMPUTATION RIGHT SECOND TOE, FOOT DEBRIDEMENT performed by Collette Basta, DPM at [place] [address] Right 12/10/2019    AMPUTATION RIGHT 3rd DIGIT WITH PARTIAL RESECTION OF THIRD METATARSAL performed by Collette Basta, DPM at [facility] N/A 2020    CYSTOSCOPY PLASMA LOOP TRANSURETHRAL RESECTION PROSTATE performed by Adam Lang MD at [address] [place] [address]         Social History:    Social History     Tobacco Use    Smoking status: Former Smoker     Packs/day: 0.50     Years: 2.00     Pack years: 1.00     Types: Cigarettes     Last attempt to quit: 1960     Years since quittin.7    Smokeless tobacco: Never Used   Substance Use Topics    Alcohol use:  No                                Counseling given: Not Answered      Vital Signs (Current):   Vitals:    20 0421 20 0611 20 0725   BP: (!) 192/97 (!) 158/93 132/62   Pulse: 85 80 81   Resp: 14 13 16   Temp: 36.4 °C (97.5 °F) 36.8 °C (98.2 °F) 36.9 °C (98.4 °F)   TempSrc:  Oral Temporal   SpO2: 95% 94% 96%   Weight: 140 lb (63.5 kg)     Height: 5' 10\" (1.778 m)                                                BP Readings from Last 3 Encounters:   09/23/20 132/62   05/18/20 123/64   05/18/20 (!) 127/57       NPO Status: Time of last liquid consumption: 2200                        Time of last solid consumption: 2000                        Date of last liquid consumption: 09/22/20                        Date of last solid food consumption: 09/22/20    BMI:   Wt Readings from Last 3 Encounters:   09/23/20 140 lb (63.5 kg)   05/18/20 135 lb (61.2 kg)   04/23/20 135 lb (61.2 kg)     Body mass index is 20.09 kg/m². CBC:   Lab Results   Component Value Date    WBC 11.2 09/23/2020    RBC 4.29 09/23/2020    HGB 13.1 09/23/2020    HCT 37.4 09/23/2020    MCV 87.2 09/23/2020    RDW 13.2 09/23/2020     09/23/2020       CMP:   Lab Results   Component Value Date     09/23/2020    K 4.2 09/23/2020    K 3.9 04/24/2019    CL 97 09/23/2020    CO2 24 09/23/2020    BUN 20 09/23/2020    CREATININE 0.9 09/23/2020    GFRAA >60 09/23/2020    LABGLOM >60 09/23/2020    GLUCOSE 157 09/23/2020    PROT 6.8 12/06/2019    CALCIUM 9.7 09/23/2020    BILITOT 0.6 12/06/2019    ALKPHOS 49 12/06/2019    AST 14 12/06/2019    ALT 11 12/06/2019       POC Tests: No results for input(s): POCGLU, POCNA, POCK, POCCL, POCBUN, POCHEMO, POCHCT in the last 72 hours.     Coags:   Lab Results   Component Value Date    PROTIME 12.0 09/23/2020    INR 1.1 09/23/2020    APTT 29.5 09/23/2020       HCG (If Applicable): No results found for: PREGTESTUR, PREGSERUM, HCG, HCGQUANT     ABGs: No results found for: PHART, PO2ART, OGO8CWE, SLN6NRU, BEART, F4WBHNYJ     Type & Screen (If Applicable):  No results found for: LABABO, LABRH    Drug/Infectious Status (If Applicable):  No results found for: HIV, HEPCAB    COVID-19 Screening (If Applicable):   Lab Results   Component Value Date    COVID19 Not Detected 05/14/2020         Anesthesia change was found            Anesthesia Plan      general     ASA 3     (Pt agrees to GA--IV induction and ETTube. Covid--not detected.)  Induction: intravenous. MIPS: Postoperative opioids intended, Prophylactic antiemetics administered and Postoperative trial extubation. Anesthetic plan and risks discussed with patient. Use of blood products discussed with patient whom consented to blood products. Plan discussed with CRNA.                   Marta Metzger RN   9/23/2020

## 2020-09-23 NOTE — PROGRESS NOTES
Patient admitted to PACU. All appropriate monitors applied, siderails up, bed locked, and in low position.

## 2020-09-23 NOTE — CARE COORDINATION
Met with the pt at the bedside. He lives with his wife and is independent. He has a walker from a previous surgery. He has used [facility] in the past and has been to [facility]. He is aware that he may need to go to rehab following surgery. He does not want to return to [facility]. He will consider [facility], but wants to speak to his wife before making a decision. He is for surgery today.  Clarence Nunez RN -[phone]

## 2020-09-23 NOTE — H&P
[address] [facility]  History and Physical      CHIEF COMPLAINT:  Fall      HISTORY OF PRESENT ILLNESS:      The patient is a 79 y.o. male patient of Dr. Indira Lange history of , PAD status post angioplasty and toe amputations, BPH status post TURP, who presents with fall. Patient presents ED yesterday after a fall. States he tripped and fell carrying boxes. Denies head or neck injury. Denies loss of consciousness. + Left hip pain. Unable to ambulate. No fevers or chills. No chest pain palpitations trouble breathing. No vomiting or diarrhea.     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 ([address] 4/25/2019    Blood circulation, collateral     Cellulitis of foot, left 1/1/2017    Chronic venous insufficiency 12/6/2019    Femoral-popliteal atherosclerosis ([address] 1/1/2017    Heel ulcer, right, with fat layer exposed ([address] 5/6/2019    Osteomyelitis of third toe of right foot ([address] 12/6/2019    S/P peripheral artery angioplasty 01/23/2020    bilateral legs -[place]    Skin ulcer of right foot with fat layer exposed ([address] 12/9/2019    Ulcer of right leg, with fat layer exposed ([address] 5/6/2019    Varicose veins of leg with edema, 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       Past Surgical History:    Past Surgical History:   Procedure Laterality Date    COLONOSCOPY      CYSTOSCOPY N/A 5/18/2020    SUPRAPUBIC TUBE PLACEMENT performed by Buck Graham MD at [address]  [address]    lense implants bilaterally    FOOT DEBRIDEMENT Left 01/04/2017    wound debridement and delayed primary closure    OTHER SURGICAL HISTORY  04/23/2019    Dr. Denis Chavez- PTA ant tibial    OTHER SURGICAL HISTORY  12/17/2019    Dr Denis Chavez - PCI Right popliteal and Anterior tibial    PROSTATE BIOPSY  04/2016    SKIN BIOPSY  sept of 2018 last time    head and ears    TOE AMPUTATION Left 12/31/2016    2nd    TOE AMPUTATION Right 4/26/2019    AMPUTATION RIGHT SECOND TOE, FOOT DEBRIDEMENT performed by Collette Basta, DPM at [facility] 22 TOE AMPUTATION Right 12/10/2019    AMPUTATION RIGHT 3rd DIGIT WITH PARTIAL RESECTION OF THIRD METATARSAL performed by Collette Basta, DPM at [facility] TURP N/A 5/18/2020    CYSTOSCOPY PLASMA LOOP TRANSURETHRAL RESECTION PROSTATE performed by Adam Lang MD at [address] [place] [address]         Medications Prior to Admission:    Medications Prior to Admission: Garlic 0594 MG TBEC, Take 1 capsule by mouth daily  Multiple Vitamins-Minerals (THERAPEUTIC MULTIVITAMIN-MINERALS) tablet, Take 1 tablet by mouth daily  NONFORMULARY, Take 1 tablet by mouth nightly Indications: Moringa     Allergies:    Latex; Aspirin; Ciprofloxacin in d5w; Pcn [penicillins]; Other; and Peanut-containing drug products    Social History:    reports that he quit smoking about 60 years ago. His smoking use included cigarettes. He has a 1.00 pack-year smoking history. He has never used smokeless tobacco. He reports that he does not drink alcohol or use drugs. Family History:   family history includes Heart Disease in his father and mother. REVIEW OF SYSTEMS:  As above in the HPI, otherwise negative    PHYSICAL EXAM:    Vitals:  /62   Pulse 81   Temp 98.4 °F (36.9 °C) (Temporal)   Resp 16   Ht 5' 10\" (1.778 m)   Wt 140 lb (63.5 kg)   SpO2 96%   BMI 20.09 kg/m²     General:  Awake, alert, oriented X 3. Well developed, well nourished, well groomed. No apparent distress. HEENT:  Normocephalic, atraumatic. Pupils equal, round, reactive to light. No scleral icterus. No conjunctival injection. Normal lips, teeth, and gums. No nasal discharge. Neck:  Supple  Heart:  RRR, no murmurs, gallops, rubs  Lungs:  CTA bilaterally, bilat symmetrical expansion, no wheeze, rales, or rhonchi  Abdomen:   Bowel sounds present, soft, nontender, no masses, no organomegaly, no peritoneal signs  Extremities:+ Pain w mvt  No clubbing, cyanosis, or edema  Skin:  Warm and dry, no open lesions or rash  Neuro:  Cranial nerves 2-12 intact, no focal deficits  Breast: deferred  Rectal: deferred  Genitalia:  deferred    LABS:    CBC with Differential:    Lab Results   Component Value Date    WBC 11.2 09/23/2020    RBC 4.29 09/23/2020    HGB 13.1 09/23/2020    HCT 37.4 09/23/2020     09/23/2020    MCV 87.2 09/23/2020    MCH 30.5 09/23/2020    MCHC 35.0 09/23/2020    RDW 13.2 09/23/2020    LYMPHOPCT 8.1 09/23/2020    MONOPCT 5.8 09/23/2020    BASOPCT 0.4 09/23/2020    MONOSABS 0.65 09/23/2020    LYMPHSABS 0.91 09/23/2020    EOSABS 0.03 09/23/2020    BASOSABS 0.04 09/23/2020     CMP:    Lab Results   Component Value Date     09/23/2020    K 4.2 09/23/2020    K 3.9 04/24/2019    CL 97 09/23/2020    CO2 24 09/23/2020    BUN 20 09/23/2020    CREATININE 0.9 09/23/2020    GFRAA >60 09/23/2020    LABGLOM >60 09/23/2020    GLUCOSE 157 09/23/2020    PROT 6.8 12/06/2019    LABALBU 3.4 12/06/2019    CALCIUM 9.7 09/23/2020    BILITOT 0.6 12/06/2019    ALKPHOS 49 12/06/2019    AST 14 12/06/2019    ALT 11 12/06/2019     Magnesium:    Lab Results   Component Value Date    MG 1.7 12/12/2019     Phosphorus:  No results found for: PHOS  PT/INR:    Lab Results   Component Value Date    PROTIME 12.0 09/23/2020    INR 1.1 09/23/2020     Last 3 Troponin:    Lab Results   Component Value Date    TROPONINI <0.01 08/26/2018     U/A:    Lab Results   Component Value Date    COLORU Yellow 01/11/2017    PROTEINU 30 01/11/2017    PHUR 6.5 01/11/2017    LABCAST FEW 12/30/2016    WBCUA 1-3 01/11/2017    RBCUA >20 01/11/2017    BACTERIA RARE 01/11/2017    CLARITYU SL CLOUDY 01/11/2017    SPECGRAV 1.020 01/11/2017    LEUKOCYTESUR Negative 01/11/2017    UROBILINOGEN 0.2 01/11/2017    BILIRUBINUR Negative 01/11/2017    BLOODU LARGE 01/11/2017    GLUCOSEU 250 01/11/2017     ABG:  No results found for: PH, PCO2, PO2, HCO3, BE, THGB, TCO2, O2SAT  HgBA1c:    Lab Results Component Value Date    LABA1C 6.2 12/06/2019     FLP:  No results found for: TRIG, HDL, LDLCALC, LDLDIRECT, LABVLDL  TSH:  No results found for: TSH    XR CHEST PORTABLE   Final Result   No acute cardiopulmonary findings. XR FEMUR LEFT (MIN 2 VIEWS)   Final Result   Intertrochanteric fracture at the left femur seen in the shoot through   lateral projection. XR HIP LEFT (2-3 VIEWS)   Final Result   Intertrochanteric fracture at the left femur seen in the shoot through   lateral projection. XR CHEST 1 VIEW    (Results Pending)       ASSESSMENT:      Principal Problem:    Closed nondisplaced intertrochanteric fracture of left femur (HCC)  Active Problems:    Essential hypertension    PVD (peripheral vascular disease) with claudication (HCC)    S/P peripheral artery angioplasty    Fall  Resolved Problems:    * No resolved hospital problems.  *      PLAN:    79-year-old male history of hypertension, PAD admitted with fall and left hip fracture    Admit  Pain control  Orthopedic consult  Patient may proceed to surgery without further risk stratification  Medications for other co morbidities cont as appropriate w dosage adjustments as necessary  PT/OT  DVT PPx  DC planning    Electronically signed by Debbie Eisenberg MD on 9/23/2020 at 12:57 PM

## 2020-09-23 NOTE — ED NOTES
# not able to receive calls at this time x 3   CR Pt states he fell while moving a box about 0100. C/O left and right leg pain. Pedal Pulses +2. A+Ox4, no s/sx of distress at this time, breathing even and unlabored.      Anup Koehler RN  09/23/20 [address], RN  09/23/20 0656

## 2020-09-23 NOTE — OP NOTE
Operative Note      Patient: Faheem Max  YOB: 1940  MRN: [id number]    Date of Procedure: 9/23/2020    Pre-Op Diagnosis: L Hip intertrochanteric fracture    Post-Op Diagnosis: Same       Procedure(s):  LEFT HIP OPEN REDUCTION INTERNAL FIXATION    Surgeon(s):  Vee Bentley MD    Assistant:   Resident: Warden Viviane DO; Sosa Mccurdy DO    Anesthesia: General    Estimated Blood Loss (mL): 75    Complications: None    Specimens:   * No specimens in log *    Implants:  Implant Name Type Inv. Item Serial No.  Lot No. LRB No. Used Action   PLATE COMPRSS DHS STD BRL ST 62MM Screw/Plate/Nail/Noe PLATE COMPRSS DHS STD BRL ST 62MM  SYNTHES [id number] Left 1 Implanted   SCREW LAG DHS DCS 1 STEP 12.7X85MM Screw/Plate/Nail/Noe SCREW LAG DHS DCS 1 STEP 12.7X85MM  SYNTHES  Left 1 Implanted   SCREW LAG COMPRSS DHS DCP 12.7X36MM Screw/Plate/Nail/Noe SCREW LAG COMPRSS DHS DCP 12.7X36MM  SYNTHES  Left 1 Implanted   SCREW CORTX SLFTP LG FRAG 4.5X44MM Screw/Plate/Nail/Noe SCREW CORTX SLFTP LG FRAG 4.5X44MM  SYNTHES  Left 1 Implanted   SCREW CORTX SLFTP LG FRAG 4.5X42MM Screw/Plate/Nail/Noe SCREW CORTX SLFTP LG FRAG 4.5X42MM  SYNTHES  Left 1 Implanted   SCREW CORTX SLFTP LG FRAG 4.5X38MM Screw/Plate/Nail/Noe SCREW CORTX SLFTP LG FRAG 4.5X38MM  SYNTHES  Left 1 Implanted         Drains:   Urethral Catheter (Active)       Urethral Catheter Double-lumen;Non-latex 16 fr (Active)       Suprapubic Catheter Non-latex 24 fr (Active)       Detailed Description of Procedure:    PROCEDURE:  Outside the operative suite, the patient was seen, identified and   the operative site marked and identified as appropriate by the patient,   staff, surgeon and anesthesia. The patient was taken to the operative suite   and placed onto the operative table. All bony prominences and neurovascular   structures were well-padded and protected.   She was then placed under an   appropriate amount of sedation by the care of the anesthesia team.  The left   leg was then placed in the fracture table. A reduction was performed which was deemed appropriate by both AP and lateral   fluoroscopic images. A 7 cm incision was made starting at the level of the   lesser trochanter and continuing straight distally. Dissection was then   continued down through subcutaneous fascia and muscle. A guide pin was then   placed at a 130 degree angle. The pin crossed at the level of the lateral   cortex, at the level of the lesser trochanter. The guidewire was then   advanced into the femoral neck, into the femoral head and was deemed to be   appropriate on both AP and lateral fluoroscopic views. A depth cage was then   used to determine the length of the guidepin which was determined to be   appropriately 90 mm. The reamer was then placed over the top of the guidepin   and reamed to the appropriate depth. A lag screw tap was then used as the   patient had dense bone in the lateral cortex. An 85 mm lag screw was then   inserted to the lateral cortex, the femoral neck and into the femoral head. The Synthes 3 hole hip plate was then placed over the top of the lag screw. The   3-hole side plate was then drilled and measured and filled with appropriate   fitting cortical screws. These were deemed appropriate under fluoroscopic   guidance. Again, under fluoroscopic guidance a compression screw was   inserted through the barrel and compression was applied across the fracture   site. AP, lateral fluoroscopic views were then taken and the DHS was   determined to be in appropriate alignment. Then, 0 Vicryl was then used to close the fascia and also the subcutaneous   layers, 2-0 Vicryl was used for the superficial subcutaneous layer and   staples were inserted in the skin. A sterile layered dressing was then   placed over the wound.     It should be noted that Dr. Umesh Martinez was present for the entirety of the procedure.       Electronically signed by Devang Carreno DO on 9/23/2020 at 5:51 PM

## 2020-09-23 NOTE — PROGRESS NOTES
Occupational Therapy          OT consult received and appreciated. Chart reviewed. Will hold evaluation due to OR scheduled today . Will evaluate at a later time post op as indicated. Thank you.  Kay Ross, OTR/L #[id number]

## 2020-09-23 NOTE — BRIEF OP NOTE
Brief Postoperative Note      Patient: Gómez Diaz  YOB: 1940  MRN: [id number]    Date of Procedure: 9/23/2020    Pre-Op Diagnosis: L Hip fracture    Post-Op Diagnosis: Same       Procedure(s):  LEFT HIP OPEN REDUCTION INTERNAL FIXATION    Surgeon(s):  Yadi Hinkle MD    Assistant:  Resident: Jacqui Silver DO; Nacho Will DO    Anesthesia: General    Estimated Blood Loss (mL): 75    Complications: None    Specimens:   * No specimens in log *    Implants:  Implant Name Type Inv.  Item Serial No.  Lot No. LRB No. Used Action   PLATE COMPRSS DHS STD BRL ST 62MM Screw/Plate/Nail/Noe PLATE COMPRSS DHS STD BRL ST 62MM  SYNTHES [id number] Left 1 Implanted   SCREW LAG DHS DCS 1 STEP 12.7X85MM Screw/Plate/Nail/Noe SCREW LAG DHS DCS 1 STEP 12.7X85MM  SYNTHES  Left 1 Implanted   SCREW LAG COMPRSS DHS DCP 12.7X36MM Screw/Plate/Nail/Noe SCREW LAG COMPRSS DHS DCP 12.7X36MM  SYNTHES  Left 1 Implanted   SCREW CORTX SLFTP LG FRAG 4.5X44MM Screw/Plate/Nail/Noe SCREW CORTX SLFTP LG FRAG 4.5X44MM  SYNTHES  Left 1 Implanted   SCREW CORTX SLFTP LG FRAG 4.5X42MM Screw/Plate/Nail/Noe SCREW CORTX SLFTP LG FRAG 4.5X42MM  SYNTHES  Left 1 Implanted   SCREW CORTX SLFTP LG FRAG 4.5X38MM Screw/Plate/Nail/Noe SCREW CORTX SLFTP LG FRAG 4.5X38MM  SYNTHES  Left 1 Implanted         Drains:   Urethral Catheter (Active)       Urethral Catheter Double-lumen;Non-latex 16 fr (Active)       Suprapubic Catheter Non-latex 24 fr (Active)       Findings: See operative note    Electronically signed by Jacqui Silver DO on 9/23/2020 at 5:50 PM

## 2020-09-24 ENCOUNTER — APPOINTMENT (OUTPATIENT)
Dept: GENERAL RADIOLOGY | Age: 80
DRG: 482 | End: 2020-09-24
Payer: MEDICARE

## 2020-09-24 LAB
EKG ATRIAL RATE: 87 BPM
EKG P AXIS: 63 DEGREES
EKG P-R INTERVAL: 182 MS
EKG Q-T INTERVAL: 354 MS
EKG QRS DURATION: 70 MS
EKG QTC CALCULATION (BAZETT): 425 MS
EKG R AXIS: 47 DEGREES
EKG T AXIS: 119 DEGREES
EKG VENTRICULAR RATE: 87 BPM

## 2020-09-24 PROCEDURE — 6360000002 HC RX W HCPCS: Performed by: STUDENT IN AN ORGANIZED HEALTH CARE EDUCATION/TRAINING PROGRAM

## 2020-09-24 PROCEDURE — 97535 SELF CARE MNGMENT TRAINING: CPT

## 2020-09-24 PROCEDURE — 97530 THERAPEUTIC ACTIVITIES: CPT

## 2020-09-24 PROCEDURE — 97165 OT EVAL LOW COMPLEX 30 MIN: CPT

## 2020-09-24 PROCEDURE — 73080 X-RAY EXAM OF ELBOW: CPT

## 2020-09-24 PROCEDURE — 6370000000 HC RX 637 (ALT 250 FOR IP): Performed by: NURSE PRACTITIONER

## 2020-09-24 PROCEDURE — 6370000000 HC RX 637 (ALT 250 FOR IP): Performed by: INTERNAL MEDICINE

## 2020-09-24 PROCEDURE — 2580000003 HC RX 258: Performed by: STUDENT IN AN ORGANIZED HEALTH CARE EDUCATION/TRAINING PROGRAM

## 2020-09-24 PROCEDURE — 97161 PT EVAL LOW COMPLEX 20 MIN: CPT

## 2020-09-24 PROCEDURE — 73060 X-RAY EXAM OF HUMERUS: CPT

## 2020-09-24 PROCEDURE — 2580000003 HC RX 258: Performed by: NURSE PRACTITIONER

## 2020-09-24 PROCEDURE — 99024 POSTOP FOLLOW-UP VISIT: CPT | Performed by: ORTHOPAEDIC SURGERY

## 2020-09-24 PROCEDURE — 6370000000 HC RX 637 (ALT 250 FOR IP): Performed by: STUDENT IN AN ORGANIZED HEALTH CARE EDUCATION/TRAINING PROGRAM

## 2020-09-24 PROCEDURE — 73030 X-RAY EXAM OF SHOULDER: CPT

## 2020-09-24 PROCEDURE — 71045 X-RAY EXAM CHEST 1 VIEW: CPT

## 2020-09-24 PROCEDURE — 1200000000 HC SEMI PRIVATE

## 2020-09-24 RX ORDER — AMLODIPINE BESYLATE 5 MG/1
5 TABLET ORAL DAILY
Status: DISCONTINUED | OUTPATIENT
Start: 2020-09-24 | End: 2020-09-25 | Stop reason: HOSPADM

## 2020-09-24 RX ADMIN — OXYCODONE HYDROCHLORIDE 10 MG: 10 TABLET ORAL at 11:02

## 2020-09-24 RX ADMIN — Medication 10 ML: at 09:09

## 2020-09-24 RX ADMIN — MULTIPLE VITAMINS W/ MINERALS TAB 1 TABLET: TAB at 09:09

## 2020-09-24 RX ADMIN — Medication 2 G: at 00:16

## 2020-09-24 RX ADMIN — Medication 2 G: at 09:09

## 2020-09-24 RX ADMIN — AMLODIPINE BESYLATE 5 MG: 5 TABLET ORAL at 10:03

## 2020-09-24 RX ADMIN — ENOXAPARIN SODIUM 40 MG: 40 INJECTION SUBCUTANEOUS at 09:08

## 2020-09-24 RX ADMIN — DOCUSATE SODIUM 50 MG AND SENNOSIDES 8.6 MG 1 TABLET: 8.6; 5 TABLET, FILM COATED ORAL at 09:09

## 2020-09-24 RX ADMIN — OXYCODONE 5 MG: 5 TABLET ORAL at 20:55

## 2020-09-24 RX ADMIN — SODIUM CHLORIDE: 9 INJECTION, SOLUTION INTRAVENOUS at 12:22

## 2020-09-24 ASSESSMENT — PAIN DESCRIPTION - LOCATION
LOCATION: HIP

## 2020-09-24 ASSESSMENT — PAIN SCALES - GENERAL
PAINLEVEL_OUTOF10: 0
PAINLEVEL_OUTOF10: 5
PAINLEVEL_OUTOF10: 0
PAINLEVEL_OUTOF10: 5
PAINLEVEL_OUTOF10: 6
PAINLEVEL_OUTOF10: 0
PAINLEVEL_OUTOF10: 0
PAINLEVEL_OUTOF10: 6

## 2020-09-24 ASSESSMENT — PAIN DESCRIPTION - ONSET
ONSET: ON-GOING
ONSET: ON-GOING

## 2020-09-24 ASSESSMENT — PAIN DESCRIPTION - PAIN TYPE
TYPE: SURGICAL PAIN
TYPE: SURGICAL PAIN

## 2020-09-24 ASSESSMENT — PAIN DESCRIPTION - DESCRIPTORS
DESCRIPTORS: ACHING;DISCOMFORT;SORE
DESCRIPTORS: ACHING;DISCOMFORT
DESCRIPTORS: DISCOMFORT

## 2020-09-24 ASSESSMENT — PAIN DESCRIPTION - FREQUENCY
FREQUENCY: CONTINUOUS

## 2020-09-24 ASSESSMENT — PAIN DESCRIPTION - ORIENTATION
ORIENTATION: LEFT

## 2020-09-24 ASSESSMENT — PAIN DESCRIPTION - PROGRESSION: CLINICAL_PROGRESSION: NOT CHANGED

## 2020-09-24 ASSESSMENT — PAIN - FUNCTIONAL ASSESSMENT: PAIN_FUNCTIONAL_ASSESSMENT: ACTIVITIES ARE NOT PREVENTED

## 2020-09-24 NOTE — PROGRESS NOTES
Subjective:  Feeling better   No CP or SOB  No fever or chills   No uncontrolled pain  No vomiting or diarrhea       Objective:    BP (!) 171/73   Pulse 71   Temp 97.8 °F (36.6 °C) (Temporal)   Resp 17   Ht 5' 10\" (1.778 m)   Wt 140 lb (63.5 kg)   SpO2 97%   BMI 20.09 kg/m²     24HR INTAKE/OUTPUT:      Intake/Output Summary (Last 24 hours) at 9/24/2020 0845  Last data filed at 9/24/2020 9639  Gross per 24 hour   Intake 1906.67 ml   Output 1225 ml   Net 681.67 ml   *  General appearance: NAD, conversant  Neck: FROM, supple   Lungs: Clear bilaterally no wheezes, no rhonchi, no crackles  CV: RRR, no MRGs; normal carotid upstroke and amplitude without Bruits  Abdomen: Soft, non-tender; no masses or HSM  Extremities: No edema, no cyanosis, no clubbing  Skin: Intact no rash, no lesions, no ulcers    Psych: Alert and oriented normal affect  Neuro: Nonfocal  Most Recent Labs  Lab Results   Component Value Date    WBC 11.2 09/23/2020    HGB 13.1 09/23/2020    HCT 37.4 09/23/2020     09/23/2020     09/23/2020    K 4.2 09/23/2020    CL 97 (L) 09/23/2020    CREATININE 0.9 09/23/2020    BUN 20 09/23/2020    CO2 24 09/23/2020    GLUCOSE 157 (H) 09/23/2020    ALT 11 12/06/2019    AST 14 12/06/2019    INR 1.1 09/23/2020    LABA1C 6.2 (H) 12/06/2019     No results for input(s): MG in the last 72 hours. Lab Results   Component Value Date    CALCIUM 9.7 09/23/2020        XR HIP 2-3 VW W PELVIS LEFT   Final Result   After ORIF for fracture proximal left femur. XR CHEST PORTABLE   Final Result   No acute cardiopulmonary findings. XR FEMUR LEFT (MIN 2 VIEWS)   Final Result   Intertrochanteric fracture at the left femur seen in the shoot through   lateral projection. XR HIP LEFT (2-3 VIEWS)   Final Result   Intertrochanteric fracture at the left femur seen in the shoot through   lateral projection.       XR CHEST 1 VIEW    (Results Pending)   FLUORO FOR SURGICAL PROCEDURES    (Results Pending)   XR

## 2020-09-24 NOTE — PROGRESS NOTES
Patient transferd to unit from PACU.   Patient's condition, V/S, and oxygen saturation stable on 2 L N/C

## 2020-09-24 NOTE — CONSULTS
9/24/2020 11:30 AM  Service: Urology  Group: [facility] (Koko/Robby/Darryl)    Rio Hinkle  [id number]     Chief Complaint:    Urinary Retention     History of Present Illness: The patient is a 79 y.o. male patient who had left hip surgery one day ago after suffering from a fall at home. He is known to our practice and is a patient of Dr. Josafat Jalloh. He has a history of BPH with bladder outlet obstruction and urinary retention. He underwent TURP with SPT placement on 5/18/20. SPT was removed in the office in July 2020 as he was voiding well s/p TURP. TURP specimen in May 2020 was without cancer. He had a Bocanegra catheter inserted for surgery yesterday that remains indwelling. There was no [facility] retention requiring Bocanegra insertion per nursing knowledge. According to nursing notes he was incontinent a large amount of urine prior to the Bocanegra catheter insertion. Prior to admission he denies any bothersome urinary symptoms. He states that his urinary symptoms have significantly improved since he has had a TURP. He would have an occasional urge incontinence, but nothing bothersome to him. He wears depends at night only as a precaution.                 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 ([address] 4/25/2019    Blood circulation, collateral     Cellulitis of foot, left 1/1/2017    Chronic venous insufficiency 12/6/2019    Femoral-popliteal atherosclerosis ([address] 1/1/2017    Heel ulcer, right, with fat layer exposed ([address] 5/6/2019    Osteomyelitis of third toe of right foot ([address] 12/6/2019    S/P peripheral artery angioplasty 01/23/2020    bilateral legs -Eulogio Raisin    Skin ulcer of right foot with fat layer exposed ([address] 12/9/2019    Ulcer of right leg, with fat layer exposed ([address] 5/6/2019    Varicose veins of leg with edema, 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         Past Surgical History:   Procedure Laterality Date    COLONOSCOPY      CYSTOSCOPY N/A 5/18/2020    SUPRAPUBIC TUBE PLACEMENT performed by Silvia Locke MD at [address] [facility]  4989'V    lense implants bilaterally    FOOT DEBRIDEMENT Left 01/04/2017    wound debridement and delayed primary closure    HIP FRACTURE SURGERY Left 9/23/2020    LEFT HIP OPEN REDUCTION INTERNAL FIXATION performed by Jasmin Gonzalez MD at [address] [facility]  04/23/2019    Dr. Atif Holly- PTA ant tibial    OTHER SURGICAL HISTORY  12/17/2019    Dr Atif Holly - PCI Right popliteal and Anterior tibial    PROSTATE BIOPSY  04/2016    SKIN BIOPSY  sept of 2018 last time    head and ears    TOE AMPUTATION Left 12/31/2016    2nd    TOE AMPUTATION Right 4/26/2019    AMPUTATION RIGHT SECOND TOE, FOOT DEBRIDEMENT performed by Jud Rodarte DPM at [place] [address] Right 12/10/2019    AMPUTATION RIGHT 3rd DIGIT WITH PARTIAL RESECTION OF THIRD METATARSAL performed by Jud Rodarte DPM at [facility] TURP N/A 5/18/2020    CYSTOSCOPY PLASMA LOOP TRANSURETHRAL RESECTION PROSTATE performed by Silvia Locke MD at [address] [place] [address]         Medications Prior to Admission:    Medications Prior to Admission: Garlic 8835 MG TBEC, Take 1 capsule by mouth daily  Multiple Vitamins-Minerals (THERAPEUTIC MULTIVITAMIN-MINERALS) tablet, Take 1 tablet by mouth daily  NONFORMULARY, Take 1 tablet by mouth nightly Indications: [place]     Allergies:    Latex; Aspirin; Ciprofloxacin in d5w; Pcn [penicillins]; Other; and Peanut-containing drug products    Social History:    reports that he quit smoking about 60 years ago. His smoking use included cigarettes. He has a 1.00 pack-year smoking history. He has never used smokeless tobacco. He reports that he does not drink alcohol or use drugs. Family History:   Non-contributory to this Urological problem  family history includes Heart Disease in his father and mother.     Review of Systems:  Constitutional: No fever or chills   Respiratory: negative for cough and hemoptysis  Cardiovascular: negative for chest pain and dyspnea  Gastrointestinal: negative for abdominal pain, diarrhea, nausea and vomiting   Derm: negative for rash and skin lesion(s)  Neurological: negative for seizures and tremors  Musculoskeletal: Left hip pain   Psychiatric: Negative   : As above in the HPI, otherwise negative  All other reviews are negative    Physical Exam:     Vitals:  BP (!) 187/79   Pulse 82   Temp 97.2 °F (36.2 °C) (Temporal)   Resp 18   Ht 5' 10\" (1.778 m)   Wt 140 lb (63.5 kg)   SpO2 96%   BMI 20.09 kg/m²     General:  Awake, alert, oriented X 3. No apparent distress. HEENT:  Normocephalic, atraumatic. Lungs:  Respirations symmetric and non-labored. Abdomen:  soft, nontender, no masses  Extremities:  No clubbing, cyanosis, or edema  Skin:  Warm and dry, no open lesions or rashes  Neuro: There are no motor or sensory deficits in the 4 quadrant extremities   Rectal: deferred  Genitourinary:  Rubio catheter intact draining yellow urine    Labs:     Recent Labs     09/23/20  0532   WBC 11.2   RBC 4.29   HGB 13.1   HCT 37.4   MCV 87.2   MCH 30.5   MCHC 35.0*   RDW 13.2      MPV 10.2         Recent Labs     09/23/20  0532   CREATININE 0.9           Assessment/plan:  BPH s/p TURP 5/2020  Hx of urinary retention       Creatinine stable   Continue the Rubio catheter  It does not appear that he had retention when rubio was inserted for surgery  Probably the best approach to managing the rubio catheter for now is to leave it indwelling and allow him to undergo a voiding trial once he is more ambulatory to prevent any situational urinary retention that would require reinsertion of the rubio catheter   This can be done in rehab if he is to go there soon  No further  interventions planned at this time  Please call with any further questions or concerns              Electronically signed by JUNE Lopez CNP on 9/24/2020 at 11:30 AM       The patient was seen and examined. I have reviewed the medical record in detail. I agree with the plan as outlined by JUNE Maldonado-ALFRED.     Lindy Devine MD  7:29 PM  9/24/2020

## 2020-09-24 NOTE — PLAN OF CARE
Problem: Falls - Risk of:  Goal: Will remain free from falls  Description: Will remain free from falls  9/24/2020 1517 by Darius Lopez RN  Outcome: Met This Shift     Problem: Falls - Risk of:  Goal: Absence of physical injury  Description: Absence of physical injury  9/24/2020 1517 by Darius Lopez RN  Outcome: Met This Shift     Problem: Skin Integrity:  Goal: Will show no infection signs and symptoms  Description: Will show no infection signs and symptoms  9/24/2020 1517 by Darius Lopez RN  Outcome: Met This Shift     Problem: Skin Integrity:  Goal: Absence of new skin breakdown  Description: Absence of new skin breakdown  9/24/2020 1517 by Darius Lopez RN  Outcome: Met This Shift

## 2020-09-24 NOTE — PROGRESS NOTES
Patient has allergy to aspirin, according to history. Ortho resident notified. Will continue to monitor.

## 2020-09-24 NOTE — PROGRESS NOTES
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 ([address] 2019    Blood circulation, collateral     Cellulitis of foot, left 2017    Chronic venous insufficiency 2019    Femoral-popliteal atherosclerosis ([address] 2017    Heel ulcer, right, with fat layer exposed ([address] 2019    Osteomyelitis of third toe of right foot ([address] 2019    S/P peripheral artery angioplasty 2020    bilateral legs -Brigette Freed    Skin ulcer of right foot with fat layer exposed ([address] 2019    Ulcer of right leg, with fat layer exposed ([address] 2019    Varicose veins of leg with edema, right 2019      Precautions:  Falls,  Full WBAT L LE     Home Living: Pt lives wife (recently had surgery) in a 1 story home with ramped entryways. Bathroom setup: walk in shower   Equipment owned: shower bench    Prior Level of Function: Pt. states Ind. with ADLs , Ind. with IADLs; ambulated no A.D.   Driving: active  Occupation: retired     Pain Level: 6/10 low back, buttocks, L hip & LE  Cognition: A&O: 4/4; Follows 2 step directions   Memory:  good   Sequencing:  fair   Problem solving:  fair   Judgement/safety:  fair     Functional Assessment:   Initial Eval Status  Date: 2020 Treatment Status  Date: Short Term Goals = Long Term Goals  Treatment frequency: 3-5 days   Feeding Mod I      Grooming Minimal Assist once seated Modified [place]    UB Dressing Minimal Assist   Modified [place]    LB Dressing Dependent   Supervision    Bathing Maximal Assist with sim. task  Modified [place]    Toileting Maximal Assist   Modified [place]    Bed Mobility  Supine to sit: NT  Sit to supine:NT  Supine to sit: Modified [place]   Sit to supine: Modified [place]    Functional Transfers Moderate Assist with sit <> stand, BSC with ww with increased time  Modified [place]    Functional Mobility Moderate Assist with ww few steps, short mobility with increased time, cues to sequence  Modified [place]    Balance Sitting:     Static:  Sup    Dynamic:SBA  Standing: Mod A     Activity Tolerance Fair- with lt. ax.  spO2 & HR WFL. Pt. requires increased time & cues to sequence steps  Fair+ with mod. ax. Visual/  Perceptual Glasses: readers        Safety Awareness fair                    good     Hand dominance: R     Strength ROM Additional Info:    RUE  4-/5  WFL good  and wfl FMC/dexterity noted during ADL tasks  Pt. With no c/o pain R UE this day during AROM & lt. Resistance/MMT     LUE 4-/5  WFL good  and wfl FMC/dexterity noted during ADL tasks       Hearing: [facility]   Sensation:  Pt. c/o no numbness/ tingling B UE  Tone: WFL B UE  Edema: none noted B UE                            Comments: Upon arrival, patient seated, cleared by Nursing, agreeable to OT. Pt demonstrating fair+ understanding of education/techniques, requiring additional training / education. At end of session, patient seated on BSC left in care of Nursing students, with call light and phone within reach, all lines and tubes intact. Pt would benefit from continued skilled OT to increase safety and independence with completion of ADL/iADL tasks, functional transfers/mobility to improve independence and quality of life.     Treatment:  OT services provided include: facilitation safe techniques for ADLs & transfers, skilled monitoring of vitals & pt.'s response to treatment (remained [facility]), instruction/training on safe & adapted techniques within precautions for completion of ADLs, proper posture and/or positioning, facilitation of functional seated & standing tolerance & balance ax. during ADLs and functional ax., skilled cuing on hand placement & proper body mechanics during functional transfer/mobility training with focus on safety, technique, precautions. Pt.  also Instructed RE: safe transfers/mobility, ADL training, role of OT, E.C. techniques, treatment plan, recs. , prec.      Eval Complexity: Low    Assessment of current deficits:    Functional mobility [x]  ADLs [x] Strength [x]  Cognition []  Functional transfers  [x] IADLs [x] Safety Awareness [x]  Endurance [x]  Fine Motor Coordination [] Balance [x] Vision/perception [] Sensation []   Gross Motor Coordination [] ROM [] Delirium []                  Motor Control []    Plan of Care: OT 1-3x/week for 5-7 days PRN   [x] ADL retraining/AE recommendations to increase functional [place]  [x] Energy Conservation Techniques/Strategies to improve tolerance for safe ADLs, functional transfers & mobility    [] Neuromuscular Re-Education      [x] Functional Transfer /MobilityTraining for fall prevention & DME recommendations        [x] Cognitive Re-Training to improve problem solving, sequencing of ax. and safety         [] Splinting/Positioning Needs to maintain skin/joint integrity          [x] Therapeutic Activity to improve functional skills, endurance, balance & posture  [x]Therapeutic Exercise to increase strength & endurance for functional ax.  [] Visual/Perceptual   [x] Positioning to Improve Functional [place], Safety, and Skin Integrity  [x] Patient and/or Family Education to Increase Safety and Functional [place] with ADLs, functional transfers/mobility    Rehab Potential: Good for established goals     Patient / Family Goal: to return home soon    Patient and/or family were instructed diagnosis, prognosis/goals and plan of care. Demonstrated fair+ understanding. [] Malnutrition indicators have been identified and nursing has been notified to ensure a dietitian consult is ordered. low Evaluation + 10    Time In: 11:57      Time Out: 12:17                Total Treatment time: 10   Min Units   OT Eval Low 57712 x    OT Eval Medium 63624     OT Eval High 98370     OT Re-Eval 61 12 61     Therapeutic Ex 97756     Therapeutic Activities 71793     ADL/Self Care 36062 10    Orthotic Management 90370     Neuro Re-Ed 97527     Non-Billable Time     TOTAL TIMED TREATMENT 10 1        Evaluation time includes thorough review of current medical information, gathering information on past medical history/social history and prior level of function, completion of standardized testing/informal observation of tasks, assessment of data, consultation with other medical professions/disciplines, and development of POC/Goals. Wendy Cash, OTR/L   License #  [id number]

## 2020-09-24 NOTE — ANESTHESIA POSTPROCEDURE EVALUATION
Department of Anesthesiology  Postprocedure Note    Patient: Faheem Max  MRN: [id number]  YOB: 1940  Date of evaluation: 9/23/2020  Time:  8:23 PM     Procedure Summary     Date:  09/23/20 Room / Location:  [facility] 09 / [facility]    Anesthesia Start:  9451 Anesthesia Stop:  0360    Procedure:  LEFT HIP OPEN REDUCTION INTERNAL FIXATION (Left Hip) Diagnosis:  (/)    Surgeon:  Aravind Clark MD Responsible Provider:  Gamal Groves MD    Anesthesia Type:  general ASA Status:  3          Anesthesia Type: general    Abelino Phase I: Abelino Score: 9    Abelino Phase II:      Last vitals: Reviewed and per EMR flowsheets.        Anesthesia Post Evaluation    Patient location during evaluation: PACU  Patient participation: complete - patient participated  Level of consciousness: awake  Airway patency: patent  Nausea & Vomiting: no vomiting and no nausea  Complications: no  Cardiovascular status: hemodynamically stable  Respiratory status: acceptable  Hydration status: stable

## 2020-09-24 NOTE — DISCHARGE INSTR - COC
AMPUTATION RIGHT SECOND TOE, FOOT DEBRIDEMENT performed by Mala Garcia DPM at [facility] TOE AMPUTATION Right 12/10/2019    AMPUTATION RIGHT 3rd DIGIT WITH PARTIAL RESECTION OF THIRD METATARSAL performed by Mala Garcia DPM at [facility] TURP N/A 5/18/2020    CYSTOSCOPY PLASMA LOOP TRANSURETHRAL RESECTION PROSTATE performed by Erica Allen MD at [address] [place] [address]         Immunization History: There is no immunization history on file for this patient. 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 Ruthe Fish       Isolation/Infection:   Isolation            No Isolation          Patient Infection Status       Infection Onset Added Last Indicated Last Indicated By Review Planned Expiration Resolved Resolved By    None active    Resolved    COVID-19 Rule Out 05/14/20 05/14/20 05/14/20 Covid-19 Ambulatory (Ordered)   05/17/20 Rule-Out Test Resulted    NOT DETECTED 5/14/2020    MRSA 12/10/19 12/13/19 12/10/19 Surgical Culture   09/23/20 Shelley Graves RN    Foot 12/10/19            Nurse Assessment:  Last Vital Signs: BP (!) 171/73   Pulse 71   Temp 97.8 °F (36.6 °C) (Temporal)   Resp 17   Ht 5' 10\" (1.778 m)   Wt 140 lb (63.5 kg)   SpO2 97%   BMI 20.09 kg/m²     Last [facility] pain score (0-10 scale): Pain Level: 0  Last Weight:   Wt Readings from Last 1 Encounters:   09/23/20 140 lb (63.5 kg) applicable)   · Name:  · Address:  · Dialysis Schedule:  · Phone:  · Fax:    / signature: Electronically signed by SILVIA Jane on 9/24/2020 at 8:07 AM      PHYSICIAN SECTION    Prognosis: {Prognosis:5214203115}    Condition at Discharge: Song Champion Patient Condition:[id number]}    Rehab Potential (if transferring to Rehab): {Prognosis:4029214466}    Recommended Labs or Other Treatments After Discharge: ***    Physician Certification: I certify the above information and transfer of Radha Gastelum  is necessary for the continuing treatment of the diagnosis listed and that he requires [facility] for less 30 days.      Update Admission H&P: {CHP DME Changes in [facility]:678772218}    PHYSICIAN SIGNATURE:  Electronically signed by Nyoka Goltz, MD on 9/25/20 at 11:14 AM EDT

## 2020-09-24 NOTE — PROGRESS NOTES
Postop day 1 ORIF left hip fracture. Pt seen and examined by me. Findings and plan as [facility] per Orthopaedic Resident Surgeon note. Exam and postop x-rays left hip satisfactory. Patient has history of prior urologic difficulties states that he required a Bocanegra catheter for about 1 year. Under care of Dr. Joseph Ford. Urology consulted for management of Bocanegra catheter, keep Bocanegra in place for now. Mobilize with physical therapy  Home versus subacute rehab depending on progress next 48 hours.

## 2020-09-24 NOTE — PLAN OF CARE
Problem: Falls - Risk of:  Goal: Will remain free from falls  Description: Will remain free from falls  9/24/2020 1025 by Anthony Canas  Outcome: Met This Shift  9/24/2020 0142 by Humberto Riggs RN  Outcome: Met This Shift  9/24/2020 0118 by Haseeb Carvalho  Outcome: Met This Shift  Goal: Absence of physical injury  Description: Absence of physical injury  9/24/2020 1025 by Anthony Canas  Outcome: Met This Shift  9/24/2020 0142 by Humberto Riggs RN  Outcome: Met This Shift  9/24/2020 0118 by Haseeb Carvalho  Outcome: Met This Shift     Problem: Skin Integrity:  Goal: Will show no infection signs and symptoms  Description: Will show no infection signs and symptoms  9/24/2020 1025 by Anthony Canas  Outcome: Met This Shift  9/24/2020 0118 by Haseeb Carvalho  Outcome: Met This Shift  Goal: Absence of new skin breakdown  Description: Absence of new skin breakdown  9/24/2020 1025 by Anthony Canas  Outcome: Met This Shift  9/24/2020 0142 by Humberto Riggs RN  Outcome: Met This Shift  9/24/2020 0118 by Haseeb Carvalho  Outcome: Met This Shift     Problem: Pain:  Goal: Pain level will decrease  Description: Pain level will decrease  Outcome: Met This Shift  Goal: Control of acute pain  Description: Control of acute pain  9/24/2020 0142 by Humberto Riggs RN  Outcome: Met This Shift  9/24/2020 0118 by Haseeb Carvalho  Outcome: Met This Shift

## 2020-09-24 NOTE — CARE COORDINATION
Spoke with the pt's wife per phone. She is agreeable to [address]. HENS, ambulance form and envelope in soft chart. will follow.  Maikel Joyner RN -[phone]

## 2020-09-24 NOTE — PROGRESS NOTES
Department of Orthopedic Surgery  Resident Progress Note    POD #1  Left hip doing well. Patient complaining of right upper arm pain first noticed last night. Imaging of right elbow, humerus, and shoulder have been ordered  WBAT L LE, PT/OT, review right UE imaging once resulted. Patient seen and examined. Pain controlled. Complaining of right upper arm pain about the biceps muscle belly. He first noticed it last night. Denies chest pain, shortness of breath, dizziness/lightheadedness.     VITALS:  BP (!) 171/73   Pulse 71   Temp 97.8 °F (36.6 °C) (Temporal)   Resp 17   Ht 5' 10\" (1.778 m)   Wt 140 lb (63.5 kg)   SpO2 97%   BMI 20.09 kg/m²     General: in no acute distress    MUSCULOSKELETAL:   left lower extremity:  · Dressing C/D/I  · Compartments soft and compressible  · +PF/DF/EHL  · +2/4 DP & PT pulses, Brisk Cap refill, Toes warm and perfused  · Distal sensation grossly intact to Peroneals, Sural, Saphenous, and tibial nrs    Right upper extremity  · Skin intact circumferentially, no edema, erythema, or ecchymosis appreciated  · Minimal TTP biceps muscle belly  · Compartments soft and compressible  · +AIN/PIN/Ulnar nerve function intact grossly  · +Radial pulse, Brisk Cap refill, hand warm and perfused  · Sensation intact to touch in radial/ulnar/median nerve distributions to hand      CBC:   Lab Results   Component Value Date    WBC 11.2 09/23/2020    HGB 13.1 09/23/2020    HCT 37.4 09/23/2020     09/23/2020     PT/INR:    Lab Results   Component Value Date    PROTIME 12.0 09/23/2020    INR 1.1 09/23/2020       ASSESSMENT  · S/P Left hip ORIF 9/23/2020  · Right UE pain - imaging pending    PLAN      · Continue physical therapy and protocol: WBAT - L LE  · Right elbow, humerus, and shoulder imaging  · 24 hour abx coverage - Ancef 2g to be completed at 0800 today  · Deep venous thrombosis prophylaxis - Lovenox 40 mg subcutaneous, early mobilization  · PT/OT  · Pain Control: IV and PO  · Monitor

## 2020-09-24 NOTE — PROGRESS NOTES
Right upper extremity xrays reviewed. No acute findings. Will continue to follow. WBAT R UE.     Electronically signed by Jonah Linn DO on 9/24/2020 at 2:42 PM

## 2020-09-24 NOTE — PROGRESS NOTES
Physical Therapy    Physical Therapy Initial Assessment     Name: Noam Dennis  : 1940  MRN: [id number]    Referring Provider:  Jason Singh DO    Date of Service: 2020    Evaluating PT:  Libia Doctor PTROD PT [id number]    Room #:  1173/7464-V  Diagnosis:  L Hip intertrochanteric fracture  PMHx/PSHx:  PAD, BPH s/p TURP  Procedure/Surgery:  L Hip ORIF 2020  Precautions:  WBAT LLE, Falls  Equipment Needs:  TBD    SUBJECTIVE:    Pt lives with spouse in a 1 story home with ramped entrance. Bed is on first floor and bath is on first floor. Pt ambulated with no device independently PTA. Equipment Owned:    OBJECTIVE:   Initial Evaluation  Date: 2020 Treatment Short Term/ Long Term   Goals   AM-PAC 6 Clicks      Was pt agreeable to Eval/treatment? Yes     Does pt have pain? Severe pain L hip     Bed Mobility  Rolling: NT  Supine to sit: MaxA  Sit to supine: NT  Scooting: MaxA  Rolling: Independent  Supine to sit:  Independent  Sit to supine: Independent  Scooting: Independent     Transfers Sit to stand: ModA  Stand to sit: ModA  Stand pivot: ModA [employer]  Sit to stand: Modified Independent    Stand to sit: Modified Independent    Stand pivot: Modified Independent     Ambulation    5 feet with ModA [employer]  >150 feet with Modified Independent  [employer]   Stair negotiation: ascended and descended  NT  >4 steps with single rail Modified Independent     ROM BUE:  See OT Note  BLE:  WFL     Strength BUE:  See OT Note  LLE: 2/5 hip and knee  3/5  RLE: 4/5  Improve Strength 1/3 MMT Grade   Balance Sitting EOB:  SBA  Dynamic Standing:  ModA [employer]  Sitting EOB:  Independent    Dynamic Standing:  Modified Independent       Pt is A & O x 4  Sensation:  Pt denies numbness and tingling to extremities  Edema:  WNL      Patient education  Pt educated on role of PT    Patient response to education:   Pt verbalized understanding Pt demonstrated skill Pt requires further education in this area   x x x ASSESSMENT:    Comments:  Pt received in supine agreeable to PT evaluation. Pt educated on WBAT. Pt requiring increased time with all mobility due to pain in L Hip as well as anticipation of pain. Pt required assistance of LLE and trunk for bed mobility. Pt requiring cues for proper hand placement, positioning and sequencing using [facility]. Pt ambulating with step to, antalgic gait pattern. Patient would benefit from continued skilled PT to maximize functional mobility independence. Treatment:  Patient practiced and was instructed in the following treatment:     Bed mobility - verbal cues for positioning and sequencing to facilitate independence   Functional transfers-Verbal cues for proper positioning and sequencing to perform transfers safely with maximum independence.  Gait training-Verbal cues for proper positioning and sequencing using assistive device to maximize functional mobility independence. Pt's/ family goals   1. Get better    Patient and or family understand(s) diagnosis, prognosis, and plan of care. yes    PLAN:      PLAN OF CARE:    Current Treatment Recommendations     [x] Strengthening     [x] ROM   [x] Balance Training   [x] Endurance Training   [x] Transfer Training   [x] Gait Training   [x] Stair Training   [x] Positioning   [x] Safety and Education Training   [x] Patient/Caregiver Education   [x] HEP  [] Other       PT care will be provided in accordance with the objectives noted above. Exercises and functional mobility practice will be used as well as appropriate assistive devices or modalities to obtain goals. Patient and family education will also be administered as needed. Frequency of treatments: 5-7x/week x 1-2 weeks.     Time in  1130  Time out  1155    Total Treatment Time  10 minutes     Evaluation Time includes thorough review of current medical information, gathering information on past medical history/social history and prior level of function, completion of standardized testing/informal observation of tasks, assessment of data and education on plan of care and goals.     CPT codes:  [x] Low Complexity PT evaluation 71119  [] Moderate Complexity PT evaluation 10668  [] High Complexity PT evaluation 54900  [] PT Re-evaluation 54963  [] Gait training 12603 0 minutes  [] Manual therapy 20907 0 minutes  [x] Therapeutic activities 31059 10 minutes  [] Therapeutic exercises 56859 0 minutes  [] Neuromuscular reeducation 37322 0 minutes       Foster Lee PT, DPT   [id number]

## 2020-09-24 NOTE — PLAN OF CARE
Problem: Falls - Risk of:  Goal: Will remain free from falls  Description: Will remain free from falls  9/24/2020 0142 by Sangeeta Perez RN  Outcome: Met This Shift     Problem: Falls - Risk of:  Goal: Absence of physical injury  Description: Absence of physical injury  9/24/2020 0142 by Sangeeta Perez RN  Outcome: Met This Shift     Problem: Skin Integrity:  Goal: Absence of new skin breakdown  Description: Absence of new skin breakdown  9/24/2020 0142 by Sangeeta Perez RN  Outcome: Met This Shift     Problem: Pain:  Goal: Control of acute pain  Description: Control of acute pain  9/24/2020 0142 by Sangeeta Perez RN  Outcome: Met This Shift

## 2020-09-25 VITALS
WEIGHT: 155.87 LBS | DIASTOLIC BLOOD PRESSURE: 85 MMHG | TEMPERATURE: 98.1 F | OXYGEN SATURATION: 98 % | HEIGHT: 70 IN | BODY MASS INDEX: 22.31 KG/M2 | SYSTOLIC BLOOD PRESSURE: 143 MMHG | RESPIRATION RATE: 16 BRPM | HEART RATE: 86 BPM

## 2020-09-25 LAB
METER GLUCOSE: 201 MG/DL (ref 74–99)
METER GLUCOSE: 206 MG/DL (ref 74–99)
METER GLUCOSE: 215 MG/DL (ref 74–99)

## 2020-09-25 PROCEDURE — 97530 THERAPEUTIC ACTIVITIES: CPT

## 2020-09-25 PROCEDURE — 97535 SELF CARE MNGMENT TRAINING: CPT

## 2020-09-25 PROCEDURE — 6360000002 HC RX W HCPCS: Performed by: STUDENT IN AN ORGANIZED HEALTH CARE EDUCATION/TRAINING PROGRAM

## 2020-09-25 PROCEDURE — 6370000000 HC RX 637 (ALT 250 FOR IP): Performed by: INTERNAL MEDICINE

## 2020-09-25 PROCEDURE — 6370000000 HC RX 637 (ALT 250 FOR IP): Performed by: STUDENT IN AN ORGANIZED HEALTH CARE EDUCATION/TRAINING PROGRAM

## 2020-09-25 PROCEDURE — 51798 US URINE CAPACITY MEASURE: CPT

## 2020-09-25 PROCEDURE — 51702 INSERT TEMP BLADDER CATH: CPT

## 2020-09-25 PROCEDURE — 82962 GLUCOSE BLOOD TEST: CPT

## 2020-09-25 PROCEDURE — 6370000000 HC RX 637 (ALT 250 FOR IP): Performed by: NURSE PRACTITIONER

## 2020-09-25 RX ORDER — OXYCODONE HYDROCHLORIDE 5 MG/1
5 TABLET ORAL EVERY 6 HOURS PRN
Qty: 5 TABLET | Refills: 0 | Status: SHIPPED | OUTPATIENT
Start: 2020-09-25 | End: 2020-09-28

## 2020-09-25 RX ORDER — SENNA AND DOCUSATE SODIUM 50; 8.6 MG/1; MG/1
1 TABLET, FILM COATED ORAL 2 TIMES DAILY
DISCHARGE
Start: 2020-09-25 | End: 2020-12-10

## 2020-09-25 RX ORDER — POLYETHYLENE GLYCOL 3350 17 G/17G
17 POWDER, FOR SOLUTION ORAL DAILY PRN
Qty: 527 G | Refills: 1 | DISCHARGE
Start: 2020-09-25 | End: 2020-10-25

## 2020-09-25 RX ADMIN — OXYCODONE 5 MG: 5 TABLET ORAL at 08:48

## 2020-09-25 RX ADMIN — DOCUSATE SODIUM 50 MG AND SENNOSIDES 8.6 MG 1 TABLET: 8.6; 5 TABLET, FILM COATED ORAL at 08:48

## 2020-09-25 RX ADMIN — MULTIPLE VITAMINS W/ MINERALS TAB 1 TABLET: TAB at 08:49

## 2020-09-25 RX ADMIN — ACETAMINOPHEN 650 MG: 325 TABLET, FILM COATED ORAL at 08:49

## 2020-09-25 RX ADMIN — OXYCODONE HYDROCHLORIDE 10 MG: 10 TABLET ORAL at 17:28

## 2020-09-25 RX ADMIN — ENOXAPARIN SODIUM 40 MG: 40 INJECTION SUBCUTANEOUS at 08:49

## 2020-09-25 RX ADMIN — AMLODIPINE BESYLATE 5 MG: 5 TABLET ORAL at 08:49

## 2020-09-25 ASSESSMENT — PAIN DESCRIPTION - LOCATION: LOCATION: HIP;LEG

## 2020-09-25 ASSESSMENT — PAIN SCALES - GENERAL
PAINLEVEL_OUTOF10: 0
PAINLEVEL_OUTOF10: 6
PAINLEVEL_OUTOF10: 3
PAINLEVEL_OUTOF10: 5
PAINLEVEL_OUTOF10: 0

## 2020-09-25 ASSESSMENT — PAIN DESCRIPTION - DESCRIPTORS: DESCRIPTORS: ACHING;DISCOMFORT;DULL;SORE

## 2020-09-25 ASSESSMENT — PAIN DESCRIPTION - ORIENTATION: ORIENTATION: LEFT

## 2020-09-25 ASSESSMENT — PAIN DESCRIPTION - FREQUENCY: FREQUENCY: INTERMITTENT

## 2020-09-25 ASSESSMENT — PAIN DESCRIPTION - PAIN TYPE: TYPE: ACUTE PAIN

## 2020-09-25 NOTE — CARE COORDINATION
9/25/2020 social work transition of care [address] to transport pt at 6 pm this evening. Sw notified nursing and left message for pt's spouse.   Electronically signed by SILVIA Rivas on 9/25/2020 at 3:18 PM

## 2020-09-25 NOTE — DISCHARGE SUMMARY
Physician Discharge Summary     Patient ID:  Azeb Lorenz  [id number]  89 y.o.  1940    Admit date: 9/23/2020    Discharge date and time:  9/25/2020    Discharge Diagnoses: Principal Problem:    Closed nondisplaced intertrochanteric fracture of left femur [facility])  Active Problems:    Essential hypertension    PVD (peripheral vascular disease) with claudication ([facility])    S/P peripheral artery angioplasty    Fall  Resolved Problems:    * No resolved hospital problems. *      Consults: IP CONSULT TO INTERNAL MEDICINE  IP CONSULT TO SOCIAL WORK  IP CONSULT TO CASE MANAGEMENT  IP CONSULT TO ORTHOPEDIC SURGERY  IP CONSULT TO CASE MANAGEMENT  IP CONSULT TO SOCIAL WORK  IP CONSULT TO UROLOGY    Procedures: See below    Hospital Course: 71-year-old male history of hypertension, PAD admitted with fall and left hip fracture S/P ORIF 9/23     Admit  Pain control  Bowel regimen  Start [place]  Orthopedic Consult appreciated   Patient may proceed to surgery without further risk stratification  Medications for other co morbidities cont as appropriate w dosage adjustments as necessary  PT/OT  DVT PPx  DC planning  DC to SNF       Discharge Exam:  See progress note from today    Condition:  Stable    Disposition: SNF    Patient Instructions:   Current Discharge Medication List      START taking these medications    Details   oxyCODONE (ROXICODONE) 5 MG immediate release tablet Take 1 tablet by mouth every 6 hours as needed for Pain for up to 3 days.   Qty: 5 tablet, Refills: 0    Comments: Reduce doses taken as pain becomes manageable  Associated Diagnoses: Closed nondisplaced intertrochanteric fracture of left femur, initial encounter ([facility])      sennosides-docusate sodium (SENOKOT-S) 8.6-50 MG tablet Take 1 tablet by mouth 2 times daily  Qty:        polyethylene glycol (GLYCOLAX) 17 g packet Take 17 g by mouth daily as needed for Constipation  Qty: 527 g, Refills: 1         CONTINUE these medications which have NOT CHANGED Details   Garlic 2684 MG TBEC Take 1 capsule by mouth daily      Multiple Vitamins-Minerals (THERAPEUTIC MULTIVITAMIN-MINERALS) tablet Take 1 tablet by mouth daily         STOP taking these medications       NONFORMULARY Comments:   Reason for Stopping:             Activity: activity as tolerated  Diet: regular diet    Follow-up with 1wk PCPsharron    Note that over 30 minutes was spent in preparing discharge papers, discussing discharge with patient, staff, consultants, medication review, arranging follow up, etc.    Signed:  Surya Andrade MD  9/25/2020  2:58 PM

## 2020-09-25 NOTE — PROGRESS NOTES
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 ([address] 2019    Blood circulation, collateral      Cellulitis of foot, left 2017    Chronic venous insufficiency 2019    Femoral-popliteal atherosclerosis ([address] 2017    Heel ulcer, right, with fat layer exposed ([address] 2019    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 Bathroom setup: walk in shower   Equipment owned: shower bench     Prior Level of Function: Pt. states Ind. with ADLs , Ind. with IADLs; ambulated no A.D.   Driving: active  Occupation: retired      Pain Level: Pt reports intense pain with movement  Cognition: A&O: 4/4; Follows 2 step directions              Memory:  good              Sequencing:  fair-              Problem solving:  fair-              Judgement/safety:  fair-                Functional Assessment:    Initial Eval Status  Date: 2020 Treatment Status  Date: 2020 Short Term Goals = Long Term Goals  Treatment frequency: 3-5 days   Feeding Mod I  Set up     Grooming Minimal Assist once seated  Min A  To comb hair seated EOB. Cuing to complete task. Modified [place]    UB Dressing Minimal Assist  Mod A  To don/doff gown while seated EOB  Modified [place]    LB Dressing Dependent   Dependent  To don/doff socks seated EOB Supervision    Bathing Maximal Assist with sim. task Max A  simulated  Modified [place]    Toileting Maximal Assist   Max A  Recommend bedside commode at this time Modified [place]    Bed Mobility  Supine to sit: NT  Sit to supine:NT  max A- supine to sit  Educated pt on technique to increase independence. Supine to sit: Modified [place]   Sit to supine: Modified [place]    Functional Transfers Moderate Assist with sit <> stand, BSC with ww with increased time Mod A- sit<->stand  Cuing for hand placement    Modified [place]    Functional Mobility Moderate Assist with ww few steps, short mobility with increased time, cues to sequence Mod A  Short home distance using w/w. Cuing for sequencing  Modified [place]    Balance Sitting:     Static:  Sup    Dynamic:SBA  Standing: Mod A Sitting:     Static:  CGA    Dynamic: Mod A  Standing: Mod A      Activity Tolerance Fair- with lt. ax.  spO2 & HR WFL. Pt. requires increased time & cues to sequence steps Muriel Moreap with mod. ax. Visual/  Perceptual Glasses: readers          Safety Awareness fair  fair-                   good       Comments: Upon arrival pt supine in bed. Pt educated on techniques to increase independence and safety during ADL's, bed mobility, and functional transfers. At end of session pt left seated in bedside chair, call light within reach, alarm set. · Pt has made fair progress towards set goals.      · Continue with current plan of care    Treatment Time In: 10:25            Treatment Time Out: 11:10             Treatment Charges: Mins Units   Ther Ex  96750     Manual Therapy [address] [place] [address] 13 1   ADL/Home Mgt 94196 10 1   Neuro Re-ed 07422     Group Therapy      Orthotic manage/training  82227     Non-Billable Time 22    Total Timed Treatment 23 [id number] [place] [address]

## 2020-09-25 NOTE — PROGRESS NOTES
Subjective:  Feeling better   No CP or SOB  No fever or chills   No uncontrolled pain  No vomiting or diarrhea       Objective:    BP (!) 178/70   Pulse 81   Temp 99.6 °F (37.6 °C) (Temporal)   Resp 18   Ht 5' 10\" (1.778 m)   Wt 155 lb 13.8 oz (70.7 kg)   SpO2 96%   BMI 22.36 kg/m²     24HR INTAKE/OUTPUT:      Intake/Output Summary (Last 24 hours) at 9/25/2020 1108  Last data filed at 9/25/2020 1047  Gross per 24 hour   Intake 1346 ml   Output 1800 ml   Net -454 ml   *  General appearance: NAD, conversant  Neck: FROM, supple   Lungs: Clear bilaterally no wheezes, no rhonchi, no crackles  CV: RRR, no MRGs; normal carotid upstroke and amplitude without Bruits  Abdomen: Soft, non-tender; no masses or HSM  Extremities: No edema, no cyanosis, no clubbing  Skin: Intact no rash, no lesions, no ulcers    Psych: Alert and oriented normal affect  Neuro: Nonfocal  Most Recent Labs  Lab Results   Component Value Date    WBC 11.2 09/23/2020    HGB 13.1 09/23/2020    HCT 37.4 09/23/2020     09/23/2020     09/23/2020    K 4.2 09/23/2020    CL 97 (L) 09/23/2020    CREATININE 0.9 09/23/2020    BUN 20 09/23/2020    CO2 24 09/23/2020    GLUCOSE 157 (H) 09/23/2020    ALT 11 12/06/2019    AST 14 12/06/2019    INR 1.1 09/23/2020    LABA1C 6.2 (H) 12/06/2019     No results for input(s): MG in the last 72 hours. Lab Results   Component Value Date    CALCIUM 9.7 09/23/2020        XR HUMERUS RIGHT (MIN 2 VIEWS)   Final Result      1. Unremarkable chest.   2. No acute fracture or dislocation involving the right shoulder,   humerus or right elbow. 3. 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 XR SHOULDER RIGHT (MIN 2 VIEWS)   Final Result      1. Unremarkable chest.   2. No acute fracture or dislocation involving the right shoulder,   humerus or right elbow. 3. 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 XR ELBOW RIGHT (MIN 3 VIEWS)   Final Result      1.  Unremarkable chest.   2. No acute fracture or dislocation involving the right shoulder,   humerus or right elbow. 3. 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 XR CHEST 1 VIEW   Final Result      1. Unremarkable chest.   2. No acute fracture or dislocation involving the right shoulder,   humerus or right elbow. 3. 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 XR HIP 2-3 VW W PELVIS LEFT   Final Result   After ORIF for fracture proximal left femur. FLUORO FOR SURGICAL PROCEDURES   Final Result   Intraoperative fluoroscopy provided for open reduction   and internal fixation of intertrochanteric hip fracture. The study was dictated by Lashawn Darden PA-C and Justin Winston MD   reviewed and concurred with the findings. XR CHEST PORTABLE   Final Result   No acute cardiopulmonary findings. XR FEMUR LEFT (MIN 2 VIEWS)   Final Result   Intertrochanteric fracture at the left femur seen in the shoot through   lateral projection. XR HIP LEFT (2-3 VIEWS)   Final Result   Intertrochanteric fracture at the left femur seen in the shoot through   lateral projection. Assessment    Principal Problem:    Closed nondisplaced intertrochanteric fracture of left femur (HCC)  Active Problems:    Essential hypertension    PVD (peripheral vascular disease) with claudication (HCC)    S/P peripheral artery angioplasty    Fall  Resolved Problems:    * No resolved hospital problems.  *      Plan:  18-year-old male history of hypertension, PAD admitted with fall and left hip fracture S/P ORIF 9/23    Admit  Pain control  Bowel regimen  Start [facility]  Orthopedic Consult appreciated   Patient may proceed to surgery without further risk stratification  Medications for other co morbidities cont as appropriate w dosage adjustments as necessary  PT/OT  DVT PPx  DC planning  DC to SNF      Electronically signed by Alvino Turner MD on 9/25/2020 at 11:08 AM

## 2020-09-25 NOTE — PLAN OF CARE
Problem: Falls - Risk of:  Goal: Will remain free from falls  9/25/2020 1956 by Itzel Mckeon RN  Outcome: Met This Shift     Problem: Falls - Risk of:  Goal: Absence of physical injury  9/25/2020 1956 by Itzel Mckeon RN  Outcome: Met This Shift

## 2020-09-25 NOTE — PROGRESS NOTES
Indwelling catheter was removed @ 1045 today by Zaid Luong LPN prior to my arrival. Wily Jackson, charge RN, spoke to REscour with urology to update that patient has not voided yet. See new order.     Electronically signed by Itzel Mckeon RN on 9/25/2020 at 3:46 PM

## 2020-09-25 NOTE — PROGRESS NOTES
Spoke with security re: patient valuables in their possession and anticipated discharge at 1800 today.     Electronically signed by Lucian Muro RN on 9/25/2020 at 5:48 PM

## 2020-09-25 NOTE — PROGRESS NOTES
Physical Therapy    Physical Therapy Treatment Note     Name: Renetta Gomes  : 1940  MRN: [id number]    Referring Provider:  José Antonio Mitchell DO    Date of Service: 2020    Evaluating PT:  Jolly Pritchard PT, DPT PT [id number]    Room #:  7639/0305-C  Diagnosis:  L Hip intertrochanteric fracture  PMHx/PSHx:  PAD, BPH s/p TURP  Procedure/Surgery:  L Hip ORIF 2020  Precautions:  WBAT LLE, Falls  Equipment Needs:  TBD    SUBJECTIVE:    Pt lives with spouse in a 1 story home with ramped entrance. Bed is on first floor and bath is on first floor. Pt ambulated with no device independently PTA. Equipment Owned:    OBJECTIVE:   Initial Evaluation  Date: 2020 Treatment  2020 Short Term/ Long Term   Goals   AM-PAC 6 Clicks  38/39    Was pt agreeable to Eval/treatment? Yes Yes     Does pt have pain? Severe pain L hip Significant L hip pain     Bed Mobility  Rolling: NT  Supine to sit: MaxA  Sit to supine: NT  Scooting: MaxA Rolling mod A   Supine to sit max A  Scooting max A  Rolling: Independent  Supine to sit:  Independent  Sit to supine: Independent  Scooting: Independent     Transfers Sit to stand: ModA  Stand to sit: ModA  Stand pivot: ModA [employer] Sit to stand mod/max A  Stand to sit mod/max A  Sit to stand: Modified Independent    Stand to sit: Modified Independent    Stand pivot: Modified Independent     Ambulation    5 feet with ModA [employer] 10 feet and 5 feet with ww with mod A  >150 feet with Modified Independent  [employer]   Stair negotiation: ascended and descended  NT NT >4 steps with single rail Modified Independent     ROM BUE:  See OT Note  BLE:  WFL     Strength BUE:  See OT Note  LLE: 2/5 hip and knee  3/5  RLE: 4/5  Improve Strength 1/3 MMT Grade   Balance Sitting EOB:  SBA  Dynamic Standing:  ModA [employer]  Sitting EOB:  Independent    Dynamic Standing:  Modified Independent       Performed supine exercises to L LE ( hip abduction and heel slides x15 ) to promote strength and flexibility      Patient education  Pt educated on technique with bed mobility     Patient response to education:   Pt verbalized understanding Pt demonstrated skill Pt requires further education in this area   x X with verbal instruction and assistance x     ASSESSMENT:    Comments:Nursing cleared pt for physical therapy. Pt in bed upon arrival and agreed to participate in therapy. Pt completed functional mobility as noted above. Pt sat on edge of bed x15 minute with min A with LOB x2 to R requiring min/mod A . Pt required verbal cues for sequencing with ambulation with decreased carry over. Narrow base of support noted with ambulation. Pt completed exercises as noted above. Pt remained in chair ( with alarm activated ) and call light in reach. Treatment:  Patient practiced and was instructed in the following treatment:     Bed mobility - Verbal instruction for technique with bed mobility. Assistance required to complete  Task.  Functional transfers-Verbal instruction for hand placement and technique with transfers. Assistance required to complete task.  Gait training-Verbal instruction with technique and sequencing during ambulation. Assistance required to complete task.  Education on B LE ROM to promote strength        PLAN OF CARE:    Current Treatment Recommendations     [x] Strengthening     [x] ROM   [x] Balance Training   [x] Endurance Training   [x] Transfer Training   [x] Gait Training   [x] Stair Training   [x] Positioning   [x] Safety and Education Training   [x] Patient/Caregiver Education   [x] HEP  [] Other     Time in  1025  Time out  1110    Total Treatment Time  25 minutes     Evaluation Time includes thorough review of current medical information, gathering information on past medical history/social history and prior level of function, completion of standardized testing/informal observation of tasks, assessment of data and education on plan of care and goals.     CPT codes:  [] Low Complexity PT evaluation 38777  [] Moderate Complexity PT evaluation 91924  [] High Complexity PT evaluation 51112  [] PT Re-evaluation 70170  [] Gait training 71583 0 minutes  [] Manual therapy 93894 0 minutes  [x] Therapeutic activities 81312 25 minutes  [] Therapeutic exercises 64958 0 minutes  [] Neuromuscular reeducation 60774 0 minutes       Shazia Vieyra [id number]

## 2020-09-25 NOTE — PROGRESS NOTES
Department of Orthopedic Surgery  Resident Progress Note    POD #2  Left hip doing well. Patient complaining of right upper arm pain first noticed last night. Imaging of right elbow, humerus, and shoulder are negative for acute fracture. Walked 5 feet with therapy yesterday. Patient seen and examined. Pain controlled. Complaining of right upper arm pain about the biceps muscle belly. He first noticed it last night. Denies chest pain, shortness of breath, dizziness/lightheadedness.     VITALS:  BP (!) 148/65   Pulse 84   Temp 98.5 °F (36.9 °C) (Temporal)   Resp 16   Ht 5' 10\" (1.778 m)   Wt 155 lb 13.8 oz (70.7 kg)   SpO2 92%   BMI 22.36 kg/m²     General: in no acute distress    MUSCULOSKELETAL:   left lower extremity:  · Dressing C/D/I  · Compartments soft and compressible  · +PF/DF/EHL  · +2/4 DP & PT pulses, Brisk Cap refill, Toes warm and perfused  · Distal sensation grossly intact to Peroneals, Sural, Saphenous, and tibial nrs    Right upper extremity  · Skin intact circumferentially, no edema, erythema, or ecchymosis appreciated  · Minimal TTP biceps muscle belly, + hook to distal biceps  · Compartments soft and compressible  · +AIN/PIN/Ulnar nerve function intact grossly  · +Radial pulse, Brisk Cap refill, hand warm and perfused  · Sensation intact to touch in radial/ulnar/median nerve distributions to hand      CBC:   Lab Results   Component Value Date    WBC 11.2 09/23/2020    HGB 13.1 09/23/2020    HCT 37.4 09/23/2020     09/23/2020     PT/INR:    Lab Results   Component Value Date    PROTIME 12.0 09/23/2020    INR 1.1 09/23/2020       ASSESSMENT  · S/P Left hip ORIF 9/23/2020  · Right UE pain    PLAN      · Continue physical therapy and protocol: WBAT - L LE  · Right elbow, humerus, and shoulder imaging are negative for acute fractures  · 24 hour abx coverage - completed  · Urology to manage rubio at this time  · Deep venous thrombosis prophylaxis - Lovenox 40 mg subcutaneous, early mobilization  · PT/OT  · Pain Control: IV and PO, wean to PO as able  · Monitor H&H - labs yet to be collected  · Plan to be discussed with attending

## 2020-09-26 ENCOUNTER — HOSPITAL ENCOUNTER (OUTPATIENT)
Age: 80
Discharge: HOME | End: 2020-09-28
Payer: MEDICARE

## 2020-09-26 PROCEDURE — U0003 INFECTIOUS AGENT DETECTION BY NUCLEIC ACID (DNA OR RNA); SEVERE ACUTE RESPIRATORY SYNDROME CORONAVIRUS 2 (SARS-COV-2) (CORONAVIRUS DISEASE [COVID-19]), AMPLIFIED PROBE TECHNIQUE, MAKING USE OF HIGH THROUGHPUT TECHNOLOGIES AS DESCRIBED BY CMS-2020-01-R: HCPCS

## 2020-09-27 LAB
SARS-COV-2: NOT DETECTED
SOURCE: NORMAL

## 2020-10-02 ENCOUNTER — HOSPITAL ENCOUNTER (OUTPATIENT)
Age: 80
Discharge: HOME | End: 2020-10-04
Payer: MEDICARE

## 2020-10-02 PROCEDURE — U0003 INFECTIOUS AGENT DETECTION BY NUCLEIC ACID (DNA OR RNA); SEVERE ACUTE RESPIRATORY SYNDROME CORONAVIRUS 2 (SARS-COV-2) (CORONAVIRUS DISEASE [COVID-19]), AMPLIFIED PROBE TECHNIQUE, MAKING USE OF HIGH THROUGHPUT TECHNOLOGIES AS DESCRIBED BY CMS-2020-01-R: HCPCS

## 2020-10-06 ENCOUNTER — HOSPITAL ENCOUNTER (OUTPATIENT)
Age: 80
Discharge: HOME | End: 2020-10-08
Payer: MEDICARE

## 2020-10-06 LAB
SARS-COV-2: NOT DETECTED
SOURCE: NORMAL

## 2020-10-06 PROCEDURE — U0003 INFECTIOUS AGENT DETECTION BY NUCLEIC ACID (DNA OR RNA); SEVERE ACUTE RESPIRATORY SYNDROME CORONAVIRUS 2 (SARS-COV-2) (CORONAVIRUS DISEASE [COVID-19]), AMPLIFIED PROBE TECHNIQUE, MAKING USE OF HIGH THROUGHPUT TECHNOLOGIES AS DESCRIBED BY CMS-2020-01-R: HCPCS

## 2020-10-08 LAB
SARS-COV-2: NOT DETECTED
SOURCE: NORMAL

## 2020-10-13 ENCOUNTER — HOSPITAL ENCOUNTER (OUTPATIENT)
Age: 80
Discharge: HOME | End: 2020-10-15
Payer: MEDICARE

## 2020-10-13 PROCEDURE — U0003 INFECTIOUS AGENT DETECTION BY NUCLEIC ACID (DNA OR RNA); SEVERE ACUTE RESPIRATORY SYNDROME CORONAVIRUS 2 (SARS-COV-2) (CORONAVIRUS DISEASE [COVID-19]), AMPLIFIED PROBE TECHNIQUE, MAKING USE OF HIGH THROUGHPUT TECHNOLOGIES AS DESCRIBED BY CMS-2020-01-R: HCPCS

## 2020-10-14 LAB
SARS-COV-2: NOT DETECTED
SOURCE: NORMAL

## 2020-10-20 ENCOUNTER — HOSPITAL ENCOUNTER (OUTPATIENT)
Age: 80
Discharge: HOME | End: 2020-10-22
Payer: MEDICARE

## 2020-10-20 PROCEDURE — U0003 INFECTIOUS AGENT DETECTION BY NUCLEIC ACID (DNA OR RNA); SEVERE ACUTE RESPIRATORY SYNDROME CORONAVIRUS 2 (SARS-COV-2) (CORONAVIRUS DISEASE [COVID-19]), AMPLIFIED PROBE TECHNIQUE, MAKING USE OF HIGH THROUGHPUT TECHNOLOGIES AS DESCRIBED BY CMS-2020-01-R: HCPCS

## 2020-10-22 LAB
SARS-COV-2: NOT DETECTED
SOURCE: NORMAL

## 2020-10-23 ENCOUNTER — OFFICE VISIT (OUTPATIENT)
Dept: ORTHOPEDIC SURGERY | Age: 80
End: 2020-10-23

## 2020-10-23 VITALS — BODY MASS INDEX: 20.04 KG/M2 | HEIGHT: 70 IN | WEIGHT: 140 LBS | TEMPERATURE: 97.2 F
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
## 2020-10-23 PROCEDURE — 99024 POSTOP FOLLOW-UP VISIT: CPT | Performed by: ORTHOPAEDIC SURGERY

## 2020-10-23 RX ORDER — AMLODIPINE BESYLATE AND BENAZEPRIL HYDROCHLORIDE 5; 10 MG/1; MG/1
1 CAPSULE ORAL DAILY
COMMUNITY
End: 2020-12-10 | Stop reason: ALTCHOICE

## 2020-10-23 RX ORDER — IBUPROFEN 400 MG/1
400 TABLET ORAL EVERY 8 HOURS PRN
COMMUNITY
End: 2020-12-10 | Stop reason: ALTCHOICE

## 2020-10-23 NOTE — PROGRESS NOTES
Chief Complaint:   Chief Complaint   Patient presents with    Post-Op Check     Left hip ORIF 9/23/2020. Hip is doing well. HPI   50-year-old man 1 month after ORIF left hip intertrochanteric fracture with sliding hip screw. Also evaluated for right shoulder pain at that time with negative x-rays. Patient remains in subacute rehab. He states he is starting to walk on his own to the bathroom. States he has no pain in the left hip. Continues to have some right shoulder and upper arm discomfort.     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 (HCC)    Skin ulcer of right foot with fat layer exposed ([address]    S/P peripheral artery angioplasty    Diabetic ulcer of right foot associated with type 2 diabetes mellitus, limited to breakdown of skin ([address]    Closed nondisplaced intertrochanteric fracture of left femur ([address]    Fall       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 ([address] 4/25/2019    Blood circulation, collateral     Cellulitis of foot, left 1/1/2017    Chronic venous insufficiency 12/6/2019    Femoral-popliteal atherosclerosis ([address] 1/1/2017    Heel ulcer, right, with fat layer exposed ([address] 5/6/2019    Osteomyelitis of third toe of right foot ([address] 12/6/2019    S/P peripheral artery angioplasty 01/23/2020    bilateral legs -Brigette Meraz    Skin ulcer of right foot with fat layer exposed ([address] 12/9/2019    Ulcer of right leg, with fat layer exposed ([address] 5/6/2019    Varicose veins of leg with edema, 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       Past Surgical History:   Procedure Laterality Date    COLONOSCOPY      CYSTOSCOPY N/A 5/18/2020    SUPRAPUBIC TUBE PLACEMENT performed by Carrie Manrique MD at [facility] [place] like it. \" \"I had a reaction a long time ago, I know it affects my kidneys. \"    Other Rash     \"I get a rash on just my face if I eat Cumin or Chili. \"    Peanut-Containing Drug Products Itching       Social History     Socioeconomic History    Marital status:      Spouse name: None    Number of children: None    Years of education: None    Highest education level: None   Occupational History    None   Social Needs    Financial resource strain: None    Food insecurity     Worry: None     Inability: None    Transportation needs     Medical: None     Non-medical: None   Tobacco Use    Smoking status: Former Smoker     Packs/day: 0.50     Years: 2.00     Pack years: 1.00     Types: Cigarettes     Last attempt to quit: 1960     Years since quittin.8    Smokeless tobacco: Never Used   Substance and Sexual Activity    Alcohol use: No    Drug use: No    Sexual activity: None   Lifestyle    Physical activity     Days per week: None     Minutes per session: None    Stress: None   Relationships    Social connections     Talks on phone: None     Gets together: None     Attends Congregational service: None     Active member of club or organization: None     Attends meetings of clubs or organizations: None     Relationship status: None    Intimate partner violence     Fear of current or ex partner: None     Emotionally abused: None     Physically abused: None     Forced sexual activity: None   Other Topics Concern    None   Social History Narrative    None       Family History   Problem Relation Age of Onset    Heart Disease Mother     Heart Disease Father          Review of Systems   No fever, chills, or other constitutionalsymptoms. No numbness or other neuro symptoms. No chest pain. No dyspnea. [unfilled]   Left hip incision is well-healed without erythema fluctuance or drainage. No pain is elicited on left hip rotation.   Leg lengths are equal although he has some general lower extremity tightness. Right shoulder demonstrates no swelling or tenderness about the joint. Some diffuse soft tissue discomfort about the shoulder and upper arm with elevation forward and abduction greater than 90 degrees. Physical Exam    Patient is alert and oriented. Well-developed well-nourished. Pupils equal and reactive. Scleraeanicteric. Neck supple  Lungs clear. Cardiac rate and rhythm regular. Abdomen soft and nontender. Skin warm and dry. XRAY:   X-ray today AP pelvis with AP and lateral views left hip  Left hip intertrochanteric fracture in anatomic alignment with sliding hip screw fixation intact. Impression: Healing left hip intertrochanteric fracture with intact fixation in anatomic alignment. 4 view x-rays right shoulder from hospital reviewed. Mild degenerative changes. No fractures or subluxation seen. ASSESSMENT/PLAN:    Nolberto Kaplan was seen today for post-op check. Diagnoses and all orders for this visit:    Closed nondisplaced intertrochanteric fracture of left femur with routine healing, subsequent encounter  -     XR HIP 2-3 VW W PELVIS LEFT; Future    Strain of right shoulder, subsequent encounter    Continue PT/OT for right upper extremity range of motion and strengthening as well as weightbearing as tolerated left leg and lower extremity strengthening. Return in about 6 weeks (around 12/4/2020) for exam and xray left hip.        Sonia Ackerman MD    10/23/2020  9:30 AM

## 2020-10-27 ENCOUNTER — TELEPHONE (OUTPATIENT)
Dept: ORTHOPEDIC SURGERY | Age: 80
End: 2020-10-27

## 2020-10-27 NOTE — TELEPHONE ENCOUNTER
Patient called LM on VM. Wanted to inform RJB that Rt shoulder pain is no better. Pain is radiating to right elbow. States Ibuprofen is not working. Called patient back, spoke with wife. Above message is correct. Wife states he has not started Home PT/OT. They are expecting a visit from them today for evaluation.

## 2020-12-09 ENCOUNTER — OFFICE VISIT (OUTPATIENT)
Dept: ORTHOPEDIC SURGERY | Age: 80
End: 2020-12-09

## 2020-12-09 VITALS — BODY MASS INDEX: 20.04 KG/M2 | TEMPERATURE: 97.1 F | HEIGHT: 70 IN | WEIGHT: 140 LBS

## 2020-12-09 PROCEDURE — 99024 POSTOP FOLLOW-UP VISIT: CPT | Performed by: ORTHOPAEDIC SURGERY

## 2020-12-09 NOTE — PROGRESS NOTES
Chief Complaint:   Chief Complaint   Patient presents with    Post-Op Check     FU ORIF Lt hip fracture 9/23/2020. Doing well       HPI   2-1/2 months postop ORIF left hip intertrochanteric fracture. Says he has minimal hip discomfort only when he is first up. Walking without a cane. Right shoulder pain also improved with chiropractic treatment says he is doing well.     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 (HCC)    Skin ulcer of right foot with fat layer exposed ([address]    S/P peripheral artery angioplasty    Diabetic ulcer of right foot associated with type 2 diabetes mellitus, limited to breakdown of skin ([address]    Closed nondisplaced intertrochanteric fracture of left femur ([address]       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 ([address] 4/25/2019    Blood circulation, collateral     Cellulitis of foot, left 1/1/2017    Chronic venous insufficiency 12/6/2019    Femoral-popliteal atherosclerosis ([address] 1/1/2017    Heel ulcer, right, with fat layer exposed ([address] 5/6/2019    Osteomyelitis of third toe of right foot ([address] 12/6/2019    S/P peripheral artery angioplasty 01/23/2020    bilateral legs -[place]    Skin ulcer of right foot with fat layer exposed ([address] 12/9/2019    Ulcer of right leg, with fat layer exposed ([address] 5/6/2019    Varicose veins of leg with edema, 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       Past Surgical History:   Procedure Laterality Date    COLONOSCOPY      CYSTOSCOPY N/A 5/18/2020    SUPRAPUBIC TUBE PLACEMENT performed by Leonardo Gibson MD at [facility]  1990's    lense implants bilaterally    FOOT DEBRIDEMENT Left 01/04/2017    wound debridement and delayed primary closure    HIP FRACTURE SURGERY Left 9/23/2020    LEFT HIP OPEN REDUCTION INTERNAL FIXATION performed by Asia Victoria MD at [address]  04/23/2019    Dr. Lavella Harada- PTA ant tibial    OTHER SURGICAL HISTORY  12/17/2019    Dr Lavella Harada - PCI Right popliteal and Anterior tibial    PROSTATE BIOPSY  04/2016    SKIN BIOPSY  sept of 2018 last time    head and ears    TOE AMPUTATION Left 12/31/2016    2nd    TOE AMPUTATION Right 4/26/2019    AMPUTATION RIGHT SECOND TOE, FOOT DEBRIDEMENT performed by Aubrie Lopez DPM at [address] Right 12/10/2019    AMPUTATION RIGHT 3rd DIGIT WITH PARTIAL RESECTION OF THIRD METATARSAL performed by Aubrie Lopez DPM at [address] TURP N/A 5/18/2020    CYSTOSCOPY PLASMA LOOP TRANSURETHRAL RESECTION PROSTATE performed by Taryn Long MD at [address] [place] [address]         Current Outpatient Medications   Medication Sig Dispense Refill    amLODIPine-benazepril (LOTREL) 5-10 MG per capsule Take 1 capsule by mouth daily      ibuprofen (ADVIL;MOTRIN) 400 MG tablet Take 400 mg by mouth every 8 hours as needed for Pain      sennosides-docusate sodium (SENOKOT-S) 8.6-50 MG tablet Take 1 tablet by mouth 2 times daily      Garlic 1362 MG TBEC Take 1 capsule by mouth daily      Multiple Vitamins-Minerals (THERAPEUTIC MULTIVITAMIN-MINERALS) tablet Take 1 tablet by mouth daily       No current facility-administered medications for this visit. Allergies   Allergen Reactions    Latex Other (See Comments)     Pt unsure of reaction    Aspirin Other (See Comments)     \"It affects my kidneys. \"    Ciprofloxacin In D5w Itching    Pcn [Penicillins] Other (See Comments)     \"I just know my body doesn't like it. \" \"I had a reaction a long time ago, I know it affects my kidneys. \"    Other Rash     \"I get a rash on just my face if I eat Cumin or Chili. \"    Peanut-Containing Drug Products Itching       Social History     Socioeconomic History    Marital status:      Spouse name: None    Number of children: None    Years of education: None    Highest education level: None   Occupational History    None   Social Needs    Financial resource strain: None    Food insecurity     Worry: None     Inability: None    Transportation needs     Medical: None     Non-medical: None   Tobacco Use    Smoking status: Former Smoker     Packs/day: 0.50     Years: 2.00     Pack years: 1.00     Types: Cigarettes     Last attempt to quit: 1960     Years since quittin.9    Smokeless tobacco: Never Used   Substance and Sexual Activity    Alcohol use: No    Drug use: No    Sexual activity: None   Lifestyle    Physical activity     Days per week: None     Minutes per session: None    Stress: None   Relationships    Social connections     Talks on phone: None     Gets together: None     Attends Buddhist service: None     Active member of club or organization: None     Attends meetings of clubs or organizations: None     Relationship status: None    Intimate partner violence     Fear of current or ex partner: None     Emotionally abused: None     Physically abused: None     Forced sexual activity: None   Other Topics Concern    None   Social History Narrative    None       Family History   Problem Relation Age of Onset    Heart Disease Mother     Heart Disease Father          Review of Systems   No fever, chills, or other constitutionalsymptoms. No numbness or other neuro symptoms. No chest pain. No dyspnea. [unfilled]   No acute distress. He is able to stand from a chair with slight use of his arms and walk independently with a trace Trendelenburg gait. Able to single and leg stand bilaterally with good balance on the left leg. Leg lengths are clinically equal.  No pain on left hip rotation. Mild tenderness over the trochanteric aspect. No pain with right shoulder active range of motion.     Physical Exam    Patient is alert and oriented. Well-developed well-nourished. Pupils equal and reactive. Scleraeanicteric. Neck supple  Lungs clear. Cardiac rate and rhythm regular. Abdomen soft and nontender. Skin warm and dry. XRAY: X-ray today AP pelvis with AP and lateral views left hip. Left hip intertrochanteric fracture healing in anatomic alignment with sliding hip screw and plate intact no lucencies loss of fixation or other complicating feature. Impression: Healing left hip intertrochanteric fracture in anatomic alignment with intact fixation. Currently doing well. Reviewed the importance of continuing consistent exercises. Anticipate continued recovery for at least 9 months. ASSESSMENT/PLAN:    Milagro Pathak was seen today for post-op check. Diagnoses and all orders for this visit:    Closed nondisplaced intertrochanteric fracture of left femur with routine healing, subsequent encounter  -     XR HIP 2-3 VW W PELVIS LEFT; Future      He will follow-up for either issue if significant symptoms recur. Return if symptoms worsen or fail to improve.        Constantin Khalil MD    12/9/2020  1:33 PM

## 2020-12-10 ENCOUNTER — OFFICE VISIT (OUTPATIENT)
Dept: VASCULAR SURGERY | Age: 80
End: 2020-12-10
Payer: MEDICARE

## 2020-12-10 VITALS — WEIGHT: 140 LBS | BODY MASS INDEX: 20.04 KG/M2 | HEIGHT: 70 IN | RESPIRATION RATE: 16 BRPM

## 2020-12-10 PROBLEM — I70.239 ATHEROSCLEROSIS OF NATIVE ARTERY OF RIGHT LOWER EXTREMITY WITH ULCERATION (HCC): Status: RESOLVED | Noted: 2019-04-25 | Resolved: 2020-12-10

## 2020-12-10 PROBLEM — L97.511 DIABETIC ULCER OF RIGHT FOOT ASSOCIATED WITH TYPE 2 DIABETES MELLITUS, LIMITED TO BREAKDOWN OF SKIN (HCC): Status: RESOLVED | Noted: 2020-03-12 | Resolved: 2020-12-10

## 2020-12-10 PROBLEM — S72.145A CLOSED NONDISPLACED INTERTROCHANTERIC FRACTURE OF LEFT FEMUR (HCC): Status: RESOLVED | Noted: 2020-09-23 | Resolved: 2020-12-10

## 2020-12-10 PROBLEM — L97.512 SKIN ULCER OF RIGHT FOOT WITH FAT LAYER EXPOSED (HCC): Status: RESOLVED | Noted: 2019-12-09 | Resolved: 2020-12-10

## 2020-12-10 PROBLEM — E11.621 DIABETIC ULCER OF RIGHT FOOT ASSOCIATED WITH TYPE 2 DIABETES MELLITUS, LIMITED TO BREAKDOWN OF SKIN (HCC): Status: RESOLVED | Noted: 2020-03-12 | Resolved: 2020-12-10

## 2020-12-10 PROCEDURE — 99213 OFFICE O/P EST LOW 20 MIN: CPT | Performed by: SURGERY

## 2020-12-10 NOTE — PROGRESS NOTES
Chief Complaint:   Chief Complaint   Patient presents with    Check-Up     femoral popliteal atherosclerosis         HPI: Patient came to the office by himself, for the evaluation of vascular status of the legs, has undergone multiple toe amputation in the past, for diabetic foot infection and osteomyelitis of the toes, post angioplasty of the arteries of the right leg done by Dr. Rodriguez Pratt in the past, overall doing well, tells me the ulcers have healed completely      Patient denies any focal lateralizing neurological symptoms like loss of speech, vision or loss of function of extremity    Patient can walk 1 block at a time slowly, and denies any symptoms of rest pain    Allergies   Allergen Reactions    Latex Other (See Comments)     Pt unsure of reaction    Aspirin Other (See Comments)     \"It affects my kidneys. \"    Ciprofloxacin In D5w Itching    Pcn [Penicillins] Other (See Comments)     \"I just know my body doesn't like it. \" \"I had a reaction a long time ago, I know it affects my kidneys. \"    Other Rash     \"I get a rash on just my face if I eat Cumin or Chili. \"    Peanut-Containing Drug Products Itching       Current Outpatient Medications   Medication Sig Dispense Refill    Garlic 6035 MG TBEC Take 1 capsule by mouth daily      Multiple Vitamins-Minerals (THERAPEUTIC MULTIVITAMIN-MINERALS) tablet Take 1 tablet by mouth daily       No current facility-administered medications for this visit.         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 ([address] 4/25/2019    Blood circulation, collateral     Cellulitis of foot, left 1/1/2017    Chronic venous insufficiency 12/6/2019    Femoral-popliteal atherosclerosis ([address] 1/1/2017    Heel ulcer, right, with fat layer exposed ([address] 5/6/2019    Osteomyelitis of third toe of right foot ([address] 12/6/2019    S/P peripheral artery angioplasty 01/23/2020    bilateral legs -Gris Mejia    Skin ulcer of right foot with fat layer exposed ([facility] 12/9/2019    Ulcer of right leg, with fat layer exposed ([facility] 5/6/2019    Varicose veins of leg with edema, 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       Past Surgical History:   Procedure Laterality Date    COLONOSCOPY      CYSTOSCOPY N/A 5/18/2020    SUPRAPUBIC TUBE PLACEMENT performed by Luca Christine MD at [place] [address]    lense implants bilaterally    FOOT DEBRIDEMENT Left 01/04/2017    wound debridement and delayed primary closure    HIP FRACTURE SURGERY Left 9/23/2020    LEFT HIP OPEN REDUCTION INTERNAL FIXATION performed by Mara Salinas MD at [facility] HISTORY  04/23/2019    Dr. Andree Schafer- PTA ant tibial    OTHER SURGICAL HISTORY  12/17/2019    Dr Andree Schafer - PCI Right popliteal and Anterior tibial    PROSTATE BIOPSY  04/2016    SKIN BIOPSY  sept of 2018 last time    head and ears    TOE AMPUTATION Left 12/31/2016    2nd    TOE AMPUTATION Right 4/26/2019    AMPUTATION RIGHT SECOND TOE, FOOT DEBRIDEMENT performed by Ross Chery DPM at [place] [address] Right 12/10/2019    AMPUTATION RIGHT 3rd DIGIT WITH PARTIAL RESECTION OF THIRD METATARSAL performed by Ross Chery DPM at [facility] N/A 5/18/2020    CYSTOSCOPY PLASMA LOOP TRANSURETHRAL RESECTION PROSTATE performed by Luca Christine MD at [address] [place] [address]         Family History   Problem Relation Age of Onset    Heart Disease Mother     Heart Disease Father        Social History     Socioeconomic History    Marital status:      Spouse name: Not on file    Number of children: Not on file    Years of education: Not on file    Highest education level: Not on file   Occupational History    Not on file   Social Needs    Financial resource strain: Not on file    Food insecurity     Worry: Not on file     Inability: Not on file    Transportation needs     Medical: Not on file     Non-medical: Not on file Tobacco Use    Smoking status: Former Smoker     Packs/day: 0.50     Years: 2.00     Pack years: 1.00     Types: Cigarettes     Last attempt to quit: 1960     Years since quittin.9    Smokeless tobacco: Never Used   Substance and Sexual Activity    Alcohol use: No    Drug use: No    Sexual activity: Not on file   Lifestyle    Physical activity     Days per week: Not on file     Minutes per session: Not on file    Stress: Not on file   Relationships    Social connections     Talks on phone: Not on file     Gets together: Not on file     Attends [employer] service: Not on file     Active member of club or organization: Not on file     Attends meetings of clubs or organizations: Not on file     Relationship status: Not on file    Intimate partner violence     Fear of current or ex partner: Not on file     Emotionally abused: Not on file     Physically abused: Not on file     Forced sexual activity: Not on file   Other Topics Concern    Not on file   Social History Narrative    Not on file       Review of Systems:    Eyes:  No blurring, diplopia or vision loss. Respiratory:  No cough, pleuritic chest pain, dyspnea, or wheezing. Cardiovascular: No angina, palpitations . Musculoskeletal:  No arthritis or weakness. Neurologic:  No paralysis, paresis, paresthesia, seizures or headache. Physical Exam:  General appearance:  Alert, awake, oriented x 3. No distress. Eyes:  Conjunctivae appear normal; PERRL  Neck:  No jugular venous distention, lymphadenopathy or thyromegaly. No evidence of carotid bruit  Lungs:  Clear to ausculation bilaterally. No rhonchi, crackles, wheezes  Heart:  Regular rate and rhythm. No rub or murmur  Abdomen:  Soft, non-tender. No masses, organomegaly. Musculoskeletal : No joint effusions, tenderness swelling    Neuro: Speech is intact. Moving all extremities. No focal motor or sensory deficits      Extremities:  Both feet are warm to touch.  The color of both feet is

## 2020-12-11 ENCOUNTER — TELEPHONE (OUTPATIENT)
Dept: VASCULAR SURGERY | Age: 80
End: 2020-12-11

## 2020-12-11 NOTE — TELEPHONE ENCOUNTER
Notified patient of appointment at [address] [place] [address] for arterial studies on 12-17-20 at 7:30 am.  Bring insurance card and photo ID.
normal...

## 2020-12-17 ENCOUNTER — HOSPITAL ENCOUNTER (OUTPATIENT)
Dept: INTERVENTIONAL RADIOLOGY/VASCULAR | Age: 80
Discharge: HOME | End: 2020-12-19
Payer: MEDICARE

## 2020-12-17 PROCEDURE — 93923 UPR/LXTR ART STDY 3+ LVLS: CPT

## 2020-12-30 ENCOUNTER — TELEPHONE (OUTPATIENT)
Dept: VASCULAR SURGERY | Age: 80
End: 2020-12-30

## 2020-12-30 NOTE — TELEPHONE ENCOUNTER
Patient called for test results. Notified patient that results are good and to follow up with Dr. John Josue in one year.

## 2020-12-31 ENCOUNTER — TELEPHONE (OUTPATIENT)
Dept: VASCULAR SURGERY | Age: 80
End: 2020-12-31

## 2021-01-05 ENCOUNTER — APPOINTMENT (OUTPATIENT)
Dept: ULTRASOUND IMAGING | Age: 81
DRG: 300 | End: 2021-01-05
Payer: MEDICARE

## 2021-01-05 ENCOUNTER — HOSPITAL ENCOUNTER (INPATIENT)
Age: 81
LOS: 3 days | Discharge: NURSING, REHAB OR LONG-TERM CARE | DRG: 300 | End: 2021-01-09
Attending: EMERGENCY MEDICINE | Admitting: INTERNAL MEDICINE
Payer: MEDICARE

## 2021-01-05 ENCOUNTER — HOSPITAL ENCOUNTER (OUTPATIENT)
Dept: WOUND CARE | Age: 81
Discharge: HOME | End: 2021-01-05
Payer: MEDICARE

## 2021-01-05 ENCOUNTER — APPOINTMENT (OUTPATIENT)
Dept: GENERAL RADIOLOGY | Age: 81
DRG: 300 | End: 2021-01-05
Payer: MEDICARE

## 2021-01-05 VITALS
SYSTOLIC BLOOD PRESSURE: 122 MMHG | RESPIRATION RATE: 16 BRPM | HEIGHT: 70 IN | DIASTOLIC BLOOD PRESSURE: 74 MMHG | TEMPERATURE: 96.7 F | WEIGHT: 140 LBS | HEART RATE: 84 BPM | BODY MASS INDEX: 20.04 KG/M2

## 2021-01-05 DIAGNOSIS — L03.115 CELLULITIS OF RIGHT LOWER EXTREMITY: Primary | ICD-10-CM

## 2021-01-05 DIAGNOSIS — R73.9 HYPERGLYCEMIA: ICD-10-CM

## 2021-01-05 LAB
ALBUMIN SERPL-MCNC: 4.3 G/DL (ref 3.5–5.2)
ALP BLD-CCNC: 96 U/L (ref 40–129)
ALT SERPL-CCNC: 15 U/L (ref 0–40)
ANION GAP SERPL CALCULATED.3IONS-SCNC: 9 MMOL/L (ref 7–16)
AST SERPL-CCNC: 15 U/L (ref 0–39)
BASOPHILS ABSOLUTE: 0.07 E9/L (ref 0–0.2)
BASOPHILS RELATIVE PERCENT: 0.9 % (ref 0–2)
BILIRUB SERPL-MCNC: 0.5 MG/DL (ref 0–1.2)
BUN BLDV-MCNC: 25 MG/DL (ref 8–23)
C-REACTIVE PROTEIN: 4.8 MG/DL (ref 0–0.4)
CALCIUM SERPL-MCNC: 10.1 MG/DL (ref 8.6–10.2)
CHLORIDE BLD-SCNC: 101 MMOL/L (ref 98–107)
CO2: 27 MMOL/L (ref 22–29)
CREAT SERPL-MCNC: 1 MG/DL (ref 0.7–1.2)
EOSINOPHILS ABSOLUTE: 0.4 E9/L (ref 0.05–0.5)
EOSINOPHILS RELATIVE PERCENT: 5.1 % (ref 0–6)
GFR AFRICAN AMERICAN: >60
GFR NON-AFRICAN AMERICAN: >60 ML/MIN/1.73
GLUCOSE BLD-MCNC: 188 MG/DL (ref 74–99)
HCT VFR BLD CALC: 40.8 % (ref 37–54)
HEMOGLOBIN: 13.9 G/DL (ref 12.5–16.5)
IMMATURE GRANULOCYTES #: 0.02 E9/L
IMMATURE GRANULOCYTES %: 0.3 % (ref 0–5)
LACTIC ACID: 0.7 MMOL/L (ref 0.5–2.2)
LYMPHOCYTES ABSOLUTE: 1.24 E9/L (ref 1.5–4)
LYMPHOCYTES RELATIVE PERCENT: 15.7 % (ref 20–42)
MCH RBC QN AUTO: 30.6 PG (ref 26–35)
MCHC RBC AUTO-ENTMCNC: 34.1 % (ref 32–34.5)
MCV RBC AUTO: 89.9 FL (ref 80–99.9)
MONOCYTES ABSOLUTE: 0.62 E9/L (ref 0.1–0.95)
MONOCYTES RELATIVE PERCENT: 7.9 % (ref 2–12)
NEUTROPHILS ABSOLUTE: 5.54 E9/L (ref 1.8–7.3)
NEUTROPHILS RELATIVE PERCENT: 70.1 % (ref 43–80)
PDW BLD-RTO: 13.6 FL (ref 11.5–15)
PLATELET # BLD: 233 E9/L (ref 130–450)
PMV BLD AUTO: 9.6 FL (ref 7–12)
POTASSIUM REFLEX MAGNESIUM: 4.5 MMOL/L (ref 3.5–5)
RBC # BLD: 4.54 E12/L (ref 3.8–5.8)
SEDIMENTATION RATE, ERYTHROCYTE: 37 MM/HR (ref 0–15)
SODIUM BLD-SCNC: 137 MMOL/L (ref 132–146)
TOTAL PROTEIN: 8.1 G/DL (ref 6.4–8.3)
WBC # BLD: 7.9 E9/L (ref 4.5–11.5)

## 2021-01-05 PROCEDURE — 99214 OFFICE O/P EST MOD 30 MIN: CPT

## 2021-01-05 PROCEDURE — 99214 OFFICE O/P EST MOD 30 MIN: CPT | Performed by: SURGERY

## 2021-01-05 PROCEDURE — 96375 TX/PRO/DX INJ NEW DRUG ADDON: CPT

## 2021-01-05 PROCEDURE — 96365 THER/PROPH/DIAG IV INF INIT: CPT

## 2021-01-05 PROCEDURE — 93971 EXTREMITY STUDY: CPT | Performed by: RADIOLOGY

## 2021-01-05 PROCEDURE — 6360000002 HC RX W HCPCS: Performed by: STUDENT IN AN ORGANIZED HEALTH CARE EDUCATION/TRAINING PROGRAM

## 2021-01-05 PROCEDURE — 86140 C-REACTIVE PROTEIN: CPT

## 2021-01-05 PROCEDURE — 99284 EMERGENCY DEPT VISIT MOD MDM: CPT

## 2021-01-05 PROCEDURE — G0378 HOSPITAL OBSERVATION PER HR: HCPCS

## 2021-01-05 PROCEDURE — 83605 ASSAY OF LACTIC ACID: CPT

## 2021-01-05 PROCEDURE — 73590 X-RAY EXAM OF LOWER LEG: CPT

## 2021-01-05 PROCEDURE — 85651 RBC SED RATE NONAUTOMATED: CPT

## 2021-01-05 PROCEDURE — 2580000003 HC RX 258: Performed by: STUDENT IN AN ORGANIZED HEALTH CARE EDUCATION/TRAINING PROGRAM

## 2021-01-05 PROCEDURE — 85025 COMPLETE CBC W/AUTO DIFF WBC: CPT

## 2021-01-05 PROCEDURE — 2580000003 HC RX 258: Performed by: PHYSICIAN ASSISTANT

## 2021-01-05 PROCEDURE — 93971 EXTREMITY STUDY: CPT

## 2021-01-05 PROCEDURE — 80053 COMPREHEN METABOLIC PANEL: CPT

## 2021-01-05 PROCEDURE — 87040 BLOOD CULTURE FOR BACTERIA: CPT

## 2021-01-05 RX ORDER — SODIUM CHLORIDE 0.9 % (FLUSH) 0.9 %
10 SYRINGE (ML) INJECTION EVERY 12 HOURS SCHEDULED
Status: DISCONTINUED | OUTPATIENT
Start: 2021-01-05 | End: 2021-01-09 | Stop reason: HOSPADM

## 2021-01-05 RX ORDER — ACETAMINOPHEN 325 MG/1
650 TABLET ORAL EVERY 6 HOURS PRN
Status: DISCONTINUED | OUTPATIENT
Start: 2021-01-05 | End: 2021-01-09 | Stop reason: HOSPADM

## 2021-01-05 RX ORDER — SODIUM CHLORIDE 0.9 % (FLUSH) 0.9 %
10 SYRINGE (ML) INJECTION PRN
Status: DISCONTINUED | OUTPATIENT
Start: 2021-01-05 | End: 2021-01-09 | Stop reason: HOSPADM

## 2021-01-05 RX ORDER — POTASSIUM CHLORIDE 7.45 MG/ML
10 INJECTION INTRAVENOUS PRN
Status: DISCONTINUED | OUTPATIENT
Start: 2021-01-05 | End: 2021-01-09 | Stop reason: HOSPADM

## 2021-01-05 RX ORDER — ASCORBIC ACID 1000 MG
200 TABLET ORAL DAILY
COMMUNITY
End: 2022-01-31 | Stop reason: ALTCHOICE

## 2021-01-05 RX ORDER — POTASSIUM CHLORIDE 20 MEQ/1
40 TABLET, EXTENDED RELEASE ORAL PRN
Status: DISCONTINUED | OUTPATIENT
Start: 2021-01-05 | End: 2021-01-09 | Stop reason: HOSPADM

## 2021-01-05 RX ORDER — ACETAMINOPHEN 650 MG/1
650 SUPPOSITORY RECTAL EVERY 6 HOURS PRN
Status: DISCONTINUED | OUTPATIENT
Start: 2021-01-05 | End: 2021-01-09 | Stop reason: HOSPADM

## 2021-01-05 RX ADMIN — VANCOMYCIN HYDROCHLORIDE 1250 MG: 10 INJECTION, POWDER, LYOPHILIZED, FOR SOLUTION INTRAVENOUS at 21:14

## 2021-01-05 RX ADMIN — Medication 10 ML: at 22:08

## 2021-01-05 RX ADMIN — CEFAZOLIN 1 G: 1 INJECTION, POWDER, FOR SOLUTION INTRAMUSCULAR; INTRAVENOUS at 20:03

## 2021-01-05 ASSESSMENT — ENCOUNTER SYMPTOMS
NAUSEA: 0
BLOOD IN STOOL: 0
RHINORRHEA: 0
SHORTNESS OF BREATH: 0
BACK PAIN: 0
SORE THROAT: 0
ABDOMINAL PAIN: 0
DIARRHEA: 0
CONSTIPATION: 0
COUGH: 0
VOMITING: 0

## 2021-01-05 ASSESSMENT — PAIN SCALES - GENERAL: PAINLEVEL_OUTOF10: 0

## 2021-01-05 ASSESSMENT — PAIN DESCRIPTION - PAIN TYPE: TYPE: ACUTE PAIN

## 2021-01-05 NOTE — ED NOTES
Bed: 14B-14  Expected date:   Expected time:   Means of arrival:   Comments:  triage     Pawel Linton RN  01/05/21 4795

## 2021-01-05 NOTE — Clinical Note
Patient Class: Observation [104]   REQUIRED: Diagnosis: Cellulitis of right lower extremity [284147]   Estimated Length of Stay: Estimated stay of less than 2 midnights

## 2021-01-05 NOTE — ED NOTES
FIRST PROVIDER CONTACT ASSESSMENT NOTE      Department of Emergency Medicine   ED  First Provider Note   1/5/21  10:27 AM EST    Chief Complaint: Wound Check (sent from wound care for right leg wounds. )      History of Present Illness:    Xiomara Porras is a 79 y.o. male who presents to the ED by private car for wound check. Patient was sent in for a wound of the right leg from the wound care center after seeing Dr. Jem goodman low the vascular surgeon. They are concerned for cellulitis. Patient states he has had this before in the past.  Patient states he is not currently on antibiotics. Patient states that the pain is a 5 out of 10 on the pain scale. Focused Screening Exam:  Constitutional:  Alert, appears stated age and is in no distress.       *ALLERGIES*     Latex, Aspirin, Ciprofloxacin in d5w, Pcn [penicillins], Other, and Peanut-containing drug products     ED Triage Vitals [01/05/21 1009]   BP Temp Temp src Pulse Resp SpO2 Height Weight   -- 96.9 °F (36.1 °C) -- 81 -- 95 % -- --        Initial Plan of Care:  Initiate Treatment-Testing, Proceed toTreatment Area When Bed Available for ED Attending/MLP to Continue Care    -----------------END OF FIRST PROVIDER CONTACT ASSESSMENT NOTE--------------  Electronically signed by Rhiannon Chowdary PA-C   DD: 1/5/21       Rhiannon Chowdary PA-C  01/05/21 4752

## 2021-01-05 NOTE — DISCHARGE INSTR - COC
TOE, FOOT DEBRIDEMENT performed by Jovan Braga DPM at [facility] Right 12/10/2019    AMPUTATION RIGHT 3rd DIGIT WITH PARTIAL RESECTION OF THIRD METATARSAL performed by Jovan Braga DPM at [address] TURP N/A 5/18/2020    CYSTOSCOPY PLASMA LOOP TRANSURETHRAL RESECTION PROSTATE performed by Valentino Hernandez MD at [address] [place] [address]         Immunization History: There is no immunization history on file for this patient.     Active Problems:  Patient Active Problem List   Diagnosis Code    Essential hypertension I10    Moderate protein-calorie malnutrition ([address] E44.0    PVD (peripheral vascular disease) with claudication ([facility] I73.9    S/P peripheral artery angioplasty Z98.62       Isolation/Infection:   Isolation          No Isolation        Patient Infection Status     Infection Onset Added Last Indicated Last Indicated By Review Planned Expiration Resolved Resolved By    None active    Resolved    COVID-19 Rule Out 10/20/20 10/21/20 10/20/20 Covid-19 Ambulatory (Ordered)   10/22/20 Rule-Out Test Resulted    COVID-19 Rule Out 10/13/20 10/13/20 10/13/20 Covid-19 Ambulatory (Ordered)   10/14/20 Rule-Out Test Resulted    COVID-19 Rule Out 10/06/20 10/07/20 10/06/20 Covid-19 Ambulatory (Ordered)   10/08/20 Rule-Out Test Resulted    COVID-19 Rule Out 10/02/20 10/03/20 10/02/20 Covid-19 Ambulatory (Ordered)   10/06/20 Rule-Out Test Resulted    COVID-19 Rule Out 09/26/20 09/26/20 09/26/20 Covid-19 Ambulatory (Ordered)   09/27/20 Rule-Out Test Resulted    COVID-19 Rule Out 05/14/20 05/14/20 05/14/20 Covid-19 Ambulatory (Ordered)   05/17/20 Rule-Out Test Resulted    NOT DETECTED 5/14/2020    MRSA 12/10/19 12/13/19 12/10/19 Surgical Culture   09/23/20 [place] RN    Foot 12/10/19          Nurse Assessment:  Last Vital Signs: /74   Pulse 84   Temp 96.7 °F (35.9 °C) (Temporal)   Resp 16   Ht 5' 10\" (1.778 m)   Wt 140 lb (63.5 kg)   BMI 20.09 kg/m²     Last [facility] pain score (0-10 scale):    Last Weight:   Wt Readings from Last 1 Encounters:   01/05/21 140 lb (63.5 kg)     Mental Status:  {IP PT MENTAL STATUS:20030}    IV Access:  [address] REYNA IV ACCESS:[id number]}    Nursing Mobility/ADLs:  Walking   {CHP DME PJLD:053453010}  Transfer  {CHP DME YESB:103835903}  Bathing  {CHP DME EXQK:403312932}  Dressing  {CHP DME MIXE:434884141}  Toileting  {CHP DME DHFC:704669966}  Feeding  {CHP DME JOYT:221095270}  Med Admin  {CHP DME WWIR:472201630}  Med Delivery   {[facility] MED Delivery:764406433}    Wound Care Documentation and Therapy:  Wound 01/05/21 Leg Right; Lower #1 (Active)   Wound Image   01/05/21 0927   Wound Etiology Venous 01/05/21 0927   Dressing Status New dressing applied 01/05/21 0948   Wound Cleansed Cleansed with saline 01/05/21 0948   Dressing/Treatment Xeroform;Dry dressing 01/05/21 0948   Offloading for Diabetic Foot Ulcers No offloading required 01/05/21 0948   Wound Length (cm) 14.6 cm 01/05/21 0927   Wound Width (cm) 19.8 cm 01/05/21 0927   Wound Depth (cm) 0.1 cm 01/05/21 0927   Wound Surface Area (cm^2) 289.08 cm^2 01/05/21 0927   Wound Volume (cm^3) 28.91 cm^3 01/05/21 0927   Wound Assessment Purple/maroon;Granulation tissue;Fibrin 01/05/21 2036   Drainage Amount Large 01/05/21 0927   Drainage Description Serosanguinous; Serous; Yellow 01/05/21 0927   Odor None 01/05/21 0927   Latonya-wound Assessment Fragile; Excoriated;Blanchable erythema 01/05/21 0927   Number of days: 0        Elimination:  Continence:   · Bowel: {YES / KR:40113}  · Bladder: {YES / IV:50181}  Urinary Catheter: {Urinary Catheter:044813016}   Colostomy/Ileostomy/Ileal Conduit: {YES / KI:30461}       Date of Last BM: ***  No intake or output data in the 24 hours ending 01/05/21 1201  No intake/output data recorded.     Safety Concerns:     [address] O[address]LL[address] Safety Concerns:606626443}    Impairments/Disabilities:      Kimberly[address] ORELLANA Impairments/Disabilities:799486370}    Nutrition Therapy:  Current Nutrition Therapy:   [address] Champion REYNA Diet List:099482363}    Routes of Feeding: {CHP DME Other Feedings:813490739}  Liquids: {Slp liquid thickness:02824}  Daily Fluid Restriction: {CHP DME Yes amt example:909357794}  Last Modified Barium Swallow with Video (Video Swallowing Test): {Done Not Done HBP}    Treatments at the Time of Hospital Discharge:   Respiratory Treatments: ***  Oxygen Therapy:  {Therapy; copd oxygen:57194}  Ventilator:    { CC Vent RMLJ:912194136}    Rehab Therapies: {THERAPEUTIC INTERVENTION:1727222656}  Weight Bearing Status/Restrictions: { CC Weight Bearin}  Other Medical Equipment (for information only, NOT a DME order):  {EQUIPMENT:532784157}  Other Treatments: ***    Patient's personal belongings (please select all that are sent with patient):  {[facility] DME Belongings:612483865}    RN SIGNATURE:  {Esignature:094161244}    CASE MANAGEMENT/SOCIAL WORK SECTION    Inpatient Status Date: ***    Readmission Risk Assessment Score:  Readmission Risk              Risk of Unplanned Readmission:        0           Discharging to Facility/ Agency   · Name:   · Address:  · Phone:  · Fax:    Dialysis Facility (if applicable)   · Name:  · Address:  · Dialysis Schedule:  · Phone:  · Fax:    / signature: {Esignature:361778991}    PHYSICIAN SECTION    Prognosis: {Prognosis:2201837946}    Condition at Discharge: [address] Patient Condition:360030322}    Rehab Potential (if transferring to Rehab): {Prognosis:0091470929}    Recommended Labs or Other Treatments After Discharge: ***    Physician Certification: I certify the above information and transfer of Garry Rico  is necessary for the continuing treatment of the diagnosis listed and that he requires {Admit to Appropriate Level of Care:90365} for {GREATER/LESS:188155426} 30 days.      Update Admission H&P: {CHP DME Changes in ZZMQU:339632038}    PHYSICIAN SIGNATURE:  {Esignature:772034056}

## 2021-01-06 PROBLEM — L03.90 CELLULITIS: Status: ACTIVE | Noted: 2021-01-06

## 2021-01-06 LAB
ANION GAP SERPL CALCULATED.3IONS-SCNC: 10 MMOL/L (ref 7–16)
BASOPHILS ABSOLUTE: 0.07 E9/L (ref 0–0.2)
BASOPHILS RELATIVE PERCENT: 0.9 % (ref 0–2)
BUN BLDV-MCNC: 21 MG/DL (ref 8–23)
CALCIUM SERPL-MCNC: 9 MG/DL (ref 8.6–10.2)
CHLORIDE BLD-SCNC: 100 MMOL/L (ref 98–107)
CO2: 25 MMOL/L (ref 22–29)
CREAT SERPL-MCNC: 0.9 MG/DL (ref 0.7–1.2)
EOSINOPHILS ABSOLUTE: 0.48 E9/L (ref 0.05–0.5)
EOSINOPHILS RELATIVE PERCENT: 6.3 % (ref 0–6)
GFR AFRICAN AMERICAN: >60
GFR NON-AFRICAN AMERICAN: >60 ML/MIN/1.73
GLUCOSE BLD-MCNC: 288 MG/DL (ref 74–99)
HBA1C MFR BLD: 6.3 % (ref 4–5.6)
HCT VFR BLD CALC: 34 % (ref 37–54)
HEMOGLOBIN: 11.8 G/DL (ref 12.5–16.5)
IMMATURE GRANULOCYTES #: 0.03 E9/L
IMMATURE GRANULOCYTES %: 0.4 % (ref 0–5)
LYMPHOCYTES ABSOLUTE: 1.41 E9/L (ref 1.5–4)
LYMPHOCYTES RELATIVE PERCENT: 18.5 % (ref 20–42)
MCH RBC QN AUTO: 30.6 PG (ref 26–35)
MCHC RBC AUTO-ENTMCNC: 34.7 % (ref 32–34.5)
MCV RBC AUTO: 88.3 FL (ref 80–99.9)
METER GLUCOSE: 147 MG/DL (ref 74–99)
METER GLUCOSE: 185 MG/DL (ref 74–99)
METER GLUCOSE: 245 MG/DL (ref 74–99)
MONOCYTES ABSOLUTE: 0.71 E9/L (ref 0.1–0.95)
MONOCYTES RELATIVE PERCENT: 9.3 % (ref 2–12)
NEUTROPHILS ABSOLUTE: 4.94 E9/L (ref 1.8–7.3)
NEUTROPHILS RELATIVE PERCENT: 64.6 % (ref 43–80)
PDW BLD-RTO: 13.4 FL (ref 11.5–15)
PLATELET # BLD: 184 E9/L (ref 130–450)
PMV BLD AUTO: 10.3 FL (ref 7–12)
POTASSIUM REFLEX MAGNESIUM: 4.1 MMOL/L (ref 3.5–5)
RBC # BLD: 3.85 E12/L (ref 3.8–5.8)
SODIUM BLD-SCNC: 135 MMOL/L (ref 132–146)
WBC # BLD: 7.6 E9/L (ref 4.5–11.5)

## 2021-01-06 PROCEDURE — 1200000000 HC SEMI PRIVATE

## 2021-01-06 PROCEDURE — 97535 SELF CARE MNGMENT TRAINING: CPT

## 2021-01-06 PROCEDURE — 2580000003 HC RX 258: Performed by: PHYSICIAN ASSISTANT

## 2021-01-06 PROCEDURE — 36415 COLL VENOUS BLD VENIPUNCTURE: CPT

## 2021-01-06 PROCEDURE — 97161 PT EVAL LOW COMPLEX 20 MIN: CPT

## 2021-01-06 PROCEDURE — 80048 BASIC METABOLIC PNL TOTAL CA: CPT

## 2021-01-06 PROCEDURE — 85025 COMPLETE CBC W/AUTO DIFF WBC: CPT

## 2021-01-06 PROCEDURE — 83036 HEMOGLOBIN GLYCOSYLATED A1C: CPT

## 2021-01-06 PROCEDURE — 6360000002 HC RX W HCPCS: Performed by: PHYSICIAN ASSISTANT

## 2021-01-06 PROCEDURE — 82962 GLUCOSE BLOOD TEST: CPT

## 2021-01-06 PROCEDURE — 97530 THERAPEUTIC ACTIVITIES: CPT

## 2021-01-06 PROCEDURE — 97165 OT EVAL LOW COMPLEX 30 MIN: CPT

## 2021-01-06 PROCEDURE — 6370000000 HC RX 637 (ALT 250 FOR IP): Performed by: INTERNAL MEDICINE

## 2021-01-06 RX ORDER — NICOTINE POLACRILEX 4 MG
15 LOZENGE BUCCAL PRN
Status: DISCONTINUED | OUTPATIENT
Start: 2021-01-06 | End: 2021-01-09 | Stop reason: HOSPADM

## 2021-01-06 RX ORDER — DEXTROSE MONOHYDRATE 50 MG/ML
100 INJECTION, SOLUTION INTRAVENOUS PRN
Status: DISCONTINUED | OUTPATIENT
Start: 2021-01-06 | End: 2021-01-09 | Stop reason: HOSPADM

## 2021-01-06 RX ORDER — DEXTROSE MONOHYDRATE 25 G/50ML
12.5 INJECTION, SOLUTION INTRAVENOUS PRN
Status: DISCONTINUED | OUTPATIENT
Start: 2021-01-06 | End: 2021-01-09 | Stop reason: HOSPADM

## 2021-01-06 RX ADMIN — INSULIN LISPRO 2 UNITS: 100 INJECTION, SOLUTION INTRAVENOUS; SUBCUTANEOUS at 13:14

## 2021-01-06 RX ADMIN — Medication 10 ML: at 21:43

## 2021-01-06 RX ADMIN — INSULIN LISPRO 1 UNITS: 100 INJECTION, SOLUTION INTRAVENOUS; SUBCUTANEOUS at 21:42

## 2021-01-06 RX ADMIN — CEFAZOLIN 1 G: 1 INJECTION, POWDER, FOR SOLUTION INTRAMUSCULAR; INTRAVENOUS at 07:38

## 2021-01-06 RX ADMIN — ENOXAPARIN SODIUM 40 MG: 40 INJECTION SUBCUTANEOUS at 09:51

## 2021-01-06 RX ADMIN — INSULIN LISPRO 1 UNITS: 100 INJECTION, SOLUTION INTRAVENOUS; SUBCUTANEOUS at 18:31

## 2021-01-06 RX ADMIN — CEFAZOLIN 1 G: 1 INJECTION, POWDER, FOR SOLUTION INTRAMUSCULAR; INTRAVENOUS at 21:39

## 2021-01-06 RX ADMIN — VANCOMYCIN HYDROCHLORIDE 1000 MG: 1 INJECTION, POWDER, LYOPHILIZED, FOR SOLUTION INTRAVENOUS at 09:49

## 2021-01-06 RX ADMIN — CEFAZOLIN 1 G: 1 INJECTION, POWDER, FOR SOLUTION INTRAMUSCULAR; INTRAVENOUS at 17:00

## 2021-01-06 NOTE — ED NOTES
Pt resting in room. Nad. Vss. No complaints at this time. Waiting for admission. Call light within reach. Ccm and pulse ox in place.  sean Martines RN  01/06/21 6487

## 2021-01-06 NOTE — ED PROVIDER NOTES
Colin Anderson is a 79 y.o. male with a PMHx significant for HTN, PVD who presents for evaluation of right lower extremity cellulitis and wound, beginning 3 weeks ago. The complaint has been persistent, moderate in severity, and worsened by nothing. The patient states that he started developed dysuria 3 to 4 weeks ago. Has been following with wound care and saw vascular surgery earlier this morning. When they evaluated him they told him to go to the ER for further evaluation and treatment. Patient denies any dizziness, headedness, chest pain, shortness of breath, nausea, vomiting, fevers at home. The history is provided by the patient and medical records. Review of Systems   Constitutional: Negative for chills and fever. HENT: Negative for postnasal drip, rhinorrhea and sore throat. Eyes: Negative for visual disturbance. Respiratory: Negative for cough and shortness of breath. Cardiovascular: Negative for chest pain. Gastrointestinal: Negative for abdominal pain, blood in stool, constipation, diarrhea, nausea and vomiting. Genitourinary: Negative for difficulty urinating, dysuria and urgency. Musculoskeletal: Negative for back pain. Skin: Positive for wound (right lower extremity). Negative for rash. Neurological: Negative for dizziness, numbness and headaches. Physical Exam  Vitals signs and nursing note reviewed. Constitutional:       General: He is not in acute distress. Appearance: Normal appearance. He is well-developed. He is not ill-appearing. HENT:      Head: Normocephalic and atraumatic. Right Ear: External ear normal.      Left Ear: External ear normal.   Eyes:      General:         Right eye: No discharge. Left eye: No discharge. Extraocular Movements: Extraocular movements intact. Conjunctiva/sclera: Conjunctivae normal.   Neck:      Musculoskeletal: Normal range of motion and neck supple.    Cardiovascular:      Rate and Rhythm: Normal rate and regular rhythm. Heart sounds: Normal heart sounds. No murmur. Pulmonary:      Effort: Pulmonary effort is normal. No respiratory distress. Breath sounds: Normal breath sounds. No stridor. No wheezing. Abdominal:      General: There is no distension. Palpations: Abdomen is soft. There is no mass. Tenderness: There is no abdominal tenderness. Hernia: No hernia is present. Musculoskeletal: Normal range of motion. General: No swelling or tenderness. Skin:     General: Skin is warm and dry. Coloration: Skin is not jaundiced or pale. Findings: Lesion present. Comments: Wound to the right lower extremity  Pulse noted     Neurological:      General: No focal deficit present. Mental Status: He is alert and oriented to person, place, and time. Cranial Nerves: No cranial nerve deficit. Coordination: Coordination normal.                      Procedures     MDM     ED Course as of Jan 05 2020 Tue Jan 05, 2021 1918 Spoke with Dr. Tonya Cortés with vascular surgery, discussed the patient. Requesting admission for ID to see. [BB]   1958 Spoke with Harini for Dr. Naida Mathur, discussed the patient. She will admit the patient. [BB]      ED Course User Index  [BB] Walt Vail, DO Maxx Riggins presents to the ED for evaluation of lower extremity wound. Workup in the ED revealed no leukocytosis or lactic acidosis. Patient is not tachycardic or febrile. Spoke with vascular surgery who is concerned it is infectious in nature as the patient's KO was normal. Patient was given first round of abx for their symptoms.  Patient requires continued workup and management of their symptoms and will be admitted to the hospital for further evaluation and treatment.      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 Past Surgical History:  has a past surgical history that includes Prostate biopsy (04/2016); Toe amputation (Left, 12/31/2016); Foot Debridement (Left, 01/04/2017); other surgical history (04/23/2019); Toe amputation (Right, 4/26/2019); eye surgery (1990's); Colonoscopy; vascular surgery; skin biopsy (sept of 2018 last time); Toe amputation (Right, 12/10/2019); other surgical history (12/17/2019); TURP (N/A, 5/18/2020); Cystoscopy (N/A, 5/18/2020); Hip fracture surgery (Left, 9/23/2020); and hip surgery. Social History:  reports that he quit smoking about 61 years ago. His smoking use included cigarettes. He has a 1.00 pack-year smoking history. He has never used smokeless tobacco. He reports that he does not drink alcohol or use drugs. Family History: family history includes Heart Disease in his father and mother. The patients home medications have been reviewed.     Allergies: Latex, Aspirin, Ciprofloxacin in d5w, Pcn [penicillins], Other, and Peanut-containing drug products    -------------------------------------------------- RESULTS -------------------------------------------------    LABS:  Results for orders placed or performed during the hospital encounter of 01/05/21   CBC auto differential   Result Value Ref Range    WBC 7.9 4.5 - 11.5 E9/L    RBC 4.54 3.80 - 5.80 E12/L    Hemoglobin 13.9 12.5 - 16.5 g/dL    Hematocrit 40.8 37.0 - 54.0 %    MCV 89.9 80.0 - 99.9 fL    MCH 30.6 26.0 - 35.0 pg    MCHC 34.1 32.0 - 34.5 %    RDW 13.6 11.5 - 15.0 fL    Platelets 538 799 - 002 E9/L    MPV 9.6 7.0 - 12.0 fL    Neutrophils % 70.1 43.0 - 80.0 %    Immature Granulocytes % 0.3 0.0 - 5.0 %    Lymphocytes % 15.7 (L) 20.0 - 42.0 %    Monocytes % 7.9 2.0 - 12.0 %    Eosinophils % 5.1 0.0 - 6.0 %    Basophils % 0.9 0.0 - 2.0 %    Neutrophils Absolute 5.54 1.80 - 7.30 E9/L    Immature Granulocytes # 0.02 E9/L    Lymphocytes Absolute 1.24 (L) 1.50 - 4.00 E9/L    Monocytes Absolute 0.62 0.10 - 0.95 E9/L    Eosinophils Absolute 0.40 0.05 - 0.50 E9/L    Basophils Absolute 0.07 0.00 - 0.20 E9/L   Comprehensive Metabolic Panel w/ Reflex to MG   Result Value Ref Range    Sodium 137 132 - 146 mmol/L    Potassium reflex Magnesium 4.5 3.5 - 5.0 mmol/L    Chloride 101 98 - 107 mmol/L    CO2 27 22 - 29 mmol/L    Anion Gap 9 7 - 16 mmol/L    Glucose 188 (H) 74 - 99 mg/dL    BUN 25 (H) 8 - 23 mg/dL    CREATININE 1.0 0.7 - 1.2 mg/dL    GFR Non-African American >60 >=60 mL/min/1.73    GFR African American >60     Calcium 10.1 8.6 - 10.2 mg/dL    Total Protein 8.1 6.4 - 8.3 g/dL    Alb 4.3 3.5 - 5.2 g/dL    Total Bilirubin 0.5 0.0 - 1.2 mg/dL    Alkaline Phosphatase 96 40 - 129 U/L    ALT 15 0 - 40 U/L    AST 15 0 - 39 U/L   Lactic Acid, Plasma   Result Value Ref Range    Lactic Acid 0.7 0.5 - 2.2 mmol/L   SEDIMENTATION RATE   Result Value Ref Range    Sed Rate 37 (H) 0 - 15 mm/Hr   C-REACTIVE PROTEIN   Result Value Ref Range    CRP 4.8 (H) 0.0 - 0.4 mg/dL       RADIOLOGY:  XR TIBIA FIBULA RIGHT (2 VIEWS)   Final Result   Soft tissue swelling seen diffusely in the right leg to a certain degree. 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       US DUP LOWER EXTREMITY RIGHT ROSE MARIE   Final Result   No evidence for deep venous thrombosis in the right lower extremity.        ------------------------- NURSING NOTES AND VITALS REVIEWED ---------------------------  Date / Time Roomed:  1/5/2021  6:33 PM  ED Bed Assignment: 14B/14B-14    The nursing notes within the ED encounter and vital signs as below have been reviewed. Patient Vitals for the past 24 hrs:   BP Temp Pulse Resp SpO2 Weight   01/05/21 1853 (!) 182/92 -- 78 18 99 % --   01/05/21 1028 (!) 172/63 -- 80 20 98 % 140 lb (63.5 kg)   01/05/21 1009 -- 96.9 °F (36.1 °C) 81 -- 95 % --       Oxygen Saturation Interpretation: Normal    ------------------------------------------ PROGRESS NOTES ------------------------------------------  Counseling:  I have spoken with the patient and discussed todays results, in addition to providing specific details for the plan of care and counseling regarding the diagnosis and prognosis. Their questions are answered at this time and they are agreeable with the plan of admission.    --------------------------------- ADDITIONAL PROVIDER NOTES ---------------------------------  Consultations:  Time: 1958. Spoke with Harini for Dr. Soren Sharif. Discussed case. They will admit the patient. This patient's ED course included: a personal history and physicial examination, re-evaluation prior to disposition, multiple bedside re-evaluations, IV medications, cardiac monitoring, continuous pulse oximetry and complex medical decision making and emergency management    This patient has remained hemodynamically stable during their ED course. Diagnosis:  1. Cellulitis of right lower extremity    2. Hyperglycemia        Disposition:  Patient's disposition: Admit to Intermediate  Patient's condition is stable.            Breezy Bell DO  Resident  01/05/21 1168

## 2021-01-06 NOTE — CONSULTS
[address] [place] [address] [facility]    Consultation Note     Admit Date: 1/5/2021  6:33 PM    Reason for Consult:   Right lower extremity cellulitis    Attending Physician:  Baldemar Becker MD     Chief Complaint: my right leg is red    HISTORY OF PRESENT ILLNESS:   The patient is a 79 y.o.  male known to the Infectious Diseases service. The patient has the below past med hx. He was sent to ER by vascular surgery yesterday for RLE cellulitis. Wava Prim He is status post angioplasty of right leg over a year ago and is followed by the vascular team.  No trauma to his leg   He was started on vanco and ancef. 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 ([address] 12/6/2019    S/P peripheral artery angioplasty 01/23/2020    bilateral legs -[place]    Skin ulcer of right foot with fat layer exposed ([address] 12/9/2019    Ulcer of right leg, with fat layer exposed ([address] 5/6/2019    Varicose veins of leg with edema, 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     Past Surgical History:        Procedure Laterality Date    COLONOSCOPY      CYSTOSCOPY N/A 5/18/2020    SUPRAPUBIC TUBE PLACEMENT performed by Homer Shaw MD at [facility]  1990s    lense implants bilaterally    FOOT DEBRIDEMENT Left 01/04/2017    wound debridement and delayed primary closure    FRACTURE SURGERY      L femur fracture surgery    HIP FRACTURE SURGERY Left 9/23/2020    LEFT HIP OPEN REDUCTION INTERNAL FIXATION performed by Felicity Monk MD at [facility]      OTHER SURGICAL HISTORY  04/23/2019    Dr. Ariza Rule- PTA ant tibial    OTHER SURGICAL HISTORY  12/17/2019    Dr Ariza Rule - PCI Right popliteal and Anterior tibial    PROSTATE BIOPSY  04/2016    SKIN BIOPSY  sept of 2018 last time    head and ears    TOE AMPUTATION Left 12/31/2016    2nd    TOE AMPUTATION Right 4/26/2019    AMPUTATION RIGHT SECOND TOE, FOOT DEBRIDEMENT performed by Layne Ayon DPM at [facility] TOE AMPUTATION Right 12/10/2019    AMPUTATION RIGHT 3rd DIGIT WITH PARTIAL RESECTION OF THIRD METATARSAL performed by Layne Ayon DPM at [facility] 22 TURP N/A 5/18/2020    CYSTOSCOPY PLASMA LOOP TRANSURETHRAL RESECTION PROSTATE performed by Dariana Mercado MD at [address] [place] [address]       Current Medications:   Scheduled Meds:   insulin lispro  0-6 Units Subcutaneous TID WC    insulin lispro  0-3 Units Subcutaneous Nightly    ceFAZolin  1 g Intravenous Q8H    sodium chloride flush  10 mL Intravenous 2 times per day    enoxaparin  40 mg Subcutaneous Daily    vancomycin  1,000 mg Intravenous Q24H     Continuous Infusions:   dextrose       PRN Meds:glucose, dextrose, glucagon (rDNA), dextrose, sodium chloride flush, potassium chloride **OR** potassium alternative oral replacement **OR** potassium chloride, magnesium hydroxide, acetaminophen **OR** acetaminophen, trimethobenzamide    Allergies:  Latex, Aspirin, Ciprofloxacin in d5w, Pcn [penicillins], Other, and Peanut-containing drug products    Social History:   Social History     Socioeconomic History    Marital status:      Spouse name: None    Number of children: 1    Years of education: None    Highest education level: None   Occupational History    None   Social Needs    Financial resource strain: None    Food insecurity     Worry: None     Inability: None    Transportation needs     Medical: None     Non-medical: None   Tobacco Use    Smoking status: Former Smoker     Packs/day: 0.50     Years: 2.00     Pack years: 1.00     Types: Cigarettes     Quit date: 56     Years since quittin.0    Smokeless tobacco: Never Used   Substance and Sexual Activity    Alcohol use: Not Currently    Drug use: Not Currently    Sexual activity: None   Lifestyle    Physical activity     Days per week: None     Minutes per session: None    Stress: None   Relationships    Social connections     Talks on phone: None     Gets together: None     Attends Congregation service: None     Active member of club or organization: None     Attends meetings of clubs or organizations: None     Relationship status: None    Intimate partner violence     Fear of current or ex partner: None     Emotionally abused: None     Physically abused: None     Forced sexual activity: None   Other Topics Concern    None   Social History Narrative    None     Tobacco: No  Alcohol: No  Pets: No  Travel: No    Family History:       Problem Relation Age of Onset    Heart Disease Mother         CHF    Heart Disease Father     Heart Attack Father    . Otherwise non-pertinent to the chief complaint. REVIEW OF SYSTEMS:    CONSTITUTIONAL:  No chills, fevers or night sweats. No loss of weight. EYES:  No double vision or drainage from eyes, ears or throat. HEENT:  No neck stiffness. No dysphagia. No drainage from eyes, ears or throat  RESPIRATORY:  No cough, productive sputum or hemoptysis. CARDIOVASCULAR:  No chest pain, palpitations, orthopnea or dyspnea on exertion. GASTROINTESTINAL:  No nausea, vomiting, diarrhea or constipation or hematochezia   GENITOURINARY:  No frequency burning dysuria or hematuria. INTEGUMENT/BREAST:  No rash or breast masses. HEMATOLOGIC/LYMPHATIC:  No lymphadenopathy or blood dyscrasics. ALLERGIC/IMMUNOLOGIC:  No anaphylaxis. ENDOCRINE:  No polyuria or polydipsia or temperature intolerance. MUSCULOSKELETAL:  No myalgia or arthralgia. Full ROM.   NEUROLOGICAL:  No focal motor sensory deficit. BEHAVIOR/PSYCH:  No psychosis. PHYSICAL EXAM:    Vitals:    BP (!) 158/62   Pulse 68   Temp 98.7 °F (37.1 °C) (Oral)   Resp 16   Ht 5' 10\" (1.778 m)   Wt 140 lb (63.5 kg)   SpO2 100%   BMI 20.09 kg/m²   Constitutional: The patient is awake, alert, and oriented. Skin: Warm and dry. No rashes were noted. HEENT: Eyes show round, and reactive pupils. No jaundice. Moist mucous membranes, no ulcerations, no thrush. Neck: Supple to movements. No lymphadenopathy. Chest: No use of accessory muscles to breathe. Symmetrical expansion. Auscultation reveals no wheezing, crackles, or rhonchi. Cardiovascular: S1 and S2 are rhythmic and regular. No murmurs appreciated. Abdomen: Positive bowel sounds to auscultation. Benign to palpation. No masses felt. No hepatosplenomegaly. Extremities: No clubbing, no cyanosis, no edema.   Lines: peripheral      CBC+dif:  Recent Labs     01/05/21  1430 01/06/21  0431   WBC 7.9 7.6   HGB 13.9 11.8*   HCT 40.8 34.0*   MCV 89.9 88.3    184   NEUTROABS 5.54 4.94     Lab Results   Component Value Date    CRP 4.8 (H) 01/05/2021    CRP 1.3 (H) 12/07/2019    CRP 13.0 (H) 04/20/2019     No results found for: CRP  Lab Results   Component Value Date    SEDRATE 37 (H) 01/05/2021    SEDRATE 45 (H) 12/07/2019    SEDRATE 50 (H) 12/05/2019     Lab Results   Component Value Date    ALT 15 01/05/2021    AST 15 01/05/2021    ALKPHOS 96 01/05/2021    BILITOT 0.5 01/05/2021     Lab Results   Component Value Date     01/06/2021    K 4.1 01/06/2021     01/06/2021    CO2 25 01/06/2021    BUN 21 01/06/2021    CREATININE 0.9 01/06/2021    GFRAA >60 01/06/2021    LABGLOM >60 01/06/2021    GLUCOSE 288 01/06/2021    PROT 8.1 01/05/2021    LABALBU 4.3 01/05/2021    CALCIUM 9.0 01/06/2021    BILITOT 0.5 01/05/2021    ALKPHOS 96 01/05/2021    AST 15 01/05/2021    ALT 15 01/05/2021       Lab Results   Component Value Date    PROTIME 12.0 09/23/2020    INR 1.1 09/23/2020 No results found for: TSH    Lab Results   Component Value Date    COLORU Yellow 01/11/2017    PHUR 6.5 01/11/2017    LABCAST FEW 12/30/2016    WBCUA 1-3 01/11/2017    RBCUA >20 01/11/2017    BACTERIA RARE 01/11/2017    CLARITYU SL CLOUDY 01/11/2017    SPECGRAV 1.020 01/11/2017    LEUKOCYTESUR Negative 01/11/2017    UROBILINOGEN 0.2 01/11/2017    BILIRUBINUR Negative 01/11/2017    BLOODU LARGE 01/11/2017    GLUCOSEU 250 01/11/2017       No results found for: ORI9BVR, BEART, P2BFYPYW, PHART, THGBART, JOY2IKQ, PO2ART, LTL8PKR  Radiology:  XR TIBIA FIBULA RIGHT (2 VIEWS)   Final Result   Soft tissue swelling seen diffusely in the right leg to a certain degree. 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 US DUP LOWER EXTREMITY RIGHT ROSE MARIE   Final Result       No evidence for deep venous thrombosis in the right lower extremity. Microbiology:  Pending  No results for input(s): BC in the last 72 hours. No results for input(s): ORG in the last 72 hours. No results for input(s): [place] in the last 72 hours. No results for input(s): STREPNEUMAGU in the last 72 hours. No results for input(s): LP1UAG in the last 72 hours. No results for input(s): ASO in the last 72 hours. No results for input(s): CULTRESP in the last 72 hours.     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 Odalys Harris  ·     Plan:    · Cont vanco and ancef   · Check cultures  · Baseline ESR, CRP  · Monitor labs  · Will follow with you    Thank you for having us see this patient in consultation. I will be discussing this case with the treating physicians.       Electronically signed by Yasir Yarbrough MD on 1/6/2021 at 12:24 PM

## 2021-01-06 NOTE — H&P
Sury De La Cruz M.D. History and Physical      CHIEF COMPLAINT: Right leg pain, redness, scaling    Reason for Admission: Right lower extremity cellulitis    History Obtained From: Patient/EMR    HISTORY OF PRESENT ILLNESS:      The patient is a 79 y.o. male of Christina Quinn MD with significant past medical history of diabetes mellitus diet-controlled,peripheral vascular disease, chronic renal insufficiency status post angioplasty of right leg over a year ago-follows with vascular team who presents with complaints of right lower extremity pain swelling redness scaling that has been ongoing for about 3 weeks. Patient does not recall any inciting event such as trauma or insect bite. He indicates the swelling and pain and redness has been getting worse. He went to see vascular on 1/5/2021 and was subsequently directed to ER for cellulitis and treatment with IV antibiotics. On my evaluation he is resting comfortably no apparent acute distress. Denies any fevers or chills at home. Currently resting comfortably.     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 ([address] 4/25/2019    Blood circulation, collateral     Cellulitis of foot, left 1/1/2017    Chronic venous insufficiency 12/6/2019    Femoral-popliteal atherosclerosis ([address] 1/1/2017    Heel ulcer, right, with fat layer exposed ([address] 5/6/2019    Osteomyelitis of third toe of right foot ([address] 12/6/2019    S/P peripheral artery angioplasty 01/23/2020    bilateral legs -Sakina Stank    Skin ulcer of right foot with fat layer exposed ([address] 12/9/2019    Ulcer of right leg, with fat layer exposed ([address] 5/6/2019    Varicose veins of leg with edema, 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     Past Surgical History:        Procedure Laterality Date    COLONOSCOPY      CYSTOSCOPY N/A 5/18/2020    SUPRAPUBIC TUBE PLACEMENT performed by Valentino Hernandez MD [place] [facility]    lense implants bilaterally    FOOT DEBRIDEMENT Left 01/04/2017    wound debridement and delayed primary closure    HIP FRACTURE SURGERY Left 9/23/2020    LEFT HIP OPEN REDUCTION INTERNAL FIXATION performed by [place]enne Wisdom MD at [facility] HISTORY  04/23/2019    Dr. Nilo Beltran- PTA ant tibial    OTHER SURGICAL HISTORY  12/17/2019    Dr Nilo Beltran - PCI Right popliteal and Anterior tibial    PROSTATE BIOPSY  04/2016    SKIN BIOPSY  sept of 2018 last time    head and ears    TOE AMPUTATION Left 12/31/2016    2nd    TOE AMPUTATION Right 4/26/2019    AMPUTATION RIGHT SECOND TOE, FOOT DEBRIDEMENT performed by Jovan Braga DPM at [place] [address] Right 12/10/2019    AMPUTATION RIGHT 3rd DIGIT WITH PARTIAL RESECTION OF THIRD METATARSAL performed by Jovan Braga DPM at [facility] N/A 5/18/2020    CYSTOSCOPY PLASMA LOOP TRANSURETHRAL RESECTION PROSTATE performed by Valentino Hernandez MD at [address] [place] [address]           Medications Prior to Admission:    Not in a hospital admission. Allergies:  Latex, Aspirin, Ciprofloxacin in d5w, Pcn [penicillins], Other, and Peanut-containing drug products    Social History:   TOBACCO:   reports that he quit smoking about 61 years ago. His smoking use included cigarettes. He has a 1.00 pack-year smoking history. He has never used smokeless tobacco.  ETOH:   reports no history of alcohol use.   MARITAL STATUS:    OCCUPATION:      Family History:       Problem Relation Age of Onset    Heart Disease Mother     Heart Disease Father        REVIEW OF SYSTEMS:    General ROS: Right leg pain and erythema and swelling  Hematological and Lymphatic ROS: negative  Endocrine

## 2021-01-06 NOTE — PROGRESS NOTES
Physical Therapy  Physical Therapy Initial Assessment     Name: Oleksandr Perez  : 1940  MRN: [id number]    Referring Provider:  DEMI Henderson    Date of Service: 2021    Evaluating PT:  Kelley Moran PT, DPT. [id number]    Room #:  2446/6098-P  Diagnosis:  Cellulitis RLE  Reason for admission:  RLE pain and redness   Precautions:  Falls  Procedures: none  Equipment Recommendations:  TBD    SUBJECTIVE:  Pt lives with spouse in a 1 story home with ramped entry. Pt ambulated with no AD PTA. OBJECTIVE:   Initial Evaluation  Date:  Treatment   Short Term/ Long Term   Goals   AM-PAC 6 Clicks      Was pt agreeable to Eval/treatment? Yes      Does pt have pain? Denies      Bed Mobility  Rolling: NT  Supine to sit: SBA  Sit to supine: SBA  Scooting: SBA  Independent    Transfers Sit to stand: SBA  Stand to sit: SBA  Stand pivot: NT  Independent    Ambulation    100 feet with no AD SBA    >300 feet with no AD independent   Stair negotiation: ascended and descended  NT  TBD   ROM BUE:  See OT eval   BLE:  WFL     Strength BUE:  See OT eval   BLE:  knee ext 5/5  Ankle DF 5/5  Increase by 1/3 MMT grade    Balance Sitting EOB:  Independent  Dynamic Standing:  SBA  Sitting EOB:  indep  Dynamic Standing:  indep     -Pt is A & O x 4  -Sensation:  unremarkable   -Edema:  unremarkable     Therapeutic Exercises:  functional activity     Patient education  Pt educated on safety, sequencing of transfers, and role of PT    Patient response to education:   Pt verbalized understanding Pt demonstrated skill Pt requires further education in this area   Yes  Yes  Yes      ASSESSMENT:    Comments:  Pt received supine in bed and agreeable to PT session   Pt able to complete all transfers without hands on assistance, however, moving very slowly and at times seemed to be processing commands slowly.  Initially upon standing, pt expressing pain in R foot and showing very antalgic gait pattern but did improve with distance to become relatively stable. Pt stopping every few steps before continuing on again making gait very slow. Returned to bed to end session. Pt with all needs met and call light in reach. Pt would benefit from continued PT POC to address functional deficits described above. Treatment:  Patient practiced and was instructed in the following treatment:     Patient education provided continuously throughout session for sequencing, safety maintenance, and improving any deficits found during the evaluation.  Bed mobility training - pt given verbal and tactile cues to facilitate proper sequencing and safety during rolling and supine>sit    STS and pivot transfer training - pt educated on proper hand and foot placement, safety and sequencing, and use of proper technique to safely complete sit<>stand   · Gait training- pt was given verbal and tactile cues to facilitate improved pattern and speed during ambulation     Pt's/ family goals   1. Return home     Patient and or family understand(s) diagnosis, prognosis, and plan of care. Yes     PLAN:    Current Treatment Recommendations   [] Strengthening     [] ROM   [x] Balance Training   [x] Endurance Training   [x] Transfer Training   [x] Gait Training   [] Stair Training   [] Positioning   [] Safety and Education Training   [] Patient/Caregiver Education   [] HEP  [] Other     Frequency of treatments: 2-5x/week x 1-2 weeks. Time in  1250  Time out  1315    Total Treatment Time 13 minutes     Evaluation Time includes thorough review of current medical information, gathering information on past medical history/social history and prior level of function, completion of standardized testing/informal observation of tasks, assessment of data and education on plan of care and goals.     CPT codes:  [x] Low Complexity PT evaluation 38625  [] Moderate Complexity PT evaluation 93491  [] High Complexity PT evaluation 12759  [] PT Re-evaluation 37731  [] Gait training 11294 - minutes  [] Manual therapy 97168 - minutes  [x] Therapeutic activities 30721 13 minutes  [] Therapeutic exercises 36178 - minutes  [] Neuromuscular reeducation 69798 - minutes     Manjeet Stauffer, PT, DPT  [id number]

## 2021-01-06 NOTE — CARE COORDINATION
Social Work Discharge/Planning:    SW met with pleasant patient who reports being independent and able to drive prior to admission. Patient is from home. Patient lives with wife Steve Buck [phone]) in a 1 story home that has a ramp to enter. Patient has no DME. Patient does not wear oxygen at home. Patient has [facility]ERMIN/SNF hx with [facility]. Patient has [address] hx with [facility]. Patient's PCP is Dr. Mike Haro. Patient's pharmacy is [facility] located in [place]. Patient reports plan is to return home once medically clear for discharge. Patient reports wife is able to transport patient home upon discharge. [facility] Patient [facility]. SW made referral with liaison Nivia Gamboa with [facility]) for IV antibiotics if patient were to discharge with IV antibiotics. Per patient, wife would be willing to learn. The Plan for Transition of Care is related to the following treatment goals: medically stable for discharge    The Patient and/or patient representative Magno Puentes was provided with a choice of provider and agrees   with the discharge plan. [x] Yes [] No    Freedom of choice list was provided with basic dialogue that supports the patient's individualized plan of care/goals, treatment preferences and shares the quality data associated with the providers. [x] Yes [] No      SW/CM to follow.       SILVIA Dowell  [phone]

## 2021-01-06 NOTE — PROGRESS NOTES
Occupational Therapy  OCCUPATIONAL THERAPY INITIAL EVALUATION        Date:2021  Patient Name: Justo Dsouza  MRN: [id number]  : 1940  Room: [address] [place], [place] [address]    Referring Physician:  DEMI Trevino    Evaluating OT:  Janae Simon JUAN, OTR/L #[id number]    AM-PAC Daily Activity Raw Score:    Recommended Adaptive Equipment:  TBD as pt progresses     Reason for Admission:  Pt was admitted w/ Wound R LE    Diagnosis:     1. Cellulitis of right lower extremity    2. Hyperglycemia      Procedures this admission:  None     Pertinent Medical History:  Cellulitis, Chronic Venous insufficiency, DM, R Heel Ulcer, Left Femur Fracture - ORIF 20       Precautions:  Falls    Home Living: Pt lives with his wife in a 1-story house w/ Ramp entry. Bed/bath on the main floor.  (+) Basement. Bathroom setup:  Walk-in-Shower w/ Built-in Bench, Standard Commode   Equipment owned:  [facility], [employer], [employer]    Available Family Assist:  Wife can provide assist PRN    Prior Level of Function:  IND with ADLs, IADLs, Transfers and Mobility using No AD for ambulation.    Driving:  Yes  Occupation:  Retired    Pain Level:  Denies pain R LE;  Nsg Notified   Additional Complaints:  General weakness    Vitals/Lab Values:  WFL Room Air    Cognition: A & O x 4   Able to Follow Multi-step Commands INDly   Memory:  good    Sequencing:  good    Problem solving:  good    Judgement/safety:  good   Additional Comments:  Pt was pleasant, cooperative, very talkative, tangential       Functional Assessment:   Initial Eval Status  Date: 21 Treatment Status  Date: Short Term/Long Term Goals  Treatment frequency: PRN 1-3 x/week  1 week   Feeding IND      NA   Grooming SUP    Able to complete tasks after set up w/ SUP for safety standing at the sink    IND  Standing At The Sink   UB Dressing SUP    Able to don garment after set up seated EOB    IND   LB Dressing Min A    Min A for clothing adjustment, SUP for safety w/ standing balance  Pt ed, VCs for safe, adaptive techs/equip    Mod I   Bathing NT      SUP for safety   Toileting NT    Pt declined  IND   Bed Mobility  Rolling:  IND  Repositioning:  IND   Supine to Sit:  IND  Sit to Supine:  IND      NA   Functional Transfers Sit to stand:  SUP  Stand to sit:  SUP      EOB 2-3x  Min VCs/Pt ed re: safety/hand placement    IND   Functional Mobility Close SUP w/o AD    Short distances in bathroom + Household distances in room, declined use of [employer], guarded re: R LE    IND   Balance Sitting:      Static:  Remote SUP EOB    Dynamic:  SUP w/ functional ax EOB    Standing:      Static:  Close SUP    Dynamic:  Close SUP w/ functional ax/mobility w/o AD       Activity Tolerance Fair(+)  Limited by R LE discomfort    Tolerated Sitting:  EOB > 10 mins w/ functional ax     Tolerated Standin-10 mins w/ functional ax/mobility        Visual/  Perceptual WFL  Glasses:  No      Hearing WFL  Hearing Aids  No       Hand dominance: Right    UE ROM: RUE:  WFL      LUE:  WFL    Strength: RUE: grossly 4+/5     LUE: grossly 4+/5     Strength:  WFL Micky UEs    Fine Motor Coordination:  WFL Micky UEs    Sensation:  Denies numbness or tingling Micky UEs  Tone:  WFL Micky UEs  Edema: Moderately Severe Distal R LE, seeping - elevated Mciky LEs for edema control                            Comments: Upon arrival, patient was found in semi-supine. He was agreeable to participate in therapeutic ax. No Family present during session. Received permission from RN prior to engaging pt in OT services. At the end of the session, patient was properly positioned in Semi-Supine. Call light and phone within reach, all lines and tubes intact. Oriented pt to call bell. Made all appropriate Environmental Modifications to facilitate pt's level of IND and safety. All needs met. Bed Alarm activated.            Overall patient demonstrated decreased independence and safety during completion of ADL/functional transfer/mobility tasks. Pt would benefit from continued skilled OT to increase safety and independence with completion of ADL/IADL tasks for functional independence and quality of life. Treatment:      Provided Skilled SUP/Assist w/ Pt safety, Proper Positioning, ADLs, Functional Transfers and Functional Mobility as noted above, as well as set up and clean up for session. Skilled monitoring of Vitals and pts response to treatment. Consulted RN    Education:      Provided Pt/Family ed re: Purpose of OT services;  OT Plan of Care;     ADL-  Instruction/training on use of DME/AD/Adaptive equip/techs to improve safety/IND with Functional Ax    Mobility-  Instruction/training on safety and improved independence with bed mobility, functional transfers, functional mobility - benefits of use of AD    Sitting EOB - to improve dynamic sitting balance and activity tolerance during ADLs as noted above   Activity tolerance - Instruction/training on energy conservation/work simplification, techs to increase endurance for completion of Functional Ax    Cognitive retraining -  Cues for safety during Functional Ax for safety, improved safety awareness   Skilled positioning/alignment for Pain Mgmt, Skin Integrity, Edema Control R LE   Skilled monitoring of pt's response to tx ax   Techs for improved Safety/Safety Awareness w/ Functional Activity/Mobility   Recommendations for Continued Participation in OT services during Hospitalization      Made all appropriate Environmental Modifications to facilitate pt's level of IND and safety. Pt and/or Family verbalized/demonstrated a Good understanding of education provided. Will Review PRN.        Assessment of current deficits   Functional mobility [x]  ADLs [x] Strength []  Cognition []  Functional transfers  [x] IADLs [x] Safety Awareness [x]  Endurance [x]  Fine Motor Coordination [] Balance [x] Vision/perception [] Sensation []   Gross Motor Coordination [] ROM [] Delirium [] Motor Control []      Plan of Care: OT 1-3 x/week for 1 week PRN   [x] ADL retraining/AE, Equipment Needs/Recommendations   [x] Energy Conservation Techniques/Strategies      [] Neuromuscular Re-Education      [x] Functional Transfer Training         [x] Functional Mobility Training          [] Cognitive Re-Training          [] Splinting/Positioning Needs           [x] Therapeutic Activity   [x]Therapeutic Exercise   [] Visual/Perceptual   [] Delirium Prevention/Treatment   [x] Positioning to Improve Functional Schriever, Safety, and Skin Integrity   [x] Patient and/or Family Education to Increase Safety and Functional Schriever   [x] Environmental Modifications  [x] Compensatory techniques for ADLs   [x] Other:       Pt would benefit from continued skilled OT services to increase safety and independence with completion of ADL/IADL tasks for functional independence and quality of life. Pt/Family actively participated in the establishment of goals. Rehab Potential:  Good for established goals    Patient / Family Goal:  Return home with family assist     Patient and/or Family were instructed on Functional Diagnosis, Prognosis/Goals and OT Plan of Care. Demonstrated Good understanding. Evaluation Time includes thorough review of current medical information, gathering information on past medical history/social history and prior level of function, completion of standardized testing/informal observation of tasks, assessment of data and education on plan of care and goals.      Eval Complexity: Low  Profile and History - Mod  Assessment of Occupational Performance and Identification of Deficits - Low  Clinical Decision Making - Low     Time In:  1529              Time Out:  1605  Total Treatment Time:  25 minutes      Treatment Charges: Mins Units   OT Eval Low 97165 X 1   OT Eval Medium 33036     OT Eval High 89289     OT Re-Eval [id number]     Therapeutic Ex  62746     Therapeutic Activities 40083 ADL/Self Care 29671 25 2   Neuro Re-ed 15143     Orthotic manage/training  92138     Non-Billable Time     Total Timed Treatment 25 701 [facility], OTR/L  # [id number]

## 2021-01-06 NOTE — PROGRESS NOTES
At bedside to complete admission database with ED staff retaining care throughout. Pt alert, oriented, pleasant and no complaints at this time. Pt states he has had itching on bilateral arms near elbows recently. Dr Fariha Hogue at bedside to assess patient.

## 2021-01-06 NOTE — CONSULTS
Pharmacy Consultation Note  (Antibiotic Dosing and Monitoring)    Initial consult date: 21  Consulting physician: DEMI Potter  Drug: Vancomycin  Indication: cellulitis    Age/  Gender Height Weight IBW Dosing weight  Allergy Information   80 y.o./male   140 lb (63.5 kg)     Patient height not recorded  63.5 kg  Latex, Aspirin, Ciprofloxacin in d5w, Pcn [penicillins], Other, and Peanut-containing drug products      Other anti-infectives Start date Stop date   Ancef 1 gram IV TID 21                Temp (24hrs), Av.8 °F (36 °C), Min:96.7 °F (35.9 °C), Max:96.9 °F (36.1 °C)          Date  WBC BUN SCr CrCl  (mL/min) Drug/Dose Time   Given Level(s)   (Time) Comments    7.9 25 1  52 Vancomycin 1250 mg IV x 1 2114                                        No intake or output data in the 24 hours ending 21 2216    Cultures:    Site Date Result                    Assessment:  · Pharmacy consulted to dose vancomycin for this 79 y.o. male for cellulitis  · Goal trough level = 15-20 mcg/mL    Plan:  · Vancomycin 1250 mg IV x 1. Begin vanco 1000mg IV Q24hrs on 21   · Pharmacist will follow and monitor/adjust dosing as necessary.      ABRAHAM Sinha [facility] 2021 10:16 PM

## 2021-01-07 LAB
ANION GAP SERPL CALCULATED.3IONS-SCNC: 6 MMOL/L (ref 7–16)
BUN BLDV-MCNC: 15 MG/DL (ref 8–23)
CALCIUM SERPL-MCNC: 8.9 MG/DL (ref 8.6–10.2)
CHLORIDE BLD-SCNC: 102 MMOL/L (ref 98–107)
CO2: 29 MMOL/L (ref 22–29)
CREAT SERPL-MCNC: 1 MG/DL (ref 0.7–1.2)
GFR AFRICAN AMERICAN: >60
GFR NON-AFRICAN AMERICAN: >60 ML/MIN/1.73
GLUCOSE BLD-MCNC: 146 MG/DL (ref 74–99)
METER GLUCOSE: 155 MG/DL (ref 74–99)
METER GLUCOSE: 161 MG/DL (ref 74–99)
METER GLUCOSE: 172 MG/DL (ref 74–99)
METER GLUCOSE: 174 MG/DL (ref 74–99)
POTASSIUM REFLEX MAGNESIUM: 4 MMOL/L (ref 3.5–5)
SODIUM BLD-SCNC: 137 MMOL/L (ref 132–146)

## 2021-01-07 PROCEDURE — 6360000002 HC RX W HCPCS: Performed by: PHYSICIAN ASSISTANT

## 2021-01-07 PROCEDURE — 80048 BASIC METABOLIC PNL TOTAL CA: CPT

## 2021-01-07 PROCEDURE — 36415 COLL VENOUS BLD VENIPUNCTURE: CPT

## 2021-01-07 PROCEDURE — 6370000000 HC RX 637 (ALT 250 FOR IP): Performed by: INTERNAL MEDICINE

## 2021-01-07 PROCEDURE — 82962 GLUCOSE BLOOD TEST: CPT

## 2021-01-07 PROCEDURE — 1200000000 HC SEMI PRIVATE

## 2021-01-07 PROCEDURE — 97535 SELF CARE MNGMENT TRAINING: CPT

## 2021-01-07 PROCEDURE — 2580000003 HC RX 258: Performed by: PHYSICIAN ASSISTANT

## 2021-01-07 RX ORDER — PETROLATUM 42 G/100G
OINTMENT TOPICAL 2 TIMES DAILY
Status: DISCONTINUED | OUTPATIENT
Start: 2021-01-07 | End: 2021-01-09 | Stop reason: HOSPADM

## 2021-01-07 RX ORDER — DIPHENHYDRAMINE HCL 25 MG
25 TABLET ORAL EVERY 6 HOURS PRN
Status: DISCONTINUED | OUTPATIENT
Start: 2021-01-07 | End: 2021-01-09 | Stop reason: HOSPADM

## 2021-01-07 RX ADMIN — VANCOMYCIN HYDROCHLORIDE 1000 MG: 1 INJECTION, POWDER, LYOPHILIZED, FOR SOLUTION INTRAVENOUS at 09:04

## 2021-01-07 RX ADMIN — DIPHENHYDRAMINE HYDROCHLORIDE 25 MG: 25 TABLET ORAL at 15:29

## 2021-01-07 RX ADMIN — CEFAZOLIN 1 G: 1 INJECTION, POWDER, FOR SOLUTION INTRAMUSCULAR; INTRAVENOUS at 21:29

## 2021-01-07 RX ADMIN — Medication 10 ML: at 09:04

## 2021-01-07 RX ADMIN — INSULIN LISPRO 1 UNITS: 100 INJECTION, SOLUTION INTRAVENOUS; SUBCUTANEOUS at 09:03

## 2021-01-07 RX ADMIN — CEFAZOLIN 1 G: 1 INJECTION, POWDER, FOR SOLUTION INTRAMUSCULAR; INTRAVENOUS at 13:44

## 2021-01-07 RX ADMIN — SKIN PROTECTANT: 44 OINTMENT TOPICAL at 15:29

## 2021-01-07 RX ADMIN — DIPHENHYDRAMINE HYDROCHLORIDE 25 MG: 25 TABLET ORAL at 21:29

## 2021-01-07 RX ADMIN — ENOXAPARIN SODIUM 40 MG: 40 INJECTION SUBCUTANEOUS at 09:03

## 2021-01-07 RX ADMIN — SKIN PROTECTANT: 44 OINTMENT TOPICAL at 21:30

## 2021-01-07 RX ADMIN — Medication 10 ML: at 21:30

## 2021-01-07 RX ADMIN — CEFAZOLIN 1 G: 1 INJECTION, POWDER, FOR SOLUTION INTRAMUSCULAR; INTRAVENOUS at 06:48

## 2021-01-07 RX ADMIN — INSULIN LISPRO 1 UNITS: 100 INJECTION, SOLUTION INTRAVENOUS; SUBCUTANEOUS at 16:51

## 2021-01-07 RX ADMIN — INSULIN LISPRO 1 UNITS: 100 INJECTION, SOLUTION INTRAVENOUS; SUBCUTANEOUS at 21:34

## 2021-01-07 RX ADMIN — INSULIN LISPRO 1 UNITS: 100 INJECTION, SOLUTION INTRAVENOUS; SUBCUTANEOUS at 12:01

## 2021-01-07 ASSESSMENT — PAIN SCALES - GENERAL: PAINLEVEL_OUTOF10: 0

## 2021-01-07 NOTE — PROGRESS NOTES
[address] [place] [address] [facility]    Consultation Note     Admit Date: 1/5/2021  6:33 PM    Reason for Consult:   Right lower extremity cellulitis    Attending Physician:  Iris Klein MD     Chief Complaint: my right leg is red    HISTORY OF PRESENT ILLNESS:   The patient is a 79 y.o.  male known to the Infectious Diseases service. The patient has the below past med hx. He was sent to ER by vascular surgery yesterday for RLE cellulitis. Henryldlori Prost He is status post angioplasty of right leg over a year ago and is followed by the vascular team.  No trauma to his leg   He was started on vanco and ancef. 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 ([address] 12/6/2019    S/P peripheral artery angioplasty 01/23/2020    bilateral legs -[place]    Skin ulcer of right foot with fat layer exposed ([address] 12/9/2019    Ulcer of right leg, with fat layer exposed ([address] 5/6/2019    Varicose veins of leg with edema, 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     Past Surgical History:        Procedure Laterality Date    COLONOSCOPY      CYSTOSCOPY N/A 5/18/2020    SUPRAPUBIC TUBE PLACEMENT performed by Bernadine Tovar MD at [address] [facility]  1990's    lense implants bilaterally    FOOT DEBRIDEMENT Left 01/04/2017    wound debridement and delayed primary closure    FRACTURE SURGERY      L femur fracture surgery    HIP FRACTURE SURGERY Left 9/23/2020    LEFT HIP OPEN REDUCTION INTERNAL FIXATION performed by Bernardo Romberg, MD at [address] [facility] SURGERY      OTHER SURGICAL HISTORY  04/23/2019    Dr. Gage Meza- PTA ant tibial    OTHER SURGICAL HISTORY  12/17/2019    Dr Gage Meza - PCI Right popliteal and Anterior tibial    PROSTATE BIOPSY  04/2016    SKIN BIOPSY  sept of 2018 last time    head and ears    TOE AMPUTATION Left 12/31/2016    2nd    TOE AMPUTATION Right 4/26/2019    AMPUTATION RIGHT SECOND TOE, FOOT DEBRIDEMENT performed by Valerie Bah DPM at [facility] TOE AMPUTATION Right 12/10/2019    AMPUTATION RIGHT 3rd DIGIT WITH PARTIAL RESECTION OF THIRD METATARSAL performed by Valerie Bah DPM at [facility] 22 TURP N/A 5/18/2020    CYSTOSCOPY PLASMA LOOP TRANSURETHRAL RESECTION PROSTATE performed by Shukri Merino MD at [address]       Current Medications:   Scheduled Meds:   mineral oil-hydrophilic petrolatum   Topical BID    insulin lispro  0-6 Units Subcutaneous TID WC    insulin lispro  0-3 Units Subcutaneous Nightly    ceFAZolin  1 g Intravenous Q8H    sodium chloride flush  10 mL Intravenous 2 times per day    enoxaparin  40 mg Subcutaneous Daily    vancomycin  1,000 mg Intravenous Q24H     Continuous Infusions:   dextrose       PRN Meds:glucose, dextrose, glucagon (rDNA), dextrose, sodium chloride flush, potassium chloride **OR** potassium alternative oral replacement **OR** potassium chloride, magnesium hydroxide, acetaminophen **OR** acetaminophen, trimethobenzamide    Allergies:  Latex, Aspirin, Ciprofloxacin in d5w, Pcn [penicillins], Other, and Peanut-containing drug products    Social History:   Social History     Socioeconomic History    Marital status:      Spouse name: None    Number of children: 1    Years of education: None    Highest education level: None   Occupational History    None   Social Needs    Financial resource strain: None    Food insecurity     Worry: None     Inability: None    Transportation needs     Medical: None     Non-medical: None   Tobacco Use    Smoking status: Former Smoker     Packs/day: 0.50     Years: 2.00     Pack years: 1.00     Types: Cigarettes     Quit date: 1960     Years since quittin.0    Smokeless tobacco: Never Used   Substance and Sexual Activity    Alcohol use: Not Currently    Drug use: Not Currently    Sexual activity: None   Lifestyle    Physical activity     Days per week: None     Minutes per session: None    Stress: None   Relationships    Social connections     Talks on phone: None     Gets together: None     Attends Yazidi service: None     Active member of club or organization: None     Attends meetings of clubs or organizations: None     Relationship status: None    Intimate partner violence     Fear of current or ex partner: None     Emotionally abused: None     Physically abused: None     Forced sexual activity: None   Other Topics Concern    None   Social History Narrative    None     Tobacco: No  Alcohol: No  Pets: No  Travel: No    Family History:       Problem Relation Age of Onset    Heart Disease Mother         CHF    Heart Disease Father     Heart Attack Father    . Otherwise non-pertinent to the chief complaint. REVIEW OF SYSTEMS:    CONSTITUTIONAL:  No chills, fevers or night sweats. No loss of weight. EYES:  No double vision or drainage from eyes, ears or throat. HEENT:  No neck stiffness. No dysphagia. No drainage from eyes, ears or throat  RESPIRATORY:  No cough, productive sputum or hemoptysis. CARDIOVASCULAR:  No chest pain, palpitations, orthopnea or dyspnea on exertion. GASTROINTESTINAL:  No nausea, vomiting, diarrhea or constipation or hematochezia   GENITOURINARY:  No frequency burning dysuria or hematuria. INTEGUMENT/BREAST:  No rash or breast masses. HEMATOLOGIC/LYMPHATIC:  No lymphadenopathy or blood dyscrasics. ALLERGIC/IMMUNOLOGIC:  No anaphylaxis. ENDOCRINE:  No polyuria or polydipsia or temperature intolerance. MUSCULOSKELETAL:  No myalgia or arthralgia.   Full PROTIME 12.0 09/23/2020    INR 1.1 09/23/2020       No results found for: TSH    Lab Results   Component Value Date    COLORU Yellow 01/11/2017    PHUR 6.5 01/11/2017    LABCAST FEW 12/30/2016    WBCUA 1-3 01/11/2017    RBCUA >20 01/11/2017    BACTERIA RARE 01/11/2017    CLARITYU SL CLOUDY 01/11/2017    SPECGRAV 1.020 01/11/2017    LEUKOCYTESUR Negative 01/11/2017    UROBILINOGEN 0.2 01/11/2017    BILIRUBINUR Negative 01/11/2017    BLOODU LARGE 01/11/2017    GLUCOSEU 250 01/11/2017       No results found for: DXW0DDA, BEART, V3YTAANN, PHART, THGBART, QZM6QQW, PO2ART, RLQ4WYR  Radiology:  XR TIBIA FIBULA RIGHT (2 VIEWS)   Final Result   Soft tissue swelling seen diffusely in the right leg to a certain degree. 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 US DUP LOWER EXTREMITY RIGHT ROSE MARIE   Final Result   No evidence for deep venous thrombosis in the right lower extremity. Microbiology:  Pending  Recent Labs     01/05/21  1425   BC 24 Hours no growth     No results for input(s): ORG in the last 72 hours. Recent Labs     01/05/21  1425   BLOODCULT2 24 Hours no growth     No results for input(s): STREPNEUMAGU in the last 72 hours. No results for input(s): LP1UAG in the last 72 hours. No results for input(s): ASO in the last 72 hours. No results for input(s): CULTRESP in the last 72 hours.     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 Zechariah Waldron  · Wound care consult appreciated   ·   ·     Plan: · Cont vanco and ancef   · Check cultures  · Baseline ESR, CRP  · Monitor labs  · Will follow with you  · I do not have a handle on his disposition yet     Thank you for having us see this patient in consultation. I will be discussing this case with the treating physicians.       Electronically signed by Sri Quinonez MD on 1/7/2021 at 1:58 PM

## 2021-01-07 NOTE — PLAN OF CARE
Problem: Falls - Risk of:  Goal: Will remain free from falls  Description: Will remain free from falls  Outcome: Met This Shift  Goal: Absence of physical injury  Description: Absence of physical injury  Outcome: Met This Shift     Problem: Skin Integrity:  Goal: Will show no infection signs and symptoms  Description: Will show no infection signs and symptoms  Outcome: Met This Shift  Goal: Absence of new skin breakdown  Description: Absence of new skin breakdown  Outcome: Met This Shift     Problem: Musculor/Skeletal Functional Status  Goal: Highest potential functional level  Outcome: Met This Shift  Goal: Absence of falls  Outcome: Met This Shift

## 2021-01-07 NOTE — PROGRESS NOTES
Occupational Therapy  OCCUPATIONAL THERAPY Treatment Note      Date:2021  Patient Name: Brenden Wilson  MRN: [id number]  : 1940  Room: [address] [place] [address]    Referring Physician:  DEMI Mendoza    Evaluating OT:  Homa BrianJUAN, OTR/L #[id number]    AM-PAC Daily Activity Raw Score:    Recommended Adaptive Equipment:  TBD as pt progresses     Reason for Admission:  Pt was admitted w/ Wound R LE    Diagnosis:     1. Cellulitis of right lower extremity    2. Hyperglycemia      Procedures this admission:  None     Pertinent Medical History:  Cellulitis, Chronic Venous insufficiency, DM, R Heel Ulcer, Left Femur Fracture - ORIF 20       Precautions:  Falls    Home Living: Pt lives with his wife in a 1-story house w/ Ramp entry. Bed/bath on the main floor.  (+) Basement. Bathroom setup:  Walk-in-Shower w/ Built-in Bench, Standard Commode   Equipment owned:  [employer], [employer], [employer]    Available Family Assist:  Wife can provide assist PRN    Prior Level of Function:  IND with ADLs, IADLs, Transfers and Mobility using No AD for ambulation.    Driving:  Yes  Occupation:  Retired    Pain Level:  Denies pain R LE;  Nsg Notified   Additional Complaints:  General weakness    Vitals/Lab Values:  WFL Room Air    Cognition: A & O x 4   Able to Follow Multi-step Commands INDly   Memory:  good    Sequencing:  good    Problem solving:  good    Judgement/safety:  good   Additional Comments:  Pt was pleasant, cooperative, very talkative, tangential       Functional Assessment:   Initial Eval Status  Date: 21 Treatment Status  Date:21 Short Term/Long Term Goals  Treatment frequency: PRN 1-3 x/week  1 week   Feeding IND      NA   Grooming SUP    Able to complete tasks after set up w/ SUP for safety standing at the sink   SUP (standing at sink for oral hygiene and facial washing) IND  Standing At The Sink   UB Dressing SUP    Able to don garment after set up seated EOB   SUP (pt doffed/donned pullover shirt) IND   LB Dressing Min A    Min A for clothing adjustment, SUP for safety w/ standing balance  Pt ed, VCs for safe, adaptive techs/equip   Min A (pt able to doff/evelin pants, assist with socks) Mod I   Bathing NT     Min A (sponge bath seated/standing at sink, assist with B feet) SUP for safety   Toileting NT    Pt declined SBA IND   Bed Mobility  Rolling:  IND  Repositioning:  IND   Supine to Sit:  IND  Sit to Supine:  IND      NA   Functional Transfers Sit to stand:  SUP  Stand to sit:  SUP      EOB 2-3x  Min VCs/Pt ed re: safety/hand placement   Sit to stand:  SBA  Stand to sit:  SBA  Commode: SBA IND   Functional Mobility Close SUP w/o AD    Short distances in bathroom + Household distances in room, declined use of [employer], guarded re: R LE   SBA (using no AD, to/from bathroom) IND   Balance Sitting:      Static:  Remote SUP EOB    Dynamic:  SUP w/ functional ax EOB    Standing:      Static:  Close SUP    Dynamic:  Close SUP w/ functional ax/mobility w/o AD   Sitting: SUP  Standing: SBA    Activity Tolerance Fair(+)  Limited by R LE discomfort    Tolerated Sitting:  EOB > 10 mins w/ functional ax     Tolerated Standin-10 mins w/ functional ax/mobility    Fair (pt stood at sink ~10 mins)    Visual/  Perceptual WFL  Glasses:  No      Hearing WFL  Hearing Aids  No       Hand dominance: Right    UE ROM: RUE:  WFL      LUE:  WFL  Strength: RUE: grossly 4+/5     LUE: grossly 4+/5     Strength:  WFL Micky UEs  Fine Motor Coordination:  WFL Micky UEs                            Comments: Cleared by RN to see pt. Upon arrival, patient was found in semi-supine and agreeable to OT session. At end of session, pt left supine in bed. Treatment: Pt required vc's for proper technique/safety with hand placement/body mechanics/posture for bed mobility/ADLs/functional tranfers/mobility. Pt required vc's for sequencing/initiation of ADLs/functional transfers. Pt appears latent with following commands. Pt able to stand at sink ~10 mins and sit the rest of the session to increase core strength/balance/activity tolerance for ease with ADLs. Pt required increased time to complete ADLs/functional transfers due to management of multiple lines and talkative behavior. Pt required rest breaks during session. Pt appeared to have tolerated session well and appears motivated/cooperative/pleasant . Pt instructed on use of call light for assistance and fall prevention. Pt demo'ing fair understanding of education provided.  Continue to educate.       -pt has made good progress toward goals  -continue current POC     Time In:  1100              Time Out:  11:45  Total Treatment Time:  45     Treatment Charges: Mins Units   OT Eval Low 31766     OT Eval Medium 14435     OT Eval High 18734     OT Re-Eval 61501     Therapeutic Ex  18486     Therapeutic Activities 01542     ADL/Self Care 24550 45 3   Neuro Re-ed 22389     Orthotic manage/training  12783     Non-Billable Time     Total Timed Treatment 45 [address] [place], [place], OTR/L [id number]

## 2021-01-07 NOTE — FLOWSHEET NOTE
Depth (cm) 0.1 cm   Wound Surface Area (cm^2) 6 cm^2   Change in Wound Size % (l*w) 97.92   Wound Volume (cm^3) 0.6 cm^3   Wound Healing % 98   Wound Assessment   (red,yellow)   Drainage Amount Scant   Drainage Description Serosanguinous   Odor None   Latonya-wound Assessment Intact   Wound 01/07/21 Leg Right;Distal   Date First Assessed/Time First Assessed: 01/07/21 1337   Present on Hospital Admission: Yes  Location: Leg  Wound Location Orientation: Right;Distal   Wound Image    Wound Etiology Venous   Dressing Status New dressing applied   Wound Cleansed Cleansed with saline   Dressing/Treatment Dry dressing;Roll gauze; Xeroform   Wound Length (cm) 0.4 cm   Wound Width (cm) 0.2 cm   Wound Depth (cm) 0.1 cm   Wound Surface Area (cm^2) 0.08 cm^2   Wound Volume (cm^3) 0.01 cm^3   Wound Assessment   (red)   Drainage Amount None   Latonya-wound Assessment Intact   Wound 01/07/21 Leg Right;Posterior; Lower   Date First Assessed/Time First Assessed: 01/07/21 1339   Present on Hospital Admission: Yes  Location: Leg  Wound Location Orientation: Right;Posterior; Lower   Wound Image   (several areas)   Wound Etiology Venous   Dressing Status New dressing applied   Wound Cleansed Cleansed with saline   Dressing/Treatment Dry dressing;Roll gauze; Xeroform   Wound Length (cm) 7 cm   Wound Width (cm) 4 cm   Wound Depth (cm) 0.1 cm   Wound Surface Area (cm^2) 28 cm^2   Wound Volume (cm^3) 2.8 cm^3   Wound Assessment   (yellow, red)   Drainage Amount Scant   Drainage Description Serosanguinous   Odor None   Latonya-wound Assessment Intact      **Informed Consent**    The patient has given verbal consent to have photos taken of  wounds and inserted into their chart as part of their permanent medical record for purposes of documentation, treatment management and/or medical review.    All Images taken on 1/7/21 of patient name: Garry Rico were transmitted and stored on secured [employer] located [facility] [facility] by a registered [employer] Mobile Application Device. Impression:  Right lower leg: venous    Plan:xeroform, 4x4, kerlix  Aquaphor  Patient will need continued preventative care.   Out patient [address]  [facility] 1/7/2021 1:44 PM

## 2021-01-07 NOTE — PROGRESS NOTES
Pharmacy Consultation Note  (Antibiotic Dosing and Monitoring)    Initial consult date: 2021  Consulting physician/provider: DEMI Pope/Dr. Corral Casselton (ID)  Drug(s): vancomycin  Indication: CSSSI RLE (cellulitis)  Age/Gender IBW DW  Allergy Information   80 y.o./M; 177.8 cm  65.8 kg  Latex, Aspirin, Ciprofloxacin in d5w, Pcn [penicillins], Other, and Peanut-containing drug products             Date  WBC BUN/CR Drug/Dose Time   Given Level(s)   (Time) Comments    7.9 25/1 vancomycin 1250 mg x1 2114      7.6 21/0.9 vancomycin 1000 mg q24h 0949     1/7  15/1 vanco 1000 mg q24h 0904        vanco 1000 mg q24h <0900> Tr at 0830      Estimated Creatinine Clearance: 54 mL/min (based on SCr of 1 mg/dL). Intake/Output Summary (Last 24 hours) at 2021 0846  Last data filed at 2021 0650  Gross per 24 hour   Intake 360 ml   Output --   Net 360 ml   UO not [facility]. Temp max: Temp (24hrs), Av.7 °F (37.1 °C), Min:98.6 °F (37 °C), Max:98.7 °F (37.1 °C)      Cultures:  available culture and sensitivity results were reviewed in [facility]      Assessment:  · Consulted by DEMI Pope to dose/monitor vancomycin. · Goal trough level:  15-20 mCg/mL. · 81 yo/M admitted 2021 for treatment of RLE cellulitis (referred in from [facility] office). Hx of RLE bypass and f/u w/Vasc Surg. · Empiric vancomycin and cefazolin started on . · ID is consulted. · : day #3 ATBs. Plan:  · Continue 1000 mg q24h. · Will check vancomycin trough level on  if vanco continues. · Pharmacist will follow and monitor/adjust dosing as necessary.       Campbell Fraga PharmD  2021  8:51 AM  Pager: [phone]

## 2021-01-07 NOTE — HOME CARE
Spoke to patient's spouse in reference to IV pricing. Patient's spouse reports pricing is unaffordable. Notified case management of same.      ORDERED THERAPY AT TIME OF QUOTE: cefazolin 1gm q8h; vanco 1gm q24h  COVERAGE: copays for drugs; 80% for per diems  TOTAL PT RESPONSIBILITY: $360.16 per week      [address]

## 2021-01-07 NOTE — PROGRESS NOTES
Subjective: The patient is awake and alert. Resting comfortably no apparent acute distress  Right leg appears little bit better  Objective:    BP (!) 137/97   Pulse 70   Temp 97.1 °F (36.2 °C) (Temporal)   Resp 16   Ht 5' 10\" (1.778 m)   Wt 145 lb 1.6 oz (65.8 kg)   SpO2 96%   BMI 20.82 kg/m²     In: 360 [P.O.:360]  Out: -   In: 360   Out: -     General appearance: NAD, conversant  HEENT: AT/NC, MMM  Neck: FROM, supple  Lungs: Clear to auscultation  CV: RRR, no MRGs  Vasc: Radial pulses 2+  Abdomen: Soft, non-tender; no masses or HSM  Extremities: Cellulitis of right lower extremity, scaling noted, slightly improved since admission  Skin: no rash, lesions or ulcers  Psych: Alert and oriented to person, place and time  Neuro: Alert and interactive     Recent Labs     01/05/21  1430 01/06/21  0431   WBC 7.9 7.6   HGB 13.9 11.8*   HCT 40.8 34.0*    184       Recent Labs     01/05/21  1430 01/06/21  0431 01/07/21  0551    135 137   K 4.5 4.1 4.0    100 102   CO2 27 25 29   BUN 25* 21 15   CREATININE 1.0 0.9 1.0   CALCIUM 10.1 9.0 8.9       Assessment:    Principal Problem:    Cellulitis of right lower extremity  Active Problems:    Essential hypertension    Cellulitis  Resolved Problems:    * No resolved hospital problems.  *      Plan:  Admit to general medical floor for evaluation of right lower extremity cellulitis with venous insufficiency  IV antibiotic therapy,-vancomycin and Ancef   infectious disease evaluation appreciated  Pancultures  DC plan once clinically improved  Resume home medications for BP, as needed   Insulin sliding scale for diabetes, check A1c as he is diet controlled at home    DVT Prophylaxis   PT/OT  Discharge planning           Garrison Tovar MD  11:18 AM  1/7/2021

## 2021-01-07 NOTE — CARE COORDINATION
Transition of Care-patient discharge plan remains home with wife in their one story home, ramp entrance. Patient is independent and driving prior to admission. Patient is currently being worked for cellulitis of right lower extremity, cultures pending, until final results receiving Vancomycin IV daily as well as Ancef Q8. Patient agreed yesterday to [facility] if IV antibiotics required at discharge, (wife agreeable to learning process) referral was sent to liaison Betsy Meraz, following case. Wife Clay Lara [phone] will transport patient at discharge. No other discharge needs identified. PCP is Dr. Nicko Del Cid and pharmacy is [facility]. CM/SW following. Update-Infectious disease has no determined antibiotics required at discharge until all cultures have resulted. If patient is leaving on current antibiotic therapy, will have to run cost with infusion center or other alternative because cost is 360 dollars a week and patient cannot afford that. CM following.     Shawn Aguirre BSN, RN  [facility]   [phone]

## 2021-01-07 NOTE — PROGRESS NOTES
Comprehensive Nutrition Assessment    Type and Reason for Visit:  Initial, Positive Nutrition Screen, Wound    Nutrition Recommendations/Plan: Continue current diet, Ensure HP BID, Jared BID    Nutrition Assessment:  Pt is at nutritional risk d/t increased nutrient needs for healing of venous ulcers. Will add ONS and monitor. Malnutrition Assessment:  Malnutrition Status:  Insufficient data    Context:  Acute Illness     Findings of the 6 clinical characteristics of malnutrition:  Energy Intake:  No significant decrease in energy intake  Weight Loss:  Unable to assess     Body Fat Loss:  Unable to assess     Muscle Mass Loss:  Unable to assess    Fluid Accumulation:  No significant fluid accumulation     Strength:  Not Performed    Estimated Daily Nutrient Needs:  Energy (kcal):  3659-4572; Weight Used for Energy Requirements:  Current     Protein (g):  85-95; Weight Used for Protein Requirements:  Current(1.3-1.5)        Fluid (ml/day):  3745-5011; Method Used for Fluid Requirements:  1 ml/kcal      Nutrition Related Findings:  pt alert, active BS, soft abd, +1 edema, fluids WDL      Wounds:  Multiple, Venous Stasis       Current Nutrition Therapies:    DIET CARB CONTROL; Carb Control: 4 carbs/meal (approximate 1800 kcals/day);  No Added Salt (3-4 GM)    Anthropometric Measures:  · Height: 5' 10\" (177.8 cm)  · Current Body Weight: 145 lb (65.8 kg)(1/7)   · Usual Body Weight: (UTO d/t no actual wt hx on file)     · Ideal Body Weight: 166 lbs; % Ideal Body Weight 87.3 %   · BMI: 20.8  · BMI Categories: Underweight (BMI less than 22) age over 72       Nutrition Diagnosis:   · Increased nutrient needs related to increase demand for energy/nutrients as evidenced by wounds    Nutrition Interventions:   Food and/or Nutrient Delivery:  Continue Current Diet, Start Oral Nutrition Supplement  Nutrition Education/Counseling:  No recommendation at this time   Coordination of Nutrition Care:  Continue to monitor while

## 2021-01-08 ENCOUNTER — APPOINTMENT (OUTPATIENT)
Dept: MRI IMAGING | Age: 81
DRG: 300 | End: 2021-01-08
Payer: MEDICARE

## 2021-01-08 LAB
ANION GAP SERPL CALCULATED.3IONS-SCNC: 8 MMOL/L (ref 7–16)
BUN BLDV-MCNC: 19 MG/DL (ref 8–23)
CALCIUM SERPL-MCNC: 9.2 MG/DL (ref 8.6–10.2)
CHLORIDE BLD-SCNC: 98 MMOL/L (ref 98–107)
CO2: 29 MMOL/L (ref 22–29)
CREAT SERPL-MCNC: 1.2 MG/DL (ref 0.7–1.2)
GFR AFRICAN AMERICAN: >60
GFR NON-AFRICAN AMERICAN: 58 ML/MIN/1.73
GLUCOSE BLD-MCNC: 306 MG/DL (ref 74–99)
METER GLUCOSE: 140 MG/DL (ref 74–99)
METER GLUCOSE: 169 MG/DL (ref 74–99)
METER GLUCOSE: 184 MG/DL (ref 74–99)
METER GLUCOSE: 360 MG/DL (ref 74–99)
POTASSIUM REFLEX MAGNESIUM: 3.8 MMOL/L (ref 3.5–5)
SARS-COV-2, NAAT: NOT DETECTED
SODIUM BLD-SCNC: 135 MMOL/L (ref 132–146)
VANCOMYCIN TROUGH: 6 MCG/ML (ref 5–16)

## 2021-01-08 PROCEDURE — 80202 ASSAY OF VANCOMYCIN: CPT

## 2021-01-08 PROCEDURE — 80048 BASIC METABOLIC PNL TOTAL CA: CPT

## 2021-01-08 PROCEDURE — 6370000000 HC RX 637 (ALT 250 FOR IP): Performed by: INTERNAL MEDICINE

## 2021-01-08 PROCEDURE — 1200000000 HC SEMI PRIVATE

## 2021-01-08 PROCEDURE — 6360000002 HC RX W HCPCS: Performed by: PHYSICIAN ASSISTANT

## 2021-01-08 PROCEDURE — 97530 THERAPEUTIC ACTIVITIES: CPT

## 2021-01-08 PROCEDURE — 73718 MRI LOWER EXTREMITY W/O DYE: CPT

## 2021-01-08 PROCEDURE — 36415 COLL VENOUS BLD VENIPUNCTURE: CPT

## 2021-01-08 PROCEDURE — U0002 COVID-19 LAB TEST NON-CDC: HCPCS

## 2021-01-08 PROCEDURE — 2580000003 HC RX 258: Performed by: PHYSICIAN ASSISTANT

## 2021-01-08 PROCEDURE — 82962 GLUCOSE BLOOD TEST: CPT

## 2021-01-08 RX ADMIN — SKIN PROTECTANT: 44 OINTMENT TOPICAL at 21:50

## 2021-01-08 RX ADMIN — INSULIN LISPRO 1 UNITS: 100 INJECTION, SOLUTION INTRAVENOUS; SUBCUTANEOUS at 21:51

## 2021-01-08 RX ADMIN — VANCOMYCIN HYDROCHLORIDE 1000 MG: 1 INJECTION, POWDER, LYOPHILIZED, FOR SOLUTION INTRAVENOUS at 12:30

## 2021-01-08 RX ADMIN — DIPHENHYDRAMINE HYDROCHLORIDE 25 MG: 25 TABLET ORAL at 21:50

## 2021-01-08 RX ADMIN — INSULIN LISPRO 1 UNITS: 100 INJECTION, SOLUTION INTRAVENOUS; SUBCUTANEOUS at 16:52

## 2021-01-08 RX ADMIN — CEFAZOLIN 1 G: 1 INJECTION, POWDER, FOR SOLUTION INTRAMUSCULAR; INTRAVENOUS at 21:50

## 2021-01-08 RX ADMIN — INSULIN LISPRO 5 UNITS: 100 INJECTION, SOLUTION INTRAVENOUS; SUBCUTANEOUS at 11:26

## 2021-01-08 RX ADMIN — Medication 10 ML: at 11:33

## 2021-01-08 RX ADMIN — CEFAZOLIN 1 G: 1 INJECTION, POWDER, FOR SOLUTION INTRAMUSCULAR; INTRAVENOUS at 13:35

## 2021-01-08 RX ADMIN — Medication 10 ML: at 21:50

## 2021-01-08 RX ADMIN — CEFAZOLIN 1 G: 1 INJECTION, POWDER, FOR SOLUTION INTRAMUSCULAR; INTRAVENOUS at 05:56

## 2021-01-08 RX ADMIN — DIPHENHYDRAMINE HYDROCHLORIDE 25 MG: 25 TABLET ORAL at 05:56

## 2021-01-08 RX ADMIN — SODIUM CHLORIDE, PRESERVATIVE FREE 10 ML: 5 INJECTION INTRAVENOUS at 13:35

## 2021-01-08 RX ADMIN — SKIN PROTECTANT: 44 OINTMENT TOPICAL at 07:00

## 2021-01-08 ASSESSMENT — PAIN SCALES - GENERAL
PAINLEVEL_OUTOF10: 0
PAINLEVEL_OUTOF10: 0

## 2021-01-08 NOTE — PROGRESS NOTES
Spoke to Ganesh Armijo in [facility] re: 0830 timed lab needs drawn.     Electronically signed by Dara Mendoza RN on 1/8/2021 at 10:07 AM

## 2021-01-08 NOTE — CARE COORDINATION
Transition of Care-[facility] can accept patient when medically stable to [facility]. Negative Covid test required prior to transfer, updated RN Sophia Bartlett and put in soft chart. Attempted to update patient -he was off unit for an MRI. Call placed to wife Kenney Draper at cell and home phone, left message. HENS, Envelope, Ambulette & Facesheet on soft chart. CM following.   Kieran Barthel BSN, RN  [facility]   [phone]

## 2021-01-08 NOTE — DISCHARGE INSTR - COC
Continuity of Care Form    Patient Name: Kuldeep Anguiano   :  1940  MRN:  [id number]    Admit date:  2021  Discharge date: 2021    Code Status Order: Full Code   Advance Directives:   Advance Care Flowsheet Documentation       Date/Time Healthcare Directive Type of Healthcare Directive Copy in [address] Agent's Name Healthcare Agent's Phone Number    21 1112  Yes, patient has an advance directive for healthcare treatment  Living will;Durable power of  for health care -- --  Geronimo Raphael. [place]  [phone]            Admitting Physician:  Tirso Mckeon MD  PCP: Divine Pressley MD    Discharging Nurse: Anant Yañez RN  [address] Unit/Room#: 8592/1588-K  Discharging Unit Phone Number: [phone]    Emergency Contact:   Extended Emergency Contact Information  Primary Emergency Contact: Shoshana Fernandez. Address: [address] [place] [address] Phone: [phone]  Mobile Phone: [phone]  Relation: Spouse  Secondary Emergency Contact: Aretha Ferrara, E.   Home Phone: [phone]  Relation: Brother/Sister    Past Surgical History:  Past Surgical History:   Procedure Laterality Date    COLONOSCOPY      CYSTOSCOPY N/A 2020    SUPRAPUBIC TUBE PLACEMENT performed by Cherrie Bruno MD at [address] [facility]  2213'I    lense implants bilaterally    FOOT DEBRIDEMENT Left 2017    wound debridement and delayed primary closure    FRACTURE SURGERY      L femur fracture surgery    HIP FRACTURE SURGERY Left 2020    LEFT HIP OPEN REDUCTION INTERNAL FIXATION performed by Tyree Hernandez MD at [facility]  2019    Dr. Guido Valverde- PTA ant tibial    OTHER SURGICAL HISTORY  2019    Dr Guido Valverde - PCI Right popliteal and Anterior tibial    PROSTATE BIOPSY  2016    SKIN BIOPSY   last time    head and ears    TOE AMPUTATION Left 12/31/2016    2nd    TOE AMPUTATION Right 4/26/2019    AMPUTATION RIGHT SECOND TOE, FOOT DEBRIDEMENT performed by [place] Sampson DPM at [address] Right 12/10/2019    AMPUTATION RIGHT 3rd DIGIT WITH PARTIAL RESECTION OF THIRD METATARSAL performed by [place] Sampson DPM at [address]    TURP N/A 5/18/2020    CYSTOSCOPY PLASMA LOOP TRANSURETHRAL RESECTION PROSTATE [facility]         Immunization History: There is no immunization history on file for this patient.     Active Problems:  Patient Active Problem List   Diagnosis Code    Essential hypertension I10    Moderate protein-calorie malnutrition ([facility] [address] E44.0    PVD (peripheral vascular disease) with claudication ([facility]) I73.9    S/P peripheral artery angioplasty Z98.62    Cellulitis of right lower extremity L03.115    Cellulitis L03.90       Isolation/Infection:   Isolation            No Isolation          Patient Infection Status       Infection Onset Added Last Indicated Last Indicated By Review Planned Expiration Resolved Resolved By    None active    Resolved    COVID-19 Rule Out 10/20/20 10/21/20 10/20/20 Covid-19 Ambulatory (Ordered)   10/22/20 Rule-Out Test Resulted    COVID-19 Rule Out 10/13/20 10/13/20 10/13/20 Covid-19 Ambulatory (Ordered)   10/14/20 Rule-Out Test Resulted    COVID-19 Rule Out 10/06/20 10/07/20 10/06/20 Covid-19 Ambulatory (Ordered)   10/08/20 Rule-Out Test Resulted    COVID-19 Rule Out 10/02/20 10/03/20 10/02/20 Covid-19 Ambulatory (Ordered)   10/06/20 Rule-Out Test Resulted    COVID-19 Rule Out 09/26/20 09/26/20 09/26/20 Covid-19 Ambulatory (Ordered)   09/27/20 Rule-Out Test Resulted    COVID-19 Rule Out 05/14/20 05/14/20 05/14/20 Covid-19 Ambulatory (Ordered)   05/17/20 Rule-Out Test Resulted    NOT DETECTED 5/14/2020    MRSA 12/10/19 12/13/19 12/10/19 Surgical Culture   09/23/20 Yuliana Allen RN    Foot 12/10/19            Nurse Assessment:  Last Vital Signs: BP (!) 156/74   Pulse 67   Temp 97 °F (36.1 °C) (Temporal)   Resp 18   Ht 5' 10\" (1.778 m)   Wt 151 lb 6.4 oz (68.7 kg)   SpO2 97%   BMI 21.72 kg/m²     Last [facility] pain score (0-10 scale): Pain Level: 0  Last Weight:   Wt Readings from Last 1 Encounters:   01/08/21 151 lb 6.4 oz (68.7 kg)     Mental Status:  oriented    IV Access:  - PICC - site  R Upper Arm, insertion date: 01/09/2021    Nursing Mobility/ADLs:  Walking   Independent  Transfer  Independent  Bathing  Independent  Dressing  Independent  [address]  Med Delivery   whole    Wound Care Documentation and Therapy:  Wound 01/05/21 Leg Right; Lower #1 (Active)   Wound Image   01/07/21 1335   Wound Etiology Venous 01/07/21 1335   Dressing Status New dressing applied 01/08/21 0632   Wound Cleansed Irrigated with saline 01/08/21 0632   Dressing/Treatment Xeroform;Roll gauze;Dry dressing 01/08/21 0632   Offloading for Diabetic Foot Ulcers No offloading required 01/05/21 0948   Wound Length (cm) 2 cm 01/07/21 1335   Wound Width (cm) 3 cm 01/07/21 1335   Wound Depth (cm) 0.1 cm 01/07/21 1335   Wound Surface Area (cm^2) 6 cm^2 01/07/21 1335   Change in Wound Size % (l*w) 97.92 01/07/21 1335   Wound Volume (cm^3) 0.6 cm^3 01/07/21 1335   Wound Healing % 98 01/07/21 1335   Wound Assessment Purple/maroon;Granulation tissue;Fibrin 01/05/21 0927   Drainage Amount Moderate 01/08/21 0039   Drainage Description Serosanguinous 01/08/21 0039   Odor None 01/08/21 0039   Latonya-wound Assessment Intact 01/07/21 1335   Number of days: 3       Wound 01/07/21 Leg Right;Distal (Active)   Wound Image   01/07/21 1335   Wound Etiology Venous 01/08/21 0039   Dressing Status New dressing applied 01/08/21 0632   Wound Cleansed Cleansed with saline 01/08/21 7756   Dressing/Treatment Dry dressing;Roll gauze; Xeroform 01/08/21 0632   Wound Length (cm) 0.4 cm 01/07/21 1335   Wound Width (cm) 0.2 cm 01/07/21 1339 Wound Depth (cm) 0.1 cm 01/07/21 1335   Wound Surface Area (cm^2) 0.08 cm^2 01/07/21 1335   Wound Volume (cm^3) 0.01 cm^3 01/07/21 1335   Drainage Amount Small 01/08/21 0039   Drainage Description Yellow 01/08/21 0039   Latonya-wound Assessment Intact 01/07/21 1335   Number of days: 1       Wound 01/07/21 Leg Right;Posterior; Lower (Active)   Wound Image   01/07/21 1335   Wound Etiology Venous 01/07/21 1335   Dressing Status New dressing applied 01/08/21 0632   Wound Cleansed Cleansed with saline 01/08/21 2376   Dressing/Treatment Dry dressing;Roll gauze; Xeroform 01/08/21 4522   Wound Length (cm) 7 cm 01/07/21 1335   Wound Width (cm) 4 cm 01/07/21 1335   Wound Depth (cm) 0.1 cm 01/07/21 1335   Wound Surface Area (cm^2) 28 cm^2 01/07/21 1335   Wound Volume (cm^3) 2.8 cm^3 01/07/21 1335   Drainage Amount Scant 01/08/21 0039   Drainage Description Yellow 01/08/21 0039   Odor None 01/07/21 1335   Latonya-wound Assessment Intact 01/07/21 1335   Number of days: 1        Elimination:  Continence: Bowel: Yes  Bladder: Yes  Urinary Catheter: None   Colostomy/Ileostomy/Ileal Conduit: No       Date of Last BM: unknown    Intake/Output Summary (Last 24 hours) at 1/8/2021 1514  Last data filed at 1/8/2021 1335  Gross per 24 hour   Intake 620 ml   Output --   Net 620 ml     I/O last 3 completed shifts: In: 620 [P.O.:600; I.V.:20]  Out: -     Safety Concerns:     None    Impairments/Disabilities:      None    Nutrition Therapy:  Current Nutrition Therapy:   - Oral Diet:  Carb Control 4 carbs/meal (1800kcals/day)    Routes of Feeding: Oral  Liquids: No Restrictions  Daily Fluid Restriction: no  Last Modified Barium Swallow with Video (Video Swallowing Test): not done    Treatments at the Time of Hospital Discharge:   Respiratory Treatments: ***  Oxygen Therapy:  is not on home oxygen therapy.   Ventilator:    - No ventilator support    Rehab Therapies: PT and OT to eval and treat   Weight Bearing Status/Restrictions: No weight bearing restirctions  Other Medical Equipment (for information only, NOT a DME order):  {EQUIPMENT:[id number]}  Other Treatments: ***    Patient's personal belongings (please select all that are sent with patient):  Rodrigo SHEARER SIGNATURE:  Electronically signed by Dominick Mcclain RN on 1/9/2021 at 3:09 PM      CASE MANAGEMENT/SOCIAL WORK SECTION    Inpatient Status Date: ***    Readmission Risk Assessment Score:  Readmission Risk              Risk of Unplanned Readmission:        11           Discharging to Facility/ Agency   Name:  [facility]   Address:   Phone:[phone] to [facility]. Fax:[phone]    Dialysis Facility (if applicable)   Name:  Address:  Dialysis Schedule:  Phone:  Fax:    / signature: Electronically signed by Kash Beck RN     PHYSICIAN SECTION    Prognosis: Fair    Condition at Discharge: Stable    Rehab Potential (if transferring to Rehab): Fair    Recommended Labs or Other Treatments After Discharge: cbc and bmp in 3 days    Physician Certification: I certify the above information and transfer of Iban Mayers  is necessary for the continuing treatment of the diagnosis listed and that he requires [facility] for less 30 days.      Update Admission H&P: No change in H&P    PHYSICIAN SIGNATURE:  Electronically signed by Payam Serna MD on 1/9/21 at 3:20 PM EST

## 2021-01-08 NOTE — PROGRESS NOTES
[address] [place] [address] [facility]    Consultation Note     Admit Date: 1/5/2021  6:33 PM    Reason for Consult:   Right lower extremity cellulitis    Attending Physician:  Rashaun Mena MD     Chief Complaint: my right leg is red    HISTORY OF PRESENT ILLNESS:   The patient is a 79 y.o.  male known to the Infectious Diseases service. The patient has the below past med hx. He was sent to ER by vascular surgery yesterday for RLE cellulitis. Rupesh Melo He is status post angioplasty of right leg over a year ago and is followed by the vascular team.  No trauma to his leg   He was started on vanco and ancef. 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 ([address] 12/6/2019    S/P peripheral artery angioplasty 01/23/2020    bilateral legs -[place]    Skin ulcer of right foot with fat layer exposed ([address] 12/9/2019    Ulcer of right leg, with fat layer exposed ([address] 5/6/2019    Varicose veins of leg with edema, 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     Past Surgical History:        Procedure Laterality Date    COLONOSCOPY      CYSTOSCOPY N/A 5/18/2020    SUPRAPUBIC TUBE PLACEMENT performed by Eli Rhodes MD at [address]  1990's    lense implants bilaterally    FOOT DEBRIDEMENT Left 01/04/2017    wound debridement and delayed primary closure    FRACTURE SURGERY      L femur fracture surgery    HIP FRACTURE SURGERY Left 9/23/2020    LEFT HIP OPEN REDUCTION INTERNAL FIXATION performed by Lupillo Hayes MD at [address] [facility]      OTHER SURGICAL HISTORY  04/23/2019    Dr. Guido Valverde- PTA ant tibial    OTHER SURGICAL HISTORY  12/17/2019    Dr Guido Valverde - PCI Right popliteal and Anterior tibial    PROSTATE BIOPSY  04/2016    SKIN BIOPSY  sept of 2018 last time    head and ears    TOE AMPUTATION Left 12/31/2016    2nd    TOE AMPUTATION Right 4/26/2019    AMPUTATION RIGHT SECOND TOE, FOOT DEBRIDEMENT performed by Sheldon Muñoz DPM at [facility] TOE AMPUTATION Right 12/10/2019    AMPUTATION RIGHT 3rd DIGIT WITH PARTIAL RESECTION OF THIRD METATARSAL performed by Sheldon Muñoz DPM at [facility] TURP N/A 5/18/2020    CYSTOSCOPY PLASMA LOOP TRANSURETHRAL RESECTION PROSTATE performed by Cherrie Bruno MD at [address] [place] [address]       Current Medications:   Scheduled Meds:   mineral oil-hydrophilic petrolatum   Topical BID    insulin lispro  0-6 Units Subcutaneous TID WC    insulin lispro  0-3 Units Subcutaneous Nightly    ceFAZolin  1 g Intravenous Q8H    sodium chloride flush  10 mL Intravenous 2 times per day    enoxaparin  40 mg Subcutaneous Daily    vancomycin  1,000 mg Intravenous Q24H     Continuous Infusions:   dextrose       PRN Meds:diphenhydrAMINE, glucose, dextrose, glucagon (rDNA), dextrose, sodium chloride flush, potassium chloride **OR** potassium alternative oral replacement **OR** potassium chloride, magnesium hydroxide, acetaminophen **OR** acetaminophen, trimethobenzamide    Allergies:  Latex, Aspirin, Ciprofloxacin in d5w, Pcn [penicillins], Other, and Peanut-containing drug products    Social History:   Social History     Socioeconomic History    Marital status:      Spouse name: None    Number of children: 1    Years of education: None    Highest education level: None   Occupational History    None   Social Needs    Financial resource strain: None    Food insecurity     Worry: None     Inability: None    Transportation needs     Medical: None     Non-medical: None Tobacco Use    Smoking status: Former Smoker     Packs/day: 0.50     Years: 2.00     Pack years: 1.00     Types: Cigarettes     Quit date: 1960     Years since quittin.0    Smokeless tobacco: Never Used   Substance and Sexual Activity    Alcohol use: Not Currently    Drug use: Not Currently    Sexual activity: None   Lifestyle    Physical activity     Days per week: None     Minutes per session: None    Stress: None   Relationships    Social connections     Talks on phone: None     Gets together: None     Attends [employer] service: None     Active member of club or organization: None     Attends meetings of clubs or organizations: None     Relationship status: None    Intimate partner violence     Fear of current or ex partner: None     Emotionally abused: None     Physically abused: None     Forced sexual activity: None   Other Topics Concern    None   Social History Narrative    None     Tobacco: No  Alcohol: No  Pets: No  Travel: No    Family History:       Problem Relation Age of Onset    Heart Disease Mother         CHF    Heart Disease Father     Heart Attack Father    . Otherwise non-pertinent to the chief complaint. REVIEW OF SYSTEMS:    CONSTITUTIONAL:  No chills, fevers or night sweats. No loss of weight. EYES:  No double vision or drainage from eyes, ears or throat. HEENT:  No neck stiffness. No dysphagia. No drainage from eyes, ears or throat  RESPIRATORY:  No cough, productive sputum or hemoptysis. CARDIOVASCULAR:  No chest pain, palpitations, orthopnea or dyspnea on exertion. GASTROINTESTINAL:  No nausea, vomiting, diarrhea or constipation or hematochezia   GENITOURINARY:  No frequency burning dysuria or hematuria. INTEGUMENT/BREAST:  No rash or breast masses. HEMATOLOGIC/LYMPHATIC:  No lymphadenopathy or blood dyscrasics. ALLERGIC/IMMUNOLOGIC:  No anaphylaxis. ENDOCRINE:  No polyuria or polydipsia or temperature intolerance.     MUSCULOSKELETAL:  No myalgia or arthralgia. Full ROM. NEUROLOGICAL:  No focal motor sensory deficit. BEHAVIOR/PSYCH:  No psychosis. PHYSICAL EXAM:    Vitals:    BP (!) 156/74   Pulse 67   Temp 97 °F (36.1 °C) (Temporal)   Resp 18   Ht 5' 10\" (1.778 m)   Wt 151 lb 6.4 oz (68.7 kg)   SpO2 97%   BMI 21.72 kg/m²   Constitutional: The patient is awake, alert, and oriented. Skin: Warm and dry. No rashes were noted. HEENT: Eyes show round, and reactive pupils. No jaundice. Moist mucous membranes, no ulcerations, no thrush. Neck: Supple to movements. No lymphadenopathy. Chest: No use of accessory muscles to breathe. Symmetrical expansion. Auscultation reveals no wheezing, crackles, or rhonchi. Cardiovascular: S1 and S2 are rhythmic and regular. No murmurs appreciated. Abdomen: Positive bowel sounds to auscultation. Benign to palpation. No masses felt. No hepatosplenomegaly. Extremities: No clubbing, no cyanosis, no edema.   Lines: peripheral      CBC+dif:  Recent Labs     01/05/21  1430 01/06/21  0431   WBC 7.9 7.6   HGB 13.9 11.8*   HCT 40.8 34.0*   MCV 89.9 88.3    184   NEUTROABS 5.54 4.94     Lab Results   Component Value Date    CRP 4.8 (H) 01/05/2021    CRP 1.3 (H) 12/07/2019    CRP 13.0 (H) 04/20/2019     No results found for: [facility]  Lab Results   Component Value Date    SEDRATE 37 (H) 01/05/2021    SEDRATE 45 (H) 12/07/2019    SEDRATE 50 (H) 12/05/2019     Lab Results   Component Value Date    ALT 15 01/05/2021    AST 15 01/05/2021    ALKPHOS 96 01/05/2021    BILITOT 0.5 01/05/2021     Lab Results   Component Value Date     01/07/2021    K 4.0 01/07/2021     01/07/2021    CO2 29 01/07/2021    BUN 15 01/07/2021    CREATININE 1.0 01/07/2021    GFRAA >60 01/07/2021    LABGLOM >60 01/07/2021    GLUCOSE 146 01/07/2021    PROT 8.1 01/05/2021    LABALBU 4.3 01/05/2021    CALCIUM 8.9 01/07/2021    BILITOT 0.5 01/05/2021    ALKPHOS 96 01/05/2021    AST 15 01/05/2021    ALT 15 01/05/2021       Lab Results Component Value Date    PROTIME 12.0 09/23/2020    INR 1.1 09/23/2020       No results found for: TSH    Lab Results   Component Value Date    COLORU Yellow 01/11/2017    PHUR 6.5 01/11/2017    LABCAST FEW 12/30/2016    WBCUA 1-3 01/11/2017    RBCUA >20 01/11/2017    BACTERIA RARE 01/11/2017    CLARITYU SL CLOUDY 01/11/2017    SPECGRAV 1.020 01/11/2017    LEUKOCYTESUR Negative 01/11/2017    UROBILINOGEN 0.2 01/11/2017    BILIRUBINUR Negative 01/11/2017    BLOODU LARGE 01/11/2017    GLUCOSEU 250 01/11/2017       No results found for: ANX6AKI, BEART, L3BXVBRV, PHART, THGBART, HTO6RQT, PO2ART, UNP2UXC  Radiology:  XR TIBIA FIBULA RIGHT (2 VIEWS)   Final Result   Soft tissue swelling seen diffusely in the right leg to a certain degree. 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 US DUP LOWER EXTREMITY RIGHT ROSE MARIE   Final Result   No evidence for deep venous thrombosis in the right lower extremity. Microbiology:  Pending  Recent Labs     01/05/21  1425   BC 24 Hours no growth     No results for input(s): ORG in the last 72 hours. Recent Labs     01/05/21  1425   BLOODCULT2 24 Hours no growth     No results for input(s): STREPNEUMAGU in the last 72 hours. No results for input(s): LP1UAG in the last 72 hours. No results for input(s): ASO in the last 72 hours. No results for input(s): CULTRESP in the last 72 hours.                         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 Go Nurse  · Wound care consult appreciated   ·   ·     Plan:    · Cont vanco and ancef   · I talked to him about disposition  I would rather he go to ECF on IV atb  · Will order MRI of right leg to rule out osteo which will help determine length of therapy  · Talked to  also   · Check cultures  · Baseline ESR, CRP  · Monitor labs  · Will follow with you  · I do not have a handle on his disposition yet     Thank you for having us see this patient in consultation. I will be discussing this case with the treating physicians.       Electronically signed by Jessica Vanegas MD on 1/8/2021 at 11:24 AM

## 2021-01-08 NOTE — CARE COORDINATION
Transition of Care-Met with patient at bedside, talked extensively about ID recommendations and antibiotic therapy. Patient cannot afford to pay co-pay to receive antibiotic at home, [facility] following. Patient's initial discharge plan was home, however d/t cost, is agreeable to SNF at discharge. Offered patient list of skilled nursing facilities, he declined. He had a past stay at [facility], referral sent to Mgean Wang, second choice is [place] [facility], reached out to [facility] to see if she had any beds, awaiting response from both. Wife Julio Hansen updated. CM/SW following.    Stiven TORRESN, RN  [facility]   [phone]

## 2021-01-08 NOTE — PROGRESS NOTES
Physical Therapy  Physical Therapy Treatment Note    Name: Sami Moore  : 1940  MRN: [id number]    Referring Provider:  DEMI Kendrick    Date of Service: 2021    Evaluating PT:  Oumou Jamison PT, DPT. [id number]    Room #:  1007/9564-I  Diagnosis:  Cellulitis RLE  Reason for admission:  RLE pain and redness   Precautions:  Falls  Procedures: none  Equipment Recommendations:  TBD    SUBJECTIVE:  Pt lives with spouse in a 1 story home with ramped entry. Pt ambulated with no AD PTA. OBJECTIVE:   Initial Evaluation  Date:  Treatment   Short Term/ Long Term   Goals   AM-PAC 6 Clicks 10/85 38/41    Was pt agreeable to Eval/treatment? Yes  Yes     Does pt have pain?  Denies  Denies     Bed Mobility  Rolling: NT  Supine to sit: SBA  Sit to supine: SBA  Scooting: SBA Rolling: NT  Supine to sit: Supervision   Sit to supine: NT  Scooting: Supervision   Independent    Transfers Sit to stand: SBA  Stand to sit: SBA  Stand pivot: NT Sit to stand: SBA  Stand to sit: SBA  Stand pivot: NT Independent    Ambulation    100 feet with no AD SBA   175 ft no AD SBA >300 feet with no AD independent   Stair negotiation: ascended and descended  NT NT TBD   ROM BUE:  See OT eval   BLE:  WFL     Strength BUE:  See OT eval   BLE:  knee ext 5/5  Ankle DF 5/5 5/5 Increase by 1/3 MMT grade    Balance Sitting EOB:  Independent  Dynamic Standing:  SBA Sitting EOB:  Independent  Dynamic Standing:  SBA Sitting EOB:  indep  Dynamic Standing:  indep     -Pt is A & O x 4  -Sensation:  unremarkable   -Edema:  unremarkable     Therapeutic Exercises:  functional activity     Patient education  Pt educated on safety, sequencing of transfers, and role of PT    Patient response to education:   Pt verbalized understanding Pt demonstrated skill Pt requires further education in this area   Yes  Yes  Yes      ASSESSMENT:    Comments:  Pt received supine in bed and agreeable to PT session   Pt with improved transfer ability out of bed

## 2021-01-08 NOTE — PROGRESS NOTES
Subjective: The patient is awake and alert. Resting comfortably no apparent acute distress  Right leg appears little bit better daily  Objective:    BP (!) 156/74   Pulse 67   Temp 97 °F (36.1 °C) (Temporal)   Resp 18   Ht 5' 10\" (1.778 m)   Wt 151 lb 6.4 oz (68.7 kg)   SpO2 97%   BMI 21.72 kg/m²     In: 240 [P.O.:240]  Out: -   In: 240   Out: -     General appearance: NAD, conversant  HEENT: AT/NC, MMM  Neck: FROM, supple  Lungs: Clear to auscultation  CV: RRR, no MRGs  Vasc: Radial pulses 2+  Abdomen: Soft, non-tender; no masses or HSM  Extremities: Cellulitis of right lower extremity, scaling noted, erythema improved since admission  Skin: no rash, lesions or ulcers  Psych: Alert and oriented to person, place and time  Neuro: Alert and interactive     Recent Labs     01/05/21  1430 01/06/21  0431   WBC 7.9 7.6   HGB 13.9 11.8*   HCT 40.8 34.0*    184       Recent Labs     01/05/21  1430 01/06/21  0431 01/07/21  0551    135 137   K 4.5 4.1 4.0    100 102   CO2 27 25 29   BUN 25* 21 15   CREATININE 1.0 0.9 1.0   CALCIUM 10.1 9.0 8.9       Assessment:    Principal Problem:    Cellulitis of right lower extremity  Active Problems:    Essential hypertension    Cellulitis  Resolved Problems:    * No resolved hospital problems. *      Plan:  Admit to general medical floor for evaluation of right lower extremity cellulitis with venous insufficiency  IV antibiotic therapy,-vancomycin and Ancef   infectious disease evaluation appreciated  Pancultures-negative so far  DC plan once clinically improved  Resume home medications for BP, as needed   Insulin sliding scale for diabetes, check A1c as he is diet controlled at home  Patient does not wish to go on any kind of insulin or p.o. diabetic medications-blood sugars have been fluctuating  A.m. labs    DVT Prophylaxis   PT/OT  Discharge planning-possibly today if antibiotics can be switched over to p.o.           Mari Driver MD  29:40 AM  1/8/2021

## 2021-01-08 NOTE — PROGRESS NOTES
Pharmacy Consultation Note  (Antibiotic Dosing and Monitoring)    Initial consult date: 2021  Consulting physician/provider: DEMI Pope/Dr. Oleksandr Pettit (ID)  Drug(s): vancomycin  Indication: CSSSI RLE (cellulitis)  Age/Gender IBW DW  Allergy Information   80 y.o./M; 177.8 cm  65.8 kg  Latex, Aspirin, Ciprofloxacin in d5w, Pcn [penicillins], Other, and Peanut-containing drug products             Date  WBC BUN/CR Drug/Dose Time   Given Level(s)   (Time) Comments    7.9 25/1 vancomycin 1250 mg x1 4      7.6 21/0.9 vancomycin 1000 mg q24h 0949     1/7 X 15/1 vanco 1000 mg q24h 0904      X X vanco 1000 mg q24h 1230 vanc  level at  1053 =                                  Estimated Creatinine Clearance: 56 mL/min (based on SCr of 1 mg/dL). Intake/Output Summary (Last 24 hours) at 2021 1241  Last data filed at 2021 1133  Gross per 24 hour   Intake 850 ml   Output --   Net 850 ml   UO not [facility]. Temp max: Temp (24hrs), Av.4 °F (36.3 °C), Min:97 °F (36.1 °C), Max:97.8 °F (36.6 °C)      Cultures:  available culture and sensitivity results were reviewed in [facility]      Assessment:  · Consulted by DEMI Pope to dose/monitor vancomycin. · Goal trough level:  15-20 mCg/mL. · 79 yo/M admitted 2021 for treatment of RLE cellulitis (referred in from [facility]). Hx of RLE bypass and f/u w/Vasc Surg. · Empiric vancomycin and cefazolin started on . · ID is consulted. · : day #3 ATBs. · : day #4 vanc and cefazolin. ID ordering MRI R LE to evaluate for osteomyelitis. Length of therapy depends on result. Plan:  · Continue 1000 mg q24h. · Will adjust dose/schedule prn based on results of level from 1053 today. · Pharmacist will follow and monitor/adjust dosing as necessary.     Alaina Carson PharmCASIMIRO  2021  12:41 PM  Pager: [phone]

## 2021-01-08 NOTE — PLAN OF CARE
Problem: Falls - Risk of:  Goal: Will remain free from falls  Description: Will remain free from falls  1/8/2021 0116 by Garry Soto RN  Outcome: Met This Shift  1/7/2021 1121 by Jose Henley RN  Outcome: Met This Shift  Goal: Absence of physical injury  Description: Absence of physical injury  1/7/2021 1121 by Jose Henley RN  Outcome: Met This Shift

## 2021-01-09 ENCOUNTER — APPOINTMENT (OUTPATIENT)
Dept: GENERAL RADIOLOGY | Age: 81
DRG: 300 | End: 2021-01-09
Payer: MEDICARE

## 2021-01-09 VITALS
HEART RATE: 62 BPM | HEIGHT: 70 IN | RESPIRATION RATE: 18 BRPM | BODY MASS INDEX: 21.13 KG/M2 | WEIGHT: 147.6 LBS | SYSTOLIC BLOOD PRESSURE: 171 MMHG | DIASTOLIC BLOOD PRESSURE: 77 MMHG | OXYGEN SATURATION: 97 % | TEMPERATURE: 97.3 F

## 2021-01-09 LAB
ANION GAP SERPL CALCULATED.3IONS-SCNC: 9 MMOL/L (ref 7–16)
BUN BLDV-MCNC: 23 MG/DL (ref 8–23)
CALCIUM SERPL-MCNC: 9.4 MG/DL (ref 8.6–10.2)
CHLORIDE BLD-SCNC: 102 MMOL/L (ref 98–107)
CO2: 26 MMOL/L (ref 22–29)
CREAT SERPL-MCNC: 1 MG/DL (ref 0.7–1.2)
GFR AFRICAN AMERICAN: >60
GFR NON-AFRICAN AMERICAN: >60 ML/MIN/1.73
GLUCOSE BLD-MCNC: 153 MG/DL (ref 74–99)
METER GLUCOSE: 161 MG/DL (ref 74–99)
METER GLUCOSE: 198 MG/DL (ref 74–99)
POTASSIUM REFLEX MAGNESIUM: 4.1 MMOL/L (ref 3.5–5)
SODIUM BLD-SCNC: 137 MMOL/L (ref 132–146)

## 2021-01-09 PROCEDURE — 6360000002 HC RX W HCPCS

## 2021-01-09 PROCEDURE — 80048 BASIC METABOLIC PNL TOTAL CA: CPT

## 2021-01-09 PROCEDURE — 6370000000 HC RX 637 (ALT 250 FOR IP): Performed by: INTERNAL MEDICINE

## 2021-01-09 PROCEDURE — 71045 X-RAY EXAM CHEST 1 VIEW: CPT

## 2021-01-09 PROCEDURE — 82962 GLUCOSE BLOOD TEST: CPT

## 2021-01-09 PROCEDURE — 6360000002 HC RX W HCPCS: Performed by: PHYSICIAN ASSISTANT

## 2021-01-09 PROCEDURE — 36415 COLL VENOUS BLD VENIPUNCTURE: CPT

## 2021-01-09 PROCEDURE — 2580000003 HC RX 258: Performed by: PHYSICIAN ASSISTANT

## 2021-01-09 PROCEDURE — 2580000003 HC RX 258

## 2021-01-09 RX ORDER — HEPARIN SODIUM (PORCINE) LOCK FLUSH IV SOLN 100 UNIT/ML 100 UNIT/ML
3 SOLUTION INTRAVENOUS PRN
Status: DISCONTINUED | OUTPATIENT
Start: 2021-01-09 | End: 2021-01-09 | Stop reason: HOSPADM

## 2021-01-09 RX ORDER — LIDOCAINE HYDROCHLORIDE 10 MG/ML
5 INJECTION, SOLUTION EPIDURAL; INFILTRATION; INTRACAUDAL; PERINEURAL ONCE
Status: DISCONTINUED | OUTPATIENT
Start: 2021-01-09 | End: 2021-01-09 | Stop reason: HOSPADM

## 2021-01-09 RX ORDER — SODIUM CHLORIDE 0.9 % (FLUSH) 0.9 %
10 SYRINGE (ML) INJECTION PRN
Status: DISCONTINUED | OUTPATIENT
Start: 2021-01-09 | End: 2021-01-09 | Stop reason: HOSPADM

## 2021-01-09 RX ORDER — HEPARIN SODIUM (PORCINE) LOCK FLUSH IV SOLN 100 UNIT/ML 100 UNIT/ML
3 SOLUTION INTRAVENOUS EVERY 12 HOURS SCHEDULED
Status: DISCONTINUED | OUTPATIENT
Start: 2021-01-09 | End: 2021-01-09 | Stop reason: HOSPADM

## 2021-01-09 RX ADMIN — VANCOMYCIN HYDROCHLORIDE 1250 MG: 10 INJECTION, POWDER, LYOPHILIZED, FOR SOLUTION INTRAVENOUS at 09:03

## 2021-01-09 RX ADMIN — DIPHENHYDRAMINE HYDROCHLORIDE 25 MG: 25 TABLET ORAL at 04:40

## 2021-01-09 RX ADMIN — SKIN PROTECTANT: 44 OINTMENT TOPICAL at 08:58

## 2021-01-09 RX ADMIN — CEFAZOLIN 1 G: 1 INJECTION, POWDER, FOR SOLUTION INTRAMUSCULAR; INTRAVENOUS at 06:00

## 2021-01-09 RX ADMIN — INSULIN LISPRO 1 UNITS: 100 INJECTION, SOLUTION INTRAVENOUS; SUBCUTANEOUS at 12:01

## 2021-01-09 RX ADMIN — INSULIN LISPRO 1 UNITS: 100 INJECTION, SOLUTION INTRAVENOUS; SUBCUTANEOUS at 08:53

## 2021-01-09 RX ADMIN — CEFAZOLIN 1 G: 1 INJECTION, POWDER, FOR SOLUTION INTRAMUSCULAR; INTRAVENOUS at 15:28

## 2021-01-09 RX ADMIN — Medication 10 ML: at 08:58

## 2021-01-09 ASSESSMENT — PAIN SCALES - GENERAL
PAINLEVEL_OUTOF10: 0
PAINLEVEL_OUTOF10: 0

## 2021-01-09 NOTE — PLAN OF CARE
Problem: Falls - Risk of:  Goal: Will remain free from falls  Description: Will remain free from falls  1/9/2021 0220 by Theresa Archibald RN  Outcome: Met This Shift  1/8/2021 2302 by Isha Sanchez  Outcome: Met This Shift  Goal: Absence of physical injury  Description: Absence of physical injury  1/9/2021 0220 by Theresa Archibald RN  Outcome: Met This Shift  1/8/2021 2302 by Isha Sanchez  Outcome: Met This Shift     Problem: Skin Integrity:  Goal: Will show no infection signs and symptoms  Description: Will show no infection signs and symptoms  1/9/2021 0220 by Theresa Archibald RN  Outcome: Met This Shift  1/8/2021 2302 by Isha Sanchez  Outcome: Met This Shift  Goal: Absence of new skin breakdown  Description: Absence of new skin breakdown  1/9/2021 0220 by Theresa Archibald RN  Outcome: Met This Shift  1/8/2021 2302 by Isha Sanchez  Outcome: Met This Shift     Problem: Musculor/Skeletal Functional Status  Goal: Highest potential functional level  Outcome: Met This Shift  Goal: Absence of falls  Outcome: Met This Shift

## 2021-01-09 NOTE — PROGRESS NOTES
[address] [place] [address] [facility]    Consultation Note     Admit Date: 1/5/2021  6:33 PM    Reason for Consult:   Right lower extremity cellulitis    Attending Physician:  Baldemar Becker MD     Chief Complaint: my right leg is red    HISTORY OF PRESENT ILLNESS:   The patient is a 79 y.o.  male known to the Infectious Diseases service. The patient has the below past med hx. He was sent to ER by vascular surgery yesterday for RLE cellulitis. Wava Prim He is status post angioplasty of right leg over a year ago and is followed by the vascular team.  No trauma to his leg   He was started on vanco and ancef. 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 ([address] 12/6/2019    S/P peripheral artery angioplasty 01/23/2020    bilateral legs -[place]    Skin ulcer of right foot with fat layer exposed ([address] 12/9/2019    Ulcer of right leg, with fat layer exposed ([address] 5/6/2019    Varicose veins of leg with edema, 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     Past Surgical History:        Procedure Laterality Date    COLONOSCOPY      CYSTOSCOPY N/A 5/18/2020    SUPRAPUBIC TUBE PLACEMENT performed by Homer Shaw MD at [address] [facility]  1990's    lense implants bilaterally    FOOT DEBRIDEMENT Left 01/04/2017    wound debridement and delayed primary closure    FRACTURE SURGERY      L femur fracture surgery    HIP FRACTURE SURGERY Left 9/23/2020    LEFT HIP OPEN REDUCTION INTERNAL FIXATION performed by Felicity Monk MD at [address] [facility] SURGERY      OTHER SURGICAL HISTORY  04/23/2019    Dr. Zoey Barakat- PTA ant tibial    OTHER SURGICAL HISTORY  12/17/2019    Dr Zoey Barakat - PCI Right popliteal and Anterior tibial    PROSTATE BIOPSY  04/2016    SKIN BIOPSY  sept of 2018 last time    head and ears    TOE AMPUTATION Left 12/31/2016    2nd    TOE AMPUTATION Right 4/26/2019    AMPUTATION RIGHT SECOND TOE, FOOT DEBRIDEMENT performed by Laurel Hoffman DPM at [facility] TOE AMPUTATION Right 12/10/2019    AMPUTATION RIGHT 3rd DIGIT WITH PARTIAL RESECTION OF THIRD METATARSAL performed by Laurel Hoffman DPM at [facility] TURP N/A 5/18/2020    CYSTOSCOPY PLASMA LOOP TRANSURETHRAL RESECTION PROSTATE performed by Zoe Castro MD at [address] [place] [address]       Current Medications:   Scheduled Meds:   vancomycin  1,250 mg Intravenous Q24H    mineral oil-hydrophilic petrolatum   Topical BID    insulin lispro  0-6 Units Subcutaneous TID WC    insulin lispro  0-3 Units Subcutaneous Nightly    ceFAZolin  1 g Intravenous Q8H    sodium chloride flush  10 mL Intravenous 2 times per day    enoxaparin  40 mg Subcutaneous Daily     Continuous Infusions:   dextrose       PRN Meds:diphenhydrAMINE, glucose, dextrose, glucagon (rDNA), dextrose, sodium chloride flush, potassium chloride **OR** potassium alternative oral replacement **OR** potassium chloride, magnesium hydroxide, acetaminophen **OR** acetaminophen, trimethobenzamide    Allergies:  Latex, Aspirin, Ciprofloxacin in d5w, Pcn [penicillins], Other, and Peanut-containing drug products    Social History:   Social History     Socioeconomic History    Marital status:      Spouse name: None    Number of children: 1    Years of education: None    Highest education level: None   Occupational History    None   Social Needs    Financial resource strain: None    Food insecurity     Worry: None     Inability: None    Transportation needs     Medical: None     Non-medical: None Tobacco Use    Smoking status: Former Smoker     Packs/day: 0.50     Years: 2.00     Pack years: 1.00     Types: Cigarettes     Quit date: 1960     Years since quittin.0    Smokeless tobacco: Never Used   Substance and Sexual Activity    Alcohol use: Not Currently    Drug use: Not Currently    Sexual activity: None   Lifestyle    Physical activity     Days per week: None     Minutes per session: None    Stress: None   Relationships    Social connections     Talks on phone: None     Gets together: None     Attends Restorationism service: None     Active member of club or organization: None     Attends meetings of clubs or organizations: None     Relationship status: None    Intimate partner violence     Fear of current or ex partner: None     Emotionally abused: None     Physically abused: None     Forced sexual activity: None   Other Topics Concern    None   Social History Narrative    None     Tobacco: No  Alcohol: No  Pets: No  Travel: No    Family History:       Problem Relation Age of Onset    Heart Disease Mother         CHF    Heart Disease Father     Heart Attack Father    . Otherwise non-pertinent to the chief complaint. REVIEW OF SYSTEMS:    CONSTITUTIONAL:  No chills, fevers or night sweats. No loss of weight. EYES:  No double vision or drainage from eyes, ears or throat. HEENT:  No neck stiffness. No dysphagia. No drainage from eyes, ears or throat  RESPIRATORY:  No cough, productive sputum or hemoptysis. CARDIOVASCULAR:  No chest pain, palpitations, orthopnea or dyspnea on exertion. GASTROINTESTINAL:  No nausea, vomiting, diarrhea or constipation or hematochezia   GENITOURINARY:  No frequency burning dysuria or hematuria. INTEGUMENT/BREAST:  No rash or breast masses. HEMATOLOGIC/LYMPHATIC:  No lymphadenopathy or blood dyscrasics. ALLERGIC/IMMUNOLOGIC:  No anaphylaxis. ENDOCRINE:  No polyuria or polydipsia or temperature intolerance.     MUSCULOSKELETAL:  No myalgia or arthralgia. Full ROM. NEUROLOGICAL:  No focal motor sensory deficit. BEHAVIOR/PSYCH:  No psychosis. PHYSICAL EXAM:    Vitals:    BP (!) 171/77   Pulse 62 Comment: 62  Temp 97.3 °F (36.3 °C) (Oral)   Resp 18   Ht 5' 10\" (1.778 m)   Wt 147 lb 9.6 oz (67 kg)   SpO2 97%   BMI 21.18 kg/m²   Constitutional: The patient is awake, alert, and oriented. Skin: Warm and dry. No rashes were noted. HEENT: Eyes show round, and reactive pupils. No jaundice. Moist mucous membranes, no ulcerations, no thrush. Neck: Supple to movements. No lymphadenopathy. Chest: No use of accessory muscles to breathe. Symmetrical expansion. Auscultation reveals no wheezing, crackles, or rhonchi. Cardiovascular: S1 and S2 are rhythmic and regular. No murmurs appreciated. Abdomen: Positive bowel sounds to auscultation. Benign to palpation. No masses felt. No hepatosplenomegaly. Extremities: No clubbing, no cyanosis, no edema. Lines: peripheral      CBC+dif:  No results for input(s): WBC, HGB, HCT, MCV, PLT, NEUTROABS in the last 72 hours.   Lab Results   Component Value Date    CRP 4.8 (H) 01/05/2021    CRP 1.3 (H) 12/07/2019    CRP 13.0 (H) 04/20/2019     No results found for: [facility]  Lab Results   Component Value Date    SEDRATE 37 (H) 01/05/2021    SEDRATE 45 (H) 12/07/2019    SEDRATE 50 (H) 12/05/2019     Lab Results   Component Value Date    ALT 15 01/05/2021    AST 15 01/05/2021    ALKPHOS 96 01/05/2021    BILITOT 0.5 01/05/2021     Lab Results   Component Value Date     01/09/2021    K 4.1 01/09/2021     01/09/2021    CO2 26 01/09/2021    BUN 23 01/09/2021    CREATININE 1.0 01/09/2021    GFRAA >60 01/09/2021    LABGLOM >60 01/09/2021    GLUCOSE 153 01/09/2021    PROT 8.1 01/05/2021    LABALBU 4.3 01/05/2021    CALCIUM 9.4 01/09/2021    BILITOT 0.5 01/05/2021    ALKPHOS 96 01/05/2021    AST 15 01/05/2021    ALT 15 01/05/2021       Lab Results   Component Value Date    PROTIME 12.0 09/23/2020    INR 1.1 09/23/2020       No results found for: TSH    Lab Results   Component Value Date    COLORU Yellow 01/11/2017    PHUR 6.5 01/11/2017    LABCAST FEW 12/30/2016    WBCUA 1-3 01/11/2017    RBCUA >20 01/11/2017    BACTERIA RARE 01/11/2017    CLARITYU SL CLOUDY 01/11/2017    SPECGRAV 1.020 01/11/2017    LEUKOCYTESUR Negative 01/11/2017    UROBILINOGEN 0.2 01/11/2017    BILIRUBINUR Negative 01/11/2017    BLOODU LARGE 01/11/2017    GLUCOSEU 250 01/11/2017       No results found for: LDZ0LNG, BEART, Q8MFKBWN, PHART, THGBART, OUX0QZM, PO2ART, MQO2FZA  Radiology:  MRI TIBIA FIBULA RIGHT WO CONTRAST   Final Result   1. No osteomyelitis or other acute abnormality. 2. The periosteal reaction seen on recent radiographs is nonspecific but can   be seen in the setting of chronic venous stasis. XR TIBIA FIBULA RIGHT (2 VIEWS)   Final Result   Soft tissue swelling seen diffusely in the right leg to a certain degree. 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 US DUP LOWER EXTREMITY RIGHT ROSE MARIE   Final Result   No evidence for deep venous thrombosis in the right lower extremity. Microbiology:  Pending  No results for input(s): BC in the last 72 hours. No results for input(s): ORG in the last 72 hours. No results for input(s): Isha Tamra in the last 72 hours. No results for input(s): STREPNEUMAGU in the last 72 hours. No results for input(s): LP1UAG in the last 72 hours. No results for input(s): ASO in the last 72 hours. No results for input(s): CULTRESP in the last 72 hours.                         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 Dru Gutierrez  · Wound care consult appreciated   ·   ·     Plan:    · Cont vanco and ancef    Will deescalate   · I talked to him about disposition  I would rather he go to ECF on IV atb  · Talked to  also   · Check cultures  · Baseline ESR, CRP  · Monitor labs  · Will follow with you  · MRI no osteo  Apparently accepted at [facility]   There are no cultures  Suspect that he would respond better to vanco than ancef      Will send him on ten days of vanco   followuup with ID     Thank you for having us see this patient in consultation. I will be discussing this case with the treating physicians.       Electronically signed by Luis Lopez MD on 1/9/2021 at 11:21 AM

## 2021-01-09 NOTE — PROGRESS NOTES
Subjective: The patient is awake and alert. Resting comfortably no apparent acute distress  Right leg appear much better today     Objective:    BP (!) 171/77   Pulse 62 Comment: 62  Temp 97.3 °F (36.3 °C) (Oral)   Resp 18   Ht 5' 10\" (1.778 m)   Wt 147 lb 9.6 oz (67 kg)   SpO2 97%   BMI 21.18 kg/m²     No intake/output data recorded. No intake/output data recorded. General appearance: NAD, conversant  HEENT: AT/NC, MMM  Neck: FROM, supple  Lungs: Clear to auscultation  CV: RRR, no MRGs  Vasc: Radial pulses 2+  Abdomen: Soft, non-tender; no masses or HSM  Extremities: Cellulitis of right lower extremity, scaling noted, erythema improved since admission  Skin: no rash, lesions or ulcers  Psych: Alert and oriented to person, place and time  Neuro: Alert and interactive     No results for input(s): WBC, HGB, HCT, PLT in the last 72 hours. Recent Labs     01/07/21  0551 01/08/21  1053 01/09/21  0617    135 137   K 4.0 3.8 4.1    98 102   CO2 29 29 26   BUN 15 19 23   CREATININE 1.0 1.2 1.0   CALCIUM 8.9 9.2 9.4       Assessment:    Principal Problem:    Cellulitis of right lower extremity  Active Problems:    Essential hypertension    Cellulitis  Resolved Problems:    * No resolved hospital problems.  *      Plan:  Admit to general medical floor for evaluation of right lower extremity cellulitis with venous insufficiency  IV antibiotic therapy,-vancomycin and Ancef   infectious disease evaluation appreciated  Pancultures-negative so far  Resume home medications for BP, as needed   Insulin sliding scale for diabetes, check A1c as he is diet controlled at home  Patient does not wish to go on any kind of insulin or p.o. diabetic medications-blood sugars have been fluctuating  A.m. labs  MRI unremarkable for osteomyelitis    DVT Prophylaxis   PT/OT  Discharge planning-await finalization of IV antibiotic therapy by infectious disease team,   Okay for discharge to [facility] today if

## 2021-01-09 NOTE — PLAN OF CARE
Problem: Falls - Risk of:  Goal: Will remain free from falls  Description: Will remain free from falls  1/9/2021 1604 by Rogerio Ontiveros RN  Outcome: Met This Shift  1/9/2021 1519 by Laisha Infante RN  Outcome: Met This Shift  1/9/2021 0220 by Froilan Monge RN  Outcome: Met This Shift  Goal: Absence of physical injury  Description: Absence of physical injury  1/9/2021 1604 by Rogerio Ontiveros RN  Outcome: Met This Shift  1/9/2021 1519 by Laisha Infante RN  Outcome: Met This Shift  1/9/2021 0220 by Froilan Monge RN  Outcome: Met This Shift     Problem: Skin Integrity:  Goal: Will show no infection signs and symptoms  Description: Will show no infection signs and symptoms  1/9/2021 1604 by Rogerio Ontiveros RN  Outcome: Met This Shift  1/9/2021 0220 by Froilan Monge RN  Outcome: Met This Shift  Goal: Absence of new skin breakdown  Description: Absence of new skin breakdown  1/9/2021 1604 by Rogerio Ontiveros RN  Outcome: Met This Shift  1/9/2021 0220 by Froilan Monge RN  Outcome: Met This Shift     Problem: Musculor/Skeletal Functional Status  Goal: Highest potential functional level  1/9/2021 0220 by Froilan Monge RN  Outcome: Met This Shift  Goal: Absence of falls  1/9/2021 0220 by Froilan Monge RN  Outcome: Met This Shift

## 2021-01-09 NOTE — PROGRESS NOTES
Pharmacy Consultation Note  (Antibiotic Dosing and Monitoring)    Initial consult date: 2021  Consulting physician/provider: DEMI Pope/Dr. Sarah Blount (ID)  Drug(s): vancomycin  Indication: CSSSI RLE (cellulitis)  Age/Gender IBW DW  Allergy Information   80 y.o./M; 177.8 cm  65.8 kg  Latex, Aspirin, Ciprofloxacin in d5w, Pcn [penicillins], Other, and Peanut-containing drug products             Date  WBC BUN/CR Drug/Dose Time   Given Level(s)   (Time) Comments    7.9 25/1 vancomycin 1250 mg x1 2114      7.6 21/0.9 vancomycin 1000 mg q24h 0949     1/7 X 15/1 vanco 1000 mg q24h 0904      X X vanco 1000 mg q24h 1230 vanc  level at  1053 = 6.0    1/9 X 23/1 vanco 1250mg IV q24h 0903                         Estimated Creatinine Clearance: 55 mL/min (based on SCr of 1 mg/dL). Intake/Output Summary (Last 24 hours) at 2021 0947  Last data filed at 2021 1335  Gross per 24 hour   Intake 380 ml   Output --   Net 380 ml   UO not [facility]. Temp max: Temp (24hrs), Av.6 °F (36.4 °C), Min:97.3 °F (36.3 °C), Max:97.8 °F (36.6 °C)      Cultures:  available culture and sensitivity [facility]      Assessment:  · Consulted by DEMI Pope to dose/monitor vancomycin. · Goal trough level:  15-20 mCg/mL. · 79 yo/M admitted 2021 for treatment of RLE cellulitis (referred in from [facility] office). Hx of RLE bypass and f/u w/Vasc Surg. · Empiric vancomycin and cefazolin started on . · ID is consulted. · : day #3 ATBs. · : day #4 vanc and cefazolin. ID ordering MRI R LE to evaluate for osteomyelitis. Length of therapy depends on result. · : day #5 vanc and cefazolin    Plan:  · Continue 1250 mg q24h. · Will check levels as necessary  · Pharmacist will follow and monitor/adjust dosing as necessary.     Wilda Melendez PharmD, BCPS 2021 9:49 AM   Ext: [phone]

## 2021-01-09 NOTE — PROGRESS NOTES
Vascular Access Procedure Note    Procedure Date:   1/9/2021    Pre-procedure Verification/Time-Out:  The proposed risks versus benefits of this procedure were discussed in detail by the physician with patient. written consent was obtained from the patient. Relevant documentation was reviewed prior to procedure including signed consent form and medications. All necessary equipment for procedure is available at time of procedure yes. An audible time out was done at 1435PM by eam members, correctly identifying patients name, medical record number, correct side, correct site, and correct procedure to be performed with registered nurse members of the procedure team all in agreement.         Indication for Procedure:   Reason for Insertion: intravenous antibiotics    ASA Assessment (Required for Moderate & Deep Sedation):      Procedure:   Reason for Consultation: power PICC line    Clinician Performing Procedure:   Geovanny Huff rn    Assistant:  none    Sedation:   Analgesia Used: lidocaine 1%    Procedure Details/Findings:  Catheter Iraqi Size: 4 fr sl  Lot Number: [id number]  Product #: [id number]  Expiration Date: 10/31/2021  Maximum Barrier Precautions: cap, eye shield, full body drape, gloves, gown, handwashing and mask  Skin Prep: chlorhexidine  Technique: modified seldinger and ultrasound guided with VPS  Attempts: 1  Exposed (cm): 0  Total (cm): 37  Placement Site: rt. Brachial vein  Vessel Size: 0.55  Dressing: securement device, transparent dressing and biopatch  Blood Return: Yes   Ultrasound Guidance: Yes   Arm Circumference Mid-Bicep (cm): 25  Chest X-Ray Ordered: chest x-ray  End Placement: svc    Complications:   none     Post-operative Condition:  stable  Patient Tolerated Procedure: well     Comments/Post-operative Education:   Post Procedure Interventions: no blood pressure sign placed above bed    Mary Leaven  01/09/21  3:09 PM

## 2021-01-09 NOTE — CARE COORDINATION
SOCIAL WORK/CASEMANAGEMENT TRANSITION OF CARE [id number] Rochelle Jimenez, [address], [facility], [phone]): due to covid positive pt's  at [address] pt has agreed and will now go to [employer]. Working with rep, sebastián at the [facility]. rn to Change this on the envelope. New nurse/nurse  and fax numbers are on the josé luis. He can go at any time per rep. I called the rn on the floor and she is waiting for ID to determine if  And when pt can go to the [facility]. Pt doesn't have a mid or picc line in yet either. Sw/cm to follow. Krupa Moya   1/9/2021     rn called and pt can go to [facility] today on iv vanco once daily. picc line to be inserted today. Rep to setup ambulette for 3:30 p.m. Snf./loc. Krupa Moya  . 1/9/2021

## 2021-01-10 LAB
BLOOD CULTURE, ROUTINE: NORMAL
CULTURE, BLOOD 2: NORMAL

## 2021-01-12 ENCOUNTER — HOSPITAL ENCOUNTER (OUTPATIENT)
Dept: WOUND CARE | Age: 81
Discharge: HOME | End: 2021-01-12
Payer: MEDICARE

## 2021-01-12 VITALS
RESPIRATION RATE: 16 BRPM | HEIGHT: 70 IN | DIASTOLIC BLOOD PRESSURE: 66 MMHG | TEMPERATURE: 96.5 F | WEIGHT: 147 LBS | HEART RATE: 64 BPM | SYSTOLIC BLOOD PRESSURE: 120 MMHG | BODY MASS INDEX: 21.05 KG/M2

## 2021-01-12 DIAGNOSIS — L03.115 CELLULITIS OF RIGHT LOWER EXTREMITY: Primary | ICD-10-CM

## 2021-01-12 PROCEDURE — 11042 DBRDMT SUBQ TIS 1ST 20SQCM/<: CPT | Performed by: SURGERY

## 2021-01-12 PROCEDURE — 11042 DBRDMT SUBQ TIS 1ST 20SQCM/<: CPT

## 2021-01-12 RX ORDER — LIDOCAINE HYDROCHLORIDE 40 MG/ML
SOLUTION TOPICAL ONCE
Status: DISCONTINUED | OUTPATIENT
Start: 2021-01-12 | End: 2021-01-13 | Stop reason: HOSPADM

## 2021-01-12 RX ORDER — DIPHENHYDRAMINE HCL 25 MG
25 CAPSULE ORAL EVERY 6 HOURS PRN
COMMUNITY
End: 2021-08-30 | Stop reason: ALTCHOICE

## 2021-01-12 RX ORDER — ACETAMINOPHEN 325 MG/1
650 TABLET ORAL EVERY 6 HOURS PRN
COMMUNITY
End: 2021-03-22

## 2021-01-12 NOTE — PROGRESS NOTES
[facility] Center Followup Visit Note    Referring Physician : Alex Burgos MD  [address] RECORD NUMBER:  [id number]  AGE: 79 y.o. GENDER: male  : 1940  EPISODE DATE:  2021    Subjective:     Chief Complaint   Patient presents with    Wound Check     right lower leg      HISTORY of PRESENT ILLNESS DELLA Noble is a 79 y.o. male who presents today in regards to follow up evaluation and treatment of wound/ulcer. That patient's past medical, family and social hx were reviewed and changes were made if present. History of Wound Context: Patient with a history of a right lower extremity wound. He status post revascularization. He ended up with cellulitis he was sent to the emergency room. He was admitted to the hospital.  Is been placed on antibiotics. Overall subjectively he is much better. His pain and discomfort is significantly improved. He denies any significant pain currently. He denies any worsening of his leg wound and overall states his wound are almost completely healed. 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      Femoral-popliteal atherosclerosis ([facility] 1/1/2017    Heel ulcer, right, with fat layer exposed ([address] 5/6/2019    Hypertension     Osteomyelitis of third toe of right foot ([address] 12/6/2019    S/P peripheral artery angioplasty 01/23/2020    bilateral legs -[place]    Skin ulcer of right foot with fat layer exposed ([address] 12/9/2019    Ulcer of right leg, with fat layer exposed ([address] 5/6/2019    Varicose veins of leg with edema, 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     Past Surgical History:   Procedure Laterality Date    COLONOSCOPY      CYSTOSCOPY N/A 5/18/2020    SUPRAPUBIC TUBE PLACEMENT performed by Sukhwinder Brown MD at [place] [address]'s    lense implants bilaterally    FOOT DEBRIDEMENT Left 01/04/2017    wound debridement and delayed primary closure    FRACTURE SURGERY      L femur fracture surgery    HIP FRACTURE SURGERY Left 9/23/2020    LEFT HIP OPEN REDUCTION INTERNAL FIXATION performed by Arsalan Mehta MD at [address] [place] [address]  04/23/2019    Dr. Mosley Nurse- PTA ant tibial    OTHER SURGICAL HISTORY  12/17/2019    Dr Mosley Nurse - PCI Right popliteal and Anterior tibial    PROSTATE BIOPSY  04/2016    SKIN BIOPSY  sept of 2018 last time    head and ears    TOE AMPUTATION Left 12/31/2016    2nd    TOE AMPUTATION Right 4/26/2019    AMPUTATION RIGHT SECOND TOE, FOOT DEBRIDEMENT performed by Chip Villela DPM at [facility] TOE AMPUTATION Right 12/10/2019    AMPUTATION RIGHT 3rd DIGIT WITH PARTIAL RESECTION OF THIRD METATARSAL performed by Chip Villela DPM at [facility] TURP N/A 5/18/2020    CYSTOSCOPY PLASMA LOOP TRANSURETHRAL RESECTION PROSTATE performed by Sukhwinder Brown MD at [address] [place] [address]       Family History   Problem Relation Age of Onset    Heart Disease Mother         CHF    Heart Disease Father     Heart Attack Father      Social History     Tobacco Use    Smoking status: Former Smoker     Packs/day: 0.50     Years: 2.00     Pack years: 1.00 Types: Cigarettes     Quit date: 56     Years since quittin.0    Smokeless tobacco: Never Used   Substance Use Topics    Alcohol use: Not Currently    Drug use: Not Currently     Allergies   Allergen Reactions    Latex Other (See Comments)     Pt unsure of reaction    Aspirin Other (See Comments)     \"It affects my kidneys. \"    Ciprofloxacin In D5w Itching    Pcn [Penicillins] Other (See Comments)     \"I just know my body doesn't like it. \" \"I had a reaction a long time ago, I know it affects my kidneys. \"    Other Rash     \"I get a rash on just my face if I eat Cumin or Chili. \"    Peanut-Containing Drug Products Itching     Current Outpatient Medications on File Prior to Encounter   Medication Sig Dispense Refill    diphenhydrAMINE (BENADRYL ALLERGY) 25 MG capsule Take 25 mg by mouth every 6 hours as needed for Itching      magnesium hydroxide (MILK OF MAGNESIA) 400 MG/5ML suspension Take 30 mLs by mouth daily as needed for Constipation      acetaminophen (TYLENOL) 325 MG tablet Take 650 mg by mouth every 6 hours as needed for Pain      vancomycin (VANCOCIN) infusion Infuse 1,000 mg intravenously every 24 hours for 10 days Compound per protocol. 60440 mg 0    Charcoal 200 MG CAPS Take 200 mg by mouth daily       CHLOROPHYLL PO Take 1 tablet by mouth daily       Garlic 7343 MG TBEC Take 1,250 mg by mouth daily       Multiple Vitamins-Minerals (THERAPEUTIC MULTIVITAMIN-MINERALS) tablet Take 1 tablet by mouth daily       No current facility-administered medications on file prior to encounter.         REVIEW OF SYSTEMS See HPI    Objective:    /66   Pulse 64   Temp 96.5 °F (35.8 °C) (Temporal)   Resp 16   Ht 5' 10\" (1.778 m)   Wt 147 lb (66.7 kg)   BMI 21.09 kg/m²   Wt Readings from Last 3 Encounters:   21 147 lb (66.7 kg)   21 147 lb 9.6 oz (67 kg)   21 140 lb (63.5 kg)     PHYSICAL EXAM  CONSTITUTIONAL:   Awake, alert, cooperative   EYES:  lids and lashes normal ENT: external ears and nose without lesions   NECK:  supple, symmetrical, trachea midline   SKIN:  Open wound Present    Assessment:     Problem List Items Addressed This Visit     * (Principal) Cellulitis of right lower extremity - Primary        #1 cellulitis is significantly improved essentially gone. He has a very small wound in the left. Overall the tissue looks great. He is doing very well. He can follow-up with podiatry    Pre Debridement Measurements:  Are located in the [facility]  Documentation Flow Sheet  Post Debridement Measurements:  Wound/Ulcer Descriptions are Pre Debridement except measurements:     Wound 01/05/21 Leg Right; Lower #1 (Active)   Wound Image   01/07/21 1335   Wound Etiology Venous 01/07/21 1335   Dressing Status Clean;Dry; Intact 01/12/21 0858   Wound Cleansed Irrigated with saline 01/12/21 0858   Dressing/Treatment Xeroform;Roll gauze;Dry dressing 01/12/21 0858   Offloading for Diabetic Foot Ulcers No 01/12/21 0858   Dressing Change Due 01/09/21 01/08/21 0800   Wound Length (cm) 1.4 cm 01/12/21 0839   Wound Width (cm) 1.8 cm 01/12/21 0839   Wound Depth (cm) 0.1 cm 01/12/21 0839   Wound Surface Area (cm^2) 2.52 cm^2 01/12/21 0839   Change in Wound Size % (l*w) 99.13 01/12/21 0839   Wound Volume (cm^3) 0.25 cm^3 01/12/21 0839   Wound Healing % 99 01/12/21 0839   Post-Procedure Length (cm) 1.4 cm 01/12/21 0857   Post-Procedure Width (cm) 1.8 cm 01/12/21 0857   Post-Procedure Depth (cm) 0.1 cm 01/12/21 0857   Post-Procedure Surface Area (cm^2) 2.52 cm^2 01/12/21 0857   Post-Procedure Volume (cm^3) 0.25 cm^3 01/12/21 0857   Wound Assessment Granulation tissue;Fibrin 01/12/21 0839   Drainage Amount Scant 01/12/21 0839   Drainage Description Serous 01/12/21 0839   Odor None 01/12/21 0839   Latonya-wound Assessment Dry/flaky 01/12/21 0839   Number of days: 7     Incision 09/23/20 Hip Left;Lateral (Active)   Number of days: 110       Procedure Note  Indications:  Based on my examination of please call the [facility] during business hours:     * Increase in Pain  * Temperature over 101  * Increase in drainage from your wound  * Drainage with a foul odor  * Bleeding  * Increase in swelling  * Need for compression bandage changes due to slippage, breakthrough drainage.     If you need medical attention outside of the business hours of the [facility] please contact your PCP or go to the nearest emergency room.     Contact your doctor if you have a temperature above 100.4 with any of the following:               Persistent cough             Shortness of breath            Chills            Fatigue            Chest pain or pressure            Difficulty breathing            Decreased consciousness of confusion             Headache     Recommend:                Wash hands often and for 20 seconds or more             Avoid touching eyes, nose, mouth and face             Social distance ( 6 feet)             Stay at home as much as possible             Call health care provider with any concerns                                       Electronically signed by Abad White MD on 1/12/2021 at 9:44 AM

## 2021-01-19 NOTE — PROGRESS NOTES
Wound Healing Center Followup Visit Note    Referring Physician : León Low MD  [address] RECORD NUMBER:  [id number]  AGE: 79 y.o. GENDER: male  : 1940  EPISODE DATE:  2021    Subjective:     Chief Complaint   Patient presents with    Wound Check     right lower leg      HISTORY of PRESENT ILLNESS DELLA Bahena is a 79 y.o. male who presents today in regards to follow up evaluation and treatment of wound/ulcer. That patient's past medical, family and social hx were reviewed and changes were made if present. History of Wound Context: Patient with a history of right lower extremity wounds. He has been suffering with this for several weeks. Has been taking care of this at home by himself and with his wife. He is some intermittent pain and discomfort on a scale 1-10 approximately 3. He describes redness of the lower extremity. Pain and discomfort. He denies any fever chills chest pain shortness of breath or discomfort. He has a history of peripheral vascular disease with prior intervention. He also follows with podiatry. 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      Femoral-popliteal Types: Cigarettes     Quit date: 56     Years since quittin.0    Smokeless tobacco: Never Used   Substance Use Topics    Alcohol use: Not Currently    Drug use: Not Currently     Allergies   Allergen Reactions    Latex Other (See Comments)     Pt unsure of reaction    Aspirin Other (See Comments)     \"It affects my kidneys. \"    Ciprofloxacin In D5w Itching    Pcn [Penicillins] Other (See Comments)     \"I just know my body doesn't like it. \" \"I had a reaction a long time ago, I know it affects my kidneys. \"    Other Rash     \"I get a rash on just my face if I eat Cumin or Chili. \"    Peanut-Containing Drug Products Itching     Current Outpatient Medications on File Prior to Encounter   Medication Sig Dispense Refill    Charcoal 200 MG CAPS Take 200 mg by mouth daily       CHLOROPHYLL PO Take 1 tablet by mouth daily       Garlic 5166 MG TBEC Take 1,250 mg by mouth daily       Multiple Vitamins-Minerals (THERAPEUTIC MULTIVITAMIN-MINERALS) tablet Take 1 tablet by mouth daily       No current facility-administered medications on file prior to encounter. REVIEW OF SYSTEMS See HPI    Objective:    /74   Pulse 84   Temp 96.7 °F (35.9 °C) (Temporal)   Resp 16   Ht 5' 10\" (1.778 m)   Wt 140 lb (63.5 kg)   BMI 20.09 kg/m²   Wt Readings from Last 3 Encounters:   21 147 lb (66.7 kg)   21 147 lb 9.6 oz (67 kg)   21 140 lb (63.5 kg)     PHYSICAL EXAM  CONSTITUTIONAL:   Awake, alert, cooperative   EYES:  lids and lashes normal   ENT: external ears and nose without lesions   NECK:  supple, symmetrical, trachea midline   SKIN:  Open wound Present multiple wounds are noted of his right lower extremity. He has cellulitis is present. He has signals that are present DP and PTs are multiphasic. Vascular: Signals in the DP and PT are multiphasic bilaterally. Bilateral palpable femoral pulses. Bilateral palpable radial and brachial pulses.   Cardiac: Is currently regular rate and rhythm no murmur or gallop  Pulmonary: Clear to auscultation bilaterally no wheezes rales or rhonchi  Abdomen: Soft nontender no rebound or guarding. Positive bowel sounds  Musculoskeletal: Right lower extremity with diffuse cellulitis below the level of the knee with ulcerations. Neuro: Is grossly intact bilateral any gross cranial nerve deficit  Psych: Affect: Judgment: And mentation appear to be appropriate    Assessment:     Problem List Items Addressed This Visit     None        #1 peripheral vascular disease with right lower extremity leg cellulitis. At this point he has flow in the DP and PT. I reviewed his ABIs. I recommend him to go to the emergency room for IV antibiotics. He has been taking care of this issue at home with his wife. He is seen multiple physicians in the past.  And wanted to follow-up with me for an additional consultation. This patient is well-known to the service. I did an intervention of his right lower extremity. I also know his wife. He will be sent to the emergency room and be admitted. Pre Debridement Measurements:  Are located in the [facility]  Documentation Flow Sheet  Post Debridement Measurements:  Wound/Ulcer Descriptions are Pre Debridement except measurements:     Wound 01/05/21 Leg Right; Lower #1 (Active)   Wound Image   01/07/21 1335   Wound Etiology Venous 01/07/21 1335   Dressing Status Clean;Dry; Intact 01/12/21 0858   Wound Cleansed Irrigated with saline 01/12/21 0858   Dressing/Treatment Xeroform;Roll gauze;Dry dressing 01/12/21 0858   Offloading for Diabetic Foot Ulcers No 01/12/21 0858   Dressing Change Due 01/09/21 01/08/21 0800   Wound Length (cm) 1.4 cm 01/12/21 0839   Wound Width (cm) 1.8 cm 01/12/21 0839   Wound Depth (cm) 0.1 cm 01/12/21 0839   Wound Surface Area (cm^2) 2.52 cm^2 01/12/21 0839   Change in Wound Size % (l*w) 99.13 01/12/21 0839   Wound Volume (cm^3) 0.25 cm^3 01/12/21 0839   Wound Healing % 99 01/12/21 0839   Post-Procedure Length (cm) 1.4 cm 01/12/21 0857   Post-Procedure Width (cm) 1.8 cm 01/12/21 0857   Post-Procedure Depth (cm) 0.1 cm 01/12/21 0857   Post-Procedure Surface Area (cm^2) 2.52 cm^2 01/12/21 0857   Post-Procedure Volume (cm^3) 0.25 cm^3 01/12/21 0857   Wound Assessment Granulation tissue;Fibrin 01/12/21 0839   Drainage Amount Scant 01/12/21 0839   Drainage Description Serous 01/12/21 0839   Odor None 01/12/21 0839   Latonya-wound Assessment Dry/flaky 01/12/21 0839   Number of days: 14     Incision 09/23/20 Hip Left;Lateral (Active)   Number of days: 117       Procedure Note  Indications:  Based on my examination of this patient's wound(s)/ulcer(s) today, debridement is not required to promote healing and evaluate the wound base. Plan:   Treatment Note please see attached Discharge Instructions    Written patient dismissal instructions given to patient and signed by patient or POA. Discharge Instructions       Visit Discharge/Physician Orders    Discharge condition: Stable    Assessment of pain at discharge: NONE    Anesthetic Used:   4 % LIDOCAINE    Discharge to: ER    Left via:Private automobile    Accompanied by: accompanied by SELF    ECF/HHA:     Dressing Orders: RT LEG: CLEANSE WOUND WITH NORMAL STERILE SALINE, APPLY XEROFORM TO WOUND, COVER WITH DRY GAUZE AND SECURE WITH TAPE. CHANGE DAILY. Treatment Orders:    [address] [place] [address],[address] followup visit _________SEND TO ED THEN 1 WEEK ____________________  (Please note your next appointment above and if you are unable to keep, kindly give a 24 hour notice. Thank you.)    Physician signature:__________________________      If you experience any of the following, please call the [address] [place] [address] Road during business hours:    * Increase in Pain  * Temperature over 101  * Increase in drainage from your wound  * Drainage with a foul odor  * Bleeding  * Increase in swelling  * Need for compression bandage changes due to slippage, breakthrough drainage.     If you need medical attention outside of the business hours of the [address] [place] [address] Road please contact your PCP or go to the nearest emergency room.     Contact your doctor if you have a temperature above 100.4 with any of the following:               Persistent cough             Shortness of breath            Chills            Fatigue            Chest pain or pressure            Difficulty breathing            Decreased consciousness of confusion             Headache     Recommend:                Wash hands often and for 20 seconds or more             Avoid touching eyes, nose, mouth and face             Social distance ( 6 feet)             Stay at home as much as possible             Call health care provider with any concerns                  Electronically signed by Janey Romo MD on 1/19/2021 at 11:27 AM

## 2021-01-21 ENCOUNTER — HOSPITAL ENCOUNTER (OUTPATIENT)
Dept: WOUND CARE | Age: 81
Discharge: HOME | End: 2021-01-21
Payer: MEDICARE

## 2021-01-21 VITALS
WEIGHT: 147 LBS | DIASTOLIC BLOOD PRESSURE: 70 MMHG | HEART RATE: 68 BPM | RESPIRATION RATE: 16 BRPM | SYSTOLIC BLOOD PRESSURE: 140 MMHG | BODY MASS INDEX: 21.05 KG/M2 | HEIGHT: 70 IN | TEMPERATURE: 96.8 F

## 2021-01-21 DIAGNOSIS — E11.621 DIABETIC ULCER OF OTHER PART OF RIGHT FOOT ASSOCIATED WITH TYPE 2 DIABETES MELLITUS, WITH FAT LAYER EXPOSED (HCC): ICD-10-CM

## 2021-01-21 DIAGNOSIS — L97.512 DIABETIC ULCER OF OTHER PART OF RIGHT FOOT ASSOCIATED WITH TYPE 2 DIABETES MELLITUS, WITH FAT LAYER EXPOSED (HCC): ICD-10-CM

## 2021-01-21 PROCEDURE — 11042 DBRDMT SUBQ TIS 1ST 20SQCM/<: CPT

## 2021-01-21 NOTE — PROGRESS NOTES
[facility] Followup Visit Note    Referring Physician : Alex Burgos MD  [address] RECORD NUMBER:  [id number]  AGE: 79 y.o. GENDER: male  : 1940  EPISODE DATE:  2021    Subjective:     Chief Complaint   Patient presents with    Wound Check     right leg ulcer      HISTORY of PRESENT ILLNESS HPI   Susana Noble is a 79 y.o. male who presents today in regards to follow up evaluation and treatment of wound/ulcer. That patient's past medical, family and social hx were reviewed and changes were made if present. History of Wound Context: Patient with a history of a right lower extremity wound. He status post revascularization. He ended up with cellulitis he was sent to the emergency room. He was admitted to the hospital.  Is been placed on antibiotics. Overall subjectively he is much better. His pain and discomfort is significantly improved. He denies any significant pain currently. He denies any worsening of his leg wound and overall states his wound are almost completely healed. 21 relates leg wound healed, new area plantar right foot. 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      Femoral-popliteal atherosclerosis ([address] 2017    Heel ulcer, right, with fat layer exposed ([facility] 5/6/2019    Hypertension     Osteomyelitis of third toe of right foot ([facility] 12/6/2019    S/P peripheral artery angioplasty 01/23/2020    bilateral legs -[place]    Skin ulcer of right foot with fat layer exposed ([facility] 12/9/2019    Ulcer of right leg, with fat layer exposed ([facility] 5/6/2019    Varicose veins of leg with edema, 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     Past Surgical History:   Procedure Laterality Date    COLONOSCOPY      CYSTOSCOPY N/A 5/18/2020    SUPRAPUBIC TUBE PLACEMENT performed by Valentino Hernandez MD at [address] [facility]  1990's    lense implants bilaterally    FOOT DEBRIDEMENT Left 01/04/2017    wound debridement and delayed primary closure    FRACTURE SURGERY      L femur fracture surgery    HIP FRACTURE SURGERY Left 9/23/2020    LEFT HIP OPEN REDUCTION INTERNAL FIXATION performed by Cheyenne Wisdom MD at [address] [place] [address]  04/23/2019    Dr. Nilo Beltran- PTA ant tibial    OTHER SURGICAL HISTORY  12/17/2019    Dr Nilo Beltran - PCI Right popliteal and Anterior tibial    PROSTATE BIOPSY  04/2016    SKIN BIOPSY  sept of 2018 last time    head and ears    TOE AMPUTATION Left 12/31/2016    2nd    TOE AMPUTATION Right 4/26/2019    AMPUTATION RIGHT SECOND TOE, FOOT DEBRIDEMENT performed by Jovan Braga DPM at [place] [address] Right 12/10/2019    AMPUTATION RIGHT 3rd DIGIT WITH PARTIAL RESECTION OF THIRD METATARSAL performed by Jovan Braga DPM at [facility] N/A 5/18/2020    CYSTOSCOPY PLASMA LOOP TRANSURETHRAL RESECTION PROSTATE performed by Valentino Hernandez MD at [facility] OR    VASCULAR SURGERY       Family History   Problem Relation Age of Onset    Heart Disease Mother         CHF    Heart Disease Father     Heart Attack Father      Social History     Tobacco Use    Smoking status: Former Smoker     Packs/day: 0.50     Years: 2.00     Pack years: 1.00     Types: Cigarettes     Quit date: 1960 Years since quittin.0    Smokeless tobacco: Never Used   Substance Use Topics    Alcohol use: Not Currently    Drug use: Not Currently     Allergies   Allergen Reactions    Latex Other (See Comments)     Pt unsure of reaction    Aspirin Other (See Comments)     \"It affects my kidneys. \"    Ciprofloxacin In D5w Itching    Pcn [Penicillins] Other (See Comments)     \"I just know my body doesn't like it. \" \"I had a reaction a long time ago, I know it affects my kidneys. \"    Other Rash     \"I get a rash on just my face if I eat Cumin or Chili. \"    Peanut-Containing Drug Products Itching     Current Outpatient Medications on File Prior to Encounter   Medication Sig Dispense Refill    Charcoal 200 MG CAPS Take 200 mg by mouth daily       CHLOROPHYLL PO Take 1 tablet by mouth daily       Garlic 3890 MG TBEC Take 1,250 mg by mouth daily       Multiple Vitamins-Minerals (THERAPEUTIC MULTIVITAMIN-MINERALS) tablet Take 1 tablet by mouth daily      diphenhydrAMINE (BENADRYL ALLERGY) 25 MG capsule Take 25 mg by mouth every 6 hours as needed for Itching      acetaminophen (TYLENOL) 325 MG tablet Take 650 mg by mouth every 6 hours as needed for Pain       No current facility-administered medications on file prior to encounter. REVIEW OF SYSTEMS See HPI    Objective:    BP (!) 140/70   Pulse 68   Temp 96.8 °F (36 °C) (Temporal)   Resp 16   Ht 5' 10\" (1.778 m)   Wt 147 lb (66.7 kg)   BMI 21.09 kg/m²   Wt Readings from Last 3 Encounters:   21 147 lb (66.7 kg)   21 147 lb (66.7 kg)   21 147 lb 9.6 oz (67 kg)     PHYSICAL EXAM  CONSTITUTIONAL:   Awake, alert, cooperative   EYES:  lids and lashes normal   ENT: external ears and nose without lesions   NECK:  supple, symmetrical, trachea midline   SKIN:  Open wound care aspect right foot with 50% devitalized nonviable tissue. No purulence or odor. No surrounding erythema, fluctuance, increase in temperature or pain.   Through subcutaneous tissue. Vascular, skin temperature warm. Loss of protective sensation. Previous area right lower leg resolved. 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, with fat layer exposed ([address]    Pre Debridement Measurements:  Are located in the [place]  Documentation Flow Sheet  Post Debridement Measurements:  Wound/Ulcer Descriptions are Pre Debridement except measurements:     Wound 01/05/21 Leg Right; Lower #1 (Active)   Wound Image   01/21/21 0931   Wound Etiology Venous 01/07/21 1335   Dressing Status Clean;Dry; Intact 01/12/21 0858   Wound Cleansed Irrigated with saline 01/12/21 0858   Dressing/Treatment Xeroform;Roll gauze;Dry dressing 01/12/21 0858   Offloading for Diabetic Foot Ulcers No 01/12/21 0858   Dressing Change Due 01/09/21 01/08/21 0800   Wound Length (cm) 0 cm 01/21/21 0931   Wound Width (cm) 0 cm 01/21/21 0931   Wound Depth (cm) 0 cm 01/21/21 0931   Wound Surface Area (cm^2) 0 cm^2 01/21/21 0931   Change in Wound Size % (l*w) 100 01/21/21 0931   Wound Volume (cm^3) 0 cm^3 01/21/21 0931   Wound Healing % 100 01/21/21 0931   Post-Procedure Length (cm) 1.4 cm 01/12/21 0857   Post-Procedure Width (cm) 1.8 cm 01/12/21 0857   Post-Procedure Depth (cm) 0.1 cm 01/12/21 0857   Post-Procedure Surface Area (cm^2) 2.52 cm^2 01/12/21 0857   Post-Procedure Volume (cm^3) 0.25 cm^3 01/12/21 0857   Wound Assessment Granulation tissue;Fibrin 01/12/21 0839   Drainage Amount Scant 01/12/21 0839   Drainage Description Serous 01/12/21 0839   Odor None 01/12/21 0839   Latonya-wound Assessment Dry/flaky 01/12/21 0839   Number of days: 16       Wound 01/21/21 Foot Right;Plantar #2 pressure stage II (Active)   Wound Image   01/21/21 0924   Wound Etiology Pressure Stage  2 01/21/21 0924   Wound Length (cm) 0.5 cm 01/21/21 0924   Wound Width (cm) 0.4 cm 01/21/21 0924   Wound Depth (cm) 0.1 cm 01/21/21 0924   Wound Surface Area (cm^2) 0.2 cm^2 01/21/21 0924   Wound Volume (cm^3) 0.02 cm^3 01/21/21 0924   Post-Procedure Length (cm) 0.5 cm 01/21/21 0931   Post-Procedure Width (cm) 0.5 cm 01/21/21 0931   Post-Procedure Depth (cm) 0.2 cm 01/21/21 0931   Post-Procedure Surface Area (cm^2) 0.25 cm^2 01/21/21 0931   Post-Procedure Volume (cm^3) 0.05 cm^3 01/21/21 0931   Wound Assessment Non-blanchable erythema;Fibrin 01/21/21 0924   Drainage Amount Scant 01/21/21 3550   Drainage Description Serous; Yellow 01/21/21 0924   Odor None 01/21/21 0924   Latonya-wound Assessment Maceration 01/21/21 0924   Number of days: 0          Procedure Note  Indications:  Based on my examination of this patient's wound(s)/ulcer(s) today, debridement is required to promote healing and evaluate the wound base. Performed by: Louis Dyson DPM    Consent obtained:  Yes    Time out taken:  Yes    Pain Control: Anesthetic  Anesthetic: None     Debridement:Excisional Debridement    Using curette and # 10 blade scalpel the wound(s)/ulcer(s) was/were sharply debrided down through and including the removal of subcutaneous tissue. Devitalized Tissue Debrided:  fibrin, biofilm, slough and necrotic/eschar to stimulate bleeding to promote healing, post debridement good bleeding base and wound edges noted    Wound/Ulcer #: 2    Percent of Wound/Ulcer Debrided: 100%    Total Surface Area Debrided:  .2 sq cm     Estimated Blood Loss:  Minimal  Hemostasis Achieved:  by pressure    Procedural Pain:  0  / 10   Post Procedural Pain:  0 / 10     Response to treatment:  Well tolerated by patient. Plan:   Treatment Note please see attached Discharge Instructions    Written patient dismissal instructions given to patient and signed by patient or POA.          Discharge Instructions        Visit Discharge/Physician Orders     Discharge condition: Stable     Assessment of pain at discharge: NONE     Anesthetic Used:   4 % LIDOCAINE     Discharge to:  [facility] Phone:  [phone]     Left via:Private automobile     Accompanied by:  SELF     ECF/HHA:      Dressing Orders: RT LEG: HEALED  RIGHT PLANTAR: CLEANSE WOUND WITH NORMAL STERILE SALINE, APPLY XEROFORM TO WOUND, COVER WITH DRY GAUZE AND SECURE WITH TAPE. CHANGE DAILY.     Treatment Orders:Eat a diet high in protein and vitamin C. Take a multiple vitamin daily unless contraindicated.     [facility] followup visit: 2 WEEK______________________ __  (Please note your next appointment above and if you are unable to keep, kindly give a 24 hour notice.  Thank you.)     Physician signature:__________________________        If you experience any of the following, please call the [employer] [facility] during business hours:     * Increase in Pain  * Temperature over 101  * Increase in drainage from your wound  * Drainage with a foul odor  * Bleeding  * Increase in swelling  * Need for compression bandage changes due to slippage, breakthrough drainage.     If you need medical attention outside of the business hours of the [facility] please contact your PCP or go to the nearest emergency room.     Contact your doctor if you have a temperature above 100.4 with any of the following:               Persistent cough             Shortness of breath            Chills            Fatigue            Chest pain or pressure            Difficulty breathing            Decreased consciousness of confusion             Headache     Recommend:                Wash hands often and for 20 seconds or more             Avoid touching eyes, nose, mouth and face             Social distance ( 6 feet)             Stay at home as much as possible             Call health care provider with any concerns                                                            Electronically signed by Cindy Duane, DPM on 1/21/2021 at 10:05 AM

## 2021-01-21 NOTE — PROGRESS NOTES
[address] [facility]:     [facility] [address] [place], [place]  M:[phone] f: [address]:     [place] [address]  [phone]  WOUND CARE Dept: [id number] NUMBER     Patient Information:      Allyn Devine  [address] [place] [address]   [phone]   : 1940  AGE: 79 y.o. GENDER: male   EPISODE DATE: 2021    Insurance:      PRIMARY INSURANCE:  Plan: Monty Ho ESSENTIAL/PLUS  Coverage: BCBS MEDICARE  Effective Date: 2019  Group Number: [unfilled]  Subscriber Number: [id number] - (Medicare Managed)    Payor/Plan Subscr  Sex Relation Sub. Ins. ID Effective Group Num   1. BCBS MEDICAREDareen Pals 1940 Male Self [id number] 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, with fat layer exposed ([address]          WOUNDS REQUIRING DRESSING SUPPLIES:     Wound 21 Leg Right; Lower #1 (Active)   Wound Image   21 0931   Wound Etiology Venous 21 1335   Dressing Status Clean;Dry; Intact 21 0858   Wound Cleansed Irrigated with saline 21 0858   Dressing/Treatment Xeroform;Roll gauze;Dry dressing 21 0858   Offloading for Diabetic Foot Ulcers No 21 0858   Dressing Change Due 21 0800   Wound Length (cm) 0 cm 21 0931   Wound Width (cm) 0 cm 21 0931   Wound Depth (cm) 0 cm 2131   Wound Surface Area (cm^2) 0 cm^2 21 0931   Change in Wound Size % (l*w) 100 21 0931   Wound Volume (cm^3) 0 cm^3 21 0931   Wound Healing % 100 21 0931   Post-Procedure Length (cm) 1.4 cm 21   Post-Procedure Width (cm) 1.8 cm 21   Post-Procedure Depth (cm) 0.1 cm 21   Post-Procedure Surface Area (cm^2) 2.52 cm^2 01/12/21 0857   Post-Procedure Volume (cm^3) 0.25 cm^3 01/12/21 0857   Wound Assessment Granulation tissue;Fibrin 01/12/21 0839   Drainage Amount Scant 01/12/21 0839   Drainage Description Serous 01/12/21 0839   Odor None 01/12/21 0839   Latonya-wound Assessment Dry/flaky 01/12/21 0839   Number of days: 16       Wound 01/21/21 Foot Right;Plantar #2 pressure stage II (Active)   Wound Image   01/21/21 0924   Wound Etiology Diabetic 01/21/21 0924   Wound Length (cm) 0.5 cm 01/21/21 0924   Wound Width (cm) 0.4 cm 01/21/21 0924   Wound Depth (cm) 0.1 cm 01/21/21 0924   Wound Surface Area (cm^2) 0.2 cm^2 01/21/21 0924   Wound Volume (cm^3) 0.02 cm^3 01/21/21 0924   Post-Procedure Length (cm) 0.5 cm 01/21/21 0931   Post-Procedure Width (cm) 0.5 cm 01/21/21 0931   Post-Procedure Depth (cm) 0.2 cm 01/21/21 0931   Post-Procedure Surface Area (cm^2) 0.25 cm^2 01/21/21 0931   Post-Procedure Volume (cm^3) 0.05 cm^3 01/21/21 0931   Wound Assessment Non-blanchable erythema;Fibrin 01/21/21 0924   Drainage Amount Scant 01/21/21 4818   Drainage Description Serous; Yellow 01/21/21 0924   Odor None 01/21/21 0924   Latonya-wound Assessment Maceration 01/21/21 0924   Number of days: 0          Supplies Requested :      WOUND #: 1   PRIMARY DRESSING:  Other: xeroform   Cover and Secure with: Gauze pad  Bulky roll gauze     FREQUENCY OF DRESSING CHANGES:  Daily                    ADDITIONAL ITEMS:  [] Gloves Small  [x] Gloves Medium [] Gloves Large [] Gloves XLarge  [] Tape 1\" [x] Tape 2\" [] Tape 3\"  [] Medipore Tape  [x] Saline  [] Skin Prep   [] Adhesive Remover   [] Cotton Tip Applicators   [] Other:    Patient Wound(s) Debrided: [x] Yes if yes please add date 1/21/21  [] No    Debribement Type: Excisional/Sharp    Patient currently being seen by Home Health: [] Yes   [x] No    Duration for needed supplies:  []15  [x]30  []60  []90 Days    Electronically signed by Svetlana Chen RN on 1/21/2021 at 10:15 AM     Provider Information:       PROVIDER'S NAME: Dr. Brooklyn Swift    NPI: [id number]

## 2021-01-31 NOTE — DISCHARGE SUMMARY
Physician Discharge Summary     Patient ID:  Shai Ferrer  [id number]  32 y.o.  1940    Admit date: 1/5/2021    Discharge date and time: 1/09   Admission Diagnoses:   Patient Active Problem List   Diagnosis    Essential hypertension    Moderate protein-calorie malnutrition [facility]    PVD (peripheral vascular disease) with claudication (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       Discharge Diagnoses: cellulitis / chronic venous insuff     Consults:id , podiatry     Procedures: none     Hospital Course: Admit to general medical floor for evaluation of right lower extremity cellulitis with venous insufficiency  IV antibiotic therapy,-vancomycin and Ancef- continued vanc for dischrge    infectious disease evaluation appreciated  Pancultures-negative so far  Resume home medications for BP, as needed   Insulin sliding scale for diabetes, check A1c as he is diet controlled at home  Patient does not wish to go on any kind of insulin or p.o. diabetic medications-blood sugars have been fluctuating  A.m. labs  MRI unremarkable for osteomyelitis     DVT Prophylaxis   PT/OT  Discharge planning-await finalization of IV antibiotic therapy by infectious disease teamOndina for [facility] today if antibiotics finalized       No results for input(s): WBC, HGB, HCT, PLT in the last 72 hours. No results for input(s): NA, K, CL, CO2, BUN, CREATININE, CALCIUM in the last 72 hours. Invalid input(s): GLU    No results found. Discharge Exam:    HEENT: NCAT,  PERRLA, No JVD  Heart:  RRR, no murmurs, gallops, or rubs.   Lungs:  CTA bilaterally, no wheeze, rales or rhonchi  Abd: bowel sounds present, nontender, nondistended, no masses  Extrem: right leg with cellulitis - improved     Disposition: SNF     Patient Condition at Discharge: stable Patient Instructions:      Medication List      CONTINUE taking these medications    Charcoal 200 MG Caps     CHLOROPHYLL PO     Garlic 0477 MG Tbec     therapeutic multivitamin-minerals tablet        ASK your doctor about these medications    vancomycin  infusion  Commonly known as: VANCOCIN  Infuse 1,000 mg intravenously every 24 hours for 10 days Compound per protocol. Ask about: Should I take this medication? Where to Get Your Medications      You can get these medications from any pharmacy    Bring a paper prescription for each of these medications  · vancomycin  infusion       Activity: activity as tolerated  Diet: diabetic diet    Pt has been advised to: Follow-up with Rosi Butler MD in 1 week.   Follow-up with consultants as recommended by them    Note that over 30 minutes was spent in preparing discharge papers, discussing discharge with patient, medication review, etc.    Signed:  Yi Winter MD  1/31/2021  11:07 AM

## 2021-02-04 ENCOUNTER — HOSPITAL ENCOUNTER (OUTPATIENT)
Dept: WOUND CARE | Age: 81
Discharge: HOME | End: 2021-02-04
Payer: MEDICARE

## 2021-02-09 ENCOUNTER — TELEPHONE (OUTPATIENT)
Dept: VASCULAR SURGERY | Age: 81
End: 2021-02-09

## 2021-02-11 ENCOUNTER — HOSPITAL ENCOUNTER (OUTPATIENT)
Dept: WOUND CARE | Age: 81
Discharge: HOME | End: 2021-02-11
Payer: MEDICARE

## 2021-03-03 ENCOUNTER — OFFICE VISIT (OUTPATIENT)
Dept: VASCULAR SURGERY | Age: 81
End: 2021-03-03
Payer: MEDICARE

## 2021-03-03 VITALS — WEIGHT: 140 LBS | BODY MASS INDEX: 20.73 KG/M2 | HEIGHT: 69 IN

## 2021-03-03 DIAGNOSIS — L03.115 CELLULITIS OF RIGHT LOWER EXTREMITY: ICD-10-CM

## 2021-03-03 DIAGNOSIS — I73.9 PVD (PERIPHERAL VASCULAR DISEASE) WITH CLAUDICATION (HCC): Primary | ICD-10-CM

## 2021-03-03 PROCEDURE — 99212 OFFICE O/P EST SF 10 MIN: CPT | Performed by: NURSE PRACTITIONER

## 2021-03-03 NOTE — PROGRESS NOTES
No [x]/Yes []          Claudication:    No [x]/Yes []      Leg swelling:   No [x]/Yes []  Gastrointestinal:             Nausea or vomiting:  No [x]/Yes []               Abdominal pain:  No [x]/Yes []                     Intestinal bleeding: No [x]/Yes []  Musculoskeletal:             Leg pain:   No [x]/Yes []      Back pain:   No [x]/Yes []                    Weakness:   No [x]/Yes []  Neurologic:             Numbness:   No [x]/Yes []      Paralysis:   No [x]/Yes []                       Headaches:   No [x]/Yes []  Hematologic, lymphatic:   Anemia:   No [x]/Yes []              Bleeding or bruising:  No [x]/Yes []              Fevers or chills: No [x]/Yes []  Endocrine:             Temp intolerance:   No [x]/Yes []                       Polydipsia, polyuria:  No [x]/Yes []  Skin:              Rash:    No [x]/Yes []      Ulcers:   No [x]/Yes [x]              Abnorm pigment: No []/Yes [x]  :              Frequency/urgency:  No [x]/Yes []      Hematuria:    No [x]/Yes []                      Incontinence:    No [x]/Yes []    PHYSICAL EXAM:  There were no vitals filed for this visit. General Appearance: alert and oriented to person, place and time, in no acute distress, well developed and well- nourished  Neurologic: no cranial nerve deficit, speech normal  Head: normocephalic and atraumatic  Eyes: extraocular eye movements intact, conjunctivae normal  ENT: external ear and ear canal normal bilaterally, nose without deformity, no carotid bruits  Pulmonary/Chest: normal air movement, no respiratory distress  Cardiovascular: normal rate, regular rhythm, no murmur  Abdomen: non-distended, no masses  Musculoskeletal: no joint deformity or tenderness  Extremities: + leg edema bilaterally, right greater than left  Skin: warm and dry, no rash or erythema  R LE: 2 plantar foot wounds. No surrounding erythema or drainage noted.      PULSE EXAM      Right      Left   Brachial     Radial 2 2   Femoral     Popliteal     Dorsalis Pedis multiphasic signal signal   Posterior Tibial signal signal   (3=normal, 2=diminished, 1=barely palpable, 4=widened)    RADIOLOGY: SA today    Problem List Items Addressed This Visit     PVD (peripheral vascular disease) with claudication ([address] - Primary    Cellulitis of right lower extremity        At this point the patient's right leg has improved since last seen. I reviewed with the patient that he needs to follow up with Dr. Jordy Rosales regarding local wound care. An appointment was made and given to the patient prior to him leaving the office. I emphasized the importance of regular follow up for this. Dr. Jordy Rosales will let us know if there are any healing issues with the wound. Plan reviewed with Dr. Zeny Salcedo. Chito Taylor CNP    No follow-ups on file.

## 2021-03-08 ENCOUNTER — HOSPITAL ENCOUNTER (OUTPATIENT)
Dept: WOUND CARE | Age: 81
Discharge: HOME | End: 2021-03-08
Payer: MEDICARE

## 2021-03-08 VITALS
HEIGHT: 69 IN | SYSTOLIC BLOOD PRESSURE: 124 MMHG | WEIGHT: 140 LBS | BODY MASS INDEX: 20.73 KG/M2 | DIASTOLIC BLOOD PRESSURE: 74 MMHG | TEMPERATURE: 96.2 F | RESPIRATION RATE: 16 BRPM

## 2021-03-08 PROCEDURE — 99213 OFFICE O/P EST LOW 20 MIN: CPT

## 2021-03-08 PROCEDURE — 11042 DBRDMT SUBQ TIS 1ST 20SQCM/<: CPT

## 2021-03-08 RX ORDER — LIDOCAINE HYDROCHLORIDE 40 MG/ML
SOLUTION TOPICAL ONCE
Status: DISCONTINUED | OUTPATIENT
Start: 2021-03-08 | End: 2021-03-09 | Stop reason: HOSPADM

## 2021-03-08 NOTE — PROGRESS NOTES
[facility] Followup Visit Note    Referring Physician : Rosi Butler MD  [address] RECORD NUMBER:  [id number]  AGE: 79 y.o. GENDER: male  : 1940  EPISODE DATE:  3/8/2021    Subjective:     Chief Complaint   Patient presents with    Wound Check     RIGHT FOOT      HISTORY of PRESENT ILLNESS HPI   River Guzman is a 79 y.o. male who presents today in regards to follow up evaluation and treatment of wound/ulcer. That patient's past medical, family and social hx were reviewed and changes were made if present. History of Wound Context: Patient with a history of a right lower extremity wound. He status post revascularization. He ended up with cellulitis he was sent to the emergency room. He was admitted to the hospital.  Is been placed on antibiotics. Overall subjectively he is much better. His pain and discomfort is significantly improved. He denies any significant pain currently. He denies any worsening of his leg wound and overall states his wound are almost completely healed. 21 relates leg wound healed, new area plantar right foot. No pressure right foot    3-8-21 last seen .   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 ([address] 12/6/2019    S/P peripheral artery angioplasty 01/23/2020    bilateral legs -[place]    Skin ulcer of right foot with fat layer exposed ([address] 12/9/2019    Ulcer of right leg, with fat layer exposed ([address] 5/6/2019    Varicose veins of leg with edema, 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     Past Surgical History:   Procedure Laterality Date    COLONOSCOPY      CYSTOSCOPY N/A 5/18/2020    SUPRAPUBIC TUBE PLACEMENT performed by Ney Fierro MD at [address] [facility]  1990's    lense implants bilaterally    FOOT DEBRIDEMENT Left 01/04/2017    wound debridement and delayed primary closure    FRACTURE SURGERY      L femur fracture surgery    HIP FRACTURE SURGERY Left 9/23/2020    LEFT HIP OPEN REDUCTION INTERNAL FIXATION performed by Maria Eugenia Delgado MD at [address] [place] [address]  04/23/2019    Dr. Mullins Aleksander- PTA ant tibial    OTHER SURGICAL HISTORY  12/17/2019    Dr Mullins Aleksander - PCI Right popliteal and Anterior tibial    PROSTATE BIOPSY  04/2016    SKIN BIOPSY  sept of 2018 last time    head and ears    TOE AMPUTATION Left 12/31/2016    2nd    TOE AMPUTATION Right 4/26/2019    AMPUTATION RIGHT SECOND TOE, FOOT DEBRIDEMENT performed by Olga Delcid DPM at [place] [address] Right 12/10/2019    AMPUTATION RIGHT 3rd DIGIT WITH PARTIAL RESECTION OF THIRD METATARSAL performed by Olga Delcid DPM at [facility] N/A 5/18/2020    CYSTOSCOPY PLASMA LOOP TRANSURETHRAL RESECTION PROSTATE performed by Ney Fierro MD at [address] [place] [address]       Family History   Problem Relation Age of Onset    Heart Disease Mother         CHF    Heart Disease Father     Heart Attack Father      Social History     Tobacco Use    Smoking status: Former Smoker     Packs/day: 0.50 Years: 2.00     Pack years: 1.00     Types: Cigarettes     Quit date: 56     Years since quittin.2    Smokeless tobacco: Never Used   Substance Use Topics    Alcohol use: Not Currently    Drug use: Not Currently     Allergies   Allergen Reactions    Latex Other (See Comments)     Pt unsure of reaction    Aspirin Other (See Comments)     \"It affects my kidneys. \"    Ciprofloxacin In D5w Itching    Pcn [Penicillins] Other (See Comments)     \"I just know my body doesn't like it. \" \"I had a reaction a long time ago, I know it affects my kidneys. \"    Other Rash     \"I get a rash on just my face if I eat Cumin or Chili. \"    Peanut-Containing Drug Products Itching     Current Outpatient Medications on File Prior to Encounter   Medication Sig Dispense Refill    Charcoal 200 MG CAPS Take 200 mg by mouth daily       CHLOROPHYLL PO Take 1 tablet by mouth daily       Garlic 8036 MG TBEC Take 1,250 mg by mouth daily       Multiple Vitamins-Minerals (THERAPEUTIC MULTIVITAMIN-MINERALS) tablet Take 1 tablet by mouth daily      diphenhydrAMINE (BENADRYL ALLERGY) 25 MG capsule Take 25 mg by mouth every 6 hours as needed for Itching      acetaminophen (TYLENOL) 325 MG tablet Take 650 mg by mouth every 6 hours as needed for Pain       No current facility-administered medications on file prior to encounter. REVIEW OF SYSTEMS See HPI    Objective:    /74   Temp 96.2 °F (35.7 °C) (Temporal)   Resp 16   Ht 5' 9\" (1.753 m)   Wt 140 lb (63.5 kg)   BMI 20.67 kg/m²   Wt Readings from Last 3 Encounters:   21 140 lb (63.5 kg)   21 140 lb (63.5 kg)   21 147 lb (66.7 kg)     PHYSICAL EXAM  CONSTITUTIONAL:   Awake, alert, cooperative   EYES:  lids and lashes normal   ENT: external ears and nose without lesions   NECK:  supple, symmetrical, trachea midline   SKIN:  Open wound plantat aspect right foot and heel, with 75% devitalized nonviable tissue. No purulence or odor.   No surrounding erythema, fluctuance, increase in temperature or pain. Through subcutaneous tissue. Vascular, skin temperature warm. Loss of protective sensation.       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, with fat layer exposed ([facility] [address]    Pre Debridement Measurements:  Are located in the [facility]  Documentation Flow Sheet  Post Debridement Measurements:  Wound/Ulcer Descriptions are Pre Debridement except measurements:     Wound 03/08/21 Foot Plantar;Right wound #1-met head (Active)   Wound Image   03/08/21 1333   Wound Etiology Diabetic Dale 3 03/08/21 1333   Wound Length (cm) 0.5 cm 03/08/21 1333   Wound Width (cm) 0.8 cm 03/08/21 1333   Wound Depth (cm) 0.2 cm 03/08/21 1333   Wound Surface Area (cm^2) 0.4 cm^2 03/08/21 1333   Wound Volume (cm^3) 0.08 cm^3 03/08/21 1333   Post-Procedure Length (cm) 0.6 cm 03/08/21 1407   Post-Procedure Width (cm) 0.8 cm 03/08/21 1407   Post-Procedure Depth (cm) 0.2 cm 03/08/21 1407   Post-Procedure Surface Area (cm^2) 0.48 cm^2 03/08/21 1407   Post-Procedure Volume (cm^3) 0.1 cm^3 03/08/21 1407   Wound Assessment Granulation tissue 03/08/21 1333   Drainage Amount Small 03/08/21 1333   Drainage Description Serosanguinous 03/08/21 1333   Odor None 03/08/21 1333   Latonya-wound Assessment Hyperkeratosis (callous) 03/08/21 1333   Number of days: 0       Wound 03/08/21 Heel Right;Plantar wound # 2 (Active)   Wound Image   03/08/21 1333   Wound Etiology Pressure Unstageable 03/08/21 1333   Wound Length (cm) 1 cm 03/08/21 1333   Wound Width (cm) 2.8 cm 03/08/21 1333   Wound Depth (cm) 0 cm 03/08/21 1333   Wound Surface Area (cm^2) 2.8 cm^2 03/08/21 1333   Wound Volume (cm^3) 0 cm^3 03/08/21 1333   Post-Procedure Length (cm) 1 cm 03/08/21 1407   Post-Procedure Width (cm) 2.8 cm 03/08/21 1407   Post-Procedure Depth (cm) 0.1 cm 03/08/21 1407   Post-Procedure Surface Area (cm^2) 2.8 cm^2 03/08/21 1407   Post-Procedure Volume (cm^3) 0.28 cm^3 03/08/21 1407 Wound Assessment Blood filled blister 03/08/21 1333   Drainage Amount None 03/08/21 1333   Odor None 03/08/21 1333   Latonya-wound Assessment Hyperkeratosis (callous) 03/08/21 1333   Number of days: 0          Procedure Note  Indications:  Based on my examination of this patient's wound(s)/ulcer(s) today, debridement is required to promote healing and evaluate the wound base. Performed by: Christiana Pike DPM    Consent obtained:  Yes    Time out taken:  Yes    Pain Control: Anesthetic  Anesthetic: 4% Lidocaine Liquid Topical     Debridement:Excisional Debridement    Using curette and # 10 blade scalpel the wound(s)/ulcer(s) was/were sharply debrided down through and including the removal of subcutaneous tissue. Devitalized Tissue Debrided:  fibrin, biofilm, slough and necrotic/eschar to stimulate bleeding to promote healing, post debridement good bleeding base and wound edges noted    Wound/Ulcer #: 1,2    Percent of Wound/Ulcer Debrided: 100%    Total Surface Area Debrided:  3.4 sq cm     Estimated Blood Loss:  Minimal  Hemostasis Achieved:  by pressure    Procedural Pain:  0  / 10   Post Procedural Pain:  0 / 10     Response to treatment:  Well tolerated by patient. Plan:   Treatment Note please see attached Discharge Instructions    Written patient dismissal instructions given to patient and signed by patient or POA. Discharge Instructions       Visit Discharge/Physician Orders    Discharge condition: Stable    Assessment of pain at discharge:minimal    Anesthetic used: 4% lidocaine    Discharge to: Home    Left via:Private automobile    Accompanied by: accompanied by self    ECF/HHA:     Dressing Orders:cleanse right foot ulcers with normal saline apply xeroform and dry dressing daily    Treatment Orders:  Eat foods high in protein and vitamin c    Take multivitamin daily.     [address] [place] [address],[address] followup visit ______2 weeks Dr. Whitfield_______________________  (Please note your next appointment above and if you are unable to keep, kindly give a 24 hour notice. Thank you.)    Physician signature:__________________________      If you experience any of the following, please call the [facility] during business hours:    * Increase in Pain  * Temperature over 101  * Increase in drainage from your wound  * Drainage with a foul odor  * Bleeding  * Increase in swelling  * Need for compression bandage changes due to slippage, breakthrough drainage. If you need medical attention outside of the business hours of the [facility] please contact your PCP or go to the nearest emergency room. Contact your doctor if you have a temperature above 100.4 with any of the following:                         Persistent cough             Shortness of breath            Chills            Fatigue            Chest pain or pressure            Difficulty breathing            Decreased consciousness of confusion             Headache    Recommend:                       Wash hands often and for 20 seconds or more             Avoid touching eyes, nose, mouth and face             Social distance ( 6 feet)             Stay at home as much as possible             Call health care provider with any concerns          Electronically signed by Sha Lou DPM on 3/8/2021 at 2:27 PM

## 2021-03-08 NOTE — PROGRESS NOTES
[address] [place] [address] [facility]  I:[phone] f: [phone]     [facility]:     Jackie Malin  [place] [address]  [phone]  WOUND CARE Dept: [address] [facility] [phone]    Patient Information:      Shan Camilo [facility] [address]   [phone]   : 1940  AGE: 79 y.o. GENDER: male   EPISODE DATE: 3/8/2021    Insurance:      PRIMARY INSURANCE:  Plan: Renetta Rodriguez ESSENTIAL/PLUS  Coverage: BCBS MEDICARE  Effective Date: 2019  Group Number: [unfilled]  Subscriber Number: [id number] - (Medicare Managed)    Payor/Plan Subscr  Sex Relation Sub. Ins. ID Effective Group Num   1.  BCBS MEDICAREGrmazin [place] 1940 Male Self [id number]                                    [id number]       Patient Wound Information:      Problem List Items Addressed This Visit     None          WOUNDS REQUIRING DRESSING SUPPLIES:     Wound 21 Foot Plantar;Right wound #1-met head (Active)   Wound Image   21 1333   Wound Etiology Diabetic Dale 3 21 1333   Wound Length (cm) 0.5 cm 21 1333   Wound Width (cm) 0.8 cm 21 1333   Wound Depth (cm) 0.2 cm 21 1333   Wound Surface Area (cm^2) 0.4 cm^2 21 1333   Wound Volume (cm^3) 0.08 cm^3 21 1333   Post-Procedure Length (cm) 0.6 cm 21 1407   Post-Procedure Width (cm) 0.8 cm 21 1407   Post-Procedure Depth (cm) 0.2 cm 21 1407   Post-Procedure Surface Area (cm^2) 0.48 cm^2 21 1407   Post-Procedure Volume (cm^3) 0.1 cm^3 21 1407   Wound Assessment Granulation tissue 21 1333   Drainage Amount Small 21 1333   Drainage Description Serosanguinous 21 1333   Odor None 21 1333   Latonya-wound Assessment Hyperkeratosis (callous) 21 1333   Number of days: 0       Wound 21 Heel Right;Plantar wound # 2 (Active) Wound Image   03/08/21 1333   Wound Etiology Pressure Unstageable 03/08/21 1333   Wound Length (cm) 1 cm 03/08/21 1333   Wound Width (cm) 2.8 cm 03/08/21 1333   Wound Depth (cm) 0 cm 03/08/21 1333   Wound Surface Area (cm^2) 2.8 cm^2 03/08/21 1333   Wound Volume (cm^3) 0 cm^3 03/08/21 1333   Post-Procedure Length (cm) 1 cm 03/08/21 1407   Post-Procedure Width (cm) 2.8 cm 03/08/21 1407   Post-Procedure Depth (cm) 0.1 cm 03/08/21 1407   Post-Procedure Surface Area (cm^2) 2.8 cm^2 03/08/21 1407   Post-Procedure Volume (cm^3) 0.28 cm^3 03/08/21 1407   Wound Assessment Blood filled blister 03/08/21 1333   Drainage Amount None 03/08/21 1333   Odor None 03/08/21 1333   Latonya-wound Assessment Hyperkeratosis (callous) 03/08/21 1333   Number of days: 0          Supplies Requested :      WOUND #: 1   PRIMARY DRESSING:  Other: xeroform 4x4   Cover and Secure with: Gauze pad  Bulky roll gauze     FREQUENCY OF DRESSING CHANGES:  Daily       WOUND #: 2   PRIMARY DRESSING:  Other: xeroform 4x4   Cover and Secure with: Gauze pad  Bulky roll gauze     FREQUENCY OF DRESSING CHANGES:  Daily                     ADDITIONAL ITEMS:  [] Gloves Small  [x] Gloves Medium [] Gloves Large [] Gloves XLarge  [] Tape 1\" [x] Tape 2\" [] Tape 3\"  [] Medipore Tape  [x] Saline  [] Skin Prep   [] Adhesive Remover   [] Cotton Tip Applicators   [] Other:    Patient Wound(s) Debrided: [x] Yes if yes please add date 3/8/21 [] No    Debribement Type: Excisional/Sharp    Patient currently being seen by Home Health: [] Yes   [x] No    Duration for needed supplies:  []15  [x]30  []60  []90 Days    Electronically signed by Buddy Rocha RN on 3/8/2021 at 2:11 PM     Provider Information:      PROVIDER'S NAME: Dr. Caden Archer    NPI: [id number]

## 2021-03-22 ENCOUNTER — HOSPITAL ENCOUNTER (OUTPATIENT)
Dept: WOUND CARE | Age: 81
Discharge: HOME | End: 2021-03-22
Payer: MEDICARE

## 2021-03-22 VITALS
TEMPERATURE: 97 F | RESPIRATION RATE: 18 BRPM | DIASTOLIC BLOOD PRESSURE: 68 MMHG | BODY MASS INDEX: 20.73 KG/M2 | HEIGHT: 69 IN | WEIGHT: 140 LBS | SYSTOLIC BLOOD PRESSURE: 136 MMHG | HEART RATE: 80 BPM

## 2021-03-22 PROCEDURE — 11042 DBRDMT SUBQ TIS 1ST 20SQCM/<: CPT

## 2021-03-22 RX ORDER — LIDOCAINE HYDROCHLORIDE 40 MG/ML
SOLUTION TOPICAL ONCE
Status: DISCONTINUED | OUTPATIENT
Start: 2021-03-22 | End: 2021-03-23 | Stop reason: HOSPADM

## 2021-03-22 NOTE — PROGRESS NOTES
[facility] Followup Visit Note    Referring Physician : Yelena Huang MD  [address] RECORD NUMBER:  [id number]  AGE: 79 y.o. GENDER: male  : 1940  EPISODE DATE:  3/22/2021    Subjective:     Chief Complaint   Patient presents with    Wound Check     right heel and right foot ulcers      HISTORY of PRESENT ILLNESS HPI   Alethea Demarco is a 79 y.o. male who presents today in regards to follow up evaluation and treatment of wound/ulcer. That patient's past medical, family and social hx were reviewed and changes were made if present. History of Wound Context: Patient with a history of a right lower extremity wound. He status post revascularization. He ended up with cellulitis he was sent to the emergency room. He was admitted to the hospital.  Is been placed on antibiotics. Overall subjectively he is much better. His pain and discomfort is significantly improved. He denies any significant pain currently. He denies any worsening of his leg wound and overall states his wound are almost completely healed. 21 relates leg wound healed, new area plantar right foot. No pressure right foot    3-8-21 last seen .   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 \"    Ciprofloxacin In D5w Itching    Pcn [Penicillins] Other (See Comments)     \"I just know my body doesn't like it. \" \"I had a reaction a long time ago, I know it affects my kidneys. \"    Other Rash     \"I get a rash on just my face if I eat Cumin or Chili. \"    Peanut-Containing Drug Products Itching     Current Outpatient Medications on File Prior to Encounter   Medication Sig Dispense Refill    Charcoal 200 MG CAPS Take 200 mg by mouth daily       CHLOROPHYLL PO Take 1 tablet by mouth daily       Garlic 0912 MG TBEC Take 1,250 mg by mouth daily       Multiple Vitamins-Minerals (THERAPEUTIC MULTIVITAMIN-MINERALS) tablet Take 1 tablet by mouth daily      diphenhydrAMINE (BENADRYL ALLERGY) 25 MG capsule Take 25 mg by mouth every 6 hours as needed for Itching       No current facility-administered medications on file prior to encounter. REVIEW OF SYSTEMS See HPI    Objective:    /68   Pulse 80   Temp 97 °F (36.1 °C) (Temporal)   Resp 18   Ht 5' 9\" (1.753 m)   Wt 140 lb (63.5 kg)   BMI 20.67 kg/m²   Wt Readings from Last 3 Encounters:   21 140 lb (63.5 kg)   21 140 lb (63.5 kg)   21 140 lb (63.5 kg)     PHYSICAL EXAM  CONSTITUTIONAL:   Awake, alert, cooperative   EYES:  lids and lashes normal   ENT: external ears and nose without lesions   NECK:  supple, symmetrical, trachea midline   SKIN:  Open wound plantat aspect right foot and heel, with 50% devitalized nonviable tissue. No purulence or odor. No surrounding erythema, fluctuance, increase in temperature or pain.   Through subcutaneous tissue. Vascular, skin temperature warm. Loss of protective sensation. 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, with fat layer exposed ([address]    Pre Debridement Measurements:  Are located in the [facility]  Documentation Flow Sheet  Post Debridement Measurements:  Wound/Ulcer Descriptions are Pre Debridement except measurements:     Wound 03/08/21 Foot Plantar;Right wound #1-met head (Active)   Wound Image   03/08/21 1333   Wound Etiology Diabetic Dale 3 03/08/21 1333   Dressing Status New dressing applied 03/08/21 1431   Wound Cleansed Cleansed with saline 03/08/21 1431   Dressing/Treatment Xeroform;Dry dressing 03/08/21 1431   Offloading for Diabetic Foot Ulcers No offloading required 03/08/21 1431   Wound Length (cm) 0.8 cm 03/22/21 1338   Wound Width (cm) 0.9 cm 03/22/21 1338   Wound Depth (cm) 0.1 cm 03/22/21 1338   Wound Surface Area (cm^2) 0.72 cm^2 03/22/21 1338   Change in Wound Size % (l*w) -80 03/22/21 1338   Wound Volume (cm^3) 0.07 cm^3 03/22/21 1338   Wound Healing % 12 03/22/21 1338   Post-Procedure Length (cm) 0.8 cm 03/22/21 1348   Post-Procedure Width (cm) 0.8 cm 03/22/21 1348   Post-Procedure Depth (cm) 0.2 cm 03/22/21 1348   Post-Procedure Surface Area (cm^2) 0.64 cm^2 03/22/21 1348   Post-Procedure Volume (cm^3) 0.13 cm^3 03/22/21 1348   Wound Assessment Pale granulation tissue 03/22/21 1338   Drainage Amount Scant 03/22/21 1338   Drainage Description Serosanguinous; Serous 03/22/21 1338   Odor None 03/22/21 1338   Latonya-wound Assessment Hyperkeratosis (callous) 03/22/21 1338   Number of days: 13       Wound 03/08/21 Heel Right;Plantar wound # 2 (Active)   Wound Image   03/08/21 1333   Wound Etiology Pressure Unstageable 03/08/21 1333   Dressing Status New dressing applied 03/08/21 1431   Wound Cleansed Cleansed with saline 03/08/21 1431   Dressing/Treatment Xeroform;Dry dressing 03/08/21 1431   Offloading for Diabetic Foot Ulcers No offloading required 03/08/21 1431   Wound Length (cm) 0.6 cm 03/22/21 1338   Wound Width (cm) 0.7 cm 03/22/21 1338   Wound Depth (cm) 0.2 cm 03/22/21 1338   Wound Surface Area (cm^2) 0.42 cm^2 03/22/21 1338   Change in Wound Size % (l*w) 85 03/22/21 1338   Wound Volume (cm^3) 0.08 cm^3 03/22/21 1338   Post-Procedure Length (cm) 0.7 cm 03/22/21 1348   Post-Procedure Width (cm) 0.7 cm 03/22/21 1348   Post-Procedure Depth (cm) 0.2 cm 03/22/21 1348   Post-Procedure Surface Area (cm^2) 0.49 cm^2 03/22/21 1348   Post-Procedure Volume (cm^3) 0.1 cm^3 03/22/21 1348   Wound Assessment Granulation tissue;Fibrin 03/22/21 1338   Drainage Amount Small 03/22/21 1338   Drainage Description Serosanguinous 03/22/21 1338   Odor None 03/22/21 1338   Latonya-wound Assessment Hyperkeratosis (callous) 03/22/21 1338   Number of days: 13          Procedure Note  Indications:  Based on my examination of this patient's wound(s)/ulcer(s) today, debridement is required to promote healing and evaluate the wound base. Performed by: Valerie Bah DPM    Consent obtained:  Yes    Time out taken:  Yes    Pain Control: Anesthetic  Anesthetic: 4% Lidocaine Liquid Topical     Debridement:Excisional Debridement    Using curette and # 10 blade scalpel the wound(s)/ulcer(s) was/were sharply debrided down through and including the removal of subcutaneous tissue. Devitalized Tissue Debrided:  fibrin, biofilm, slough and necrotic/eschar to stimulate bleeding to promote healing, post debridement good bleeding base and wound edges noted    Wound/Ulcer #: 1,2    Percent of Wound/Ulcer Debrided: 100%    Total Surface Area Debrided:  1.14 sq cm     Estimated Blood Loss:  Minimal  Hemostasis Achieved:  by pressure    Procedural Pain:  0  / 10   Post Procedural Pain:  0 / 10     Response to treatment:  Well tolerated by patient.      Plan:   Treatment Note please see attached Discharge Instructions    Written patient dismissal instructions given to patient and signed by patient or POA. Discharge Instructions       Visit Discharge/Physician Orders     Discharge condition: Stable     Assessment of pain at discharge:minimal     Anesthetic used: 4% lidocaine     Discharge to: Home     Left via:Private automobile     Accompanied by: accompanied by self     ECF/HHA:      Dressing Orders:cleanse right foot ulcers with normal saline apply xeroform and dry dressing daily     Treatment Orders:  Eat foods high in protein and vitamin c     Take multivitamin daily.     [facility] followup visit ______3 weeks Dr. Whitfield_______________________  (Please note your next appointment above and if you are unable to keep, kindly give a 24 hour notice. Thank you.)     Physician signature:__________________________        If you experience any of the following, please call the [facility] during business hours:     * Increase in Pain  * Temperature over 101  * Increase in drainage from your wound  * Drainage with a foul odor  * Bleeding  * Increase in swelling  * Need for compression bandage changes due to slippage, breakthrough drainage.     If you need medical attention outside of the business hours of the [facility] please contact your PCP or go to the nearest emergency room. Contact your doctor if you have a temperature above 100.4 with any of the following:                         Persistent cough             Shortness of breath            Chills            Fatigue            Chest pain or pressure            Difficulty breathing            Decreased consciousness of confusion             Headache     Recommend:                       Wash hands often and for 20 seconds or more             Avoid touching eyes, nose, mouth and face             Social distance ( 6 feet)             Stay at home as much as possible             Call health care provider with any concerns                    Electronically signed by Ryan Breaux DPM on 3/22/2021 at 1:56 PM

## 2021-04-12 ENCOUNTER — HOSPITAL ENCOUNTER (OUTPATIENT)
Dept: WOUND CARE | Age: 81
Discharge: HOME | End: 2021-04-12
Payer: MEDICARE

## 2021-04-12 VITALS
DIASTOLIC BLOOD PRESSURE: 72 MMHG | SYSTOLIC BLOOD PRESSURE: 138 MMHG | RESPIRATION RATE: 18 BRPM | BODY MASS INDEX: 20.73 KG/M2 | HEIGHT: 69 IN | WEIGHT: 140 LBS | HEART RATE: 72 BPM | TEMPERATURE: 96.3 F

## 2021-04-12 PROCEDURE — 11042 DBRDMT SUBQ TIS 1ST 20SQCM/<: CPT

## 2021-04-12 RX ORDER — LIDOCAINE HYDROCHLORIDE 40 MG/ML
SOLUTION TOPICAL ONCE
Status: DISCONTINUED | OUTPATIENT
Start: 2021-04-12 | End: 2021-04-13 | Stop reason: HOSPADM

## 2021-04-12 NOTE — PROGRESS NOTES
Pt states he checks glucoses once a week and keeps in check by diet.     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  1990's    lense implants bilaterally    FOOT DEBRIDEMENT Left 01/04/2017    wound debridement and delayed primary closure    FRACTURE SURGERY      L femur fracture surgery    HIP FRACTURE SURGERY Left 9/23/2020    LEFT HIP OPEN REDUCTION INTERNAL FIXATION performed by David Hearn MD at [address] [place] [address]  04/23/2019    Dr. Brian Steiner- PTA ant tibial    OTHER SURGICAL HISTORY  12/17/2019    Dr Brian Steiner - PCI Right popliteal and Anterior tibial    PROSTATE BIOPSY  04/2016    SKIN BIOPSY  sept of 2018 last time    head and ears    TOE AMPUTATION Left 12/31/2016    2nd    TOE AMPUTATION Right 4/26/2019    AMPUTATION RIGHT SECOND TOE, FOOT DEBRIDEMENT performed by Ann Marie Champagne DPM at [place] [address] Right 12/10/2019    AMPUTATION RIGHT 3rd DIGIT WITH PARTIAL RESECTION OF THIRD METATARSAL performed by Ann Marie Champagne DPM at [facility] N/A 5/18/2020    CYSTOSCOPY PLASMA LOOP TRANSURETHRAL RESECTION PROSTATE performed by Latonya Cosme MD at [facility] OR    VASCULAR SURGERY       Family History   Problem Relation Age of Onset    Heart Disease Mother         CHF    Heart Disease Father     Heart Attack Father      Social History     Tobacco Use  Smoking status: Former Smoker     Packs/day: 0.50     Years: 2.00     Pack years: 1.00     Types: Cigarettes     Quit date: 1960     Years since quittin.3    Smokeless tobacco: Never Used   Substance Use Topics    Alcohol use: Not Currently    Drug use: Not Currently     Allergies   Allergen Reactions    Latex Other (See Comments)     Pt unsure of reaction    Aspirin Other (See Comments)     \"It affects my kidneys. \"    Ciprofloxacin In D5w Itching    Pcn [Penicillins] Other (See Comments)     \"I just know my body doesn't like it. \" \"I had a reaction a long time ago, I know it affects my kidneys. \"    Other Rash     \"I get a rash on just my face if I eat Cumin or Chili. \"    Peanut-Containing Drug Products Itching     Current Outpatient Medications on File Prior to Encounter   Medication Sig Dispense Refill    Charcoal 200 MG CAPS Take 200 mg by mouth daily       CHLOROPHYLL PO Take 1 tablet by mouth daily       Garlic 3471 MG TBEC Take 1,250 mg by mouth daily       Multiple Vitamins-Minerals (THERAPEUTIC MULTIVITAMIN-MINERALS) tablet Take 1 tablet by mouth daily      diphenhydrAMINE (BENADRYL ALLERGY) 25 MG capsule Take 25 mg by mouth every 6 hours as needed for Itching       No current facility-administered medications on file prior to encounter. REVIEW OF SYSTEMS See HPI    Objective:    /72   Pulse 72   Temp 96.3 °F (35.7 °C) (Temporal)   Resp 18   Ht 5' 9\" (1.753 m)   Wt 140 lb (63.5 kg)   BMI 20.67 kg/m²   Wt Readings from Last 3 Encounters:   21 140 lb (63.5 kg)   21 140 lb (63.5 kg)   21 140 lb (63.5 kg)     PHYSICAL EXAM  CONSTITUTIONAL:   Awake, alert, cooperative   EYES:  lids and lashes normal   ENT: external ears and nose without lesions   NECK:  supple, symmetrical, trachea midline   SKIN:  Open wound plantat aspect right foot and heel, with 25% devitalized nonviable tissue. No purulence or odor.   No surrounding erythema, fluctuance, increase in for Diabetic Foot Ulcers No 04/12/21 1418   Wound Length (cm) 0.2 cm 04/12/21 1350   Wound Width (cm) 0.2 cm 04/12/21 1350   Wound Depth (cm) 0.3 cm 04/12/21 1350   Wound Surface Area (cm^2) 0.04 cm^2 04/12/21 1350   Change in Wound Size % (l*w) 98.57 04/12/21 1350   Wound Volume (cm^3) 0.01 cm^3 04/12/21 1350   Post-Procedure Length (cm) 0.3 cm 04/12/21 1418   Post-Procedure Width (cm) 0.2 cm 04/12/21 1418   Post-Procedure Depth (cm) 0.3 cm 04/12/21 1418   Post-Procedure Surface Area (cm^2) 0.06 cm^2 04/12/21 1418   Post-Procedure Volume (cm^3) 0.02 cm^3 04/12/21 1418   Wound Assessment Pale granulation tissue 04/12/21 1350   Drainage Amount Scant 04/12/21 1350   Drainage Description Serous 04/12/21 1350   Odor None 04/12/21 1350   Latonya-wound Assessment Hyperkeratosis (callous) 04/12/21 1350   Number of days: 35          Procedure Note  Indications:  Based on my examination of this patient's wound(s)/ulcer(s) today, debridement is required to promote healing and evaluate the wound base. Performed by: Rayne Morelos DPM    Consent obtained:  Yes    Time out taken:  Yes    Pain Control: Anesthetic  Anesthetic: 4% Lidocaine Liquid Topical     Debridement:Excisional Debridement    Using curette and # 10 blade scalpel the wound(s)/ulcer(s) was/were sharply debrided down through and including the removal of subcutaneous tissue. Devitalized Tissue Debrided:  fibrin, biofilm, slough and necrotic/eschar to stimulate bleeding to promote healing, post debridement good bleeding base and wound edges noted    Wound/Ulcer #: 1,2    Percent of Wound/Ulcer Debrided: 100%    Total Surface Area Debrided:  0.19 sq cm     Estimated Blood Loss:  Minimal  Hemostasis Achieved:  by pressure    Procedural Pain:  0  / 10   Post Procedural Pain:  0 / 10     Response to treatment:  Well tolerated by patient.      Plan:   Treatment Note please see attached Discharge Instructions    Written patient dismissal instructions given to patient and signed by patient or POA. Discharge Instructions       Visit Discharge/Physician Orders     Discharge condition: Stable     Assessment of pain at discharge:minimal     Anesthetic used: 4% lidocaine     Discharge to: Home     Left via:Private automobile     Accompanied by: accompanied by self     ECF/HHA:      Dressing Orders:cleanse right foot ulcers with normal saline apply xeroform and dry dressing daily     Treatment Orders:  Eat foods high in protein and vitamin c     Take multivitamin daily.     [facility] followup visit ______3 weeks Dr. Whitfield_______________________  (Please note your next appointment above and if you are unable to keep, kindly give a 24 hour notice. Thank you.)     Physician signature:__________________________        If you experience any of the following, please call the [facility] during business hours:     * Increase in Pain  * Temperature over 101  * Increase in drainage from your wound  * Drainage with a foul odor  * Bleeding  * Increase in swelling  * Need for compression bandage changes due to slippage, breakthrough drainage.     If you need medical attention outside of the business hours of the [facility] please contact your PCP or go to the nearest emergency room. Contact your doctor if you have a temperature above 100.4 with any of the following:                         Persistent cough             Shortness of breath            Chills            Fatigue            Chest pain or pressure            Difficulty breathing            Decreased consciousness of confusion             Headache     Recommend:                       Wash hands often and for 20 seconds or more             Avoid touching eyes, nose, mouth and face             Social distance ( 6 feet)             Stay at home as much as possible             Call health care provider with any concerns                                         Electronically signed by Ann Marie Champagne DPM on 4/12/2021 at 2:47 PM

## 2021-05-03 ENCOUNTER — HOSPITAL ENCOUNTER (OUTPATIENT)
Dept: WOUND CARE | Age: 81
Discharge: HOME | End: 2021-05-03
Payer: MEDICARE

## 2021-07-15 ENCOUNTER — APPOINTMENT (OUTPATIENT)
Dept: CT IMAGING | Age: 81
DRG: 522 | End: 2021-07-15
Payer: MEDICARE

## 2021-07-15 ENCOUNTER — APPOINTMENT (OUTPATIENT)
Dept: GENERAL RADIOLOGY | Age: 81
DRG: 522 | End: 2021-07-15
Payer: MEDICARE

## 2021-07-15 ENCOUNTER — HOSPITAL ENCOUNTER (INPATIENT)
Age: 81
LOS: 5 days | Discharge: NURSING, REHAB OR LONG-TERM CARE | DRG: 522 | End: 2021-07-20
Attending: EMERGENCY MEDICINE
Payer: MEDICARE

## 2021-07-15 DIAGNOSIS — R42 DIZZINESS: Primary | ICD-10-CM

## 2021-07-15 DIAGNOSIS — S72.001A CLOSED FRACTURE OF RIGHT HIP, INITIAL ENCOUNTER (HCC): ICD-10-CM

## 2021-07-15 DIAGNOSIS — S72.001A CLOSED DISPLACED FRACTURE OF RIGHT FEMORAL NECK (HCC): ICD-10-CM

## 2021-07-15 PROBLEM — S72.002A CLOSED FRACTURE OF NECK OF LEFT FEMUR (HCC): Status: ACTIVE | Noted: 2021-07-15

## 2021-07-15 LAB
ALBUMIN SERPL-MCNC: 4.2 G/DL (ref 3.5–5.2)
ALP BLD-CCNC: 97 U/L (ref 40–129)
ALT SERPL-CCNC: 25 U/L (ref 0–40)
ANION GAP SERPL CALCULATED.3IONS-SCNC: 12 MMOL/L (ref 7–16)
AST SERPL-CCNC: 19 U/L (ref 0–39)
BASOPHILS ABSOLUTE: 0.04 E9/L (ref 0–0.2)
BASOPHILS RELATIVE PERCENT: 0.4 % (ref 0–2)
BILIRUB SERPL-MCNC: 0.8 MG/DL (ref 0–1.2)
BUN BLDV-MCNC: 16 MG/DL (ref 6–23)
CALCIUM SERPL-MCNC: 9.7 MG/DL (ref 8.6–10.2)
CHLORIDE BLD-SCNC: 99 MMOL/L (ref 98–107)
CO2: 26 MMOL/L (ref 22–29)
CREAT SERPL-MCNC: 1 MG/DL (ref 0.7–1.2)
EOSINOPHILS ABSOLUTE: 0.07 E9/L (ref 0.05–0.5)
EOSINOPHILS RELATIVE PERCENT: 0.8 % (ref 0–6)
GFR AFRICAN AMERICAN: >60
GFR NON-AFRICAN AMERICAN: >60 ML/MIN/1.73
GLUCOSE BLD-MCNC: 214 MG/DL (ref 74–99)
HCT VFR BLD CALC: 39.2 % (ref 37–54)
HEMOGLOBIN: 13.5 G/DL (ref 12.5–16.5)
IMMATURE GRANULOCYTES #: 0.06 E9/L
IMMATURE GRANULOCYTES %: 0.7 % (ref 0–5)
LYMPHOCYTES ABSOLUTE: 0.95 E9/L (ref 1.5–4)
LYMPHOCYTES RELATIVE PERCENT: 10.4 % (ref 20–42)
MCH RBC QN AUTO: 30 PG (ref 26–35)
MCHC RBC AUTO-ENTMCNC: 34.4 % (ref 32–34.5)
MCV RBC AUTO: 87.1 FL (ref 80–99.9)
MONOCYTES ABSOLUTE: 0.56 E9/L (ref 0.1–0.95)
MONOCYTES RELATIVE PERCENT: 6.1 % (ref 2–12)
NEUTROPHILS ABSOLUTE: 7.47 E9/L (ref 1.8–7.3)
NEUTROPHILS RELATIVE PERCENT: 81.6 % (ref 43–80)
PDW BLD-RTO: 14.4 FL (ref 11.5–15)
PLATELET # BLD: 188 E9/L (ref 130–450)
PMV BLD AUTO: 9.7 FL (ref 7–12)
POTASSIUM SERPL-SCNC: 4.4 MMOL/L (ref 3.5–5)
RBC # BLD: 4.5 E12/L (ref 3.8–5.8)
SODIUM BLD-SCNC: 137 MMOL/L (ref 132–146)
TOTAL PROTEIN: 7.8 G/DL (ref 6.4–8.3)
TROPONIN, HIGH SENSITIVITY: 14 NG/L (ref 0–11)
TROPONIN, HIGH SENSITIVITY: 18 NG/L (ref 0–11)
WBC # BLD: 9.2 E9/L (ref 4.5–11.5)

## 2021-07-15 PROCEDURE — 99221 1ST HOSP IP/OBS SF/LOW 40: CPT | Performed by: ORTHOPAEDIC SURGERY

## 2021-07-15 PROCEDURE — 73552 X-RAY EXAM OF FEMUR 2/>: CPT

## 2021-07-15 PROCEDURE — 2060000000 HC ICU INTERMEDIATE R&B

## 2021-07-15 PROCEDURE — 6360000002 HC RX W HCPCS: Performed by: NURSE PRACTITIONER

## 2021-07-15 PROCEDURE — 93005 ELECTROCARDIOGRAM TRACING: CPT | Performed by: EMERGENCY MEDICINE

## 2021-07-15 PROCEDURE — 70450 CT HEAD/BRAIN W/O DYE: CPT

## 2021-07-15 PROCEDURE — 73562 X-RAY EXAM OF KNEE 3: CPT

## 2021-07-15 PROCEDURE — 85025 COMPLETE CBC W/AUTO DIFF WBC: CPT

## 2021-07-15 PROCEDURE — 71045 X-RAY EXAM CHEST 1 VIEW: CPT

## 2021-07-15 PROCEDURE — 73502 X-RAY EXAM HIP UNI 2-3 VIEWS: CPT

## 2021-07-15 PROCEDURE — 80053 COMPREHEN METABOLIC PANEL: CPT

## 2021-07-15 PROCEDURE — 99285 EMERGENCY DEPT VISIT HI MDM: CPT

## 2021-07-15 PROCEDURE — 84484 ASSAY OF TROPONIN QUANT: CPT

## 2021-07-15 PROCEDURE — 96374 THER/PROPH/DIAG INJ IV PUSH: CPT

## 2021-07-15 PROCEDURE — 6360000002 HC RX W HCPCS: Performed by: EMERGENCY MEDICINE

## 2021-07-15 RX ORDER — ONDANSETRON 4 MG/1
4 TABLET, ORALLY DISINTEGRATING ORAL EVERY 8 HOURS PRN
Status: DISCONTINUED | OUTPATIENT
Start: 2021-07-15 | End: 2021-07-20 | Stop reason: HOSPADM

## 2021-07-15 RX ORDER — ACETAMINOPHEN 325 MG/1
650 TABLET ORAL EVERY 4 HOURS PRN
Status: DISCONTINUED | OUTPATIENT
Start: 2021-07-15 | End: 2021-07-20 | Stop reason: HOSPADM

## 2021-07-15 RX ORDER — FENTANYL CITRATE 50 UG/ML
50 INJECTION, SOLUTION INTRAMUSCULAR; INTRAVENOUS ONCE
Status: COMPLETED | OUTPATIENT
Start: 2021-07-15 | End: 2021-07-15

## 2021-07-15 RX ORDER — SODIUM CHLORIDE 9 MG/ML
INJECTION, SOLUTION INTRAVENOUS CONTINUOUS
Status: DISCONTINUED | OUTPATIENT
Start: 2021-07-15 | End: 2021-07-19

## 2021-07-15 RX ORDER — SODIUM CHLORIDE 9 MG/ML
25 INJECTION, SOLUTION INTRAVENOUS PRN
Status: DISCONTINUED | OUTPATIENT
Start: 2021-07-15 | End: 2021-07-20 | Stop reason: HOSPADM

## 2021-07-15 RX ORDER — ONDANSETRON 2 MG/ML
4 INJECTION INTRAMUSCULAR; INTRAVENOUS EVERY 6 HOURS PRN
Status: DISCONTINUED | OUTPATIENT
Start: 2021-07-15 | End: 2021-07-20 | Stop reason: HOSPADM

## 2021-07-15 RX ORDER — SODIUM CHLORIDE 0.9 % (FLUSH) 0.9 %
5-40 SYRINGE (ML) INJECTION PRN
Status: DISCONTINUED | OUTPATIENT
Start: 2021-07-15 | End: 2021-07-20 | Stop reason: HOSPADM

## 2021-07-15 RX ORDER — SODIUM CHLORIDE 9 MG/ML
INJECTION, SOLUTION INTRAVENOUS CONTINUOUS
Status: DISCONTINUED | OUTPATIENT
Start: 2021-07-16 | End: 2021-07-16

## 2021-07-15 RX ORDER — MORPHINE SULFATE 2 MG/ML
2 INJECTION, SOLUTION INTRAMUSCULAR; INTRAVENOUS EVERY 4 HOURS PRN
Status: DISCONTINUED | OUTPATIENT
Start: 2021-07-15 | End: 2021-07-19

## 2021-07-15 RX ORDER — DIPHENHYDRAMINE HCL 25 MG
25 TABLET ORAL EVERY 6 HOURS PRN
Status: DISCONTINUED | OUTPATIENT
Start: 2021-07-15 | End: 2021-07-20 | Stop reason: HOSPADM

## 2021-07-15 RX ORDER — SODIUM CHLORIDE 0.9 % (FLUSH) 0.9 %
5-40 SYRINGE (ML) INJECTION EVERY 12 HOURS SCHEDULED
Status: DISCONTINUED | OUTPATIENT
Start: 2021-07-15 | End: 2021-07-20 | Stop reason: HOSPADM

## 2021-07-15 RX ADMIN — Medication 2 MG: at 23:54

## 2021-07-15 RX ADMIN — FENTANYL CITRATE 50 MCG: 50 INJECTION, SOLUTION INTRAMUSCULAR; INTRAVENOUS at 19:57

## 2021-07-15 ASSESSMENT — PAIN SCALES - GENERAL
PAINLEVEL_OUTOF10: 7
PAINLEVEL_OUTOF10: 7
PAINLEVEL_OUTOF10: 8

## 2021-07-15 NOTE — LETTER
[place] Department Medicaid  CERTIFICATION OF NECESSITY  FOR NON-EMERGENCY TRANSPORTATION   BY GROUND AMBULANCE      Individual Information   1. Name: Maninder Jean Baptiste 2. [place] Medicaid Billing Number:    3. Address: [facility] Provider Information   4. Provider Name:    5. [place] Medicaid Provider Number:  National Provider Identifier (NPI):      Certification  7. Criteria:  During transport, this individual requires:  [x] Medical treatment or continuous     supervision by an EMT. [x] The administration or regulation of oxygen by another person. [] Supervised protective restraint. 8. Period Beginning Date: 21   9. Length  [x] Not more than 10 day(s)  [] One Year     Additional Information Relevant to Certification   10. Comments or Explanations, If Necessary or Appropriate     R ORIF hip , unable to tolerate sitting due to recent hip repair     Certifying Practitioner Information   11. Name of Practitioner: Cherelle Bunch MD   12. [place] Medicaid Provider Number, If Applicable:  [address] Provider Identifier (NPI):      Signature Information   14. Date of Signature: 21 15. Name of Person Signing: [facility]   69. Signature and Professional Designation: [facility] discharge planner     [facility] [address]  Rev. 2015      [address] [place] [address] Encounter Date/Time: 7/15/2021 110[address] [facility] Account: [de-identified]    MRN: [id number]    Patient: Maninder Jean Baptiste    Contact Serial #: [id number]      ENCOUNTER          Patient Class: I Private Enc?   No Unit RM BD: SEYZ 4S PICU 4504/4504-A   Hospital Service: Med/Surg   Encounter DX: Dizziness [R42]   ADM Provider: Keith Arellano MD   Procedure:     ATT Provider: Efraín Hwang MD   REF Provider:        Admission DX: Dizziness and DX codes: Rajesh [place] Hernandez 1721      PATIENT                 Name: Maninder Jean Baptiste : 1940 (80 yrs)   Address: [facility] [place], [place] [address] Sex: Male   City: [address] [place] [address]         Marital Status:    Employer: RETIRED         [employer]: [employer]   Primary Care Provider: Evangelist Morocho MD         Primary Phone: "[employer] [phone]   Contact Name Legal Guardian? Relationship to Patient Home Phone Work Phone   1. Taina Chinchilla      Spouse  Brother/Sister [phone]              GUARANTOR            Guarantor: Cindy Renee     : 1940   Address: [address] [place] [address] Sex: [place]     [place] [address]     Relation to Patient: Self       Home Phone: [phone]   Guarantor ID: [id number]       Work Phone:     Guarantor Employer: RETIRED         Status: RETIRED      COVERAGE        PRIMARY INSURANCE   Payor: [facility] MEDICARE Plan: JOSH MCKEON*   Payor Address: [facility] [id number] [address] [address]       Group Number: [address] Type: INDEMNITY   Subscriber Name: Thania Gamble : 1940   Subscriber ID: [id number] Pat. Rel. to Sub: Self   SECONDARY INSURANCE   Payor:   Plan:     Payor Address:  ,           Group Number:   Insurance Type:     Subscriber Name:   Subscriber :     Subscriber ID:   Pat.  Rel. to Sub:

## 2021-07-15 NOTE — ED PROVIDER NOTES
[facility]  Department of Emergency Medicine   ED  Encounter Note  Admit Date/RoomTime: 7/15/2021  7:25 PM  ED Room:     NAME: Omar Gil  : 1940  MRN: [id number]     Chief Complaint:  Fall (pt had a mechanical fall. pt complaints of pain to right femur. EMS gave 100mcg fentanyl IM and 4 ODT Zofran )    History of Present Illness       Omar Gil is a 79 y.o. old male who presents to the emergency department for evaluation after a fall. He presents from home where he lives with his wife. He states that he went outside for a walk and fell in the grass. He is unsure how exactly he fell but does not think he lost consciousness. He denies hitting his head. He landed on his right hip. He states he felt dizzy afterward. He denied chest pain or shortness of breath. He was unable to get himself off the ground or stand. He does have an obvious deformity to the right femur. ROS   Pertinent positives and negatives are stated within HPI, all other systems reviewed and are negative. 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 Surgical History:  has a past surgical history that includes Prostate biopsy (2016); Toe amputation (Left, 2016); Foot Debridement (Left, 2017); other surgical history (2019); Toe amputation (Right, 2019); eye surgery ();  Colonoscopy; vascular surgery; skin biopsy ( performed during the hospital encounter of 07/15/21   CBC Auto Differential   Result Value Ref Range    WBC 9.2 4.5 - 11.5 E9/L    RBC 4.50 3.80 - 5.80 E12/L    Hemoglobin 13.5 12.5 - 16.5 g/dL    Hematocrit 39.2 37.0 - 54.0 %    MCV 87.1 80.0 - 99.9 fL    MCH 30.0 26.0 - 35.0 pg    MCHC 34.4 32.0 - 34.5 %    RDW 14.4 11.5 - 15.0 fL    Platelets 241 059 - 833 E9/L    MPV 9.7 7.0 - 12.0 fL    Neutrophils % 81.6 (H) 43.0 - 80.0 %    Immature Granulocytes % 0.7 0.0 - 5.0 %    Lymphocytes % 10.4 (L) 20.0 - 42.0 %    Monocytes % 6.1 2.0 - 12.0 %    Eosinophils % 0.8 0.0 - 6.0 %    Basophils % 0.4 0.0 - 2.0 %    Neutrophils Absolute 7.47 (H) 1.80 - 7.30 E9/L    Immature Granulocytes # 0.06 E9/L    Lymphocytes Absolute 0.95 (L) 1.50 - 4.00 E9/L    Monocytes Absolute 0.56 0.10 - 0.95 E9/L    Eosinophils Absolute 0.07 0.05 - 0.50 E9/L    Basophils Absolute 0.04 0.00 - 0.20 E9/L   Troponin   Result Value Ref Range    Troponin, High Sensitivity 18 (H) 0 - 11 ng/L   Comprehensive Metabolic Panel   Result Value Ref Range    Sodium 137 132 - 146 mmol/L    Potassium 4.4 3.5 - 5.0 mmol/L    Chloride 99 98 - 107 mmol/L    CO2 26 22 - 29 mmol/L    Anion Gap 12 7 - 16 mmol/L    Glucose 214 (H) 74 - 99 mg/dL    BUN 16 6 - 23 mg/dL    CREATININE 1.0 0.7 - 1.2 mg/dL    GFR Non-African American >60 >=60 mL/min/1.73    GFR African American >60     Calcium 9.7 8.6 - 10.2 mg/dL    Total Protein 7.8 6.4 - 8.3 g/dL    Albumin 4.2 3.5 - 5.2 g/dL    Total Bilirubin 0.8 0.0 - 1.2 mg/dL    Alkaline Phosphatase 97 40 - 129 U/L    ALT 25 0 - 40 U/L    AST 19 0 - 39 U/L   Troponin   Result Value Ref Range    Troponin, High Sensitivity 14 (H) 0 - 11 ng/L   EKG 12 Lead   Result Value Ref Range    Ventricular Rate 71 BPM    Atrial Rate 72 BPM    P-R Interval 174 ms    QRS Duration 78 ms    Q-T Interval 372 ms    QTc Calculation (Bazett) 404 ms    P Axis 72 degrees    R Axis 14 degrees    T Axis 116 degrees       Imaging:   All Radiology results interpreted by Radiologist unless otherwise noted. CT HEAD WO CONTRAST   Final Result   No acute intracranial abnormality. XR KNEE RIGHT (3 VIEWS)   Final Result   No acute abnormality of the knee. XR HIP 2-3 VW W PELVIS RIGHT   Final Result   Right femoral neck fracture. XR FEMUR RIGHT (MIN 2 VIEWS)   Final Result   Femoral neck fracture. XR CHEST PORTABLE   Final Result   No acute process. ED Course / Medical Decision Making     Medications   0.9 % sodium chloride infusion (has no administration in time range)   diphenhydrAMINE (BENADRYL) tablet 25 mg (has no administration in time range)   sodium chloride flush 0.9 % injection 5-40 mL (has no administration in time range)   sodium chloride flush 0.9 % injection 5-40 mL (has no administration in time range)   0.9 % sodium chloride infusion (has no administration in time range)   acetaminophen (TYLENOL) tablet 650 mg (has no administration in time range)   ondansetron (ZOFRAN-ODT) disintegrating tablet 4 mg (has no administration in time range)     Or   ondansetron (ZOFRAN) injection 4 mg (has no administration in time range)   morphine (PF) injection 2 mg (2 mg Intravenous Given 7/15/21 9878)   0.9 % sodium chloride infusion (has no administration in time range)   fentaNYL (SUBLIMAZE) injection 50 mcg (50 mcg Intravenous Given 7/15/21 1957)           EKG Interpretation    Interpreted by emergency department physician    Rhythm: normal sinus   Rate: normal  Axis: normal  Ectopy: premature ventricular contractions (unifocal)  Conduction: normal  ST Segments: no acute change  T Waves: non specific changes  Q Waves: none    Clinical Impression: non-specific EKG    Angela Brady DO      Re-examination:  7/15/21     Patients symptoms show no change.     Consult(s):   IP CONSULT TO INTERNAL MEDICINE  IP CONSULT TO ORTHOPEDIC SURGERY  IP CONSULT TO CARDIOLOGY    Procedure(s):  None    MDM:   Patient presents to the ED for

## 2021-07-15 NOTE — ED NOTES
Bed: 23  Expected date:   Expected time:   Means of arrival:   Comments:  ems     [address] [facility] 121, RN  07/15/21 1925

## 2021-07-16 ENCOUNTER — APPOINTMENT (OUTPATIENT)
Dept: GENERAL RADIOLOGY | Age: 81
DRG: 522 | End: 2021-07-16
Payer: MEDICARE

## 2021-07-16 ENCOUNTER — ANESTHESIA (OUTPATIENT)
Dept: OPERATING ROOM | Age: 81
DRG: 522 | End: 2021-07-16
Payer: MEDICARE

## 2021-07-16 ENCOUNTER — ANESTHESIA EVENT (OUTPATIENT)
Dept: OPERATING ROOM | Age: 81
DRG: 522 | End: 2021-07-16
Payer: MEDICARE

## 2021-07-16 VITALS
DIASTOLIC BLOOD PRESSURE: 66 MMHG | RESPIRATION RATE: 1 BRPM | SYSTOLIC BLOOD PRESSURE: 182 MMHG | OXYGEN SATURATION: 100 %

## 2021-07-16 LAB
ANION GAP SERPL CALCULATED.3IONS-SCNC: 11 MMOL/L (ref 7–16)
BASOPHILS ABSOLUTE: 0.06 E9/L (ref 0–0.2)
BASOPHILS RELATIVE PERCENT: 0.5 % (ref 0–2)
BUN BLDV-MCNC: 18 MG/DL (ref 6–23)
C-REACTIVE PROTEIN: 4.2 MG/DL (ref 0–0.4)
CALCIUM SERPL-MCNC: 9.3 MG/DL (ref 8.6–10.2)
CHLORIDE BLD-SCNC: 100 MMOL/L (ref 98–107)
CHOLESTEROL, TOTAL: 190 MG/DL (ref 0–199)
CK MB: 1.9 NG/ML (ref 0–7.7)
CO2: 26 MMOL/L (ref 22–29)
CREAT SERPL-MCNC: 1 MG/DL (ref 0.7–1.2)
EKG ATRIAL RATE: 72 BPM
EKG P AXIS: 72 DEGREES
EKG P-R INTERVAL: 174 MS
EKG Q-T INTERVAL: 372 MS
EKG QRS DURATION: 78 MS
EKG QTC CALCULATION (BAZETT): 404 MS
EKG R AXIS: 14 DEGREES
EKG T AXIS: 116 DEGREES
EKG VENTRICULAR RATE: 71 BPM
EOSINOPHILS ABSOLUTE: 0.24 E9/L (ref 0.05–0.5)
EOSINOPHILS RELATIVE PERCENT: 1.8 % (ref 0–6)
GFR AFRICAN AMERICAN: >60
GFR NON-AFRICAN AMERICAN: >60 ML/MIN/1.73
GLUCOSE BLD-MCNC: 239 MG/DL (ref 74–99)
HBA1C MFR BLD: 6.9 % (ref 4–5.6)
HCT VFR BLD CALC: 37.8 % (ref 37–54)
HDLC SERPL-MCNC: 38 MG/DL
HEMOGLOBIN: 12.8 G/DL (ref 12.5–16.5)
IMMATURE GRANULOCYTES #: 0.04 E9/L
IMMATURE GRANULOCYTES %: 0.3 % (ref 0–5)
LDL CHOLESTEROL CALCULATED: 139 MG/DL (ref 0–99)
LYMPHOCYTES ABSOLUTE: 1.11 E9/L (ref 1.5–4)
LYMPHOCYTES RELATIVE PERCENT: 8.5 % (ref 20–42)
MAGNESIUM: 1.5 MG/DL (ref 1.6–2.6)
MCH RBC QN AUTO: 30 PG (ref 26–35)
MCHC RBC AUTO-ENTMCNC: 33.9 % (ref 32–34.5)
MCV RBC AUTO: 88.5 FL (ref 80–99.9)
METER GLUCOSE: 175 MG/DL (ref 74–99)
METER GLUCOSE: 253 MG/DL (ref 74–99)
METER GLUCOSE: 273 MG/DL (ref 74–99)
METER GLUCOSE: 273 MG/DL (ref 74–99)
MONOCYTES ABSOLUTE: 0.79 E9/L (ref 0.1–0.95)
MONOCYTES RELATIVE PERCENT: 6.1 % (ref 2–12)
NEUTROPHILS ABSOLUTE: 10.8 E9/L (ref 1.8–7.3)
NEUTROPHILS RELATIVE PERCENT: 82.8 % (ref 43–80)
PDW BLD-RTO: 14.4 FL (ref 11.5–15)
PLATELET # BLD: 178 E9/L (ref 130–450)
PMV BLD AUTO: 9.8 FL (ref 7–12)
POTASSIUM SERPL-SCNC: 4.3 MMOL/L (ref 3.5–5)
RBC # BLD: 4.27 E12/L (ref 3.8–5.8)
SEDIMENTATION RATE, ERYTHROCYTE: 17 MM/HR (ref 0–15)
SODIUM BLD-SCNC: 137 MMOL/L (ref 132–146)
TOTAL CK: 62 U/L (ref 20–200)
TRIGL SERPL-MCNC: 66 MG/DL (ref 0–149)
TROPONIN, HIGH SENSITIVITY: 21 NG/L (ref 0–11)
VLDLC SERPL CALC-MCNC: 13 MG/DL
WBC # BLD: 13 E9/L (ref 4.5–11.5)

## 2021-07-16 PROCEDURE — 2500000003 HC RX 250 WO HCPCS: Performed by: ORTHOPAEDIC SURGERY

## 2021-07-16 PROCEDURE — 2500000003 HC RX 250 WO HCPCS

## 2021-07-16 PROCEDURE — 2060000000 HC ICU INTERMEDIATE R&B

## 2021-07-16 PROCEDURE — 6360000002 HC RX W HCPCS: Performed by: STUDENT IN AN ORGANIZED HEALTH CARE EDUCATION/TRAINING PROGRAM

## 2021-07-16 PROCEDURE — 6360000002 HC RX W HCPCS: Performed by: ANESTHESIOLOGY

## 2021-07-16 PROCEDURE — 80048 BASIC METABOLIC PNL TOTAL CA: CPT

## 2021-07-16 PROCEDURE — 85651 RBC SED RATE NONAUTOMATED: CPT

## 2021-07-16 PROCEDURE — 3600000005 HC SURGERY LEVEL 5 BASE: Performed by: ORTHOPAEDIC SURGERY

## 2021-07-16 PROCEDURE — 82553 CREATINE MB FRACTION: CPT

## 2021-07-16 PROCEDURE — 99223 1ST HOSP IP/OBS HIGH 75: CPT | Performed by: INTERNAL MEDICINE

## 2021-07-16 PROCEDURE — 85025 COMPLETE CBC W/AUTO DIFF WBC: CPT

## 2021-07-16 PROCEDURE — 82550 ASSAY OF CK (CPK): CPT

## 2021-07-16 PROCEDURE — APPSS60 APP SPLIT SHARED TIME 46-60 MINUTES: Performed by: PHYSICIAN ASSISTANT

## 2021-07-16 PROCEDURE — 88305 TISSUE EXAM BY PATHOLOGIST: CPT

## 2021-07-16 PROCEDURE — C1776 JOINT DEVICE (IMPLANTABLE): HCPCS | Performed by: ORTHOPAEDIC SURGERY

## 2021-07-16 PROCEDURE — 7100000000 HC PACU RECOVERY - FIRST 15 MIN: Performed by: ORTHOPAEDIC SURGERY

## 2021-07-16 PROCEDURE — 2709999900 HC NON-CHARGEABLE SUPPLY: Performed by: ORTHOPAEDIC SURGERY

## 2021-07-16 PROCEDURE — 7100000001 HC PACU RECOVERY - ADDTL 15 MIN: Performed by: ORTHOPAEDIC SURGERY

## 2021-07-16 PROCEDURE — 6360000002 HC RX W HCPCS

## 2021-07-16 PROCEDURE — 73600 X-RAY EXAM OF ANKLE: CPT

## 2021-07-16 PROCEDURE — 6370000000 HC RX 637 (ALT 250 FOR IP): Performed by: INTERNAL MEDICINE

## 2021-07-16 PROCEDURE — 73620 X-RAY EXAM OF FOOT: CPT

## 2021-07-16 PROCEDURE — 82962 GLUCOSE BLOOD TEST: CPT

## 2021-07-16 PROCEDURE — 86140 C-REACTIVE PROTEIN: CPT

## 2021-07-16 PROCEDURE — 83735 ASSAY OF MAGNESIUM: CPT

## 2021-07-16 PROCEDURE — 6370000000 HC RX 637 (ALT 250 FOR IP): Performed by: ORTHOPAEDIC SURGERY

## 2021-07-16 PROCEDURE — 84484 ASSAY OF TROPONIN QUANT: CPT

## 2021-07-16 PROCEDURE — 6370000000 HC RX 637 (ALT 250 FOR IP): Performed by: STUDENT IN AN ORGANIZED HEALTH CARE EDUCATION/TRAINING PROGRAM

## 2021-07-16 PROCEDURE — 36415 COLL VENOUS BLD VENIPUNCTURE: CPT

## 2021-07-16 PROCEDURE — 27236 TREAT THIGH FRACTURE: CPT | Performed by: ORTHOPAEDIC SURGERY

## 2021-07-16 PROCEDURE — 2500000003 HC RX 250 WO HCPCS: Performed by: STUDENT IN AN ORGANIZED HEALTH CARE EDUCATION/TRAINING PROGRAM

## 2021-07-16 PROCEDURE — 0SRR0JZ REPLACEMENT OF RIGHT HIP JOINT, FEMORAL SURFACE WITH SYNTHETIC SUBSTITUTE, OPEN APPROACH: ICD-10-PCS | Performed by: ORTHOPAEDIC SURGERY

## 2021-07-16 PROCEDURE — 6360000002 HC RX W HCPCS: Performed by: ORTHOPAEDIC SURGERY

## 2021-07-16 PROCEDURE — 2580000003 HC RX 258: Performed by: STUDENT IN AN ORGANIZED HEALTH CARE EDUCATION/TRAINING PROGRAM

## 2021-07-16 PROCEDURE — 3600000015 HC SURGERY LEVEL 5 ADDTL 15MIN: Performed by: ORTHOPAEDIC SURGERY

## 2021-07-16 PROCEDURE — 2580000003 HC RX 258: Performed by: NURSE PRACTITIONER

## 2021-07-16 PROCEDURE — 80061 LIPID PANEL: CPT

## 2021-07-16 PROCEDURE — 3700000001 HC ADD 15 MINUTES (ANESTHESIA): Performed by: ORTHOPAEDIC SURGERY

## 2021-07-16 PROCEDURE — 2580000003 HC RX 258: Performed by: ORTHOPAEDIC SURGERY

## 2021-07-16 PROCEDURE — 27236 TREAT THIGH FRACTURE: CPT | Performed by: PHYSICIAN ASSISTANT

## 2021-07-16 PROCEDURE — 2500000003 HC RX 250 WO HCPCS: Performed by: ANESTHESIOLOGY

## 2021-07-16 PROCEDURE — 3700000000 HC ANESTHESIA ATTENDED CARE: Performed by: ORTHOPAEDIC SURGERY

## 2021-07-16 PROCEDURE — 83036 HEMOGLOBIN GLYCOSYLATED A1C: CPT

## 2021-07-16 PROCEDURE — 88311 DECALCIFY TISSUE: CPT

## 2021-07-16 PROCEDURE — 73502 X-RAY EXAM HIP UNI 2-3 VIEWS: CPT

## 2021-07-16 PROCEDURE — 93010 ELECTROCARDIOGRAM REPORT: CPT | Performed by: INTERNAL MEDICINE

## 2021-07-16 DEVICE — HEAD FEM OD52MM ID26MM HIP CO CHROM POLYETH BPLR CEMENTLESS: Type: IMPLANTABLE DEVICE | Site: HIP | Status: FUNCTIONAL

## 2021-07-16 DEVICE — STEM FEM SZ 7 L114MM NK L37MM 46MM OFFSET 132DEG HIP TI: Type: IMPLANTABLE DEVICE | Site: HIP | Status: FUNCTIONAL

## 2021-07-16 DEVICE — HEAD FEM DIA26MM +0MM OFFSET HIP CO CHROM V40 TAPR LO FRIC: Type: IMPLANTABLE DEVICE | Site: HIP | Status: FUNCTIONAL

## 2021-07-16 RX ORDER — SODIUM CHLORIDE 9 MG/ML
25 INJECTION, SOLUTION INTRAVENOUS PRN
Status: DISCONTINUED | OUTPATIENT
Start: 2021-07-16 | End: 2021-07-20 | Stop reason: HOSPADM

## 2021-07-16 RX ORDER — ROCURONIUM BROMIDE 10 MG/ML
INJECTION, SOLUTION INTRAVENOUS PRN
Status: DISCONTINUED | OUTPATIENT
Start: 2021-07-16 | End: 2021-07-16 | Stop reason: SDUPTHER

## 2021-07-16 RX ORDER — MEPERIDINE HYDROCHLORIDE 25 MG/ML
12.5 INJECTION INTRAMUSCULAR; INTRAVENOUS; SUBCUTANEOUS EVERY 5 MIN PRN
Status: DISCONTINUED | OUTPATIENT
Start: 2021-07-16 | End: 2021-07-19

## 2021-07-16 RX ORDER — SODIUM CHLORIDE 0.9 % (FLUSH) 0.9 %
5-40 SYRINGE (ML) INJECTION PRN
Status: DISCONTINUED | OUTPATIENT
Start: 2021-07-16 | End: 2021-07-20 | Stop reason: HOSPADM

## 2021-07-16 RX ORDER — PHENYLEPHRINE HCL IN 0.9% NACL 1 MG/10 ML
SYRINGE (ML) INTRAVENOUS PRN
Status: DISCONTINUED | OUTPATIENT
Start: 2021-07-16 | End: 2021-07-16 | Stop reason: SDUPTHER

## 2021-07-16 RX ORDER — ONDANSETRON 2 MG/ML
INJECTION INTRAMUSCULAR; INTRAVENOUS PRN
Status: DISCONTINUED | OUTPATIENT
Start: 2021-07-16 | End: 2021-07-16 | Stop reason: SDUPTHER

## 2021-07-16 RX ORDER — DEXTROSE MONOHYDRATE 25 G/50ML
12.5 INJECTION, SOLUTION INTRAVENOUS PRN
Status: DISCONTINUED | OUTPATIENT
Start: 2021-07-16 | End: 2021-07-20 | Stop reason: HOSPADM

## 2021-07-16 RX ORDER — DEXTROSE MONOHYDRATE 50 MG/ML
100 INJECTION, SOLUTION INTRAVENOUS PRN
Status: DISCONTINUED | OUTPATIENT
Start: 2021-07-16 | End: 2021-07-20 | Stop reason: HOSPADM

## 2021-07-16 RX ORDER — ASPIRIN 81 MG/1
81 TABLET ORAL DAILY
Status: DISCONTINUED | OUTPATIENT
Start: 2021-07-16 | End: 2021-07-20 | Stop reason: HOSPADM

## 2021-07-16 RX ORDER — PROPOFOL 10 MG/ML
INJECTION, EMULSION INTRAVENOUS PRN
Status: DISCONTINUED | OUTPATIENT
Start: 2021-07-16 | End: 2021-07-16 | Stop reason: SDUPTHER

## 2021-07-16 RX ORDER — PROMETHAZINE HYDROCHLORIDE 25 MG/ML
25 INJECTION, SOLUTION INTRAMUSCULAR; INTRAVENOUS PRN
Status: DISCONTINUED | OUTPATIENT
Start: 2021-07-16 | End: 2021-07-16

## 2021-07-16 RX ORDER — HYDROCODONE BITARTRATE AND ACETAMINOPHEN 5; 325 MG/1; MG/1
1 TABLET ORAL EVERY 4 HOURS PRN
Qty: 42 TABLET | Refills: 0 | Status: SHIPPED | OUTPATIENT
Start: 2021-07-16 | End: 2021-07-23

## 2021-07-16 RX ORDER — VANCOMYCIN HYDROCHLORIDE 1 G/20ML
INJECTION, POWDER, LYOPHILIZED, FOR SOLUTION INTRAVENOUS PRN
Status: DISCONTINUED | OUTPATIENT
Start: 2021-07-16 | End: 2021-07-16 | Stop reason: ALTCHOICE

## 2021-07-16 RX ORDER — PROMETHAZINE HYDROCHLORIDE 25 MG/ML
25 INJECTION, SOLUTION INTRAMUSCULAR; INTRAVENOUS PRN
Status: DISCONTINUED | OUTPATIENT
Start: 2021-07-16 | End: 2021-07-20 | Stop reason: HOSPADM

## 2021-07-16 RX ORDER — AMLODIPINE BESYLATE 5 MG/1
5 TABLET ORAL DAILY
Status: DISCONTINUED | OUTPATIENT
Start: 2021-07-16 | End: 2021-07-20 | Stop reason: HOSPADM

## 2021-07-16 RX ORDER — MEPERIDINE HYDROCHLORIDE 25 MG/ML
12.5 INJECTION INTRAMUSCULAR; INTRAVENOUS; SUBCUTANEOUS EVERY 5 MIN PRN
Status: DISCONTINUED | OUTPATIENT
Start: 2021-07-16 | End: 2021-07-16

## 2021-07-16 RX ORDER — NEOSTIGMINE METHYLSULFATE 1 MG/ML
INJECTION, SOLUTION INTRAVENOUS PRN
Status: DISCONTINUED | OUTPATIENT
Start: 2021-07-16 | End: 2021-07-16 | Stop reason: SDUPTHER

## 2021-07-16 RX ORDER — FENTANYL CITRATE 50 UG/ML
INJECTION, SOLUTION INTRAMUSCULAR; INTRAVENOUS PRN
Status: DISCONTINUED | OUTPATIENT
Start: 2021-07-16 | End: 2021-07-16 | Stop reason: SDUPTHER

## 2021-07-16 RX ORDER — LABETALOL HYDROCHLORIDE 5 MG/ML
5 INJECTION, SOLUTION INTRAVENOUS EVERY 10 MIN PRN
Status: DISCONTINUED | OUTPATIENT
Start: 2021-07-16 | End: 2021-07-16

## 2021-07-16 RX ORDER — LIDOCAINE HYDROCHLORIDE 20 MG/ML
INJECTION, SOLUTION INTRAVENOUS PRN
Status: DISCONTINUED | OUTPATIENT
Start: 2021-07-16 | End: 2021-07-16 | Stop reason: SDUPTHER

## 2021-07-16 RX ORDER — DEXAMETHASONE SODIUM PHOSPHATE 10 MG/ML
INJECTION INTRAMUSCULAR; INTRAVENOUS PRN
Status: DISCONTINUED | OUTPATIENT
Start: 2021-07-16 | End: 2021-07-16 | Stop reason: SDUPTHER

## 2021-07-16 RX ORDER — HYDROCODONE BITARTRATE AND ACETAMINOPHEN 5; 325 MG/1; MG/1
1 TABLET ORAL EVERY 4 HOURS PRN
Qty: 42 TABLET | Refills: 0 | Status: SHIPPED | OUTPATIENT
Start: 2021-07-16 | End: 2021-07-16 | Stop reason: SDUPTHER

## 2021-07-16 RX ORDER — ROSUVASTATIN CALCIUM 20 MG/1
20 TABLET, COATED ORAL NIGHTLY
Status: DISCONTINUED | OUTPATIENT
Start: 2021-07-16 | End: 2021-07-20 | Stop reason: HOSPADM

## 2021-07-16 RX ORDER — LABETALOL HYDROCHLORIDE 5 MG/ML
5 INJECTION, SOLUTION INTRAVENOUS EVERY 10 MIN PRN
Status: DISCONTINUED | OUTPATIENT
Start: 2021-07-16 | End: 2021-07-19

## 2021-07-16 RX ORDER — LABETALOL HYDROCHLORIDE 5 MG/ML
10 INJECTION, SOLUTION INTRAVENOUS EVERY 6 HOURS PRN
Status: DISCONTINUED | OUTPATIENT
Start: 2021-07-16 | End: 2021-07-19

## 2021-07-16 RX ORDER — NICOTINE POLACRILEX 4 MG
15 LOZENGE BUCCAL PRN
Status: DISCONTINUED | OUTPATIENT
Start: 2021-07-16 | End: 2021-07-20 | Stop reason: HOSPADM

## 2021-07-16 RX ORDER — METOPROLOL SUCCINATE 25 MG/1
25 TABLET, EXTENDED RELEASE ORAL DAILY
Status: DISCONTINUED | OUTPATIENT
Start: 2021-07-16 | End: 2021-07-20 | Stop reason: HOSPADM

## 2021-07-16 RX ORDER — SODIUM CHLORIDE 0.9 % (FLUSH) 0.9 %
5-40 SYRINGE (ML) INJECTION EVERY 12 HOURS SCHEDULED
Status: DISCONTINUED | OUTPATIENT
Start: 2021-07-16 | End: 2021-07-20 | Stop reason: HOSPADM

## 2021-07-16 RX ORDER — GLYCOPYRROLATE 1 MG/5 ML
SYRINGE (ML) INTRAVENOUS PRN
Status: DISCONTINUED | OUTPATIENT
Start: 2021-07-16 | End: 2021-07-16 | Stop reason: HOSPADM

## 2021-07-16 RX ADMIN — ONDANSETRON HYDROCHLORIDE 4 MG: 2 INJECTION, SOLUTION INTRAMUSCULAR; INTRAVENOUS at 14:25

## 2021-07-16 RX ADMIN — Medication 10 ML: at 08:13

## 2021-07-16 RX ADMIN — LABETALOL HYDROCHLORIDE 5 MG: 5 INJECTION, SOLUTION INTRAVENOUS at 15:10

## 2021-07-16 RX ADMIN — ROCURONIUM BROMIDE 10 MG: 10 INJECTION, SOLUTION INTRAVENOUS at 14:10

## 2021-07-16 RX ADMIN — ROCURONIUM BROMIDE 40 MG: 10 INJECTION, SOLUTION INTRAVENOUS at 13:24

## 2021-07-16 RX ADMIN — MEPERIDINE HYDROCHLORIDE 12.5 MG: 25 INJECTION, SOLUTION INTRAMUSCULAR; INTRAVENOUS; SUBCUTANEOUS at 15:05

## 2021-07-16 RX ADMIN — SODIUM CHLORIDE: 9 INJECTION, SOLUTION INTRAVENOUS at 07:57

## 2021-07-16 RX ADMIN — CEFAZOLIN 2000 MG: 10 INJECTION, POWDER, FOR SOLUTION INTRAVENOUS at 20:05

## 2021-07-16 RX ADMIN — HYDROMORPHONE HYDROCHLORIDE 0.25 MG: 1 INJECTION, SOLUTION INTRAMUSCULAR; INTRAVENOUS; SUBCUTANEOUS at 15:45

## 2021-07-16 RX ADMIN — FENTANYL CITRATE 50 MCG: 50 INJECTION, SOLUTION INTRAMUSCULAR; INTRAVENOUS at 13:45

## 2021-07-16 RX ADMIN — Medication 2000 MG: at 13:32

## 2021-07-16 RX ADMIN — FENTANYL CITRATE 50 MCG: 50 INJECTION, SOLUTION INTRAMUSCULAR; INTRAVENOUS at 13:24

## 2021-07-16 RX ADMIN — Medication 2 MG: at 20:04

## 2021-07-16 RX ADMIN — Medication 10 ML: at 20:06

## 2021-07-16 RX ADMIN — AMLODIPINE BESYLATE 5 MG: 5 TABLET ORAL at 08:12

## 2021-07-16 RX ADMIN — Medication 3 MG: at 14:34

## 2021-07-16 RX ADMIN — SODIUM CHLORIDE: 9 INJECTION, SOLUTION INTRAVENOUS at 14:16

## 2021-07-16 RX ADMIN — LABETALOL HYDROCHLORIDE 5 MG: 5 INJECTION INTRAVENOUS at 17:42

## 2021-07-16 RX ADMIN — DIPHENHYDRAMINE HYDROCHLORIDE 25 MG: 25 TABLET ORAL at 20:04

## 2021-07-16 RX ADMIN — LABETALOL HYDROCHLORIDE 5 MG: 5 INJECTION INTRAVENOUS at 15:38

## 2021-07-16 RX ADMIN — ROSUVASTATIN 20 MG: 20 TABLET, FILM COATED ORAL at 20:04

## 2021-07-16 RX ADMIN — ONDANSETRON HYDROCHLORIDE 4 MG: 2 INJECTION, SOLUTION INTRAMUSCULAR; INTRAVENOUS at 14:24

## 2021-07-16 RX ADMIN — Medication 100 MCG: at 14:26

## 2021-07-16 RX ADMIN — INSULIN LISPRO 3 UNITS: 100 INJECTION, SOLUTION INTRAVENOUS; SUBCUTANEOUS at 09:09

## 2021-07-16 RX ADMIN — Medication 10 ML: at 20:05

## 2021-07-16 RX ADMIN — SODIUM CHLORIDE: 9 INJECTION, SOLUTION INTRAVENOUS at 01:00

## 2021-07-16 RX ADMIN — PROPOFOL 130 MG: 10 INJECTION, EMULSION INTRAVENOUS at 13:24

## 2021-07-16 RX ADMIN — Medication 100 MCG: at 14:24

## 2021-07-16 RX ADMIN — METOPROLOL SUCCINATE 25 MG: 25 TABLET, EXTENDED RELEASE ORAL at 12:32

## 2021-07-16 RX ADMIN — INSULIN LISPRO 3 UNITS: 100 INJECTION, SOLUTION INTRAVENOUS; SUBCUTANEOUS at 17:13

## 2021-07-16 RX ADMIN — DEXAMETHASONE SODIUM PHOSPHATE 10 MG: 10 INJECTION INTRAMUSCULAR; INTRAVENOUS at 13:32

## 2021-07-16 RX ADMIN — LIDOCAINE HYDROCHLORIDE 100 MG: 20 INJECTION, SOLUTION INTRAVENOUS at 13:24

## 2021-07-16 RX ADMIN — ASPIRIN 81 MG: 81 TABLET, COATED ORAL at 17:37

## 2021-07-16 RX ADMIN — Medication 0.6 MG: at 14:34

## 2021-07-16 ASSESSMENT — PULMONARY FUNCTION TESTS
PIF_VALUE: 17
PIF_VALUE: 19
PIF_VALUE: 21
PIF_VALUE: 0
PIF_VALUE: 19
PIF_VALUE: 17
PIF_VALUE: 22
PIF_VALUE: 17
PIF_VALUE: 20
PIF_VALUE: 19
PIF_VALUE: 20
PIF_VALUE: 36
PIF_VALUE: 21
PIF_VALUE: 16
PIF_VALUE: 21
PIF_VALUE: 17
PIF_VALUE: 21
PIF_VALUE: 19
PIF_VALUE: 21
PIF_VALUE: 20
PIF_VALUE: 20
PIF_VALUE: 5
PIF_VALUE: 31
PIF_VALUE: 11
PIF_VALUE: 19
PIF_VALUE: 10
PIF_VALUE: 20
PIF_VALUE: 18
PIF_VALUE: 20
PIF_VALUE: 20
PIF_VALUE: 16
PIF_VALUE: 17
PIF_VALUE: 20
PIF_VALUE: 19
PIF_VALUE: 16
PIF_VALUE: 20
PIF_VALUE: 21
PIF_VALUE: 15
PIF_VALUE: 2
PIF_VALUE: 21
PIF_VALUE: 0
PIF_VALUE: 18
PIF_VALUE: 2
PIF_VALUE: 20
PIF_VALUE: 20
PIF_VALUE: 19
PIF_VALUE: 16
PIF_VALUE: 18
PIF_VALUE: 0
PIF_VALUE: 21
PIF_VALUE: 17
PIF_VALUE: 20
PIF_VALUE: 19
PIF_VALUE: 2
PIF_VALUE: 16
PIF_VALUE: 1
PIF_VALUE: 19
PIF_VALUE: 3
PIF_VALUE: 19
PIF_VALUE: 20
PIF_VALUE: 19
PIF_VALUE: 21
PIF_VALUE: 19
PIF_VALUE: 2
PIF_VALUE: 3
PIF_VALUE: 20
PIF_VALUE: 21
PIF_VALUE: 18
PIF_VALUE: 0
PIF_VALUE: 21
PIF_VALUE: 21
PIF_VALUE: 17
PIF_VALUE: 19
PIF_VALUE: 19
PIF_VALUE: 20
PIF_VALUE: 21
PIF_VALUE: 21
PIF_VALUE: 20
PIF_VALUE: 20
PIF_VALUE: 3
PIF_VALUE: 21
PIF_VALUE: 21
PIF_VALUE: 20
PIF_VALUE: 19
PIF_VALUE: 3
PIF_VALUE: 21
PIF_VALUE: 21
PIF_VALUE: 16
PIF_VALUE: 1
PIF_VALUE: 20
PIF_VALUE: 2
PIF_VALUE: 3
PIF_VALUE: 20
PIF_VALUE: 20
PIF_VALUE: 1
PIF_VALUE: 19

## 2021-07-16 ASSESSMENT — PAIN DESCRIPTION - LOCATION: LOCATION: HIP

## 2021-07-16 ASSESSMENT — PAIN SCALES - GENERAL
PAINLEVEL_OUTOF10: 3
PAINLEVEL_OUTOF10: 5
PAINLEVEL_OUTOF10: 0
PAINLEVEL_OUTOF10: 7

## 2021-07-16 ASSESSMENT — PAIN DESCRIPTION - ORIENTATION: ORIENTATION: RIGHT

## 2021-07-16 ASSESSMENT — PAIN DESCRIPTION - FREQUENCY: FREQUENCY: INTERMITTENT

## 2021-07-16 ASSESSMENT — PAIN DESCRIPTION - DESCRIPTORS: DESCRIPTORS: ACHING;DISCOMFORT

## 2021-07-16 ASSESSMENT — PAIN DESCRIPTION - PAIN TYPE: TYPE: SURGICAL PAIN

## 2021-07-16 ASSESSMENT — LIFESTYLE VARIABLES: SMOKING_STATUS: 0

## 2021-07-16 NOTE — ANESTHESIA POSTPROCEDURE EVALUATION
Department of Anesthesiology  Postprocedure Note    Patient: Sumeet Sharp  MRN: [id number]  YOB: 1940  Date of evaluation: 7/16/2021  Time:  7:59 PM     Procedure Summary     Date: 07/16/21 Room / Location: [facility] OR 08 / [facility]    Anesthesia Start: 9722 Anesthesia Stop: 1455    Procedure: RIGHT HIP HEMIARTHROPLASTY (Right Hip) Diagnosis: (.)    Surgeons: Raven Larsen DO Responsible Provider: Diane Mcdonald MD    Anesthesia Type: general ASA Status: 3          Anesthesia Type: general    Abelino Phase I: Abelino Score: 9    Abelino Phase II:      Last vitals: Reviewed and per EMR flowsheets.        Anesthesia Post Evaluation    Patient location during evaluation: PACU  Patient participation: complete - patient participated  Level of consciousness: awake  Airway patency: patent  Nausea & Vomiting: no nausea and no vomiting  Complications: no  Cardiovascular status: hemodynamically stable  Respiratory status: acceptable  Hydration status: stable

## 2021-07-16 NOTE — CARE COORDINATION
Spoke with patient, he is from home, has walker and wheelchair but typically does not use assistive devices. He lives with his spouse. For right hip ORIF. He has a history at [facility] and would be agreeable to going there at discharge but asked me to speak with spouse. Called and spoke with Tex Webb, she is recovering from a CVA and cannot provide hands on assistance to patient if needed at discharge. She is also in agreement with [facility], referral pending. If accepted will need precert prior to discharge. HENS and ambulance on soft chart. For questions I can be reached at [phone]. [place], [place]    The Plan for Transition of Care is related to the following treatment goals: discharge planning when stable     The Patient and/or patient representative Sherry Cassidy was provided with a choice of provider and agrees   with the discharge plan. [x] Yes [] No    Freedom of choice list was provided with basic dialogue that supports the patient's individualized plan of care/goals, treatment preferences and shares the quality data associated with the providers.  [x] Yes [] No

## 2021-07-16 NOTE — CONSULTS
Inpatient Cardiology Consultation      Reason for Consult: Dizziness, syncope, elevated troponin    Consulting Physician: Dr. Yadi Matamoros    Requesting Physician: Dr. Mario Benton    Date of Consultation: 7/16/2021    HISTORY OF PRESENT ILLNESS:   Patient is an 79year old WM not previously known to [facility]. Patient is being seen in consultation this hospital admission by Dr. Yadi Matamoros for evaluation and recommendations regarding dizziness, possible syncope and elevated troponin. Patient states he has been seen by a cardiologist in the past, but cannot remember the physician's name. He also notes of potentially having a stress test done \"a few years back\", but cannot remember the location or results. The patient is a poor historian at times regarding his past medical history. He does state that he has a history of diabetes mellitus type II that is currently diet controlled, peripheral vascular disease with multiple interventions in the past (details as noted below), chronic diabetic wounds/ulcerations, history of cellulitis of the right lower extremity, history of left foot osteomyelitis, history of left hip fracture status-post left ORIF September 2020 and BPH/bladder outlet obstruction with urinary retention status-post TURP in May 2020. The patient himself denies any history of hypertension, hyperlipidemia, coronary artery disease, myocardial infarction, heart failure, valvular heart disease or cardiac arrhythmia. He denies ever having an echocardiogram or left heart catheterization in the past.  Patient presented to [facility] on July 15, 2021 after mechanical fall at home. Patient states that yesterday evening, he was outside walking in his yard, \"stumbled\" and fell to the ground. He does not believe that he passed out or lost consciousness, however he has limited recollection of the event. He does not believe he hit his head on the ground.   After the fall, he states he was unable to get up off the ground or bear weight on his right leg. His wife and neighbors found him, called EMS and he was brought to the ED for further evaluation. The patient denies to me any cardiac symptoms both prior to or after the fall. He denies any complaints of chest discomfort at rest or on exertion, shortness of breath at rest or on exertion, nausea, emesis, diaphoresis, dizziness, palpitations near-syncope or syncope. He denies paroxysmal nocturnal dyspnea, orthopnea or peripheral edema. He denies headache, vision changes or gait instability/balance issues. He denies subjective fever, chills, cough or any known recent sick contacts. He denies any focal neurological deficits. Family and social history as noted below. Labs and diagnostic testing as noted below. Please note: past medical records were reviewed per electronic medical record (EMR) - see detailed reports under Past Medical/ Surgical History. PAST MEDICAL HISTORY:    1. Hypertension per chart review. 2. Diabetes mellitus type II, diet controlled at home per chart review. 3. Chronic diabetic foot ulcerations, right foot. 4. Peripheral vascular disease. S/p balloon angioplasty of the right popliteal and anterior tibial artery April 2019. S/p balloon angioplasty of the anterior right tibial artery, popliteal artery December 2019.  5. History of cellulitis of the right lower extremity. 6. Benign prostatic hypertrophy. 7. History of left foot abscess with osteomyelitis. S/p I&D with second LLE digit amputation and metatarsal debridement December 2016. S/p full thickness excision and debridement of all muscle and tendon left foot January 2017.   8. History of right second toe gangrene s/p right second toe amputation and wound debridement RLE April 2019.   9. History of right third digit osteomyelitis s/p right third digit amputation with partial excision of third metatarsal December 2019.    10. Bladder outlet obstruction/urinary retention status-post TURP May 2020.  11. History of left hip fracture s/p left ORIF September 2020. PAST SURGICAL HISTORY:    Past Surgical History:   Procedure Laterality Date    COLONOSCOPY      CYSTOSCOPY N/A 5/18/2020    SUPRAPUBIC TUBE PLACEMENT performed by Danelle Banda MD at [address] [facility]  [address]    lense implants bilaterally    FOOT DEBRIDEMENT Left 01/04/2017    wound debridement and delayed primary closure    FRACTURE SURGERY      L femur fracture surgery    HIP FRACTURE SURGERY Left 9/23/2020    LEFT HIP OPEN REDUCTION INTERNAL FIXATION performed by Argelia Rich MD at [address] [facility] HISTORY  04/23/2019    Dr. Dario Lopez- PTA ant tibial    OTHER SURGICAL HISTORY  12/17/2019    Dr Dario Lopez - PCI Right popliteal and Anterior tibial    PROSTATE BIOPSY  04/2016    SKIN BIOPSY  sept of 2018 last time    head and ears    TOE AMPUTATION Left 12/31/2016    2nd    TOE AMPUTATION Right 4/26/2019    AMPUTATION RIGHT SECOND TOE, FOOT DEBRIDEMENT performed by Vesta Carr DPM at [facility] [address] Right 12/10/2019    AMPUTATION RIGHT 3rd DIGIT WITH PARTIAL RESECTION OF THIRD METATARSAL performed by Vesta Carr DPM at [facility] TUR N/A 5/18/2020    CYSTOSCOPY PLASMA LOOP TRANSURETHRAL RESECTION PROSTATE [facility] [address] [facility]  Prior to Admission medications    Medication Sig Start Date End Date Taking?  Authorizing Provider   diphenhydrAMINE (BENADRYL ALLERGY) 25 MG capsule Take 25 mg by mouth every 6 hours as needed for Itching    Historical Provider, MD   Charcoal 200 MG CAPS Take 200 mg by mouth daily     Historical Provider, MD   CHLOROPHYLL PO Take 1 tablet by mouth daily     Historical Provider, MD   Garlic 0183 MG TBEC Take 1,250 mg by mouth daily     Historical Provider, MD   Multiple Vitamins-Minerals (THERAPEUTIC MULTIVITAMIN-MINERALS) tablet Take 1 tablet by mouth daily Historical Provider, MD       CURRENT MEDICATIONS:      Current Facility-Administered Medications:     amLODIPine (NORVASC) tablet 5 mg, 5 mg, Oral, Daily, Liseth Schneider MD, 5 mg at 07/16/21 0953    labetalol (NORMODYNE;TRANDATE) injection 10 mg, 10 mg, Intravenous, Q6H PRN, Liseth Schneider MD    glucose (GLUTOSE) 40 % oral gel 15 g, 15 g, Oral, PRN, Liseth Schneider MD    dextrose 50 % IV solution, 12.5 g, Intravenous, PRN, Liseth Schneider MD    glucagon (rDNA) injection 1 mg, 1 mg, Intramuscular, PRN, Liseth Schneider MD    dextrose 5 % solution, 100 mL/hr, Intravenous, PRN, Liseth Schneider MD    insulin lispro (HUMALOG) injection vial 0-6 Units, 0-6 Units, Subcutaneous, TID WC, Juan Ramos MD    insulin lispro (HUMALOG) injection vial 0-3 Units, 0-3 Units, Subcutaneous, Nightly, Juan Ramos MD    diphenhydrAMINE (BENADRYL) tablet 25 mg, 25 mg, Oral, Q6H PRN, Abraham Eaton APRN - CNP    sodium chloride flush 0.9 % injection 5-40 mL, 5-40 mL, Intravenous, 2 times per day, Abraham Eaton APRN - CNP, 10 mL at 07/16/21 0813    sodium chloride flush 0.9 % injection 5-40 mL, 5-40 mL, Intravenous, PRN, Deondre Haywood Pravenessa, APRN - CNP    0.9 % sodium chloride infusion, 25 mL, Intravenous, PRN, Jessalin Follansbee Prader, APRN - CNP    acetaminophen (TYLENOL) tablet 650 mg, 650 mg, Oral, Q4H PRN, Deondre Scottder, APRN - CNP    ondansetron (ZOFRAN-ODT) disintegrating tablet 4 mg, 4 mg, Oral, Q8H PRN **OR** ondansetron (ZOFRAN) injection 4 mg, 4 mg, Intravenous, Q6H PRN, Jessalin Velva Prader, APRN - CNP    morphine (PF) injection 2 mg, 2 mg, Intravenous, Q4H PRN, Jessalin Velva Prader, APRN - CNP, 2 mg at 07/15/21 4250    0.9 % sodium chloride infusion, , Intravenous, Continuous, Jessalin Velva Prader, APRN - CNP, Last Rate: 75 mL/hr at 07/16/21 0757, New Bag at 07/16/21 0757      ALLERGIES:  Latex, Aspirin, Ciprofloxacin in d5w, Pcn [penicillins], Other, and Peanut-containing drug products    SOCIAL HISTORY:    Lives with wife at home. Does not require assistance with ambulation. Smoked tobacco briefly in his teenage years. Admits to rare, social alcohol use. Denies former or current illicit drug use. FAMILY HISTORY:   Father with non-specific heart disease. Denies any other pertinent cardiac family medical history to me at this time. REVIEW OF SYSTEMS:     · Constitutional: Denies fevers, chills, night sweats, and generalized fatigue. Denies significant weight loss or weight gain. · HEENT: Denies headaches, nose bleeds, rhinorrhea, sore throat. Denies blurred vision. Denies dysphagia, odynophagia. · Musculoskeletal: +mechanical fall per the patient. +right hip discomfort. Denies muscle weakness. · Neurological: Denies dizziness and lightheadedness, numbness and tingling. Denies focal neurological deficits. · Cardiovascular: Denies chest pain, palpitations, diaphoresis, near syncope, syncope. Denies PND, orthopnea, peripheral edema. · Respiratory: Denies shortness of breath at rest or with exertion. Denies cough, hemoptysis. · Gastrointestinal: Denies abdominal pain, nausea/vomiting, diarrhea and constipation, black/bloody, and tarry stools. · Genitourinary: Denies dysuria and hematuria. · Hematologic: Denies excessive bruising or bleeding. · Endocrine: Denies excessive thirst. Denies intolerance to hot and cold. PHYSICAL EXAM:   BP (!) 177/70   Pulse 73   Temp 98.1 °F (36.7 °C) (Oral)   Resp 17   SpO2 97%   CONST:  Well developed, well nourished WM who appears stated age. Awake, alert, cooperative, no apparent distress. HEENT:   Head- Normocephalic, atraumatic. Eyes- Conjunctivae pink, anicteric. Neck-  No stridor, trachea midline, no apparent jugular venous distention. CHEST: Chest symmetrical and non-tender to palpation. No accessory muscle use or intercostal retractions. RESPIRATORY: Lung sounds - clear throughout fields.  No wheezing, rales or rhonchi. CARDIOVASCULAR:     No noted carotid bruit. Heart Ausculation- Regular rate and rhythm, soft systolic murmur RUSB/LUSB. PV: Mild bilateral lower extremity edema. +stasis dermatitis bilaterally. No clubbing or cyanosis. ABDOMEN: Soft, non-tender to light palpation. Bowel sounds present. MS: Good muscle strength and tone. No atrophy or abnormal movements. SKIN: Warm and dry. NEURO / PSYCH: Oriented to person, place and time. Speech clear and appropriate. Follows all commands. Pleasant affect. DATA:    Telemetry: NSR with HR in the 70s. Rare PVCs. Diagnostic:  All diagnostic testing and lab work thus far this admission reviewed in detail. CXR 7/15/2021:  Impression:  No acute process. Hip XRAY 7/15/2021:  Impression:  Right femoral neck fracture. Right Femur XRAY 7/15/2021:  Impression:  Femoral neck fracture.     Right Knee XRAY 7/15/2021:  Impression  No acute abnormality of the knee. CT Head 7/15/2021:  Impression  No acute intracranial abnormality. No intake or output data in the 24 hours ending 07/16/21 0851    Labs:   CBC:   Recent Labs     07/15/21  1952   WBC 9.2   HGB 13.5   HCT 39.2        BMP:   Recent Labs     07/15/21  1952      K 4.4   CO2 26   BUN 16   CREATININE 1.0   LABGLOM >60   CALCIUM 9.7     HgA1c:   Lab Results   Component Value Date    LABA1C 6.3 (H) 01/06/2021     LIVER PROFILE:  Recent Labs     07/15/21  1952   AST 19   ALT 25   LABALBU 4.2      Ref Range & Units 07/15/21 2221 07/15/21 1952   Troponin, High Sensitivity 0 - 11 ng/L 14High   18High  CM              Assessment and plan to follow as per Dr. Sukumar De La Torre. [facility], [address] [place] [address], [place] [address] Cardiology    Electronically signed by Duncan Baez PA-C on 7/16/2021 at 8:51 AM     I personally and independently saw and examined patient and reviewed all done pertinent laboratory data, imaging studies, ECGs and rhythm strips.  I have reviewed and agree with the APN history and physical exam as [facility] in the above note. Electronically signed by Andrei aPtel MD on 7/16/2021 at 4:15 PM     We were asked to see the patient for \"syncope\" and elevated troponin.     IMPRESSION:  1. Fractured right hip s/p fall. Patient denies syncope. 2. Mild elevation in troponin levels, pattern not consistent with ACS. Patient denies chest pain and there are no acute EKG changes as compared to EKG 9/2020.  3. Diabetes mellitus, diet controlled. 4. Peripheral vascular disease with history percutaneous revascularizations and history of diabetic foot ulcerations and recurrent osteomyelitis of the digits s/p left foot second toe amputation and right foot second and third toe amputations. 5. Uncontrolled hypertension, not previously diagnosed. 6. Low HDL and high LDL. 7. History of BPH and bladder outlet obstruction s/p TURP. 8. History of left hip fracture s/p ORIF 9/2020.  9. Patient should be at an acceptable risk from cardiac standpoint for surgery and can proceed if needed without prior cardiac testing.      PLAN:   1. Monitor blood pressure, consider adding ARB (patient is diabetic). 2. Start low dose beta blocker therapy. 3. Start statin. 4. Start low dose aspirin therapy. 5. Consider an echocardiogram and MPI as an outpatient. 6. Cardiology will sign off.  Please call if needed        Electronically signed by Andrei Patel MD on 7/16/2021 at 10:50 AM

## 2021-07-16 NOTE — OP NOTE
Operative Note      Patient: Brenden Lorenz  YOB: 1940  MRN: [id number]    Date of Procedure: 7/16/2021    Pre-Op Diagnosis: Right femoral neck fracture    Post-Op Diagnosis: Same       Procedure(s):  RIGHT HIP HEMIARTHROPLASTY    Surgeon(s):  Scarlet Coyle DO    Assistant:   Resident: Arianne Cosme DO    Anesthesia: General    Estimated Blood Loss (mL): less than 100     Complications: None    Specimens:   ID Type Source Tests Collected by Time Destination   A : RIGHT FEMORAL HEAD Bone Bone SURGICAL PATHOLOGY Scarlet Coyle DO 7/16/2021 1422        Implants:  Implant Name Type Inv. Item Serial No.  Lot No. LRB No. Used Action   STEM FEM SZ 7 [id number] NK [id number] 46MM OFFSET 132DEG HIP TI  STEM FEM SZ 7 L114MM NK L37MM 46MM OFFSET 132DEG HIP TI  [employer] [facility] [id number] Right 1 Implanted   HEAD FEM OD52MM ID26MM HIP CO CHROM POLYETH BPLR CEMENTLESS  HEAD FEM OD52MM ID26MM HIP CO CHROM POLYETH BPLR CEMENTLESS  [employer] [facility] [id number] Right 1 Implanted   HEAD FEM [id number] +0MM OFFSET HIP CO CHROM V40 TAPR LO FRIC  HEAD FEM [id number] +0MM OFFSET HIP CO CHROM V40 TAPR LO FRIC  [employer] [facility] [id number] Right 1 Implanted         Drains:   Urethral Catheter 16 fr (Active)       Detailed Description of Procedure: Outside the operating suite, the patient was seen and identified. The operative site was marked and identified as appropriate by the patient, staff, surgeon, and Anesthesia. The patient was taken into the operating room, placed on the operative table, and placed under the appropriate amount of sedation under the care of the anesthesia team. The patient was placed into a lateral decubitus position. All bony prominences and neurovascular structures were well padded and protected. The operative site was then prepped and draped in a standard sterile fashion.  An incision was made over the lateral trochanteric region and carried down to the level of the fascia elizabeth maintaining appropriate hemostasis with electrocautery. A small rent was placed in the fascia elizabeth. Rogers scissors were used to extend the fascia elizabeth incision in line with its fibers to the length of the skin incision. A Charnley retractor was then placed in the anterior and posterior margin of the tensor fascia elizabeth incision, taking care not to violate any neurovascular structures. The anterior one-third of the gluteus medius tendon was identified. In line with its fibers, it was reflected using electrocautery off its trochanteric insertion. The gluteus medius and minimus tendons were reflected anteriorly down to the level of the hip capsule. The hip capsule was then divided in line with the superior portion of the femoral neck up to the acetabulum with care to preserve the labrum. The fracture was visualized. The template was utilized to brett the femoral neck cut and with art retractors in place the femoral neck was then cut and removed. After this was done, a corkscrew was then placed into the femoral head. The femoral head was levered out of the acetabulum, and taken for sizing. At this point, the acetabulum was then inspected, removing all bony fragments and interposed tissue. A Woody retractor was placed under the femoral neck cut. A box osteotome was introduced into the femoral neck which was removed. Then a T-handle canal finder was then introduced and removed. Broaching began at a size 0 and was carried up sequentially to an appropriately sized broach, where we found appropriate stability, axially and rotationally. The broach was then removed. The corresponding sized femoral component was then impacted in the appropriate position, which was found to have appropriate stable fit. An appropriately sized femoral head trial was then placed. The hip was reduced, taken through a range of motion, and was found to be appropriately stable. The hip was then dislocated.  The femoral head trial component was then removed. The bipolar component was impacted onto the femoral stem into the appropriate position. The hip was then reduced, taken through a range of motion, and was found to be appropriately stable. Leg lengths were felt to be near symmetrical. A betadine soak was performed followed by copious irrigation was performed of the joint. Pericapsular tissues were now injected with premixed analgesic concoction. Ethibond was used to reapproximate the gluteus medius tendon to its trochanteric insertion. Copious irrigation was performed once more. The tensor fascia elizabeth was then closed with an #0 Vicryl. Copious irrigation was performed once more. #0 and 2-0 Vicryl were used to close the subcutaneous layer. Sutures were used for the skin. A sterile layered dressing was placed over the wound. The patient was awakened from anesthesia, transferred to a hospital bed, and taken to the PACU in stable condition. Disposition: The patient was taken to PACU in stable condition. Once stable, he will be transferred to the floor under the medicine team service. Orders have been provided to begin physical therapy, weight bear as tolerated Right lower extremity. Patient received a dose of ancef preoperatively. We will continue this for 24 hours postoperatively for infection prophylaxis. The patient will also be started on chemical DVT prophylaxis postoperative day 1. We will also consult podiatry regarding his DFU's. Electronically signed by Faye Taveras DO on 7/16/2021     NOTE: This report was transcribed using voice recognition software.  Every effort was made to ensure accuracy; however, inadvertent computerized transcription errors may be present

## 2021-07-16 NOTE — PROGRESS NOTES
Podiatry Consult H&P  7/16/2021   Cherri Snellen Blessing       SUBJECTIVE: This is a 79 y.o. male seen bedside for right foot wounds, bruising. He is an established pt of Dr. Alexsandra Cardoso at wound care. Patient has recently returned to floor from OR after surgery for R hip fracture. He is still groggy from surgery and does not remember much regarding his fall. Denies pain to R foot. 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      Femoral-popliteal atherosclerosis ([address] 1/1/2017    Heel ulcer, right, with fat layer exposed ([address] 5/6/2019    Hypertension     Osteomyelitis of third toe of right foot ([address] 12/6/2019    S/P peripheral artery angioplasty 01/23/2020    bilateral legs -Algernon Cagey    Skin ulcer of right foot with fat layer exposed ([address] 12/9/2019    Ulcer of right leg, with fat layer exposed ([address] 5/6/2019    Varicose veins of leg with edema, 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        Past Surgical History:   Procedure Laterality Date    COLONOSCOPY      CYSTOSCOPY N/A 5/18/2020    SUPRAPUBIC TUBE PLACEMENT performed by Luca Christine MD at [address]    lense implants bilaterally    FOOT DEBRIDEMENT Left 01/04/2017    wound debridement and delayed primary closure    FRACTURE SURGERY      L femur fracture surgery    HIP FRACTURE SURGERY Left 9/23/2020    LEFT HIP OPEN REDUCTION INTERNAL FIXATION performed by Mara Salinas MD at [facility]  04/23/2019    Dr. Andree Schafer- PTA ant tibial    OTHER SURGICAL HISTORY  12/17/2019    Dr Andree Schafer - PCI Right popliteal and Anterior tibial    PROSTATE BIOPSY  04/2016    SKIN BIOPSY  last time    head and ears    TOE AMPUTATION Left 2016    2nd    TOE AMPUTATION Right 2019    AMPUTATION RIGHT SECOND TOE, FOOT DEBRIDEMENT performed by Claribel Desai DPM at [address] Right 12/10/2019    AMPUTATION RIGHT 3rd DIGIT WITH PARTIAL RESECTION OF THIRD METATARSAL performed by Claribel Desai DPM at [address] [place]    TURP N/A 2020    CYSTOSCOPY PLASMA LOOP TRANSURETHRAL RESECTION PROSTATE performed by Sukhi Kuo MD at [facility]           Family History   Problem Relation Age of Onset    Heart Disease Mother         CHF    Heart Disease Father     Heart Attack Father         Social History     Tobacco Use    Smoking status: Former Smoker     Packs/day: 0.50     Years: 2.00     Pack years: 1.00     Types: Cigarettes     Quit date: 1960     Years since quittin.5    Smokeless tobacco: Never Used   Substance Use Topics    Alcohol use: Not Currently        Prior to Admission medications    Medication Sig Start Date End Date Taking? Authorizing Provider   HYDROcodone-acetaminophen (NORCO) 5-325 MG per tablet Take 1 tablet by mouth every 4 hours as needed for Pain for up to 7 days. Intended supply: 7 days.  Take lowest dose possible to manage pain 21 Yes Trang Duarte, DO   enoxaparin (LOVENOX) 40 MG/0.4ML injection Inject 0.4 mLs into the skin daily for 28 days 21 Yes Trang Duarte, DO   diphenhydrAMINE (BENADRYL ALLERGY) 25 MG capsule Take 25 mg by mouth every 6 hours as needed for Itching    Historical Provider, MD   Charcoal 200 MG CAPS Take 200 mg by mouth daily     Historical Provider, MD   CHLOROPHYLL PO Take 1 tablet by mouth daily     Historical Provider, MD   Garlic 7586 MG TBEC Take 1,250 mg by mouth daily     Historical Provider, MD   Multiple Vitamins-Minerals (THERAPEUTIC MULTIVITAMIN-MINERALS) tablet Take 1 tablet by mouth daily    Historical Provider, MD        Latex, Aspirin, Ciprofloxacin in d5w, Pcn [penicillins], Other, and Peanut-containing drug products         OBJECTIVE:        Vitals:    07/16/21 1646   BP: (!) 168/69   Pulse: 72   Resp: 17   Temp: 97.8 °F (36.6 °C)   SpO2:               EXAM:            Vascular Exam:  DP and PT pulses are non-palpable B/L. Skin temp is cool to cool from proximal to distal B/L. No pitting edema present. CFT approx 5 seconds B/L. No active bleeding from wound sites. Neuro Exam:  Epicritic sensation diminished B/L. Dermatologic Exam:  2 wounds present to R foot; sub 1st met head, and plantar heel. Plantar heel wound full thickness to subcutaneous layer with pale granular wound bed, hyperkeratotic rim. No active drainage. No malodor. Sub 1st met head wound full thickness with granular bed, hyperkeratotic rim. No active drainage, no malodor, no fluctuance. No wounds noted to L foot. R foot plantar ecchymosis present. R Anterior shin with fluctuant area consistent with recent fall; no signs of infection    MSK: Evidence of previous digital amputations B/L. No pain with palpation of wound beds.  Muscle atrophy present B/L            Current Facility-Administered Medications   Medication Dose Route Frequency Provider Last Rate Last Admin    amLODIPine (NORVASC) tablet 5 mg  5 mg Oral Daily Bridget Leather, DO   5 mg at 07/16/21 3369    labetalol (NORMODYNE;TRANDATE) injection 10 mg  10 mg Intravenous Q6H PRN Bridget Leather, DO        glucose (GLUTOSE) 40 % oral gel 15 g  15 g Oral PRN Bridget Leather, DO        dextrose 50 % IV solution  12.5 g Intravenous PRN Bridget Leather, DO        glucagon (rDNA) injection 1 mg  1 mg Intramuscular PRN Bridget Leather, DO        dextrose 5 % solution  100 mL/hr Intravenous PRN Bridget Leather, DO        insulin lispro (HUMALOG) injection vial 0-6 Units  0-6 Units Subcutaneous TID [facility], DO   3 Units at 07/16/21 0909    insulin lispro (HUMALOG) injection vial 0-3 Units  0-3 Units Subcutaneous Nightly Lj Alaniz Jessica, DO        rosuvastatin (CRESTOR) tablet 20 mg  20 mg Oral Nightly [place] Robin, DO        aspirin EC tablet 81 mg  81 mg Oral Daily Ronald Lopez, DO        metoprolol succinate (TOPROL XL) extended release tablet 25 mg  25 mg Oral Daily [facility] Robin, DO   25 mg at 07/16/21 1232    sodium chloride flush 0.9 % injection 5-40 mL  5-40 mL Intravenous 2 times per day [facility] Robin, DO        sodium chloride flush 0.9 % injection 5-40 mL  5-40 mL Intravenous PRN [facility], [facility]        0.9 % sodium chloride infusion  25 mL Intravenous PRN [facility] Robin, DO        ceFAZolin (ANCEF) 2000 mg in sterile water 20 mL IV syringe  2,000 mg Intravenous Q8H [facility] Robin, DO        HYDROmorphone (DILAUDID) injection 0.5 mg  0.5 mg Intravenous Q5 Min PRN [facility] Robin, DO        HYDROmorphone (DILAUDID) injection 0.25 mg  0.25 mg Intravenous Q5 Min PRN [facility] Robin, DO   0.25 mg at 07/16/21 1545    labetalol (NORMODYNE;TRANDATE) injection 5 mg  5 mg Intravenous Q10 Min PRN [facility], DO   5 mg at 07/16/21 1538    promethazine (PHENERGAN) injection 25 mg  25 mg Intravenous PRN [facility], DO        meperidine (DEMEROL) injection 12.5 mg  12.5 mg Intravenous Q5 Min PRN [facility], DO        diphenhydrAMINE (BENADRYL) tablet 25 mg  25 mg Oral Q6H PRN [facility] Robin, DO        sodium chloride flush 0.9 % injection 5-40 mL  5-40 mL Intravenous 2 times per day [facility] Robin, DO   10 mL at 07/16/21 0813    sodium chloride flush 0.9 % injection 5-40 mL  5-40 mL Intravenous PRN [facility], DO        0.9 % sodium chloride infusion  25 mL Intravenous PRN [facility], DO        acetaminophen (TYLENOL) tablet 650 mg  650 mg Oral Q4H PRN [facility] Robin, DO        ondansetron (ZOFRAN-ODT) disintegrating tablet 4 mg  4 mg Oral Q8H PRN [facility] Robin, DO        Or    ondansetron [facility]) injection 4 mg  4 mg Intravenous Q6H PRN Vito Kelly DO        morphine (PF) injection 2 mg  2 mg Intravenous Q4H PRN Florie Merlyn, DO   2 mg at 07/15/21 2354    0.9 % sodium chloride infusion   Intravenous Continuous Florie Merlny, DO 75 mL/hr at 07/16/21 0757 New Bag at 07/16/21 1416     Facility-Administered Medications Ordered in Other Encounters   Medication Dose Route Frequency Provider Last Rate Last Admin    glycopyrrolate (ROBINUL) injection   Intravenous PRN Allyson Hdez RN   0.6 mg at 07/16/21 1434        Lab Results   Component Value Date    WBC 13.0 (H) 07/16/2021    HCT 37.8 07/16/2021    HGB 12.8 07/16/2021     07/16/2021     07/16/2021    K 4.3 07/16/2021     07/16/2021    CO2 26 07/16/2021    BUN 18 07/16/2021    CREATININE 1.0 07/16/2021    GLUCOSE 239 (H) 07/16/2021    CRP 4.2 (H) 07/16/2021         Radiographs: N/A    ASSESEMENT:    PVD (peripheral vascular disease) with claudication (HCC)    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 Chart and labs reviewed. - R foot XR ordered to r/o fractures due to recent fall and associated bruising.  - R foot wounds dressed with Xeroform, DSD. Will continue with q24-48h dressing changes. Wounds appear stable. - Will continue to follow; patient to follow up with Dr. Robin Peña in wound care as outpatient when DC. Patient d/w Dr. Robin Peña. Carlos Arora   7/16/2021   5:09 PM       Patient evaluated, discussed with resident in detail. Agree with findings, treatment as outlined. Any changes made by me as deemed necessary. Patient known to my service, last evaluated April 12 in wound care center, failed to show up for his follow-up appointment. Local care as instructed. Once discharged recommend him following up with me at the wound care center.

## 2021-07-16 NOTE — PROGRESS NOTES
Silver colored ring, placed in clear container. Labeled with patient labels. Sent with patient to PACU along with telemetry monitor.

## 2021-07-16 NOTE — H&P
[address] [facility]  History and Physical      CHIEF COMPLAINT:    Chief Complaint   Patient presents with   Dossie Anupama Fall     pt had a mechanical fall. pt complaints of pain to right femur. EMS gave 100mcg fentanyl IM and 4 ODT Zofran         Patient of Codie Farooq MD presents with:  Closed displaced fracture of right femoral neck ([address]    History of Present Illness:   Patient was in his yard, made a turn, tripped on the grass, fell and injured his right hip. Currently denies pain. Not radiating. No other associated symptoms. No chest pain dizziness or lightheadedness prior to the fall. Severity is severe. He says that prior to this injury, he probably could walk at least 1/4 mile if needed. He can climb a few flights of stairs without significant chest pain or shortness of breath. He never gets any exertional chest pain. He has 2 open wounds of the right foot which she states has been there for several months. He follows with a podiatrist.  He denies fevers. He does have some tenderness in the right foot and he cannot really be sure if that is a new or chronic issue. No purulence or drainage from the foot. No unusual degree of erythema. REVIEW OF SYSTEMS:  Pertinent negatives are above in HPI. 10 point ROS otherwise negative. 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 ([address] 12/6/2019    S/P peripheral artery angioplasty 01/23/2020    bilateral legs -[place]    Skin ulcer of right foot with fat layer exposed ([facility] 12/9/2019    Ulcer of right leg, with fat layer exposed ([facility] 5/6/2019    Varicose veins of leg with edema, 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         Past Surgical History:   Procedure Laterality Date    COLONOSCOPY      CYSTOSCOPY N/A 5/18/2020    SUPRAPUBIC TUBE PLACEMENT performed by Danelle Banda MD at [address] [facility]  [address]    lense implants bilaterally    FOOT DEBRIDEMENT Left 01/04/2017    wound debridement and delayed primary closure    FRACTURE SURGERY      L femur fracture surgery    HIP FRACTURE SURGERY Left 9/23/2020    LEFT HIP OPEN REDUCTION INTERNAL FIXATION performed by Argelia Rich MD at [address] [place] [address]  04/23/2019    Dr. Dario Lopez- PTA ant tibial    OTHER SURGICAL HISTORY  12/17/2019    Dr Dario Lopez - PCI Right popliteal and Anterior tibial    PROSTATE BIOPSY  04/2016    SKIN BIOPSY  sept of 2018 last time    head and ears    TOE AMPUTATION Left 12/31/2016    2nd    TOE AMPUTATION Right 4/26/2019    AMPUTATION RIGHT SECOND TOE, FOOT DEBRIDEMENT performed by Vesta Carr DPM at [facility] Right 12/10/2019    AMPUTATION RIGHT 3rd DIGIT WITH PARTIAL RESECTION OF THIRD METATARSAL performed by Vesta Carr DPM at [place] [address] TURP N/A 5/18/2020    CYSTOSCOPY PLASMA LOOP TRANSURETHRAL RESECTION PROSTATE performed by Danelle Banda MD at [address] [place] [address]         Medications Prior to Admission:    Medications Prior to Admission: diphenhydrAMINE (BENADRYL ALLERGY) 25 MG capsule, Take 25 mg by mouth every 6 hours as needed for Itching  Charcoal 200 MG CAPS, Take 200 mg by mouth daily   CHLOROPHYLL PO, Take 1 tablet by mouth daily   Garlic 6551 MG TBEC, Take 1,250 mg by mouth daily   Multiple Vitamins-Minerals (THERAPEUTIC MULTIVITAMIN-MINERALS) tablet, Take 1 tablet by mouth daily    Note that the patient's home medications were reviewed and the above list is accurate to the best of my knowledge at the time of the exam.    Allergies:    Latex, Aspirin, Ciprofloxacin in d5w, Pcn [penicillins], Other, and Peanut-containing drug products    Social History:    reports that he quit smoking about 61 years ago. His smoking use included cigarettes. He has a 1.00 pack-year smoking history. He has never used smokeless tobacco. He reports previous alcohol use. He reports previous drug use. Family History:   family history includes Heart Attack in his father; Heart Disease in his father and mother. PHYSICAL EXAM:    Vitals:  BP (!) 162/71   Pulse 69   Temp 97.7 °F (36.5 °C)   Resp 18   SpO2 98%       General appearance: NAD, conversant  Eyes: Sclerae anicteric, PERRLA  HEENT: AT/NC, MMM  Neck: FROM, supple, no thyromegaly  Lymph: No cervical / supraclavicular lymphadenopathy  Lungs: Clear to auscultation, WOB normal  CV: RRR, no MRGs, no lower extremity edema  Abdomen: Soft, non-tender; no masses or HSM, +BS  Extremities: FROM without synovitis. No clubbing or cyanosis of the hands. Skin: Shallow wound anterior R foot bottom. Larger still shallow wound of the R heel. Mild surrounding erythema but no galina cellulitis. No purulence or obvious odor. Slight bluish discoloration of the bottom of the foot. Minimal tenderness of the mid-foot. Vasc: R foot DP is 2+  Psych: Calm and cooperative. Normal judgement and insight. Normal mood and affect. Neuro: Alert and interactive, face symmetric, speech fluent. LABS:  All labs reviewed.   Of note:  CBC with Differential:    Lab Results   Component Value Date    WBC 9.2 07/15/2021    RBC 4.50 07/15/2021    HGB 13.5 07/15/2021    HCT 39.2 07/15/2021     07/15/2021    MCV 87.1 07/15/2021    MCH 30.0 07/15/2021    MCHC 34.4 07/15/2021    RDW 14.4 07/15/2021    LYMPHOPCT 10.4 07/15/2021    MONOPCT 6.1 07/15/2021    BASOPCT 0.4 07/15/2021    MONOSABS 0.56 07/15/2021    LYMPHSABS 0.95 07/15/2021    EOSABS 0.07 07/15/2021    BASOSABS 0.04 07/15/2021     CMP: Lab Results   Component Value Date     07/15/2021    K 4.4 07/15/2021    K 4.1 01/09/2021    CL 99 07/15/2021    CO2 26 07/15/2021    BUN 16 07/15/2021    CREATININE 1.0 07/15/2021    GFRAA >60 07/15/2021    LABGLOM >60 07/15/2021    GLUCOSE 214 07/15/2021    PROT 7.8 07/15/2021    LABALBU 4.2 07/15/2021    CALCIUM 9.7 07/15/2021    BILITOT 0.8 07/15/2021    ALKPHOS 97 07/15/2021    AST 19 07/15/2021    ALT 25 07/15/2021       Imaging:  I've personally reviewed the patient's XR R hip  Femoral neck fracture. EKG:  I've personally reviewed the patient's EKG:  NSR no acute ischemic changes           ASSESSMENT/PLAN:  Principal Problem:    Closed displaced fracture of right femoral neck (HCC)  Active Problems:    Essential hypertension    Moderate protein-calorie malnutrition (HCC)    PVD (peripheral vascular disease) with claudication (HCC)    Diabetic ulcer of right foot associated with type 2 diabetes mellitus, with fat layer exposed ([address]    Dizziness  Resolved Problems:    * No resolved hospital problems. *      Ortho c/s - surgery today    Podiatry consult for the wounds. I can't exclude osteomyelitis with 743% certainty but externally they don't appear infected and I don't think surgery should be delayed to do anything with the chronic foot wounds. I've messaged Ortho to discuss as well. From my POV he is medically optimized for surgery with no signs of active cardiovascular process    I went through the NSQIP numbers with him and quoted the risks for death, MI, UTI, VTE, etc.  Overall he is below average risk    SSI    BM a little high, start norvasc.   Labetalol PRN    Code status: Full  Requires inpatient level of care  Saroj Brumfield MD    7:28 AM  7/16/2021

## 2021-07-16 NOTE — ANESTHESIA PRE PROCEDURE
tablet 20 mg  20 mg Oral Nightly Yung Macias MD        aspirin EC tablet 81 mg  81 mg Oral Daily Yung Macias MD        metoprolol succinate (TOPROL XL) extended release tablet 25 mg  25 mg Oral Daily Yung Macias MD        diphenhydrAMINE (BENADRYL) tablet 25 mg  25 mg Oral Q6H PRN Shellthee Jeter, APRN - CNP        sodium chloride flush 0.9 % injection 5-40 mL  5-40 mL Intravenous 2 times per day Shellia Steffany, APRN - CNP   10 mL at 07/16/21 0813    sodium chloride flush 0.9 % injection 5-40 mL  5-40 mL Intravenous PRN Shellia Steffany, APRN - CNP        0.9 % sodium chloride infusion  25 mL Intravenous PRN Shellia Steffany, APRN - CNP        acetaminophen (TYLENOL) tablet 650 mg  650 mg Oral Q4H PRN Jessalin Gilberto Obregon, APRN - CNP        ondansetron (ZOFRAN-ODT) disintegrating tablet 4 mg  4 mg Oral Q8H PRN Jessalin Gilberto Obregon, APRN - CNP        Or    ondansetron (ZOFRAN) injection 4 mg  4 mg Intravenous Q6H PRN Jessalin Gelae Mindi, APRN - CNP        morphine (PF) injection 2 mg  2 mg Intravenous Q4H PRN Jessalin Jadelee Mindi, APRN - CNP   2 mg at 07/15/21 2354    0.9 % sodium chloride infusion   Intravenous Continuous Jessalin Gilberto Obregon, APRN - CNP 75 mL/hr at 07/16/21 0757 New Bag at 07/16/21 0757       Allergies: Allergies   Allergen Reactions    Latex Other (See Comments)     Pt unsure of reaction    Aspirin Other (See Comments)     \"It affects my kidneys. \"    Ciprofloxacin In D5w Itching    Pcn [Penicillins] Other (See Comments)     \"I just know my body doesn't like it. \" \"I had a reaction a long time ago, I know it affects my kidneys. \"    Other Rash     \"I get a rash on just my face if I eat Cumin or Chili. 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 ([address] 12/6/2019    S/P peripheral artery angioplasty 01/23/2020    bilateral legs -[place]    Skin ulcer of right foot with fat layer exposed ([address] 12/9/2019    Ulcer of right leg, with fat layer exposed ([address] 5/6/2019    Varicose veins of leg with edema, 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       Past Surgical History:        Procedure Laterality Date    COLONOSCOPY      CYSTOSCOPY N/A 5/18/2020    SUPRAPUBIC TUBE PLACEMENT performed by Jensen Álvarez MD at [address] [place] [address]  1990's    lense implants bilaterally    FOOT DEBRIDEMENT Left 01/04/2017    wound debridement and delayed primary closure    FRACTURE SURGERY      L femur fracture surgery    HIP FRACTURE SURGERY Left 9/23/2020    LEFT HIP OPEN REDUCTION INTERNAL FIXATION performed by Nancy Watters MD at [address] HISTORY  04/23/2019    Dr. Tomas Mcconnell- PTA ant tibial    OTHER SURGICAL HISTORY  12/17/2019    Dr Tomas Mcconnell - PCI Right popliteal and Anterior tibial    PROSTATE BIOPSY  04/2016    SKIN BIOPSY  sept of 2018 last time    head and ears  TOE AMPUTATION Left 2016    2nd    TOE AMPUTATION Right 2019    AMPUTATION RIGHT SECOND TOE, FOOT DEBRIDEMENT performed by Shawna Taveras DPM at [place] [address] TOE AMPUTATION Right 12/10/2019    AMPUTATION RIGHT 3rd DIGIT WITH PARTIAL RESECTION OF THIRD METATARSAL performed by Shawna Taveras DPM at [place] [address] TURP N/A 2020    CYSTOSCOPY PLASMA LOOP TRANSURETHRAL RESECTION PROSTATE performed by Jensen Álvarez MD at [place] [address] VASCULAR SURGERY         Social History:    Social History     Tobacco Use    Smoking status: Former Smoker     Packs/day: 0.50     Years: 2.00     Pack years: 1.00     Types: Cigarettes     Quit date: 1960     Years since quittin.5    Smokeless tobacco: Never Used   Substance Use Topics    Alcohol use: Not Currently                                Counseling given: Not Answered      Vital Signs (Current): There were no vitals filed for this visit.                                            BP Readings from Last 3 Encounters:   21 (!) 177/70   21 138/72   21 136/68       NPO Status:                                                                                 BMI:   Wt Readings from Last 3 Encounters:   21 140 lb (63.5 kg)   21 140 lb (63.5 kg)   21 140 lb (63.5 kg)     There is no height or weight on file to calculate BMI.    CBC:   Lab Results   Component Value Date    WBC 13.0 2021    RBC 4.27 2021    HGB 12.8 2021    HCT 37.8 2021    MCV 88.5 2021    RDW 14.4 2021     2021       CMP:   Lab Results   Component Value Date     2021    K 4.3 2021    K 4.1 2021     2021    CO2 26 2021    BUN 18 2021    CREATININE 1.0 2021    GFRAA >60 2021    LABGLOM >60 2021    GLUCOSE 239 2021    PROT 7.8 07/15/2021    CALCIUM 9.3 2021    BILITOT 0.8 07/15/2021    ALKPHOS 97 07/15/2021    AST 19 07/15/2021    ALT 25 07/15/2021       POC Tests: No results for input(s): POCGLU, POCNA, POCK, POCCL, POCBUN, POCHEMO, POCHCT in the last 72 hours. Coags:   Lab Results   Component Value Date    PROTIME 12.0 09/23/2020    INR 1.1 09/23/2020    APTT 29.5 09/23/2020       HCG (If Applicable): No results found for: PREGTESTUR, PREGSERUM, HCG, HCGQUANT     ABGs: No results found for: PHART, PO2ART, ZMN0GRV, CPP8OKZ, BEART, H1IPGUMX     Type & Screen (If Applicable):  No results found for: LABABO, LABRH    Drug/Infectious Status (If Applicable):  No results found for: HIV, HEPCAB    COVID-19 Screening (If Applicable):   Lab Results   Component Value Date    COVID19 Not Detected 01/08/2021    COVID19 Not Detected 10/20/2020         Anesthesia Evaluation  Patient summary reviewed and Nursing notes reviewed no history of anesthetic complications:   Airway: Mallampati: III  TM distance: >3 FB   Neck ROM: full  Mouth opening: > = 3 FB Dental:          Pulmonary:Negative Pulmonary ROS breath sounds clear to auscultation      (-) not a current smoker                           Cardiovascular:    (+) hypertension:,       ECG reviewed  Rhythm: regular  Rate: normal           Beta Blocker:  Not on Beta Blocker         Neuro/Psych:   Negative Neuro/Psych ROS              GI/Hepatic/Renal:            ROS comment: S/p TURP 5/19/20. Endo/Other:    (+) DiabetesType II DM, , blood dyscrasia (Taking Garlic herbal supplements): anticoagulation therapy:., .                 Abdominal:             Vascular:   + PVD, aortic or cerebral, . ROS comment: S/P peripheral angioplasty. Chronic Venous Insufficiency.  Other Findings:        9/23/2020  8:17 AM - Ra, Mhy Incoming Ekg Results From [facility]     Component  Value  Ref Range & Units  Status  Collected  Lab    Ventricular Rate  87  BPM  Incomplete  09/23/2020 7:45 AM  [facility]    Atrial Rate  87  BPM  Incomplete  09/23/2020 7:45 AM  [facility]    P-R Interval  182  ms  Incomplete  09/23/2020 7:45 AM [facility]    QRS Duration  70  ms  Incomplete  09/23/2020 7:45 AM  [facility]    Q-T Interval  354  ms  Incomplete  09/23/2020 7:45 AM  [facility]    QTc Calculation (Bazett)  425  ms  Incomplete  09/23/2020 7:45 AM  [facility]    P Axis  63  degrees  Incomplete  09/23/2020 7:45 AM  [facility]    R Axis  47  degrees  Incomplete  09/23/2020 7:45 AM  [facility]    T Axis  119  degrees  Incomplete  09/23/2020 7:45 AM  [facility]    Testing Performed By     Lab - Abbreviation  Name  Director  Address  Valid Date Range    360-[facility]  [facility] MUSE  Unknown  Unknown  04/18/16 0721-Present    Narrative & Impression     Normal sinus rhythm  Nonspecific T wave abnormality  Abnormal ECG  When compared with ECG of 10-DEC-2019 08:13,  No significant change was found            Anesthesia Plan      general     ASA 3       Induction: intravenous. MIPS: Postoperative opioids intended, Prophylactic antiemetics administered and Postoperative trial extubation. Anesthetic plan and risks discussed with patient. Use of blood products discussed with patient whom consented to blood products. Plan discussed with CRNA.                   Andriy Guardado MD   7/16/2021

## 2021-07-16 NOTE — ED NOTES
Dr Marcus Grewal made aware of pt blood pressure, no orders at this time.      Verónica Kuhn RN  07/15/21 0686

## 2021-07-16 NOTE — PROGRESS NOTES
Spoke with Dr. Nakul Alamo, he stated podiatry does not need to clear pt prior to surgery, they will be looking at his foot downstairs at pre-op area. Pt has signed consent for procedure in chart.

## 2021-07-16 NOTE — CONSULTS
Department of Orthopedic Surgery  Resident Consult Note      Reason for Consult: Right hip pain    HISTORY OF PRESENT ILLNESS:       Patient is a 79 y.o. male who presents with complaint of right hip pain. He sustained a fall at home which he believes to be mechanical, however he does not have complete recollection of the events. He does not believe he struck his head or loss consciousness. Following the fall he had pain to the right hip and gluteal region and was unable to bear weight. He denies any numbness or tingling to the extremities. He has a past surgical history of operative fixation of a left hip fracture treated by Dr. Jake Mcgrath in 2020. He has been ambulating without any assistive devices since that time. He admits to medical history including diabetes which is not treated with medications at this time. He has had 2 digits amputated from the right foot and one from the left foot related to osteomyelitis and vascular insufficiency. He denies use of any anticoagulants. He resides at home with his wife. No additional orthopedic complaints at this time. 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      Femoral-popliteal atherosclerosis ([address] 1/1/2017    Heel ulcer, right, with fat layer exposed ([address] 5/6/2019    Hypertension     Osteomyelitis of third toe of right foot ([address] 12/6/2019    S/P peripheral artery angioplasty 01/23/2020    bilateral legs -Trae Munoz    Skin ulcer of right foot with fat layer exposed ([address] 12/9/2019    Ulcer of right leg, with fat layer exposed ([address] 5/6/2019    Varicose veins of leg with edema, 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     Past Surgical History:        Procedure Laterality Date    COLONOSCOPY      CYSTOSCOPY N/A 5/18/2020    SUPRAPUBIC TUBE PLACEMENT performed by Vivek Yarbrough MD at [address]  [address]    lense implants bilaterally    FOOT DEBRIDEMENT Left 01/04/2017    wound debridement and delayed primary closure    FRACTURE SURGERY      L femur fracture surgery    HIP FRACTURE SURGERY Left 9/23/2020    LEFT HIP OPEN REDUCTION INTERNAL FIXATION performed by Edis Cook MD at [address] [facility]  04/23/2019    Dr. Fred Martin- PTA ant tibial    OTHER SURGICAL HISTORY  12/17/2019    Dr Fred Martin - PCI Right popliteal and Anterior tibial    PROSTATE BIOPSY  04/2016    SKIN BIOPSY  sept of 2018 last time    head and ears    TOE AMPUTATION Left 12/31/2016    2nd    TOE AMPUTATION Right 4/26/2019    AMPUTATION RIGHT SECOND TOE, FOOT DEBRIDEMENT performed by Bozena Martínez DPM at [address] Right 12/10/2019    AMPUTATION RIGHT 3rd DIGIT WITH PARTIAL RESECTION OF THIRD METATARSAL performed by Bozena Martínez DPM at [address]    TURP N/A 5/18/2020    CYSTOSCOPY PLASMA LOOP TRANSURETHRAL RESECTION PROSTATE performed by Vivek Yarbrough MD at [facility]       Current Medications:   No current facility-administered medications for this encounter. Allergies:  Latex, Aspirin, Ciprofloxacin in d5w, Pcn [penicillins], Other, and Peanut-containing drug products    Social History:   TOBACCO:   reports that he quit smoking about 61 years ago. His smoking use included cigarettes. He has a 1.00 pack-year smoking history. He has never used smokeless tobacco.  ETOH:   reports previous alcohol use. DRUGS:   reports previous drug use.   ACTIVITIES OF DAILY LIVING: Community ambulator  OCCUPATION: Retired  Family History:       Problem Relation Age of Onset    Heart Disease Mother         CHF    Heart Disease Father     Heart Attack Father        REVIEW OF SYSTEMS:  CONSTITUTIONAL:  negative for  fevers, chills  EYES: negative for blurred vision, visual disturbance  HEENT:  negative for  hearing loss, voice change  RESPIRATORY:  negative for  dyspnea, wheezing  CARDIOVASCULAR:  negative for  chest pain, palpitations  GASTROINTESTINAL:  negative for nausea, vomiting  GENITOURINARY:  negative for frequency, urinary incontinence  HEMATOLOGIC/LYMPHATIC:  negative for bleeding and petechiae  MUSCULOSKELETAL: See HPI  NEUROLOGICAL:  negative for headaches, dizziness  BEHAVIOR/PSYCH:  negative for increased agitation and anxiety    PHYSICAL EXAM:    VITALS:  BP (!) 195/79   Pulse 75   Temp 97.5 °F (36.4 °C)   Resp 18   SpO2 93%   CONSTITUTIONAL: Patient alert and cooperative, he repeated questions throughout the exam, some confusion related to his history  EYES: Lids and lashes normal, pupils equal, round and reactive to light, extra ocular muscles intact, sclera clear, conjunctiva normal  ENT: Normocephalic, without obvious abnormality, atraumatic, external ears without lesions, oral pharynx with moist mucus membranes  NECK: Trachea midline, skin normal  LUNGS: No increased work of breathing, good air exchange  CARDIOVASCULAR: 2+ pulses throughout and capillary Refill less than 2 seconds  ABDOMEN: soft, non-distended, non-tender and no rebound tenderness or guarding  NEUROLOGIC: Awake, alert, oriented to name, place and time. Cranial nerves II-XII are grossly intact. Motor is 5 out of 5 bilaterally. Sensory is intact. Babinski down going    MUSCULOSKELETAL:  Right lower Extremity:  No obvious deformities, skin intact about the extremity with the exception of the foot. Pain with logroll of the hip as well as axial loading  Some tenderness to palpation of the medial and lateral aspects of the knee joint line  Pressure ulcer wound noted to the plantar surface of the right heel, additional wound to the plantar surface of the forefoot. Edema noted to the foot and ankle extending to the mid shin region. Digits 2 and 3 absent.   Active motor function to EHL, FHL, tibialis anterior, gastrocsoleus complex  Dorsalis pedis and posterior tibial pulses are palpable, foot warm and perfused  Compartments soft and compressible    Secondary Exam:   bilateralUE: No obvious signs of trauma. -TTP to fingers, hand, wrist, forearm, elbow, humerus, shoulder or clavicle. -- Patient able to flex/extend fingers, wrist, elbow and shoulder with active and passive ROM without pain, +2/4 Radial pulse, cap refill <3sec, +AIN/PIN/Radial/Ulnar/Median N, distal sensation grossly intact to C4-T1 dermatomes, compartments soft and compressible. leftLE: No obvious signs of trauma. Digit 2 absent from foot. Significant callus to the metatarsal region of the foot. -TTP to foot, ankle, leg, knee, thigh, hip.-- Patient able to flex/extend toes, ankle, knee and hip with active and passive ROM without pain,+2/4 DP & PT pulses, cap refill <3sec, +5/5 PF/DF/EHL, distal sensation grossly intact to L4-S1 dermatomes, compartments soft and compressible. DATA:    CBC:   Lab Results   Component Value Date    WBC 9.2 07/15/2021    RBC 4.50 07/15/2021    HGB 13.5 07/15/2021    HCT 39.2 07/15/2021    MCV 87.1 07/15/2021    MCH 30.0 07/15/2021    MCHC 34.4 07/15/2021    RDW 14.4 07/15/2021     07/15/2021    MPV 9.7 07/15/2021     PT/INR:    Lab Results   Component Value Date    PROTIME 12.0 09/23/2020    INR 1.1 09/23/2020       Radiology Review:  X-ray imaging of the right hip, pelvis, and femur reviewed. Femoral neck fracture noted on the right with valgus alignment. Marginal bone proliferation of the acetabulum primarily noted. Dynamic hip screw from previous surgery noted to the left hip. No evidence of hardware failure. No evidence of fractures on the left side. X-ray imaging of the right knee reviewed. No acute fractures or dislocations noted.     IMPRESSION:  Right, closed, femoral neck fracture    PLAN:  Nonweightbearing to right lower extremity   NPO fracture  PVD  DM  Right DFU    PLAN:  Had lengthy discussion with patient regarding their diagnosis, typical prognosis, and expected outcomes. We reviewed the possible complications from the injury itself despite treatment chosen. We also discussed treatment options including nonoperative managements versus surgical management, along with risks and benefits of each. Patient has elected for surgical management despite associated risks. Medical admit with surgical optimization. Planning for right hip hemiarthroplasty. Maintain bedrest, n.p.o.  Did discuss the patient is at elevated risk for infection due to his PVD and chronic DFU however he elects to pursue surgical intervention which allow him to be mobile and not bedridden. I have explained the risks and complications of the recommended surgery with the patient at length, as well as discussed potential treatment alternatives including nonoperative management. These risks include but are not limited to death or complication from anesthesia, continued pain, nerve tendon or vascular injury, infection, fracture, dislocation, leg length discrepancy, limp, heterotopic bone formation, unforeseen complications, symptomatic hardware or hardware failure, deep vein thrombosis or pulmonary embolism, and need for further surgery, etc.  Patient understood this, asked appropriate questions, which were all answered, and he has elected to proceed with the procedure.     Electronically signed by   Jessica Lockhart DO  7/16/2021

## 2021-07-16 NOTE — PROGRESS NOTES
Transport here to take pt to pre-op, message left for Dr. Drew Kuhn to check if pt needs cleared by podiatry or not prior to surgery.

## 2021-07-16 NOTE — PROGRESS NOTES
Pt has two open wounds on R foot, currently dressed with xeroform and kurlex, inpatient wound care consult order placed. Perfect serve message sent to [employer].  Supply request sent for [employer] system

## 2021-07-17 LAB
ANION GAP SERPL CALCULATED.3IONS-SCNC: 13 MMOL/L (ref 7–16)
BASOPHILS ABSOLUTE: 0.02 E9/L (ref 0–0.2)
BASOPHILS RELATIVE PERCENT: 0.1 % (ref 0–2)
BUN BLDV-MCNC: 19 MG/DL (ref 6–23)
CALCIUM SERPL-MCNC: 9.1 MG/DL (ref 8.6–10.2)
CHLORIDE BLD-SCNC: 100 MMOL/L (ref 98–107)
CO2: 24 MMOL/L (ref 22–29)
CREAT SERPL-MCNC: 0.8 MG/DL (ref 0.7–1.2)
EOSINOPHILS ABSOLUTE: 0.01 E9/L (ref 0.05–0.5)
EOSINOPHILS RELATIVE PERCENT: 0.1 % (ref 0–6)
GFR AFRICAN AMERICAN: >60
GFR NON-AFRICAN AMERICAN: >60 ML/MIN/1.73
GLUCOSE BLD-MCNC: 339 MG/DL (ref 74–99)
HCT VFR BLD CALC: 39.1 % (ref 37–54)
HEMOGLOBIN: 13.1 G/DL (ref 12.5–16.5)
IMMATURE GRANULOCYTES #: 0.09 E9/L
IMMATURE GRANULOCYTES %: 0.6 % (ref 0–5)
LYMPHOCYTES ABSOLUTE: 0.73 E9/L (ref 1.5–4)
LYMPHOCYTES RELATIVE PERCENT: 4.9 % (ref 20–42)
MCH RBC QN AUTO: 30.3 PG (ref 26–35)
MCHC RBC AUTO-ENTMCNC: 33.5 % (ref 32–34.5)
MCV RBC AUTO: 90.3 FL (ref 80–99.9)
METER GLUCOSE: 221 MG/DL (ref 74–99)
METER GLUCOSE: 275 MG/DL (ref 74–99)
METER GLUCOSE: 310 MG/DL (ref 74–99)
MONOCYTES ABSOLUTE: 0.98 E9/L (ref 0.1–0.95)
MONOCYTES RELATIVE PERCENT: 6.6 % (ref 2–12)
NEUTROPHILS ABSOLUTE: 12.94 E9/L (ref 1.8–7.3)
NEUTROPHILS RELATIVE PERCENT: 87.7 % (ref 43–80)
PDW BLD-RTO: 14.4 FL (ref 11.5–15)
PLATELET # BLD: 167 E9/L (ref 130–450)
PMV BLD AUTO: 10.4 FL (ref 7–12)
POTASSIUM SERPL-SCNC: 4.8 MMOL/L (ref 3.5–5)
RBC # BLD: 4.33 E12/L (ref 3.8–5.8)
SODIUM BLD-SCNC: 137 MMOL/L (ref 132–146)
WBC # BLD: 14.8 E9/L (ref 4.5–11.5)

## 2021-07-17 PROCEDURE — 85025 COMPLETE CBC W/AUTO DIFF WBC: CPT

## 2021-07-17 PROCEDURE — 6370000000 HC RX 637 (ALT 250 FOR IP): Performed by: STUDENT IN AN ORGANIZED HEALTH CARE EDUCATION/TRAINING PROGRAM

## 2021-07-17 PROCEDURE — 6360000002 HC RX W HCPCS: Performed by: STUDENT IN AN ORGANIZED HEALTH CARE EDUCATION/TRAINING PROGRAM

## 2021-07-17 PROCEDURE — 36415 COLL VENOUS BLD VENIPUNCTURE: CPT

## 2021-07-17 PROCEDURE — 80048 BASIC METABOLIC PNL TOTAL CA: CPT

## 2021-07-17 PROCEDURE — 82962 GLUCOSE BLOOD TEST: CPT

## 2021-07-17 PROCEDURE — 2500000003 HC RX 250 WO HCPCS: Performed by: STUDENT IN AN ORGANIZED HEALTH CARE EDUCATION/TRAINING PROGRAM

## 2021-07-17 PROCEDURE — 2060000000 HC ICU INTERMEDIATE R&B

## 2021-07-17 PROCEDURE — 2580000003 HC RX 258: Performed by: STUDENT IN AN ORGANIZED HEALTH CARE EDUCATION/TRAINING PROGRAM

## 2021-07-17 RX ADMIN — INSULIN LISPRO 3 UNITS: 100 INJECTION, SOLUTION INTRAVENOUS; SUBCUTANEOUS at 11:50

## 2021-07-17 RX ADMIN — DIPHENHYDRAMINE HYDROCHLORIDE 25 MG: 25 TABLET ORAL at 21:02

## 2021-07-17 RX ADMIN — INSULIN LISPRO 4 UNITS: 100 INJECTION, SOLUTION INTRAVENOUS; SUBCUTANEOUS at 09:32

## 2021-07-17 RX ADMIN — CEFAZOLIN 2000 MG: 10 INJECTION, POWDER, FOR SOLUTION INTRAVENOUS at 05:19

## 2021-07-17 RX ADMIN — METOPROLOL SUCCINATE 25 MG: 25 TABLET, EXTENDED RELEASE ORAL at 09:33

## 2021-07-17 RX ADMIN — Medication 2 MG: at 01:27

## 2021-07-17 RX ADMIN — SODIUM CHLORIDE 25 ML: 9 INJECTION, SOLUTION INTRAVENOUS at 03:43

## 2021-07-17 RX ADMIN — ROSUVASTATIN 20 MG: 20 TABLET, FILM COATED ORAL at 21:02

## 2021-07-17 RX ADMIN — INSULIN LISPRO 2 UNITS: 100 INJECTION, SOLUTION INTRAVENOUS; SUBCUTANEOUS at 17:53

## 2021-07-17 RX ADMIN — ASPIRIN 81 MG: 81 TABLET, COATED ORAL at 09:32

## 2021-07-17 RX ADMIN — AMLODIPINE BESYLATE 5 MG: 5 TABLET ORAL at 09:32

## 2021-07-17 ASSESSMENT — PAIN SCALES - GENERAL
PAINLEVEL_OUTOF10: 0
PAINLEVEL_OUTOF10: 0
PAINLEVEL_OUTOF10: 7
PAINLEVEL_OUTOF10: 0

## 2021-07-17 NOTE — PROGRESS NOTES
Comprehensive Nutrition Assessment    Type and Reason for Visit:  Initial, Positive Nutrition Screen    Nutrition Recommendations/Plan: Continue current diet, Start Jared & Ensure BID    Nutrition Assessment:  Pt admit s/p fall w/ R hip fx s/p R hip hemiarthroplasty. Noted multiple chronic foot wounds w/ increased nutrient needs for healing. Will add ONS and monitor. Malnutrition Assessment:  Malnutrition Status:  Mild malnutrition    Context:  Chronic Illness     Findings of the 6 clinical characteristics of malnutrition:  Energy Intake:  Mild decrease in energy intake (Comment)  Weight Loss:  Unable to assess     Body Fat Loss:  Unable to assess     Muscle Mass Loss:  1 - Mild muscle mass loss Calf (gastrocnemius)  Fluid Accumulation:  No significant fluid accumulation     Strength:  Not Performed    Estimated Daily Nutrient Needs:  Energy (kcal):  7635-9741; Weight Used for Energy Requirements:  Current     Protein (g):   (1.3-1.5); Weight Used for Protein Requirements:  Current        Fluid (ml/day):  6941-6393; Method Used for Fluid Requirements:  1 ml/kcal      Nutrition Related Findings:  disoriented, abd WDL, no noted edema, +I/Os      Wounds:  Multiple, Open Wounds, Surgical Incision       Current Nutrition Therapies:    ADULT DIET; Regular  Adult Oral Nutrition Supplement; Diabetic Oral Supplement  Adult Oral Nutrition Supplement;  Wound Healing Oral Supplement    Anthropometric Measures:  · Height: 5' 9\" (175.3 cm)  · Current Body Weight: 143 lb (64.9 kg) (7/16 no method - UTO updated wt)   · Usual Body Weight: 147 lb (66.7 kg) (1/2021 bed scale, per EMR)     · Ideal Body Weight: 160 lbs; % Ideal Body Weight 89.4 %   · BMI: 21.1  · BMI Categories: Underweight (BMI less than 22) age over 72       Nutrition Diagnosis:   · Mild malnutrition, In context of chronic illness related to increase demand for energy/nutrients (2/2 chronic wounds) as evidenced by mild muscle loss, intake 0-25%    Nutrition Interventions:   Food and/or Nutrient Delivery:  Continue Current Diet, Start Oral Nutrition Supplement (Ensure BID, Jared BID)  Nutrition Education/Counseling:  Education not indicated   Coordination of Nutrition Care:  Continue to monitor while inpatient    Goals:  pt to consume >75% meals/ONS       Nutrition Monitoring and Evaluation:   Food/Nutrient Intake Outcomes:  Food and Nutrient Intake, Supplement Intake  Physical Signs/Symptoms Outcomes:  Biochemical Data, GI Status, Fluid Status or Edema, Nutrition Focused Physical Findings, Skin, Weight     Discharge Planning:     Too soon to determine     Electronically signed by Mayi Cedeno MS, RD, LD on 7/17/21 at 1:05 PM EDT    Contact: [phone]

## 2021-07-17 NOTE — PROGRESS NOTES
Department of Orthopedic Surgery  Resident Progress Note    Patient seen and examined. Pain controlled. No new complaints. Denies chest pain, shortness of breath, dizziness/lightheadedness. No new complaints this morning. Patient states he is doing well this morning. VITALS:  /72   Pulse 74   Temp 97.1 °F (36.2 °C) (Oral)   Resp 16   Ht 5' 9\" (1.753 m)   Wt 143 lb (64.9 kg)   SpO2 97%   BMI 21.12 kg/m²     General: awake, alert, cooperative in no acute distress    MUSCULOSKELETAL:   right lower extremity:  · Dressing C/D/I  · Compartments soft and compressible  · +PF/DF/EHL  · +2/4 DP & PT pulses, Brisk Cap refill, Toes warm and perfused  · Distal sensation grossly intact to Peroneals, Sural, Saphenous, and tibial nrs    CBC:   Lab Results   Component Value Date    WBC 14.8 07/17/2021    HGB 13.1 07/17/2021    HCT 39.1 07/17/2021     07/17/2021     PT/INR:    Lab Results   Component Value Date    PROTIME 12.0 09/23/2020    INR 1.1 09/23/2020         ASSESSMENT  · S/P R hip hemiarthroplasty - 7/16    PLAN      · Continue physical therapy and protocol: WBAT - RLE  · 24 hour abx coverage  · Deep venous thrombosis prophylaxis - aspirin, early mobilization  · PT/OT  · Pain Control: IV and PO  · Monitor H&H 13.1  · D/C Plan:  Per sw/pt/ot recs. Appreciate input  · Discuss with attending    Orthopaedic Attending    I have seen and evaluated the patient with the resident and agree with the above assessments on today's visit. I have performed the key components of the history and physical examination and concur completely with the findings and plans as [facility] above.     Electronically signed by   Alo March DO  7/17/2021

## 2021-07-17 NOTE — PROGRESS NOTES
by Marco A Magallon DPM at [facility] 22 TOE AMPUTATION Right 12/10/2019    AMPUTATION RIGHT 3rd DIGIT WITH PARTIAL RESECTION OF THIRD METATARSAL performed by Marco A Magallon DPM at [facility] 22 TURP N/A 2020    CYSTOSCOPY PLASMA LOOP TRANSURETHRAL RESECTION PROSTATE performed by Rach Olivares MD at [facility] OR    VASCULAR SURGERY           Family History   Problem Relation Age of Onset    Heart Disease Mother         CHF    Heart Disease Father     Heart Attack Father         Social History     Tobacco Use    Smoking status: Former Smoker     Packs/day: 0.50     Years: 2.00     Pack years: 1.00     Types: Cigarettes     Quit date:      Years since quittin.5    Smokeless tobacco: Never Used   Substance Use Topics    Alcohol use: Not Currently        Prior to Admission medications    Medication Sig Start Date End Date Taking? Authorizing Provider   HYDROcodone-acetaminophen (NORCO) 5-325 MG per tablet Take 1 tablet by mouth every 4 hours as needed for Pain for up to 7 days. Intended supply: 7 days.  Take lowest dose possible to manage pain 21 Yes Hanh Zuleta DO   enoxaparin (LOVENOX) 40 MG/0.4ML injection Inject 0.4 mLs into the skin daily for 28 days 21 Yes Hanh Zuleta DO   diphenhydrAMINE (BENADRYL ALLERGY) 25 MG capsule Take 25 mg by mouth every 6 hours as needed for Itching    Historical Provider, MD   Charcoal 200 MG CAPS Take 200 mg by mouth daily     Historical Provider, MD   CHLOROPHYLL PO Take 1 tablet by mouth daily     Historical Provider, MD   Garlic 7800 MG TBEC Take 1,250 mg by mouth daily     Historical Provider, MD   Multiple Vitamins-Minerals (THERAPEUTIC MULTIVITAMIN-MINERALS) tablet Take 1 tablet by mouth daily    Historical Provider, MD        Latex, Aspirin, Ciprofloxacin in d5w, Pcn [penicillins], Other, and Peanut-containing drug products         OBJECTIVE:        Vitals:    21 0857   BP: (!) 185/70   Pulse: 81   Resp: 16   Temp: 97.7 °F (36.5 °C)   SpO2: 93%              EXAM:    Previously performed        Vascular Exam:  DP and PT pulses are non-palpable B/L. Skin temp is cool to cool from proximal to distal B/L. No pitting edema present. CFT approx 5 seconds B/L. No active bleeding from wound sites. Neuro Exam:  Epicritic sensation diminished B/L. Dermatologic Exam:  2 wounds present to R foot; sub 1st met head, and plantar heel. Plantar heel wound full thickness to subcutaneous layer with pale granular wound bed, hyperkeratotic rim. No active drainage. No malodor. Sub 1st met head wound full thickness with granular bed, hyperkeratotic rim. No active drainage, no malodor, no fluctuance. No wounds noted to L foot. R foot plantar ecchymosis present. R Anterior shin with fluctuant area consistent with recent fall; no signs of infection    MSK: Evidence of previous digital amputations B/L. No pain with palpation of wound beds.  Muscle atrophy present B/L            Current Facility-Administered Medications   Medication Dose Route Frequency Provider Last Rate Last Admin    amLODIPine (NORVASC) tablet 5 mg  5 mg Oral Daily Lourena Heap, DO   5 mg at 07/17/21 0932    labetalol (NORMODYNE;TRANDATE) injection 10 mg  10 mg Intravenous Q6H PRN Lourena Heap, DO        glucose (GLUTOSE) 40 % oral gel 15 g  15 g Oral PRN Lourena Heap, DO        dextrose 50 % IV solution  12.5 g Intravenous PRN Lourena Heap, DO        glucagon (rDNA) injection 1 mg  1 mg Intramuscular PRN Lourena Heap, DO        dextrose 5 % solution  100 mL/hr Intravenous PRN Lourena Heap, DO        insulin lispro (HUMALOG) injection vial 0-6 Units  0-6 Units Subcutaneous TID WC Lourena Heap, DO   4 Units at 07/17/21 0932    insulin lispro (HUMALOG) injection vial 0-3 Units  0-3 Units Subcutaneous Nightly Lourena Heap, DO        rosuvastatin (CRESTOR) tablet 20 mg  20 mg Oral Nightly Lourena Heap, DO   20 mg at 07/16/21 2004    aspirin EC tablet 81 mg  81 mg Oral Daily Llana Harbour, DO   81 mg at 07/17/21 0932    metoprolol succinate (TOPROL XL) extended release tablet 25 mg  25 mg Oral Daily ana Harbour, DO   25 mg at 07/17/21 1630    sodium chloride flush 0.9 % injection 5-40 mL  5-40 mL Intravenous 2 times per day Llana Harbour, DO   10 mL at 07/16/21 2005    sodium chloride flush 0.9 % injection 5-40 mL  5-40 mL Intravenous PRN ana Harbour, DO        0.9 % sodium chloride infusion  25 mL Intravenous PRN Llana Harbour,  mL/hr at 07/17/21 0343 25 mL at 07/17/21 0343    HYDROmorphone (DILAUDID) injection 0.5 mg  0.5 mg Intravenous Q5 Min PRN ana Harbour, DO        HYDROmorphone (DILAUDID) injection 0.25 mg  0.25 mg Intravenous Q5 Min PRN [facility] Harbour, DO   0.25 mg at 07/16/21 1545    labetalol (NORMODYNE;TRANDATE) injection 5 mg  5 mg Intravenous Q10 Min PRN [facility] Harbour, DO   5 mg at 07/16/21 1742    promethazine (PHENERGAN) injection 25 mg  25 mg Intravenous PRN ana Harbour, DO        meperidine (DEMEROL) injection 12.5 mg  12.5 mg Intravenous Q5 Min PRN [facility] Harbour, DO        diphenhydrAMINE (BENADRYL) tablet 25 mg  25 mg Oral Q6H PRN [facility] Harbour, DO   25 mg at 07/16/21 2004    sodium chloride flush 0.9 % injection 5-40 mL  5-40 mL Intravenous 2 times per day [facility] Harbour, DO   10 mL at 07/16/21 2006    sodium chloride flush 0.9 % injection 5-40 mL  5-40 mL Intravenous PRN [facility] Harbour, DO        0.9 % sodium chloride infusion  25 mL Intravenous PRN ana Harbour, DO        acetaminophen (TYLENOL) tablet 650 mg  650 mg Oral Q4H PRN [facility] Harbour, DO        ondansetron (ZOFRAN-ODT) disintegrating tablet 4 mg  4 mg Oral Q8H PRN [facility] Harbour, DO        Or    [facility]danset[facility] injection 4 mg  4 mg Intravenous Q6H PRN Clayton Linares DO        morphine (PF) injection 2 mg  2 mg Intravenous Q4H PRN Clayton Linares,    2 mg at 07/17/21 0127    0.9 % sodium chloride infusion   Intravenous Continuous Candi Waqar, DO 75 mL/hr at 07/16/21 0757 New Bag at 07/16/21 1416        Lab Results   Component Value Date    WBC 14.8 (H) 07/17/2021    HCT 39.1 07/17/2021    HGB 13.1 07/17/2021     07/17/2021     07/17/2021    K 4.8 07/17/2021     07/17/2021    CO2 24 07/17/2021    BUN 19 07/17/2021    CREATININE 0.8 07/17/2021    GLUCOSE 339 (H) 07/17/2021    CRP 4.2 (H) 07/16/2021         Radiographs: N/A    ASSESEMENT:    PVD (peripheral vascular disease) with claudication (HCC)    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 Chart and labs reviewed. - R foot XR ordered to r/o fractures due to recent fall and associated bruising.  - R foot wounds dressed with Xeroform, DSD. Will continue with q24-48h dressing changes. Wounds appear stable. - Will continue to follow; patient to follow up with Dr. Gemini Reyes in wound care as outpatient when DC.  - Patient stable from podiatry, can D/c once medically cleared    Patient d/w Dr. Gemini Reyes. Edward Bautista DPM   7/17/2021   2:06 PM       Patient evaluated, discussed with resident in detail. Agree with findings, treatment plan as outlined.   Any changes made by me as deemed necessary

## 2021-07-17 NOTE — PROGRESS NOTES
Subjective: The patient is awake and alert. Currently eating breakfast   Denies any pain at this time  No acute events overnight. Denies chest pain, angina, SOB     Objective:    /72   Pulse 74   Temp 97.1 °F (36.2 °C) (Oral)   Resp 16   Ht 5' 9\" (1.753 m)   Wt 143 lb (64.9 kg)   SpO2 97%   BMI 21.12 kg/m²     In: 2070 [P.O.:120; I.V.:1950]  Out: 900   In: 2070   Out: 900 [Urine:900]    General appearance: NAD, conversant, fatigued  HEENT: AT/NC, MMM  Neck: FROM, supple  Lungs: Clear to auscultation  CV: RRR, no MRGs  Vasc: Radial pulses 2+ pedal pulse 2+  Abdomen: Soft, non-tender; no masses or HSM  Extremities: No peripheral edema or digital cyanosis, right foot wounds, right hip weakness  Skin: no rash, lesions or ulcers  Psych: Alert and oriented to person, place and time  Neuro: Alert and interactive     Recent Labs     07/15/21  1952 07/16/21  0737 07/17/21  0426   WBC 9.2 13.0* 14.8*   HGB 13.5 12.8 13.1   HCT 39.2 37.8 39.1    178 167       Recent Labs     07/15/21  1952 07/16/21  0737 07/17/21  0426    137 137   K 4.4 4.3 4.8   CL 99 100 100   CO2 26 26 24   BUN 16 18 19   CREATININE 1.0 1.0 0.8   CALCIUM 9.7 9.3 9.1       Assessment:    Principal Problem:    Closed displaced fracture of right femoral neck (HCC)  Active Problems:    Essential hypertension    Moderate protein-calorie malnutrition (HCC)    PVD (peripheral vascular disease) with claudication (HCC)    Diabetic ulcer of right foot associated with type 2 diabetes mellitus, with fat layer exposed ([address]    Dizziness  Resolved Problems:    * No resolved hospital problems.  *      Plan:  65-year-old male admitted to telemetry unit with    Closed displaced fracture of right femoral neck  -Pain management  -Monitor blood pressure, parameters on BP medications  -Bowel regimen  -ISP q. hour while awake  -Ortho following -right hip hemiarthroplasty POD #1   -Podiatry following - right foot wounds  -Monitor hemoglobin WBC postop       DVT prophylaxis -PCD's - ASA 81 mg   PT OT  Discharge planning  Case discussed with attending and agreed upon plan of care      JUNE Herbert - CNP  8:58 AM  7/17/2021   On my evaluation comfortable and resting  Status post right hip hemiarthroplasty 7/16/2021  Pain control DVT prophylaxis  Blood pressure remains elevated, actually labile-as needed IV hydralazine  Indicates he is ready to get out of the hospital at this point  Mandie Christina for discharge from medicine standpoint once okay with orthopedics and podiatry  May be tomorrow, if social arrangements made  Check a.m. labs    I personally saw, examined and provided care for the patient. Radiographs, labs and medication list were reviewed by me independently. The case was discussed in detail and plans for care were established. Review of [address] [place] [address], documentation was conducted and revisions were made as appropriate directly by me. I agree with the above [facility] exam, problem list, and plan of care.      Greta Wallis MD  3:20 PM  7/17/2021

## 2021-07-18 LAB
ANION GAP SERPL CALCULATED.3IONS-SCNC: 5 MMOL/L (ref 7–16)
BUN BLDV-MCNC: 15 MG/DL (ref 6–23)
CALCIUM SERPL-MCNC: 8.8 MG/DL (ref 8.6–10.2)
CHLORIDE BLD-SCNC: 98 MMOL/L (ref 98–107)
CO2: 27 MMOL/L (ref 22–29)
CREAT SERPL-MCNC: 0.9 MG/DL (ref 0.7–1.2)
GFR AFRICAN AMERICAN: >60
GFR NON-AFRICAN AMERICAN: >60 ML/MIN/1.73
GLUCOSE BLD-MCNC: 273 MG/DL (ref 74–99)
HCT VFR BLD CALC: 34.7 % (ref 37–54)
HEMOGLOBIN: 12 G/DL (ref 12.5–16.5)
MCH RBC QN AUTO: 30.4 PG (ref 26–35)
MCHC RBC AUTO-ENTMCNC: 34.6 % (ref 32–34.5)
MCV RBC AUTO: 87.8 FL (ref 80–99.9)
METER GLUCOSE: 100 MG/DL (ref 74–99)
METER GLUCOSE: 201 MG/DL (ref 74–99)
METER GLUCOSE: 270 MG/DL (ref 74–99)
METER GLUCOSE: 284 MG/DL (ref 74–99)
PDW BLD-RTO: 14.4 FL (ref 11.5–15)
PLATELET # BLD: 162 E9/L (ref 130–450)
PMV BLD AUTO: 9.7 FL (ref 7–12)
POTASSIUM SERPL-SCNC: 3.6 MMOL/L (ref 3.5–5)
RBC # BLD: 3.95 E12/L (ref 3.8–5.8)
SODIUM BLD-SCNC: 130 MMOL/L (ref 132–146)
WBC # BLD: 11.2 E9/L (ref 4.5–11.5)

## 2021-07-18 PROCEDURE — 2580000003 HC RX 258: Performed by: STUDENT IN AN ORGANIZED HEALTH CARE EDUCATION/TRAINING PROGRAM

## 2021-07-18 PROCEDURE — 2060000000 HC ICU INTERMEDIATE R&B

## 2021-07-18 PROCEDURE — 85027 COMPLETE CBC AUTOMATED: CPT

## 2021-07-18 PROCEDURE — 97530 THERAPEUTIC ACTIVITIES: CPT

## 2021-07-18 PROCEDURE — 82962 GLUCOSE BLOOD TEST: CPT

## 2021-07-18 PROCEDURE — 6370000000 HC RX 637 (ALT 250 FOR IP): Performed by: STUDENT IN AN ORGANIZED HEALTH CARE EDUCATION/TRAINING PROGRAM

## 2021-07-18 PROCEDURE — 97166 OT EVAL MOD COMPLEX 45 MIN: CPT

## 2021-07-18 PROCEDURE — 97161 PT EVAL LOW COMPLEX 20 MIN: CPT

## 2021-07-18 PROCEDURE — 80048 BASIC METABOLIC PNL TOTAL CA: CPT

## 2021-07-18 PROCEDURE — 36415 COLL VENOUS BLD VENIPUNCTURE: CPT

## 2021-07-18 RX ADMIN — SODIUM CHLORIDE: 9 INJECTION, SOLUTION INTRAVENOUS at 15:13

## 2021-07-18 RX ADMIN — INSULIN LISPRO 3 UNITS: 100 INJECTION, SOLUTION INTRAVENOUS; SUBCUTANEOUS at 08:52

## 2021-07-18 RX ADMIN — Medication 10 ML: at 22:22

## 2021-07-18 RX ADMIN — Medication 10 ML: at 21:31

## 2021-07-18 RX ADMIN — ROSUVASTATIN 20 MG: 20 TABLET, FILM COATED ORAL at 21:29

## 2021-07-18 RX ADMIN — AMLODIPINE BESYLATE 5 MG: 5 TABLET ORAL at 08:50

## 2021-07-18 RX ADMIN — INSULIN LISPRO 2 UNITS: 100 INJECTION, SOLUTION INTRAVENOUS; SUBCUTANEOUS at 17:28

## 2021-07-18 RX ADMIN — ASPIRIN 81 MG: 81 TABLET, COATED ORAL at 08:50

## 2021-07-18 RX ADMIN — METOPROLOL SUCCINATE 25 MG: 25 TABLET, EXTENDED RELEASE ORAL at 08:49

## 2021-07-18 RX ADMIN — INSULIN LISPRO 3 UNITS: 100 INJECTION, SOLUTION INTRAVENOUS; SUBCUTANEOUS at 12:14

## 2021-07-18 ASSESSMENT — PAIN SCALES - GENERAL
PAINLEVEL_OUTOF10: 0

## 2021-07-18 NOTE — PROGRESS NOTES
Subjective: The patient is resting on examination. Easily arousable  Denies any pain at this time  No acute events overnight. Denies chest pain, angina, SOB     Objective:    BP (!) 170/97   Pulse 89   Temp 97.3 °F (36.3 °C) (Oral)   Resp 16   Ht 5' 9\" (1.753 m)   Wt 143 lb (64.9 kg)   SpO2 93%   BMI 21.12 kg/m²     In: 907 [I.V.:907]  Out: 250   In: 907   Out: 250 [Urine:250]    General appearance: NAD, conversant, fatigued  HEENT: AT/NC, MMM  Neck: FROM, supple  Lungs: Clear to auscultation  CV: RRR, no MRGs  Vasc: Radial pulses 2+ pedal pulse 2+  Abdomen: Soft, non-tender; no masses or HSM  Extremities: No peripheral edema or digital cyanosis, right foot wounds, right hip weakness  Skin: no rash, lesions or ulcers  Psych: Alert and oriented to person, place and time  Neuro: Alert and interactive     Recent Labs     07/16/21  0737 07/17/21  0426 07/18/21  0557   WBC 13.0* 14.8* 11.2   HGB 12.8 13.1 12.0*   HCT 37.8 39.1 34.7*    167 162       Recent Labs     07/16/21 0737 07/17/21  0426 07/18/21  0557    137 130*   K 4.3 4.8 3.6    100 98   CO2 26 24 27   BUN 18 19 15   CREATININE 1.0 0.8 0.9   CALCIUM 9.3 9.1 8.8       Assessment:    Principal Problem:    Closed displaced fracture of right femoral neck (HCC)  Active Problems:    Essential hypertension    Moderate protein-calorie malnutrition (HCC)    PVD (peripheral vascular disease) with claudication (HCC)    Diabetic ulcer of right foot associated with type 2 diabetes mellitus, with fat layer exposed ([address]    Dizziness  Resolved Problems:    * No resolved hospital problems.  *      Plan:  79-year-old male admitted to telemetry unit with    Closed displaced fracture of right femoral neck  -Pain management  -Monitor blood pressure, parameters on BP medications  -Bowel regimen  -ISP q. hour while awake  -Ortho following -right hip hemiarthroplasty POD #2  -Podiatry following - right foot wounds  -Monitor hemoglobin WBC postop  -Check am labs in am  -BP prn medications ordered       DVT prophylaxis -PCD's - ASA 81 mg   PT OT  Discharge planning  Case discussed with attending and agreed upon plan of care      JUNE Lang - CNP  1:52 PM  7/18/2021     On my evaluation comfortable and resting  Status post right hip hemiarthroplasty 7/16/2021  Pain control DVT prophylaxis-adequately controlled,  Blood pressure remains elevated, actually labile-as needed IV hydralazine  Indicates he is ready to get out of the hospital at this point  White count has continued to trend down, was likely reactive postop  Okay for discharge from medicine standpoint once okay with orthopedics and podiatry  May be tomorrow, if social arrangements made  Check a.m. labs     I personally saw, examined and provided care for the patient. Radiographs, labs and medication list were reviewed by me independently. The case was discussed in detail and plans for care were established. Review of [address] [place] [address], documentation was conducted and revisions were made as appropriate directly by me. I agree with the above [facility] exam, problem list, and plan of care.      Nancy Glass MD  3:49 PM  7/18/2021

## 2021-07-18 NOTE — PROGRESS NOTES
Department of Orthopedic Surgery  Resident Progress Note    Patient seen and examined. Pain controlled. No new complaints. Denies chest pain, shortness of breath, dizziness/lightheadedness. No new complaints this morning. Patient states he is doing well this morning. No acute overnight events. No auquacel dressing this morning just covered with ABD. VITALS:  BP (!) 170/97   Pulse 89   Temp 97.3 °F (36.3 °C) (Oral)   Resp 16   Ht 5' 9\" (1.753 m)   Wt 143 lb (64.9 kg)   SpO2 93%   BMI 21.12 kg/m²     General: awake, alert, cooperative in no acute distress    MUSCULOSKELETAL:   right lower extremity:  · No auquacel dressing on the patients incision this morning. Incision clean and dry with no drainage of erythema. · Compartments soft and compressible  · +PF/DF/EHL  · +2/4 DP & PT pulses, Brisk Cap refill, Toes warm and perfused  · Distal sensation grossly intact to Peroneals, Sural, Saphenous, and tibial nrs    CBC:   Lab Results   Component Value Date    WBC 11.2 07/18/2021    HGB 12.0 07/18/2021    HCT 34.7 07/18/2021     07/18/2021     PT/INR:    Lab Results   Component Value Date    PROTIME 12.0 09/23/2020    INR 1.1 09/23/2020         ASSESSMENT  · S/P R hip hemiarthroplasty - 7/16    PLAN      · Continue physical therapy and protocol: WBAT - RLE  · Incision was cleaned with chlorhexidine prep stick and new dressing auquacel was placed. · 24 hour abx coverage  · Deep venous thrombosis prophylaxis - aspirin, early mobilization  · PT/OT  · Pain Control: IV and PO  · Monitor H&H 13.1  · D/C Plan:  Per sw/pt/ot recs.  Appreciate input  · Discuss with attending

## 2021-07-18 NOTE — PROGRESS NOTES
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 Per chart, pt lives with wife. Pt ambulated without AD prior to admission. OBJECTIVE:   Initial Evaluation  Date: 21 Treatment Date: Short Term/ Long Term   Goals   AM-PAC 6 Clicks      Was pt agreeable to Eval/treatment? Yes     Does pt have pain? R LE pain with movement     Bed Mobility  Rolling: NT  Supine to sit: Max A  Sit to supine: Max A  Scooting: Max A to EOB  Rolling: Min A  Supine to sit: Min A  Sit to supine: Min A  Scooting: Min A   Transfers Sit to stand: NT  Stand to sit: NT  Stand pivot: NT  Sit to stand: Mod A  Stand to sit: Mod A  Stand pivot: Mod A with [employer]   Ambulation   NT  >10 feet with [employer] with Mod A   Stair negotiation: ascended and descended NT  NA   ROM BUE: Refer to OT note  BLE: WFL     Strength BUE: Refer to OT note  BLE: NT     Balance Sitting EOB: Max A  Dynamic Standing: NT  Sitting EOB: Min A  Dynamic Standing: Mod A with [employer]     Pt is A & O x: 1 to person. Sensation: NT  Edema: NT    Patient education  NA due to cognitive status.     Patient response to education:   Pt verbalized understanding Pt demonstrated skill Pt requires further education in this area   NA NA NA     ASSESSMENT:    Conditions Requiring Skilled Therapeutic Intervention:    [x]Decreased strength     []Decreased ROM  [x]Decreased functional mobility  [x]Decreased balance   []Decreased endurance   [x]Decreased posture  []Decreased sensation  []Decreased coordination   []Decreased vision  [x]Decreased safety awareness   [x]Increased pain       Comments:    Pt was in bed upon room entry, agreeable to PT evaluation. Pt is very confused and pulls at gown, curtain, and linens. Pt is very rigid and resists assistance from therapist. Pt is unable to follow commands. Pt required heavy assistance for supine to sit transfer. Pt was rigid throughout transfer and when sitting at EOB. Pt has very strong posterior lean when sitting and was unable to follow commands to correct. Pt sat at EOB for several minutes and was assisted back bed. Pt was repositioned comfortably. Pt was left in bed with all needs met at conclusion of session. Treatment:  Patient practiced and was instructed in the following treatment:     Therapeutic activities:  o Bed mobility: Pt was cued for technique during bed mobility transfers. o Sitting EOB: Pt sat at EOB for 3+ minutes as sitting balance was challenged. Pt was cued to correct posterior lean.  o Skilled repositioning to protect skin integrity    Pt's/family goals:  1. None stated. Prognosis is Fair- for reaching above PT goals. Patient and or family understand(s) diagnosis, prognosis, and plan of care. No.    PHYSICAL THERAPY PLAN OF CARE:    PT POC is established based on physician order and patient diagnosis     Referring provider/PT Order:    Start   Ordering Provider    07/16/21 1515  PT eval and treat Start: 07/16/21 1515, End: 07/16/21 1515, ONE TIME, Standing Count: 1 Occurrences, R      Shila Marshall DO        Diagnosis:  Dizziness [R42]  Specific instructions for next treatment:  Stand and ambulate.     Current Treatment Recommendations:     [x] Strengthening to improve independence with functional mobility   [x] ROM to improve independence with functional mobility   [x] Balance Training to improve static/dynamic balance and to reduce fall risk  [] Endurance Training to improve activity tolerance during functional mobility   [x] Transfer Training to improve safety and independence with all functional transfers   [x] Gait Training to improve gait mechanics, endurance and assess need for appropriate assistive device  [] Stair Training in preparation for safe discharge home and/or into the community   [x] Positioning to prevent skin breakdown and contractures  [x] Safety and Education Training   [] Patient/Caregiver Education   [] HEP  [] Other     PT long term treatment goals are located in above grid    Frequency of treatments: Dailyi x 3-5 days. Time in  0730  Time out  0750    Total Treatment Time  10 minutes     Evaluation Time includes thorough review of current medical information, gathering information on past medical history/social history and prior level of function, completion of standardized testing/informal observation of tasks, assessment of data and education on plan of care and goals.     CPT codes:  [x] Low Complexity PT evaluation 21854  [] Moderate Complexity PT evaluation 22731  [] High Complexity PT evaluation 83388  [] PT Re-evaluation 61086  [] Gait training 83893 0 minutes  [] Manual therapy 41512 0 minutes  [x] Therapeutic activities 03726 10 minutes  [] Therapeutic exercises 56809 0 minutes  [] Neuromuscular reeducation 33996 0 minutes     Joanne Beebe, PT, DPT  [id number]

## 2021-07-18 NOTE — PROGRESS NOTES
Podiatry progress note  7/18/2021   Whit Locke       SUBJECTIVE: This is a 79 y.o. male seen bedside for right foot wounds, bruising. He is resting peacefully at this time. He is an established pt of Dr. Silas Mcgrath at wound care. 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 ([address] 12/6/2019    S/P peripheral artery angioplasty 01/23/2020    bilateral legs -[place]    Skin ulcer of right foot with fat layer exposed ([address] 12/9/2019    Ulcer of right leg, with fat layer exposed ([address] 5/6/2019    Varicose veins of leg with edema, 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        Past Surgical History:   Procedure Laterality Date    COLONOSCOPY      CYSTOSCOPY N/A 5/18/2020    SUPRAPUBIC TUBE PLACEMENT performed by Janki Uriostegui MD at [address] [facility] [facility]  1990's    lense implants bilaterally    FOOT DEBRIDEMENT Left 01/04/2017    wound debridement and delayed primary closure    FRACTURE SURGERY      L femur fracture surgery    HIP FRACTURE SURGERY Left 9/23/2020    LEFT HIP OPEN REDUCTION INTERNAL FIXATION performed by Georgia Echols MD at [address] [facility] HISTORY  04/23/2019    Dr. Andre Parson- PTA ant tibial    OTHER SURGICAL HISTORY  12/17/2019    Dr Andre Parson - PCI Right popliteal and Anterior tibial    PROSTATE BIOPSY  04/2016    SKIN BIOPSY  sept of 2018 last time    head and ears    TOE AMPUTATION Left 12/31/2016    2nd    TOE AMPUTATION Right 4/26/2019    AMPUTATION RIGHT SECOND TOE, FOOT DEBRIDEMENT performed by Gage Benedict DPM at [facility] 22 TOE AMPUTATION Right 12/10/2019    AMPUTATION RIGHT 3rd DIGIT WITH PARTIAL RESECTION OF THIRD METATARSAL performed by Gage Benedict DPM at [facility] 22 TURP N/A 2020    CYSTOSCOPY PLASMA LOOP TRANSURETHRAL RESECTION PROSTATE performed by Carrie Manrique MD at [facility] OR    VASCULAR SURGERY           Family History   Problem Relation Age of Onset    Heart Disease Mother         CHF    Heart Disease Father     Heart Attack Father         Social History     Tobacco Use    Smoking status: Former Smoker     Packs/day: 0.50     Years: 2.00     Pack years: 1.00     Types: Cigarettes     Quit date:      Years since quittin.5    Smokeless tobacco: Never Used   Substance Use Topics    Alcohol use: Not Currently        Prior to Admission medications    Medication Sig Start Date End Date Taking? Authorizing Provider   HYDROcodone-acetaminophen (NORCO) 5-325 MG per tablet Take 1 tablet by mouth every 4 hours as needed for Pain for up to 7 days. Intended supply: 7 days.  Take lowest dose possible to manage pain 21 Yes Arianne Cosme DO   enoxaparin (LOVENOX) 40 MG/0.4ML injection Inject 0.4 mLs into the skin daily for 28 days 21 Yes Arianne Cosme DO   diphenhydrAMINE (BENADRYL ALLERGY) 25 MG capsule Take 25 mg by mouth every 6 hours as needed for Itching    Historical Provider, MD   Charcoal 200 MG CAPS Take 200 mg by mouth daily     Historical Provider, MD   CHLOROPHYLL PO Take 1 tablet by mouth daily     Historical Provider, MD   Garlic 4005 MG TBEC Take 1,250 mg by mouth daily     Historical Provider, MD   Multiple Vitamins-Minerals (THERAPEUTIC MULTIVITAMIN-MINERALS) tablet Take 1 tablet by mouth daily    Historical Provider, MD        Latex, Aspirin, Ciprofloxacin in d5w, Pcn [penicillins], Other, and Peanut-containing drug products         OBJECTIVE:        Vitals:    21 1417   BP: (!) 161/61   Pulse: 67   Resp: 16   Temp:    SpO2: 95%              EXAM:    Previously performed        Vascular Exam:  DP and PT pulses are non-palpable B/L. Skin temp is cool to cool from proximal to distal B/L. No pitting edema present. CFT approx 5 seconds B/L. No active bleeding from wound sites. Neuro Exam:  Epicritic sensation diminished B/L. Dermatologic Exam:  2 wounds present to R foot; sub 1st met head, and plantar heel. Plantar heel wound full thickness to subcutaneous layer with pale granular wound bed, hyperkeratotic rim. No active drainage. No malodor. Sub 1st met head wound full thickness with granular bed, hyperkeratotic rim. No active drainage, no malodor, no fluctuance. No wounds noted to L foot. R foot plantar ecchymosis present. R Anterior shin with fluctuant area consistent with recent fall; no signs of infection    MSK: Evidence of previous digital amputations B/L. No pain with palpation of wound beds.  Muscle atrophy present B/L            Current Facility-Administered Medications   Medication Dose Route Frequency Provider Last Rate Last Admin    amLODIPine (NORVASC) tablet 5 mg  5 mg Oral Daily Burtis Rolls, DO   5 mg at 07/18/21 0850    labetalol (NORMODYNE;TRANDATE) injection 10 mg  10 mg Intravenous Q6H PRN Burtis Rolls, DO        glucose (GLUTOSE) 40 % oral gel 15 g  15 g Oral PRN Burtis Rolls, DO        dextrose 50 % IV solution  12.5 g Intravenous PRN Burtis Rolls, DO        glucagon (rDNA) injection 1 mg  1 mg Intramuscular PRN Burtis Rolls, DO        dextrose 5 % solution  100 mL/hr Intravenous PRN Burtis Rolls, DO        insulin lispro (HUMALOG) injection vial 0-6 Units  0-6 Units Subcutaneous TID WC Burtis Rolls, DO   3 Units at 07/18/21 1214    insulin lispro (HUMALOG) injection vial 0-3 Units  0-3 Units Subcutaneous Nightly Burtis Rolls, DO        rosuvastatin (CRESTOR) tablet 20 mg  20 mg Oral Nightly Ronald Lopez, DO   20 mg at 07/17/21 2102    aspirin EC tablet 81 mg  81 mg Oral Daily Carol Guido, DO   81 mg at 07/18/21 0850    metoprolol succinate (TOPROL XL) extended release tablet 25 mg  25 mg Oral Daily Carol Guido, DO   25 mg at 07/18/21 0849    sodium chloride flush 0.9 % injection 5-40 mL  5-40 mL Intravenous 2 times per day Carol Guido, DO   10 mL at 07/16/21 2005    sodium chloride flush 0.9 % injection 5-40 mL  5-40 mL Intravenous PRN Carol Guido, DO        0.9 % sodium chloride infusion  25 mL Intravenous PRN Carol Guido,  mL/hr at 07/17/21 0343 25 mL at 07/17/21 0343    HYDROmorphone (DILAUDID) injection 0.5 mg  0.5 mg Intravenous Q5 Min PRN Carol Guido, DO        HYDROmorphone (DILAUDID) injection 0.25 mg  0.25 mg Intravenous Q5 Min PRN Carol Guido, DO   0.25 mg at 07/16/21 1545    labetalol (NORMODYNE;TRANDATE) injection 5 mg  5 mg Intravenous Q10 Min PRN Carol Guido, DO   5 mg at 07/16/21 1742    promethazine (PHENERGAN) injection 25 mg  25 mg Intravenous PRN Carol Guido, DO        meperidine (DEMEROL) injection 12.5 mg  12.5 mg Intravenous Q5 Min PRN Carol Guido, DO        diphenhydrAMINE (BENADRYL) tablet 25 mg  25 mg Oral Q6H PRN Carol Guido, DO   25 mg at 07/17/21 2102    sodium chloride flush 0.9 % injection 5-40 mL  5-40 mL Intravenous 2 times per day Carol Guido, DO   10 mL at 07/16/21 2006    sodium chloride flush 0.9 % injection 5-40 mL  5-40 mL Intravenous PRN Carol Guido, DO        0.9 % sodium chloride infusion  25 mL Intravenous PRN Carol Guido, DO        acetaminophen (TYLENOL) tablet 650 mg  650 mg Oral Q4H PRN Carol Guido, DO        ondansetron (ZOFRAN-ODT) disintegrating tablet 4 mg  4 mg Oral Q8H PRN Carol Guido,         Or    ondansetron [facility]) injection 4 mg  4 mg Intravenous Q6H PRN Carol Guido,         morphine (PF) injection 2 mg  2 mg Intravenous Q4H PRN Carol Guido, DO   2 mg at 07/17/21 0127    0.9 % sodium chloride infusion   Intravenous Continuous Darroll Robin, DO 75 mL/hr at 07/18/21 1513 New Bag at 07/18/21 1513        Lab Results   Component Value Date    WBC 11.2 07/18/2021    HCT 34.7 (L) 07/18/2021    HGB 12.0 (L) 07/18/2021     07/18/2021     (L) 07/18/2021    K 3.6 07/18/2021    CL 98 07/18/2021    CO2 27 07/18/2021    BUN 15 07/18/2021    CREATININE 0.9 07/18/2021    GLUCOSE 273 (H) 07/18/2021    CRP 4.2 (H) 07/16/2021         Radiographs: N/A    ASSESEMENT:    PVD (peripheral vascular disease) with claudication (HCC)    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 Chart and labs reviewed. - R foot XR ordered to r/o fractures due to recent fall and associated bruising.  - R foot dressing left intact. Will continue with q24-48h dressing changes. Wounds appear stable. - Will continue to follow; patient to follow up with Dr. Angelo Rodriguez in wound care as outpatient when DC.  - Patient stable from podiatry, can D/c once medically cleared    Patient d/w Dr. Angelo Rodriguez. Noman Villanueva DPM   7/18/2021   3:44 PM       Patient evaluated, discussed with resident in detail. Agree with findings, treatment plan as outlined.   Any changes made by me as deemed necessary

## 2021-07-18 NOTE — PROGRESS NOTES
OCCUPATIONAL THERAPY INITIAL EVALUATION       [facility] [address] [facility]       Date:2021                                                  Patient Name: Olimpia Strickland  MRN: [id number]  : 1940  Room: [address] [place] [address]    Evaluating OT: Akanksha Fernandes, OTR/L [id number]    Referring Provider[de-identified] Rosemary Severe, DO     Specific Provider Orders/Date: OT evaluation and treatment 21    Diagnosis: Dizziness  Closed displaced fx of R femoral neck   Procedure(s): 21  RIGHT HIP HEMIARTHROPLASTY      Pertinent Medical History: HTN, 2 digit ampulation R foot, 1 digit L foot        Precautions:  Fall Risk, R hip hemiarthroplasty, WBAT RLE, ulcer R foot and heel    Assessment of current deficits   [x] Functional mobility   [x]ADLs  [x] Strength               [x]Cognition   [x] Functional transfers   [] IADLs         [x] Safety Awareness   [x]Endurance   [x] Fine Coordination              [x] Balance      [x] Vision/perception   []Sensation    [x]Gross Motor Coordination  [x] ROM  [] Delirium                   [] Motor Control     OT PLAN OF CARE   OT POC based on physician orders, patient diagnosis and results of clinical assessment    Frequency/Duration  2-5 days/wk for 2 weeks PRN   Specific OT Treatment to include:   * Instruction/training on adapted ADL techniques and AE recommendations to increase functional independence within precautions       * Functional transfer/mobility training/DME recommendations for increased independence, safety, and fall prevention  * Patient/Family education to increase follow through with safety techniques and functional independence  * Cognitive retraining/development of therapeutic activities to improve problem solving, judgement, memory, and attention for increased safety/participation in ADL/IADL tasks  * Visual-perceptual training to improve environmental scanning, visual attention/focus, and oculomotor to sit: Minimal Assist   Sit to supine: Minimal Assist    Functional Transfers Sit to stand: NT   Stand to sit: NT   Stand pivot: NT   Moderate Assist    Functional Mobility NT  Mod A with [employer]     Balance Sitting:     Static:  NT    Dynamic:NT  Standing: NT     Activity Tolerance Very poor  Eyes open during basic grooming task, minimal interaction in AM.   Fair,  Pt to tolerate EOB ADLs   Visual/  Perceptual Glasses: no,  No eye contact made during eval. Eyes open but no attention to therapist          BUE  ROM/Strength/  Fine motor Coordination Hand dominance: ??    BUE: ROM rigid tone in B UE. Limited AROM/PROM     Strength: grossly 2+/5      Strength: poor     Coordination:  poor    Pt will increase BUE AROM to [facility] for improved participation in functional mobility and ADLs     Hearing: [facility]   Sensation:  NT,No c/o numbness or tingling   Tone: rigid tone BUE   Edema:  None noted    Comments:   RN cleared patient for OT. Upon arrival patient in bed. Pt oriented to self only. Poor short term memory and minimal participation in therapeutic activity. .  Therapist facilitated and instructed pt on adapted  techniques & compensatory strategies to improve safety and independence with basic ADLs,  Pt demonstrated poor understanding of education & follow through  At end of session, patient in bed with call light and phone within reach, all lines and tubes intact. Overall, patient demonstrated  decreased independence and safety during completion of ADL  Pt would benefit from continued skilled OT to increase safety and independence with completion of ADL tasks and functional mobility for improved quality of life. Rehab Potential: Good  for established goals     Patient / Family Goal:  Not stated    Patient and/or family were instructed on functional diagnosis, prognosis/goals and OT plan of care. Demonstrated poor understanding.     ·  Eval Complexity: Mod Complexity  · History: Expanded review of medical records and additional review of physical, cognitive, or psychosocial history related to current functional performance  · Exam: 5+ performance deficits  · Assistance/Modification: mod/max assistance or modifications required to perform tasks. May have comorbidities that affect occupational performance. Time In: 7:55  Time Out: 8:08  Total Treatment Time: 0 minutes    Min Units   OT Eval Low 97048       OT Eval Medium 93381  x    OT Eval High 56146       OT Re-Eval [id number]       Therapeutic Ex 51809       Therapeutic Activities 54435      ADL/Self Care 01408      Orthotic Management 72444       Neuro Re-Ed 14279       Non-Billable Time          Evaluation Time includes thorough review of current medical information, gathering information on past medical history/social history and prior level of function, completion of standardized testing/informal observation of tasks, assessment of data and education on plan of care and goals.             [place], [place] [address]

## 2021-07-19 VITALS
WEIGHT: 143 LBS | TEMPERATURE: 98.2 F | RESPIRATION RATE: 16 BRPM | DIASTOLIC BLOOD PRESSURE: 82 MMHG | HEIGHT: 69 IN | BODY MASS INDEX: 21.18 KG/M2 | OXYGEN SATURATION: 96 % | HEART RATE: 80 BPM | SYSTOLIC BLOOD PRESSURE: 186 MMHG

## 2021-07-19 LAB
ANION GAP SERPL CALCULATED.3IONS-SCNC: 14 MMOL/L (ref 7–16)
BUN BLDV-MCNC: 12 MG/DL (ref 6–23)
CALCIUM SERPL-MCNC: 8.6 MG/DL (ref 8.6–10.2)
CHLORIDE BLD-SCNC: 98 MMOL/L (ref 98–107)
CO2: 27 MMOL/L (ref 22–29)
CREAT SERPL-MCNC: 0.7 MG/DL (ref 0.7–1.2)
GFR AFRICAN AMERICAN: >60
GFR NON-AFRICAN AMERICAN: >60 ML/MIN/1.73
GLUCOSE BLD-MCNC: 161 MG/DL (ref 74–99)
HCT VFR BLD CALC: 35.7 % (ref 37–54)
HEMOGLOBIN: 12.3 G/DL (ref 12.5–16.5)
MCH RBC QN AUTO: 30.3 PG (ref 26–35)
MCHC RBC AUTO-ENTMCNC: 34.5 % (ref 32–34.5)
MCV RBC AUTO: 87.9 FL (ref 80–99.9)
METER GLUCOSE: 164 MG/DL (ref 74–99)
METER GLUCOSE: 328 MG/DL (ref 74–99)
METER GLUCOSE: 367 MG/DL (ref 74–99)
PDW BLD-RTO: 14.3 FL (ref 11.5–15)
PLATELET # BLD: 158 E9/L (ref 130–450)
PMV BLD AUTO: 10.3 FL (ref 7–12)
POTASSIUM SERPL-SCNC: 3.5 MMOL/L (ref 3.5–5)
RBC # BLD: 4.06 E12/L (ref 3.8–5.8)
SARS-COV-2, NAAT: NOT DETECTED
SODIUM BLD-SCNC: 139 MMOL/L (ref 132–146)
WBC # BLD: 8.6 E9/L (ref 4.5–11.5)

## 2021-07-19 PROCEDURE — 6370000000 HC RX 637 (ALT 250 FOR IP): Performed by: STUDENT IN AN ORGANIZED HEALTH CARE EDUCATION/TRAINING PROGRAM

## 2021-07-19 PROCEDURE — 36415 COLL VENOUS BLD VENIPUNCTURE: CPT

## 2021-07-19 PROCEDURE — 87635 SARS-COV-2 COVID-19 AMP PRB: CPT

## 2021-07-19 PROCEDURE — 6370000000 HC RX 637 (ALT 250 FOR IP): Performed by: INTERNAL MEDICINE

## 2021-07-19 PROCEDURE — 2580000003 HC RX 258: Performed by: STUDENT IN AN ORGANIZED HEALTH CARE EDUCATION/TRAINING PROGRAM

## 2021-07-19 PROCEDURE — 80048 BASIC METABOLIC PNL TOTAL CA: CPT

## 2021-07-19 PROCEDURE — 85027 COMPLETE CBC AUTOMATED: CPT

## 2021-07-19 PROCEDURE — 2060000000 HC ICU INTERMEDIATE R&B

## 2021-07-19 PROCEDURE — 82962 GLUCOSE BLOOD TEST: CPT

## 2021-07-19 RX ORDER — AMLODIPINE BESYLATE 5 MG/1
5 TABLET ORAL DAILY
Qty: 30 TABLET | Refills: 3 | DISCHARGE
Start: 2021-07-20 | End: 2021-07-19

## 2021-07-19 RX ORDER — AMLODIPINE BESYLATE 5 MG/1
5 TABLET ORAL ONCE
Status: COMPLETED | OUTPATIENT
Start: 2021-07-19 | End: 2021-07-19

## 2021-07-19 RX ORDER — AMLODIPINE BESYLATE 5 MG/1
10 TABLET ORAL DAILY
Qty: 30 TABLET | Refills: 3 | DISCHARGE
Start: 2021-07-20

## 2021-07-19 RX ORDER — METOPROLOL SUCCINATE 25 MG/1
25 TABLET, EXTENDED RELEASE ORAL DAILY
Qty: 30 TABLET | Refills: 3 | DISCHARGE
Start: 2021-07-20

## 2021-07-19 RX ORDER — ASPIRIN 81 MG/1
81 TABLET ORAL DAILY
Qty: 30 TABLET | Refills: 3 | DISCHARGE
Start: 2021-07-20 | End: 2022-03-17

## 2021-07-19 RX ADMIN — ROSUVASTATIN 20 MG: 20 TABLET, FILM COATED ORAL at 20:56

## 2021-07-19 RX ADMIN — ASPIRIN 81 MG: 81 TABLET, COATED ORAL at 09:57

## 2021-07-19 RX ADMIN — SODIUM CHLORIDE: 9 INJECTION, SOLUTION INTRAVENOUS at 06:30

## 2021-07-19 RX ADMIN — INSULIN LISPRO 3 UNITS: 100 INJECTION, SOLUTION INTRAVENOUS; SUBCUTANEOUS at 20:56

## 2021-07-19 RX ADMIN — METOPROLOL SUCCINATE 25 MG: 25 TABLET, EXTENDED RELEASE ORAL at 09:57

## 2021-07-19 RX ADMIN — INSULIN LISPRO 4 UNITS: 100 INJECTION, SOLUTION INTRAVENOUS; SUBCUTANEOUS at 16:51

## 2021-07-19 RX ADMIN — AMLODIPINE BESYLATE 5 MG: 5 TABLET ORAL at 16:51

## 2021-07-19 RX ADMIN — INSULIN LISPRO 1 UNITS: 100 INJECTION, SOLUTION INTRAVENOUS; SUBCUTANEOUS at 09:58

## 2021-07-19 RX ADMIN — AMLODIPINE BESYLATE 5 MG: 5 TABLET ORAL at 09:58

## 2021-07-19 ASSESSMENT — PAIN SCALES - GENERAL
PAINLEVEL_OUTOF10: 0
PAINLEVEL_OUTOF10: 0

## 2021-07-19 NOTE — DISCHARGE INSTR - COC
Continuity of Care Form    Patient Name: Germania Deleon   :  1940  MRN:  [id number]    Admit date:  7/15/2021  Discharge date:  2021    Code Status Order: Full Code   Advance Directives:      Admitting Physician:  No admitting provider for patient encounter. PCP: Berta Luther MD    Discharging Nurse: [facility] Unit/Room#: 8850/6468-I  Discharging Unit Phone Number: [phone]    Emergency Contact:   Extended Emergency Contact Information  Primary Emergency Contact: Landry Sierra. Address: [address] [place] [address] [place] [facility], [address] [place] [address] of [address]  Phone: [phone]  Mobile Phone: [phone]  Relation: Spouse  Secondary Emergency Contact: AICHA Pop.   Home Phone: [phone]  Relation: Brother/Sister    Past Surgical History:  Past Surgical History:   Procedure Laterality Date    COLONOSCOPY      CYSTOSCOPY N/A 2020    SUPRAPUBIC TUBE PLACEMENT performed by Eros Guzman MD at [address] [facility]  [address]    lense implants bilaterally    FOOT DEBRIDEMENT Left 2017    wound debridement and delayed primary closure    FRACTURE SURGERY      L femur fracture surgery    HIP FRACTURE SURGERY Left 2020    LEFT HIP OPEN REDUCTION INTERNAL FIXATION performed by Mari Andrade MD at [address] Right 2021    RIGHT HIP [facility]AR[facility]    Dr. Eva Payne- PTA ant tibial    OTHER SURGICAL HISTORY  2019    Dr Eva Payne - PCI Right popliteal and Anterior tibial    PROSTATE BIOPSY  2016    SKIN BIOPSY   last time    head and ears    TOE AMPUTATION Left 2016    2nd    TOE AMPUTATION Right 2019    AMPUTATION RIGHT SECOND TOE, FOOT DEBRIDEMENT performed by Sharri Rios DPM at [address] [facility] Right 12/10/2019    AMPUTATION RIGHT 3rd DIGIT WITH PARTIAL RESECTION OF THIRD METATARSAL performed by Kevin Hall DPM at [address] TURP N/A 5/18/2020    CYSTOSCOPY PLASMA LOOP TRANSURETHRAL RESECTION PROSTATE performed by Jun Mathur MD at [address] [place] [address]         Immunization History: There is no immunization history on file for this patient.     Active Problems:  Patient Active Problem List   Diagnosis Code    Essential hypertension I10    Moderate protein-calorie malnutrition ([address] E44.0    PVD (peripheral vascular disease) with claudication ([address] I73.9    S/P peripheral artery angioplasty Z98.62    Cellulitis of right lower extremity L03.115    Cellulitis L03.90    Diabetic ulcer of right foot associated with type 2 diabetes mellitus, with fat layer exposed ([address] E11.621, L97.512    Dizziness R42    Closed displaced fracture of right femoral neck ([address] S72.001A       Isolation/Infection:   Isolation            No Isolation          Patient Infection Status       Infection Onset Added Last Indicated Last Indicated By Review Planned Expiration Resolved Resolved By    None active    Resolved    COVID-19 Rule Out 01/08/21 01/08/21 01/08/21 COVID-19 (Ordered)   01/08/21 Rule-Out Test Resulted    COVID-19 Rule Out 10/20/20 10/21/20 10/20/20 Covid-19 Ambulatory (Ordered)   10/22/20 Rule-Out Test Resulted    COVID-19 Rule Out 10/13/20 10/13/20 10/13/20 Covid-19 Ambulatory (Ordered)   10/14/20 Rule-Out Test Resulted    COVID-19 Rule Out 10/06/20 10/07/20 10/06/20 Covid-19 Ambulatory (Ordered)   10/08/20 Rule-Out Test Resulted    COVID-19 Rule Out 10/02/20 10/03/20 10/02/20 Covid-19 Ambulatory (Ordered)   10/06/20 Rule-Out Test Resulted    COVID-19 Rule Out 09/26/20 09/26/20 09/26/20 Covid-19 Ambulatory (Ordered)   09/27/20 Rule-Out Test Resulted    COVID-19 Rule Out 05/14/20 05/14/20 05/14/20 Covid-19 Ambulatory (Ordered)   05/17/20 Rule-Out Test Resulted    NOT DETECTED 5/14/2020    MRSA 12/10/19 12/13/19 12/10/19 Surgical Culture   09/23/20 Keesha Al Oli Anna RN    Foot 12/10/19            Nurse Assessment:  Last Vital Signs: BP (!) 177/79   Pulse 84   Temp 97.8 °F (36.6 °C) (Oral)   Resp 16   Ht 5' 9\" (1.753 m)   Wt 143 lb (64.9 kg)   SpO2 94%   BMI 21.12 kg/m²     Last [facility] pain score (0-10 scale): Pain Level: 0  Last Weight:   Wt Readings from Last 1 Encounters:   07/16/21 143 lb (64.9 kg)     Mental Status:  disoriented and alert    IV Access:  - None    Nursing Mobility/ADLs:  Walking   Dependent  Transfer  Dependent  Bathing  Dependent  Dressing  Dependent  Toileting  Assisted  Feeding  Independent  Med Admin  Assisted  Med Delivery   whole    Wound Care Documentation and Therapy:  Wound 03/08/21 Foot Plantar;Right wound #1-met head (Active)   Dressing Status Clean;Dry; Intact 07/19/21 0830   Wound Cleansed Irrigated with saline 07/18/21 1955   Dressing/Treatment Xeroform;ABD;Gauze dressing/dressing sponge; Ace wrap 07/18/21 1955   Wound Length (cm) 2 cm 07/16/21 0945   Wound Width (cm) 2 cm 07/16/21 0945   Wound Surface Area (cm^2) 4 cm^2 07/16/21 0945   Change in Wound Size % (l*w) -900 07/16/21 0945   Wound Assessment Other (Comment) 07/18/21 1955   Drainage Amount None 07/19/21 0830   Number of days: 133       Wound 03/08/21 Heel Right;Plantar wound # 2 (Active)   Dressing Status New dressing applied;Clean 07/16/21 0945   Wound Cleansed Irrigated with saline 07/16/21 0945   Dressing/Treatment Xeroform;ABD;Ace wrap;Gauze dressing/dressing sponge 07/16/21 1501   Wound Length (cm) 4 cm 07/16/21 0945   Wound Width (cm) 3 cm 07/16/21 0945   Wound Depth (cm) 0.25 cm 07/16/21 0945   Wound Surface Area (cm^2) 12 cm^2 07/16/21 0945   Change in Wound Size % (l*w) -328.57 07/16/21 0945   Wound Volume (cm^3) 3 cm^3 07/16/21 0945   Drainage Amount Scant 07/16/21 0945   Drainage Description Yellow 07/16/21 0945   Number of days: 133        Elimination:  Continence:   · Bowel: No  · Bladder: No  Urinary Catheter: Removal Date 7/19/2021 Colostomy/Ileostomy/Ileal Conduit: No       Date of Last BM:     Intake/Output Summary (Last 24 hours) at 7/19/2021 1529  Last data filed at 7/19/2021 1438  Gross per 24 hour   Intake --   Output 3075 ml   Net -3075 ml     I/O last 3 completed shifts:  In: -   Out: 3075 [Urine:3075]    Safety Concerns:     History of Falls (last 30 days)    Impairments/Disabilities:      None    Nutrition Therapy:  Current Nutrition Therapy:   - Oral Diet:  Carb Control 4 carbs/meal (1800kcals/day)    Routes of Feeding: Oral  Liquids: No Restrictions  Daily Fluid Restriction: no  Last Modified Barium Swallow with Video (Video Swallowing Test): not done    Treatments at the Time of Hospital Discharge:   Respiratory Treatments:   Oxygen Therapy:  is not on home oxygen therapy. Ventilator:    - No ventilator support    Rehab Therapies: Physical Therapy and Occupational Therapy  Weight Bearing Status/Restrictions: No weight bearing restirctions  Other Medical Equipment (for information only, NOT a DME order):  hospital bed  Other Treatments: ***    Patient's personal belongings (please select all that are sent with patient):  None    RN SIGNATURE:  Electronically signed by Seble Murray RN on 7/19/21 at 5:57 PM EDT    CASE MANAGEMENT/SOCIAL WORK SECTION    Inpatient Status Date: ***    Readmission Risk Assessment Score:  Readmission Risk              Risk of Unplanned Readmission:  13           Discharging to Facility/ Agency   · Name:   · Address:  · Phone:  · Fax:    Dialysis Facility (if applicable)   · Name:  · Address:  · Dialysis Schedule:  · Phone:  · Fax:    / signature: {Esignature:[id number]:::0}    PHYSICIAN SECTION    Prognosis: Good    Condition at Discharge: Stable    Rehab Potential (if transferring to Rehab): Good    Recommended Labs or Other Treatments After Discharge: Monitor BP closely and adjust new antihypertensives as needed.     Physician Certification: I certify the above information and transfer of Rosa Xie  is necessary for the continuing treatment of the diagnosis listed and that he requires [facility] for less 30 days.      Update Admission H&P: No change in H&P    PHYSICIAN SIGNATURE:  Electronically signed by Neil Bowman MD on 7/19/21 at 3:30 PM EDT

## 2021-07-19 NOTE — DISCHARGE SUMMARY
Physician Discharge Summary     Patient ID:  Ronel Ho  [id number]  64 y.o.  1940    Admit date: 7/15/2021    Discharge date and time:  7/19/2021     Admission Diagnoses:   Chief Complaint   Patient presents with   Erinn [place] Fall     pt had a mechanical fall. pt complaints of pain to right femur. 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 *       Consults: cardiology, ortho    Procedures: R hip hemiarthroplasty    Hospital Course:   Patient presented with mechanical fall resulting in a right hip fracture. He underwent right hemiarthroplasty and did well postoperatively without any significant complications. He has been noted to be hypertensive throughout his time here. He probably is hypertensive at home to. He takes a lot of natural supplements. Cardiology saw him as well, I think it may have been due to an initial report of dizziness, though for me he really sounded like a mechanical fall. I started him on Norvasc and cardiology started him on aspirin and metoprolol.      Discharge Exam:  Vitals:    07/18/21 0850 07/18/21 1417 07/18/21 1955 07/19/21 0935   BP: (!) 170/97 (!) 161/61 (!) 172/74 (!) 177/79   Pulse: 89 67 72 84   Resp: 16 16 12 16   Temp:   97.5 °F (36.4 °C) 97.8 °F (36.6 °C)   TempSrc:   Oral Oral   SpO2: 93% 95% 95% 94%   Weight:       Height:            General appearance: NAD, conversant  HEENT: AT/NC, MMM  Neck: FROM, supple  Lungs: Clear to auscultation, WOB normal  CV: RRR, no MRGs  Abdomen: Soft, non-tender; no masses or HSM, +BS  Extremities: No peripheral edema or digital cyanosis. R hip dressing c/d/i FROM hip  Skin: no rash, lesions or ulcers  Psych: Calm and cooperative  Neuro: Alert and interactive, nonfocal     Condition:  Stable    Disposition: SNF    Patient Instructions:   Current Discharge Medication List      START taking these medications    Details   aspirin 81 MG EC tablet Take 1 tablet by mouth daily  Qty: 30 tablet, Refills: 3      metoprolol succinate (TOPROL XL) 25 MG extended release tablet Take 1 tablet by mouth daily  Qty: 30 tablet, Refills: 3      amLODIPine (NORVASC) 5 MG tablet Take 2 tablets by mouth daily  Qty: 30 tablet, Refills: 3      HYDROcodone-acetaminophen (NORCO) 5-325 MG per tablet Take 1 tablet by mouth every 4 hours as needed for Pain for up to 7 days. Intended supply: 7 days. Take lowest dose possible to manage pain  Qty: 42 tablet, Refills: 0    Comments: Reduce doses taken as pain becomes manageable  Associated Diagnoses: Closed displaced fracture of right femoral neck (HCC)         CONTINUE these medications which have NOT CHANGED    Details   diphenhydrAMINE (BENADRYL ALLERGY) 25 MG capsule Take 25 mg by mouth every 6 hours as needed for Itching      Charcoal 200 MG CAPS Take 200 mg by mouth daily       CHLOROPHYLL PO Take 1 tablet by mouth daily       Garlic 6350 MG TBEC Take 1,250 mg by mouth daily       Multiple Vitamins-Minerals (THERAPEUTIC MULTIVITAMIN-MINERALS) tablet Take 1 tablet by mouth daily                Activity: activity as tolerated  Diet: diabetic diet    Follow-up with PCP in 1 week.     Note that over 30 minutes was spent in preparing discharge papers, discussing discharge with patient, medication review, etc.    Signed:  Sam Gandhi MD    7/19/2021  3:29 PM

## 2021-07-19 NOTE — PROGRESS NOTES
Daily Progress Note      SUBJECTIVE: Patient seen today in follow-up right foot and heel wound. Awake, alert. Possible transfer to SNF today. He denies any nausea, vomiting, fever or chills. No shortness of breath or chest pain. No right foot pain. OBJECTIVE: Dressing intact right foot with removal vascular intact. Loss of protective sensation. Wounds appreciated subfirst metatarsal head, relatively clean. Plantar right heel with areas approximately 20% devitalized nonviable tissue, centralized area through subcutaneous tissue. 2.0 x 3.0 cm overall. There is no purulence, odor, erythema or increase in temperature. No pain.     Medications    Current Facility-Administered Medications: amLODIPine (NORVASC) tablet 5 mg, 5 mg, Oral, Once  amLODIPine (NORVASC) tablet 5 mg, 5 mg, Oral, Daily  glucose (GLUTOSE) 40 % oral gel 15 g, 15 g, Oral, PRN  dextrose 50 % IV solution, 12.5 g, Intravenous, PRN  glucagon (rDNA) injection 1 mg, 1 mg, Intramuscular, PRN  dextrose 5 % solution, 100 mL/hr, Intravenous, PRN  insulin lispro (HUMALOG) injection vial 0-6 Units, 0-6 Units, Subcutaneous, TID WC  insulin lispro (HUMALOG) injection vial 0-3 Units, 0-3 Units, Subcutaneous, Nightly  rosuvastatin (CRESTOR) tablet 20 mg, 20 mg, Oral, Nightly  aspirin EC tablet 81 mg, 81 mg, Oral, Daily  metoprolol succinate (TOPROL XL) extended release tablet 25 mg, 25 mg, Oral, Daily  sodium chloride flush 0.9 % injection 5-40 mL, 5-40 mL, Intravenous, 2 times per day  sodium chloride flush 0.9 % injection 5-40 mL, 5-40 mL, Intravenous, PRN  0.9 % sodium chloride infusion, 25 mL, Intravenous, PRN  promethazine (PHENERGAN) injection 25 mg, 25 mg, Intravenous, PRN  diphenhydrAMINE (BENADRYL) tablet 25 mg, 25 mg, Oral, Q6H PRN  sodium chloride flush 0.9 % injection 5-40 mL, 5-40 mL, Intravenous, 2 times per day  sodium chloride flush 0.9 % injection 5-40 mL, 5-40 mL, Intravenous, PRN  0.9 % sodium chloride infusion, 25 mL, Intravenous, PRN  acetaminophen (TYLENOL) tablet 650 mg, 650 mg, Oral, Q4H PRN  ondansetron (ZOFRAN-ODT) disintegrating tablet 4 mg, 4 mg, Oral, Q8H PRN **OR** ondansetron (ZOFRAN) injection 4 mg, 4 mg, Intravenous, Q6H PRN  Physical    VITALS:  BP (!) 186/82 Comment: manual  Pulse 80   Temp 98.2 °F (36.8 °C) (Oral)   Resp 16   Ht 5' 9\" (1.753 m)   Wt 143 lb (64.9 kg)   SpO2 96%   BMI 21.12 kg/m²   TEMPERATURE:  Current - Temp: 98.2 °F (36.8 °C);  Max - Temp  Av.8 °F (36.6 °C)  Min: 97.5 °F (36.4 °C)  Max: 98.2 °F (36.8 °C)  RESPIRATIONS RANGE: Resp  Av.7  Min: 12  Max: 16  PULSE RANGE: Pulse  Av.7  Min: 72  Max: 84  BLOOD PRESSURE RANGE:  Systolic (73UTX), MDD:674 , Min:172 , BLR:160   ; Diastolic (97RUC), INN:29, Min:74, Max:82    PULSE OXIMETRY RANGE: SpO2  Av %  Min: 94 %  Max: 96 %  24HR INTAKE/OUTPUT:      Intake/Output Summary (Last 24 hours) at 2021 1647  Last data filed at 2021 1438  Gross per 24 hour   Intake --   Output 3075 ml   Net -3075 ml           Data    CBC with Differential:    Lab Results   Component Value Date    WBC 8.6 2021    RBC 4.06 2021    HGB 12.3 2021    HCT 35.7 2021     2021    MCV 87.9 2021    MCH 30.3 2021    MCHC 34.5 2021    RDW 14.3 2021    LYMPHOPCT 4.9 2021    MONOPCT 6.6 2021    BASOPCT 0.1 2021    MONOSABS 0.98 2021    LYMPHSABS 0.73 2021    EOSABS 0.01 2021    BASOSABS 0.02 2021     CMP:    Lab Results   Component Value Date     2021    K 3.5 2021    K 4.1 2021    CL 98 2021    CO2 27 2021    BUN 12 2021    CREATININE 0.7 2021    GFRAA >60 2021    LABGLOM >60 2021    GLUCOSE 161 2021    PROT 7.8 07/15/2021    LABALBU 4.2 07/15/2021    CALCIUM 8.6 2021    BILITOT 0.8 07/15/2021    ALKPHOS 97 07/15/2021    AST 19 07/15/2021    ALT 25 07/15/2021       ASSESSMENT AND PLAN: Right foot and heel ulcer in a patient with diabetes, neuropathy as well as PVD. After oral consent as well as risk discussed in detail sharp excisional debridement utilizing a 10 blade to remove devitalized nonviable tissue through and including subcutaneous tissue to stimulate bleeding and promote healing. Post debridement good bleeding base and wound edges noted. Pressure for hemostasis. 3.0 x 3.0 cm. Dressing applied. Continue same. Reinforced no pressure. Discussed importance of follow-up at the wound care center to continue treatment. Discussed possible risk/complications including deeper infection as well as loss of limb. X-rays reviewed.

## 2021-07-19 NOTE — CARE COORDINATION
POD #2 right hip hemiarthroplasty. [facility] to submit for [employer] today. HENS and ambulance on soft chart. Auth obtained for [facility]. [facility] arranged for 8:30pm . Message left with spouse on discharge time. For questions I can be reached at [phone].  [place], [place]

## 2021-07-19 NOTE — PROGRESS NOTES
Patient pulled off R hip dressing, educated patient to leave dressing on to promote healing and prevent infection. Place abd pad x2 and paper tape until surgery can assess in the AM. Continue to monitor.

## 2021-07-20 ENCOUNTER — TELEPHONE (OUTPATIENT)
Dept: ORTHOPEDIC SURGERY | Age: 81
End: 2021-07-20

## 2021-07-20 NOTE — TELEPHONE ENCOUNTER
Aaliyah Mcgrath from [facility] called in, she needs to change the pt's appointment time on 8/2/21. Aaliyah Mcgrath can be reached at [phone]. Thank you.

## 2021-07-20 NOTE — PROGRESS NOTES
Physician Progress Note      María Samuel  CSN #:                  [id number]  :                       1940  ADMIT DATE:       7/15/2021 7:25 PM  [address] [place] DATE:        2021 12:30 AM  RESPONDING  PROVIDER #:        Fior Zaldivar MD          QUERY TEXT:    Noted documentation of moderate malnutrition per progress notes, and mild   malnutrition per dietician consult note. Due to the conflicting degrees of   malnutrition, please document in progress notes and discharge summary if you   are evaluating and /or treating any of the following: The medical record reflects the following:  Risk Factors: malnutrition, chronic illness  Clinical Indicators: Moderate malnutrition listed in progress notes. Dietician   consult 21 \"Mild malnutrition, In context of chronic illness related to   increase demand for energy/nutrients (2/2 chronic wounds) as evidenced by mild   muscle loss, intake 0-25%\". Treatment: dietician consult, ONS    Thank you,  Dino Mir, RN, BSN, CCDS, Clinical Documentation Improvement  [phone]  Options provided:  -- Moderate malnutrition confirmed and mild malnutrition ruled out  -- Mild malnutrition confirmed and Moderate malnutrition ruled out  -- Both Moderate malnutrition and mild malnutrition confirmed  -- Other - I will add my own diagnosis  -- Disagree - Not applicable / Not valid  -- Disagree - Clinically unable to determine / Unknown  -- Refer to Clinical Documentation Reviewer    PROVIDER RESPONSE TEXT:    After study, mild malnutrition confirmed and Moderate malnutrition ruled out.     Query created by: Grace Rogers on 2021 9:06 AM      Electronically signed by:  Fior Zaldivar MD 2021 4:02 PM

## 2021-07-23 DIAGNOSIS — Z96.641 HISTORY OF RIGHT HIP HEMIARTHROPLASTY: ICD-10-CM

## 2021-07-23 DIAGNOSIS — S72.001A CLOSED DISPLACED FRACTURE OF RIGHT FEMORAL NECK (HCC): Primary | ICD-10-CM

## 2021-07-26 ENCOUNTER — HOSPITAL ENCOUNTER (OUTPATIENT)
Dept: WOUND CARE | Age: 81
Discharge: HOME | End: 2021-07-26
Payer: MEDICARE

## 2021-08-02 ENCOUNTER — HOSPITAL ENCOUNTER (OUTPATIENT)
Dept: GENERAL RADIOLOGY | Age: 81
Discharge: HOME | End: 2021-08-04
Payer: MEDICARE

## 2021-08-02 ENCOUNTER — OFFICE VISIT (OUTPATIENT)
Dept: ORTHOPEDIC SURGERY | Age: 81
End: 2021-08-02
Payer: MEDICARE

## 2021-08-02 VITALS — TEMPERATURE: 97.3 F

## 2021-08-02 DIAGNOSIS — Z96.641 HISTORY OF RIGHT HIP HEMIARTHROPLASTY: Primary | ICD-10-CM

## 2021-08-02 DIAGNOSIS — Z96.641 HISTORY OF RIGHT HIP HEMIARTHROPLASTY: ICD-10-CM

## 2021-08-02 DIAGNOSIS — S72.001A CLOSED DISPLACED FRACTURE OF RIGHT FEMORAL NECK (HCC): ICD-10-CM

## 2021-08-02 PROCEDURE — 99024 POSTOP FOLLOW-UP VISIT: CPT | Performed by: PHYSICIAN ASSISTANT

## 2021-08-02 PROCEDURE — 73502 X-RAY EXAM HIP UNI 2-3 VIEWS: CPT

## 2021-08-02 PROCEDURE — 99212 OFFICE O/P EST SF 10 MIN: CPT

## 2021-08-02 NOTE — PATIENT INSTRUCTIONS
INSTRUCTIONS FOR FACILITY    · Weightbearing tolerated lower extremity  · Use assistive devices when needed  · Continue physical therapy and home exercise program  · Recommend frequent ice, elevation as needed. Pain control per facility physician. · Recommend aspirin 81 mg twice daily until 30 days postop. After 30 days postop, can back down to aspirin 81 mg once daily. · Sutures removed today in office. Steri-Strips placed. Steri-Strips can be removed in 7 to 10 days if they do not fall off sooner. Follow-up in approximately 4 weeks. Call with any questions or concerns.

## 2021-08-02 NOTE — PROGRESS NOTES
Germania Deleon is a 79 y.o. male who presents for follow up of right hip elizabeth    SURGEON: Dr. Jolly Figueroa, DO  Date of Injury/Surgery: 7-16-21  Date last seen in office: First follow up    Symptoms: better  New complaints: none    Weightbearing:  Weight bearing as tolerated      Assistive device Walker - standard  Participating in therapy (location if yes)?  yes, [facility]    Refills Needed: None  Order/Referral Needed: N/A

## 2021-08-02 NOTE — PROGRESS NOTES
Chief Complaint   Patient presents with    Follow-up     Right hip elizabeth       OP:SURGEON: Dr. Cele Cortes DO  DATE OF PROCEDURE: 7/16/21  PROCEDURE: R hip hemiarthroplasty     Subjective:  Elizabeth Infante is approximately 2 weeks follow-up from the above surgery. Patient is WBAT on that extremity. He ambulates with assistive device, walker or wheelchair. His pain to the R hip is mild and tolerable. Currently residing at [facility] and receiving PT/OT. Per facility paperwork, only receiving ASA 81mg once daily. Denies any noticeable drainage or erythema about the incision line to the R hip. Denies calf pain, CP, SOB, fever, chills. Review of Systems -    General ROS: negative for - chills, fatigue, fever or night sweats  Respiratory ROS: no cough, shortness of breath, or wheezing  Cardiovascular ROS: no chest pain or dyspnea on exertion  Gastrointestinal ROS: no abdominal pain, nausea, vomiting, diarrhea, constipation,or black or bloody stools  Genitourinary: no hematuria, dysuria, or incontinence   Musculoskeletal ROS: negative for -back or neck pain or stiffness, also see HPI  Neurological ROS: no TIA or stroke symptoms       Objective:    General: Alert and oriented X 3, normocephalic atraumatic, external ears and eye normal, sclera clear, no acute distress, respirations easy and unlabored with no audible wheezes, skin warm and dry, speech and dress appropriate for noted age, affect euthymic. Extremity:  Right Lower Extremity  Skin clean dry and intact, without signs of infection  Incisions well approximated without signs of redness, warmth or drainage- sutures intact  Mild edema noted about the incision line   Mild TTP about the incision line   Compartments supple throughout thigh and leg  Calf supple and nontender  Demonstrates active knee flexion/extension, ankle plantar/dorsiflexion/great toe extension.    States sensation intact to touch in sural/deep peroneal/superficial peroneal/saphenous/posterior tibial nerve distributions to foot/ankle. Palpable dorsalis pedis and posterior tibialis pulses, cap refill brisk in toes, foot warm/perfused. Temp 97.3 °F (36.3 °C) (Oral)     XR:   3 views of R hip and pelvis demonstrating R hip hemiarthroplasty, intact without interval displacement, loosening, or failure. From what can be seen on AP of pelvis, hardware to contralateral femur also appears stable without loosening or failure. No acute fractures or dislocations or any other osseus abnormality identified. Assessment:   Diagnosis Orders   1. History of right hip hemiarthroplasty  XR HIP 2-3 VW W PELVIS RIGHT       Plan:   Reviewed x-rays with patient today in office   · Weightbearing tolerated lower extremity  · Use assistive devices when needed  · Continue physical therapy and home exercise program  · Recommend frequent ice, elevation as needed. Pain control per facility physician. · Recommend aspirin 81 mg twice daily until 30 days postop. After 30 days postop, can back down to aspirin 81 mg once daily. · Sutures removed today in office. Steri-Strips placed. Steri-Strips can be removed in 7 to 10 days if they do not fall off sooner. Future Appointments   Date Time Provider Brain Franco   8/9/2021  2:30 PM Al Basilio DPM [facility] [address] [facility] [place]   8/30/2021  1:00 PM SCHEDULE, [facility]  [facility]         Electronically signed by Lilliana Oscar PA-C on 8/2/2021 at 2:03 PM  Note: This report was completed using [employer] voiced recognition software. Every effort has been made to ensure accuracy; however, inadvertent computerized transcription errors may be present.

## 2021-08-09 ENCOUNTER — HOSPITAL ENCOUNTER (OUTPATIENT)
Dept: WOUND CARE | Age: 81
Discharge: HOME | End: 2021-08-09
Payer: MEDICARE

## 2021-08-09 VITALS
HEART RATE: 80 BPM | HEIGHT: 69 IN | WEIGHT: 143 LBS | BODY MASS INDEX: 21.18 KG/M2 | DIASTOLIC BLOOD PRESSURE: 70 MMHG | TEMPERATURE: 96.6 F | RESPIRATION RATE: 16 BRPM | SYSTOLIC BLOOD PRESSURE: 142 MMHG

## 2021-08-09 DIAGNOSIS — E11.621 DIABETIC ULCER OF RIGHT HEEL ASSOCIATED WITH TYPE 2 DIABETES MELLITUS, WITH FAT LAYER EXPOSED (HCC): Primary | ICD-10-CM

## 2021-08-09 DIAGNOSIS — L97.412 DIABETIC ULCER OF RIGHT HEEL ASSOCIATED WITH TYPE 2 DIABETES MELLITUS, WITH FAT LAYER EXPOSED (HCC): Primary | ICD-10-CM

## 2021-08-09 PROCEDURE — 11042 DBRDMT SUBQ TIS 1ST 20SQCM/<: CPT

## 2021-08-09 RX ORDER — GINSENG 100 MG
CAPSULE ORAL ONCE
Status: CANCELLED | OUTPATIENT
Start: 2021-08-09 | End: 2021-08-09

## 2021-08-09 RX ORDER — BACITRACIN, NEOMYCIN, POLYMYXIN B 400; 3.5; 5 [USP'U]/G; MG/G; [USP'U]/G
OINTMENT TOPICAL ONCE
Status: CANCELLED | OUTPATIENT
Start: 2021-08-09 | End: 2021-08-09

## 2021-08-09 RX ORDER — LIDOCAINE HYDROCHLORIDE 20 MG/ML
JELLY TOPICAL ONCE
Status: CANCELLED | OUTPATIENT
Start: 2021-08-09 | End: 2021-08-09

## 2021-08-09 RX ORDER — BETAMETHASONE DIPROPIONATE 0.05 %
OINTMENT (GRAM) TOPICAL ONCE
Status: CANCELLED | OUTPATIENT
Start: 2021-08-09 | End: 2021-08-09

## 2021-08-09 RX ORDER — LIDOCAINE 40 MG/G
CREAM TOPICAL ONCE
Status: CANCELLED | OUTPATIENT
Start: 2021-08-09 | End: 2021-08-09

## 2021-08-09 RX ORDER — GENTAMICIN SULFATE 1 MG/G
OINTMENT TOPICAL ONCE
Status: CANCELLED | OUTPATIENT
Start: 2021-08-09 | End: 2021-08-09

## 2021-08-09 RX ORDER — BACITRACIN ZINC AND POLYMYXIN B SULFATE 500; 1000 [USP'U]/G; [USP'U]/G
OINTMENT TOPICAL ONCE
Status: CANCELLED | OUTPATIENT
Start: 2021-08-09 | End: 2021-08-09

## 2021-08-09 RX ORDER — LIDOCAINE 50 MG/G
OINTMENT TOPICAL ONCE
Status: CANCELLED | OUTPATIENT
Start: 2021-08-09 | End: 2021-08-09

## 2021-08-09 RX ORDER — LIDOCAINE HYDROCHLORIDE 40 MG/ML
SOLUTION TOPICAL ONCE
Status: CANCELLED | OUTPATIENT
Start: 2021-08-09 | End: 2021-08-09

## 2021-08-09 RX ORDER — LIDOCAINE HYDROCHLORIDE 40 MG/ML
SOLUTION TOPICAL ONCE
Status: COMPLETED | OUTPATIENT
Start: 2021-08-09 | End: 2021-08-09

## 2021-08-09 RX ORDER — CLOBETASOL PROPIONATE 0.5 MG/G
OINTMENT TOPICAL ONCE
Status: CANCELLED | OUTPATIENT
Start: 2021-08-09 | End: 2021-08-09

## 2021-08-09 RX ADMIN — LIDOCAINE HYDROCHLORIDE: 40 SOLUTION TOPICAL at 15:04

## 2021-08-09 NOTE — PROGRESS NOTES
[facility]  History and Physical/Consultation  Podiatry    Referring Physician : Hamzah Naranjo MD  [address] Formerly [facility], [facility] RECORD NUMBER:  [id number]  AGE: 79 y.o. GENDER: male  : 1940  EPISODE DATE:  2021  Subjective:     Chief Complaint   Patient presents with    Wound Check     right foot/heel         HISTORY of PRESENT ILLNESS DELLA Rodriguez is a 79 y.o. male who presents today for wound/ulcer evaluation. History of Wound Context: He presents in follow-up for right foot wounds. Patient currently at SNF due to unrelated issue. Overall doing well. He does have a new area on his right lower leg. Patient last seen while hospitalized , prior to that April here at the wound care center. Patient at present denies any pain. No fever or chills.         Wound/Ulcer Pain Timing/Severity: none  Quality of pain:   Severity:   / 10   Modifying Factors:   Associated Signs/Symptoms:     Ulcer Identification:  Ulcer Type: diabetic, pressure and neuropathic  Contributing Factors: diabetes and chronic pressure    Diabetic/Pressure/Non Pressure Ulcers onl y:  Ulcer: Diabetic ulcer, fat layer exposed    If patient has diabetic lower extremity wounds  Dale Classification of diabetic lower extremity wounds:    Grade Description   []  0 No open wound   []  1 Superficial ulcer involving the full skin thickness   []  2 Deep ulcer involves ligament, tendon, joint capsule, or fascia  No bone involvement or abscess presence   []  3 Deep Ulcer with abcess formation and/or osteomyelitis   []  4 Localized gangrene   []  5 Extensive gangrene of the foot     Wound: N/A        PAST MEDICAL HISTORY      Diagnosis Date    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 ([address] 12/6/2019    S/P peripheral artery angioplasty 01/23/2020    bilateral legs -[place]    Skin ulcer of right foot with fat layer exposed ([address] 12/9/2019    Ulcer of right leg, with fat layer exposed ([address] 5/6/2019    Varicose veins of leg with edema, 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     Past Surgical History:   Procedure Laterality Date    COLONOSCOPY      CYSTOSCOPY N/A 5/18/2020    SUPRAPUBIC TUBE PLACEMENT performed by Vlad Kramer MD at [address] [facility] [facility]  1990's    lense implants bilaterally    FOOT DEBRIDEMENT Left 01/04/2017    wound debridement and delayed primary closure    FRACTURE SURGERY      L femur fracture surgery    HIP FRACTURE SURGERY Left 9/23/2020    LEFT HIP OPEN REDUCTION INTERNAL FIXATION performed by Katelyn Sandra MD at [address] Right 7/16/2021    RIGHT HIP HEMIARTHROPLASTY performed by Yann Younger DO at [place] [address]  04/23/2019    Dr. Nilesh Billings- PTA ant tibial    OTHER SURGICAL HISTORY  12/17/2019    Dr Nilesh Billings - PCI Right popliteal and Anterior tibial    PROSTATE BIOPSY  04/2016    SKIN BIOPSY  sept of 2018 last time    head and ears    TOE AMPUTATION Left 12/31/2016    2nd    TOE AMPUTATION Right 4/26/2019    AMPUTATION RIGHT SECOND TOE, FOOT DEBRIDEMENT performed by Tali Lea DPM at [facility] Right 12/10/2019    AMPUTATION RIGHT 3rd DIGIT WITH PARTIAL RESECTION OF THIRD METATARSAL performed by Tali Lea DPM at [facility] TURP N/A 5/18/2020    CYSTOSCOPY PLASMA LOOP TRANSURETHRAL RESECTION PROSTATE performed by Vlad Kramer MD at [facility] OR    VASCULAR SURGERY       Family History   Problem Relation Age of Onset    Heart Disease Mother         CHF    Heart Disease Father     Heart Attack Father      Social History     Tobacco Use    Smoking status: Former Smoker     Packs/day: 0.50     Years: 2.00     Pack years: 1.00     Types: Cigarettes     Quit date: 1960     Years since quittin.6    Smokeless tobacco: Never Used   Vaping Use    Vaping Use: Never used   Substance Use Topics    Alcohol use: Not Currently    Drug use: Not Currently     Allergies   Allergen Reactions    Latex Other (See Comments)     Pt unsure of reaction    Ciprofloxacin In D5w Itching    Pcn [Penicillins] Other (See Comments)     \"I just know my body doesn't like it. \" \"I had a reaction a long time ago, I know it affects my kidneys. \"    Other Rash     \"I get a rash on just my face if I eat Cumin or Chili. \"    Peanut-Containing Drug Products Itching     Current Outpatient Medications on File Prior to Encounter   Medication Sig Dispense Refill    metFORMIN (GLUCOPHAGE) 500 MG tablet Take 500 mg by mouth 2 times daily (with meals)      aspirin 81 MG EC tablet Take 1 tablet by mouth daily 30 tablet 3    metoprolol succinate (TOPROL XL) 25 MG extended release tablet Take 1 tablet by mouth daily 30 tablet 3    amLODIPine (NORVASC) 5 MG tablet Take 2 tablets by mouth daily 30 tablet 3    diphenhydrAMINE (BENADRYL ALLERGY) 25 MG capsule Take 25 mg by mouth every 6 hours as needed for Itching      Charcoal 200 MG CAPS Take 200 mg by mouth daily  (Patient not taking: Reported on 2021)      CHLOROPHYLL PO Take 1 tablet by mouth daily  (Patient not taking: Reported on 6395)      Garlic 2077 MG TBEC Take 1,250 mg by mouth daily       Multiple Vitamins-Minerals (THERAPEUTIC MULTIVITAMIN-MINERALS) tablet Take 1 tablet by mouth daily       No current facility-administered medications on file prior to encounter.        REVIEW OF SYSTEMS   ROS : All others Negative if blank [], Positive if [x]  General Vascular   [] Fevers [] Claudication   [] Chills [] Rest Pain   Skin Neurologic [x] Tissue Loss [] Lower extremity neuropathy     Objective:    BP (!) 142/70   Pulse 80   Temp 96.6 °F (35.9 °C) (Temporal)   Resp 16   Ht 5' 9\" (1.753 m)   Wt 143 lb (64.9 kg)   BMI 21.12 kg/m²   Wt Readings from Last 3 Encounters:   08/09/21 143 lb (64.9 kg)   07/16/21 143 lb (64.9 kg)   04/12/21 140 lb (63.5 kg)       PHYSICAL EXAM   CONSTITUTIONAL:   Awake, alert, cooperative   PSYCHIATRIC :  Oriented to time, place and person      normal insight to disease process  EXTREMITIES:   Wounds plantar right foot and heel with 50% devitalized nonviable tissue. No purulence or odor. No surrounding erythema, fluctuance or crepitus. No pain. New area right lower leg superficial, stable. Loss of protective sensation. R dorsalis pedis + L dorsalis pedis +   R posterior tibial + L posterior tibial +     Assessment:     Problem List Items Addressed This Visit     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 2 diabetes mellitus, with fat layer exposed ([address] - Primary    Relevant Orders    Initiate Outpatient Wound Care Protocol          Pre Debridement Measurements:  Are located in the [place]  Documentation Flow Sheet  Post Debridement Measurements:  Wound/Ulcer Descriptions are Pre Debridement except measurements:     Wound 03/08/21 Foot Plantar;Right wound #1-met head (Active)   Wound Image   08/09/21 1445   Wound Etiology Diabetic Dale 3 03/08/21 1333   Dressing Status Clean;Dry; Intact 08/09/21 1525   Wound Cleansed Irrigated with saline 08/09/21 1525   Dressing/Treatment Alginate with Ag;Dry dressing 08/09/21 1525   Offloading for Diabetic Foot Ulcers No 04/12/21 1418   Wound Length (cm) 0.6 cm 08/09/21 1445   Wound Width (cm) 0.5 cm 08/09/21 1445   Wound Depth (cm) 0.2 cm 08/09/21 1445   Wound Surface Area (cm^2) 0.3 cm^2 08/09/21 1445   Change in Wound Size % (l*w) 25 08/09/21 1445   Wound Volume (cm^3) 0.06 cm^3 08/09/21 1445   Wound Healing % 25 08/09/21 1445   Post-Procedure Length (cm) 0.6 cm 08/09/21 1509   Post-Procedure Width (cm) 0.5 cm 08/09/21 1509   Post-Procedure Depth (cm) 0.2 cm 08/09/21 1509   Post-Procedure Surface Area (cm^2) 0.3 cm^2 08/09/21 1509   Post-Procedure Volume (cm^3) 0.06 cm^3 08/09/21 1509   Undermining Starts ___ O'Clock 12 08/09/21 1445   Undermining Ends___ O'Clock 12 08/09/21 1445   Undermining Maxium Distance (cm) [email or web address] 08/09/21 1445   Wound Assessment Pale granulation tissue;Fibrin 08/09/21 1445   Drainage Amount Small 08/09/21 1445   Drainage Description Yellow 08/09/21 1445   Odor None 08/09/21 1445   Latonya-wound Assessment Hyperkeratosis (callous) 08/09/21 1445   Number of days: 154       Wound 03/08/21 Heel Right;Plantar wound # 2 (Active)   Wound Image   08/09/21 1445   Wound Etiology Pressure Unstageable 03/08/21 1333   Dressing Status New dressing applied;Clean 08/09/21 1525   Wound Cleansed Irrigated with saline 08/09/21 1525   Dressing/Treatment Alginate with Ag;Dry dressing 08/09/21 1525   Offloading for Diabetic Foot Ulcers No 04/12/21 1418   Wound Length (cm) 0.6 cm 08/09/21 1445   Wound Width (cm) 1.2 cm 08/09/21 1445   Wound Depth (cm) 0.2 cm 08/09/21 1445   Wound Surface Area (cm^2) 0.72 cm^2 08/09/21 1445   Change in Wound Size % (l*w) 74.29 08/09/21 1445   Wound Volume (cm^3) 0.144 cm^3 08/09/21 1445   Post-Procedure Length (cm) 0.6 cm 08/09/21 1509   Post-Procedure Width (cm) 1.2 cm 08/09/21 1509   Post-Procedure Depth (cm) 0.2 cm 08/09/21 1509   Post-Procedure Surface Area (cm^2) 0.72 cm^2 08/09/21 1509   Post-Procedure Volume (cm^3) 0.144 cm^3 08/09/21 1509   Undermining Starts ___ O'Clock 12 08/09/21 1445   Undermining Ends___ O'Clock 12 08/09/21 1445   Undermining Maxium Distance (cm) [email or web address] 08/09/21 1445   Wound Assessment Pale granulation tissue;Fibrin;Slough 08/09/21 1445   Drainage Amount Small 08/09/21 1445   Drainage Description Thick; Yellow 08/09/21 1445   Odor None 08/09/21 1445   Latonya-wound Assessment Hyperkeratosis (callous) 08/09/21 1445   Number of days: 154       Wound 08/09/21 Pretibial Right #3 (Active)   Wound Image   08/09/21 1445   Wound Etiology Venous 08/09/21 1445   Dressing Status New dressing applied 08/09/21 1525   Wound Cleansed Cleansed with saline 08/09/21 1525   Dressing/Treatment Alginate with Ag;Dry dressing 08/09/21 1525   Wound Length (cm) 3.1 cm 08/09/21 1445   Wound Width (cm) 2 cm 08/09/21 1445   Wound Depth (cm) 0.1 cm 08/09/21 1445   Wound Surface Area (cm^2) 6.2 cm^2 08/09/21 1445   Wound Volume (cm^3) 0.62 cm^3 08/09/21 1445   Post-Procedure Length (cm) 3.1 cm 08/09/21 1509   Post-Procedure Width (cm) 2 cm 08/09/21 1509   Post-Procedure Depth (cm) 0.1 cm 08/09/21 1509   Post-Procedure Surface Area (cm^2) 6.2 cm^2 08/09/21 1509   Post-Procedure Volume (cm^3) 0.62 cm^3 08/09/21 1509   Wound Assessment Granulation tissue 08/09/21 1445   Drainage Amount Moderate 08/09/21 1445   Drainage Description Serosanguinous;Brown 08/09/21 1445   Odor None 08/09/21 1445   Latonya-wound Assessment Intact 08/09/21 1445   Number of days: 0     Incision 07/16/21 Hip Right (Active)   Dressing Status Other (Comment) 07/19/21 0830   Incision Cleansed Cleansed with saline 07/16/21 1430   Dressing/Treatment ABD pad 07/19/21 0830   Drainage Amount Small 07/18/21 1955   Drainage Description Serosanguinous 07/18/21 1955   Number of days: 24       Procedure Note  Indications:  Based on my examination of this patient's wound(s)/ulcer(s) today, debridement is required to promote healing and evaluate the wound base. Performed by: Bozena Martínez DPM    Consent obtained:  Yes    Time out taken:  Yes    Pain Control: Anesthetic  Anesthetic: 4% Lidocaine Liquid Topical     Debridement:Excisional Debridement    Using curette and #15 blade scalpel the wound(s)/ulcer(s) was/were sharply debrided down through and including the removal of subcutaneous tissue.         Devitalized Tissue Debrided:  fibrin, biofilm, slough and necrotic/eschar to stimulate bleeding to promote healing, post debridement good bleeding base and wound edges noted    Wound/Ulcer #: 1 and 2    Percent of Wound/Ulcer Debrided: 100%    Total Surface Area Debrided:  1.2 sq cm     Estimated Blood Loss:  Minimal  Hemostasis Achieved:  by pressure    Procedural Pain:  0  / 10   Post Procedural Pain:  0 / 10     Response to treatment:  Well tolerated by patient. A culture was not done. Plan:     Pt is not a smoker   - Discussed relationship of smoking and negative affects on wound healing    Reinforced no pressure to facilitate healing. Follow-up as scheduled. Treatment Note please see attached Discharge Instructions    Written patient dismissal instructions given to patient and signed by patient or POA. Discharge Instructions       Visit Discharge/Physician Orders    Discharge condition: Stable    Assessment of pain at discharge:  none    Anesthetic used:  4% lidocaine    Discharge to: ECF    Left via:ambulance    Accompanied by: accompanied by self    ECF/[facility]: [facility]    Dressing Orders:  Clean all wounds of right leg, right plantar 1st metatarsal and plantar right heel with normal saline. Make sure to pack heel ulcer with silver alginate rope ( has depth)  Otherwise, apply silver alginate pad to rest of wounds. Secure with dry dressing. Change daily. Treatment Orders:    [facility] followup visit _____one week with Dr. Whitfield________________________  (Please note your next appointment above and if you are unable to keep, kindly give a 24 hour notice. Thank you.)    Physician signature:__________________________      If you experience any of the following, please call the [address] [place] [address] during business hours:    * Increase in Pain  * Temperature over 101  * Increase in drainage from your wound  * Drainage with a foul odor  * Bleeding  * Increase in swelling  * Need for compression bandage changes due to slippage, breakthrough drainage.     If you need medical attention outside of the business hours of the [address] [place] [address] please contact your PCP or go to the nearest emergency room.         Electronically signed by Marco A Magallon DPM on 8/9/2021 at 3:41 PM

## 2021-08-16 ENCOUNTER — HOSPITAL ENCOUNTER (OUTPATIENT)
Dept: WOUND CARE | Age: 81
Discharge: HOME | End: 2021-08-16
Payer: MEDICARE

## 2021-08-16 DIAGNOSIS — L97.412 DIABETIC ULCER OF RIGHT HEEL ASSOCIATED WITH TYPE 2 DIABETES MELLITUS, WITH FAT LAYER EXPOSED (HCC): Primary | ICD-10-CM

## 2021-08-16 DIAGNOSIS — E11.621 DIABETIC ULCER OF RIGHT HEEL ASSOCIATED WITH TYPE 2 DIABETES MELLITUS, WITH FAT LAYER EXPOSED (HCC): Primary | ICD-10-CM

## 2021-08-16 PROCEDURE — 11042 DBRDMT SUBQ TIS 1ST 20SQCM/<: CPT

## 2021-08-16 RX ORDER — CLOBETASOL PROPIONATE 0.5 MG/G
OINTMENT TOPICAL ONCE
Status: CANCELLED | OUTPATIENT
Start: 2021-08-16 | End: 2021-08-16

## 2021-08-16 RX ORDER — GINSENG 100 MG
CAPSULE ORAL ONCE
Status: CANCELLED | OUTPATIENT
Start: 2021-08-16 | End: 2021-08-16

## 2021-08-16 RX ORDER — LIDOCAINE HYDROCHLORIDE 20 MG/ML
JELLY TOPICAL ONCE
Status: CANCELLED | OUTPATIENT
Start: 2021-08-16 | End: 2021-08-16

## 2021-08-16 RX ORDER — GENTAMICIN SULFATE 1 MG/G
OINTMENT TOPICAL ONCE
Status: CANCELLED | OUTPATIENT
Start: 2021-08-16 | End: 2021-08-16

## 2021-08-16 RX ORDER — BACITRACIN, NEOMYCIN, POLYMYXIN B 400; 3.5; 5 [USP'U]/G; MG/G; [USP'U]/G
OINTMENT TOPICAL ONCE
Status: CANCELLED | OUTPATIENT
Start: 2021-08-16 | End: 2021-08-16

## 2021-08-16 RX ORDER — BACITRACIN ZINC AND POLYMYXIN B SULFATE 500; 1000 [USP'U]/G; [USP'U]/G
OINTMENT TOPICAL ONCE
Status: CANCELLED | OUTPATIENT
Start: 2021-08-16 | End: 2021-08-16

## 2021-08-16 RX ORDER — LIDOCAINE HYDROCHLORIDE 40 MG/ML
SOLUTION TOPICAL ONCE
Status: CANCELLED | OUTPATIENT
Start: 2021-08-16 | End: 2021-08-16

## 2021-08-16 RX ORDER — LIDOCAINE HYDROCHLORIDE 40 MG/ML
SOLUTION TOPICAL ONCE
Status: COMPLETED | OUTPATIENT
Start: 2021-08-16 | End: 2021-08-16

## 2021-08-16 RX ORDER — LIDOCAINE 50 MG/G
OINTMENT TOPICAL ONCE
Status: CANCELLED | OUTPATIENT
Start: 2021-08-16 | End: 2021-08-16

## 2021-08-16 RX ORDER — LIDOCAINE 40 MG/G
CREAM TOPICAL ONCE
Status: CANCELLED | OUTPATIENT
Start: 2021-08-16 | End: 2021-08-16

## 2021-08-16 RX ORDER — BETAMETHASONE DIPROPIONATE 0.05 %
OINTMENT (GRAM) TOPICAL ONCE
Status: CANCELLED | OUTPATIENT
Start: 2021-08-16 | End: 2021-08-16

## 2021-08-16 RX ADMIN — LIDOCAINE HYDROCHLORIDE: 40 SOLUTION TOPICAL at 14:01

## 2021-08-16 NOTE — PROGRESS NOTES
[facility] Followup Visit Note    Referring Physician : Floyd Villa MD  [address] RECORD NUMBER:  [id number]  AGE: 79 y.o. GENDER: male  : 1940  EPISODE DATE:  2021    Subjective:     Chief Complaint   Patient presents with    Wound Check      HISTORY of PRESENT ILLNESS HPI   Viky Albrecht is a 79 y.o. male who presents today in regards to follow up evaluation and treatment of wound/ulcer. That patient's past medical, family and social hx were reviewed and changes were made if present. History of Wound Context: He presents in follow-up for right foot wounds. Patient currently at SNF due to unrelated issue. Overall doing well. He does have a new area on his right lower leg. Patient last seen while hospitalized , prior to that April here at the wound care center. Patient at present denies any pain. No fever or chills.         Wound/Ulcer Pain Timing/Severity: none  Quality of pain:   Severity:   / 10   Modifying Factors:   Associated Signs/Symptoms:      Ulcer Identification:  Ulcer Type: diabetic, pressure and neuropathic  Contributing Factors: diabetes and chronic pressure    8-- Wounds plantar right foot and heel with 30% devitalized nonviable tissue. No purulence or odor. No surrounding erythema, fluctuance or crepitus. No pain. New area right lower leg superficial, stable. Loss of protective sensation.              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      Femoral-popliteal atherosclerosis ([address] 2017    Heel ulcer, right, with fat layer exposed ([address] 2019    Hypertension     Osteomyelitis of third toe of right foot ([facility] 12/6/2019    S/P peripheral artery angioplasty 01/23/2020    bilateral legs -[place]    Skin ulcer of right foot with fat layer exposed ([facility] 12/9/2019    Ulcer of right leg, with fat layer exposed ([facility] 5/6/2019    Varicose veins of leg with edema, 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     Past Surgical History:   Procedure Laterality Date    COLONOSCOPY      CYSTOSCOPY N/A 5/18/2020    SUPRAPUBIC TUBE PLACEMENT performed by Andrew Vanegas MD at [address] [facility] [facility]  1990's    lense implants bilaterally    FOOT DEBRIDEMENT Left 01/04/2017    wound debridement and delayed primary closure    FRACTURE SURGERY      L femur fracture surgery    HIP FRACTURE SURGERY Left 9/23/2020    LEFT HIP OPEN REDUCTION INTERNAL FIXATION performed by Heidy Licona MD at [address] Right 7/16/2021    RIGHT HIP HEMIARTHROPLASTY performed by Christel Torres DO at [place] [id number]  04/23/2019    Dr. Wilian Morgan- PTA ant tibial    OTHER SURGICAL HISTORY  12/17/2019    Dr Wilian Morgan - PCI Right popliteal and Anterior tibial    PROSTATE BIOPSY  04/2016    SKIN BIOPSY  sept of 2018 last time    head and ears    TOE AMPUTATION Left 12/31/2016    2nd    TOE AMPUTATION Right 4/26/2019    AMPUTATION RIGHT SECOND TOE, FOOT DEBRIDEMENT performed by Karen Isbell DPM at [facility] Right 12/10/2019    AMPUTATION RIGHT 3rd DIGIT WITH PARTIAL RESECTION OF THIRD METATARSAL [facility] TURP N/A 5/18/2020    CYSTOSCOPY PLASMA LOOP TRANSURETHRAL RESECTION PROSTATE performed by Andrew Vanegas MD at [address] [place] [address]       Family History   Problem Relation Age of Onset    Heart Disease Mother         CHF    Heart Disease Father     Heart Attack Father      Social History     Tobacco Use    Smoking status: Former Smoker     Packs/day: 0.50     Years: 2.00     Pack years: 1.00     Types: Cigarettes     Quit date: 1960 Years since quittin.6    Smokeless tobacco: Never Used   Vaping Use    Vaping Use: Never used   Substance Use Topics    Alcohol use: Not Currently    Drug use: Not Currently     Allergies   Allergen Reactions    Latex Other (See Comments)     Pt unsure of reaction    Ciprofloxacin In D5w Itching    Pcn [Penicillins] Other (See Comments)     \"I just know my body doesn't like it. \" \"I had a reaction a long time ago, I know it affects my kidneys. \"    Other Rash     \"I get a rash on just my face if I eat Cumin or Chili. \"    Peanut-Containing Drug Products Itching     Current Outpatient Medications on File Prior to Encounter   Medication Sig Dispense Refill    metFORMIN (GLUCOPHAGE) 500 MG tablet Take 500 mg by mouth 2 times daily (with meals)      aspirin 81 MG EC tablet Take 1 tablet by mouth daily 30 tablet 3    metoprolol succinate (TOPROL XL) 25 MG extended release tablet Take 1 tablet by mouth daily 30 tablet 3    amLODIPine (NORVASC) 5 MG tablet Take 2 tablets by mouth daily 30 tablet 3    diphenhydrAMINE (BENADRYL ALLERGY) 25 MG capsule Take 25 mg by mouth every 6 hours as needed for Itching      Charcoal 200 MG CAPS Take 200 mg by mouth daily       CHLOROPHYLL PO Take 1 tablet by mouth daily       Garlic 1978 MG TBEC Take 1,250 mg by mouth daily       Multiple Vitamins-Minerals (THERAPEUTIC MULTIVITAMIN-MINERALS) tablet Take 1 tablet by mouth daily       No current facility-administered medications on file prior to encounter. REVIEW OF SYSTEMS See HPI    Objective: There were no vitals taken for this visit.   Wt Readings from Last 3 Encounters:   21 143 lb (64.9 kg)   21 143 lb (64.9 kg)   21 140 lb (63.5 kg)     PHYSICAL EXAM  CONSTITUTIONAL:   Awake, alert, cooperative   EYES:  lids and lashes normal   ENT: external ears and nose without lesions   NECK:  supple, symmetrical, trachea midline   SKIN:  Open wound     Assessment:     Problem List Items Addressed This Visit     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 2 diabetes mellitus, with fat layer exposed ([address] - Primary    Relevant Orders    Initiate Outpatient Wound Care Protocol          Pre Debridement Measurements:  Are located in the [place]  Documentation Flow Sheet  Post Debridement Measurements:  Wound/Ulcer Descriptions are Pre Debridement except measurements:     Wound 03/08/21 Foot Plantar;Right wound #1-met head (Active)   Wound Image   08/09/21 1445   Wound Etiology Diabetic Dale 3 03/08/21 1333   Dressing Status Clean;Dry; Intact 08/09/21 1525   Wound Cleansed Irrigated with saline 08/09/21 1525   Dressing/Treatment Alginate with Ag;Dry dressing 08/09/21 1525   Offloading for Diabetic Foot Ulcers No 04/12/21 1418   Wound Length (cm) 0.6 cm 08/16/21 1349   Wound Width (cm) 0.5 cm 08/16/21 1349   Wound Depth (cm) 0.2 cm 08/16/21 1349   Wound Surface Area (cm^2) 0.3 cm^2 08/16/21 1349   Change in Wound Size % (l*w) 25 08/16/21 1349   Wound Volume (cm^3) 0.06 cm^3 08/16/21 1349   Wound Healing % 25 08/16/21 1349   Post-Procedure Length (cm) 0.6 cm 08/16/21 1410   Post-Procedure Width (cm) 0.6 cm 08/16/21 1410   Post-Procedure Depth (cm) 0.3 cm 08/16/21 1410   Post-Procedure Surface Area (cm^2) 0.36 cm^2 08/16/21 1410   Post-Procedure Volume (cm^3) 0.108 cm^3 08/16/21 1410   Undermining Starts ___ O'Clock 12 08/09/21 1445   Undermining Ends___ O'Clock 12 08/09/21 1445   Undermining Maxium Distance (cm) Lou@Actions 08/09/21 1445   Wound Assessment Pale granulation tissue;Fibrin 08/16/21 1349   Drainage Amount Moderate 08/16/21 1410   Drainage Description Serosanguinous 08/16/21 1410   Odor None 08/16/21 1349   Latonya-wound Assessment Hyperkeratosis (callous) 08/16/21 1349   Number of days: 160       Wound 03/08/21 Heel Right;Plantar wound # 2 (Active)   Wound Image   08/09/21 1445   Wound Etiology Pressure Unstageable 03/08/21 1333   Dressing Status New dressing applied;Clean 08/09/21 1525   Wound Cleansed Irrigated with saline 08/09/21 1525   Dressing/Treatment Alginate with Ag;Dry dressing 08/09/21 1525   Offloading for Diabetic Foot Ulcers No 04/12/21 1418   Wound Length (cm) 1.2 cm 08/16/21 1349   Wound Width (cm) 0.7 cm 08/16/21 1349   Wound Depth (cm) 1.1 cm 08/16/21 1349   Wound Surface Area (cm^2) 0.84 cm^2 08/16/21 1349   Change in Wound Size % (l*w) 70 08/16/21 1349   Wound Volume (cm^3) 0.924 cm^3 08/16/21 1349   Post-Procedure Length (cm) 0.8 cm 08/16/21 1410   Post-Procedure Width (cm) 1.2 cm 08/16/21 1410   Post-Procedure Depth (cm) 0.2 cm 08/09/21 1509   Post-Procedure Surface Area (cm^2) 0.96 cm^2 08/16/21 1410   Post-Procedure Volume (cm^3) 0.144 cm^3 08/09/21 1509   Undermining Starts ___ O'Clock 12 08/16/21 1349   Undermining Ends___ O'Clock 12 08/16/21 1349   Undermining Maxium Distance (cm) .6 @ 12 08/16/21 1349   Wound Assessment Pale granulation tissue;Fibrin;Slough 08/16/21 1349   Drainage Amount Large 08/16/21 1410   Drainage Description Serosanguinous 08/16/21 1410   Odor None 08/16/21 1349   Latonya-wound Assessment Hyperkeratosis (callous) 08/16/21 1349   Number of days: 160       Wound 08/09/21 Pretibial Right #3 (Active)   Wound Image   08/09/21 1445   Wound Etiology Venous 08/09/21 1445   Dressing Status New dressing applied 08/09/21 1525   Wound Cleansed Cleansed with saline 08/09/21 1525   Dressing/Treatment Alginate with Ag;Dry dressing 08/09/21 1525   Wound Length (cm) 1.5 cm 08/16/21 1349   Wound Width (cm) 1.2 cm 08/16/21 1349   Wound Depth (cm) 0.1 cm 08/16/21 1349   Wound Surface Area (cm^2) 1.8 cm^2 08/16/21 1349   Change in Wound Size % (l*w) 70.97 08/16/21 1349   Wound Volume (cm^3) 0.18 cm^3 08/16/21 1349   Wound Healing % 71 08/16/21 1349   Post-Procedure Length (cm) 1.5 cm 08/16/21 1410   Post-Procedure Width (cm) 1.3 cm 08/16/21 1410   Post-Procedure Depth (cm) 0.2 cm 08/16/21 1410   Post-Procedure Surface Area (cm^2) 1.95 cm^2 08/16/21 1410   Post-Procedure Volume (cm^3) 0.39 cm^3 08/16/21 1410   Wound Assessment Granulation tissue 08/16/21 1349   Drainage Amount Moderate 08/16/21 1410   Drainage Description Serosanguinous 08/16/21 1410   Odor None 08/16/21 1349   Latonya-wound Assessment Intact 08/16/21 1349   Number of days: 6     Incision 07/16/21 Hip Right (Active)   Dressing Status Other (Comment) 07/19/21 0830   Incision Cleansed Cleansed with saline 07/16/21 1430   Dressing/Treatment ABD pad 07/19/21 0830   Drainage Amount Small 07/18/21 1955   Drainage Description Serosanguinous 07/18/21 1955   Number of days: 31       Procedure Note  Indications:  Based on my examination of this patient's wound(s)/ulcer(s) today, debridement is required to promote healing and evaluate the wound base. Performed by: Heydi Phillip DPM    Consent obtained:  Yes    Time out taken:  Yes    Pain Control: Anesthetic  Anesthetic: 4% Lidocaine Liquid Topical     Debridement:Excisional Debridement    Using curette and tissue nippers the wound(s)/ulcer(s) was/were sharply debrided down through and including the removal of subcutaneous tissue. Devitalized Tissue Debrided:  fibrin, biofilm, slough and necrotic/eschar to stimulate bleeding to promote healing, post debridement good bleeding base and wound edges noted    Wound/Ulcer #: 1 and 2    Percent of Wound/Ulcer Debrided: 100%    Total Surface Area Debrided:  1.14 sq cm     Estimated Blood Loss:  Minimal  Hemostasis Achieved:  by pressure    Procedural Pain:  0  / 10   Post Procedural Pain:  0 / 10     Response to treatment:  Well tolerated by patient. Plan:   Treatment Note please see attached Discharge Instructions    Written patient dismissal instructions given to patient and signed by patient or POA.          Discharge Instructions       Visit Discharge/Physician Orders    Discharge condition: Stable    Assessment of pain at discharge:  none    Anesthetic used:  4% lidocaine    Discharge to: [facility]    Left via:ambulance    Accompanied by: accompanied by self    ECF/[facility]: [facility]    Dressing Orders:  Clean all wounds of right leg, right plantar 1st metatarsal and plantar right heel with normal saline. Make sure to pack heel ulcer with silver alginate rope ( has depth)  Otherwise, apply silver alginate pad to rest of wounds. Secure with dry dressing. Change daily. Treatment Orders:    [address] [place] [address],[address] followup visit _____one week with Dr. Whitfield________________________  (Please note your next appointment above and if you are unable to keep, kindly give a 24 hour notice. Thank you.)    Physician signature:__________________________      If you experience any of the following, please call the [facility] during business hours:    * Increase in Pain  * Temperature over 101  * Increase in drainage from your wound  * Drainage with a foul odor  * Bleeding  * Increase in swelling  * Need for compression bandage changes due to slippage, breakthrough drainage. If you need medical attention outside of the business hours of the [facility] please contact your PCP or go to the nearest emergency room.         Electronically signed by Al Basilio DPM on 8/16/2021 at 2:33 PM

## 2021-08-23 ENCOUNTER — HOSPITAL ENCOUNTER (OUTPATIENT)
Dept: WOUND CARE | Age: 81
Discharge: HOME | End: 2021-08-23
Payer: MEDICARE

## 2021-08-23 VITALS
DIASTOLIC BLOOD PRESSURE: 62 MMHG | RESPIRATION RATE: 18 BRPM | HEIGHT: 69 IN | BODY MASS INDEX: 21.18 KG/M2 | TEMPERATURE: 96.7 F | SYSTOLIC BLOOD PRESSURE: 118 MMHG | WEIGHT: 143 LBS | HEART RATE: 61 BPM

## 2021-08-23 DIAGNOSIS — E11.621 DIABETIC ULCER OF RIGHT HEEL ASSOCIATED WITH TYPE 2 DIABETES MELLITUS, WITH FAT LAYER EXPOSED (HCC): Primary | ICD-10-CM

## 2021-08-23 DIAGNOSIS — L97.412 DIABETIC ULCER OF RIGHT HEEL ASSOCIATED WITH TYPE 2 DIABETES MELLITUS, WITH FAT LAYER EXPOSED (HCC): Primary | ICD-10-CM

## 2021-08-23 PROCEDURE — 11042 DBRDMT SUBQ TIS 1ST 20SQCM/<: CPT

## 2021-08-23 RX ORDER — LIDOCAINE HYDROCHLORIDE 40 MG/ML
SOLUTION TOPICAL ONCE
Status: CANCELLED | OUTPATIENT
Start: 2021-08-23 | End: 2021-08-23

## 2021-08-23 RX ORDER — LIDOCAINE HYDROCHLORIDE 20 MG/ML
JELLY TOPICAL ONCE
Status: CANCELLED | OUTPATIENT
Start: 2021-08-23 | End: 2021-08-23

## 2021-08-23 RX ORDER — GINSENG 100 MG
CAPSULE ORAL ONCE
Status: CANCELLED | OUTPATIENT
Start: 2021-08-23 | End: 2021-08-23

## 2021-08-23 RX ORDER — BACITRACIN, NEOMYCIN, POLYMYXIN B 400; 3.5; 5 [USP'U]/G; MG/G; [USP'U]/G
OINTMENT TOPICAL ONCE
Status: CANCELLED | OUTPATIENT
Start: 2021-08-23 | End: 2021-08-23

## 2021-08-23 RX ORDER — CLOBETASOL PROPIONATE 0.5 MG/G
OINTMENT TOPICAL ONCE
Status: CANCELLED | OUTPATIENT
Start: 2021-08-23 | End: 2021-08-23

## 2021-08-23 RX ORDER — LIDOCAINE HYDROCHLORIDE 40 MG/ML
SOLUTION TOPICAL ONCE
Status: COMPLETED | OUTPATIENT
Start: 2021-08-23 | End: 2021-08-23

## 2021-08-23 RX ORDER — LIDOCAINE 50 MG/G
OINTMENT TOPICAL ONCE
Status: CANCELLED | OUTPATIENT
Start: 2021-08-23 | End: 2021-08-23

## 2021-08-23 RX ORDER — LIDOCAINE 40 MG/G
CREAM TOPICAL ONCE
Status: CANCELLED | OUTPATIENT
Start: 2021-08-23 | End: 2021-08-23

## 2021-08-23 RX ORDER — GENTAMICIN SULFATE 1 MG/G
OINTMENT TOPICAL ONCE
Status: CANCELLED | OUTPATIENT
Start: 2021-08-23 | End: 2021-08-23

## 2021-08-23 RX ORDER — BETAMETHASONE DIPROPIONATE 0.05 %
OINTMENT (GRAM) TOPICAL ONCE
Status: CANCELLED | OUTPATIENT
Start: 2021-08-23 | End: 2021-08-23

## 2021-08-23 RX ORDER — BACITRACIN ZINC AND POLYMYXIN B SULFATE 500; 1000 [USP'U]/G; [USP'U]/G
OINTMENT TOPICAL ONCE
Status: CANCELLED | OUTPATIENT
Start: 2021-08-23 | End: 2021-08-23

## 2021-08-23 RX ADMIN — LIDOCAINE HYDROCHLORIDE: 40 SOLUTION TOPICAL at 14:21

## 2021-08-23 NOTE — PROGRESS NOTES
Wound Healing Center Followup Visit Note    Referring Physician : Evangelist Morocho MD  [address] RECORD NUMBER:  [id number]  AGE: 79 y.o. GENDER: male  : 1940  EPISODE DATE:  2021    Subjective:     Chief Complaint   Patient presents with    Wound Check     RIGHT LEG       HISTORY of PRESENT ILLNESS HPI   Cindy Renee is a 79 y.o. male who presents today in regards to follow up evaluation and treatment of wound/ulcer. That patient's past medical, family and social hx were reviewed and changes were made if present. History of Wound Context: He presents in follow-up for right foot wounds. Patient currently at SNF due to unrelated issue. Overall doing well. He does have a new area on his right lower leg. Patient last seen while hospitalized , prior to that April here at the wound care center. Patient at present denies any pain. No fever or chills.         Wound/Ulcer Pain Timing/Severity: none  Quality of pain:   Severity:   / 10   Modifying Factors:   Associated Signs/Symptoms:      Ulcer Identification:  Ulcer Type: diabetic, pressure and neuropathic  Contributing Factors: diabetes and chronic pressure    8 Wounds plantar right foot and heel with 30% devitalized nonviable tissue. No purulence or odor. No surrounding erythema, fluctuance or crepitus. No pain. New area right lower leg superficial, stable. Loss of protective sensation.      21 Wounds plantar right foot and heel with 20% devitalized nonviable tissue. No purulence or odor. No surrounding erythema, fluctuance or crepitus. No pain. New area right lower leg superficial, stable. Loss of protective sensation.          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 ([address] 12/6/2019    S/P peripheral artery angioplasty 01/23/2020    bilateral legs -Kollipara    Skin ulcer of right foot with fat layer exposed ([address] 12/9/2019    Ulcer of right leg, with fat layer exposed ([address] 5/6/2019    Varicose veins of leg with edema, 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     Past Surgical History:   Procedure Laterality Date    COLONOSCOPY      CYSTOSCOPY N/A 5/18/2020    SUPRAPUBIC TUBE PLACEMENT performed by Vlad Kramer MD at [address] [facility] [facility]  1990's    lense implants bilaterally    FOOT DEBRIDEMENT Left 01/04/2017    wound debridement and delayed primary closure    FRACTURE SURGERY      L femur fracture surgery    HIP FRACTURE SURGERY Left 9/23/2020    LEFT HIP OPEN REDUCTION INTERNAL FIXATION performed by Katelyn Sandra MD at [address] Right 7/16/2021    RIGHT HIP HEMIARTHROPLASTY performed by Yann Younger DO at [place] [address].  04/23/2019    Dr. Nilesh Billings- PTA ant tibial    OTHER SURGICAL HISTORY  12/17/2019    Dr Nilesh Billings - PCI Right popliteal and Anterior tibial    PROSTATE BIOPSY  04/2016    SKIN BIOPSY  sept of 2018 last time    head and ears    TOE AMPUTATION Left 12/31/2016    2nd    TOE AMPUTATION Right 4/26/2019    AMPUTATION RIGHT SECOND TOE, FOOT DEBRIDEMENT performed by Tali Lea DPM at [address] Right 12/10/2019    AMPUTATION RIGHT 3rd DIGIT WITH PARTIAL RESECTION OF THIRD METATARSAL performed by Tali Lea DPM at [address] TURP N/A 5/18/2020    CYSTOSCOPY PLASMA LOOP TRANSURETHRAL RESECTION PROSTATE performed by Vlad Kramer MD at [facility] OR    VASCULAR SURGERY       Family History   Problem Relation Age of Onset    Heart Disease Mother         CHF    Heart Disease Father     Heart Attack Father      Social History     Tobacco Use    Smoking status: Former Smoker     Packs/day: 0.50     Years: 2.00     Pack years: 1.00     Types: Cigarettes     Quit date:      Years since quittin.6    Smokeless tobacco: Never Used   Vaping Use    Vaping Use: Never used   Substance Use Topics    Alcohol use: Not Currently    Drug use: Not Currently     Allergies   Allergen Reactions    Latex Other (See Comments)     Pt unsure of reaction    Ciprofloxacin In D5w Itching    Pcn [Penicillins] Other (See Comments)     \"I just know my body doesn't like it. \" \"I had a reaction a long time ago, I know it affects my kidneys. \"    Other Rash     \"I get a rash on just my face if I eat Cumin or Chili. \"    Peanut-Containing Drug Products Itching     Current Outpatient Medications on File Prior to Encounter   Medication Sig Dispense Refill    metFORMIN (GLUCOPHAGE) 500 MG tablet Take 500 mg by mouth 2 times daily (with meals)      aspirin 81 MG EC tablet Take 1 tablet by mouth daily 30 tablet 3    metoprolol succinate (TOPROL XL) 25 MG extended release tablet Take 1 tablet by mouth daily 30 tablet 3    amLODIPine (NORVASC) 5 MG tablet Take 2 tablets by mouth daily 30 tablet 3    diphenhydrAMINE (BENADRYL ALLERGY) 25 MG capsule Take 25 mg by mouth every 6 hours as needed for Itching      Charcoal 200 MG CAPS Take 200 mg by mouth daily       CHLOROPHYLL PO Take 1 tablet by mouth daily       Garlic 7054 MG TBEC Take 1,250 mg by mouth daily       Multiple Vitamins-Minerals (THERAPEUTIC MULTIVITAMIN-MINERALS) tablet Take 1 tablet by mouth daily       No current facility-administered medications on file prior to encounter.        REVIEW OF SYSTEMS See HPI    Objective:    /62   Pulse 61   Temp 96.7 °F (35.9 °C) (Temporal)   Resp 18   Ht 5' 9\" (1.753 m)   Wt 143 lb (64.9 kg)   BMI 21.12 kg/m²   Wt Readings from Last 3 Encounters:   08/23/21 143 lb (64.9 kg)   08/09/21 143 lb (64.9 kg)   07/16/21 143 lb (64.9 kg)     PHYSICAL EXAM  CONSTITUTIONAL:   Awake, alert, cooperative   EYES:  lids and lashes normal   ENT: external ears and nose without lesions   NECK:  supple, symmetrical, trachea midline   SKIN:  Open wound     Assessment:     Problem List Items Addressed This Visit     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 2 diabetes mellitus, with fat layer exposed ([address] - Primary    Relevant Orders    Initiate Outpatient Wound Care Protocol          Pre Debridement Measurements:  Are located in the [place]  Documentation Flow Sheet  Post Debridement Measurements:  Wound/Ulcer Descriptions are Pre Debridement except measurements:     Wound 03/08/21 Foot Plantar;Right wound #1-met head (Active)   Wound Image   08/09/21 1445   Wound Etiology Diabetic Dale 3 03/08/21 1333   Dressing Status New dressing applied;Clean;Dry; Intact 08/23/21 1523   Wound Cleansed Cleansed with saline 08/23/21 1523   Dressing/Treatment Alginate with Ag;Dry dressing 08/23/21 1523   Offloading for Diabetic Foot Ulcers Other (comment) 08/23/21 1523   Wound Length (cm) 0.4 cm 08/23/21 1413   Wound Width (cm) 0.3 cm 08/23/21 1413   Wound Depth (cm) 0.1 cm 08/23/21 1413   Wound Surface Area (cm^2) 0.12 cm^2 08/23/21 1413   Change in Wound Size % (l*w) 70 08/23/21 1413   Wound Volume (cm^3) 0.012 cm^3 08/23/21 1413   Wound Healing % 85 08/23/21 1413   Post-Procedure Length (cm) 0.4 cm 08/23/21 1503   Post-Procedure Width (cm) 0.3 cm 08/23/21 1503   Post-Procedure Depth (cm) 0.2 cm 08/23/21 1503   Post-Procedure Surface Area (cm^2) 0.12 cm^2 08/23/21 1503   Post-Procedure Volume (cm^3) 0.024 cm^3 08/23/21 1503   Undermining Starts ___ O'Clock 12 08/09/21 1445   Undermining Ends___ O'Clock 12 08/09/21 1445   Undermining Maxium Distance (cm) [email or web address] 08/09/21 1445   Wound Assessment Eschar dry 08/23/21 1413   Drainage Amount None 08/23/21 1413   Drainage Description Serosanguinous 08/16/21 1410   Odor None 08/23/21 1413   Latonya-wound Assessment Hyperkeratosis (callous) 08/23/21 1413   Number of days: 168       Wound 03/08/21 Heel Right;Plantar wound # 2 (Active)   Wound Image   08/09/21 1445   Wound Etiology Pressure Unstageable 03/08/21 1333   Dressing Status New dressing applied;Clean 08/23/21 1523   Wound Cleansed Cleansed with saline 08/23/21 1523   Dressing/Treatment Alginate with Ag;Dry dressing 08/23/21 1523   Offloading for Diabetic Foot Ulcers Other (comment) 08/23/21 1523   Wound Length (cm) 0.7 cm 08/23/21 1413   Wound Width (cm) 0.9 cm 08/23/21 1413   Wound Depth (cm) 0.5 cm 08/23/21 1413   Wound Surface Area (cm^2) 0.63 cm^2 08/23/21 1413   Change in Wound Size % (l*w) 77.5 08/23/21 1413   Wound Volume (cm^3) 0.315 cm^3 08/23/21 1413   Post-Procedure Length (cm) 0.7 cm 08/23/21 1503   Post-Procedure Width (cm) 0.9 cm 08/23/21 1503   Post-Procedure Depth (cm) 0.6 cm 08/23/21 1503   Post-Procedure Surface Area (cm^2) 0.63 cm^2 08/23/21 1503   Post-Procedure Volume (cm^3) 0.378 cm^3 08/23/21 1503   Undermining Starts ___ O'Clock 12 08/23/21 1413   Undermining Ends___ O'Clock 12 08/23/21 1413   Undermining Maxium Distance (cm) 0.4 08/23/21 1413   Wound Assessment Pale granulation tissue;Fibrin;Slough 08/23/21 1413   Drainage Amount Large 08/23/21 1413   Drainage Description Serosanguinous 08/23/21 1413   Odor None 08/23/21 1413   Latonya-wound Assessment Hyperkeratosis (callous) 08/23/21 1413   Number of days: 168       Wound 08/09/21 Pretibial Right #3 (Active)   Wound Image   08/09/21 1445   Wound Etiology Venous 08/09/21 1445   Dressing Status New dressing applied;Clean;Dry; Intact 08/23/21 1523   Wound Cleansed Cleansed with saline 08/23/21 1523   Dressing/Treatment Alginate with Ag;Dry dressing 08/23/21 1523   Offloading for Diabetic Foot Ulcers Other (comment) 08/23/21 1523   Wound Length (cm) 0.8 cm 08/23/21 1413   Wound Width (cm) 0.7 cm 08/23/21 1413 Wound Depth (cm) 0.1 cm 08/23/21 1413   Wound Surface Area (cm^2) 0.56 cm^2 08/23/21 1413   Change in Wound Size % (l*w) 90.97 08/23/21 1413   Wound Volume (cm^3) 0.056 cm^3 08/23/21 1413   Wound Healing % 91 08/23/21 1413   Post-Procedure Length (cm) 0.8 cm 08/23/21 1503   Post-Procedure Width (cm) 0.8 cm 08/23/21 1503   Post-Procedure Depth (cm) 0.2 cm 08/23/21 1503   Post-Procedure Surface Area (cm^2) 0.64 cm^2 08/23/21 1503   Post-Procedure Volume (cm^3) 0.128 cm^3 08/23/21 1503   Wound Assessment Eschar dry 08/23/21 1413   Drainage Amount None 08/23/21 1413   Drainage Description Serosanguinous 08/16/21 1410   Odor None 08/23/21 1413   Latonya-wound Assessment Intact 08/23/21 1413   Number of days: 14     Incision 07/16/21 Hip Right (Active)   Number of days: 38       Procedure Note  Indications:  Based on my examination of this patient's wound(s)/ulcer(s) today, debridement is required to promote healing and evaluate the wound base. Performed by: Ross Chery DPM    Consent obtained:  Yes    Time out taken:  Yes    Pain Control: Anesthetic  Anesthetic: 4% Lidocaine Liquid Topical     Debridement:Excisional Debridement    Using curette and tissue nippers the wound(s)/ulcer(s) was/were sharply debrided down through and including the removal of subcutaneous tissue. Devitalized Tissue Debrided:  fibrin, biofilm, slough and necrotic/eschar to stimulate bleeding to promote healing, post debridement good bleeding base and wound edges noted    Wound/Ulcer #: 1 and 2    Percent of Wound/Ulcer Debrided: 100%    Total Surface Area Debrided:  0.75 sq cm     Estimated Blood Loss:  Minimal  Hemostasis Achieved:  by pressure    Procedural Pain:  0  / 10   Post Procedural Pain:  0 / 10     Response to treatment:  Well tolerated by patient. Plan:   Treatment Note please see attached Discharge Instructions    Written patient dismissal instructions given to patient and signed by patient or POA.          Discharge Instructions       Visit Discharge/Physician Orders     Discharge condition: Stable     Assessment of pain at discharge:  none     Anesthetic used:  4% lidocaine     Discharge to: ECF     Left via:ambulance     Accompanied by: accompanied by self     ECF/[facility]: [facility]     Dressing Orders:  Clean all wounds of right leg, right plantar 1st metatarsal and plantar right heel with normal saline. Make sure to pack heel ulcer with silver alginate rope ( has depth)  Otherwise, apply silver alginate pad to rest of wounds. Secure with dry dressing. Change daily. Treatment Orders:     [facility] followup visit _____one week with Dr. Whitfield________________________  (Please note your next appointment above and if you are unable to keep, kindly give a 24 hour notice.  Thank you.)     Physician signature:__________________________        If you experience any of the following, please call the [address] [place] [address] [email or web address] during business hours:     * Increase in Pain  * Temperature over 101  * Increase in drainage from your wound  * Drainage with a foul odor  * Bleeding  * Increase in swelling  * Need for compression bandage changes due to slippage, breakthrough drainage.     If you need medical attention outside of the business hours of the [address] [place] [address] [email or web address] please contact your PCP or go to the nearest emergency room.                 Electronically signed by Richard Marte DPM on 8/23/2021 at 3:51 PM

## 2021-08-30 ENCOUNTER — HOSPITAL ENCOUNTER (OUTPATIENT)
Dept: GENERAL RADIOLOGY | Age: 81
Discharge: HOME | End: 2021-09-01
Payer: MEDICARE

## 2021-08-30 ENCOUNTER — OFFICE VISIT (OUTPATIENT)
Dept: ORTHOPEDIC SURGERY | Age: 81
End: 2021-08-30
Payer: MEDICARE

## 2021-08-30 ENCOUNTER — HOSPITAL ENCOUNTER (OUTPATIENT)
Dept: WOUND CARE | Age: 81
Discharge: HOME | End: 2021-08-30
Payer: MEDICARE

## 2021-08-30 VITALS
SYSTOLIC BLOOD PRESSURE: 122 MMHG | HEIGHT: 69 IN | TEMPERATURE: 96.2 F | DIASTOLIC BLOOD PRESSURE: 68 MMHG | BODY MASS INDEX: 21.18 KG/M2 | HEART RATE: 64 BPM | RESPIRATION RATE: 16 BRPM | WEIGHT: 143 LBS

## 2021-08-30 VITALS — TEMPERATURE: 98.8 F

## 2021-08-30 DIAGNOSIS — L97.412 DIABETIC ULCER OF RIGHT HEEL ASSOCIATED WITH TYPE 2 DIABETES MELLITUS, WITH FAT LAYER EXPOSED (HCC): ICD-10-CM

## 2021-08-30 DIAGNOSIS — E11.621 DIABETIC ULCER OF RIGHT HEEL ASSOCIATED WITH TYPE 2 DIABETES MELLITUS, WITH FAT LAYER EXPOSED (HCC): ICD-10-CM

## 2021-08-30 DIAGNOSIS — Z96.641 HISTORY OF RIGHT HIP HEMIARTHROPLASTY: ICD-10-CM

## 2021-08-30 DIAGNOSIS — Z96.641 HISTORY OF RIGHT HIP HEMIARTHROPLASTY: Primary | ICD-10-CM

## 2021-08-30 PROCEDURE — 99024 POSTOP FOLLOW-UP VISIT: CPT | Performed by: PHYSICIAN ASSISTANT

## 2021-08-30 PROCEDURE — 73502 X-RAY EXAM HIP UNI 2-3 VIEWS: CPT

## 2021-08-30 PROCEDURE — 11042 DBRDMT SUBQ TIS 1ST 20SQCM/<: CPT

## 2021-08-30 PROCEDURE — 99212 OFFICE O/P EST SF 10 MIN: CPT | Performed by: PHYSICIAN ASSISTANT

## 2021-08-30 RX ORDER — SODIUM BICARBONATE 325 MG/1
325 TABLET ORAL 2 TIMES DAILY
COMMUNITY
End: 2022-04-28

## 2021-08-30 NOTE — PROGRESS NOTES
Wound Healing Center Followup Visit Note    Referring Physician : Pat Keita MD  [address] RECORD NUMBER:  [id number]  AGE: 79 y.o. GENDER: male  : 1940  EPISODE DATE:  2021    Subjective:     Chief Complaint   Patient presents with    Wound Check     right pretibial, right  plantar and left heel ulcers      HISTORY of PRESENT ILLNESS HPI   Sarah Corcoran is a 79 y.o. male who presents today in regards to follow up evaluation and treatment of wound/ulcer. That patient's past medical, family and social hx were reviewed and changes were made if present. History of Wound Context: He presents in follow-up for right foot wounds. Patient currently at SNF due to unrelated issue. Overall doing well. He does have a new area on his right lower leg. Patient last seen while hospitalized , prior to that April here at the wound care center. Patient at present denies any pain. No fever or chills.         Wound/Ulcer Pain Timing/Severity: none  Quality of pain:   Severity:   / 10   Modifying Factors:   Associated Signs/Symptoms:      Ulcer Identification:  Ulcer Type: diabetic, pressure and neuropathic  Contributing Factors: diabetes and chronic pressure    8 Wounds plantar right foot and heel with 30% devitalized nonviable tissue. No purulence or odor. No surrounding erythema, fluctuance or crepitus. No pain. New area right lower leg superficial, stable. Loss of protective sensation.      21 Wounds plantar right foot and heel with 20% devitalized nonviable tissue. No purulence or odor. No surrounding erythema, fluctuance or crepitus. No pain. New area right lower leg superficial, stable. Loss of protective sensation. 21 Wound plantar right foot, healed, heel covered with devitalized nonviable tissue. No purulence or odor. No surrounding erythema, fluctuance or crepitus. No pain. New area right lower leg superficial, stable.   Loss of protective sensation. 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 ([address] 12/6/2019    S/P peripheral artery angioplasty 01/23/2020    bilateral legs -[place]ollipara    Skin ulcer of right foot with fat layer exposed ([address] 12/9/2019    Ulcer of right leg, with fat layer exposed ([address] 5/6/2019    Varicose veins of leg with edema, 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     Past Surgical History:   Procedure Laterality Date    COLONOSCOPY      CYSTOSCOPY N/A 5/18/2020    SUPRAPUBIC TUBE PLACEMENT performed by Paola Lino MD at [address]  1990's    lense implants bilaterally    FOOT DEBRIDEMENT Left 01/04/2017    wound debridement and delayed primary closure    FRACTURE SURGERY      L femur fracture surgery    HIP FRACTURE SURGERY Left 9/23/2020    LEFT HIP OPEN REDUCTION INTERNAL FIXATION performed by Linda Perez MD at [address] Right 7/16/2021    RIGHT HIP HEMIARTHROPLASTY performed by Dipak Altamirano DO at [facility]  04/23/2019    Dr. Errol Hurtado- PTA ant tibial    OTHER SURGICAL HISTORY  12/17/2019    Dr Errol Hurtado - PCI Right popliteal and Anterior tibial    PROSTATE BIOPSY  04/2016    SKIN BIOPSY  sept of 2018 last time    head and ears    TOE AMPUTATION Left 12/31/2016    2nd    TOE AMPUTATION Right 4/26/2019    AMPUTATION RIGHT SECOND TOE, FOOT DEBRIDEMENT performed by Al Basilio DPM at [address] Right 12/10/2019    AMPUTATION RIGHT 3rd DIGIT WITH PARTIAL RESECTION OF THIRD METATARSAL performed by Eri Mckeon DPM at [facility] TUR N/A 2020    CYSTOSCOPY PLASMA LOOP TRANSURETHRAL RESECTION PROSTATE performed by Bk Vaughan MD at [facility] OR    VASCULAR SURGERY       Family History   Problem Relation Age of Onset    Heart Disease Mother         CHF    Heart Disease Father     Heart Attack Father      Social History     Tobacco Use    Smoking status: Former Smoker     Packs/day: 0.50     Years: 2.00     Pack years: 1.00     Types: Cigarettes     Quit date:      Years since quittin.7    Smokeless tobacco: Never Used   Vaping Use    Vaping Use: Never used   Substance Use Topics    Alcohol use: Not Currently    Drug use: Not Currently     Allergies   Allergen Reactions    Latex Other (See Comments)     Pt unsure of reaction    Ciprofloxacin In D5w Itching    Pcn [Penicillins] Other (See Comments)     \"I just know my body doesn't like it. \" \"I had a reaction a long time ago, I know it affects my kidneys. \"    Other Rash     \"I get a rash on just my face if I eat Cumin or Chili. \"    Peanut-Containing Drug Products Itching     Current Outpatient Medications on File Prior to Encounter   Medication Sig Dispense Refill    insulin lispro (HUMALOG) 100 UNIT/ML injection vial Inject into the skin 3 times daily (before meals)      SITagliptin (JANUVIA) 100 MG tablet Take 100 mg by mouth daily      sodium bicarbonate 325 MG tablet Take 325 mg by mouth 2 times daily      metFORMIN (GLUCOPHAGE) 500 MG tablet Take 500 mg by mouth 2 times daily (with meals)      aspirin 81 MG EC tablet Take 1 tablet by mouth daily 30 tablet 3    metoprolol succinate (TOPROL XL) 25 MG extended release tablet Take 1 tablet by mouth daily 30 tablet 3    amLODIPine (NORVASC) 5 MG tablet Take 2 tablets by mouth daily 30 tablet 3    Charcoal 200 MG CAPS Take 200 mg by mouth daily       CHLOROPHYLL PO Take 1 tablet by mouth daily       Garlic 5669 MG TBEC Take 1,250 mg by mouth daily       Multiple Vitamins-Minerals (THERAPEUTIC MULTIVITAMIN-MINERALS) tablet Take 1 tablet by mouth daily       No current facility-administered medications on file prior to encounter. REVIEW OF SYSTEMS See HPI    Objective:    /68   Pulse 64   Temp 96.2 °F (35.7 °C) (Temporal)   Resp 16   Ht 5' 9\" (1.753 m)   Wt 143 lb (64.9 kg)   BMI 21.12 kg/m²   Wt Readings from Last 3 Encounters:   08/30/21 143 lb (64.9 kg)   08/23/21 143 lb (64.9 kg)   08/09/21 143 lb (64.9 kg)     PHYSICAL EXAM  CONSTITUTIONAL:   Awake, alert, cooperative   EYES:  lids and lashes normal   ENT: external ears and nose without lesions   NECK:  supple, symmetrical, trachea midline   SKIN:  Open wound     Assessment:     Problem List Items Addressed This Visit     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 2 diabetes mellitus, with fat layer exposed ([facility] [address] [address]          Pre Debridement Measurements:  Are located in the [place]  Documentation Flow Sheet  Post Debridement Measurements:  Wound/Ulcer Descriptions are Pre Debridement except measurements:     Wound 03/08/21 Foot Plantar;Right wound #1-met head (Active)   Wound Image   08/09/21 1445   Wound Etiology Diabetic Dale 3 03/08/21 1333   Dressing Status New dressing applied;Clean;Dry; Intact 08/23/21 1523   Wound Cleansed Cleansed with saline 08/23/21 1523   Dressing/Treatment Alginate with Ag;Dry dressing 08/23/21 1523   Offloading for Diabetic Foot Ulcers Other (comment) 08/23/21 1523   Wound Length (cm) 0 cm 08/30/21 1457   Wound Width (cm) 0 cm 08/30/21 1457   Wound Depth (cm) 0 cm 08/30/21 1457   Wound Surface Area (cm^2) 0 cm^2 08/30/21 1457   Change in Wound Size % (l*w) 100 08/30/21 1457   Wound Volume (cm^3) 0 cm^3 08/30/21 1457   Wound Healing % 100 08/30/21 1457   Post-Procedure Length (cm) 0.4 cm 08/23/21 1503   Post-Procedure Width (cm) 0.3 cm 08/23/21 1503   Post-Procedure Depth Venous 08/09/21 1445   Dressing Status New dressing applied;Clean;Dry; Intact 08/23/21 1523   Wound Cleansed Cleansed with saline 08/23/21 1523   Dressing/Treatment Alginate with Ag;Dry dressing 08/23/21 1523   Offloading for Diabetic Foot Ulcers Other (comment) 08/23/21 1523   Wound Length (cm) 0.4 cm 08/30/21 1457   Wound Width (cm) 0.1 cm 08/30/21 1457   Wound Depth (cm) 0.1 cm 08/30/21 1457   Wound Surface Area (cm^2) 0.04 cm^2 08/30/21 1457   Change in Wound Size % (l*w) 99.35 08/30/21 1457   Wound Volume (cm^3) 0.004 cm^3 08/30/21 1457   Wound Healing % 99 08/30/21 1457   Post-Procedure Length (cm) 0.8 cm 08/23/21 1503   Post-Procedure Width (cm) 0.8 cm 08/23/21 1503   Post-Procedure Depth (cm) 0.2 cm 08/23/21 1503   Post-Procedure Surface Area (cm^2) 0.64 cm^2 08/23/21 1503   Post-Procedure Volume (cm^3) 0.128 cm^3 08/23/21 1503   Wound Assessment Eschar dry 08/30/21 1457   Drainage Amount Scant 08/30/21 1457   Drainage Description Serosanguinous 08/30/21 1457   Odor None 08/30/21 1457   Latonya-wound Assessment Intact 08/30/21 1457   Number of days: 21     Incision 07/16/21 Hip Right (Active)   Number of days: 45       Procedure Note  Indications:  Based on my examination of this patient's wound(s)/ulcer(s) today, debridement is required to promote healing and evaluate the wound base. Performed by: Richard Marte DPM    Consent obtained:  Yes    Time out taken:  Yes    Pain Control: Anesthetic  Anesthetic: 4% Lidocaine Liquid Topical     Debridement:Excisional Debridement    Using curette and tissue nippers the wound(s)/ulcer(s) was/were sharply debrided down through and including the removal of subcutaneous tissue.         Devitalized Tissue Debrided:  fibrin, biofilm, slough and necrotic/eschar to stimulate bleeding to promote healing, post debridement good bleeding base and wound edges noted    Wound/Ulcer #: 3    Percent of Wound/Ulcer Debrided: 100%    Total Surface Area Debrided:  0.04 sq cm

## 2021-08-30 NOTE — PATIENT INSTRUCTIONS
Continue weightbearing as tolerated right lower extremity. Continue physical therapy at the facility. Continue wound care to right foot ulcers. Follow-up as needed. Call if any questions or concerns.

## 2021-08-30 NOTE — PROGRESS NOTES
Chief Complaint   Patient presents with    Follow-up     6wk f/u R hip elizabeth. Pain tolerable, denies numbness or tingling, fever or chills. Currently at [facility], participating in PT.    Other     XR in EPIC        OP:SURGEON: Dr. Jase Walls, DO  DATE OF PROCEDURE: 7-16-21  PROCEDURE: RIGHT HIP HEMIARTHROPLASTY    POD: 6 weeks    Subjective:  Gabriela Velasco is following up from the above surgery. He is WBAT on right lower extremity. He ambulates with no assistive device. Pain to extremity is none and is not taking prescribed pain medication. They denies numbness or tingling to the right lower extremity. Denies calf pain, chest pain, or shortness of breath. Patient has finished DVT prophylaxis. Patient is  participating in therapy at the skilled nursing facility. States that overall he is doing well and his right leg is getting stronger. States that he is able to ambulate without the use of any assistive devices. Of note, wound care is still treating him at the facility for right foot ulcers which he states are improving. Review of Systems -  All pertinent positives/negative in HPI     Objective:    General: Alert and oriented X 3, normocephalic atraumatic, external ears and eye normal, sclera clear, no acute distress, respirations easy and unlabored with no audible wheezes, skin warm and dry, speech and dress appropriate for noted age, affect euthymic. Extremity:  Right Lower Extremity  Skin clean dry and intact, without signs of infection   Incision well healed  no edema noted  Compartments supple throughout thigh and leg  Calf supple and not tender  negative Homans  Demonstrates good active motion with right hip flexion abduction. Good motion with flexion/extension right knee. Presents today in a wheelchair. States sensation intact to touch in sural, deep peroneal, superficial peroneal, saphenous, posterior tibial  nerve distributions to foot/ankle.   Palpable dorsalis pedis and posterior tibialis pulses, cap refill brisk in toes, foot warm/perfused. He does have a compressive dressing on his right foot. Temp 98.8 °F (37.1 °C)     XR:   AP pelvis and right hip films demonstrate a right hip hemiarthroplasty with the hardware in stable position and alignment. No evidence of hardware loosening or failure. Assessment:   Diagnosis Orders   1. History of right hip hemiarthroplasty       Plan:  X-rays reviewed and discussed. Continue weightbearing as tolerated right lower extremity. Continue physical therapy at the facility. Continue wound care to right foot ulcers. Follow-up as needed. Call if any questions or concerns. Electronically signed by DEMI Felix on 8/30/2021 at 2:03 PM  Note: This report was completed using [employer] voiced recognition software. Every effort has been made to ensure accuracy; however, inadvertent computerized transcription errors may be present.

## 2021-09-13 ENCOUNTER — HOSPITAL ENCOUNTER (OUTPATIENT)
Dept: WOUND CARE | Age: 81
Discharge: HOME | End: 2021-09-13
Payer: MEDICARE

## 2021-09-13 VITALS
SYSTOLIC BLOOD PRESSURE: 140 MMHG | RESPIRATION RATE: 16 BRPM | WEIGHT: 143 LBS | HEIGHT: 69 IN | HEART RATE: 68 BPM | TEMPERATURE: 96.6 F | DIASTOLIC BLOOD PRESSURE: 62 MMHG | BODY MASS INDEX: 21.18 KG/M2

## 2021-09-13 DIAGNOSIS — E11.621 DIABETIC ULCER OF RIGHT HEEL ASSOCIATED WITH TYPE 2 DIABETES MELLITUS, WITH FAT LAYER EXPOSED (HCC): Primary | ICD-10-CM

## 2021-09-13 DIAGNOSIS — L97.412 DIABETIC ULCER OF RIGHT HEEL ASSOCIATED WITH TYPE 2 DIABETES MELLITUS, WITH FAT LAYER EXPOSED (HCC): Primary | ICD-10-CM

## 2021-09-13 PROCEDURE — 99212 OFFICE O/P EST SF 10 MIN: CPT

## 2021-09-13 RX ORDER — LIDOCAINE HYDROCHLORIDE 20 MG/ML
JELLY TOPICAL ONCE
Status: CANCELLED | OUTPATIENT
Start: 2021-09-13 | End: 2021-09-13

## 2021-09-13 RX ORDER — BACITRACIN, NEOMYCIN, POLYMYXIN B 400; 3.5; 5 [USP'U]/G; MG/G; [USP'U]/G
OINTMENT TOPICAL ONCE
Status: CANCELLED | OUTPATIENT
Start: 2021-09-13 | End: 2021-09-13

## 2021-09-13 RX ORDER — GINSENG 100 MG
CAPSULE ORAL ONCE
Status: CANCELLED | OUTPATIENT
Start: 2021-09-13 | End: 2021-09-13

## 2021-09-13 RX ORDER — LIDOCAINE HYDROCHLORIDE 40 MG/ML
SOLUTION TOPICAL ONCE
Status: CANCELLED | OUTPATIENT
Start: 2021-09-13 | End: 2021-09-13

## 2021-09-13 RX ORDER — BACITRACIN ZINC AND POLYMYXIN B SULFATE 500; 1000 [USP'U]/G; [USP'U]/G
OINTMENT TOPICAL ONCE
Status: CANCELLED | OUTPATIENT
Start: 2021-09-13 | End: 2021-09-13

## 2021-09-13 RX ORDER — CLOBETASOL PROPIONATE 0.5 MG/G
OINTMENT TOPICAL ONCE
Status: CANCELLED | OUTPATIENT
Start: 2021-09-13 | End: 2021-09-13

## 2021-09-13 RX ORDER — LIDOCAINE 40 MG/G
CREAM TOPICAL ONCE
Status: CANCELLED | OUTPATIENT
Start: 2021-09-13 | End: 2021-09-13

## 2021-09-13 RX ORDER — BETAMETHASONE DIPROPIONATE 0.05 %
OINTMENT (GRAM) TOPICAL ONCE
Status: CANCELLED | OUTPATIENT
Start: 2021-09-13 | End: 2021-09-13

## 2021-09-13 RX ORDER — GENTAMICIN SULFATE 1 MG/G
OINTMENT TOPICAL ONCE
Status: CANCELLED | OUTPATIENT
Start: 2021-09-13 | End: 2021-09-13

## 2021-09-13 RX ORDER — LIDOCAINE 50 MG/G
OINTMENT TOPICAL ONCE
Status: CANCELLED | OUTPATIENT
Start: 2021-09-13 | End: 2021-09-13

## 2021-09-13 RX ORDER — LIDOCAINE HYDROCHLORIDE 40 MG/ML
SOLUTION TOPICAL ONCE
Status: COMPLETED | OUTPATIENT
Start: 2021-09-13 | End: 2021-09-13

## 2021-09-13 RX ADMIN — LIDOCAINE HYDROCHLORIDE: 40 SOLUTION TOPICAL at 15:18

## 2021-09-13 NOTE — PROGRESS NOTES
Wound Healing Center Followup Visit Note    Referring Physician : Noe Abraham MD  [address] RECORD NUMBER:  [id number]  AGE: 79 y.o. GENDER: male  : 1940  EPISODE DATE:  2021    Subjective:     Chief Complaint   Patient presents with    Wound Check     right heel and right pretibial ulcers      HISTORY of PRESENT ILLNESS DELLA Paz is a 79 y.o. male who presents today in regards to follow up evaluation and treatment of wound/ulcer. That patient's past medical, family and social hx were reviewed and changes were made if present. History of Wound Context: He presents in follow-up for right foot wounds. Patient currently at SNF due to unrelated issue. Overall doing well. He does have a new area on his right lower leg. Patient last seen while hospitalized , prior to that April here at the wound care center. Patient at present denies any pain. No fever or chills.         Wound/Ulcer Pain Timing/Severity: none  Quality of pain:   Severity:   / 10   Modifying Factors:   Associated Signs/Symptoms:      Ulcer Identification:  Ulcer Type: diabetic, pressure and neuropathic  Contributing Factors: diabetes and chronic pressure    8 Wounds plantar right foot and heel with 30% devitalized nonviable tissue. No purulence or odor. No surrounding erythema, fluctuance or crepitus. No pain. New area right lower leg superficial, stable. Loss of protective sensation.      21 Wounds plantar right foot and heel with 20% devitalized nonviable tissue. No purulence or odor. No surrounding erythema, fluctuance or crepitus. No pain. New area right lower leg superficial, stable. Loss of protective sensation. 21 Wound plantar right foot, healed, heel covered with devitalized nonviable tissue. No purulence or odor. No surrounding erythema, fluctuance or crepitus. No pain. New area right lower leg superficial, stable. Loss of protective sensation. D/C diana ashley ,     9-13-21 right heel ulcer, healed. D/C tx. Appropriate shoe gear discussed. Follow as needed. 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 ([address] 12/6/2019    S/P peripheral artery angioplasty 01/23/2020    bilateral legs -[place]    Skin ulcer of right foot with fat layer exposed ([address] 12/9/2019    Ulcer of right leg, with fat layer exposed ([address] 5/6/2019    Varicose veins of leg with edema, 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     Past Surgical History:   Procedure Laterality Date    COLONOSCOPY      CYSTOSCOPY N/A 5/18/2020    SUPRAPUBIC TUBE PLACEMENT performed by Josefina Yusuf MD at [address]  1990's    lense implants bilaterally    FOOT DEBRIDEMENT Left 01/04/2017    wound debridement and delayed primary closure    FRACTURE SURGERY      L femur fracture surgery    HIP FRACTURE SURGERY Left 9/23/2020    LEFT HIP OPEN REDUCTION INTERNAL FIXATION performed by Akanksha Melendez MD at [address] Right 7/16/2021    RIGHT HIP HEMIARTHROPLASTY performed by Vana Harada, DO at [facility] [address] HISTORY  04/23/2019    Dr. Rodriguez Pratt- PTA ant tibial    OTHER SURGICAL HISTORY  12/17/2019    Dr Rodriguez Pratt - PCI Right popliteal and Anterior tibial    PROSTATE BIOPSY  04/2016    SKIN BIOPSY  sept of 2018 last time    head and ears    TOE AMPUTATION Left 12/31/2016    2nd    TOE AMPUTATION Right 4/26/2019    AMPUTATION RIGHT SECOND TOE, FOOT DEBRIDEMENT performed by Hussein Hartman DPM at [facility] TOE AMPUTATION Right 12/10/2019    AMPUTATION RIGHT 3rd DIGIT WITH PARTIAL RESECTION OF THIRD METATARSAL performed by Hussein Hartman DPM at [facility] TURP N/A 2020    CYSTOSCOPY PLASMA LOOP TRANSURETHRAL RESECTION PROSTATE performed by Zayda Aranda MD at [facility] OR    VASCULAR SURGERY       Family History   Problem Relation Age of Onset    Heart Disease Mother         CHF    Heart Disease Father     Heart Attack Father      Social History     Tobacco Use    Smoking status: Former Smoker     Packs/day: 0.50     Years: 2.00     Pack years: 1.00     Types: Cigarettes     Quit date:      Years since quittin.7    Smokeless tobacco: Never Used   Vaping Use    Vaping Use: Never used   Substance Use Topics    Alcohol use: Not Currently    Drug use: Not Currently     Allergies   Allergen Reactions    Latex Other (See Comments)     Pt unsure of reaction    Ciprofloxacin In D5w Itching    Pcn [Penicillins] Other (See Comments)     \"I just know my body doesn't like it. \" \"I had a reaction a long time ago, I know it affects my kidneys. \"    Other Rash     \"I get a rash on just my face if I eat Cumin or Chili. \"    Peanut-Containing Drug Products Itching     Current Outpatient Medications on File Prior to Encounter   Medication Sig Dispense Refill    insulin lispro (HUMALOG) 100 UNIT/ML injection vial Inject into the skin 3 times daily (before meals)      SITagliptin (JANUVIA) 100 MG tablet Take 100 mg by mouth daily      sodium bicarbonate 325 MG tablet Take 325 mg by mouth 2 times daily      metFORMIN (GLUCOPHAGE) 500 MG tablet Take 500 mg by mouth 2 times daily (with meals)      aspirin 81 MG EC tablet Take 1 tablet by mouth daily 30 tablet 3    metoprolol succinate (TOPROL XL) 25 MG extended release tablet Take 1 tablet by mouth daily 30 tablet 3    amLODIPine (NORVASC) 5 MG tablet Take 2 tablets by mouth daily 30 tablet 3    Charcoal 200 MG CAPS Take 200 mg by mouth daily       CHLOROPHYLL PO Take 1 tablet by mouth daily       Garlic 8170 MG TBEC Take 1,250 mg by mouth daily       Multiple Vitamins-Minerals (THERAPEUTIC MULTIVITAMIN-MINERALS) tablet Take 1 tablet by mouth daily       No current facility-administered medications on file prior to encounter. REVIEW OF SYSTEMS See HPI    Objective:    BP (!) 140/62   Pulse 68   Temp 96.6 °F (35.9 °C) (Temporal)   Resp 16   Ht 5' 9\" (1.753 m)   Wt 143 lb (64.9 kg)   BMI 21.12 kg/m²   Wt Readings from Last 3 Encounters:   09/13/21 143 lb (64.9 kg)   08/30/21 143 lb (64.9 kg)   08/23/21 143 lb (64.9 kg)     PHYSICAL EXAM  CONSTITUTIONAL:   Awake, alert, cooperative   EYES:  lids and lashes normal   ENT: external ears and nose without lesions   NECK:  supple, symmetrical, trachea midline   SKIN:  Open wound     Assessment:     Right heel ulcer, healed. Wound 03/08/21 Foot Plantar;Right wound #1-met head (Active)   Wound Image   09/13/21 1519   Wound Etiology Diabetic Dale 3 03/08/21 1333   Dressing Status New dressing applied;Clean;Dry; Intact 09/13/21 1604   Wound Cleansed Cleansed with saline 08/23/21 1523   Dressing/Treatment Dry dressing 09/13/21 1604   Offloading for Diabetic Foot Ulcers Other (comment) 08/23/21 1523   Wound Length (cm) 0 cm 08/30/21 1457   Wound Width (cm) 0 cm 08/30/21 1457   Wound Depth (cm) 0 cm 08/30/21 1457   Wound Surface Area (cm^2) 0 cm^2 08/30/21 1457   Change in Wound Size % (l*w) 100 08/30/21 1457   Wound Volume (cm^3) 0 cm^3 08/30/21 1457   Wound Healing % 100 08/30/21 1457   Post-Procedure Length (cm) 0.4 cm 08/23/21 1503   Post-Procedure Width (cm) 0.3 cm 08/23/21 1503   Post-Procedure Depth (cm) 0.2 cm 08/23/21 1503   Post-Procedure Surface Area (cm^2) 0.12 cm^2 08/23/21 1503   Post-Procedure Volume (cm^3) 0.024 cm^3 08/23/21 1503   Undermining Starts ___ O'Clock 12 08/09/21 1445   Undermining Ends___ O'Clock 12 08/09/21 1445   Undermining Maxium Distance (cm) 0 08/30/21 1457   Wound Assessment Eschar dry 08/30/21 1457   Drainage Amount None 08/30/21 1457   Drainage Description Serosanguinous 08/16/21 1410   Odor None 08/30/21 1457   Latonya-wound Assessment Hyperkeratosis (callous) 08/30/21 1457   Number of days: 189       Wound 03/08/21 Heel Right;Plantar wound # 2 (Active)   Wound Image   09/13/21 1519   Wound Etiology Pressure Unstageable 03/08/21 1333   Dressing Status New dressing applied;Clean;Dry; Intact 09/13/21 1604   Wound Cleansed Cleansed with saline 08/23/21 1523   Dressing/Treatment Dry dressing 09/13/21 1604   Offloading for Diabetic Foot Ulcers Other (comment) 08/23/21 1523   Wound Length (cm) 0 cm 09/13/21 1519   Wound Width (cm) 0 cm 09/13/21 1519   Wound Depth (cm) 0 cm 09/13/21 1519   Wound Surface Area (cm^2) 0 cm^2 09/13/21 1519   Change in Wound Size % (l*w) 100 09/13/21 1519   Wound Volume (cm^3) 0 cm^3 09/13/21 1519   Post-Procedure Length (cm) 0.2 cm 08/30/21 1522   Post-Procedure Width (cm) 0.2 cm 08/30/21 1522   Post-Procedure Depth (cm) 0.3 cm 08/30/21 1522   Post-Procedure Surface Area (cm^2) 0.04 cm^2 08/30/21 1522   Post-Procedure Volume (cm^3) 0.012 cm^3 08/30/21 1522   Undermining Starts ___ O'Clock 12 08/23/21 1413   Undermining Ends___ O'Clock 12 08/23/21 1413   Undermining Maxium Distance (cm) 0 08/30/21 1457   Wound Assessment Eschar dry 08/30/21 1457   Drainage Amount None 08/30/21 1457   Drainage Description Serosanguinous 08/23/21 1413   Odor None 08/30/21 1457   Latonya-wound Assessment Hyperkeratosis (callous) 08/30/21 1457   Number of days: 189       Wound 08/09/21 Pretibial Right #3 (Active)   Wound Image   09/13/21 1519   Wound Etiology Venous 08/09/21 1445   Dressing Status New dressing applied;Clean;Dry; Intact 08/23/21 1523   Wound Cleansed Cleansed with saline 08/23/21 1523   Dressing/Treatment Alginate with Ag;Dry dressing 08/23/21 1523   Offloading for Diabetic Foot Ulcers Other (comment) 08/23/21 1523   Wound

## 2022-01-31 ENCOUNTER — HOSPITAL ENCOUNTER (OUTPATIENT)
Dept: WOUND CARE | Age: 82
Discharge: HOME | End: 2022-01-31
Payer: MEDICARE

## 2022-01-31 VITALS
HEIGHT: 69 IN | SYSTOLIC BLOOD PRESSURE: 138 MMHG | WEIGHT: 143 LBS | RESPIRATION RATE: 18 BRPM | BODY MASS INDEX: 21.18 KG/M2 | HEART RATE: 76 BPM | TEMPERATURE: 96.5 F | DIASTOLIC BLOOD PRESSURE: 70 MMHG

## 2022-01-31 DIAGNOSIS — L97.412 DIABETIC ULCER OF RIGHT HEEL ASSOCIATED WITH TYPE 2 DIABETES MELLITUS, WITH FAT LAYER EXPOSED (HCC): Primary | ICD-10-CM

## 2022-01-31 DIAGNOSIS — L97.525 DIABETIC ULCER OF OTHER PART OF LEFT FOOT ASSOCIATED WITH TYPE 2 DIABETES MELLITUS, WITH MUSCLE INVOLVEMENT WITHOUT EVIDENCE OF NECROSIS (HCC): ICD-10-CM

## 2022-01-31 DIAGNOSIS — E11.621 DIABETIC ULCER OF RIGHT HEEL ASSOCIATED WITH TYPE 2 DIABETES MELLITUS, WITH FAT LAYER EXPOSED (HCC): Primary | ICD-10-CM

## 2022-01-31 DIAGNOSIS — E11.621 DIABETIC ULCER OF OTHER PART OF LEFT FOOT ASSOCIATED WITH TYPE 2 DIABETES MELLITUS, WITH MUSCLE INVOLVEMENT WITHOUT EVIDENCE OF NECROSIS (HCC): ICD-10-CM

## 2022-01-31 PROCEDURE — 88304 TISSUE EXAM BY PATHOLOGIST: CPT

## 2022-01-31 PROCEDURE — 11043 DBRDMT MUSC&/FSCA 1ST 20/<: CPT

## 2022-01-31 PROCEDURE — 6370000000 HC RX 637 (ALT 250 FOR IP): Performed by: PODIATRIST

## 2022-01-31 RX ORDER — EMPAGLIFLOZIN 25 MG/1
25 TABLET, FILM COATED ORAL DAILY
COMMUNITY

## 2022-01-31 RX ORDER — CLOBETASOL PROPIONATE 0.5 MG/G
OINTMENT TOPICAL ONCE
Status: CANCELLED | OUTPATIENT
Start: 2022-01-31 | End: 2022-01-31

## 2022-01-31 RX ORDER — BACITRACIN, NEOMYCIN, POLYMYXIN B 400; 3.5; 5 [USP'U]/G; MG/G; [USP'U]/G
OINTMENT TOPICAL ONCE
Status: CANCELLED | OUTPATIENT
Start: 2022-01-31 | End: 2022-01-31

## 2022-01-31 RX ORDER — GENTAMICIN SULFATE 1 MG/G
OINTMENT TOPICAL ONCE
Status: CANCELLED | OUTPATIENT
Start: 2022-01-31 | End: 2022-01-31

## 2022-01-31 RX ORDER — LIDOCAINE HYDROCHLORIDE 20 MG/ML
JELLY TOPICAL ONCE
Status: CANCELLED | OUTPATIENT
Start: 2022-01-31 | End: 2022-01-31

## 2022-01-31 RX ORDER — LIDOCAINE HYDROCHLORIDE 40 MG/ML
SOLUTION TOPICAL ONCE
Status: CANCELLED | OUTPATIENT
Start: 2022-01-31 | End: 2022-01-31

## 2022-01-31 RX ORDER — BACITRACIN ZINC AND POLYMYXIN B SULFATE 500; 1000 [USP'U]/G; [USP'U]/G
OINTMENT TOPICAL ONCE
Status: CANCELLED | OUTPATIENT
Start: 2022-01-31 | End: 2022-01-31

## 2022-01-31 RX ORDER — BETAMETHASONE DIPROPIONATE 0.05 %
OINTMENT (GRAM) TOPICAL ONCE
Status: CANCELLED | OUTPATIENT
Start: 2022-01-31 | End: 2022-01-31

## 2022-01-31 RX ORDER — LIDOCAINE HYDROCHLORIDE 40 MG/ML
SOLUTION TOPICAL ONCE
Status: COMPLETED | OUTPATIENT
Start: 2022-01-31 | End: 2022-01-31

## 2022-01-31 RX ORDER — LIDOCAINE 50 MG/G
OINTMENT TOPICAL ONCE
Status: CANCELLED | OUTPATIENT
Start: 2022-01-31 | End: 2022-01-31

## 2022-01-31 RX ORDER — GINSENG 100 MG
CAPSULE ORAL ONCE
Status: CANCELLED | OUTPATIENT
Start: 2022-01-31 | End: 2022-01-31

## 2022-01-31 RX ORDER — LIDOCAINE 40 MG/G
CREAM TOPICAL ONCE
Status: CANCELLED | OUTPATIENT
Start: 2022-01-31 | End: 2022-01-31

## 2022-01-31 RX ADMIN — LIDOCAINE HYDROCHLORIDE 6 ML: 40 SOLUTION TOPICAL at 15:14

## 2022-01-31 NOTE — PROGRESS NOTES
Wound Healing Center  History and Physical/Consultation  Podiatry    Referring Physician : Jo Ann Mohan MD  [address] RECORD NUMBER:  [id number]  AGE: 79 y.o. GENDER: male  : 1940  EPISODE DATE:  2022  Subjective:     Chief Complaint   Patient presents with    Wound Check     left plantar foot         HISTORY of PRESENT ILLNESS HPI     Severa Bonier is a 79 y.o. male who presents today for wound/ulcer evaluation. History of Wound Context: Patient has a wound on his left foot that initially he was unaware of. He denies any trauma. He has been up walking. He denies any pain. They have been doing dressing changes. He has had similar wounds in the past.  He is a longstanding diabetic with peripheral vascular disease. Pt is not on abx at time of initial visit. 22 local care, strict NWB. Specimen to path.       Wound/Ulcer Pain Timing/Severity: none  Quality of pain:   Severity:   / 10   Modifying Factors:   Associated Signs/Symptoms:     Ulcer Identification:  Ulcer Type: diabetic and neuropathic  Contributing Factors: diabetes, chronic pressure and shear force    Diabetic/Pressure/Non Pressure Ulcers onl y:  Ulcer: N/A    If patient has diabetic lower extremity wounds  Dale Classification of diabetic lower extremity wounds:    Grade Description   []  0 No open wound   []  1 Superficial ulcer involving the full skin thickness   []  2 Deep ulcer involves ligament, tendon, joint capsule, or fascia  No bone involvement or abscess presence   []  3 Deep Ulcer with abcess formation and/or osteomyelitis   []  4 Localized gangrene   []  5 Extensive gangrene of the foot     Wound: N/A        PAST MEDICAL HISTORY      Diagnosis Date    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 ([address] 12/6/2019    S/P peripheral artery angioplasty 01/23/2020    bilateral legs -[facility]    Skin ulcer of right foot with fat layer exposed ([address] 12/9/2019    Ulcer of right leg, with fat layer exposed ([address] 5/6/2019    Varicose veins of leg with edema, 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     Past Surgical History:   Procedure Laterality Date    COLONOSCOPY      CYSTOSCOPY N/A 5/18/2020    SUPRAPUBIC TUBE PLACEMENT performed by Tonie Healy MD at [address] [facility]  1990's    lense implants bilaterally    FOOT DEBRIDEMENT Left 01/04/2017    wound debridement and delayed primary closure    FRACTURE SURGERY      L femur fracture surgery    HIP FRACTURE SURGERY Left 9/23/2020    LEFT HIP OPEN REDUCTION INTERNAL FIXATION performed by Sonya Sweeney MD at [address] Right 7/16/2021    RIGHT HIP HEMIARTHROPLASTY performed by DO Brandt at [address] [place] [address]  04/23/2019    Dr. Tuan Villar- PTA ant tibial    OTHER SURGICAL HISTORY  12/17/2019    Dr Tuan Villar - PCI Right popliteal and Anterior tibial    PROSTATE BIOPSY  04/2016    SKIN BIOPSY  sept of 2018 last time    head and ears    TOE AMPUTATION Left 12/31/2016    2nd    TOE AMPUTATION Right 4/26/2019    AMPUTATION RIGHT SECOND TOE, FOOT DEBRIDEMENT performed by Dasha Lucia DPM at [facility] Right 12/10/2019    AMPUTATION RIGHT 3rd DIGIT WITH PARTIAL RESECTION OF THIRD METATARSAL performed by Dasha Lucia DPM at [facility] [address] TURP N/A 5/18/2020    CYSTOSCOPY PLASMA LOOP TRANSURETHRAL RESECTION PROSTATE performed by Tonei Healy MD at [facility] OR    VASCULAR SURGERY       Family History   Problem Relation Age of Onset    Heart Disease Mother         CHF    Heart Disease Father     Heart Attack Father      Social History     Tobacco Use    Smoking status: Former Smoker     Packs/day: 0.50     Years: 2.00     Pack years: 1.00     Types: Cigarettes     Quit date:      Years since quittin.1    Smokeless tobacco: Never Used   Vaping Use    Vaping Use: Never used   Substance Use Topics    Alcohol use: Not Currently    Drug use: Not Currently     Allergies   Allergen Reactions    Latex Other (See Comments)     Pt unsure of reaction    Ciprofloxacin In D5w Itching    Pcn [Penicillins] Other (See Comments)     \"I just know my body doesn't like it. \" \"I had a reaction a long time ago, I know it affects my kidneys. \"    Other Rash     \"I get a rash on just my face if I eat Cumin or Chili. \"    Peanut-Containing Drug Products Itching     Current Outpatient Medications on File Prior to Encounter   Medication Sig Dispense Refill    empagliflozin (JARDIANCE) 25 MG tablet Take 25 mg by mouth daily      SITagliptin (JANUVIA) 100 MG tablet Take 100 mg by mouth daily      sodium bicarbonate 325 MG tablet Take 325 mg by mouth 2 times daily      metFORMIN (GLUCOPHAGE) 500 MG tablet Take 500 mg by mouth 2 times daily (with meals)      aspirin 81 MG EC tablet Take 1 tablet by mouth daily 30 tablet 3    metoprolol succinate (TOPROL XL) 25 MG extended release tablet Take 1 tablet by mouth daily 30 tablet 3    amLODIPine (NORVASC) 5 MG tablet Take 2 tablets by mouth daily 30 tablet 3    Garlic 2717 MG TBEC Take 1,250 mg by mouth daily       Multiple Vitamins-Minerals (THERAPEUTIC MULTIVITAMIN-MINERALS) tablet Take 1 tablet by mouth daily      insulin lispro (HUMALOG) 100 UNIT/ML injection vial Inject into the skin 4 times daily Sliding scale       No current facility-administered medications on file prior to encounter.        REVIEW OF SYSTEMS   ROS : All others Negative if blank [], Positive if [x]  General Vascular   [] Fevers [] Claudication   [] Chills [] Rest Pain   Skin Neurologic   [] Tissue Loss [] Lower extremity neuropathy     Objective:    /70   Pulse 76   Temp 96.5 °F (35.8 °C) (Temporal)   Resp 18   Ht 5' 9\" (1.753 m)   Wt 143 lb (64.9 kg)   BMI 21.12 kg/m²   Wt Readings from Last 3 Encounters:   01/31/22 143 lb (64.9 kg)   09/13/21 143 lb (64.9 kg)   08/30/21 143 lb (64.9 kg)       PHYSICAL EXAM   CONSTITUTIONAL:   Awake, alert, cooperative   PSYCHIATRIC :  Oriented to time, place and person      normal insight to disease process  EXTREMITIES:   Bilateral lower extremity exam demonstrates audible dorsalis pedis and posterior tibial pulses. Skin temperature warm proximal distal.  Wound appreciated plantar aspect left foot covered with devitalized nonviable tissue, with debridement there is devitalized nonviable deep tissue including muscle/fascia and tendon. There is no purulence, odor, erythema or increase in temperature. Does probe dorsal however not the bone as it is near previous digit amputation site.     Assessment:     Diabetic ulcer left foot    Pre Debridement Measurements:  Are located in the [facility]  Documentation Flow Sheet  Post Debridement Measurements:  Wound/Ulcer Descriptions are Pre Debridement except measurements:     Wound 01/31/22 Foot Left;Plantar #1 (Active)   Wound Image   01/31/22 1506   Wound Etiology Diabetic 01/31/22 1506   Wound Length (cm) 1.6 cm 01/31/22 1506   Wound Width (cm) 0.8 cm 01/31/22 1506   Wound Depth (cm) 0.5 cm 01/31/22 1506   Wound Surface Area (cm^2) 1.28 cm^2 01/31/22 1506   Wound Volume (cm^3) 0.64 cm^3 01/31/22 1506   Post-Procedure Length (cm) 1.8 cm 01/31/22 1518   Post-Procedure Width (cm) 1 cm 01/31/22 1518   Post-Procedure Depth (cm) 2.5 cm 01/31/22 1518   Post-Procedure Surface Area (cm^2) 1.8 cm^2 01/31/22 1518   Post-Procedure Volume (cm^3) 4.5 cm^3 01/31/22 1518   Wound Assessment Fibrin;Granulation tissue;Slough 01/31/22 1506   Drainage Amount Large 01/31/22 1506   Drainage Description Serosanguinous 01/31/22 1506   Odor Mild 01/31/22 1506   Latonya-wound Assessment Maceration; Hyperkeratosis (callous) 01/31/22 1506   Number of days: 0     Incision 07/16/21 Hip Right (Active)   Number of days: 199       Procedure Note  Indications:  Based on my examination of this patient's wound(s)/ulcer(s) today, debridement is required to promote healing and evaluate the wound base. Performed by: Evie Lee DPM    Consent obtained:  Yes    Time out taken:  Yes    Pain Control: Anesthetic  Anesthetic: 4% Lidocaine Liquid Topical     Debridement:Excisional Debridement    Using curette, #15 blade scalpel and tissue nippers the wound(s)/ulcer(s) was/were sharply debrided down through and including the removal of subcutaneous tissue and muscle/fascia. Devitalized Tissue Debrided:  fibrin, biofilm, slough and necrotic/eschar to stimulate bleeding to promote healing, post debridement good bleeding base and wound edges noted    Wound/Ulcer #: 1    Percent of Wound/Ulcer Debrided: 100%    Total Surface Area Debrided:  1.8 sq cm     Estimated Blood Loss:  Minimal  Hemostasis Achieved:  by pressure    Procedural Pain:  0  / 10   Post Procedural Pain:  0 / 10     Response to treatment:  Well tolerated by patient. A culture was not done. Plan:     Treatment Note please see attached Discharge Instructions    Written patient dismissal instructions given to patient and signed by patient or POA.          Discharge Instructions       Visit Discharge/Physician Orders    Discharge condition: Stable    Assessment of pain at discharge:minimal  Anesthetic used: 4%lidocaine    Discharge to: ECF    Left via:ambulance    Accompanied by: accompanied by self    [facility]: [facility]    Dressing Orders:left plantar foot cleanse with normal saline apply alginate and dry dressing daily    Treatment Orders:  Nonweightbearing left foot    Eat foods high in protein and vitamin c    Take multivitamin daily. [facility] followup visit __________1 week___________________  (Please note your next appointment above and if you are unable to keep, kindly give a 24 hour notice. Thank you.)    Physician signature:__________________________      If you experience any of the following, please call the [facility] during business hours:    * Increase in Pain  * Temperature over 101  * Increase in drainage from your wound  * Drainage with a foul odor  * Bleeding  * Increase in swelling  * Need for compression bandage changes due to slippage, breakthrough drainage. If you need medical attention outside of the business hours of the [facility] please contact your PCP or go to the nearest emergency room.         Electronically signed by Ava Sands DPM on 1/31/2022 at 3:32 PM

## 2022-02-07 ENCOUNTER — HOSPITAL ENCOUNTER (OUTPATIENT)
Dept: WOUND CARE | Age: 82
Discharge: HOME | End: 2022-02-07
Payer: MEDICARE

## 2022-02-07 VITALS
TEMPERATURE: 97 F | WEIGHT: 143 LBS | HEIGHT: 69 IN | BODY MASS INDEX: 21.18 KG/M2 | HEART RATE: 81 BPM | RESPIRATION RATE: 17 BRPM | SYSTOLIC BLOOD PRESSURE: 114 MMHG | DIASTOLIC BLOOD PRESSURE: 58 MMHG

## 2022-02-07 DIAGNOSIS — E11.621 DIABETIC ULCER OF RIGHT HEEL ASSOCIATED WITH TYPE 2 DIABETES MELLITUS, WITH FAT LAYER EXPOSED (HCC): Primary | ICD-10-CM

## 2022-02-07 DIAGNOSIS — L97.412 DIABETIC ULCER OF RIGHT HEEL ASSOCIATED WITH TYPE 2 DIABETES MELLITUS, WITH FAT LAYER EXPOSED (HCC): Primary | ICD-10-CM

## 2022-02-07 PROCEDURE — 11042 DBRDMT SUBQ TIS 1ST 20SQCM/<: CPT

## 2022-02-07 RX ORDER — CLOBETASOL PROPIONATE 0.5 MG/G
OINTMENT TOPICAL ONCE
Status: CANCELLED | OUTPATIENT
Start: 2022-02-07 | End: 2022-02-07

## 2022-02-07 RX ORDER — BACITRACIN ZINC AND POLYMYXIN B SULFATE 500; 1000 [USP'U]/G; [USP'U]/G
OINTMENT TOPICAL ONCE
Status: CANCELLED | OUTPATIENT
Start: 2022-02-07 | End: 2022-02-07

## 2022-02-07 RX ORDER — LIDOCAINE 40 MG/G
CREAM TOPICAL ONCE
Status: CANCELLED | OUTPATIENT
Start: 2022-02-07 | End: 2022-02-07

## 2022-02-07 RX ORDER — LIDOCAINE HYDROCHLORIDE 20 MG/ML
JELLY TOPICAL ONCE
Status: CANCELLED | OUTPATIENT
Start: 2022-02-07 | End: 2022-02-07

## 2022-02-07 RX ORDER — LIDOCAINE HYDROCHLORIDE 40 MG/ML
SOLUTION TOPICAL ONCE
Status: CANCELLED | OUTPATIENT
Start: 2022-02-07 | End: 2022-02-07

## 2022-02-07 RX ORDER — LIDOCAINE HYDROCHLORIDE 40 MG/ML
SOLUTION TOPICAL ONCE
Status: COMPLETED | OUTPATIENT
Start: 2022-02-07 | End: 2022-02-07

## 2022-02-07 RX ORDER — LIDOCAINE 50 MG/G
OINTMENT TOPICAL ONCE
Status: CANCELLED | OUTPATIENT
Start: 2022-02-07 | End: 2022-02-07

## 2022-02-07 RX ORDER — GENTAMICIN SULFATE 1 MG/G
OINTMENT TOPICAL ONCE
Status: CANCELLED | OUTPATIENT
Start: 2022-02-07 | End: 2022-02-07

## 2022-02-07 RX ORDER — BETAMETHASONE DIPROPIONATE 0.05 %
OINTMENT (GRAM) TOPICAL ONCE
Status: CANCELLED | OUTPATIENT
Start: 2022-02-07 | End: 2022-02-07

## 2022-02-07 RX ORDER — GINSENG 100 MG
CAPSULE ORAL ONCE
Status: CANCELLED | OUTPATIENT
Start: 2022-02-07 | End: 2022-02-07

## 2022-02-07 RX ORDER — BACITRACIN, NEOMYCIN, POLYMYXIN B 400; 3.5; 5 [USP'U]/G; MG/G; [USP'U]/G
OINTMENT TOPICAL ONCE
Status: CANCELLED | OUTPATIENT
Start: 2022-02-07 | End: 2022-02-07

## 2022-02-07 RX ADMIN — LIDOCAINE HYDROCHLORIDE: 40 SOLUTION TOPICAL at 14:35

## 2022-02-07 NOTE — PROGRESS NOTES
[facility] Followup Visit Note    Referring Physician : Alo Brandt MD  [address] RECORD NUMBER:  [id number]  AGE: 79 y.o. GENDER: male  : 1940  EPISODE DATE:  2022    Subjective:     Chief Complaint   Patient presents with    Wound Check     LLE      HISTORY of PRESENT ILLNESS HPI   Malathi Mcginnis is a 79 y.o. male who presents today in regards to follow up evaluation and treatment of wound/ulcer. That patient's past medical, family and social hx were reviewed and changes were made if present. History of Wound Context: Patient has a wound on his left foot that initially he was unaware of. He denies any trauma. He has been up walking. He denies any pain. They have been doing dressing changes. He has had similar wounds in the past.  He is a longstanding diabetic with peripheral vascular disease.     Pt is not on abx at time of initial visit.     22 local care, strict NWB. Specimen to path. 22 Bilateral lower extremity exam demonstrates audible dorsalis pedis and posterior tibial pulses. Skin temperature warm proximal distal.  Wound appreciated plantar aspect left foot covered with devitalized nonviable tissue, through suq q, no exposed muscle/fascia at present, improved. 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      Femoral-popliteal atherosclerosis ([address] 2017    Heel ulcer, right, with fat layer exposed ([facility] 5/6/2019    Hypertension     Osteomyelitis of third toe of right foot ([address] 12/6/2019    S/P peripheral artery angioplasty 01/23/2020    bilateral legs -Sheng Lester    Skin ulcer of right foot with fat layer exposed ([facility] 12/9/2019    Ulcer of right leg, with fat layer exposed ([facility] 5/6/2019    Varicose veins of leg with edema, 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     Past Surgical History:   Procedure Laterality Date    COLONOSCOPY      CYSTOSCOPY N/A 5/18/2020    SUPRAPUBIC TUBE PLACEMENT performed by Kennedy Daniels MD at [facility]  1990's    lense implants bilaterally    FOOT DEBRIDEMENT Left 01/04/2017    wound debridement and delayed primary closure    FRACTURE SURGERY      L femur fracture surgery    HIP FRACTURE SURGERY Left 9/23/2020    LEFT HIP OPEN REDUCTION INTERNAL FIXATION performed by Diamond Winter MD at [address] Right 7/16/2021    RIGHT HIP HEMIARTHROPLASTY performed by Angeles Dainel DO at [facility] [address] HISTORY  04/23/2019    Dr. George Nielson- PTA ant tibial    OTHER SURGICAL HISTORY  12/17/2019    Dr George Nielson - PCI Right popliteal and Anterior tibial    PROSTATE BIOPSY  04/2016    SKIN BIOPSY  sept of 2018 last time    head and ears    TOE AMPUTATION Left 12/31/2016    2nd    TOE AMPUTATION Right 4/26/2019    AMPUTATION RIGHT SECOND TOE, FOOT DEBRIDEMENT [facility] Right 12/10/2019    AMPUTATION RIGHT 3rd DIGIT WITH PARTIAL RESECTION OF THIRD METATARSAL performed by Yoana Jones DPM at [address] TURP N/A 5/18/2020    CYSTOSCOPY PLASMA LOOP TRANSURETHRAL RESECTION PROSTATE performed by Kennedy Daniels MD at [address] [place] [address]       Family History   Problem Relation Age of Onset    Heart Disease Mother         CHF    Heart Disease Father     Heart Attack Father      Social History     Tobacco Use    Smoking status: Former Smoker     Packs/day: 0.50     Years: 2.00     Pack years: 1.00     Types: Cigarettes     Quit date: 56     Years since quittin.1    Smokeless tobacco: Never Used   Vaping Use    Vaping Use: Never used   Substance Use Topics    Alcohol use: Not Currently    Drug use: Not Currently     Allergies   Allergen Reactions    Latex Other (See Comments)     Pt unsure of reaction    Ciprofloxacin In D5w Itching    Pcn [Penicillins] Other (See Comments)     \"I just know my body doesn't like it. \" \"I had a reaction a long time ago, I know it affects my kidneys. \"    Other Rash     \"I get a rash on just my face if I eat Cumin or Chili. \"    Peanut-Containing Drug Products Itching     Current Outpatient Medications on File Prior to Encounter   Medication Sig Dispense Refill    empagliflozin (JARDIANCE) 25 MG tablet Take 25 mg by mouth daily      insulin lispro (HUMALOG) 100 UNIT/ML injection vial Inject into the skin 4 times daily Sliding scale      SITagliptin (JANUVIA) 100 MG tablet Take 100 mg by mouth daily      sodium bicarbonate 325 MG tablet Take 325 mg by mouth 2 times daily      metFORMIN (GLUCOPHAGE) 500 MG tablet Take 500 mg by mouth 2 times daily (with meals)      aspirin 81 MG EC tablet Take 1 tablet by mouth daily 30 tablet 3    metoprolol succinate (TOPROL XL) 25 MG extended release tablet Take 1 tablet by mouth daily 30 tablet 3    amLODIPine (NORVASC) 5 MG tablet Take 2 tablets by mouth daily 30 tablet 3    Garlic 0279 MG TBEC Take 1,250 mg by mouth daily       Multiple Vitamins-Minerals (THERAPEUTIC MULTIVITAMIN-MINERALS) tablet Take 1 tablet by mouth daily       No current facility-administered medications on file prior to encounter.        REVIEW OF SYSTEMS See HPI    Objective:    BP (!) 114/58   Pulse 81   Temp 97 °F (36.1 °C) (Temporal)   Resp 17   Ht 5' 9\" (1.753 m)   Wt 143 lb (64.9 kg)   BMI 21.12 kg/m²   Wt Readings from Last 3 Encounters:   22 143 lb (64.9 kg) 01/31/22 143 lb (64.9 kg)   09/13/21 143 lb (64.9 kg)     PHYSICAL EXAM  CONSTITUTIONAL:   Awake, alert, cooperative   EYES:  lids and lashes normal   ENT: external ears and nose without lesions   NECK:  supple, symmetrical, trachea midline   SKIN:  Open wound     Assessment:     Diabetic ulcer    Pre Debridement Measurements:  Are located in the [place]  Documentation Flow Sheet  Post Debridement Measurements:  Wound/Ulcer Descriptions are Pre Debridement except measurements:     Wound 01/31/22 Foot Left;Plantar #1 (Active)   Wound Image   01/31/22 1506   Wound Etiology Diabetic 01/31/22 1506   Dressing Status New dressing applied 02/07/22 1448   Wound Cleansed Cleansed with saline 02/07/22 1448   Dressing/Treatment Alginate;Dry dressing 02/07/22 1448   Offloading for Diabetic Foot Ulcers Diabetic shoes/inserts 02/07/22 1448   Wound Length (cm) 1.2 cm 02/07/22 1430   Wound Width (cm) 0.6 cm 02/07/22 1430   Wound Depth (cm) 0.5 cm 02/07/22 1430   Wound Surface Area (cm^2) 0.72 cm^2 02/07/22 1430   Change in Wound Size % (l*w) 43.75 02/07/22 1430   Wound Volume (cm^3) 0.36 cm^3 02/07/22 1430   Wound Healing % 44 02/07/22 1430   Post-Procedure Length (cm) 1.3 cm 02/07/22 1443   Post-Procedure Width (cm) 0.7 cm 02/07/22 1443   Post-Procedure Depth (cm) 0.6 cm 02/07/22 1443   Post-Procedure Surface Area (cm^2) 0.91 cm^2 02/07/22 1443   Post-Procedure Volume (cm^3) 0.546 cm^3 02/07/22 1443   Wound Assessment Pink/red;Fibrin 02/07/22 1430   Drainage Amount Moderate 02/07/22 1430   Drainage Description Serosanguinous 02/07/22 1430   Odor None 02/07/22 1430   Latonya-wound Assessment Maceration; Hyperkeratosis (callous) 02/07/22 1430   Number of days: 6     Incision 07/16/21 Hip Right (Active)   Number of days: 206       Procedure Note  Indications:  Based on my examination of this patient's wound(s)/ulcer(s) today, debridement is required to promote healing and evaluate the wound base.     Performed by: Fabiola Cleveland DPM    Consent obtained:  Yes    Time out taken:  Yes    Pain Control: Anesthetic  Anesthetic: 4% Lidocaine Liquid Topical     Debridement:Excisional Debridement    Using curette and tissue nippers the wound(s)/ulcer(s) was/were sharply debrided down through and including the removal of subcutaneous tissue. Devitalized Tissue Debrided:  slough and necrotic/eschar to stimulate bleeding to promote healing, post debridement good bleeding base and wound edges noted    Wound/Ulcer #: 1    Percent of Wound/Ulcer Debrided: 100%    Total Surface Area Debrided:  0.72 sq cm     Estimated Blood Loss:  Minimal  Hemostasis Achieved:  by pressure    Procedural Pain:  0  / 10   Post Procedural Pain:  0 / 10     Response to treatment:  Well tolerated by patient. Plan:   Treatment Note please see attached Discharge Instructions    Written patient dismissal instructions given to patient and signed by patient or POA. Discharge Instructions       Visit Discharge/Physician Orders     Discharge condition: Stable     Assessment of pain at discharge:minimal  Anesthetic used: 4%lidocaine     Discharge to: ECF     Left via:ambulance     Accompanied by: accompanied by self     [facility]: [facility]     Dressing Orders:left plantar foot cleanse with normal saline apply alginate and dry dressing daily     Treatment Orders:  Nonweightbearing left foot     Eat foods high in protein and vitamin c     Take multivitamin daily.        [address] [place] [address],[address] followup visit __________1 week___________________  (Please note your next appointment above and if you are unable to keep, kindly give a 24 hour notice.  Thank you.)     Physician signature:__________________________        If you experience any of the following, please call the [address] [place] [address] Road during business hours:     * Increase in Pain  * Temperature over 101  * Increase in drainage from your wound  * Drainage with a foul odor  * Bleeding  * Increase in swelling  * Need for compression bandage changes due to slippage, breakthrough drainage.     If you need medical attention outside of the business hours of the [address] [place] [address] Road please contact your PCP or go to the nearest emergency room.                 Electronically signed by Jean Paul Grullon DPM on 2/7/2022 at 2:49 PM

## 2022-02-09 ENCOUNTER — TELEPHONE (OUTPATIENT)
Dept: VASCULAR SURGERY | Age: 82
End: 2022-02-09

## 2022-02-10 ENCOUNTER — OFFICE VISIT (OUTPATIENT)
Dept: VASCULAR SURGERY | Age: 82
End: 2022-02-10
Payer: MEDICARE

## 2022-02-10 VITALS — HEIGHT: 69 IN | BODY MASS INDEX: 20.73 KG/M2 | WEIGHT: 140 LBS

## 2022-02-10 DIAGNOSIS — L97.522 ULCERATED, FOOT, LEFT, WITH FAT LAYER EXPOSED (HCC): ICD-10-CM

## 2022-02-10 DIAGNOSIS — I73.9 PVD (PERIPHERAL VASCULAR DISEASE) WITH CLAUDICATION (HCC): Primary | ICD-10-CM

## 2022-02-10 DIAGNOSIS — Z98.62 S/P PERIPHERAL ARTERY ANGIOPLASTY: ICD-10-CM

## 2022-02-10 PROBLEM — L03.90 CELLULITIS: Status: RESOLVED | Noted: 2021-01-06 | Resolved: 2022-02-10

## 2022-02-10 PROBLEM — L03.115 CELLULITIS OF RIGHT LOWER EXTREMITY: Status: RESOLVED | Noted: 2021-01-05 | Resolved: 2022-02-10

## 2022-02-10 PROCEDURE — 1036F TOBACCO NON-USER: CPT | Performed by: SURGERY

## 2022-02-10 PROCEDURE — G8420 CALC BMI NORM PARAMETERS: HCPCS | Performed by: SURGERY

## 2022-02-10 PROCEDURE — 99214 OFFICE O/P EST MOD 30 MIN: CPT | Performed by: SURGERY

## 2022-02-10 PROCEDURE — 4040F PNEUMOC VAC/ADMIN/RCVD: CPT | Performed by: SURGERY

## 2022-02-10 PROCEDURE — 1123F ACP DISCUSS/DSCN MKR DOCD: CPT | Performed by: SURGERY

## 2022-02-10 PROCEDURE — G8427 DOCREV CUR MEDS BY ELIG CLIN: HCPCS | Performed by: SURGERY

## 2022-02-10 PROCEDURE — G8484 FLU IMMUNIZE NO ADMIN: HCPCS | Performed by: SURGERY

## 2022-02-10 NOTE — PROGRESS NOTES
last time    head and ears    TOE AMPUTATION Left 2016    2nd    TOE AMPUTATION Right 2019    AMPUTATION RIGHT SECOND TOE, FOOT DEBRIDEMENT performed by Pretty Jimenez DPM at [facility] TOE AMPUTATION Right 12/10/2019    AMPUTATION RIGHT 3rd DIGIT WITH PARTIAL RESECTION OF THIRD METATARSAL performed by Pretty Jimenez DPM at [facility] TURP N/A 2020    CYSTOSCOPY PLASMA LOOP TRANSURETHRAL RESECTION PROSTATE performed by Farzana Mccartney MD at [address] [place] [address]         Family History   Problem Relation Age of Onset    Heart Disease Mother         CHF    Heart Disease Father     Heart Attack Father        Social History     Socioeconomic History    Marital status:      Spouse name: Not on file    Number of children: 1    Years of education: Not on file    Highest education level: Not on file   Occupational History    Not on file   Tobacco Use    Smoking status: Former Smoker     Packs/day: 0.50     Years: 2.00     Pack years: 1.00     Types: Cigarettes     Quit date:      Years since quittin.1    Smokeless tobacco: Never Used   Vaping Use    Vaping Use: Never used   Substance and Sexual Activity    Alcohol use: Not Currently    Drug use: Not Currently    Sexual activity: Not on file   Other Topics Concern    Not on file   Social History Narrative    Not on file     Social Determinants of Health     Financial Resource Strain:     Difficulty of Paying Living Expenses: Not on file   Food Insecurity:     Worried About [address] in the Last Year: Not on file    [address] in the Last Year: Not on file   Transportation Needs:     Lack of Transportation (Medical): Not on file    Lack of Transportation (Non-Medical):  Not on file   Physical Activity:     Days of Exercise per Week: Not on file    Minutes of Exercise per Session: Not on file   Stress:     Feeling of Stress : Not on file   Social Connections:     Frequency of Communication with Friends and Family: Not on file    Frequency of Social Gatherings with Friends and Family: Not on file    Attends Confucianist Services: Not on file    Active Member of Clubs or Organizations: Not on file    Attends Club or Organization Meetings: Not on file    Marital Status: Not on file   Intimate Partner Violence:     Fear of Current or Ex-Partner: Not on file    Emotionally Abused: Not on file    Physically Abused: Not on file    Sexually Abused: Not on file   Housing Stability:     Unable to Pay for Housing in the Last Year: Not on file    Number of [place] in the Last Year: Not on file    Unstable Housing in the Last Year: Not on file       Review of Systems:    Eyes:  No blurring, diplopia or vision loss. Respiratory:  No cough, pleuritic chest pain, dyspnea, or wheezing. Cardiovascular: No angina, palpitations . Hypertension, hyperlipidemia  Musculoskeletal:  No arthritis or weakness. History of fracture of the right femoral neck  Neurologic:  No paralysis, paresis, paresthesia, seizures or headache. Endocrinology: Diabetes with diabetic neuropathy        Physical Exam:  General appearance:  Alert, awake, oriented x 3. No distress. Eyes:  Conjunctivae appear normal; PERRL  Neck:  No jugular venous distention, lymphadenopathy or thyromegaly. No evidence of carotid bruit  Lungs:  Clear to ausculation bilaterally. No rhonchi, crackles, wheezes  Heart:  Regular rate and rhythm. No rub or murmur  Abdomen:  Soft, non-tender. No masses, organomegaly. Musculoskeletal : No joint effusions, tenderness swelling    Neuro: Speech is intact. Moving all extremities. No focal motor or sensory deficits      Extremities:  Both feet are warm to touch.  The color of both feet is normal.    Patient does have multiple toe amputations bilaterally right more than the left    Patient does have a ulcer over the plantar surface left foot, fat layer exposed without any cellulitis    Pulses Right  Left Brachial 3 3    Radial    3=normal   Femoral 2 2  2=diminished   Popliteal    1=barely palpable   Dorsalis pedis 1 1  0=absent   Posterior tibial    4=aneurysmal             Other pertinent information:1. The past medical records were reviewed. Assessment:    1. PVD (peripheral vascular disease) with claudication ([address]    2. S/P peripheral artery angioplasty    3. Ulcerated, foot, left, with fat layer exposed ([address]              Plan:       Discussed with the patient and his wife, in the room, all options, risks benefits were explained, recommended complete lower extremity artery Doppler study, follow-up with his podiatrist at the wound care center regular basis, but call me if any worsening of the ulcer of the foot, in view of his condition, his line of the foot is improving, no vascular intervention is planned, continue the 81 mg Ecotrin daily              Patient was instructed to continue walking program and to call if any worsening of symptoms and to call if any focal lateralizing neurological symptoms like loss of speech, vision or loss of function of extremity. All the questions were answered. Orders Placed This Encounter   Procedures    VL LOWER EXTREMITY ARTERIAL SEGMENTAL PRESSURES W PPG             Indicated follow-up: Return in about 1 year (around 2/10/2023), or if symptoms worsen or fail to improve.

## 2022-02-14 ENCOUNTER — HOSPITAL ENCOUNTER (OUTPATIENT)
Dept: WOUND CARE | Age: 82
Discharge: HOME | End: 2022-02-14
Payer: MEDICARE

## 2022-02-14 VITALS
DIASTOLIC BLOOD PRESSURE: 68 MMHG | HEART RATE: 64 BPM | WEIGHT: 140 LBS | HEIGHT: 69 IN | SYSTOLIC BLOOD PRESSURE: 134 MMHG | BODY MASS INDEX: 20.73 KG/M2 | RESPIRATION RATE: 16 BRPM | TEMPERATURE: 96.7 F

## 2022-02-14 PROCEDURE — 11042 DBRDMT SUBQ TIS 1ST 20SQCM/<: CPT

## 2022-02-14 NOTE — PROGRESS NOTES
[facility] Followup Visit Note    Referring Physician : Jass Fallon MD  [address] RECORD NUMBER:  [id number]  AGE: 79 y.o. GENDER: male  : 1940  EPISODE DATE:  2022    Subjective:     Chief Complaint   Patient presents with    Wound Check     LEFT FOOT      HISTORY of PRESENT ILLNESS HPI   Maura Alcantara is a 79 y.o. male who presents today in regards to follow up evaluation and treatment of wound/ulcer. That patient's past medical, family and social hx were reviewed and changes were made if present. History of Wound Context: Patient has a wound on his left foot that initially he was unaware of. He denies any trauma. He has been up walking. He denies any pain. They have been doing dressing changes. He has had similar wounds in the past.  He is a longstanding diabetic with peripheral vascular disease.     Pt is not on abx at time of initial visit.     22 local care, strict NWB. Specimen to path. 22 Bilateral lower extremity exam demonstrates audible dorsalis pedis and posterior tibial pulses. Skin temperature warm proximal distal.  Wound appreciated plantar aspect left foot covered with devitalized nonviable tissue, through suq q, no exposed muscle/fascia at present, improved. 22 Bilateral lower extremity exam demonstrates audible dorsalis pedis and posterior tibial pulses. Skin temperature warm proximal distal.  Wound appreciated plantar aspect left foot with devitalized nonviable tissue, through suq q, no exposed muscle/fascia at present, continues to improve.       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 ([address] 12/6/2019    S/P peripheral artery angioplasty 01/23/2020    bilateral legs -[facility]    Skin ulcer of right foot with fat layer exposed ([address] 12/9/2019    Ulcer of right leg, with fat layer exposed ([address] 5/6/2019    Varicose veins of leg with edema, 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     Past Surgical History:   Procedure Laterality Date    COLONOSCOPY      CYSTOSCOPY N/A 5/18/2020    SUPRAPUBIC TUBE PLACEMENT performed by Shankar Canchola MD at [facility]  1990's    lense implants bilaterally    FOOT DEBRIDEMENT Left 01/04/2017    wound debridement and delayed primary closure    FRACTURE SURGERY      L femur fracture surgery    HIP FRACTURE SURGERY Left 9/23/2020    LEFT HIP OPEN REDUCTION INTERNAL FIXATION performed by Betha Hodgkins, MD at [address] Right 7/16/2021    RIGHT HIP HEMIARTHROPLASTY performed by Maria Eugenia Sinclair DO at [address]  04/23/2019    Dr. Al Epps- PTA ant tibial    OTHER SURGICAL HISTORY  12/17/2019    Dr Al Epps - PCI Right popliteal and Anterior tibial    PROSTATE BIOPSY  04/2016    SKIN BIOPSY  sept of 2018 last time    head and ears    TOE AMPUTATION Left 12/31/2016    2nd    TOE AMPUTATION Right 4/26/2019    AMPUTATION RIGHT SECOND TOE, FOOT DEBRIDEMENT performed by Carlyle Morgan DPM at [address] Right 12/10/2019    AMPUTATION RIGHT 3rd DIGIT WITH PARTIAL RESECTION OF THIRD METATARSAL performed by Carlyle Morgan DPM at [address] TURP N/A 5/18/2020    CYSTOSCOPY PLASMA LOOP TRANSURETHRAL RESECTION PROSTATE performed by Piero Jacobo MD at [address]       Family History   Problem Relation Age of Onset    Heart Disease Mother         CHF    Heart Disease Father     Heart Attack Father      Social History     Tobacco Use    Smoking status: Former Smoker     Packs/day: 0.50     Years: 2.00     Pack years: 1.00     Types: Cigarettes     Quit date:      Years since quittin.1    Smokeless tobacco: Never Used   Vaping Use    Vaping Use: Never used   Substance Use Topics    Alcohol use: Not Currently    Drug use: Not Currently     Allergies   Allergen Reactions    Latex Other (See Comments)     Pt unsure of reaction    Ciprofloxacin In D5w Itching    Pcn [Penicillins] Other (See Comments)     \"I just know my body doesn't like it. \" \"I had a reaction a long time ago, I know it affects my kidneys. \"    Other Rash     \"I get a rash on just my face if I eat Cumin or Chili. \"    Peanut-Containing Drug Products Itching     Current Outpatient Medications on File Prior to Encounter   Medication Sig Dispense Refill    empagliflozin (JARDIANCE) 25 MG tablet Take 25 mg by mouth daily      insulin lispro (HUMALOG) 100 UNIT/ML injection vial Inject into the skin 4 times daily Sliding scale      SITagliptin (JANUVIA) 100 MG tablet Take 100 mg by mouth daily      sodium bicarbonate 325 MG tablet Take 325 mg by mouth 2 times daily      metFORMIN (GLUCOPHAGE) 500 MG tablet Take 500 mg by mouth 2 times daily (with meals)      aspirin 81 MG EC tablet Take 1 tablet by mouth daily 30 tablet 3    metoprolol succinate (TOPROL XL) 25 MG extended release tablet Take 1 tablet by mouth daily 30 tablet 3    amLODIPine (NORVASC) 5 MG tablet Take 2 tablets by mouth daily 30 tablet 3    Garlic 7092 MG TBEC Take 1,250 mg by mouth daily       Multiple Vitamins-Minerals (THERAPEUTIC MULTIVITAMIN-MINERALS) tablet Take 1 tablet by mouth daily       No current facility-administered medications on file prior to encounter. REVIEW OF SYSTEMS See HPI    Objective:    /68   Pulse 64   Temp 96.7 °F (35.9 °C) (Temporal)   Resp 16   Ht 5' 9\" (1.753 m)   Wt 140 lb (63.5 kg)   BMI 20.67 kg/m²   Wt Readings from Last 3 Encounters:   02/14/22 140 lb (63.5 kg)   02/10/22 140 lb (63.5 kg)   02/07/22 143 lb (64.9 kg)     PHYSICAL EXAM  CONSTITUTIONAL:   Awake, alert, cooperative   EYES:  lids and lashes normal   ENT: external ears and nose without lesions   NECK:  supple, symmetrical, trachea midline   SKIN:  Open wound     Assessment:     Diabetic ulcer    Pre Debridement Measurements:  Are located in the [facility]  Documentation Flow Sheet  Post Debridement Measurements:  Wound/Ulcer Descriptions are Pre Debridement except measurements:     Wound 01/31/22 Foot Left;Plantar #1 (Active)   Wound Image   01/31/22 1506   Wound Etiology Diabetic 01/31/22 1506   Dressing Status New dressing applied 02/07/22 1448   Wound Cleansed Cleansed with saline 02/07/22 1448   Dressing/Treatment Alginate;Dry dressing 02/07/22 1448   Offloading for Diabetic Foot Ulcers Diabetic shoes/inserts 02/07/22 1448   Wound Length (cm) 0.6 cm 02/14/22 1412   Wound Width (cm) 0.4 cm 02/14/22 1412   Wound Depth (cm) 0.6 cm 02/14/22 1412   Wound Surface Area (cm^2) 0.24 cm^2 02/14/22 1412   Change in Wound Size % (l*w) 81.25 02/14/22 1412   Wound Volume (cm^3) 0.144 cm^3 02/14/22 1412   Wound Healing % 78 02/14/22 1412   Post-Procedure Length (cm) 0.7 cm 02/14/22 1428   Post-Procedure Width (cm) 0.4 cm 02/14/22 1428   Post-Procedure Depth (cm) 0.7 cm 02/14/22 1428   Post-Procedure Surface Area (cm^2) 0.28 cm^2 02/14/22 1428   Post-Procedure Volume (cm^3) 0.196 cm^3 02/14/22 1428   Wound Assessment Pale granulation tissue;Fibrin 02/14/22 1412   Drainage Amount Small 02/14/22 1412   Drainage Description Serous; Yellow 02/14/22 1412   Odor None 02/14/22 1412   Latonya-wound Assessment Maceration; Hyperkeratosis (callous) 02/14/22 1412   Number of days: 13     Incision 07/16/21 Hip Right (Active)   Number of days: 213       Procedure Note  Indications:  Based on my examination of this patient's wound(s)/ulcer(s) today, debridement is required to promote healing and evaluate the wound base. Performed by: Richmond Lu DPM    Consent obtained:  Yes    Time out taken:  Yes    Pain Control: Anesthetic  Anesthetic: 4% Lidocaine Liquid Topical     Debridement:Excisional Debridement    Using curette and tissue nippers the wound(s)/ulcer(s) was/were sharply debrided down through and including the removal of subcutaneous tissue. Devitalized Tissue Debrided:  slough and necrotic/eschar to stimulate bleeding to promote healing, post debridement good bleeding base and wound edges noted    Wound/Ulcer #: 1    Percent of Wound/Ulcer Debrided: 100%    Total Surface Area Debrided:  0.24 sq cm     Estimated Blood Loss:  Minimal  Hemostasis Achieved:  by pressure    Procedural Pain:  0  / 10   Post Procedural Pain:  0 / 10     Response to treatment:  Well tolerated by patient. Plan:   Treatment Note please see attached Discharge Instructions    Written patient dismissal instructions given to patient and signed by patient or POA. Discharge Instructions        Visit Discharge/Physician Orders     Discharge condition: Stable     Assessment of pain at discharge:minimal  Anesthetic used: 4%lidocaine     Discharge to: ECF     Left via:ambulance     Accompanied by: accompanied by self     [facility]F/[facility]: [facility]     Dressing Orders:left plantar foot cleanse with normal saline apply alginate and dry dressing daily     Treatment Orders:  Nonweightbearing left foot     Eat foods high in protein and vitamin c     Take multivitamin daily.        [facility] followup visit __________1 week___________________  (Please note your next appointment above and if you are unable to keep, kindly give a 24 hour notice.  Thank you.)     Physician signature:__________________________        If you experience any of the following, please call the [facility] [address] during business hours:     * Increase in Pain  * Temperature over 101  * Increase in drainage from your wound  * Drainage with a foul odor  * Bleeding  * Increase in swelling  * Need for compression bandage changes due to slippage, breakthrough drainage.     If you need medical attention outside of the business hours of the [facility] [address] please contact your PCP or go to the nearest emergency room.                                Electronically signed by Letty Thakkar DPM on 2/14/2022 at 2:36 PM

## 2022-02-21 ENCOUNTER — HOSPITAL ENCOUNTER (OUTPATIENT)
Dept: WOUND CARE | Age: 82
Discharge: HOME | End: 2022-02-21
Payer: MEDICARE

## 2022-02-21 VITALS
TEMPERATURE: 96.8 F | WEIGHT: 140 LBS | SYSTOLIC BLOOD PRESSURE: 130 MMHG | HEART RATE: 68 BPM | BODY MASS INDEX: 20.73 KG/M2 | DIASTOLIC BLOOD PRESSURE: 68 MMHG | HEIGHT: 69 IN | RESPIRATION RATE: 16 BRPM

## 2022-02-21 DIAGNOSIS — E11.621 DIABETIC ULCER OF RIGHT HEEL ASSOCIATED WITH TYPE 2 DIABETES MELLITUS, WITH FAT LAYER EXPOSED (HCC): Primary | ICD-10-CM

## 2022-02-21 DIAGNOSIS — L97.412 DIABETIC ULCER OF RIGHT HEEL ASSOCIATED WITH TYPE 2 DIABETES MELLITUS, WITH FAT LAYER EXPOSED (HCC): Primary | ICD-10-CM

## 2022-02-21 PROCEDURE — 6370000000 HC RX 637 (ALT 250 FOR IP): Performed by: PODIATRIST

## 2022-02-21 PROCEDURE — 11042 DBRDMT SUBQ TIS 1ST 20SQCM/<: CPT

## 2022-02-21 RX ORDER — CLOBETASOL PROPIONATE 0.5 MG/G
OINTMENT TOPICAL ONCE
Status: CANCELLED | OUTPATIENT
Start: 2022-02-21 | End: 2022-02-21

## 2022-02-21 RX ORDER — LIDOCAINE HYDROCHLORIDE 40 MG/ML
SOLUTION TOPICAL ONCE
Status: CANCELLED | OUTPATIENT
Start: 2022-02-21 | End: 2022-02-21

## 2022-02-21 RX ORDER — BETAMETHASONE DIPROPIONATE 0.05 %
OINTMENT (GRAM) TOPICAL ONCE
Status: CANCELLED | OUTPATIENT
Start: 2022-02-21 | End: 2022-02-21

## 2022-02-21 RX ORDER — LIDOCAINE HYDROCHLORIDE 20 MG/ML
JELLY TOPICAL ONCE
Status: CANCELLED | OUTPATIENT
Start: 2022-02-21 | End: 2022-02-21

## 2022-02-21 RX ORDER — LIDOCAINE 40 MG/G
CREAM TOPICAL ONCE
Status: CANCELLED | OUTPATIENT
Start: 2022-02-21 | End: 2022-02-21

## 2022-02-21 RX ORDER — LIDOCAINE HYDROCHLORIDE 40 MG/ML
SOLUTION TOPICAL ONCE
Status: COMPLETED | OUTPATIENT
Start: 2022-02-21 | End: 2022-02-21

## 2022-02-21 RX ORDER — GINSENG 100 MG
CAPSULE ORAL ONCE
Status: CANCELLED | OUTPATIENT
Start: 2022-02-21 | End: 2022-02-21

## 2022-02-21 RX ORDER — LIDOCAINE 50 MG/G
OINTMENT TOPICAL ONCE
Status: CANCELLED | OUTPATIENT
Start: 2022-02-21 | End: 2022-02-21

## 2022-02-21 RX ORDER — GENTAMICIN SULFATE 1 MG/G
OINTMENT TOPICAL ONCE
Status: CANCELLED | OUTPATIENT
Start: 2022-02-21 | End: 2022-02-21

## 2022-02-21 RX ORDER — BACITRACIN, NEOMYCIN, POLYMYXIN B 400; 3.5; 5 [USP'U]/G; MG/G; [USP'U]/G
OINTMENT TOPICAL ONCE
Status: CANCELLED | OUTPATIENT
Start: 2022-02-21 | End: 2022-02-21

## 2022-02-21 RX ORDER — BACITRACIN ZINC AND POLYMYXIN B SULFATE 500; 1000 [USP'U]/G; [USP'U]/G
OINTMENT TOPICAL ONCE
Status: CANCELLED | OUTPATIENT
Start: 2022-02-21 | End: 2022-02-21

## 2022-02-21 RX ADMIN — LIDOCAINE HYDROCHLORIDE 5 ML: 40 SOLUTION TOPICAL at 13:27

## 2022-02-21 ASSESSMENT — PAIN SCALES - GENERAL: PAINLEVEL_OUTOF10: 0

## 2022-02-21 NOTE — PROGRESS NOTES
"NOC Shift Report    Pt in bed at beginning of shift, breathing quiet and unlabored. Pt slept through shift. Pt slept 7 hours.     No pt complaints or concerns at this time.     No PRNs given. Will continue to monitor.     Precautions: Suicide,Assault    Addendum; 0719 Pt is awake at this time, paranoid about security person on the unit (here to pick a pt going out for ECT). Hyperventilating, lies on the floor \"you guys are out to kill me\", staff continue to reassure pt about being safe here at the hospital. \"Yes I believe this hospital is safer than the one I came from\" pt continues to be fixated on being restrained from at the ER.      Morning scheduled meds administered per pt's request. Pt appears calm afterwards, requests to watch tv, pt is currently in the lounge with peers. Will continue to monitor      " Wound Healing Center Followup Visit Note    Referring Physician : Vincent Carrillo MD  [address] RECORD NUMBER:  [id number]  AGE: 79 y.o. GENDER: male  : 1940  EPISODE DATE:  2022    Subjective:     Chief Complaint   Patient presents with    Wound Check     LEFT FOOT      HISTORY of PRESENT ILLNESS DELLA Ortiz is a 79 y.o. male who presents today in regards to follow up evaluation and treatment of wound/ulcer. That patient's past medical, family and social hx were reviewed and changes were made if present. History of Wound Context: Patient has a wound on his left foot that initially he was unaware of. He denies any trauma. He has been up walking. He denies any pain. They have been doing dressing changes. He has had similar wounds in the past.  He is a longstanding diabetic with peripheral vascular disease.     Pt is not on abx at time of initial visit.     22 local care, strict NWB. Specimen to path. 22 Bilateral lower extremity exam demonstrates audible dorsalis pedis and posterior tibial pulses. Skin temperature warm proximal distal.  Wound appreciated plantar aspect left foot covered with devitalized nonviable tissue, through suq q, no exposed muscle/fascia at present, improved. 22 Bilateral lower extremity exam demonstrates audible dorsalis pedis and posterior tibial pulses. Skin temperature warm proximal distal.  Wound appreciated plantar aspect left foot with devitalized nonviable tissue, through suq q, no exposed muscle/fascia at present, continues to improve. 22 new area right foot, noticed recently. No known trauma. No increased activity. Bilateral lower extremity exam demonstrates audible dorsalis pedis and posterior tibial pulses. Skin temperature warm proximal distal.  Wound appreciated plantar aspect left foot with devitalized nonviable tissue, through suq q, right foot area covered with devitalized nonviable tissue. With debridement through subcu. No purulence or odor. No surrounding erythema. Reinforced no pressure at all times. 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 ([address] 12/6/2019    S/P peripheral artery angioplasty 01/23/2020    bilateral legs -[facility]    Skin ulcer of right foot with fat layer exposed ([address] 12/9/2019    Ulcer of right leg, with fat layer exposed ([address] 5/6/2019    Varicose veins of leg with edema, 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     Past Surgical History:   Procedure Laterality Date    COLONOSCOPY      CYSTOSCOPY N/A 5/18/2020    SUPRAPUBIC TUBE PLACEMENT performed by Lety Garcia MD at [address] [place] [address]  1990's    lense implants bilaterally    FOOT DEBRIDEMENT Left 01/04/2017    wound debridement and delayed primary closure    FRACTURE SURGERY      L femur fracture surgery    HIP FRACTURE SURGERY Left 9/23/2020    LEFT HIP OPEN REDUCTION INTERNAL FIXATION performed by Destiny Finley MD at [address] Right 7/16/2021    RIGHT HIP HEMIARTHROPLASTY performed by Thelma Johnston DO at [facility]  04/23/2019    Dr. Lilly Conde- PTA ant tibial    OTHER SURGICAL HISTORY  12/17/2019     [place] - PCI Right popliteal and Anterior tibial    PROSTATE BIOPSY  2016    SKIN BIOPSY   last time    head and ears    TOE AMPUTATION Left 2016    2nd    TOE AMPUTATION Right 2019    AMPUTATION RIGHT SECOND TOE, FOOT DEBRIDEMENT performed by Michael Banda DPM at [facility] 22 TOE AMPUTATION Right 12/10/2019    AMPUTATION RIGHT 3rd DIGIT WITH PARTIAL RESECTION OF THIRD METATARSAL performed by Michael Banda DPM at [facility] TURP N/A 2020    CYSTOSCOPY PLASMA LOOP TRANSURETHRAL RESECTION PROSTATE performed by Darius Bradford MD at [facility] OR    VASCULAR SURGERY       Family History   Problem Relation Age of Onset    Heart Disease Mother         CHF    Heart Disease Father     Heart Attack Father      Social History     Tobacco Use    Smoking status: Former Smoker     Packs/day: 0.50     Years: 2.00     Pack years: 1.00     Types: Cigarettes     Quit date:      Years since quittin.1    Smokeless tobacco: Never Used   Vaping Use    Vaping Use: Never used   Substance Use Topics    Alcohol use: Not Currently    Drug use: Not Currently     Allergies   Allergen Reactions    Latex Other (See Comments)     Pt unsure of reaction    Ciprofloxacin In D5w Itching    Pcn [Penicillins] Other (See Comments)     \"I just know my body doesn't like it. \" \"I had a reaction a long time ago, I know it affects my kidneys. \"    Other Rash     \"I get a rash on just my face if I eat Cumin or Chili. \"    Peanut-Containing Drug Products Itching     Current Outpatient Medications on File Prior to Encounter   Medication Sig Dispense Refill    empagliflozin (JARDIANCE) 25 MG tablet Take 25 mg by mouth daily      insulin lispro (HUMALOG) 100 UNIT/ML injection vial Inject into the skin 4 times daily Sliding scale      SITagliptin (JANUVIA) 100 MG tablet Take 100 mg by mouth daily      sodium bicarbonate 325 MG tablet Take 325 mg by mouth 2 times daily      metFORMIN (GLUCOPHAGE) 500 MG tablet Take 500 mg by mouth 2 times daily (with meals)      aspirin 81 MG EC tablet Take 1 tablet by mouth daily 30 tablet 3    metoprolol succinate (TOPROL XL) 25 MG extended release tablet Take 1 tablet by mouth daily 30 tablet 3    amLODIPine (NORVASC) 5 MG tablet Take 2 tablets by mouth daily 30 tablet 3    Garlic 2426 MG TBEC Take 1,250 mg by mouth daily       Multiple Vitamins-Minerals (THERAPEUTIC MULTIVITAMIN-MINERALS) tablet Take 1 tablet by mouth daily       No current facility-administered medications on file prior to encounter.        REVIEW OF SYSTEMS See HPI    Objective:    /68   Pulse 68   Temp 96.8 °F (36 °C) (Temporal)   Resp 16   Ht 5' 9\" (1.753 m)   Wt 140 lb (63.5 kg)   BMI 20.67 kg/m²   Wt Readings from Last 3 Encounters:   02/21/22 140 lb (63.5 kg)   02/14/22 140 lb (63.5 kg)   02/10/22 140 lb (63.5 kg)     PHYSICAL EXAM  CONSTITUTIONAL:   Awake, alert, cooperative   EYES:  lids and lashes normal   ENT: external ears and nose without lesions   NECK:  supple, symmetrical, trachea midline   SKIN:  Open wound     Assessment:     Diabetic ulcer    Pre Debridement Measurements:  Are located in the [facility]  Documentation Flow Sheet  Post Debridement Measurements:  Wound/Ulcer Descriptions are Pre Debridement except measurements:     Wound 01/31/22 Foot Left;Plantar #1 (Active)   Wound Image   02/21/22 1323   Wound Etiology Diabetic 01/31/22 1506   Dressing Status New dressing applied 02/21/22 1341   Wound Cleansed Cleansed with saline 02/21/22 1341   Dressing/Treatment Alginate;Dry dressing 02/21/22 1341   Offloading for Diabetic Foot Ulcers Diabetic shoes/inserts 02/21/22 1341   Wound Length (cm) 0.4 cm 02/21/22 1323   Wound Width (cm) 0.3 cm 02/21/22 1323   Wound Depth (cm) 0.4 cm 02/21/22 1323   Wound Surface Area (cm^2) 0.12 cm^2 02/21/22 1323   Change in Wound Size % (l*w) 90.62 02/21/22 1323   Wound Volume (cm^3) 0.048 cm^3 02/21/22 1323   Wound Healing % 92 02/21/22 1323 Post-Procedure Length (cm) 0.5 cm 02/21/22 1336   Post-Procedure Width (cm) 0.3 cm 02/21/22 1336   Post-Procedure Depth (cm) 0.5 cm 02/21/22 1336   Post-Procedure Surface Area (cm^2) 0.15 cm^2 02/21/22 1336   Post-Procedure Volume (cm^3) 0.075 cm^3 02/21/22 1336   Wound Assessment Pale granulation tissue 02/21/22 1323   Drainage Amount Small 02/21/22 1323   Drainage Description Serosanguinous 02/21/22 1323   Odor None 02/21/22 1323   Latonya-wound Assessment Hyperkeratosis (callous) 02/21/22 1323   Number of days: 20       Wound 02/21/22 Foot Right;Plantar #2 (Active)   Wound Image   02/21/22 1323   Dressing Status New dressing applied 02/21/22 1341   Wound Cleansed Cleansed with saline 02/21/22 1341   Dressing/Treatment Alginate;Dry dressing 02/21/22 1341   Offloading for Diabetic Foot Ulcers Other (comment) 02/21/22 1341   Wound Length (cm) 0.3 cm 02/21/22 1323   Wound Width (cm) 0.3 cm 02/21/22 1323   Wound Depth (cm) 0.1 cm 02/21/22 1323   Wound Surface Area (cm^2) 0.09 cm^2 02/21/22 1323   Wound Volume (cm^3) 0.009 cm^3 02/21/22 1323   Post-Procedure Length (cm) 0.7 cm 02/21/22 1336   Post-Procedure Width (cm) 1.3 cm 02/21/22 1336   Post-Procedure Depth (cm) 0.2 cm 02/21/22 1336   Post-Procedure Surface Area (cm^2) 0.91 cm^2 02/21/22 1336   Post-Procedure Volume (cm^3) 0.182 cm^3 02/21/22 1336   Wound Assessment Pink/red 02/21/22 1323   Drainage Amount Small 02/21/22 1323   Drainage Description Serosanguinous 02/21/22 1323   Odor None 02/21/22 1323   Latonya-wound Assessment Hyperkeratosis (callous) 02/21/22 1323   Number of days: 0     Incision 07/16/21 Hip Right (Active)   Number of days: 220       Procedure Note  Indications:  Based on my examination of this patient's wound(s)/ulcer(s) today, debridement is required to promote healing and evaluate the wound base.     Performed by: Yoana Jones DPM    Consent obtained:  Yes    Time out taken:  Yes    Pain Control: Anesthetic  Anesthetic: 4% Lidocaine Liquid Topical     Debridement:Excisional Debridement    Using curette and tissue nippers the wound(s)/ulcer(s) was/were sharply debrided down through and including the removal of subcutaneous tissue. Devitalized Tissue Debrided:  slough and necrotic/eschar to stimulate bleeding to promote healing, post debridement good bleeding base and wound edges noted    Wound/Ulcer #: 1,2    Percent of Wound/Ulcer Debrided: 100%    Total Surface Area Debrided:  0.21 sq cm     Estimated Blood Loss:  Minimal  Hemostasis Achieved:  by pressure    Procedural Pain:  0  / 10   Post Procedural Pain:  0 / 10     Response to treatment:  Well tolerated by patient. Plan:   Treatment Note please see attached Discharge Instructions    Written patient dismissal instructions given to patient and signed by patient or POA. Discharge Instructions           Visit Discharge/Physician Orders     Discharge condition: Stable     Assessment of pain at discharge:minimal  Anesthetic used: 4%lidocaine     Discharge to: ECF     Left via:ambulance     Accompanied by: accompanied by self     ECF/HHA: [facility]     Dressing Orders:left/RIGHT plantar foot cleanse with normal saline apply alginate and dry dressing daily     Treatment Orders:  Nonweightbearing left foot     Eat foods high in protein and vitamin c     Take multivitamin daily.        [facility] followup visit __________1 week___________________  (Please note your next appointment above and if you are unable to keep, kindly give a 24 hour notice.  Thank you.)     Physician signature:__________________________        If you experience any of the following, please call the [facility] [address] during business hours:     * Increase in Pain  * Temperature over 101  * Increase in drainage from your wound  * Drainage with a foul odor  * Bleeding  * Increase in swelling  * Need for compression bandage changes due to slippage, breakthrough drainage.     If you need medical attention outside of the business hours of the [address] [place] [address] Road please contact your PCP or go to the nearest emergency room.                                                     Electronically signed by Aubrey Kawasaki, DPM on 2/21/2022 at 2:33 PM

## 2022-02-28 ENCOUNTER — HOSPITAL ENCOUNTER (OUTPATIENT)
Dept: WOUND CARE | Age: 82
Discharge: HOME | End: 2022-02-28
Payer: MEDICARE

## 2022-02-28 VITALS
RESPIRATION RATE: 16 BRPM | HEART RATE: 80 BPM | DIASTOLIC BLOOD PRESSURE: 70 MMHG | SYSTOLIC BLOOD PRESSURE: 142 MMHG | WEIGHT: 140 LBS | BODY MASS INDEX: 20.73 KG/M2 | HEIGHT: 69 IN | TEMPERATURE: 96.5 F

## 2022-02-28 DIAGNOSIS — L97.412 DIABETIC ULCER OF RIGHT HEEL ASSOCIATED WITH TYPE 2 DIABETES MELLITUS, WITH FAT LAYER EXPOSED (HCC): Primary | ICD-10-CM

## 2022-02-28 DIAGNOSIS — E11.621 DIABETIC ULCER OF RIGHT HEEL ASSOCIATED WITH TYPE 2 DIABETES MELLITUS, WITH FAT LAYER EXPOSED (HCC): Primary | ICD-10-CM

## 2022-02-28 PROCEDURE — 11042 DBRDMT SUBQ TIS 1ST 20SQCM/<: CPT

## 2022-02-28 PROCEDURE — 6370000000 HC RX 637 (ALT 250 FOR IP): Performed by: PODIATRIST

## 2022-02-28 RX ORDER — BACITRACIN, NEOMYCIN, POLYMYXIN B 400; 3.5; 5 [USP'U]/G; MG/G; [USP'U]/G
OINTMENT TOPICAL ONCE
Status: CANCELLED | OUTPATIENT
Start: 2022-02-28 | End: 2022-02-28

## 2022-02-28 RX ORDER — GINSENG 100 MG
CAPSULE ORAL ONCE
Status: CANCELLED | OUTPATIENT
Start: 2022-02-28 | End: 2022-02-28

## 2022-02-28 RX ORDER — CLOBETASOL PROPIONATE 0.5 MG/G
OINTMENT TOPICAL ONCE
Status: CANCELLED | OUTPATIENT
Start: 2022-02-28 | End: 2022-02-28

## 2022-02-28 RX ORDER — LIDOCAINE 50 MG/G
OINTMENT TOPICAL ONCE
Status: CANCELLED | OUTPATIENT
Start: 2022-02-28 | End: 2022-02-28

## 2022-02-28 RX ORDER — BACITRACIN ZINC AND POLYMYXIN B SULFATE 500; 1000 [USP'U]/G; [USP'U]/G
OINTMENT TOPICAL ONCE
Status: CANCELLED | OUTPATIENT
Start: 2022-02-28 | End: 2022-02-28

## 2022-02-28 RX ORDER — GENTAMICIN SULFATE 1 MG/G
OINTMENT TOPICAL ONCE
Status: CANCELLED | OUTPATIENT
Start: 2022-02-28 | End: 2022-02-28

## 2022-02-28 RX ORDER — LIDOCAINE HYDROCHLORIDE 40 MG/ML
SOLUTION TOPICAL ONCE
Status: COMPLETED | OUTPATIENT
Start: 2022-02-28 | End: 2022-02-28

## 2022-02-28 RX ORDER — LIDOCAINE HYDROCHLORIDE 40 MG/ML
SOLUTION TOPICAL ONCE
Status: CANCELLED | OUTPATIENT
Start: 2022-02-28 | End: 2022-02-28

## 2022-02-28 RX ORDER — LIDOCAINE HYDROCHLORIDE 20 MG/ML
JELLY TOPICAL ONCE
Status: CANCELLED | OUTPATIENT
Start: 2022-02-28 | End: 2022-02-28

## 2022-02-28 RX ORDER — BETAMETHASONE DIPROPIONATE 0.05 %
OINTMENT (GRAM) TOPICAL ONCE
Status: CANCELLED | OUTPATIENT
Start: 2022-02-28 | End: 2022-02-28

## 2022-02-28 RX ORDER — LIDOCAINE 40 MG/G
CREAM TOPICAL ONCE
Status: CANCELLED | OUTPATIENT
Start: 2022-02-28 | End: 2022-02-28

## 2022-02-28 RX ADMIN — LIDOCAINE HYDROCHLORIDE 8 ML: 40 SOLUTION TOPICAL at 14:34

## 2022-02-28 NOTE — PROGRESS NOTES
[facility] Followup Visit Note    Referring Physician : Joy Green MD  [address] RECORD NUMBER:  [id number]  AGE: 79 y.o. GENDER: male  : 1940  EPISODE DATE:  2022    Subjective:     Chief Complaint   Patient presents with    Wound Check     bilateral plantar feet      HISTORY of PRESENT ILLNESS HPI   Keith Dozier is a 79 y.o. male who presents today in regards to follow up evaluation and treatment of wound/ulcer. That patient's past medical, family and social hx were reviewed and changes were made if present. History of Wound Context: Patient has a wound on his left foot that initially he was unaware of. He denies any trauma. He has been up walking. He denies any pain. They have been doing dressing changes. He has had similar wounds in the past.  He is a longstanding diabetic with peripheral vascular disease.     Pt is not on abx at time of initial visit.     22 local care, strict NWB. Specimen to path. 22 Bilateral lower extremity exam demonstrates audible dorsalis pedis and posterior tibial pulses. Skin temperature warm proximal distal.  Wound appreciated plantar aspect left foot covered with devitalized nonviable tissue, through suq q, no exposed muscle/fascia at present, improved. 22 Bilateral lower extremity exam demonstrates audible dorsalis pedis and posterior tibial pulses. Skin temperature warm proximal distal.  Wound appreciated plantar aspect left foot with devitalized nonviable tissue, through suq q, no exposed muscle/fascia at present, continues to improve. 22 new area right foot, noticed recently. No known trauma. No increased activity. Bilateral lower extremity exam demonstrates audible dorsalis pedis and posterior tibial pulses.   Skin temperature warm proximal distal.  Wound appreciated plantar aspect left foot with devitalized nonviable tissue, through suq q, right foot area covered with devitalized nonviable tissue. With debridement through subcu. No purulence or odor. No surrounding erythema. Reinforced no pressure at all times. 2-28-22 Bilateral lower extremity exam:  Wound appreciated plantar aspect left foot with devitalized nonviable tissue, through suq q, right foot area with beefy and devitalized nonviable tissue. With debridement through subcu. No purulence or odor. No surrounding erythema. Reinforced no pressure at all times. 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 ([address] 12/6/2019    S/P peripheral artery angioplasty 01/23/2020    bilateral legs -[place]    Skin ulcer of right foot with fat layer exposed ([address] 12/9/2019    Ulcer of right leg, with fat layer exposed ([address] 5/6/2019    Varicose veins of leg with edema, 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     Past Surgical History:   Procedure Laterality Date    COLONOSCOPY      CYSTOSCOPY N/A 5/18/2020    SUPRAPUBIC TUBE PLACEMENT performed by Juan Mitchell MD at [address] [facility] [facility]  1990's    lense implants bilaterally    FOOT DEBRIDEMENT Left 01/04/2017    wound debridement and delayed primary closure    FRACTURE SURGERY      L femur fracture surgery    HIP FRACTURE SURGERY Left 9/23/2020    LEFT HIP OPEN REDUCTION INTERNAL FIXATION performed by Diana Connolly MD at [address] Right 2021    RIGHT HIP HEMIARTHROPLASTY performed by Eleuterio Marquez DO at [facility] HISTORY  2019    Dr. Chandler Johnson- PTA ant tibial    OTHER SURGICAL HISTORY  2019    Dr Chandler Johnson - PCI Right popliteal and Anterior tibial    PROSTATE BIOPSY  2016    SKIN BIOPSY   last time    head and ears    TOE AMPUTATION Left 2016    2nd    TOE AMPUTATION Right 2019    AMPUTATION RIGHT SECOND TOE, FOOT DEBRIDEMENT performed by Letty Thakkar DPM at [facility] TOE AMPUTATION Right 12/10/2019    AMPUTATION RIGHT 3rd DIGIT WITH PARTIAL RESECTION OF THIRD METATARSAL performed by Letty Thakkar DPM at [facility] TURP N/A 2020    CYSTOSCOPY PLASMA LOOP TRANSURETHRAL RESECTION PROSTATE performed by Ranjana Carreno MD at [facility] OR    VASCULAR SURGERY       Family History   Problem Relation Age of Onset    Heart Disease Mother         CHF    Heart Disease Father     Heart Attack Father      Social History     Tobacco Use    Smoking status: Former Smoker     Packs/day: 0.50     Years: 2.00     Pack years: 1.00     Types: Cigarettes     Quit date:      Years since quittin.2    Smokeless tobacco: Never Used   Vaping Use    Vaping Use: Never used   Substance Use Topics    Alcohol use: Not Currently    Drug use: Not Currently     Allergies   Allergen Reactions    Latex Other (See Comments)     Pt unsure of reaction    Ciprofloxacin In D5w Itching    Pcn [Penicillins] Other (See Comments)     \"I just know my body doesn't like it. \" \"I had a reaction a long time ago, I know it affects my kidneys. \"    Other Rash     \"I get a rash on just my face if I eat Cumin or Chili. \"    Peanut-Containing Drug Products Itching     Current Outpatient Medications on File Prior to Encounter   Medication Sig Dispense Refill    empagliflozin (JARDIANCE) 25 MG tablet Take 25 mg by mouth daily      SITagliptin (JANUVIA) 100 MG tablet Take 100 mg by mouth daily      sodium bicarbonate 325 MG tablet Take 325 mg by mouth 2 times daily      metFORMIN (GLUCOPHAGE) 500 MG tablet Take 500 mg by mouth 2 times daily (with meals)      aspirin 81 MG EC tablet Take 1 tablet by mouth daily 30 tablet 3    metoprolol succinate (TOPROL XL) 25 MG extended release tablet Take 1 tablet by mouth daily 30 tablet 3    amLODIPine (NORVASC) 5 MG tablet Take 2 tablets by mouth daily 30 tablet 3    Garlic 1230 MG TBEC Take 1,250 mg by mouth daily       Multiple Vitamins-Minerals (THERAPEUTIC MULTIVITAMIN-MINERALS) tablet Take 1 tablet by mouth daily      insulin lispro (HUMALOG) 100 UNIT/ML injection vial Inject into the skin 4 times daily Sliding scale       No current facility-administered medications on file prior to encounter.        REVIEW OF SYSTEMS See HPI    Objective:    BP (!) 142/70   Pulse 80   Temp 96.5 °F (35.8 °C) (Temporal)   Resp 16   Ht 5' 9\" (1.753 m)   Wt 140 lb (63.5 kg)   BMI 20.67 kg/m²   Wt Readings from Last 3 Encounters:   02/28/22 140 lb (63.5 kg)   02/21/22 140 lb (63.5 kg)   02/14/22 140 lb (63.5 kg)     PHYSICAL EXAM  CONSTITUTIONAL:   Awake, alert, cooperative   EYES:  lids and lashes normal   ENT: external ears and nose without lesions   NECK:  supple, symmetrical, trachea midline   SKIN:  Open wound     Assessment:     Diabetic ulcer    Pre Debridement Measurements:  Are located in the [facility]  Documentation Flow Sheet  Post Debridement Measurements:  Wound/Ulcer Descriptions are Pre Debridement except measurements:     Wound 01/31/22 Foot Left;Plantar #1 (Active)   Wound Image   02/21/22 1323   Wound Etiology Diabetic 01/31/22 1506   Dressing Status New dressing applied 02/28/22 1447   Wound Cleansed Cleansed with saline 02/28/22 1447   Dressing/Treatment Alginate;Dry dressing 02/28/22 1447   Offloading for Diabetic Foot Ulcers Diabetic shoes/inserts 02/28/22 1447   Wound Length (cm) 0.2 cm 02/28/22 1430   Wound Width (cm) 0.2 cm 02/28/22 1430   Wound Depth (cm) 0.2 cm 02/28/22 1430   Wound Surface Area (cm^2) 0.04 cm^2 02/28/22 1430   Change in Wound Size % (l*w) 96.88 02/28/22 1430   Wound Volume (cm^3) 0.008 cm^3 02/28/22 1430   Wound Healing % 99 02/28/22 1430   Post-Procedure Length (cm) 0.3 cm 02/28/22 1442   Post-Procedure Width (cm) 0.3 cm 02/28/22 1442   Post-Procedure Depth (cm) 0.2 cm 02/28/22 1442   Post-Procedure Surface Area (cm^2) 0.09 cm^2 02/28/22 1442   Post-Procedure Volume (cm^3) 0.018 cm^3 02/28/22 1442   Wound Assessment Fibrin 02/28/22 1430   Drainage Amount Scant 02/28/22 1430   Drainage Description Serous 02/28/22 1430   Odor None 02/28/22 1430   Latonya-wound Assessment Hyperkeratosis (callous) 02/28/22 1430   Number of days: 28       Wound 02/21/22 Foot Right;Plantar #2 (Active)   Wound Image   02/21/22 1323   Dressing Status New dressing applied 02/28/22 1447   Wound Cleansed Cleansed with saline 02/28/22 1447   Dressing/Treatment Alginate;Dry dressing 02/28/22 1447   Offloading for Diabetic Foot Ulcers Diabetic shoes/inserts 02/28/22 1447   Wound Length (cm) 0.7 cm 02/28/22 1430   Wound Width (cm) 1.4 cm 02/28/22 1430   Wound Depth (cm) 0.1 cm 02/28/22 1430   Wound Surface Area (cm^2) 0.98 cm^2 02/28/22 1430   Change in Wound Size % (l*w) -988.89 02/28/22 1430   Wound Volume (cm^3) 0.098 cm^3 02/28/22 1430   Wound Healing % -989 02/28/22 1430   Post-Procedure Length (cm) 1 cm 02/28/22 1442   Post-Procedure Width (cm) 1.4 cm 02/28/22 1442   Post-Procedure Depth (cm) 0.2 cm 02/28/22 1442   Post-Procedure Surface Area (cm^2) 1.4 cm^2 02/28/22 1442   Post-Procedure Volume (cm^3) 0.28 cm^3 02/28/22 1442   Wound Assessment Pale granulation tissue;Fibrin 02/28/22 1430   Drainage Amount Moderate 02/28/22 1430   Drainage Description Yellow;Brown 02/28/22 1430   Odor None 02/28/22 1430   Latonya-wound Assessment Maceration; Hyperkeratosis (callous) 02/28/22 1430   Number of days: 7     Incision 07/16/21 Hip Right (Active)   Number of days: 227       Procedure Note  Indications:  Based on my examination of this patient's wound(s)/ulcer(s) today, debridement is required to promote healing and evaluate the wound base. Performed by: Ava Sands DPM    Consent obtained:  Yes    Time out taken:  Yes    Pain Control: Anesthetic  Anesthetic: 4% Lidocaine Liquid Topical     Debridement:Excisional Debridement    Using curette and tissue nippers the wound(s)/ulcer(s) was/were sharply debrided down through and including the removal of subcutaneous tissue. Devitalized Tissue Debrided:  slough and necrotic/eschar to stimulate bleeding to promote healing, post debridement good bleeding base and wound edges noted    Wound/Ulcer #: 1,2    Percent of Wound/Ulcer Debrided: 100%    Total Surface Area Debrided:  1.02 sq cm     Estimated Blood Loss:  Minimal  Hemostasis Achieved:  by pressure    Procedural Pain:  0  / 10   Post Procedural Pain:  0 / 10     Response to treatment:  Well tolerated by patient. Plan:   Treatment Note please see attached Discharge Instructions    Written patient dismissal instructions given to patient and signed by patient or POA. Discharge Instructions         Visit Discharge/Physician Orders     Discharge condition: Stable     Assessment of pain at discharge:minimal  Anesthetic used: 4%lidocaine     Discharge to: ECF     Left via:ambulance     Accompanied by: accompanied by self     [facility]: [facility]     Dressing Orders:left/RIGHT plantar foot cleanse with normal saline apply alginate and dry dressing daily     Treatment Orders:  Nonweightbearing left foot     Eat foods high in protein and vitamin c     Take multivitamin daily.        [address] [facility] followup visit __________1 week___________________  (Please note your next appointment above and if you are unable to keep, kindly give a 24 hour notice. Thank you.)     Physician signature:__________________________        If you experience any of the following, please call the [address] [place] [address] Road during business hours:     * Increase in Pain  * Temperature over 101  * Increase in drainage from your wound  * Drainage with a foul odor  * Bleeding  * Increase in swelling  * Need for compression bandage changes due to slippage, breakthrough drainage.     If you need medical attention outside of the business hours of the [address] [place] [address] Road please contact your PCP or go to the nearest emergency room.                                                                          Electronically signed by Lizeth Ocampo DPM on 2/28/2022 at 3:10 PM

## 2022-03-10 ENCOUNTER — HOSPITAL ENCOUNTER (OUTPATIENT)
Dept: WOUND CARE | Age: 82
Discharge: HOME | End: 2022-03-10

## 2022-03-17 ENCOUNTER — HOSPITAL ENCOUNTER (OUTPATIENT)
Dept: WOUND CARE | Age: 82
Discharge: HOME | End: 2022-03-17
Payer: MEDICARE

## 2022-03-17 VITALS
BODY MASS INDEX: 20.73 KG/M2 | WEIGHT: 140 LBS | SYSTOLIC BLOOD PRESSURE: 148 MMHG | HEART RATE: 63 BPM | TEMPERATURE: 97 F | RESPIRATION RATE: 16 BRPM | HEIGHT: 69 IN | DIASTOLIC BLOOD PRESSURE: 74 MMHG

## 2022-03-17 DIAGNOSIS — E11.621 DIABETIC ULCER OF RIGHT HEEL ASSOCIATED WITH TYPE 2 DIABETES MELLITUS, WITH FAT LAYER EXPOSED (HCC): Primary | ICD-10-CM

## 2022-03-17 DIAGNOSIS — L97.412 DIABETIC ULCER OF RIGHT HEEL ASSOCIATED WITH TYPE 2 DIABETES MELLITUS, WITH FAT LAYER EXPOSED (HCC): Primary | ICD-10-CM

## 2022-03-17 PROCEDURE — 6370000000 HC RX 637 (ALT 250 FOR IP): Performed by: PODIATRIST

## 2022-03-17 PROCEDURE — 11042 DBRDMT SUBQ TIS 1ST 20SQCM/<: CPT

## 2022-03-17 RX ORDER — LIDOCAINE HYDROCHLORIDE 40 MG/ML
SOLUTION TOPICAL ONCE
Status: COMPLETED | OUTPATIENT
Start: 2022-03-17 | End: 2022-03-17

## 2022-03-17 RX ORDER — LIDOCAINE HYDROCHLORIDE 20 MG/ML
JELLY TOPICAL ONCE
Status: CANCELLED | OUTPATIENT
Start: 2022-03-17 | End: 2022-03-17

## 2022-03-17 RX ORDER — GINSENG 100 MG
CAPSULE ORAL ONCE
Status: CANCELLED | OUTPATIENT
Start: 2022-03-17 | End: 2022-03-17

## 2022-03-17 RX ORDER — LIDOCAINE 50 MG/G
OINTMENT TOPICAL ONCE
Status: CANCELLED | OUTPATIENT
Start: 2022-03-17 | End: 2022-03-17

## 2022-03-17 RX ORDER — BACITRACIN, NEOMYCIN, POLYMYXIN B 400; 3.5; 5 [USP'U]/G; MG/G; [USP'U]/G
OINTMENT TOPICAL ONCE
Status: CANCELLED | OUTPATIENT
Start: 2022-03-17 | End: 2022-03-17

## 2022-03-17 RX ORDER — BETAMETHASONE DIPROPIONATE 0.05 %
OINTMENT (GRAM) TOPICAL ONCE
Status: CANCELLED | OUTPATIENT
Start: 2022-03-17 | End: 2022-03-17

## 2022-03-17 RX ORDER — GENTAMICIN SULFATE 1 MG/G
OINTMENT TOPICAL ONCE
Status: CANCELLED | OUTPATIENT
Start: 2022-03-17 | End: 2022-03-17

## 2022-03-17 RX ORDER — LIDOCAINE HYDROCHLORIDE 40 MG/ML
SOLUTION TOPICAL ONCE
Status: CANCELLED | OUTPATIENT
Start: 2022-03-17 | End: 2022-03-17

## 2022-03-17 RX ORDER — BACITRACIN ZINC AND POLYMYXIN B SULFATE 500; 1000 [USP'U]/G; [USP'U]/G
OINTMENT TOPICAL ONCE
Status: CANCELLED | OUTPATIENT
Start: 2022-03-17 | End: 2022-03-17

## 2022-03-17 RX ORDER — LIDOCAINE 40 MG/G
CREAM TOPICAL ONCE
Status: CANCELLED | OUTPATIENT
Start: 2022-03-17 | End: 2022-03-17

## 2022-03-17 RX ORDER — CLOBETASOL PROPIONATE 0.5 MG/G
OINTMENT TOPICAL ONCE
Status: CANCELLED | OUTPATIENT
Start: 2022-03-17 | End: 2022-03-17

## 2022-03-17 RX ADMIN — LIDOCAINE HYDROCHLORIDE 10 ML: 40 SOLUTION TOPICAL at 09:03

## 2022-03-17 ASSESSMENT — PAIN SCALES - GENERAL: PAINLEVEL_OUTOF10: 0

## 2022-03-17 NOTE — PROGRESS NOTES
[facility] Followup Visit Note    Referring Physician : Ricci Perdue MD  [address] RECORD NUMBER:  [id number]  AGE: 79 y.o. GENDER: male  : 1940  EPISODE DATE:  3/17/2022    Subjective:     Chief Complaint   Patient presents with    Wound Check     right and left foot      HISTORY of PRESENT ILLNESS HPI   Mohinder Keys is a 79 y.o. male who presents today in regards to follow up evaluation and treatment of wound/ulcer. That patient's past medical, family and social hx were reviewed and changes were made if present. History of Wound Context: Patient has a wound on his left foot that initially he was unaware of. He denies any trauma. He has been up walking. He denies any pain. They have been doing dressing changes. He has had similar wounds in the past.  He is a longstanding diabetic with peripheral vascular disease.     Pt is not on abx at time of initial visit.     22 local care, strict NWB. Specimen to path. 22 Bilateral lower extremity exam demonstrates audible dorsalis pedis and posterior tibial pulses. Skin temperature warm proximal distal.  Wound appreciated plantar aspect left foot covered with devitalized nonviable tissue, through suq q, no exposed muscle/fascia at present, improved. 22 Bilateral lower extremity exam demonstrates audible dorsalis pedis and posterior tibial pulses. Skin temperature warm proximal distal.  Wound appreciated plantar aspect left foot with devitalized nonviable tissue, through suq q, no exposed muscle/fascia at present, continues to improve. 22 new area right foot, noticed recently. No known trauma. No increased activity. Bilateral lower extremity exam demonstrates audible dorsalis pedis and posterior tibial pulses.   Skin temperature warm proximal distal.  Wound appreciated plantar aspect left foot with devitalized nonviable tissue, through suq q, right foot area covered with devitalized nonviable tissue. With debridement through subcu. No purulence or odor. No surrounding erythema. Reinforced no pressure at all times. 2-28-22 Bilateral lower extremity exam:  Wound appreciated plantar aspect left foot with devitalized nonviable tissue, through suq q, right foot area with beefy and devitalized nonviable tissue. With debridement through subcu. No purulence or odor. No surrounding erythema. Reinforced no pressure at all times. 3-17-22 left foot wound healed. Right foot wound with beefy and devitalized nonviable tissue. With debridement through subcu. No purulence or odor. No surrounding erythema. Reinforced no pressure at all times. 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 ([address] 12/6/2019    S/P peripheral artery angioplasty 01/23/2020    bilateral legs -[facility]    Skin ulcer of right foot with fat layer exposed ([address] 12/9/2019    Ulcer of right leg, with fat layer exposed ([address] 5/6/2019    Varicose veins of leg with edema, 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     Past Surgical History:   Procedure Laterality Date    COLONOSCOPY      CYSTOSCOPY N/A 5/18/2020    SUPRAPUBIC TUBE PLACEMENT performed by Edvin Gao MD at [facility] [facility] 1990's    lense implants bilaterally    FOOT DEBRIDEMENT Left 2017    wound debridement and delayed primary closure    FRACTURE SURGERY      L femur fracture surgery    HIP FRACTURE SURGERY Left 2020    LEFT HIP OPEN REDUCTION INTERNAL FIXATION performed by Diana Connolly MD at [address] Right 2021    RIGHT HIP HEMIARTHROPLASTY performed by Eleuterio Marquez DO at [address] HISTORY  2019    Dr. Chandler Johnson- PTA ant tibial    OTHER SURGICAL HISTORY  2019    Dr Chandler Johnson - PCI Right popliteal and Anterior tibial    PROSTATE BIOPSY  2016    SKIN BIOPSY   last time    head and ears    TOE AMPUTATION Left 2016    2nd    TOE AMPUTATION Right 2019    AMPUTATION RIGHT SECOND TOE, FOOT DEBRIDEMENT performed by Letty Thakkar DPM at [facility] TOE AMPUTATION Right 12/10/2019    AMPUTATION RIGHT 3rd DIGIT WITH PARTIAL RESECTION OF THIRD METATARSAL performed by Letty Thakkar DPM at [facility] TURP N/A 2020    CYSTOSCOPY PLASMA LOOP TRANSURETHRAL RESECTION PROSTATE performed by Ranjana Carreno MD at [address] [place] [address]       Family History   Problem Relation Age of Onset    Heart Disease Mother         CHF    Heart Disease Father     Heart Attack Father      Social History     Tobacco Use    Smoking status: Former Smoker     Packs/day: 0.50     Years: 2.00     Pack years: 1.00     Types: Cigarettes     Quit date: 1960     Years since quittin.2    Smokeless tobacco: Never Used   Vaping Use    Vaping Use: Never used   Substance Use Topics    Alcohol use: Not Currently    Drug use: Not Currently     Allergies   Allergen Reactions    Latex Other (See Comments)     Pt unsure of reaction    Ciprofloxacin In D5w Itching    Pcn [Penicillins] Other (See Comments)     \"I just know my body doesn't like it. \" \"I had a reaction a long time ago, I know it affects my kidneys. \"    Other Rash     \"I get a rash on just my face if I eat Cumin or Chili. \"    Peanut-Containing Drug Products Itching     Current Outpatient Medications on File Prior to Encounter   Medication Sig Dispense Refill    empagliflozin (JARDIANCE) 25 MG tablet Take 25 mg by mouth daily      insulin lispro (HUMALOG) 100 UNIT/ML injection vial Inject into the skin 4 times daily Sliding scale      SITagliptin (JANUVIA) 100 MG tablet Take 100 mg by mouth daily      sodium bicarbonate 325 MG tablet Take 325 mg by mouth 2 times daily      metFORMIN (GLUCOPHAGE) 500 MG tablet Take 500 mg by mouth 2 times daily (with meals)      metoprolol succinate (TOPROL XL) 25 MG extended release tablet Take 1 tablet by mouth daily 30 tablet 3    amLODIPine (NORVASC) 5 MG tablet Take 2 tablets by mouth daily 30 tablet 3    Garlic 2902 MG TBEC Take 1,250 mg by mouth daily       Multiple Vitamins-Minerals (THERAPEUTIC MULTIVITAMIN-MINERALS) tablet Take 1 tablet by mouth daily       No current facility-administered medications on file prior to encounter.        REVIEW OF SYSTEMS See HPI    Objective:    BP (!) 148/74   Pulse 63   Temp 97 °F (36.1 °C) (Temporal)   Resp 16   Ht 5' 9\" (1.753 m)   Wt 140 lb (63.5 kg)   BMI 20.67 kg/m²   Wt Readings from Last 3 Encounters:   03/17/22 140 lb (63.5 kg)   02/28/22 140 lb (63.5 kg)   02/21/22 140 lb (63.5 kg)     PHYSICAL EXAM  CONSTITUTIONAL:   Awake, alert, cooperative   EYES:  lids and lashes normal   ENT: external ears and nose without lesions   NECK:  supple, symmetrical, trachea midline   SKIN:  Open wound     Assessment:     Diabetic ulcer    Pre Debridement Measurements:  Are located in the [place]  Documentation Flow Sheet  Post Debridement Measurements:  Wound/Ulcer Descriptions are Pre Debridement except measurements:     Wound 01/31/22 Foot Left;Plantar #1 (Active)   Wound Image   03/17/22 0846   Wound Etiology Diabetic 01/31/22 1506   Dressing Status New dressing applied 02/28/22 1447   Wound Cleansed Cleansed with saline 02/28/22 1447   Dressing/Treatment Alginate;Dry dressing 02/28/22 1447   Offloading for Diabetic Foot Ulcers Diabetic shoes/inserts 02/28/22 1447   Wound Length (cm) 0 cm 03/17/22 0846   Wound Width (cm) 0 cm 03/17/22 0846   Wound Depth (cm) 0 cm 03/17/22 0846   Wound Surface Area (cm^2) 0 cm^2 03/17/22 0846   Change in Wound Size % (l*w) 100 03/17/22 0846   Wound Volume (cm^3) 0 cm^3 03/17/22 0846   Wound Healing % 100 03/17/22 0846   Post-Procedure Length (cm) 0.3 cm 02/28/22 1442   Post-Procedure Width (cm) 0.3 cm 02/28/22 1442   Post-Procedure Depth (cm) 0.2 cm 02/28/22 1442   Post-Procedure Surface Area (cm^2) 0.09 cm^2 02/28/22 1442   Post-Procedure Volume (cm^3) 0.018 cm^3 02/28/22 1442   Wound Assessment Epithelialization 03/17/22 0846   Drainage Amount None 03/17/22 0846   Drainage Description Serous 02/28/22 1430   Odor None 03/17/22 0846   Latonya-wound Assessment Hyperkeratosis (callous) 03/17/22 0846   Number of days: 44       Wound 02/21/22 Foot Right;Plantar #2 (Active)   Wound Image   03/17/22 0846   Dressing Status New dressing applied 02/28/22 1447   Wound Cleansed Cleansed with saline 02/28/22 1447   Dressing/Treatment Alginate;Dry dressing 02/28/22 1447   Offloading for Diabetic Foot Ulcers Diabetic shoes/inserts 02/28/22 1447   Wound Length (cm) 1.5 cm 03/17/22 0846   Wound Width (cm) 2.2 cm 03/17/22 0846   Wound Depth (cm) 0.1 cm 03/17/22 0846   Wound Surface Area (cm^2) 3.3 cm^2 03/17/22 0846   Change in Wound Size % (l*w) -3566.67 03/17/22 0846   Wound Volume (cm^3) 0.33 cm^3 03/17/22 0846   Wound Healing % -3567 03/17/22 0846   Post-Procedure Length (cm) 1 cm 02/28/22 1442   Post-Procedure Width (cm) 1.4 cm 02/28/22 1442   Post-Procedure Depth (cm) 0.2 cm 02/28/22 1442   Post-Procedure Surface Area (cm^2) 1.4 cm^2 02/28/22 1442   Post-Procedure Volume (cm^3) 0.28 cm^3 02/28/22 1442   Wound Assessment Pale granulation tissue;Fibrin 03/17/22 0846 Drainage Amount Moderate 03/17/22 0846   Drainage Description Yellow 03/17/22 0846   Odor None 03/17/22 0846   Latonya-wound Assessment Maceration; Hyperkeratosis (callous) 03/17/22 0846   Number of days: 23       Wound 03/17/22 Foot Right;Medial #3 right medial metatarsal head (Active)   Wound Image   03/17/22 0846   Wound Length (cm) 0.1 cm 03/17/22 0846   Wound Width (cm) 0.1 cm 03/17/22 0846   Wound Depth (cm) 0.1 cm 03/17/22 0846   Wound Surface Area (cm^2) 0.01 cm^2 03/17/22 0846   Wound Volume (cm^3) 0.001 cm^3 03/17/22 0846   Wound Assessment Pink/red 03/17/22 0846   Drainage Amount None 03/17/22 0846   Odor None 03/17/22 0846   Latonya-wound Assessment Dry/flaky 03/17/22 0846   Number of days: 0          Procedure Note  Indications:  Based on my examination of this patient's wound(s)/ulcer(s) today, debridement is required to promote healing and evaluate the wound base. Performed by: Ltety Thakkar DPM    Consent obtained:  Yes    Time out taken:  Yes    Pain Control: Anesthetic  Anesthetic: 4% Lidocaine Liquid Topical     Debridement:Excisional Debridement    Using curette and tissue nippers the wound(s)/ulcer(s) was/were sharply debrided down through and including the removal of subcutaneous tissue. Devitalized Tissue Debrided:  slough and necrotic/eschar to stimulate bleeding to promote healing, post debridement good bleeding base and wound edges noted    Wound/Ulcer #: 2    Percent of Wound/Ulcer Debrided: 100%    Total Surface Area Debrided:  3.3 sq cm     Estimated Blood Loss:  Minimal  Hemostasis Achieved:  by pressure    Procedural Pain:  0  / 10   Post Procedural Pain:  0 / 10     Response to treatment:  Well tolerated by patient. Plan:   Treatment Note please see attached Discharge Instructions    Written patient dismissal instructions given to patient and signed by patient or POA.          Discharge Instructions       Visit Discharge/Physician Orders     Discharge condition: Stable     Assessment of pain at discharge:minimal  Anesthetic used: 4%lidocaine     Discharge to: ECF     Left via:ambulance     Accompanied by: accompanied by self     [facility]: [facility]     Dressing Orders: right foot ulcers cleanse with normal saline apply alginate and dry dressing daily     Treatment Orders:  Non-weightbearing to right foot     Eat foods high in protein and vitamin c     Take multivitamin daily.        [address] [place] [address],[address] followup visit __________1 week___________________  (Please note your next appointment above and if you are unable to keep, kindly give a 24 hour notice.  Thank you.)     Physician signature:__________________________        If you experience any of the following, please call the [facility] during business hours:     * Increase in Pain  * Temperature over 101  * Increase in drainage from your wound  * Drainage with a foul odor  * Bleeding  * Increase in swelling  * Need for compression bandage changes due to slippage, breakthrough drainage.     If you need medical attention outside of the business hours of the [facility] please contact your PCP or go to the nearest emergency room.                                                                                               Electronically signed by Richmond Lu DPM on 3/17/2022 at 9:09 AM

## 2022-03-24 ENCOUNTER — HOSPITAL ENCOUNTER (OUTPATIENT)
Dept: WOUND CARE | Age: 82
Discharge: HOME | End: 2022-03-24
Payer: MEDICARE

## 2022-03-24 VITALS
RESPIRATION RATE: 16 BRPM | HEIGHT: 69 IN | WEIGHT: 140 LBS | SYSTOLIC BLOOD PRESSURE: 130 MMHG | TEMPERATURE: 96.4 F | HEART RATE: 64 BPM | BODY MASS INDEX: 20.73 KG/M2 | DIASTOLIC BLOOD PRESSURE: 70 MMHG

## 2022-03-24 DIAGNOSIS — L97.412 DIABETIC ULCER OF RIGHT HEEL ASSOCIATED WITH TYPE 2 DIABETES MELLITUS, WITH FAT LAYER EXPOSED (HCC): Primary | ICD-10-CM

## 2022-03-24 DIAGNOSIS — E11.621 DIABETIC ULCER OF RIGHT HEEL ASSOCIATED WITH TYPE 2 DIABETES MELLITUS, WITH FAT LAYER EXPOSED (HCC): Primary | ICD-10-CM

## 2022-03-24 PROCEDURE — 11042 DBRDMT SUBQ TIS 1ST 20SQCM/<: CPT

## 2022-03-24 PROCEDURE — 6370000000 HC RX 637 (ALT 250 FOR IP): Performed by: PODIATRIST

## 2022-03-24 RX ORDER — BACITRACIN ZINC AND POLYMYXIN B SULFATE 500; 1000 [USP'U]/G; [USP'U]/G
OINTMENT TOPICAL ONCE
Status: CANCELLED | OUTPATIENT
Start: 2022-03-24 | End: 2022-03-24

## 2022-03-24 RX ORDER — LIDOCAINE HYDROCHLORIDE 20 MG/ML
JELLY TOPICAL ONCE
Status: CANCELLED | OUTPATIENT
Start: 2022-03-24 | End: 2022-03-24

## 2022-03-24 RX ORDER — LIDOCAINE 40 MG/G
CREAM TOPICAL ONCE
Status: CANCELLED | OUTPATIENT
Start: 2022-03-24 | End: 2022-03-24

## 2022-03-24 RX ORDER — CLOBETASOL PROPIONATE 0.5 MG/G
OINTMENT TOPICAL ONCE
Status: CANCELLED | OUTPATIENT
Start: 2022-03-24 | End: 2022-03-24

## 2022-03-24 RX ORDER — LIDOCAINE HYDROCHLORIDE 40 MG/ML
SOLUTION TOPICAL ONCE
Status: COMPLETED | OUTPATIENT
Start: 2022-03-24 | End: 2022-03-24

## 2022-03-24 RX ORDER — BETAMETHASONE DIPROPIONATE 0.05 %
OINTMENT (GRAM) TOPICAL ONCE
Status: CANCELLED | OUTPATIENT
Start: 2022-03-24 | End: 2022-03-24

## 2022-03-24 RX ORDER — BACITRACIN, NEOMYCIN, POLYMYXIN B 400; 3.5; 5 [USP'U]/G; MG/G; [USP'U]/G
OINTMENT TOPICAL ONCE
Status: CANCELLED | OUTPATIENT
Start: 2022-03-24 | End: 2022-03-24

## 2022-03-24 RX ORDER — LIDOCAINE HYDROCHLORIDE 40 MG/ML
SOLUTION TOPICAL ONCE
Status: CANCELLED | OUTPATIENT
Start: 2022-03-24 | End: 2022-03-24

## 2022-03-24 RX ORDER — GINSENG 100 MG
CAPSULE ORAL ONCE
Status: CANCELLED | OUTPATIENT
Start: 2022-03-24 | End: 2022-03-24

## 2022-03-24 RX ORDER — GENTAMICIN SULFATE 1 MG/G
OINTMENT TOPICAL ONCE
Status: CANCELLED | OUTPATIENT
Start: 2022-03-24 | End: 2022-03-24

## 2022-03-24 RX ORDER — LIDOCAINE 50 MG/G
OINTMENT TOPICAL ONCE
Status: CANCELLED | OUTPATIENT
Start: 2022-03-24 | End: 2022-03-24

## 2022-03-24 RX ADMIN — LIDOCAINE HYDROCHLORIDE 3 ML: 40 SOLUTION TOPICAL at 08:21

## 2022-03-24 ASSESSMENT — PAIN SCALES - GENERAL: PAINLEVEL_OUTOF10: 0

## 2022-03-24 NOTE — PROGRESS NOTES
[facility] Followup Visit Note    Referring Physician : Simin Martin MD  [address] RECORD NUMBER:  [id number]  AGE: 79 y.o. GENDER: male  : 1940  EPISODE DATE:  3/24/2022    Subjective:     Chief Complaint   Patient presents with    Wound Check     right foot      HISTORY of PRESENT ILLNESS HPI   Nathan Fisher is a 79 y.o. male who presents today in regards to follow up evaluation and treatment of wound/ulcer. That patient's past medical, family and social hx were reviewed and changes were made if present. History of Wound Context: Patient has a wound on his left foot that initially he was unaware of. He denies any trauma. He has been up walking. He denies any pain. They have been doing dressing changes. He has had similar wounds in the past.  He is a longstanding diabetic with peripheral vascular disease.     Pt is not on abx at time of initial visit.     22 local care, strict NWB. Specimen to path. 22 Bilateral lower extremity exam demonstrates audible dorsalis pedis and posterior tibial pulses. Skin temperature warm proximal distal.  Wound appreciated plantar aspect left foot covered with devitalized nonviable tissue, through suq q, no exposed muscle/fascia at present, improved. 22 Bilateral lower extremity exam demonstrates audible dorsalis pedis and posterior tibial pulses. Skin temperature warm proximal distal.  Wound appreciated plantar aspect left foot with devitalized nonviable tissue, through suq q, no exposed muscle/fascia at present, continues to improve. 22 new area right foot, noticed recently. No known trauma. No increased activity. Bilateral lower extremity exam demonstrates audible dorsalis pedis and posterior tibial pulses. Skin temperature warm proximal distal.  Wound appreciated plantar aspect left foot with devitalized nonviable tissue, through suq q, right foot area covered with devitalized nonviable tissue. With debridement through subcu. No purulence or odor. No surrounding erythema. Reinforced no pressure at all times. 2-28-22 Bilateral lower extremity exam:  Wound appreciated plantar aspect left foot with devitalized nonviable tissue, through suq q, right foot area with beefy and devitalized nonviable tissue. With debridement through subcu. No purulence or odor. No surrounding erythema. Reinforced no pressure at all times. 3-17-22 left foot wound healed. Right foot wound with beefy and devitalized nonviable tissue. With debridement through subcu. No purulence or odor. No surrounding erythema. Reinforced no pressure at all times. 3-24-22 Right foot wound with beefy and devitalized nonviable tissue. With debridement through subcu. No purulence or odor. No surrounding erythema. Reinforced no pressure at all times. Improved. Vasc w/ audible pedal pulses. 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 ([address] 12/6/2019    S/P peripheral artery angioplasty 01/23/2020    bilateral legs -[facility]    Skin ulcer of right foot with fat layer exposed ([address] 12/9/2019    Ulcer of right leg, with fat layer exposed ([address] 5/6/2019    Varicose veins of leg with edema, right 2019     Past Surgical History:   Procedure Laterality Date    COLONOSCOPY      CYSTOSCOPY N/A 2020    SUPRAPUBIC TUBE PLACEMENT performed by Danielle Cobian MD at [address] [facility] [facility]  [address]    lense implants bilaterally    FOOT DEBRIDEMENT Left 2017    wound debridement and delayed primary closure    FRACTURE SURGERY      L femur fracture surgery    HIP FRACTURE SURGERY Left 2020    LEFT HIP OPEN REDUCTION INTERNAL FIXATION performed by Claude Sung MD at [address] Right 2021    RIGHT HIP HEM[facility]ARTHROPLASTY [facility]  2019    Dr. Randall Quiroga- PTA ant tibial    OTHER SURGICAL HISTORY  2019    Dr Randall Quiroga - PCI Right popliteal and Anterior tibial    PROSTATE BIOPSY  2016    SKIN BIOPSY   last time    head and ears    TOE AMPUTATION Left 2016    2nd    TOE AMPUTATION Right 2019    AMPUTATION RIGHT SECOND TOE, FOOT DEBRIDEMENT performed by Hanane Bowman DPM at [place] [id number] Right 12/10/2019    AMPUTATION RIGHT 3rd DIGIT WITH PARTIAL RESECTION OF THIRD METATARSAL performed by Hanane Bowman DPM at [facility] N/A 2020    CYSTOSCOPY PLASMA LOOP TRANSURETHRAL RESECTION PROSTATE performed by Danielle Cobian MD at [address]       Family History   Problem Relation Age of Onset    Heart Disease Mother         CHF    Heart Disease Father     Heart Attack Father      Social History     Tobacco Use    Smoking status: Former Smoker     Packs/day: 0.50     Years: 2.00     Pack years: 1.00     Types: Cigarettes     Quit date: 1960     Years since quittin.2    Smokeless tobacco: Never Used   Vaping Use    Vaping Use: Never used   Substance Use Topics    Alcohol use: Not Currently    Drug use: Not Currently     Allergies   Allergen Reactions    Latex Other (See Comments)     Pt unsure of reaction    Ciprofloxacin In D5w Itching    Pcn [Penicillins] Other (See Comments)     \"I just know my body doesn't like it. \" \"I had a reaction a long time ago, I know it affects my kidneys. \"    Other Rash     \"I get a rash on just my face if I eat Cumin or Chili. \"    Peanut-Containing Drug Products Itching     Current Outpatient Medications on File Prior to Encounter   Medication Sig Dispense Refill    empagliflozin (JARDIANCE) 25 MG tablet Take 25 mg by mouth daily      insulin lispro (HUMALOG) 100 UNIT/ML injection vial Inject into the skin 4 times daily Sliding scale      SITagliptin (JANUVIA) 100 MG tablet Take 100 mg by mouth daily      sodium bicarbonate 325 MG tablet Take 325 mg by mouth 2 times daily      metFORMIN (GLUCOPHAGE) 500 MG tablet Take 500 mg by mouth 2 times daily (with meals)      metoprolol succinate (TOPROL XL) 25 MG extended release tablet Take 1 tablet by mouth daily 30 tablet 3    amLODIPine (NORVASC) 5 MG tablet Take 2 tablets by mouth daily 30 tablet 3    Garlic 8955 MG TBEC Take 1,250 mg by mouth daily       Multiple Vitamins-Minerals (THERAPEUTIC MULTIVITAMIN-MINERALS) tablet Take 1 tablet by mouth daily       No current facility-administered medications on file prior to encounter.        REVIEW OF SYSTEMS See HPI    Objective:    /70   Pulse 64   Temp 96.4 °F (35.8 °C) (Temporal)   Resp 16   Ht 5' 9\" (1.753 m)   Wt 140 lb (63.5 kg)   BMI 20.67 kg/m²   Wt Readings from Last 3 Encounters:   03/24/22 140 lb (63.5 kg)   03/17/22 140 lb (63.5 kg)   02/28/22 140 lb (63.5 kg)     PHYSICAL EXAM  CONSTITUTIONAL:   Awake, alert, cooperative   EYES:  lids and lashes normal   ENT: external ears and nose without lesions   NECK:  supple, symmetrical, trachea midline   SKIN:  Open wound     Assessment:     Diabetic ulcer    Pre Debridement Measurements:  Are located in the [place]  Documentation Flow Sheet  Post Debridement Measurements:  Wound/Ulcer Descriptions are Pre Debridement except measurements:     Wound 01/31/22 Foot Left;Plantar #1 (Active)   Wound Image   03/17/22 0846   Wound Etiology Diabetic 01/31/22 1506   Dressing Status New dressing applied 02/28/22 1447   Wound Cleansed Cleansed with saline 02/28/22 1447   Dressing/Treatment Alginate;Dry dressing 02/28/22 1447   Offloading for Diabetic Foot Ulcers Diabetic shoes/inserts 02/28/22 1447   Wound Length (cm) 0 cm 03/17/22 0846   Wound Width (cm) 0 cm 03/17/22 0846   Wound Depth (cm) 0 cm 03/17/22 0846   Wound Surface Area (cm^2) 0 cm^2 03/17/22 0846   Change in Wound Size % (l*w) 100 03/17/22 0846   Wound Volume (cm^3) 0 cm^3 03/17/22 0846   Wound Healing % 100 03/17/22 0846   Post-Procedure Length (cm) 0.3 cm 02/28/22 1442   Post-Procedure Width (cm) 0.3 cm 02/28/22 1442   Post-Procedure Depth (cm) 0.2 cm 02/28/22 1442   Post-Procedure Surface Area (cm^2) 0.09 cm^2 02/28/22 1442   Post-Procedure Volume (cm^3) 0.018 cm^3 02/28/22 1442   Wound Assessment Epithelialization 03/17/22 0846   Drainage Amount None 03/17/22 0846   Drainage Description Serous 02/28/22 1430   Odor None 03/17/22 0846   Latonya-wound Assessment Hyperkeratosis (callous) 03/17/22 0846   Number of days: 51       Wound 02/21/22 Foot Right;Plantar #2 (Active)   Wound Image   03/17/22 0846   Dressing Status New dressing applied 02/28/22 1447   Wound Cleansed Cleansed with saline 02/28/22 1447   Dressing/Treatment Alginate;Dry dressing 02/28/22 1447   Offloading for Diabetic Foot Ulcers Diabetic shoes/inserts 02/28/22 1447   Wound Length (cm) 1.3 cm 03/24/22 0817   Wound Width (cm) 2.1 cm 03/24/22 0817   Wound Depth (cm) 0.1 cm 03/24/22 0817   Wound Surface Area (cm^2) 2.73 cm^2 03/24/22 0817   Change in Wound Size % (l*w) -2933.33 03/24/22 0817   Wound Volume (cm^3) 0.273 cm^3 03/24/22 0817   Wound Healing % -2933 03/24/22 0817   Post-Procedure Length (cm) 1.3 cm 03/24/22 0831   Post-Procedure Width (cm) 2.1 cm 03/24/22 0831   Post-Procedure Depth (cm) 0.2 cm 03/24/22 0831   Post-Procedure Surface Area (cm^2) 2.73 cm^2 03/24/22 0831   Post-Procedure Volume (cm^3) 0.546 cm^3 03/24/22 0831   Wound Assessment Pink/red;Pale granulation tissue 03/24/22 0817   Drainage Amount Moderate 03/24/22 0817   Drainage Description Serosanguinous 03/24/22 0817   Odor None 03/24/22 0817   Latonya-wound Assessment Maceration 03/24/22 0817   Number of days: 30       Wound 03/17/22 Foot Right;Medial #3 right medial metatarsal head (Active)   Wound Image   03/17/22 0846   Dressing Status New dressing applied;Clean;Dry; Intact 03/24/22 0844   Wound Cleansed Cleansed with saline 03/24/22 0844   Dressing/Treatment Alginate;Dry dressing 03/24/22 0844   Offloading for Diabetic Foot Ulcers Other (comment) 03/24/22 0844   Wound Length (cm) 0.1 cm 03/24/22 0817   Wound Width (cm) 0.1 cm 03/24/22 0817   Wound Depth (cm) 0.1 cm 03/24/22 0817   Wound Surface Area (cm^2) 0.01 cm^2 03/24/22 0817   Change in Wound Size % (l*w) 0 03/24/22 0817   Wound Volume (cm^3) 0.001 cm^3 03/24/22 0817   Wound Healing % 0 03/24/22 0817   Wound Assessment Pink/red 03/24/22 0817   Drainage Amount None 03/24/22 0817   Odor None 03/24/22 0817   Latonya-wound Assessment Dry/flaky 03/24/22 0817   Number of days: 6          Procedure Note  Indications:  Based on my examination of this patient's wound(s)/ulcer(s) today, debridement is required to promote healing and evaluate the wound base. Performed by: Letty Thakkar DPM    Consent obtained:  Yes    Time out taken:  Yes    Pain Control: Anesthetic  Anesthetic: 4% Lidocaine Liquid Topical     Debridement:Excisional Debridement    Using curette and tissue nippers the wound(s)/ulcer(s) was/were sharply debrided down through and including the removal of subcutaneous tissue.         Devitalized Tissue Debrided:  slough and necrotic/eschar to stimulate bleeding to promote healing, post debridement good bleeding base and wound edges noted    Wound/Ulcer #: 2    Percent of Wound/Ulcer Debrided: 100%    Total Surface Area Debrided:  2.73 sq cm     Estimated Blood Loss:  Minimal  Hemostasis Achieved:  by pressure    Procedural Pain:  0  / 10   Post Procedural Pain:  0 / 10     Response to treatment:  Well tolerated by patient. Plan:   Treatment Note please see attached Discharge Instructions    Written patient dismissal instructions given to patient and signed by patient or POA. Discharge Instructions        Visit Discharge/Physician Orders     Discharge condition: Stable     Assessment of pain at discharge:minimal  Anesthetic used: 4%lidocaine     Discharge to: ECF     Left via:ambulance     Accompanied by: accompanied by self     [facility]: [facility]     Dressing Orders: right foot ulcers cleanse with normal saline apply alginate and dry dressing daily     Treatment Orders:  Non-weightbearing to right foot     Eat foods high in protein and vitamin c     Take multivitamin daily.        [facility] followup visit __________1 week___________________  (Please note your next appointment above and if you are unable to keep, kindly give a 24 hour notice.  Thank you.)     Physician signature:__________________________        If you experience any of the following, please call the [facility] during business hours:     * Increase in Pain  * Temperature over 101  * Increase in drainage from your wound  * Drainage with a foul odor  * Bleeding  * Increase in swelling  * Need for compression bandage changes due to slippage, breakthrough drainage.     If you need medical attention outside of the business hours of the [facility] [address] [address] please contact your PCP or go to the nearest emergency room.                                                                                                                    Electronically signed by Shanti Storey DPM on 3/24/2022 at 8:47 AM

## 2022-03-31 ENCOUNTER — HOSPITAL ENCOUNTER (OUTPATIENT)
Dept: WOUND CARE | Age: 82
Discharge: HOME | End: 2022-03-31
Payer: MEDICARE

## 2022-03-31 VITALS
RESPIRATION RATE: 18 BRPM | BODY MASS INDEX: 20.73 KG/M2 | SYSTOLIC BLOOD PRESSURE: 132 MMHG | HEIGHT: 69 IN | DIASTOLIC BLOOD PRESSURE: 68 MMHG | WEIGHT: 140 LBS | HEART RATE: 60 BPM | TEMPERATURE: 96.3 F

## 2022-03-31 DIAGNOSIS — L97.412 DIABETIC ULCER OF RIGHT HEEL ASSOCIATED WITH TYPE 2 DIABETES MELLITUS, WITH FAT LAYER EXPOSED (HCC): Primary | ICD-10-CM

## 2022-03-31 DIAGNOSIS — E11.621 DIABETIC ULCER OF RIGHT HEEL ASSOCIATED WITH TYPE 2 DIABETES MELLITUS, WITH FAT LAYER EXPOSED (HCC): Primary | ICD-10-CM

## 2022-03-31 PROCEDURE — 11042 DBRDMT SUBQ TIS 1ST 20SQCM/<: CPT

## 2022-03-31 PROCEDURE — 6370000000 HC RX 637 (ALT 250 FOR IP): Performed by: PODIATRIST

## 2022-03-31 RX ORDER — LIDOCAINE HYDROCHLORIDE 40 MG/ML
SOLUTION TOPICAL ONCE
Status: CANCELLED | OUTPATIENT
Start: 2022-03-31 | End: 2022-03-31

## 2022-03-31 RX ORDER — BACITRACIN ZINC AND POLYMYXIN B SULFATE 500; 1000 [USP'U]/G; [USP'U]/G
OINTMENT TOPICAL ONCE
Status: CANCELLED | OUTPATIENT
Start: 2022-03-31 | End: 2022-03-31

## 2022-03-31 RX ORDER — LIDOCAINE HYDROCHLORIDE 40 MG/ML
SOLUTION TOPICAL ONCE
Status: COMPLETED | OUTPATIENT
Start: 2022-03-31 | End: 2022-03-31

## 2022-03-31 RX ORDER — LIDOCAINE HYDROCHLORIDE 20 MG/ML
JELLY TOPICAL ONCE
Status: CANCELLED | OUTPATIENT
Start: 2022-03-31 | End: 2022-03-31

## 2022-03-31 RX ORDER — GINSENG 100 MG
CAPSULE ORAL ONCE
Status: CANCELLED | OUTPATIENT
Start: 2022-03-31 | End: 2022-03-31

## 2022-03-31 RX ORDER — BACITRACIN, NEOMYCIN, POLYMYXIN B 400; 3.5; 5 [USP'U]/G; MG/G; [USP'U]/G
OINTMENT TOPICAL ONCE
Status: CANCELLED | OUTPATIENT
Start: 2022-03-31 | End: 2022-03-31

## 2022-03-31 RX ORDER — LIDOCAINE 40 MG/G
CREAM TOPICAL ONCE
Status: CANCELLED | OUTPATIENT
Start: 2022-03-31 | End: 2022-03-31

## 2022-03-31 RX ORDER — CLOBETASOL PROPIONATE 0.5 MG/G
OINTMENT TOPICAL ONCE
Status: CANCELLED | OUTPATIENT
Start: 2022-03-31 | End: 2022-03-31

## 2022-03-31 RX ORDER — BETAMETHASONE DIPROPIONATE 0.05 %
OINTMENT (GRAM) TOPICAL ONCE
Status: CANCELLED | OUTPATIENT
Start: 2022-03-31 | End: 2022-03-31

## 2022-03-31 RX ORDER — LIDOCAINE 50 MG/G
OINTMENT TOPICAL ONCE
Status: CANCELLED | OUTPATIENT
Start: 2022-03-31 | End: 2022-03-31

## 2022-03-31 RX ORDER — GENTAMICIN SULFATE 1 MG/G
OINTMENT TOPICAL ONCE
Status: CANCELLED | OUTPATIENT
Start: 2022-03-31 | End: 2022-03-31

## 2022-03-31 RX ADMIN — LIDOCAINE HYDROCHLORIDE 3 ML: 40 SOLUTION TOPICAL at 08:29

## 2022-03-31 ASSESSMENT — PAIN SCALES - GENERAL: PAINLEVEL_OUTOF10: 0

## 2022-03-31 NOTE — PROGRESS NOTES
[facility] Followup Visit Note    Referring Physician : Mabel Hartman MD  [address] RECORD NUMBER:  [id number]  AGE: 79 y.o. GENDER: male  : 1940  EPISODE DATE:  3/31/2022    Subjective:     Chief Complaint   Patient presents with    Wound Check     right foot      HISTORY of PRESENT ILLNESS HPI   Enid Singleton is a 79 y.o. male who presents today in regards to follow up evaluation and treatment of wound/ulcer. That patient's past medical, family and social hx were reviewed and changes were made if present. History of Wound Context: Patient has a wound on his left foot that initially he was unaware of. He denies any trauma. He has been up walking. He denies any pain. They have been doing dressing changes. He has had similar wounds in the past.  He is a longstanding diabetic with peripheral vascular disease.     Pt is not on abx at time of initial visit.     22 local care, strict NWB. Specimen to path. 22 Bilateral lower extremity exam demonstrates audible dorsalis pedis and posterior tibial pulses. Skin temperature warm proximal distal.  Wound appreciated plantar aspect left foot covered with devitalized nonviable tissue, through suq q, no exposed muscle/fascia at present, improved. 22 Bilateral lower extremity exam demonstrates audible dorsalis pedis and posterior tibial pulses. Skin temperature warm proximal distal.  Wound appreciated plantar aspect left foot with devitalized nonviable tissue, through suq q, no exposed muscle/fascia at present, continues to improve. 22 new area right foot, noticed recently. No known trauma. No increased activity. Bilateral lower extremity exam demonstrates audible dorsalis pedis and posterior tibial pulses. Skin temperature warm proximal distal.  Wound appreciated plantar aspect left foot with devitalized nonviable tissue, through suq q, right foot area covered with devitalized nonviable tissue. With debridement through subcu. No purulence or odor. No surrounding erythema. Reinforced no pressure at all times. 2-28-22 Bilateral lower extremity exam:  Wound appreciated plantar aspect left foot with devitalized nonviable tissue, through suq q, right foot area with beefy and devitalized nonviable tissue. With debridement through subcu. No purulence or odor. No surrounding erythema. Reinforced no pressure at all times. 3-17-22 left foot wound healed. Right foot wound with beefy and devitalized nonviable tissue. With debridement through subcu. No purulence or odor. No surrounding erythema. Reinforced no pressure at all times. 3-24-22 Right foot wound with beefy and devitalized nonviable tissue. With debridement through subcu. No purulence or odor. No surrounding erythema. Reinforced no pressure at all times. Improved. Vasc w/ audible pedal pulses. 3-31-22 Right foot wound with beefy and devitalized nonviable tissue. With debridement through subcu. Additional breakdown. No surrounding erythema. 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 ([address] 12/6/2019    S/P peripheral artery angioplasty 01/23/2020 bilateral legs -[place]    Skin ulcer of right foot with fat layer exposed ([address] 2019    Ulcer of right leg, with fat layer exposed ([address] 2019    Varicose veins of leg with edema, right 2019     Past Surgical History:   Procedure Laterality Date    COLONOSCOPY      CYSTOSCOPY N/A 2020    SUPRAPUBIC TUBE PLACEMENT performed by Key Nunez MD at [address] [facility]  's    lense implants bilaterally    FOOT DEBRIDEMENT Left 2017    wound debridement and delayed primary closure    FRACTURE SURGERY      L femur fracture surgery    HIP FRACTURE SURGERY Left 2020    LEFT HIP OPEN REDUCTION INTERNAL FIXATION performed by David Ness MD at [address] Right 2021    RIGHT HIP HEMIARTHROPLASTY performed by Héctor Sandoval DO at [address] [facility]  2019    Dr. Chriss Farooq- PTA ant tibial    OTHER SURGICAL HISTORY  2019    Dr Chriss Farooq - PCI Right popliteal and Anterior tibial    PROSTATE BIOPSY  2016    SKIN BIOPSY   last time    head and ears    TOE AMPUTATION Left 2016    2nd    TOE AMPUTATION Right 2019    AMPUTATION RIGHT SECOND TOE, FOOT DEBRIDEMENT performed by Margie Perez DPM at [address] TOE AMPUTATION Right 12/10/2019    AMPUTATION RIGHT 3rd DIGIT WITH PARTIAL RESECTION OF THIRD METATARSAL performed by Margie Perez DPM at [address] TURP N/A 2020    CYSTOSCOPY PLASMA LOOP TRANSURETHRAL RESECTION PROSTATE performed by Key Nunez MD at [address] [place] [address]       Family History   Problem Relation Age of Onset    Heart Disease Mother         CHF    Heart Disease Father     Heart Attack Father      Social History     Tobacco Use    Smoking status: Former Smoker     Packs/day: 0.50     Years: 2.00     Pack years: 1.00     Types: Cigarettes     Quit date:      Years since quittin.2    Smokeless tobacco: Never Used Vaping Use    Vaping Use: Never used   Substance Use Topics    Alcohol use: Not Currently    Drug use: Not Currently     Allergies   Allergen Reactions    Latex Other (See Comments)     Pt unsure of reaction    Ciprofloxacin In D5w Itching    Pcn [Penicillins] Other (See Comments)     \"I just know my body doesn't like it. \" \"I had a reaction a long time ago, I know it affects my kidneys. \"    Other Rash     \"I get a rash on just my face if I eat Cumin or Chili. \"    Peanut-Containing Drug Products Itching     Current Outpatient Medications on File Prior to Encounter   Medication Sig Dispense Refill    empagliflozin (JARDIANCE) 25 MG tablet Take 25 mg by mouth daily      insulin lispro (HUMALOG) 100 UNIT/ML injection vial Inject into the skin 4 times daily Sliding scale      SITagliptin (JANUVIA) 100 MG tablet Take 100 mg by mouth daily      sodium bicarbonate 325 MG tablet Take 325 mg by mouth 2 times daily      metFORMIN (GLUCOPHAGE) 500 MG tablet Take 500 mg by mouth 2 times daily (with meals)      metoprolol succinate (TOPROL XL) 25 MG extended release tablet Take 1 tablet by mouth daily 30 tablet 3    amLODIPine (NORVASC) 5 MG tablet Take 2 tablets by mouth daily 30 tablet 3    Garlic 1498 MG TBEC Take 1,250 mg by mouth daily       Multiple Vitamins-Minerals (THERAPEUTIC MULTIVITAMIN-MINERALS) tablet Take 1 tablet by mouth daily       No current facility-administered medications on file prior to encounter.        REVIEW OF SYSTEMS See HPI    Objective:    /68   Pulse 60   Temp 96.3 °F (35.7 °C) (Temporal)   Resp 18   Ht 5' 9\" (1.753 m)   Wt 140 lb (63.5 kg)   BMI 20.67 kg/m²   Wt Readings from Last 3 Encounters:   03/31/22 140 lb (63.5 kg)   03/24/22 140 lb (63.5 kg)   03/17/22 140 lb (63.5 kg)     PHYSICAL EXAM  CONSTITUTIONAL:   Awake, alert, cooperative   EYES:  lids and lashes normal   ENT: external ears and nose without lesions   NECK:  supple, symmetrical, trachea midline   SKIN: Open wound     Assessment:     Diabetic ulcer    Pre Debridement Measurements:  Are located in the [place]  Documentation Flow Sheet  Post Debridement Measurements:  Wound/Ulcer Descriptions are Pre Debridement except measurements:     Wound 01/31/22 Foot Left;Plantar #1 (Active)   Wound Image   03/17/22 0846   Wound Etiology Diabetic 01/31/22 1506   Dressing Status New dressing applied 02/28/22 1447   Wound Cleansed Cleansed with saline 02/28/22 1447   Dressing/Treatment Alginate;Dry dressing 02/28/22 1447   Offloading for Diabetic Foot Ulcers Diabetic shoes/inserts 02/28/22 1447   Wound Length (cm) 0 cm 03/17/22 0846   Wound Width (cm) 0 cm 03/17/22 0846   Wound Depth (cm) 0 cm 03/17/22 0846   Wound Surface Area (cm^2) 0 cm^2 03/17/22 0846   Change in Wound Size % (l*w) 100 03/17/22 0846   Wound Volume (cm^3) 0 cm^3 03/17/22 0846   Wound Healing % 100 03/17/22 0846   Post-Procedure Length (cm) 0.3 cm 02/28/22 1442   Post-Procedure Width (cm) 0.3 cm 02/28/22 1442   Post-Procedure Depth (cm) 0.2 cm 02/28/22 1442   Post-Procedure Surface Area (cm^2) 0.09 cm^2 02/28/22 1442   Post-Procedure Volume (cm^3) 0.018 cm^3 02/28/22 1442   Wound Assessment Epithelialization 03/17/22 0846   Drainage Amount None 03/17/22 0846   Drainage Description Serous 02/28/22 1430   Odor None 03/17/22 0846   Latonya-wound Assessment Hyperkeratosis (callous) 03/17/22 0846   Number of days: 58       Wound 02/21/22 Foot Right;Plantar #2 (Active)   Wound Image   03/17/22 0846   Dressing Status New dressing applied 02/28/22 1447   Wound Cleansed Cleansed with saline 02/28/22 1447   Dressing/Treatment Alginate;Dry dressing 02/28/22 1447   Offloading for Diabetic Foot Ulcers Diabetic shoes/inserts 02/28/22 1447   Wound Length (cm) 1.5 cm 03/31/22 0824   Wound Width (cm) 2.2 cm 03/31/22 0824   Wound Depth (cm) 0.2 cm 03/31/22 0824   Wound Surface Area (cm^2) 3.3 cm^2 03/31/22 0824   Change in Wound Size % (l*w) -3566.67 03/31/22 0824   Wound Volume (cm^3) 0.66 cm^3 03/31/22 0824   Wound Healing % -7233 03/31/22 0824   Post-Procedure Length (cm) 1.8 cm 03/31/22 0837   Post-Procedure Width (cm) 3.4 cm 03/31/22 0837   Post-Procedure Depth (cm) 0.2 cm 03/31/22 0837   Post-Procedure Surface Area (cm^2) 6.12 cm^2 03/31/22 0837   Post-Procedure Volume (cm^3) 1.224 cm^3 03/31/22 0837   Wound Assessment Fibrin;Pink/red 03/31/22 0824   Drainage Amount Moderate 03/31/22 0824   Drainage Description Purulent 03/31/22 0824   Odor Mild 03/31/22 0824   Latonya-wound Assessment Hyperkeratosis (callous) 03/31/22 0824   Number of days: 37       Wound 03/17/22 Foot Right;Medial #3 right medial metatarsal head (Active)   Wound Image   03/17/22 0846   Dressing Status New dressing applied;Clean;Dry; Intact 03/24/22 0844   Wound Cleansed Cleansed with saline 03/24/22 0844   Dressing/Treatment Alginate;Dry dressing 03/24/22 0844   Offloading for Diabetic Foot Ulcers Other (comment) 03/24/22 0844   Wound Length (cm) 0.2 cm 03/31/22 0824   Wound Width (cm) 0.2 cm 03/31/22 0824   Wound Depth (cm) 0.2 cm 03/31/22 0824   Wound Surface Area (cm^2) 0.04 cm^2 03/31/22 0824   Change in Wound Size % (l*w) -300 03/31/22 0824   Wound Volume (cm^3) 0.008 cm^3 03/31/22 0824   Wound Healing % -700 03/31/22 0824   Wound Assessment Dry;Fibrin;Pink/red 03/31/22 0824   Drainage Amount Moderate 03/31/22 0824   Drainage Description Purulent 03/31/22 0824   Odor Mild 03/31/22 0824   Latonya-wound Assessment Dry/flaky 03/31/22 0824   Number of days: 13          Procedure Note  Indications:  Based on my examination of this patient's wound(s)/ulcer(s) today, debridement is required to promote healing and evaluate the wound base.     Performed by: Aubrie Lopez DPM    Consent obtained:  Yes    Time out taken:  Yes    Pain Control: Anesthetic  Anesthetic: 4% Lidocaine Liquid Topical     Debridement:Excisional Debridement    Using curette and tissue nippers the wound(s)/ulcer(s) was/were sharply debrided down through and including the removal of subcutaneous tissue. Devitalized Tissue Debrided:  slough and necrotic/eschar to stimulate bleeding to promote healing, post debridement good bleeding base and wound edges noted    Wound/Ulcer #: 2    Percent of Wound/Ulcer Debrided: 100%    Total Surface Area Debrided:  6.12 sq cm     Estimated Blood Loss:  Minimal  Hemostasis Achieved:  by pressure    Procedural Pain:  0  / 10   Post Procedural Pain:  0 / 10     Response to treatment:  Well tolerated by patient. Plan:   Treatment Note please see attached Discharge Instructions    Written patient dismissal instructions given to patient and signed by patient or POA. Discharge Instructions         Visit Discharge/Physician Orders     Discharge condition: Stable     Assessment of pain at discharge:minimal  Anesthetic used: 4%lidocaine     Discharge to: ECF     Left via:ambulance     Accompanied by: accompanied by self     [facility]: [facility]     Dressing Orders: right foot ulcers cleanse with normal saline apply alginate and dry dressing daily     Treatment Orders:  Non-weightbearing to right foot     Eat foods high in protein and vitamin c     Take multivitamin daily.        [address] [facility] followup visit __________1 week___________________  (Please note your next appointment above and if you are unable to keep, kindly give a 24 hour notice.  Thank you.)     Physician signature:__________________________        If you experience any of the following, please call the [address] [place] [address] [address] during business hours:     * Increase in Pain  * Temperature over 101  * Increase in drainage from your wound  * Drainage with a foul odor  * Bleeding  * Increase in swelling  * Need for compression bandage changes due to slippage, breakthrough drainage.     If you need medical attention outside of the business hours of the [address] please contact your PCP or go to the nearest emergency room.                                                                                                                               Electronically signed by Karen Isbell DPM on 3/31/2022 at 8:48 AM

## 2022-04-07 ENCOUNTER — HOSPITAL ENCOUNTER (OUTPATIENT)
Dept: WOUND CARE | Age: 82
Discharge: HOME | End: 2022-04-07
Payer: MEDICARE

## 2022-04-07 VITALS
RESPIRATION RATE: 16 BRPM | SYSTOLIC BLOOD PRESSURE: 128 MMHG | TEMPERATURE: 97.5 F | DIASTOLIC BLOOD PRESSURE: 64 MMHG | HEART RATE: 68 BPM

## 2022-04-07 PROCEDURE — 11042 DBRDMT SUBQ TIS 1ST 20SQCM/<: CPT

## 2022-04-07 NOTE — PROGRESS NOTES
[facility] Followup Visit Note    Referring Physician : Elizabeth Tariq MD  [address] RECORD NUMBER:  [id number]  AGE: 79 y.o. GENDER: male  : 1940  EPISODE DATE:  2022    Subjective:     Chief Complaint   Patient presents with    Wound Check      HISTORY of PRESENT ILLNESS HPI   Holger Wade is a 79 y.o. male who presents today in regards to follow up evaluation and treatment of wound/ulcer. That patient's past medical, family and social hx were reviewed and changes were made if present. History of Wound Context: Patient has a wound on his left foot that initially he was unaware of. He denies any trauma. He has been up walking. He denies any pain. They have been doing dressing changes. He has had similar wounds in the past.  He is a longstanding diabetic with peripheral vascular disease.     Pt is not on abx at time of initial visit.     22 local care, strict NWB. Specimen to path. 22 Bilateral lower extremity exam demonstrates audible dorsalis pedis and posterior tibial pulses. Skin temperature warm proximal distal.  Wound appreciated plantar aspect left foot covered with devitalized nonviable tissue, through suq q, no exposed muscle/fascia at present, improved. 22 Bilateral lower extremity exam demonstrates audible dorsalis pedis and posterior tibial pulses. Skin temperature warm proximal distal.  Wound appreciated plantar aspect left foot with devitalized nonviable tissue, through suq q, no exposed muscle/fascia at present, continues to improve. 22 new area right foot, noticed recently. No known trauma. No increased activity. Bilateral lower extremity exam demonstrates audible dorsalis pedis and posterior tibial pulses. Skin temperature warm proximal distal.  Wound appreciated plantar aspect left foot with devitalized nonviable tissue, through suq q, right foot area covered with devitalized nonviable tissue.   With debridement through subcu. No purulence or odor. No surrounding erythema. Reinforced no pressure at all times. 2-28-22 Bilateral lower extremity exam:  Wound appreciated plantar aspect left foot with devitalized nonviable tissue, through suq q, right foot area with beefy and devitalized nonviable tissue. With debridement through subcu. No purulence or odor. No surrounding erythema. Reinforced no pressure at all times. 3-17-22 left foot wound healed. Right foot wound with beefy and devitalized nonviable tissue. With debridement through subcu. No purulence or odor. No surrounding erythema. Reinforced no pressure at all times. 3-24-22 Right foot wound with beefy and devitalized nonviable tissue. With debridement through subcu. No purulence or odor. No surrounding erythema. Reinforced no pressure at all times. Improved. Vasc w/ audible pedal pulses. 3-31-22 Right foot wound with beefy and devitalized nonviable tissue. With debridement through subcu. Additional breakdown. No surrounding erythema. Reinforced no pressure at all times    4-7-22 Right foot wound with mixture of beefy and devitalized nonviable tissue. With debridement through subcu. No surrounding erythema. 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      Femoral-popliteal atherosclerosis ([facility] [address] 1/1/2017    Heel ulcer, right, with fat layer exposed ([address] 5/6/2019    Hypertension     Osteomyelitis of third toe of right foot ([address] 12/6/2019    S/P peripheral artery angioplasty 01/23/2020    bilateral legs -[place]    Skin ulcer of right foot with fat layer exposed ([address] 12/9/2019    Ulcer of right leg, with fat layer exposed ([address] 5/6/2019    Varicose veins of leg with edema, 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     Past Surgical History:   Procedure Laterality Date    COLONOSCOPY      CYSTOSCOPY N/A 5/18/2020    SUPRAPUBIC TUBE PLACEMENT performed by Lily Richards MD at [address]  1990's    lense implants bilaterally    FOOT DEBRIDEMENT Left 01/04/2017    wound debridement and delayed primary closure    FRACTURE SURGERY      L femur fracture surgery    HIP FRACTURE SURGERY Left 9/23/2020    LEFT HIP OPEN REDUCTION INTERNAL FIXATION performed by Michael Macedo MD at [address] Right 7/16/2021    RIGHT HIP HEMIARTHROPLASTY performed by Denise Myers DO at [address] [facility] [address]  04/23/2019    Dr. Meche Graham- PTA ant tibial    OTHER SURGICAL HISTORY  12/17/2019    Dr Meche Graahm - PCI Right popliteal and Anterior tibial    PROSTATE BIOPSY  04/2016    SKIN BIOPSY  sept of 2018 last time    head and ears    TOE AMPUTATION Left 12/31/2016    2nd    TOE AMPUTATION Right 4/26/2019    AMPUTATION RIGHT SECOND TOE, FOOT DEBRIDEMENT performed by Patrick Craig DPM at [facility] [address] Right 12/10/2019    AMPUTATION RIGHT 3rd DIGIT WITH PARTIAL RESECTION OF THIRD METATARSAL performed by Patrick Craig DPM at [address] TURP N/A 5/18/2020    CYSTOSCOPY PLASMA LOOP TRANSURETHRAL RESECTION PROSTATE performed by [facility] Richards MD at [facility] OR    VASCULAR SURGERY       Family History   Problem Relation Age of Onset    Heart Disease Mother         CHF    Heart Disease Father     Heart Attack Father      Social History     Tobacco Use    Smoking status: Former Smoker     Packs/day: 0.50     Years: 2.00     Pack years: 1.00     Types: Cigarettes     Quit date: 1960     Years since quittin.3    Smokeless tobacco: Never Used   Vaping Use    Vaping Use: Never used   Substance Use Topics    Alcohol use: Not Currently    Drug use: Not Currently     Allergies   Allergen Reactions    Latex Other (See Comments)     Pt unsure of reaction    Ciprofloxacin In D5w Itching    Pcn [Penicillins] Other (See Comments)     \"I just know my body doesn't like it. \" \"I had a reaction a long time ago, I know it affects my kidneys. \"    Other Rash     \"I get a rash on just my face if I eat Cumin or Chili. \"    Peanut-Containing Drug Products Itching     Current Outpatient Medications on File Prior to Encounter   Medication Sig Dispense Refill    empagliflozin (JARDIANCE) 25 MG tablet Take 25 mg by mouth daily      insulin lispro (HUMALOG) 100 UNIT/ML injection vial Inject into the skin 4 times daily Sliding scale      SITagliptin (JANUVIA) 100 MG tablet Take 100 mg by mouth daily      sodium bicarbonate 325 MG tablet Take 325 mg by mouth 2 times daily      metFORMIN (GLUCOPHAGE) 500 MG tablet Take 500 mg by mouth 2 times daily (with meals)      metoprolol succinate (TOPROL XL) 25 MG extended release tablet Take 1 tablet by mouth daily 30 tablet 3    amLODIPine (NORVASC) 5 MG tablet Take 2 tablets by mouth daily 30 tablet 3    Garlic 9242 MG TBEC Take 1,250 mg by mouth daily       Multiple Vitamins-Minerals (THERAPEUTIC MULTIVITAMIN-MINERALS) tablet Take 1 tablet by mouth daily       No current facility-administered medications on file prior to encounter.        REVIEW OF SYSTEMS See HPI    Objective:    /64   Pulse 68   Temp 97.5 °F (36.4 °C) (Temporal)   Resp 16   Wt Readings from Last 3 Encounters:   22 140 lb (63.5 kg)   22 140 lb (63.5 kg)   22 140 lb (63.5 kg)     PHYSICAL EXAM  CONSTITUTIONAL: Awake, alert, cooperative   EYES:  lids and lashes normal   ENT: external ears and nose without lesions   NECK:  supple, symmetrical, trachea midline   SKIN:  Open wound     Assessment:     Diabetic ulcer    Pre Debridement Measurements:  Are located in the [place]  Documentation Flow Sheet  Post Debridement Measurements:  Wound/Ulcer Descriptions are Pre Debridement except measurements:     Wound 01/31/22 Foot Left;Plantar #1 (Active)   Wound Image   03/17/22 0846   Wound Etiology Diabetic 01/31/22 1506   Dressing Status New dressing applied 02/28/22 1447   Wound Cleansed Cleansed with saline 02/28/22 1447   Dressing/Treatment Alginate;Dry dressing 02/28/22 1447   Offloading for Diabetic Foot Ulcers Diabetic shoes/inserts 02/28/22 1447   Wound Length (cm) 0 cm 03/17/22 0846   Wound Width (cm) 0 cm 03/17/22 0846   Wound Depth (cm) 0 cm 03/17/22 0846   Wound Surface Area (cm^2) 0 cm^2 03/17/22 0846   Change in Wound Size % (l*w) 100 03/17/22 0846   Wound Volume (cm^3) 0 cm^3 03/17/22 0846   Wound Healing % 100 03/17/22 0846   Post-Procedure Length (cm) 0.3 cm 02/28/22 1442   Post-Procedure Width (cm) 0.3 cm 02/28/22 1442   Post-Procedure Depth (cm) 0.2 cm 02/28/22 1442   Post-Procedure Surface Area (cm^2) 0.09 cm^2 02/28/22 1442   Post-Procedure Volume (cm^3) 0.018 cm^3 02/28/22 1442   Wound Assessment Epithelialization 03/17/22 0846   Drainage Amount None 03/17/22 0846   Drainage Description Serous 02/28/22 1430   Odor None 03/17/22 0846   Latonya-wound Assessment Hyperkeratosis (callous) 03/17/22 0846   Number of days: 65       Wound 02/21/22 Foot Right;Plantar #2 (Active)   Wound Image   03/17/22 0846   Dressing Status New dressing applied 02/28/22 1447   Wound Cleansed Cleansed with saline 02/28/22 1447   Dressing/Treatment Alginate;Dry dressing 02/28/22 1447   Offloading for Diabetic Foot Ulcers Diabetic shoes/inserts 02/28/22 1447   Wound Length (cm) 1.1 cm 04/07/22 1033   Wound Width (cm) 2 cm 04/07/22 1033 Wound Depth (cm) 0.2 cm 04/07/22 1033   Wound Surface Area (cm^2) 2.2 cm^2 04/07/22 1033   Change in Wound Size % (l*w) -2344.44 04/07/22 1033   Wound Volume (cm^3) 0.44 cm^3 04/07/22 1033   Wound Healing % -4789 04/07/22 1033   Post-Procedure Length (cm) 2 cm 04/07/22 1051   Post-Procedure Width (cm) 2 cm 04/07/22 1051   Post-Procedure Depth (cm) 0.3 cm 04/07/22 1051   Post-Procedure Surface Area (cm^2) 4 cm^2 04/07/22 1051   Post-Procedure Volume (cm^3) 1.2 cm^3 04/07/22 1051   Wound Assessment Pink/red 04/07/22 1033   Drainage Amount Moderate 04/07/22 1033   Drainage Description Serous; Yellow 04/07/22 1033   Odor None 04/07/22 1033   Latonya-wound Assessment Blanchable erythema 04/07/22 1033   Number of days: 44       Wound 03/17/22 Foot Right;Medial #3 right medial metatarsal head (Active)   Wound Image   03/17/22 0846   Dressing Status New dressing applied;Clean;Dry; Intact 03/31/22 0837   Wound Cleansed Cleansed with saline 03/31/22 0837   Dressing/Treatment Alginate;Dry dressing 03/31/22 0837   Offloading for Diabetic Foot Ulcers Other (comment) 03/31/22 0837   Wound Length (cm) 0.3 cm 04/07/22 1033   Wound Width (cm) 0.3 cm 04/07/22 1033   Wound Depth (cm) 0.2 cm 04/07/22 1033   Wound Surface Area (cm^2) 0.09 cm^2 04/07/22 1033   Change in Wound Size % (l*w) -800 04/07/22 1033   Wound Volume (cm^3) 0.018 cm^3 04/07/22 1033   Wound Healing % -1700 04/07/22 1033   Wound Assessment Fibrin;Dry 04/07/22 1033   Drainage Amount Moderate 04/07/22 1033   Drainage Description Brown;Yellow 04/07/22 1033   Odor None 04/07/22 1033   Latonya-wound Assessment Dry/flaky 04/07/22 1033   Number of days: 21          Procedure Note  Indications:  Based on my examination of this patient's wound(s)/ulcer(s) today, debridement is required to promote healing and evaluate the wound base.     Performed by: Ross Chery DPM    Consent obtained:  Yes    Time out taken:  Yes    Pain Control: Anesthetic  Anesthetic: 4% Lidocaine Liquid Topical     Debridement:Excisional Debridement    Using curette and tissue nippers the wound(s)/ulcer(s) was/were sharply debrided down through and including the removal of subcutaneous tissue. Devitalized Tissue Debrided:  slough and necrotic/eschar to stimulate bleeding to promote healing, post debridement good bleeding base and wound edges noted    Wound/Ulcer #: 2    Percent of Wound/Ulcer Debrided: 100%    Total Surface Area Debrided:  2.2 sq cm     Estimated Blood Loss:  Minimal  Hemostasis Achieved:  by pressure    Procedural Pain:  0  / 10   Post Procedural Pain:  0 / 10     Response to treatment:  Well tolerated by patient. Plan:   Treatment Note please see attached Discharge Instructions    Written patient dismissal instructions given to patient and signed by patient or POA. Discharge Instructions       Visit Discharge/Physician Orders     Discharge condition: Stable     Assessment of pain at discharge:minimal  Anesthetic used: 4%lidocaine     Discharge to: ECF     Left via:ambulance     Accompanied by: accompanied by self     [facility]: [facility]     Dressing Orders: right foot ulcers cleanse with normal saline apply alginate and dry dressing daily     Treatment Orders:  Non-weightbearing to right foot     Eat foods high in protein and vitamin c     Take multivitamin daily.        [address] [place] [address],[address] followup visit __________1 week___________________  (Please note your next appointment above and if you are unable to keep, kindly give a 24 hour notice.  Thank you.)     Physician signature:__________________________        If you experience any of the following, please call the [facility] [address] during business hours:     * Increase in Pain  * Temperature over 101  * Increase in drainage from your wound  * Drainage with a foul odor  * Bleeding  * Increase in swelling  * Need for compression bandage changes due to slippage, breakthrough drainage.     If you need medical attention outside of the business hours of the [address] please contact your PCP or go to the nearest emergency room.                                                                                                                                                              Electronically signed by Al Basilio DPM on 4/7/2022 at 11:05 AM

## 2022-04-11 ENCOUNTER — HOSPITAL ENCOUNTER (OUTPATIENT)
Dept: INTERVENTIONAL RADIOLOGY/VASCULAR | Age: 82
Discharge: HOME | End: 2022-04-13
Payer: MEDICARE

## 2022-04-11 DIAGNOSIS — I73.9 PVD (PERIPHERAL VASCULAR DISEASE) WITH CLAUDICATION (HCC): ICD-10-CM

## 2022-04-11 DIAGNOSIS — Z98.62 S/P PERIPHERAL ARTERY ANGIOPLASTY: ICD-10-CM

## 2022-04-11 PROCEDURE — 93923 UPR/LXTR ART STDY 3+ LVLS: CPT

## 2022-04-14 ENCOUNTER — HOSPITAL ENCOUNTER (OUTPATIENT)
Dept: WOUND CARE | Age: 82
Discharge: HOME | End: 2022-04-14
Payer: MEDICARE

## 2022-04-14 VITALS
SYSTOLIC BLOOD PRESSURE: 122 MMHG | TEMPERATURE: 95 F | RESPIRATION RATE: 16 BRPM | BODY MASS INDEX: 20.73 KG/M2 | HEIGHT: 69 IN | WEIGHT: 140 LBS | DIASTOLIC BLOOD PRESSURE: 72 MMHG | HEART RATE: 66 BPM

## 2022-04-14 DIAGNOSIS — L97.412 DIABETIC ULCER OF RIGHT HEEL ASSOCIATED WITH TYPE 2 DIABETES MELLITUS, WITH FAT LAYER EXPOSED (HCC): Primary | ICD-10-CM

## 2022-04-14 DIAGNOSIS — E11.621 DIABETIC ULCER OF RIGHT HEEL ASSOCIATED WITH TYPE 2 DIABETES MELLITUS, WITH FAT LAYER EXPOSED (HCC): Primary | ICD-10-CM

## 2022-04-14 PROCEDURE — 11042 DBRDMT SUBQ TIS 1ST 20SQCM/<: CPT

## 2022-04-14 PROCEDURE — 6370000000 HC RX 637 (ALT 250 FOR IP): Performed by: PODIATRIST

## 2022-04-14 RX ORDER — LIDOCAINE HYDROCHLORIDE 20 MG/ML
JELLY TOPICAL ONCE
Status: CANCELLED | OUTPATIENT
Start: 2022-04-14 | End: 2022-04-14

## 2022-04-14 RX ORDER — LIDOCAINE HYDROCHLORIDE 40 MG/ML
SOLUTION TOPICAL ONCE
Status: CANCELLED | OUTPATIENT
Start: 2022-04-14 | End: 2022-04-14

## 2022-04-14 RX ORDER — CLOBETASOL PROPIONATE 0.5 MG/G
OINTMENT TOPICAL ONCE
Status: CANCELLED | OUTPATIENT
Start: 2022-04-14 | End: 2022-04-14

## 2022-04-14 RX ORDER — LIDOCAINE 40 MG/G
CREAM TOPICAL ONCE
Status: CANCELLED | OUTPATIENT
Start: 2022-04-14 | End: 2022-04-14

## 2022-04-14 RX ORDER — BACITRACIN, NEOMYCIN, POLYMYXIN B 400; 3.5; 5 [USP'U]/G; MG/G; [USP'U]/G
OINTMENT TOPICAL ONCE
Status: CANCELLED | OUTPATIENT
Start: 2022-04-14 | End: 2022-04-14

## 2022-04-14 RX ORDER — GENTAMICIN SULFATE 1 MG/G
OINTMENT TOPICAL ONCE
Status: CANCELLED | OUTPATIENT
Start: 2022-04-14 | End: 2022-04-14

## 2022-04-14 RX ORDER — LIDOCAINE HYDROCHLORIDE 40 MG/ML
SOLUTION TOPICAL ONCE
Status: COMPLETED | OUTPATIENT
Start: 2022-04-14 | End: 2022-04-14

## 2022-04-14 RX ORDER — LIDOCAINE 50 MG/G
OINTMENT TOPICAL ONCE
Status: CANCELLED | OUTPATIENT
Start: 2022-04-14 | End: 2022-04-14

## 2022-04-14 RX ORDER — BACITRACIN ZINC AND POLYMYXIN B SULFATE 500; 1000 [USP'U]/G; [USP'U]/G
OINTMENT TOPICAL ONCE
Status: CANCELLED | OUTPATIENT
Start: 2022-04-14 | End: 2022-04-14

## 2022-04-14 RX ORDER — BETAMETHASONE DIPROPIONATE 0.05 %
OINTMENT (GRAM) TOPICAL ONCE
Status: CANCELLED | OUTPATIENT
Start: 2022-04-14 | End: 2022-04-14

## 2022-04-14 RX ORDER — GINSENG 100 MG
CAPSULE ORAL ONCE
Status: CANCELLED | OUTPATIENT
Start: 2022-04-14 | End: 2022-04-14

## 2022-04-14 RX ADMIN — LIDOCAINE HYDROCHLORIDE 10 ML: 40 SOLUTION TOPICAL at 10:25

## 2022-04-14 ASSESSMENT — PAIN SCALES - GENERAL: PAINLEVEL_OUTOF10: 0

## 2022-04-14 NOTE — PROGRESS NOTES
[facility] Followup Visit Note    Referring Physician : Ras Gurrola MD  [address] RECORD NUMBER:  [id number]  AGE: 79 y.o. GENDER: male  : 1940  EPISODE DATE:  2022    Subjective:     Chief Complaint   Patient presents with    Wound Check     right foot      HISTORY of PRESENT ILLNESS HPI   Milford Libman is a 79 y.o. male who presents today in regards to follow up evaluation and treatment of wound/ulcer. That patient's past medical, family and social hx were reviewed and changes were made if present. History of Wound Context: Patient has a wound on his left foot that initially he was unaware of. He denies any trauma. He has been up walking. He denies any pain. They have been doing dressing changes. He has had similar wounds in the past.  He is a longstanding diabetic with peripheral vascular disease.     Pt is not on abx at time of initial visit.     22 local care, strict NWB. Specimen to path. 22 Bilateral lower extremity exam demonstrates audible dorsalis pedis and posterior tibial pulses. Skin temperature warm proximal distal.  Wound appreciated plantar aspect left foot covered with devitalized nonviable tissue, through suq q, no exposed muscle/fascia at present, improved. 22 Bilateral lower extremity exam demonstrates audible dorsalis pedis and posterior tibial pulses. Skin temperature warm proximal distal.  Wound appreciated plantar aspect left foot with devitalized nonviable tissue, through suq q, no exposed muscle/fascia at present, continues to improve. 22 new area right foot, noticed recently. No known trauma. No increased activity. Bilateral lower extremity exam demonstrates audible dorsalis pedis and posterior tibial pulses. Skin temperature warm proximal distal.  Wound appreciated plantar aspect left foot with devitalized nonviable tissue, through suq q, right foot area covered with devitalized nonviable tissue. With debridement through subcu. No purulence or odor. No surrounding erythema. Reinforced no pressure at all times. 2-28-22 Bilateral lower extremity exam:  Wound appreciated plantar aspect left foot with devitalized nonviable tissue, through suq q, right foot area with beefy and devitalized nonviable tissue. With debridement through subcu. No purulence or odor. No surrounding erythema. Reinforced no pressure at all times. 3-17-22 left foot wound healed. Right foot wound with beefy and devitalized nonviable tissue. With debridement through subcu. No purulence or odor. No surrounding erythema. Reinforced no pressure at all times. 3-24-22 Right foot wound with beefy and devitalized nonviable tissue. With debridement through subcu. No purulence or odor. No surrounding erythema. Reinforced no pressure at all times. Improved. Vasc w/ audible pedal pulses. 3-31-22 Right foot wound with beefy and devitalized nonviable tissue. With debridement through subcu. Additional breakdown. No surrounding erythema. Reinforced no pressure at all times    4-7-22 Right foot wound with mixture of beefy and devitalized nonviable tissue. With debridement through subcu. No surrounding erythema. Reinforced no pressure at all times  Surgicel and silver nitrate for hemostasis    4-14-22 ight foot wound with mixture of beefy and devitalized nonviable tissue. With debridement through subcu. No surrounding erythema. Medial aspect area w/ + drainage, superficial.  Reinforced no pressure at all times  Silver nitrate for hemostasis. Change to xeroform.       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 ([address] 12/6/2019    S/P peripheral artery angioplasty 01/23/2020    bilateral legs -[facility]    Skin ulcer of right foot with fat layer exposed ([address] 12/9/2019    Ulcer of right leg, with fat layer exposed ([address] 5/6/2019    Varicose veins of leg with edema, 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     Past Surgical History:   Procedure Laterality Date    COLONOSCOPY      CYSTOSCOPY N/A 5/18/2020    SUPRAPUBIC TUBE PLACEMENT performed [place] Jensen Álvarez MD [place] [facility] [place]  [place]    lense implants bilaterally    FOOT DEBRIDEMENT Left 01/04/2017    wound debridement and delayed primary closure    FRACTURE SURGERY      L femur fracture surgery    HIP FRACTURE SURGERY Left 9/23/2020    LEFT HIP OPEN REDUCTION INTERNAL FIXATION performed by Nancy Watters MD at [address] Right 7/16/2021    RIGHT HIP HEMIARTHROPLASTY performed by Brittany Dsouza DO at [facility]  04/23/2019    Dr. Tomas Mcconnell- PTA ant tibial    OTHER SURGICAL HISTORY  12/17/2019    Dr Tomas Mcconnell - PCI Right popliteal and Anterior tibial    PROSTATE BIOPSY  04/2016    SKIN BIOPSY  sept of 2018 last time    head and ears    TOE AMPUTATION Left 12/31/2016    2nd    TOE AMPUTATION Right 4/26/2019    AMPUTATION RIGHT SECOND TOE, FOOT DEBRIDEMENT performed by Shawna Taveras DPM at [facility] Right 12/10/2019    AMPUTATION RIGHT 3rd DIGIT WITH PARTIAL RESECTION OF THIRD METATARSAL performed by Shawna Taveras DPM at [facility] N/A 5/18/2020 CYSTOSCOPY PLASMA LOOP TRANSURETHRAL RESECTION PROSTATE performed by Key Nunez MD at [address] [place] [address]       Family History   Problem Relation Age of Onset    Heart Disease Mother         CHF    Heart Disease Father     Heart Attack Father      Social History     Tobacco Use    Smoking status: Former Smoker     Packs/day: 0.50     Years: 2.00     Pack years: 1.00     Types: Cigarettes     Quit date:      Years since quittin.3    Smokeless tobacco: Never Used    Tobacco comment: quit   Vaping Use    Vaping Use: Never used   Substance Use Topics    Alcohol use: Not Currently    Drug use: Not Currently     Allergies   Allergen Reactions    Latex Other (See Comments)     Pt unsure of reaction    Ciprofloxacin In D5w Itching    Pcn [Penicillins] Other (See Comments)     \"I just know my body doesn't like it. \" \"I had a reaction a long time ago, I know it affects my kidneys. \"    Other Rash     \"I get a rash on just my face if I eat Cumin or Chili. \"    Peanut-Containing Drug Products Itching     Current Outpatient Medications on File Prior to Encounter   Medication Sig Dispense Refill    empagliflozin (JARDIANCE) 25 MG tablet Take 25 mg by mouth daily      insulin lispro (HUMALOG) 100 UNIT/ML injection vial Inject into the skin 4 times daily Sliding scale      SITagliptin (JANUVIA) 100 MG tablet Take 100 mg by mouth daily      sodium bicarbonate 325 MG tablet Take 325 mg by mouth 2 times daily      metFORMIN (GLUCOPHAGE) 500 MG tablet Take 500 mg by mouth 2 times daily (with meals)      metoprolol succinate (TOPROL XL) 25 MG extended release tablet Take 1 tablet by mouth daily 30 tablet 3    amLODIPine (NORVASC) 5 MG tablet Take 2 tablets by mouth daily 30 tablet 3    Garlic 1439 MG TBEC Take 1,250 mg by mouth daily       Multiple Vitamins-Minerals (THERAPEUTIC MULTIVITAMIN-MINERALS) tablet Take 1 tablet by mouth daily       No current facility-administered medications on file prior to encounter. REVIEW OF SYSTEMS See HPI    Objective:    /72   Pulse 66   Temp 95 °F (35 °C) (Temporal)   Resp 16   Ht 5' 9\" (1.753 m)   Wt 140 lb (63.5 kg)   BMI 20.67 kg/m²   Wt Readings from Last 3 Encounters:   04/14/22 140 lb (63.5 kg)   03/31/22 140 lb (63.5 kg)   03/24/22 140 lb (63.5 kg)     PHYSICAL EXAM  CONSTITUTIONAL:   Awake, alert, cooperative   EYES:  lids and lashes normal   ENT: external ears and nose without lesions   NECK:  supple, symmetrical, trachea midline   SKIN:  Open wound     Assessment:     Diabetic ulcer    Pre Debridement Measurements:  Are located in the [facility]  Documentation Flow Sheet  Post Debridement Measurements:  Wound/Ulcer Descriptions are Pre Debridement except measurements:     Wound 02/21/22 Foot Right;Plantar #2 (Active)   Wound Image   04/14/22 1017   Dressing Status New dressing applied 04/07/22 1111   Wound Cleansed Cleansed with saline 04/07/22 1111   Dressing/Treatment Dry dressing 04/07/22 1111   Offloading for Diabetic Foot Ulcers Diabetic shoes/inserts 02/28/22 1447   Wound Length (cm) 2 cm 04/14/22 1017   Wound Width (cm) 1.8 cm 04/14/22 1017   Wound Depth (cm) 0.1 cm 04/14/22 1017   Wound Surface Area (cm^2) 3.6 cm^2 04/14/22 1017   Change in Wound Size % (l*w) -3900 04/14/22 1017   Wound Volume (cm^3) 0.36 cm^3 04/14/22 1017   Wound Healing % -3900 04/14/22 1017   Post-Procedure Length (cm) 2 cm 04/14/22 1032   Post-Procedure Width (cm) 2 cm 04/14/22 1032   Post-Procedure Depth (cm) 0.3 cm 04/14/22 1032   Post-Procedure Surface Area (cm^2) 4 cm^2 04/14/22 1032   Post-Procedure Volume (cm^3) 1.2 cm^3 04/14/22 1032   Wound Assessment Pink/red 04/14/22 1017   Drainage Amount Moderate 04/14/22 1017   Drainage Description Serous; Yellow 04/14/22 1017   Odor None 04/14/22 1017   Latonya-wound Assessment Hyperkeratosis (callous) 04/14/22 1017   Number of days: 51       Wound 03/17/22 Foot Right;Medial #3 right medial metatarsal head (Active) Wound Image   04/14/22 1017   Dressing Status New dressing applied 04/07/22 1111   Wound Cleansed Cleansed with saline 04/07/22 1111   Dressing/Treatment Dry dressing 04/07/22 1111   Offloading for Diabetic Foot Ulcers Other (comment) 03/31/22 0837   Wound Length (cm) 0.2 cm 04/14/22 1017   Wound Width (cm) 0.2 cm 04/14/22 1017   Wound Depth (cm) 0.2 cm 04/14/22 1017   Wound Surface Area (cm^2) 0.04 cm^2 04/14/22 1017   Change in Wound Size % (l*w) -300 04/14/22 1017   Wound Volume (cm^3) 0.008 cm^3 04/14/22 1017   Wound Healing % -700 04/14/22 1017   Wound Assessment Pink/red;Fibrin 04/14/22 1017   Drainage Amount Moderate 04/14/22 1017   Drainage Description Purulent;Yellow 04/14/22 1017   Odor None 04/14/22 1017   Latonya-wound Assessment Dry/flaky 04/14/22 1017   Number of days: 28          Procedure Note  Indications:  Based on my examination of this patient's wound(s)/ulcer(s) today, debridement is required to promote healing and evaluate the wound base. Performed by: Darryn Gomez DPM    Consent obtained:  Yes    Time out taken:  Yes    Pain Control: Anesthetic  Anesthetic: 4% Lidocaine Liquid Topical     Debridement:Excisional Debridement    Using curette and tissue nippers the wound(s)/ulcer(s) was/were sharply debrided down through and including the removal of subcutaneous tissue. Devitalized Tissue Debrided:  slough and necrotic/eschar to stimulate bleeding to promote healing, post debridement good bleeding base and wound edges noted    Wound/Ulcer #: 2    Percent of Wound/Ulcer Debrided: 100%    Total Surface Area Debrided:  3.6 sq cm     Estimated Blood Loss:  Minimal  Hemostasis Achieved:  by pressure    Procedural Pain:  0  / 10   Post Procedural Pain:  0 / 10     Response to treatment:  Well tolerated by patient. Plan:   Treatment Note please see attached Discharge Instructions    Written patient dismissal instructions given to patient and signed by patient or POA.          Discharge Instructions       Visit Discharge/Physician Orders     Discharge condition: Stable     Assessment of pain at discharge:minimal  Anesthetic used: 4%lidocaine     Discharge to: ECF     Left via:ambulance     Accompanied by: accompanied by self     [facility]: [facility]     Dressing Orders: right foot ulcers cleanse with normal saline apply xeroform and dry dressing daily     Treatment Orders:  Non-weightbearing to right foot     Eat foods high in protein and vitamin c     Take multivitamin daily.        [address] [place] [address],[address] followup visit __________1 week___________________  (Please note your next appointment above and if you are unable to keep, kindly give a 24 hour notice.  Thank you.)     Physician signature:__________________________        If you experience any of the following, please call the [email or web address] during business hours:     * Increase in Pain  * Temperature over 101  * Increase in drainage from your wound  * Drainage with a foul odor  * Bleeding  * Increase in swelling  * Need for compression bandage changes due to slippage, breakthrough drainage.     If you need medical attention outside of the business hours of the [facility] please contact your PCP or go to the nearest emergency room.                                                                                                                                                                                   Electronically signed by Eri Mckeon DPM on 4/14/2022 at 10:39 AM

## 2022-04-19 ENCOUNTER — TELEPHONE (OUTPATIENT)
Dept: VASCULAR SURGERY | Age: 82
End: 2022-04-19

## 2022-04-19 NOTE — TELEPHONE ENCOUNTER
Called and spoke with patients wife regarding arterial doppler study, good blood flow to both legs, call if any pain or ulcers to feet, otherwise keep next appointment.

## 2022-04-20 ENCOUNTER — HOSPITAL ENCOUNTER (EMERGENCY)
Age: 82
Discharge: NURSING, REHAB OR LONG-TERM CARE | End: 2022-04-20
Attending: EMERGENCY MEDICINE
Payer: MEDICARE

## 2022-04-20 VITALS
HEART RATE: 78 BPM | BODY MASS INDEX: 20.73 KG/M2 | SYSTOLIC BLOOD PRESSURE: 145 MMHG | TEMPERATURE: 98.2 F | WEIGHT: 140 LBS | HEIGHT: 69 IN | OXYGEN SATURATION: 94 % | DIASTOLIC BLOOD PRESSURE: 75 MMHG | RESPIRATION RATE: 16 BRPM

## 2022-04-20 DIAGNOSIS — U07.1 COVID-19: Primary | ICD-10-CM

## 2022-04-20 PROCEDURE — 99283 EMERGENCY DEPT VISIT LOW MDM: CPT

## 2022-04-20 ASSESSMENT — PAIN - FUNCTIONAL ASSESSMENT
PAIN_FUNCTIONAL_ASSESSMENT: NONE - DENIES PAIN
PAIN_FUNCTIONAL_ASSESSMENT: NONE - DENIES PAIN

## 2022-04-20 NOTE — ED PROVIDER NOTES
HPI:  4/20/22,   Time: 1:38 PM EDT       Brenda Chicas is a 79 y.o. male presenting to the ED for covid pos, beginning 1 day ago. The complaint has been persistent, mild in severity, and worsened by nothing. Pt no c/o, bib ems, from nh, covid pos yesterday. Ems reports sat >92 enroute. No n/v/d/abd pain/sob/cough/congestion/runny nose/sore throat. Pt w/o any c/o    Review of Systems:   Pertinent positives and negatives are stated within HPI, all other systems reviewed and are negative.          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 Past Surgical History:  has a past surgical history that includes Prostate biopsy (04/2016); Toe amputation (Left, 12/31/2016); Foot Debridement (Left, 01/04/2017); other surgical history (04/23/2019); Toe amputation (Right, 4/26/2019); eye surgery (1990's); Colonoscopy; vascular surgery; skin biopsy (sept of 2018 last time); Toe amputation (Right, 12/10/2019); other surgical history (12/17/2019); TURP (N/A, 5/18/2020); Cystoscopy (N/A, 5/18/2020); Hip fracture surgery (Left, 9/23/2020); hip surgery; fracture surgery; and hip surgery (Right, 7/16/2021). Social History:  reports that he quit smoking about 62 years ago. His smoking use included cigarettes. He has a 1.00 pack-year smoking history.  He has never used smokeless tobacco. He reports previous alcohol use. He reports previous drug use. Family History: family history includes Heart Attack in his father; Heart Disease in his father and mother. The patients home medications have been reviewed. Allergies: Latex, Ciprofloxacin in d5w, Pcn [penicillins], Other, and Peanut-containing drug products        ---------------------------------------------------PHYSICAL EXAM--------------------------------------    Constitutional/General: Alert and oriented x3, well appearing, non toxic in NAD  Head: Normocephalic and atraumatic  Eyes: PERRL, EOMI, conjunctive normal, sclera non icteric  Mouth: Oropharynx clear, handling secretions,   Neck: Supple, full ROM,   Respiratory: Lungs clear to auscultation bilaterally, no wheezes, rales, or rhonchi. Not in respiratory distress  Cardiovascular:  Regular rate. Regular rhythm. No murmurs, gallops, or rubs. 2+ distal pulses  Chest: No chest wall tenderness  GI:  Abdomen Soft, Non tender, Non distended. .   Musculoskeletal: Moves all extremities x 4. Warm and well perfused, no clubbing, cyanosis, or edema. Capillary refill <3 seconds  Integument: skin warm and dry. No rashes. Lymphatic: no lymphadenopathy noted  Neurologic: GCS 15, no focal deficits,   Psychiatric: Normal Affect    -------------------------------------------------- RESULTS -------------------------------------------------  I have personally reviewed all laboratory and imaging results for this patient. Results are listed below. LABS:  No results found for this visit on 04/20/22. RADIOLOGY:  Interpreted by Radiologist.  No orders to display       EKG: This EKG is signed and interpreted by the EP. Time:   Rate:   Rhythm:   Interpretation:   Comparison:       ------------------------- NURSING NOTES AND VITALS REVIEWED ---------------------------   The nursing notes within the ED encounter and vital signs as below have been reviewed by myself.   BP (!) 145/75   Pulse 78 Temp 98.2 °F (36.8 °C) (Oral)   Resp 16   Ht 5' 9\" (1.753 m)   Wt 140 lb (63.5 kg)   SpO2 94%   BMI 20.67 kg/m²   Oxygen Saturation Interpretation: Normal    The patients available past medical records and past encounters were reviewed. ------------------------------ ED COURSE/MEDICAL DECISION MAKING----------------------  Medications - No data to display      ED COURSE:       Medical Decision Making:    Non toxic, o2 >92 at rest and after ambulation, pt no sx, no emergent tx, send back to nh      This patient's ED course included: a personal history and physicial examination    This patient has remained hemodynamically stable during their ED course. Re-Evaluations:             Re-evaluation. Patients symptoms show no change        Counseling: The emergency provider has spoken with the patient and discussed todays results, in addition to providing specific details for the plan of care and counseling regarding the diagnosis and prognosis. Questions are answered at this time and they are agreeable with the plan.       --------------------------------- IMPRESSION AND DISPOSITION ---------------------------------    IMPRESSION  1. COVID-19        DISPOSITION  Disposition: Discharge to nursing home  Patient condition is stable    NOTE: This report was transcribed using voice recognition software.  Every effort was made to ensure accuracy; however, inadvertent computerized transcription errors may be present        Nathan Carvalho MD  04/20/22 2015

## 2022-04-20 NOTE — CARE COORDINATION
Social Work/ Transition of Care:    Pt presented to the ED secondary to testing positive for covid and his pulse ox dropping upon exertion. Pt sent to ED from [address] [place] [address]. Pt ambulated in ED without difficulty and pulse ox remained stable. USMAN spoke to [employer] who reports [facility] wheelchair [facility] transport will be here at 330pm.  USMAN canceled transportation with [facility].

## 2022-04-21 ENCOUNTER — HOSPITAL ENCOUNTER (OUTPATIENT)
Dept: WOUND CARE | Age: 82
Discharge: HOME | End: 2022-04-21

## 2022-04-28 ENCOUNTER — APPOINTMENT (OUTPATIENT)
Dept: GENERAL RADIOLOGY | Age: 82
DRG: 871 | End: 2022-04-28
Payer: MEDICARE

## 2022-04-28 ENCOUNTER — HOSPITAL ENCOUNTER (INPATIENT)
Age: 82
LOS: 6 days | Discharge: NURSING, REHAB OR LONG-TERM CARE | DRG: 871 | End: 2022-05-04
Attending: EMERGENCY MEDICINE | Admitting: INTERNAL MEDICINE
Payer: MEDICARE

## 2022-04-28 ENCOUNTER — APPOINTMENT (OUTPATIENT)
Dept: CT IMAGING | Age: 82
DRG: 871 | End: 2022-04-28
Payer: MEDICARE

## 2022-04-28 DIAGNOSIS — R09.02 HYPOXIA: Primary | ICD-10-CM

## 2022-04-28 DIAGNOSIS — U07.1 COVID-19: ICD-10-CM

## 2022-04-28 LAB
ALBUMIN SERPL-MCNC: 3.8 G/DL (ref 3.5–5.2)
ALP BLD-CCNC: 64 U/L (ref 40–129)
ALT SERPL-CCNC: 14 U/L (ref 0–40)
ANION GAP SERPL CALCULATED.3IONS-SCNC: 22 MMOL/L (ref 7–16)
ANISOCYTOSIS: ABNORMAL
APTT: 26.7 SEC (ref 24.5–35.1)
AST SERPL-CCNC: 15 U/L (ref 0–39)
BASOPHILS ABSOLUTE: 0 E9/L (ref 0–0.2)
BASOPHILS RELATIVE PERCENT: 0.2 % (ref 0–2)
BILIRUB SERPL-MCNC: 0.6 MG/DL (ref 0–1.2)
BUN BLDV-MCNC: 34 MG/DL (ref 6–23)
BURR CELLS: ABNORMAL
CALCIUM SERPL-MCNC: 9.8 MG/DL (ref 8.6–10.2)
CHLORIDE BLD-SCNC: 111 MMOL/L (ref 98–107)
CO2: 16 MMOL/L (ref 22–29)
CREAT SERPL-MCNC: 1 MG/DL (ref 0.7–1.2)
D DIMER: 437 NG/ML DDU
EOSINOPHILS ABSOLUTE: 0 E9/L (ref 0.05–0.5)
EOSINOPHILS RELATIVE PERCENT: 0 % (ref 0–6)
GFR AFRICAN AMERICAN: >60
GFR NON-AFRICAN AMERICAN: >60 ML/MIN/1.73
GLUCOSE BLD-MCNC: 203 MG/DL (ref 74–99)
HBA1C MFR BLD: 6.9 % (ref 4–5.6)
HCT VFR BLD CALC: 43.9 % (ref 37–54)
HEMOGLOBIN: 14.4 G/DL (ref 12.5–16.5)
INR BLD: 1.1
LYMPHOCYTES ABSOLUTE: 0.55 E9/L (ref 1.5–4)
LYMPHOCYTES RELATIVE PERCENT: 2.6 % (ref 20–42)
MCH RBC QN AUTO: 29.5 PG (ref 26–35)
MCHC RBC AUTO-ENTMCNC: 32.8 % (ref 32–34.5)
MCV RBC AUTO: 90 FL (ref 80–99.9)
METER GLUCOSE: 202 MG/DL (ref 74–99)
MONOCYTES ABSOLUTE: 0.91 E9/L (ref 0.1–0.95)
MONOCYTES RELATIVE PERCENT: 5.2 % (ref 2–12)
MYELOCYTE PERCENT: 0.9 % (ref 0–0)
NEUTROPHILS ABSOLUTE: 16.74 E9/L (ref 1.8–7.3)
NEUTROPHILS RELATIVE PERCENT: 91.3 % (ref 43–80)
OVALOCYTES: ABNORMAL
PDW BLD-RTO: 15.1 FL (ref 11.5–15)
PLATELET # BLD: 229 E9/L (ref 130–450)
PMV BLD AUTO: 10.1 FL (ref 7–12)
POIKILOCYTES: ABNORMAL
POLYCHROMASIA: ABNORMAL
POTASSIUM REFLEX MAGNESIUM: 4.1 MMOL/L (ref 3.5–5)
PROTHROMBIN TIME: 12.1 SEC (ref 9.3–12.4)
RBC # BLD: 4.88 E12/L (ref 3.8–5.8)
SODIUM BLD-SCNC: 149 MMOL/L (ref 132–146)
TOTAL PROTEIN: 7.8 G/DL (ref 6.4–8.3)
WBC # BLD: 18.2 E9/L (ref 4.5–11.5)

## 2022-04-28 PROCEDURE — 6360000004 HC RX CONTRAST MEDICATION: Performed by: RADIOLOGY

## 2022-04-28 PROCEDURE — 85730 THROMBOPLASTIN TIME PARTIAL: CPT

## 2022-04-28 PROCEDURE — 96374 THER/PROPH/DIAG INJ IV PUSH: CPT

## 2022-04-28 PROCEDURE — 6370000000 HC RX 637 (ALT 250 FOR IP): Performed by: NURSE PRACTITIONER

## 2022-04-28 PROCEDURE — 71045 X-RAY EXAM CHEST 1 VIEW: CPT

## 2022-04-28 PROCEDURE — 36415 COLL VENOUS BLD VENIPUNCTURE: CPT

## 2022-04-28 PROCEDURE — 85025 COMPLETE CBC W/AUTO DIFF WBC: CPT

## 2022-04-28 PROCEDURE — 93005 ELECTROCARDIOGRAM TRACING: CPT | Performed by: EMERGENCY MEDICINE

## 2022-04-28 PROCEDURE — 99285 EMERGENCY DEPT VISIT HI MDM: CPT

## 2022-04-28 PROCEDURE — 85610 PROTHROMBIN TIME: CPT

## 2022-04-28 PROCEDURE — 80053 COMPREHEN METABOLIC PANEL: CPT

## 2022-04-28 PROCEDURE — 3E0333Z INTRODUCTION OF ANTI-INFLAMMATORY INTO PERIPHERAL VEIN, PERCUTANEOUS APPROACH: ICD-10-PCS | Performed by: EMERGENCY MEDICINE

## 2022-04-28 PROCEDURE — 85378 FIBRIN DEGRADE SEMIQUANT: CPT

## 2022-04-28 PROCEDURE — 6360000002 HC RX W HCPCS: Performed by: EMERGENCY MEDICINE

## 2022-04-28 PROCEDURE — 83036 HEMOGLOBIN GLYCOSYLATED A1C: CPT

## 2022-04-28 PROCEDURE — 82962 GLUCOSE BLOOD TEST: CPT

## 2022-04-28 PROCEDURE — 6360000002 HC RX W HCPCS: Performed by: NURSE PRACTITIONER

## 2022-04-28 PROCEDURE — 2700000000 HC OXYGEN THERAPY PER DAY

## 2022-04-28 PROCEDURE — 71275 CT ANGIOGRAPHY CHEST: CPT

## 2022-04-28 PROCEDURE — 2060000000 HC ICU INTERMEDIATE R&B

## 2022-04-28 RX ORDER — MULTIVIT WITH MINERALS/LUTEIN
1000 TABLET ORAL DAILY
COMMUNITY
Start: 2022-04-21 | End: 2022-06-06

## 2022-04-28 RX ORDER — ASPIRIN 81 MG/1
81 TABLET ORAL DAILY
COMMUNITY
End: 2022-06-06 | Stop reason: SINTOL

## 2022-04-28 RX ORDER — ZINC SULFATE 50(220)MG
50 CAPSULE ORAL DAILY
COMMUNITY
Start: 2022-04-21 | End: 2022-05-21

## 2022-04-28 RX ORDER — CHOLECALCIFEROL (VITAMIN D3) 50 MCG
2000 TABLET ORAL DAILY
Status: DISCONTINUED | OUTPATIENT
Start: 2022-04-28 | End: 2022-05-04 | Stop reason: HOSPADM

## 2022-04-28 RX ORDER — DEXTROSE MONOHYDRATE 50 MG/ML
100 INJECTION, SOLUTION INTRAVENOUS PRN
Status: DISCONTINUED | OUTPATIENT
Start: 2022-04-28 | End: 2022-05-04 | Stop reason: HOSPADM

## 2022-04-28 RX ORDER — METOPROLOL SUCCINATE 25 MG/1
25 TABLET, EXTENDED RELEASE ORAL DAILY
Status: DISCONTINUED | OUTPATIENT
Start: 2022-04-28 | End: 2022-05-04 | Stop reason: HOSPADM

## 2022-04-28 RX ORDER — DEXAMETHASONE SODIUM PHOSPHATE 10 MG/ML
6 INJECTION, SOLUTION INTRAMUSCULAR; INTRAVENOUS EVERY 24 HOURS
Status: DISCONTINUED | OUTPATIENT
Start: 2022-04-28 | End: 2022-05-04 | Stop reason: HOSPADM

## 2022-04-28 RX ORDER — ZINC SULFATE 50(220)MG
50 CAPSULE ORAL DAILY
Status: DISCONTINUED | OUTPATIENT
Start: 2022-04-28 | End: 2022-04-29

## 2022-04-28 RX ORDER — INSULIN LISPRO 100 [IU]/ML
0-6 INJECTION, SOLUTION INTRAVENOUS; SUBCUTANEOUS NIGHTLY
Status: DISCONTINUED | OUTPATIENT
Start: 2022-04-28 | End: 2022-04-30

## 2022-04-28 RX ORDER — ASCORBIC ACID 500 MG
1000 TABLET ORAL DAILY
Status: DISCONTINUED | OUTPATIENT
Start: 2022-04-28 | End: 2022-04-29

## 2022-04-28 RX ORDER — ASPIRIN 81 MG/1
81 TABLET ORAL DAILY
Status: DISCONTINUED | OUTPATIENT
Start: 2022-04-28 | End: 2022-05-04 | Stop reason: HOSPADM

## 2022-04-28 RX ORDER — SODIUM CHLORIDE 1000 MG
1 TABLET, SOLUBLE MISCELLANEOUS 2 TIMES DAILY
COMMUNITY

## 2022-04-28 RX ORDER — INSULIN LISPRO 100 [IU]/ML
0-12 INJECTION, SOLUTION INTRAVENOUS; SUBCUTANEOUS
Status: DISCONTINUED | OUTPATIENT
Start: 2022-04-29 | End: 2022-04-30

## 2022-04-28 RX ORDER — AMLODIPINE BESYLATE 10 MG/1
10 TABLET ORAL DAILY
Status: DISCONTINUED | OUTPATIENT
Start: 2022-04-28 | End: 2022-05-04 | Stop reason: HOSPADM

## 2022-04-28 RX ORDER — SODIUM CHLORIDE 1000 MG
1 TABLET, SOLUBLE MISCELLANEOUS 2 TIMES DAILY
Status: DISCONTINUED | OUTPATIENT
Start: 2022-04-28 | End: 2022-04-29

## 2022-04-28 RX ORDER — DEXTROSE MONOHYDRATE 25 G/50ML
12.5 INJECTION, SOLUTION INTRAVENOUS PRN
Status: DISCONTINUED | OUTPATIENT
Start: 2022-04-28 | End: 2022-05-04 | Stop reason: HOSPADM

## 2022-04-28 RX ORDER — ENOXAPARIN SODIUM 100 MG/ML
30 INJECTION SUBCUTANEOUS 2 TIMES DAILY
Status: DISCONTINUED | OUTPATIENT
Start: 2022-04-28 | End: 2022-04-29

## 2022-04-28 RX ORDER — M-VIT,TX,IRON,MINS/CALC/FOLIC 27MG-0.4MG
1 TABLET ORAL DAILY
Status: DISCONTINUED | OUTPATIENT
Start: 2022-04-28 | End: 2022-05-04 | Stop reason: HOSPADM

## 2022-04-28 RX ORDER — DEXAMETHASONE SODIUM PHOSPHATE 10 MG/ML
10 INJECTION, SOLUTION INTRAMUSCULAR; INTRAVENOUS ONCE
Status: COMPLETED | OUTPATIENT
Start: 2022-04-28 | End: 2022-04-28

## 2022-04-28 RX ORDER — NICOTINE POLACRILEX 4 MG
15 LOZENGE BUCCAL PRN
Status: DISCONTINUED | OUTPATIENT
Start: 2022-04-28 | End: 2022-05-04 | Stop reason: HOSPADM

## 2022-04-28 RX ORDER — CHOLECALCIFEROL (VITAMIN D3) 125 MCG
2000 CAPSULE ORAL DAILY
COMMUNITY
Start: 2022-04-21 | End: 2022-06-06

## 2022-04-28 RX ADMIN — METOPROLOL SUCCINATE 25 MG: 25 TABLET, EXTENDED RELEASE ORAL at 22:50

## 2022-04-28 RX ADMIN — DEXAMETHASONE SODIUM PHOSPHATE 10 MG: 10 INJECTION, SOLUTION INTRAMUSCULAR; INTRAVENOUS at 17:08

## 2022-04-28 RX ADMIN — ASPIRIN 81 MG: 81 TABLET, COATED ORAL at 22:50

## 2022-04-28 RX ADMIN — AMLODIPINE BESYLATE 10 MG: 10 TABLET ORAL at 22:46

## 2022-04-28 RX ADMIN — INSULIN LISPRO 2 UNITS: 100 INJECTION, SOLUTION INTRAVENOUS; SUBCUTANEOUS at 22:59

## 2022-04-28 RX ADMIN — ENOXAPARIN SODIUM 30 MG: 100 INJECTION SUBCUTANEOUS at 22:50

## 2022-04-28 RX ADMIN — OXYCODONE HYDROCHLORIDE AND ACETAMINOPHEN 1000 MG: 500 TABLET ORAL at 22:46

## 2022-04-28 RX ADMIN — IOPAMIDOL 75 ML: 755 INJECTION, SOLUTION INTRAVENOUS at 23:28

## 2022-04-28 RX ADMIN — Medication 2000 UNITS: at 22:52

## 2022-04-28 ASSESSMENT — PAIN SCALES - GENERAL: PAINLEVEL_OUTOF10: 0

## 2022-04-28 ASSESSMENT — PAIN - FUNCTIONAL ASSESSMENT: PAIN_FUNCTIONAL_ASSESSMENT: NONE - DENIES PAIN

## 2022-04-28 NOTE — ED PROVIDER NOTES
HPI:  4/28/22,   Time: 4:56 PM EDT       Sullivan Friday is a 79 y.o. male presenting to the ED for covid/hypoxia, beginning 8 days ago. The complaint has been persistent, severe in severity, and worsened by nothing. From nh, tested pos outpt 8 days ago, worse today, 82% on ra. Bib ems. Pos cough. Pt poor historian. Pos fever/chills. No n/v/d/abd pain. Review of Systems:   Pertinent positives and negatives are stated within HPI, all other systems reviewed and are negative.          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 Past Surgical History:  has a past surgical history that includes Prostate biopsy (04/2016); Toe amputation (Left, 12/31/2016); Foot Debridement (Left, 01/04/2017); other surgical history (04/23/2019); Toe amputation (Right, 4/26/2019); eye surgery (1990's); Colonoscopy; vascular surgery; skin biopsy (sept of 2018 last time); Toe amputation (Right, 12/10/2019); other surgical history (12/17/2019); TURP (N/A, 5/18/2020); Cystoscopy (N/A, 5/18/2020); Hip fracture surgery (Left, 9/23/2020); hip surgery; fracture surgery; and hip surgery (Right, 7/16/2021). Social History:  reports that he quit smoking about 62 years ago. His smoking use included cigarettes. He has a 1.00 pack-year smoking history.  He has never used smokeless tobacco. He reports previous alcohol use. He reports previous drug use. Family History: family history includes Heart Attack in his father; Heart Disease in his father and mother. The patients home medications have been reviewed. Allergies: Latex, Ciprofloxacin in d5w, Pcn [penicillins], Other, and Peanut-containing drug products        ---------------------------------------------------PHYSICAL EXAM--------------------------------------    Constitutional/General: Alert and oriented x3, chronically ill appearing  Head: Normocephalic and atraumatic  Eyes: PERRL, EOMI, conjunctive normal, sclera non icteric  Mouth: Oropharynx clear, handling secretions, no trismus, no asymmetry of the posterior oropharynx or uvular edema  Neck: Supple, full ROM,   Respiratory: Lungs clear to auscultation bilaterally, tachypnic  Cardiovascular:  Regular rate. Regular rhythm. No murmurs, gallops, or rubs. 2+ distal pulses  Chest: No chest wall tenderness  GI:  Abdomen Soft, Non tender, Non distended. .   Musculoskeletal: Moves all extremities x 4. Warm and well perfused, no clubbing, cyanosis, or edema. Capillary refill <3 seconds  Integument: skin warm and dry. No rashes. Lymphatic: no lymphadenopathy noted  Neurologic: GCS 15, no focal deficits,   Psychiatric: Normal Affect    -------------------------------------------------- RESULTS -------------------------------------------------  I have personally reviewed all laboratory and imaging results for this patient. Results are listed below.      LABS:  Results for orders placed or performed during the hospital encounter of 04/28/22   CBC with Auto Differential   Result Value Ref Range    WBC 18.2 (H) 4.5 - 11.5 E9/L    RBC 4.88 3.80 - 5.80 E12/L    Hemoglobin 14.4 12.5 - 16.5 g/dL    Hematocrit 43.9 37.0 - 54.0 %    MCV 90.0 80.0 - 99.9 fL    MCH 29.5 26.0 - 35.0 pg    MCHC 32.8 32.0 - 34.5 %    RDW 15.1 (H) 11.5 - 15.0 fL    Platelets 049 929 - 680 E9/L    MPV 10.1 7.0 - 12.0 fL Neutrophils % 91.3 (H) 43.0 - 80.0 %    Lymphocytes % 2.6 (L) 20.0 - 42.0 %    Monocytes % 5.2 2.0 - 12.0 %    Eosinophils % 0.0 0.0 - 6.0 %    Basophils % 0.2 0.0 - 2.0 %    Neutrophils Absolute 16.74 (H) 1.80 - 7.30 E9/L    Lymphocytes Absolute 0.55 (L) 1.50 - 4.00 E9/L    Monocytes Absolute 0.91 0.10 - 0.95 E9/L    Eosinophils Absolute 0.00 (L) 0.05 - 0.50 E9/L    Basophils Absolute 0.00 0.00 - 0.20 E9/L    Myelocyte Percent 0.9 0 - 0 %    Anisocytosis 1+     Polychromasia 1+     Poikilocytes 1+     Neeraj Cells 1+     Ovalocytes 1+    Comprehensive Metabolic Panel w/ Reflex to MG   Result Value Ref Range    Sodium 149 (H) 132 - 146 mmol/L    Potassium reflex Magnesium 4.1 3.5 - 5.0 mmol/L    Chloride 111 (H) 98 - 107 mmol/L    CO2 16 (L) 22 - 29 mmol/L    Anion Gap 22 (H) 7 - 16 mmol/L    Glucose 203 (H) 74 - 99 mg/dL    BUN 34 (H) 6 - 23 mg/dL    CREATININE 1.0 0.7 - 1.2 mg/dL    GFR Non-African American >60 >=60 mL/min/1.73    GFR African American >60     Calcium 9.8 8.6 - 10.2 mg/dL    Total Protein 7.8 6.4 - 8.3 g/dL    Albumin 3.8 3.5 - 5.2 g/dL    Total Bilirubin 0.6 0.0 - 1.2 mg/dL    Alkaline Phosphatase 64 40 - 129 U/L    ALT 14 0 - 40 U/L    AST 15 0 - 39 U/L   Protime-INR   Result Value Ref Range    Protime 12.1 9.3 - 12.4 sec    INR 1.1    APTT   Result Value Ref Range    aPTT 26.7 24.5 - 35.1 sec   D-Dimer, Quantitative   Result Value Ref Range    D-Dimer, Quant 437 ng/mL DDU   EKG 12 Lead   Result Value Ref Range    Ventricular Rate 68 BPM    Atrial Rate 68 BPM    P-R Interval 154 ms    QRS Duration 82 ms    Q-T Interval 382 ms    QTc Calculation (Bazett) 406 ms    P Axis 62 degrees    R Axis 22 degrees    T Axis 86 degrees       RADIOLOGY:  Interpreted by Radiologist.  XR CHEST PORTABLE   Final Result   No acute process. CTA PULMONARY W CONTRAST    (Results Pending)       EKG: This EKG is signed and interpreted by the EP.     Time: 1711  Rate: 70  Rhythm: Sinus  Interpretation: non-specific EKG  Comparison: None      ------------------------- NURSING NOTES AND VITALS REVIEWED ---------------------------   The nursing notes within the ED encounter and vital signs as below have been reviewed by myself. BP (!) 145/69   Pulse 64   Temp 97.8 °F (36.6 °C)   Resp 17   Ht 5' 10\" (1.778 m)   Wt 140 lb (63.5 kg)   SpO2 93%   BMI 20.09 kg/m²   Oxygen Saturation Interpretation: Abnormal and Improved after treatment    The patients available past medical records and past encounters were reviewed. ------------------------------ ED COURSE/MEDICAL DECISION MAKING----------------------  Medications   dexamethasone (PF) (DECADRON) injection 10 mg (10 mg IntraVENous Given 4/28/22 1708)         ED COURSE:       Medical Decision Making:    Covid, hypoxia, dex given, o2 to maintain sats. Will admit for further care      This patient's ED course included: a personal history and physicial examination    This patient has remained hemodynamically stable during their ED course. Re-Evaluations:             Re-evaluation. Patients symptoms show no change    Re-examination  4/28/22   816        Vital Signs:   Vitals:    04/28/22 1653 04/28/22 1830 04/28/22 1917   BP: (!) 147/61 123/61 (!) 145/69   Pulse: 67 57 64   Resp: 18 22 17   Temp: 97.8 °F (36.6 °C)     SpO2: 98% 95% 93%   Weight: 140 lb (63.5 kg)     Height: 5' 10\" (1.778 m)               Consultations:             [facility]    Critical Care: 35 min        Counseling: The emergency provider has spoken with the patient and discussed todays results, in addition to providing specific details for the plan of care and counseling regarding the diagnosis and prognosis. Questions are answered at this time and they are agreeable with the plan.       --------------------------------- IMPRESSION AND DISPOSITION ---------------------------------    IMPRESSION  1. Hypoxia    2.  COVID-19        DISPOSITION  Disposition: Admit to telemetry  Patient condition is stable    NOTE: This report was transcribed using voice recognition software.  Every effort was made to ensure accuracy; however, inadvertent computerized transcription errors may be present        Anuj Curiel MD  04/28/22 2016

## 2022-04-28 NOTE — LETTER
[place] [employer] Medicaid  CERTIFICATION OF NECESSITY  FOR NON-EMERGENCY TRANSPORTATION   BY GROUND AMBUL[facility]      Individual Information   1. Name: Rosa Xie 2. [place] Medicaid Billing Number:    3. Address: [address] [place] [id number]      Transportation Provider Information   4. Provider Name: [facility]   5. [place] Medicaid Provider Number:  National Provider Identifier (NPI):      Certification  7. Criteria:  During transport, this individual requires:  [x] Medical treatment or continuous     supervision by an EMT. [x] The administration or regulation of oxygen by another person. [] Supervised protective restraint. 8. Period Beginning Date: 4/29/22   9. Length  [x] Not more than 5 day(s)  [] One Year     Additional Information Relevant to Certification   10. Comments or Explanations, If Necessary or Appropriate     Covid 19 Positive, Heel Ulcer, Osteomylitis     Certifying Practitioner Information   11. Name of Practitioner: Dr Will Juarez MD   12. [place] Medicaid Provider Number, If Applicable:  [address] Provider Identifier (NPI):      Signature Information   14. Date of Signature: 4/29/22 15. Name of Person Signing: Korin Adler    72.  Signature and Professional Designation: Electronically signed by SILVIA Melgoza on 4/29/2022 at 12:37 PM       OD 06393  Rev. 7/2015

## 2022-04-29 PROBLEM — A41.9 SEPSIS (HCC): Status: ACTIVE | Noted: 2022-04-29

## 2022-04-29 PROBLEM — E11.9 TYPE 2 DIABETES MELLITUS (HCC): Status: ACTIVE | Noted: 2017-01-01

## 2022-04-29 PROBLEM — E87.0 HYPERNATREMIA: Status: ACTIVE | Noted: 2022-04-29

## 2022-04-29 PROBLEM — J96.01 ACUTE HYPOXEMIC RESPIRATORY FAILURE (HCC): Status: ACTIVE | Noted: 2022-04-29

## 2022-04-29 PROBLEM — J18.9 PNEUMONIA: Status: ACTIVE | Noted: 2022-04-29

## 2022-04-29 LAB
ALBUMIN SERPL-MCNC: 3.7 G/DL (ref 3.5–5.2)
ALP BLD-CCNC: 70 U/L (ref 40–129)
ALT SERPL-CCNC: 16 U/L (ref 0–40)
ANION GAP SERPL CALCULATED.3IONS-SCNC: 22 MMOL/L (ref 7–16)
AST SERPL-CCNC: 18 U/L (ref 0–39)
BASOPHILS ABSOLUTE: 0.03 E9/L (ref 0–0.2)
BASOPHILS RELATIVE PERCENT: 0.2 % (ref 0–2)
BILIRUB SERPL-MCNC: 0.5 MG/DL (ref 0–1.2)
BUN BLDV-MCNC: 41 MG/DL (ref 6–23)
C-REACTIVE PROTEIN: 16 MG/DL (ref 0–0.4)
CALCIUM SERPL-MCNC: 10.1 MG/DL (ref 8.6–10.2)
CHLORIDE BLD-SCNC: 111 MMOL/L (ref 98–107)
CO2: 18 MMOL/L (ref 22–29)
CREAT SERPL-MCNC: 1.1 MG/DL (ref 0.7–1.2)
D DIMER: 508 NG/ML DDU
EKG ATRIAL RATE: 68 BPM
EKG P AXIS: 62 DEGREES
EKG P-R INTERVAL: 154 MS
EKG Q-T INTERVAL: 382 MS
EKG QRS DURATION: 82 MS
EKG QTC CALCULATION (BAZETT): 406 MS
EKG R AXIS: 22 DEGREES
EKG T AXIS: 86 DEGREES
EKG VENTRICULAR RATE: 68 BPM
EOSINOPHILS ABSOLUTE: 0 E9/L (ref 0.05–0.5)
EOSINOPHILS RELATIVE PERCENT: 0 % (ref 0–6)
FIBRINOGEN: >700 MG/DL (ref 225–540)
GFR AFRICAN AMERICAN: >60
GFR NON-AFRICAN AMERICAN: >60 ML/MIN/1.73
GLUCOSE BLD-MCNC: 234 MG/DL (ref 74–99)
HCT VFR BLD CALC: 47.1 % (ref 37–54)
HEMOGLOBIN: 15.6 G/DL (ref 12.5–16.5)
IMMATURE GRANULOCYTES #: 0.08 E9/L
IMMATURE GRANULOCYTES %: 0.5 % (ref 0–5)
L. PNEUMOPHILA SEROGP 1 UR AG: NORMAL
LYMPHOCYTES ABSOLUTE: 1.19 E9/L (ref 1.5–4)
LYMPHOCYTES RELATIVE PERCENT: 6.9 % (ref 20–42)
MCH RBC QN AUTO: 29.4 PG (ref 26–35)
MCHC RBC AUTO-ENTMCNC: 33.1 % (ref 32–34.5)
MCV RBC AUTO: 88.9 FL (ref 80–99.9)
METER GLUCOSE: 208 MG/DL (ref 74–99)
METER GLUCOSE: 252 MG/DL (ref 74–99)
METER GLUCOSE: 253 MG/DL (ref 74–99)
METER GLUCOSE: 254 MG/DL (ref 74–99)
MONOCYTES ABSOLUTE: 0.42 E9/L (ref 0.1–0.95)
MONOCYTES RELATIVE PERCENT: 2.4 % (ref 2–12)
NEUTROPHILS ABSOLUTE: 15.64 E9/L (ref 1.8–7.3)
NEUTROPHILS RELATIVE PERCENT: 90 % (ref 43–80)
PDW BLD-RTO: 15.3 FL (ref 11.5–15)
PLATELET # BLD: 228 E9/L (ref 130–450)
PMV BLD AUTO: 10.1 FL (ref 7–12)
POTASSIUM REFLEX MAGNESIUM: 4.5 MMOL/L (ref 3.5–5)
PROCALCITONIN: 0.19 NG/ML (ref 0–0.08)
RBC # BLD: 5.3 E12/L (ref 3.8–5.8)
SARS-COV-2, NAAT: DETECTED
SODIUM BLD-SCNC: 151 MMOL/L (ref 132–146)
STREP PNEUMONIAE ANTIGEN, URINE: NORMAL
TOTAL PROTEIN: 8.1 G/DL (ref 6.4–8.3)
WBC # BLD: 17.4 E9/L (ref 4.5–11.5)

## 2022-04-29 PROCEDURE — 2700000000 HC OXYGEN THERAPY PER DAY

## 2022-04-29 PROCEDURE — 87449 NOS EACH ORGANISM AG IA: CPT

## 2022-04-29 PROCEDURE — 80053 COMPREHEN METABOLIC PANEL: CPT

## 2022-04-29 PROCEDURE — 85378 FIBRIN DEGRADE SEMIQUANT: CPT

## 2022-04-29 PROCEDURE — 92526 ORAL FUNCTION THERAPY: CPT

## 2022-04-29 PROCEDURE — 87635 SARS-COV-2 COVID-19 AMP PRB: CPT

## 2022-04-29 PROCEDURE — 51798 US URINE CAPACITY MEASURE: CPT

## 2022-04-29 PROCEDURE — 6360000002 HC RX W HCPCS: Performed by: NURSE PRACTITIONER

## 2022-04-29 PROCEDURE — 6370000000 HC RX 637 (ALT 250 FOR IP): Performed by: INTERNAL MEDICINE

## 2022-04-29 PROCEDURE — 6370000000 HC RX 637 (ALT 250 FOR IP): Performed by: NURSE PRACTITIONER

## 2022-04-29 PROCEDURE — 2060000000 HC ICU INTERMEDIATE R&B

## 2022-04-29 PROCEDURE — 2580000003 HC RX 258: Performed by: INTERNAL MEDICINE

## 2022-04-29 PROCEDURE — 36415 COLL VENOUS BLD VENIPUNCTURE: CPT

## 2022-04-29 PROCEDURE — 85384 FIBRINOGEN ACTIVITY: CPT

## 2022-04-29 PROCEDURE — 82962 GLUCOSE BLOOD TEST: CPT

## 2022-04-29 PROCEDURE — 92610 EVALUATE SWALLOWING FUNCTION: CPT

## 2022-04-29 PROCEDURE — 84145 PROCALCITONIN (PCT): CPT

## 2022-04-29 PROCEDURE — 6360000002 HC RX W HCPCS: Performed by: INTERNAL MEDICINE

## 2022-04-29 PROCEDURE — 85025 COMPLETE CBC W/AUTO DIFF WBC: CPT

## 2022-04-29 PROCEDURE — 86140 C-REACTIVE PROTEIN: CPT

## 2022-04-29 RX ORDER — DOXYCYCLINE HYCLATE 100 MG/1
100 CAPSULE ORAL EVERY 12 HOURS SCHEDULED
Status: COMPLETED | OUTPATIENT
Start: 2022-04-29 | End: 2022-05-03

## 2022-04-29 RX ORDER — ENOXAPARIN SODIUM 100 MG/ML
40 INJECTION SUBCUTANEOUS DAILY
Status: DISCONTINUED | OUTPATIENT
Start: 2022-04-29 | End: 2022-05-04 | Stop reason: HOSPADM

## 2022-04-29 RX ORDER — DEXTROSE MONOHYDRATE 50 MG/ML
INJECTION, SOLUTION INTRAVENOUS CONTINUOUS
Status: DISCONTINUED | OUTPATIENT
Start: 2022-04-29 | End: 2022-05-01

## 2022-04-29 RX ADMIN — PETROLATUM: 420 OINTMENT TOPICAL at 16:52

## 2022-04-29 RX ADMIN — DEXTROSE MONOHYDRATE: 50 INJECTION, SOLUTION INTRAVENOUS at 08:54

## 2022-04-29 RX ADMIN — INSULIN LISPRO 3 UNITS: 100 INJECTION, SOLUTION INTRAVENOUS; SUBCUTANEOUS at 22:16

## 2022-04-29 RX ADMIN — DEXAMETHASONE SODIUM PHOSPHATE 6 MG: 10 INJECTION, SOLUTION INTRAMUSCULAR; INTRAVENOUS at 22:12

## 2022-04-29 RX ADMIN — INSULIN LISPRO 4 UNITS: 100 INJECTION, SOLUTION INTRAVENOUS; SUBCUTANEOUS at 08:57

## 2022-04-29 RX ADMIN — DOXYCYCLINE HYCLATE 100 MG: 100 CAPSULE ORAL at 08:57

## 2022-04-29 RX ADMIN — AMLODIPINE BESYLATE 10 MG: 10 TABLET ORAL at 08:57

## 2022-04-29 RX ADMIN — DEXAMETHASONE SODIUM PHOSPHATE 6 MG: 10 INJECTION, SOLUTION INTRAMUSCULAR; INTRAVENOUS at 01:34

## 2022-04-29 RX ADMIN — ASPIRIN 81 MG: 81 TABLET, COATED ORAL at 08:58

## 2022-04-29 RX ADMIN — Medication 1 TABLET: at 08:58

## 2022-04-29 RX ADMIN — Medication 2000 UNITS: at 08:58

## 2022-04-29 RX ADMIN — INSULIN LISPRO 6 UNITS: 100 INJECTION, SOLUTION INTRAVENOUS; SUBCUTANEOUS at 17:52

## 2022-04-29 RX ADMIN — SODIUM CHLORIDE 3000 MG: 900 INJECTION INTRAVENOUS at 13:13

## 2022-04-29 RX ADMIN — ENOXAPARIN SODIUM 40 MG: 100 INJECTION SUBCUTANEOUS at 08:56

## 2022-04-29 RX ADMIN — DOXYCYCLINE HYCLATE 100 MG: 100 CAPSULE ORAL at 22:13

## 2022-04-29 RX ADMIN — SODIUM CHLORIDE 3000 MG: 900 INJECTION INTRAVENOUS at 08:56

## 2022-04-29 RX ADMIN — INSULIN LISPRO 6 UNITS: 100 INJECTION, SOLUTION INTRAVENOUS; SUBCUTANEOUS at 12:45

## 2022-04-29 RX ADMIN — SODIUM CHLORIDE 3000 MG: 900 INJECTION INTRAVENOUS at 22:12

## 2022-04-29 ASSESSMENT — PAIN SCALES - GENERAL
PAINLEVEL_OUTOF10: 2
PAINLEVEL_OUTOF10: 0

## 2022-04-29 ASSESSMENT — PAIN DESCRIPTION - LOCATION: LOCATION: BACK

## 2022-04-29 NOTE — FLOWSHEET NOTE
Inpatient Wound Care (Initial consult) [id number]    Admit Date: 4/28/2022  4:52 PM    Reason for consult:  Coccyx, Right plantar foot    Significant history:  Per ED note     Dewey Burciaga is a 79 y.o. male presenting to the ED for covid/hypoxia, beginning 8 days ago. The complaint has been persistent, severe in severity, and worsened by nothing. From nh, tested pos outpt 8 days ago, worse today, 82% on ra. Bib ems. Pos cough. Pt poor historian. Pos fever/chills. No n/v/d/abd pain. Findings:      04/29/22 1037   Wound 02/21/22 Foot Right;Plantar #2   Date First Assessed/Time First Assessed: 02/21/22 1322   Present on Hospital Admission: Yes  Wound Approximate Age at First Assessment (Weeks): 4 weeks  Location: Foot  Wound Location Orientation: Right;Plantar  Wound Description (Comments): #2   Wound Image    Wound Etiology Diabetic   Dressing/Treatment ABD; Alginate;Roll gauze   Wound Length (cm) 1.6 cm   Wound Width (cm) 2 cm   Wound Depth (cm) 0.1 cm   Wound Surface Area (cm^2) 3.2 cm^2   Change in Wound Size % (l*w) -3455.56   Wound Volume (cm^3) 0.32 cm^3   Wound Healing % -3456   Wound Assessment Pink/red   Drainage Amount Scant   Drainage Description Serosanguinous   Odor None   Latonya-wound Assessment Dry/flaky   Wound 04/29/22 Foot Left;Plantar   Date First Assessed/Time First Assessed: 04/29/22 1244   Present on Hospital Admission: Yes  Location: Foot  Wound Location Orientation: Left;Plantar   Wound Image    Wound Etiology Diabetic   Dressing/Treatment Open to air   Wound Assessment   (black)   Drainage Amount None   Odor None   Latonya-wound Assessment Dry/flaky    MHY RN SHIFT SKIN ASSESSMENT      SIGN-OFF RN ONLY NEEDED AT INITIAL ADMISSION/TRANSFER TO UNIT  Completed every shift by primary RN      Primary RN:  Sign-Off RN:      SKIN INTEGRITY SITES   (PLACE LDA in FLOWSHEET FOR ANY OPEN SITE)   RIGHT/LEFT/BILATERAL  Select using F2: LOCATION  Type in: DESCRIPTORS:  Select using F2: OTHER  (comment):   1. Select area: Left   Plantar foot Assessment: Other (comment) callous   2. Select area: Bilateral    feet Assessment: Other (comment) Dry, flaky   3. Select area: Bilateral arms Assessment: Bruising      4. Select area: Bilateral   Lower legs Assessment: Other (comment) Vascular discoloration   5. Select area: Bilateral   buttock Assessment: Redness (blanchable)      Utilize skin binder and prevention measures during interim for resource. Ensure that GERARDO SCALE and GERARDO ORDER SETS are assessed and utilized if Gerardo Score 18 or below. **Informed Consent**    The patient has given verbal consent to have photos taken of wound and inserted into their chart as part of their permanent medical record for purposes of documentation, treatment management and/or medical review. All Images taken on 4/29/22 of patient name: Phil Chisholm were transmitted and stored on secured [employer] located within [facility] by a registered [employer] Mobile Application Device.        Impression:  Right plantar foot, left plantar foot; diabetic    Plan: opticell, 4x4 kerlix  Aquaphor  TAPS  Heel protectors  Patient will need continued preventative care        Jovanny Angela RN 4/29/2022 12:45 PM

## 2022-04-29 NOTE — H&P
[id number]  [facility]  History and Physical      CHIEF COMPLAINT:    Chief Complaint   Patient presents with    Shortness of Breath     88% on room air. Recent diagnosis COVID (+) @ 15th        Patient of Tyler Albarado MD presents with:  Pneumonia    History of Present Illness: The patient is a very simple historian. He just answers 1 or 2 word responses. He reports several days of shortness of breath. He has a cough. He denies fevers. He is unable to explain the severity. He is unable to provide any associated symptoms. He denies chest pain. There are no other associated symptoms that I could elicit. There are no modifying or alleviating factors that I can elicit. REVIEW OF SYSTEMS:  Pertinent negatives are above in HPI. 10 point ROS otherwise negative. 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 ([address] 12/6/2019    S/P peripheral artery angioplasty 01/23/2020    bilateral legs -[place]    Skin ulcer of right foot with fat layer exposed ([address] 12/9/2019    Ulcer of right leg, with fat layer exposed ([address] 5/6/2019    Varicose veins of leg with edema, 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         Past Surgical History:   Procedure Laterality Date    COLONOSCOPY      CYSTOSCOPY N/A 5/18/2020    SUPRAPUBIC TUBE PLACEMENT performed by Paola Lino MD at [address] [facility]  [facility]    lense implants bilaterally    FOOT DEBRIDEMENT Left 01/04/2017    wound debridement and delayed primary closure    FRACTURE SURGERY      L femur fracture surgery    HIP FRACTURE SURGERY Left 9/23/2020    LEFT HIP OPEN REDUCTION INTERNAL FIXATION performed by Shruti Concepcion MD at [address] Right 7/16/2021    RIGHT HIP HEMIARTHROPLASTY performed by Willem Trejo DO at [address]  04/23/2019    Dr. Rosina Camarillo- PTA ant tibial    OTHER SURGICAL HISTORY  12/17/2019    Dr Rosina Camarillo - PCI Right popliteal and Anterior tibial    PROSTATE BIOPSY  04/2016    SKIN BIOPSY  sept of 2018 last time    head and ears    TOE AMPUTATION Left 12/31/2016    2nd    TOE AMPUTATION Right 4/26/2019    AMPUTATION RIGHT SECOND TOE, FOOT DEBRIDEMENT performed by Sam Chavez DPM at [address] [facility] Right 12/10/2019    AMPUTATION RIGHT 3rd DIGIT WITH PARTIAL RESECTION OF THIRD METATARSAL performed by Sam Chavez DPM at [facility] TUR N/A 5/18/2020    CYSTOSCOPY PLASMA LOOP TRANSURETHRAL RESECTION PROSTATE performed by Nikhil Pena MD at [address] [place] [address]         Medications Prior to Admission:    Medications Prior to Admission: aspirin 81 MG EC tablet, Take 81 mg by mouth daily  sodium chloride 1 g tablet, Take 1 g by mouth in the morning and at bedtime  Ascorbic Acid (VITAMIN C) 1000 MG tablet, Take 1,000 mg by mouth daily  Cholecalciferol (VITAMIN D3) 50 MCG (2000 UT) TABS, Take 2,000 Units by mouth daily  zinc sulfate (ZINCATE) 220 (50 Zn) MG capsule, Take 50 mg by mouth daily  empagliflozin (JARDIANCE) 25 MG tablet, Take 25 mg by mouth daily  insulin lispro (HUMALOG) 100 UNIT/ML injection vial, Inject into the skin 4 times daily per sliding scale: 150-199 = 2 units;    200-249 = 4;    250-299 = 6;    300-349 = 8;    > 350 = notify MD  SITagliptin (JANUVIA) 100 MG tablet, Take 100 mg by mouth daily  metFORMIN (GLUCOPHAGE) 1000 MG tablet, Take 500 mg by mouth 2 times daily   metoprolol succinate (TOPROL XL) 25 MG extended release tablet, Take 1 tablet by mouth daily  amLODIPine (NORVASC) 5 MG tablet, Take 2 tablets by mouth daily  Garlic 4648 MG TABS, Take 1,250 mg by mouth daily   Multiple Vitamins-Minerals (THERAPEUTIC MULTIVITAMIN-MINERALS) tablet, Take 1 tablet by mouth daily    Note that the patient's home medications were reviewed and the above list is accurate to the best of my knowledge at the time of the exam.    Allergies:    Latex, Ciprofloxacin in d5w, Other, and Peanut-containing drug products    Social History:    reports that he quit smoking about 62 years ago. His smoking use included cigarettes. He has a 1.00 pack-year smoking history. He has never used smokeless tobacco. He reports previous alcohol use. He reports previous drug use. Family History:   family history includes Heart Attack in his father; Heart Disease in his father and mother. PHYSICAL EXAM:    Vitals:  /78   Pulse 66   Temp 96.5 °F (35.8 °C) (Temporal)   Resp 18   Ht 5' 9\" (1.753 m)   Wt 146 lb (66.2 kg)   SpO2 96%   BMI 21.56 kg/m²       General appearance: VERY frail man. Very thin/atrophic limbs. Nontoxic appearing  Eyes: Sclerae anicteric, PERRLA  HEENT: AT/NC, MMM  Neck: FROM, supple, no thyromegaly  Lymph: No cervical / supraclavicular lymphadenopathy  Lungs: B/L rales. Deep wet cough. WOB normal.  CV: RRR, no MRGs, no lower extremity edema  Abdomen: Soft, non-tender; no masses or HSM, +BS  Extremities: FROM without synovitis. No clubbing or cyanosis of the hands. Skin: no rash, induration, lesions, or ulcers  Psych: Calm and cooperative. Very limited judgement and insight. Normal mood and affect. Neuro: Alert and interactive, face symmetric, speech fluent. Limited content of conversation, but can answer simple questions. LABS:  All labs reviewed.   Of note:  CBC with Differential:    Lab Results   Component Value Date    WBC 17.4 04/29/2022    RBC 5.30 04/29/2022    HGB 15.6 04/29/2022    HCT 47.1 04/29/2022     04/29/2022    MCV 88.9 04/29/2022    MCH 29.4 04/29/2022    MCHC 33.1 04/29/2022    RDW 15.3 04/29/2022    LYMPHOPCT 6.9 04/29/2022    MONOPCT 2.4 04/29/2022    MYELOPCT 0.9 04/28/2022    BASOPCT 0.2 04/29/2022    MONOSABS 0.42 04/29/2022    LYMPHSABS 1.19 04/29/2022    EOSABS 0.00 04/29/2022    BASOSABS 0.03 04/29/2022     CMP:    Lab Results   Component Value Date     04/29/2022    K 4.5 04/29/2022     04/29/2022    CO2 18 04/29/2022    BUN 41 04/29/2022    CREATININE 1.1 04/29/2022    GFRAA >60 04/29/2022    LABGLOM >60 04/29/2022    GLUCOSE 234 04/29/2022    PROT 8.1 04/29/2022    LABALBU 3.7 04/29/2022    CALCIUM 10.1 04/29/2022    BILITOT 0.5 04/29/2022    ALKPHOS 70 04/29/2022    AST 18 04/29/2022    ALT 16 04/29/2022       Imaging:  I've personally reviewed the patient's CTA chest     Dense left lower lobe consolidation consistent with pneumonia.  Partial   opacification of left lower lobe airways could represent retained secretions   but aspiration not excluded.  Neoplasia less likely but follow-up to   resolution recommended. Minimal areas of bilateral linear atelectasis. Scattered borderline sized lymph nodes which are favored to be reactive but   could also be followed. Cholelithiasis. EKG:  I've personally reviewed the patient's EKG:  NSR no acute ischemic changes       ASSESSMENT/PLAN:  Principal Problem:    Pneumonia  Active Problems:    COVID-19    Sepsis ([facility]    Acute hypoxemic respiratory failure (HCC)    Hypernatremia    Type 2 diabetes mellitus ([facility]  Resolved Problems:    * No resolved hospital problems. *      This patient has a blatant lobar pneumonia with elevated WBC. His clinical picture is NOT that of COVID pneumonia.     Start antibiotics - unasyn/doxy    Consult SLP r/o aspiration    Dex is fine but COVID is incidental to his real issue    D5W for hypernatremia    SSI for DM2    DVT prophylaxis: Lovenox  Code status: Full  Requires inpatient level of care  Cynthia Lucia MD 7:36 AM  4/29/2022

## 2022-04-29 NOTE — PROGRESS NOTES
Pt increasingly confused, repetitive and hallucinating. Pt re-oriented and re-directed at this time. PT remains bradycardiac with SPO2 >90% on 6L of O2. PT frequently removing nasal cannula, teaching reinforced. Dr. Nakul Alamo notified, no additional orders at this time.  Will continue to monitor

## 2022-04-29 NOTE — CARE COORDINATION
Patient was admitted from [facility]. He is COVID Positive Message sent to Mary Hopes there regarding bedhold and if a precert is needed for him to return. Awaiting response. Have completed REYNA, Envelope and ambulance form on soft chart.

## 2022-04-29 NOTE — PROGRESS NOTES
SPEECH/LANGUAGE PATHOLOGY  CLINICAL ASSESSMENT OF SWALLOWING FUNCTION   and PLAN OF CARE    PATIENT NAME:  Payam Ram  (male)     MRN:  [id number]    :  1940  (79 y.o.)  STATUS:  Inpatient: Room 8511/8511-A    TODAY'S DATE:  2022  REFERRING PROVIDER:   Dr. Jayda Abrams: TERA swallowing-dysphagia evaluation and treatment Date of order:  22  REASON FOR REFERRAL: dysphagia   EVALUATING THERAPIST: TERA Talley                 RESULTS:    DYSPHAGIA DIAGNOSIS:   Clinical indicators of mild  oropharyngeal phase dysphagia       DIET RECOMMENDATIONS:  Soft and bite size consistency solids (IDDSI level 6) with  thin liquids (IDDSI level 0)     FEEDING RECOMMENDATIONS:     Assistance level:  Stand by assistance is needed during all oral intake      Compensatory strategies recommended: Small bites/sips and No straw      Discussed recommendations with nursing and/or faxed report to referring provider: Nursing not available, diet change recommendation placed in Physician sticky note section     SPEECH THERAPY  PLAN OF CARE   The dysphagia POC is established based on physician order, dysphagia diagnosis and results of clinical assessment     Dysphagia therapy is not recommended     Conditions Requiring Skilled Therapeutic Intervention for dysphagia:    Not applicable    Specific dysphagia interventions to include:     not applicable    Specific instructions for next treatment:  not applicable   Patient Treatment Goals:    Short Term Goals:  Not applicable no therapy warranted     Long Term Goals:   Not applicable no therapy warranted      Patient/family Goal:    not applicable                    ADMITTING DIAGNOSIS: Hypoxia [R09.02]  COVID-19 [U07.1]    VISIT DIAGNOSIS:   Visit Diagnoses       Codes    Hypoxia    -  Primary R09.02           PATIENT REPORT/COMPLAINT: denies difficulty swallowing  nursing not available at time of evaluation chart reviewed and patient is not NPO for any procedures per current documentation. PRIOR LEVEL OF SWALLOW FUNCTION:    PAST HISTORY OF DYSPHAGIA?: none reported    Home diet: Regular consistency solids (IDDSI level 7) with  thin liquids (IDDSI level 0)  Current Diet Order:  ADULT DIET; Regular; 4 carb choices (60 gm/meal)    PROCEDURE:  Consistencies Administered During the Evaluation   Liquids: thin liquid   Solids:  pureed foods and solid foods      Method of Intake:   cup, straw, spoon  Fed by clinician      Position:   Seated, upright, Lateral plane    CLINICAL ASSESSMENT:  Oral Stage:       Decreased mastication due to:  cognitive function and disorganized chewing pattern      Pharyngeal Stage:    Wet respirations were noted after presentation of thin liquid with straw use only    Cognition:   Confusion noted    Oral Peripheral Examination   Generalized oral weakness    Current Respiratory Status    6 liters nasal cannula     Parameters of Speech Production  Respiration:  Adequate for speech production  Quality:   Strained  Intensity: Quiet    Volitional Swallow: not able to elicit     Volitional Cough:   present     Pain: No pain reported. EDUCATION:   The Speech Language Pathologist (SLP) completed education regarding results of evaluation and that intervention is not warranted at this time. Learner: Patient  Education: Reviewed results and recommendations of this evaluation and Reviewed diet and strategies  Evaluation of Education:  Needs further instruction    This plan may be re-evaluated and revised as warranted. Evaluation Time includes thorough review of current medical information, gathering information on past medical history/social history and prior level of function, completion of standardized testing/informal observation of tasks, assessment of data and education on plan of care and goals. [x]The admitting diagnosis and active problem list, have been reviewed prior to initiation of this evaluation.         ACTIVE PROBLEM LIST: Patient Active Problem List   Diagnosis    Type 2 diabetes mellitus ([address]    Essential hypertension    Moderate protein-calorie malnutrition ([address]    PVD (peripheral vascular disease) with claudication (HCC)    S/P peripheral artery angioplasty    Dizziness    Closed displaced fracture of right femoral neck (HCC)    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 2 diabetes mellitus, with fat layer exposed ([address]    Diabetic ulcer of left foot associated with type 2 diabetes mellitus, with muscle involvement without evidence of necrosis (HCC)    Ulcerated, foot, left, with fat layer exposed ([address]    COVID-19    Pneumonia    Sepsis ([address]    Acute hypoxemic respiratory failure ([address]    Hypernatremia         CPT code:  33048  bedside swallow eval

## 2022-04-29 NOTE — PLAN OF CARE
Problem: Chronic Conditions and Co-morbidities  Goal: Patient's chronic conditions and co-morbidity symptoms are monitored and maintained or improved  Outcome: Not Progressing     Problem: Skin/Tissue Integrity  Goal: Absence of new skin breakdown  Description: 1. Monitor for areas of redness and/or skin breakdown  2. Assess vascular access sites hourly  3. Every 4-6 hours minimum:  Change oxygen saturation probe site  4. Every 4-6 hours:  If on nasal continuous positive airway pressure, respiratory therapy assess nares and determine need for appliance change or resting period.   4/29/2022 8801 by Ebonie Real RN  Outcome: Not Progressing  4/29/2022 0631 by Ebonie Real RN  Outcome: Not Progressing     Problem: ABCDS Injury Assessment  Goal: Absence of physical injury  Outcome: Not Progressing

## 2022-04-29 NOTE — DISCHARGE INSTR - COC
Continuity of Care Form    Patient Name: Ronel Barrientos   :  1940  MRN:  [id number]    Admit date:  2022  Discharge date:  ***    Code Status Order: Prior   Advance Directives:      Admitting Physician:  No admitting provider for patient encounter. PCP: Linda Lara MD    Discharging Nurse: [facility] Unit/Room#: 4812/3305-E  Discharging Unit Phone Number: ***    Emergency Contact:   Extended Emergency Contact Information  Primary Emergency Contact: Jef Lee. Address: [address] [place] [address] [address] Phone: [phone]  Mobile Phone: [phone]  Relation: Spouse  Secondary Emergency Contact: AICHA Griffith.   Home Phone: [phone]  Relation: Brother/Sister    Past Surgical History:  Past Surgical History:   Procedure Laterality Date    COLONOSCOPY      CYSTOSCOPY N/A 2020    SUPRAPUBIC TUBE PLACEMENT performed by Juanis Kelsey MD at [address] [facility]  [address]    lense implants bilaterally    FOOT DEBRIDEMENT Left 2017    wound debridement and delayed primary closure    FRACTURE SURGERY      L femur fracture surgery    HIP FRACTURE SURGERY Left 2020    LEFT HIP OPEN REDUCTION INTERNAL FIXATION performed by Ktaty Elliott MD at [address] Right 2021    RIGHT HIP HEMIARTHROPLASTY performed by Maritza Shah DO at [address] [place] [address]  2019    Dr. Terrence Farr- PTA ant tibial    OTHER SURGICAL HISTORY  2019    Dr Terrence Farr - PCI Right popliteal and Anterior tibial    PROSTATE BIOPSY  2016    SKIN BIOPSY   last time    head and ears    TOE AMPUTATION Left 2016    2nd    TOE AMPUTATION Right 2019    AMPUTATION RIGHT SECOND TOE, FOOT DEBRIDEMENT performed by Kandice West DPM at [address] Right 12/10/2019    AMPUTATION RIGHT 3rd DIGIT WITH PARTIAL RESECTION OF THIRD METATARSAL performed by Bozena Martínez DPM at [facility] OR    TURP N/A 5/18/2020    CYSTOSCOPY PLASMA LOOP TRANSURETHRAL RESECTION PROSTATE performed by Vivek Yarbrough MD at [address]         Immunization History: There is no immunization history on file for this patient.     Active Problems:  Patient Active Problem List   Diagnosis Code    Type 2 diabetes mellitus ([facility]) E11.9    Essential hypertension I10    Moderate protein-calorie malnutrition ([address] E44.0    PVD (peripheral vascular disease) with claudication ([facility]) I73.9    S/P peripheral artery angioplasty Z98.62    Dizziness R42    Closed displaced fracture of right femoral neck ([address] S72.001A    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 2 diabetes mellitus, with fat layer exposed ([address] E11.621, L97.412    Diabetic ulcer of left foot associated with type 2 diabetes mellitus, with muscle involvement without evidence of necrosis ([address] E11.621, L97.525    Ulcerated, foot, left, with fat layer exposed ([address] L97.522    COVID-19 U07.1    Pneumonia J18.9    Sepsis ([facility]) A41.9    Acute hypoxemic respiratory failure ([facility]) J96.01    Hypernatremia E87.0       Isolation/Infection:   Isolation            Droplet Plus          Patient Infection Status       Infection Onset Added Last Indicated Last Indicated By Review Planned Expiration Resolved Resolved By    COVID-19 (Rule Out) 04/29/22 04/29/22 04/29/22 COVID-19, Rapid (Ordered) 05/06/22 05/13/22      Resolved    COVID-19 (Rule Out) 01/08/21 01/08/21 01/08/21 COVID-19 (Ordered)   01/08/21 Rule-Out Test Resulted    COVID-19 (Rule Out) 10/20/20 10/21/20 10/20/20 Covid-19 Ambulatory (Ordered)   10/22/20 Rule-Out Test Resulted    COVID-19 (Rule Out) 10/13/20 10/13/20 10/13/20 Covid-19 Ambulatory (Ordered)   10/14/20 Rule-Out Test Resulted    COVID-19 (Rule Out) 10/06/20 10/07/20 10/06/20 Covid-19 Ambulatory (Ordered)   10/08/20 Rule-Out Test Resulted    COVID-19 (Rule Out) 10/02/20 10/03/20 10/02/20 Covid-19 Ambulatory (Ordered)   10/06/20 Rule-Out Test Resulted    COVID-19 (Rule Out) 09/26/20 09/26/20 09/26/20 Covid-19 Ambulatory (Ordered)   09/27/20 Rule-Out Test Resulted    COVID-19 (Rule Out) 05/14/20 05/14/20 05/14/20 Covid-19 Ambulatory (Ordered)   05/17/20 Rule-Out Test Resulted    NOT DETECTED 5/14/2020    MRSA 12/10/19 12/13/19 12/10/19 Surgical Culture   09/23/20 Mela Samayoa RN    Foot 12/10/19            Nurse Assessment:  Last Vital Signs: BP (!) 125/97   Pulse 59   Temp 97.7 °F (36.5 °C) (Temporal)   Resp 14   Ht 5' 9\" (1.753 m)   Wt 146 lb (66.2 kg)   SpO2 (!) 87%   BMI 21.56 kg/m²     Last [facility] pain score (0-10 scale): Pain Level: 0  Last Weight:   Wt Readings from Last 1 Encounters:   04/28/22 146 lb (66.2 kg)     Mental Status:  disoriented, alert, and able to concentrate and follow conversation    IV Access:  - None    Nursing Mobility/ADLs:  Walking   Assisted  Transfer  Assisted  Bathing  Assisted  Dressing  Assisted  Toileting  Assisted  Feeding  Independent  Med [address]  Med Delivery   whole    Wound Care Documentation and Therapy:  Wound 02/21/22 Foot Right;Plantar #2 (Active)   Dressing Status New dressing applied 04/28/22 2130   Wound Cleansed Cleansed with saline 04/28/22 2130   Dressing/Treatment Foam 04/28/22 2130   Drainage Amount Small 04/28/22 2130   Drainage Description Sanguinous 04/28/22 2130   Odor None 04/28/22 2130   Number of days: 66       Wound 03/17/22 Foot Right;Medial #3 right medial metatarsal head (Active)   Number of days: 43        Elimination:  Continence: Bowel: No  Bladder: {YES / KD:68144}  Urinary Catheter: Removal Date 5/02/2022    Colostomy/Ileostomy/Ileal Conduit: {YES / QO:73880}       Date of Last BM: ***    Intake/Output Summary (Last 24 hours) at 4/29/2022 1232  Last data filed at 4/29/2022 0857  Gross per 24 hour   Intake 600 ml   Output 700 ml   Net -100 ml     I/O last 3 completed shifts:   In: 360 [P.O.:360]  Out: 700 [Urine:700]    Safety Concerns: At Risk for Falls    Impairments/Disabilities:      None    Nutrition Therapy:  Current Nutrition Therapy:   - Oral Diet:  Carb Control 4 carbs/meal (1800kcals/day) and Dental Soft    Routes of Feeding: Oral  Liquids: No Restrictions  Daily Fluid Restriction: no  Last Modified Barium Swallow with Video (Video Swallowing Test): none    Treatments at the Time of Hospital Discharge:   Respiratory Treatments:   Oxygen Therapy:  is not on home oxygen therapy. Ventilator:    - No ventilator support    Rehab Therapies: Physical Therapy and Occupational Therapy  Weight Bearing Status/Restrictions: No weight bearing restrictions  Other Medical Equipment (for information only, NOT a DME order):  walker, bath bench, and bedside commode  Other Treatments:       Patient's personal belongings (please select all that are sent with patient):  Glasses, clothing    RN SIGNATURE:  {Esignature:[id number]}    CASE MANAGEMENT/SOCIAL WORK SECTION    Inpatient Status Date: ***    Readmission Risk Assessment Score:  Readmission Risk              Risk of Unplanned Readmission:  16           Discharging to Facility/ Agency   Name: [facility]  Address:  [facility], [address] [place] [address]  Phone: [phone]    Dialysis Facility (if applicable)   Name:  Address:  Dialysis Schedule:  Phone:  Fax:    / signature: Electronically signed by SILVIA Palmer on 4/29/22 at 12:34 PM EDT    PHYSICIAN SECTION    Prognosis: Good    Condition at Discharge: Stable    Rehab Potential (if transferring to Rehab): Good    Recommended Labs or Other Treatments After Discharge: Stable    Physician Certification: I certify the above information and transfer of Deepali Friday  is necessary for the continuing treatment of the diagnosis listed and that he requires [facility] for less than 30 days.      Update Admission H&P: No change in H&P    PHYSICIAN SIGNATURE: Electronically signed by Elvia Nance MD on 5/2/22 at 11:41 AM EDT

## 2022-04-30 LAB
ALBUMIN SERPL-MCNC: 3.4 G/DL (ref 3.5–5.2)
ALP BLD-CCNC: 71 U/L (ref 40–129)
ALT SERPL-CCNC: 15 U/L (ref 0–40)
ANION GAP SERPL CALCULATED.3IONS-SCNC: 17 MMOL/L (ref 7–16)
AST SERPL-CCNC: 20 U/L (ref 0–39)
BASOPHILS ABSOLUTE: 0.02 E9/L (ref 0–0.2)
BASOPHILS RELATIVE PERCENT: 0.1 % (ref 0–2)
BILIRUB SERPL-MCNC: 0.6 MG/DL (ref 0–1.2)
BUN BLDV-MCNC: 40 MG/DL (ref 6–23)
CALCIUM SERPL-MCNC: 9.5 MG/DL (ref 8.6–10.2)
CHLORIDE BLD-SCNC: 110 MMOL/L (ref 98–107)
CO2: 23 MMOL/L (ref 22–29)
CREAT SERPL-MCNC: 1 MG/DL (ref 0.7–1.2)
EOSINOPHILS ABSOLUTE: 0 E9/L (ref 0.05–0.5)
EOSINOPHILS RELATIVE PERCENT: 0 % (ref 0–6)
GFR AFRICAN AMERICAN: >60
GFR NON-AFRICAN AMERICAN: >60 ML/MIN/1.73
GLUCOSE BLD-MCNC: 298 MG/DL (ref 74–99)
HCT VFR BLD CALC: 44.9 % (ref 37–54)
HEMOGLOBIN: 15 G/DL (ref 12.5–16.5)
IMMATURE GRANULOCYTES #: 0.17 E9/L
IMMATURE GRANULOCYTES %: 0.9 % (ref 0–5)
LYMPHOCYTES ABSOLUTE: 0.92 E9/L (ref 1.5–4)
LYMPHOCYTES RELATIVE PERCENT: 4.9 % (ref 20–42)
MCH RBC QN AUTO: 29.5 PG (ref 26–35)
MCHC RBC AUTO-ENTMCNC: 33.4 % (ref 32–34.5)
MCV RBC AUTO: 88.4 FL (ref 80–99.9)
METER GLUCOSE: 245 MG/DL (ref 74–99)
METER GLUCOSE: 342 MG/DL (ref 74–99)
METER GLUCOSE: 376 MG/DL (ref 74–99)
MONOCYTES ABSOLUTE: 0.56 E9/L (ref 0.1–0.95)
MONOCYTES RELATIVE PERCENT: 3 % (ref 2–12)
NEUTROPHILS ABSOLUTE: 16.99 E9/L (ref 1.8–7.3)
NEUTROPHILS RELATIVE PERCENT: 91.1 % (ref 43–80)
PDW BLD-RTO: 15 FL (ref 11.5–15)
PLATELET # BLD: 212 E9/L (ref 130–450)
PMV BLD AUTO: 10.1 FL (ref 7–12)
POTASSIUM REFLEX MAGNESIUM: 4 MMOL/L (ref 3.5–5)
RBC # BLD: 5.08 E12/L (ref 3.8–5.8)
SODIUM BLD-SCNC: 150 MMOL/L (ref 132–146)
TOTAL PROTEIN: 7.3 G/DL (ref 6.4–8.3)
WBC # BLD: 18.7 E9/L (ref 4.5–11.5)

## 2022-04-30 PROCEDURE — 6360000002 HC RX W HCPCS: Performed by: NURSE PRACTITIONER

## 2022-04-30 PROCEDURE — 85025 COMPLETE CBC W/AUTO DIFF WBC: CPT

## 2022-04-30 PROCEDURE — 36415 COLL VENOUS BLD VENIPUNCTURE: CPT

## 2022-04-30 PROCEDURE — 6360000002 HC RX W HCPCS: Performed by: INTERNAL MEDICINE

## 2022-04-30 PROCEDURE — 2580000003 HC RX 258: Performed by: INTERNAL MEDICINE

## 2022-04-30 PROCEDURE — 2060000000 HC ICU INTERMEDIATE R&B

## 2022-04-30 PROCEDURE — 80053 COMPREHEN METABOLIC PANEL: CPT

## 2022-04-30 PROCEDURE — 2700000000 HC OXYGEN THERAPY PER DAY

## 2022-04-30 PROCEDURE — 82962 GLUCOSE BLOOD TEST: CPT

## 2022-04-30 PROCEDURE — 6370000000 HC RX 637 (ALT 250 FOR IP): Performed by: NURSE PRACTITIONER

## 2022-04-30 PROCEDURE — 6370000000 HC RX 637 (ALT 250 FOR IP): Performed by: INTERNAL MEDICINE

## 2022-04-30 RX ORDER — INSULIN LISPRO 100 [IU]/ML
0-18 INJECTION, SOLUTION INTRAVENOUS; SUBCUTANEOUS
Status: DISCONTINUED | OUTPATIENT
Start: 2022-04-30 | End: 2022-05-04 | Stop reason: HOSPADM

## 2022-04-30 RX ORDER — INSULIN LISPRO 100 [IU]/ML
0-9 INJECTION, SOLUTION INTRAVENOUS; SUBCUTANEOUS NIGHTLY
Status: DISCONTINUED | OUTPATIENT
Start: 2022-04-30 | End: 2022-05-04 | Stop reason: HOSPADM

## 2022-04-30 RX ADMIN — SODIUM CHLORIDE 3000 MG: 900 INJECTION INTRAVENOUS at 09:35

## 2022-04-30 RX ADMIN — METOPROLOL SUCCINATE 25 MG: 25 TABLET, EXTENDED RELEASE ORAL at 09:18

## 2022-04-30 RX ADMIN — DEXTROSE MONOHYDRATE: 50 INJECTION, SOLUTION INTRAVENOUS at 03:14

## 2022-04-30 RX ADMIN — INSULIN LISPRO 6 UNITS: 100 INJECTION, SOLUTION INTRAVENOUS; SUBCUTANEOUS at 08:00

## 2022-04-30 RX ADMIN — DEXAMETHASONE SODIUM PHOSPHATE 6 MG: 10 INJECTION, SOLUTION INTRAMUSCULAR; INTRAVENOUS at 21:04

## 2022-04-30 RX ADMIN — ASPIRIN 81 MG: 81 TABLET, COATED ORAL at 09:18

## 2022-04-30 RX ADMIN — INSULIN LISPRO 3 UNITS: 100 INJECTION, SOLUTION INTRAVENOUS; SUBCUTANEOUS at 21:18

## 2022-04-30 RX ADMIN — SODIUM CHLORIDE 3000 MG: 900 INJECTION INTRAVENOUS at 21:08

## 2022-04-30 RX ADMIN — INSULIN LISPRO 12 UNITS: 100 INJECTION, SOLUTION INTRAVENOUS; SUBCUTANEOUS at 17:47

## 2022-04-30 RX ADMIN — DOXYCYCLINE HYCLATE 100 MG: 100 CAPSULE ORAL at 21:03

## 2022-04-30 RX ADMIN — Medication 2000 UNITS: at 09:18

## 2022-04-30 RX ADMIN — SODIUM CHLORIDE 3000 MG: 900 INJECTION INTRAVENOUS at 16:03

## 2022-04-30 RX ADMIN — AMLODIPINE BESYLATE 10 MG: 10 TABLET ORAL at 09:18

## 2022-04-30 RX ADMIN — PETROLATUM: 420 OINTMENT TOPICAL at 11:56

## 2022-04-30 RX ADMIN — SODIUM CHLORIDE 3000 MG: 900 INJECTION INTRAVENOUS at 02:00

## 2022-04-30 RX ADMIN — DEXTROSE MONOHYDRATE: 50 INJECTION, SOLUTION INTRAVENOUS at 11:54

## 2022-04-30 RX ADMIN — ENOXAPARIN SODIUM 40 MG: 100 INJECTION SUBCUTANEOUS at 09:18

## 2022-04-30 RX ADMIN — PETROLATUM: 420 OINTMENT TOPICAL at 21:05

## 2022-04-30 RX ADMIN — DOXYCYCLINE HYCLATE 100 MG: 100 CAPSULE ORAL at 09:35

## 2022-04-30 RX ADMIN — Medication 1 TABLET: at 13:53

## 2022-04-30 RX ADMIN — INSULIN LISPRO 15 UNITS: 100 INJECTION, SOLUTION INTRAVENOUS; SUBCUTANEOUS at 14:07

## 2022-04-30 ASSESSMENT — PAIN SCALES - GENERAL
PAINLEVEL_OUTOF10: 0

## 2022-04-30 NOTE — PROGRESS NOTES
[facility]                                Progress note    Subjective: The patient is awake and alert. Currently requiring 6L NC    Objective:    /77   Pulse 67   Temp 97.4 °F (36.3 °C) (Temporal)   Resp 14   Ht 5' 9\" (1.753 m)   Wt 146 lb (66.2 kg)   SpO2 94%   BMI 21.56 kg/m²     In: 2550.5 [P.O.:240; I.V.:1971.3]  Out: 2550   In: 2550.5   Out: 2550 [Urine:2550]    General appearance: NAD, conversant, Frail   HEENT: AT/NC, MMM  Neck: FROM, supple  Lungs: B/L rales. Deep wet cough. CV: RRR, no MRGs  Vasc: Radial pulses 2+  Abdomen: Soft, non-tender; no masses or HSM  Extremities: No peripheral edema or digital cyanosis  Skin: no rash, lesions or ulcers  Psych: Alert and oriented to person, place and time  Neuro: Alert and interactive     Recent Labs     04/28/22 1717 04/29/22  0554 04/30/22  0523   WBC 18.2* 17.4* 18.7*   HGB 14.4 15.6 15.0   HCT 43.9 47.1 44.9    228 212       Recent Labs     04/28/22 1717 04/29/22  0554 04/30/22  0523   * 151* 150*   K 4.1 4.5 4.0   * 111* 110*   CO2 16* 18* 23   BUN 34* 41* 40*   CREATININE 1.0 1.1 1.0   CALCIUM 9.8 10.1 9.5       Assessment:    Principal Problem:    Pneumonia  Active Problems:    COVID-19    Sepsis (HCC)    Acute hypoxemic respiratory failure (HCC)    Hypernatremia    Type 2 diabetes mellitus ([facility]  Resolved Problems:    * No resolved hospital problems. *      Plan:    Patient is a 79year old male admitted to [facility] for   Pneumonia  -Monitor labs  -WBC trending up today 18. 7-check daily, may also be secondary to Decadron  -IV Unasyn and doxy to cover for atypicals and aspiration  -SLP to r/o aspiration-pending  -Legionella and strep pneumoniae unremarkable    Covid-19  -IV decardon, not requiring remdesivir on 4 L by nasal cannula  -Follow inflammatory markers  -Vitamin D, zinc, vitamin C  -Monitor O2 saturations and supplement oxygen to keep saturations greater than 93%, wean as necessary, incentive spirometer, no nebulizers  -Droplet plus isolation  -Encourage ISP use/self proning    Hypernatremia  -Monitor labs, BMP daily  -D5 @ 100-recheck sodium level in a.m.    DM type II  -Monitor labs  -ISS glucose control  -Hypoglycemia protocol initiated  -Increased scale d/t steroids and D5        DVT Prophylaxis Lovenox   PT/OT  Discharge planning     JUNE Otoole CNP  3:25 PM  4/30/2022     Above note edited to reflect my thoughts     I personally saw, examined and provided care for the patient. Radiographs, labs and medication list were reviewed by me independently. The case was discussed in detail and plans for care were established. Review of VICENTA Otoole   , documentation was conducted and revisions were made as appropriate directly by me. I agree with the above [facility] exam, problem list, and plan of care.      Christopher Alvarez MD  5:32 PM  4/30/2022

## 2022-04-30 NOTE — CONSULTS
Comprehensive Nutrition Assessment    Type and Reason for Visit:  Initial,Consult,Wound    Nutrition Recommendations/Plan:   Will start Glucerna TID to supplement intake. Malnutrition Assessment:  Malnutrition Status: At risk for malnutrition (Comment) (04/30/22 1709)    Context:  Acute Illness     Findings of the 6 clinical characteristics of malnutrition:  Energy Intake:  Mild decrease in energy intake (Comment)  Weight Loss:  Unable to assess (d/t lack of actual EMR wt hx and lack of actual wt this admission)     Body Fat Loss:  Unable to assess (d/t COVID isolation)     Muscle Mass Loss:  Unable to assess    Fluid Accumulation:  No significant fluid accumulation     Strength:  Not Performed    Nutrition Assessment:    Pt admitted w/ hypoxia r/t PNA and COVID-19. PMH of DM. Noted mild oropharyngeal dysphagia per SLP eval. Also noted bilateral foot DM wounds. Poor intake since admission. Will start ONS and monitor. Nutrition Related Findings:    Pt oriented to person, abd soft, hypoactive BS, I/O WNL, no edema, hyperglcyemia Wound Type: Multiple,Diabetic Ulcer    Current Nutrition Intake & Therapies:    Average Meal Intake: 1-25%  Average Supplements Intake: None Ordered  ADULT DIET; Regular; 4 carb choices (60 gm/meal)    Anthropometric Measures:  Height: 5' 9\" (175.3 cm)  Ideal Body Weight (IBW): 160 lbs (73 kg)    Admission Body Weight: 146 lb (66.2 kg) (4/28 stated)  Current Body Weight: 146 lb (66.2 kg) (4/28 stated, UTO current wt d/t COVID isolation), 91.3 % IBW.  Weight Source: Stated  Current BMI (kg/m2): 21.6  Usual Body Weight:  (140-143 lb stated wt x 9 months, no actual recent EMR wt hx)  BMI Categories: Underweight (BMI less than 22) age over 72    Estimated Daily Nutrient Needs:  Energy Requirements Based On: Formula  Weight Used for Energy Requirements: Current  Energy (kcal/day): 2093-0551; MSJ= 1353 x 1.2  Weight Used for Protein Requirements: Current  Protein (g/day):  (1.3-1.5 gm/kg)  Method Used for Fluid Requirements: 1 ml/kcal  Fluid (ml/day): 7137-9695    Nutrition Diagnosis:   · Inadequate oral intake related to impaired respiratory function as evidenced by intake 0-25%,wounds    Nutrition Interventions:   Food and/or Nutrient Delivery: Continue Current Diet,Start Oral Nutrition Supplement (Will start Glucerna TID to supplement intake)  Nutrition Education/Counseling: Education not indicated  Coordination of Nutrition Care: Continue to monitor while inpatient    Goals:  Goals: PO intake 50% or greater,by next RD assessment    Nutrition Monitoring and Evaluation:   Behavioral-Environmental Outcomes: None Identified  Food/Nutrient Intake Outcomes: Food and Nutrient Intake,Supplement Intake  Physical Signs/Symptoms Outcomes: Biochemical Data,GI Status,Fluid Status or Edema,Nutrition Focused Physical Findings,Skin,Weight    Discharge Planning:     Too soon to determine     Tierney Pineda RD, LD  Contact: 1410

## 2022-04-30 NOTE — PLAN OF CARE
Problem: Chronic Conditions and Co-morbidities  Goal: Patient's chronic conditions and co-morbidity symptoms are monitored and maintained or improved  Outcome: Progressing     Problem: Skin/Tissue Integrity  Goal: Absence of new skin breakdown  Description: 1. Monitor for areas of redness and/or skin breakdown  2. Assess vascular access sites hourly  3. Every 4-6 hours minimum:  Change oxygen saturation probe site  4. Every 4-6 hours:  If on nasal continuous positive airway pressure, respiratory therapy assess nares and determine need for appliance change or resting period.   Outcome: Progressing     Problem: Safety - Adult  Goal: Free from fall injury  Outcome: Progressing     Problem: ABCDS Injury Assessment  Goal: Absence of physical injury  Outcome: Progressing     Problem: Pain  Goal: Verbalizes/displays adequate comfort level or baseline comfort level  Outcome: Progressing     Problem: Respiratory - Adult  Goal: Achieves optimal ventilation and oxygenation  Outcome: Progressing     Problem: Cardiovascular - Adult  Goal: Absence of cardiac dysrhythmias or at baseline  Outcome: Progressing     Problem: Cardiovascular - Adult  Goal: Maintains optimal cardiac output and hemodynamic stability  Outcome: Progressing

## 2022-05-01 LAB
ALBUMIN SERPL-MCNC: 2.9 G/DL (ref 3.5–5.2)
ALP BLD-CCNC: 71 U/L (ref 40–129)
ALT SERPL-CCNC: 15 U/L (ref 0–40)
ANION GAP SERPL CALCULATED.3IONS-SCNC: 10 MMOL/L (ref 7–16)
AST SERPL-CCNC: 15 U/L (ref 0–39)
BASOPHILS ABSOLUTE: 0.03 E9/L (ref 0–0.2)
BASOPHILS RELATIVE PERCENT: 0.2 % (ref 0–2)
BILIRUB SERPL-MCNC: 0.7 MG/DL (ref 0–1.2)
BUN BLDV-MCNC: 34 MG/DL (ref 6–23)
C-REACTIVE PROTEIN: 4.2 MG/DL (ref 0–0.4)
CALCIUM SERPL-MCNC: 9.3 MG/DL (ref 8.6–10.2)
CHLORIDE BLD-SCNC: 105 MMOL/L (ref 98–107)
CO2: 25 MMOL/L (ref 22–29)
CREAT SERPL-MCNC: 0.8 MG/DL (ref 0.7–1.2)
D DIMER: 473 NG/ML DDU
EOSINOPHILS ABSOLUTE: 0 E9/L (ref 0.05–0.5)
EOSINOPHILS RELATIVE PERCENT: 0 % (ref 0–6)
GFR AFRICAN AMERICAN: >60
GFR NON-AFRICAN AMERICAN: >60 ML/MIN/1.73
GLUCOSE BLD-MCNC: 312 MG/DL (ref 74–99)
HCT VFR BLD CALC: 42.1 % (ref 37–54)
HEMOGLOBIN: 14.6 G/DL (ref 12.5–16.5)
IMMATURE GRANULOCYTES #: 0.15 E9/L
IMMATURE GRANULOCYTES %: 0.9 % (ref 0–5)
LYMPHOCYTES ABSOLUTE: 0.77 E9/L (ref 1.5–4)
LYMPHOCYTES RELATIVE PERCENT: 4.5 % (ref 20–42)
MCH RBC QN AUTO: 29.9 PG (ref 26–35)
MCHC RBC AUTO-ENTMCNC: 34.7 % (ref 32–34.5)
MCV RBC AUTO: 86.1 FL (ref 80–99.9)
METER GLUCOSE: 204 MG/DL (ref 74–99)
METER GLUCOSE: 212 MG/DL (ref 74–99)
METER GLUCOSE: 227 MG/DL (ref 74–99)
METER GLUCOSE: 321 MG/DL (ref 74–99)
MONOCYTES ABSOLUTE: 0.29 E9/L (ref 0.1–0.95)
MONOCYTES RELATIVE PERCENT: 1.7 % (ref 2–12)
NEUTROPHILS ABSOLUTE: 15.77 E9/L (ref 1.8–7.3)
NEUTROPHILS RELATIVE PERCENT: 92.7 % (ref 43–80)
PDW BLD-RTO: 14.5 FL (ref 11.5–15)
PLATELET # BLD: 176 E9/L (ref 130–450)
PMV BLD AUTO: 9.9 FL (ref 7–12)
POTASSIUM REFLEX MAGNESIUM: 4 MMOL/L (ref 3.5–5)
PROCALCITONIN: 0.09 NG/ML (ref 0–0.08)
RBC # BLD: 4.89 E12/L (ref 3.8–5.8)
SODIUM BLD-SCNC: 140 MMOL/L (ref 132–146)
TOTAL PROTEIN: 6.6 G/DL (ref 6.4–8.3)
WBC # BLD: 17 E9/L (ref 4.5–11.5)

## 2022-05-01 PROCEDURE — 36415 COLL VENOUS BLD VENIPUNCTURE: CPT

## 2022-05-01 PROCEDURE — 85025 COMPLETE CBC W/AUTO DIFF WBC: CPT

## 2022-05-01 PROCEDURE — 6360000002 HC RX W HCPCS: Performed by: NURSE PRACTITIONER

## 2022-05-01 PROCEDURE — 6360000002 HC RX W HCPCS: Performed by: INTERNAL MEDICINE

## 2022-05-01 PROCEDURE — 80053 COMPREHEN METABOLIC PANEL: CPT

## 2022-05-01 PROCEDURE — 86140 C-REACTIVE PROTEIN: CPT

## 2022-05-01 PROCEDURE — 85378 FIBRIN DEGRADE SEMIQUANT: CPT

## 2022-05-01 PROCEDURE — 6370000000 HC RX 637 (ALT 250 FOR IP): Performed by: INTERNAL MEDICINE

## 2022-05-01 PROCEDURE — 6370000000 HC RX 637 (ALT 250 FOR IP): Performed by: NURSE PRACTITIONER

## 2022-05-01 PROCEDURE — 2700000000 HC OXYGEN THERAPY PER DAY

## 2022-05-01 PROCEDURE — 2060000000 HC ICU INTERMEDIATE R&B

## 2022-05-01 PROCEDURE — 2580000003 HC RX 258: Performed by: INTERNAL MEDICINE

## 2022-05-01 PROCEDURE — 84145 PROCALCITONIN (PCT): CPT

## 2022-05-01 PROCEDURE — 82962 GLUCOSE BLOOD TEST: CPT

## 2022-05-01 RX ADMIN — SODIUM CHLORIDE 3000 MG: 900 INJECTION INTRAVENOUS at 02:12

## 2022-05-01 RX ADMIN — DEXTROSE MONOHYDRATE: 50 INJECTION, SOLUTION INTRAVENOUS at 02:13

## 2022-05-01 RX ADMIN — DEXAMETHASONE SODIUM PHOSPHATE 6 MG: 10 INJECTION, SOLUTION INTRAMUSCULAR; INTRAVENOUS at 21:08

## 2022-05-01 RX ADMIN — INSULIN LISPRO 12 UNITS: 100 INJECTION, SOLUTION INTRAVENOUS; SUBCUTANEOUS at 08:23

## 2022-05-01 RX ADMIN — SODIUM CHLORIDE 3000 MG: 900 INJECTION INTRAVENOUS at 17:27

## 2022-05-01 RX ADMIN — ASPIRIN 81 MG: 81 TABLET, COATED ORAL at 08:21

## 2022-05-01 RX ADMIN — DOXYCYCLINE HYCLATE 100 MG: 100 CAPSULE ORAL at 08:20

## 2022-05-01 RX ADMIN — PETROLATUM: 420 OINTMENT TOPICAL at 21:09

## 2022-05-01 RX ADMIN — AMLODIPINE BESYLATE 10 MG: 10 TABLET ORAL at 08:21

## 2022-05-01 RX ADMIN — Medication 2000 UNITS: at 08:21

## 2022-05-01 RX ADMIN — SODIUM CHLORIDE 3000 MG: 900 INJECTION INTRAVENOUS at 21:08

## 2022-05-01 RX ADMIN — SODIUM CHLORIDE 3000 MG: 900 INJECTION INTRAVENOUS at 10:35

## 2022-05-01 RX ADMIN — INSULIN LISPRO 3 UNITS: 100 INJECTION, SOLUTION INTRAVENOUS; SUBCUTANEOUS at 21:08

## 2022-05-01 RX ADMIN — ENOXAPARIN SODIUM 40 MG: 100 INJECTION SUBCUTANEOUS at 08:22

## 2022-05-01 RX ADMIN — METOPROLOL SUCCINATE 25 MG: 25 TABLET, EXTENDED RELEASE ORAL at 08:21

## 2022-05-01 RX ADMIN — Medication 1 TABLET: at 08:21

## 2022-05-01 RX ADMIN — DOXYCYCLINE HYCLATE 100 MG: 100 CAPSULE ORAL at 21:13

## 2022-05-01 RX ADMIN — INSULIN LISPRO 6 UNITS: 100 INJECTION, SOLUTION INTRAVENOUS; SUBCUTANEOUS at 12:35

## 2022-05-01 RX ADMIN — PETROLATUM: 420 OINTMENT TOPICAL at 08:22

## 2022-05-01 ASSESSMENT — PAIN SCALES - GENERAL
PAINLEVEL_OUTOF10: 0

## 2022-05-01 NOTE — PLAN OF CARE
Problem: Chronic Conditions and Co-morbidities  Goal: Patient's chronic conditions and co-morbidity symptoms are monitored and maintained or improved  Outcome: Progressing     Problem: Discharge Planning  Goal: Discharge to home or other facility with appropriate resources  Outcome: Progressing     Problem: Skin/Tissue Integrity  Goal: Absence of new skin breakdown  Description: 1. Monitor for areas of redness and/or skin breakdown  2. Assess vascular access sites hourly  3. Every 4-6 hours minimum:  Change oxygen saturation probe site  4. Every 4-6 hours:  If on nasal continuous positive airway pressure, respiratory therapy assess nares and determine need for appliance change or resting period.   Outcome: Progressing     Problem: Safety - Adult  Goal: Free from fall injury  Outcome: Progressing     Problem: ABCDS Injury Assessment  Goal: Absence of physical injury  Outcome: Progressing     Problem: Pain  Goal: Verbalizes/displays adequate comfort level or baseline comfort level  Outcome: Progressing     Problem: Respiratory - Adult  Goal: Achieves optimal ventilation and oxygenation  Outcome: Progressing     Problem: Cardiovascular - Adult  Goal: Maintains optimal cardiac output and hemodynamic stability  Outcome: Progressing  Goal: Absence of cardiac dysrhythmias or at baseline  Outcome: Progressing     Problem: Nutrition Deficit:  Goal: Optimize nutritional status  Outcome: Progressing

## 2022-05-01 NOTE — PROGRESS NOTES
[facility]                                Progress note    Subjective: The patient is awake and alert. Currently requiring 4L NC, improving     Objective:    /74   Pulse 60   Temp 97.7 °F (36.5 °C) (Oral)   Resp 18   Ht 5' 9\" (1.753 m)   Wt 146 lb (66.2 kg)   SpO2 93%   BMI 21.56 kg/m²     In: 424.3 [P.O.:300]  Out: 1300   In: 424.3   Out: 1300 [Urine:1300]    General appearance: NAD, conversant, Frail   HEENT: AT/NC, MMM  Neck: FROM, supple  Lungs: B/L rales, improving, improving cough. CV: RRR, no MRGs  Vasc: Radial pulses 2+  Abdomen: Soft, non-tender; no masses or HSM  Extremities: No peripheral edema or digital cyanosis  Skin: no rash, lesions or ulcers  Psych: Alert and oriented to person, place and time  Neuro: Alert and interactive     Recent Labs     04/29/22  0554 04/30/22  0523 05/01/22  0534   WBC 17.4* 18.7* 17.0*   HGB 15.6 15.0 14.6   HCT 47.1 44.9 42.1    212 176       Recent Labs     04/29/22  0554 04/30/22  0523 05/01/22  0534   * 150* 140   K 4.5 4.0 4.0   * 110* 105   CO2 18* 23 25   BUN 41* 40* 34*   CREATININE 1.1 1.0 0.8   CALCIUM 10.1 9.5 9.3       Assessment:    Principal Problem:    Pneumonia  Active Problems:    COVID-19    Sepsis ([facility] [address]    Acute hypoxemic respiratory failure (HCC)    Hypernatremia    Type 2 diabetes mellitus (HCC)  Resolved Problems:    * No resolved hospital problems. *      Plan:    Patient is a 79year old male admitted to [facility] for   Pneumonia  -Monitor labs  -WBC trending up today 18. 7-check daily, may also be secondary to Decadron  -IV Unasyn and doxy to cover for atypicals and aspiration  -SLP to r/o aspiration-pending  -Legionella and strep pneumoniae unremarkable    Covid-19  -IV decardon, not requiring remdesivir on 4 L by nasal cannula- switch to po   -Follow inflammatory markers  -Vitamin D, zinc, vitamin C  -Monitor O2 saturations and supplement oxygen to keep saturations greater than 93%, wean as necessary, incentive spirometer, no nebulizers  -Droplet plus isolation  -Encourage ISP use/self proning    Hypernatremia  -Monitor labs, BMP daily  -Na 150+ > 140 today  -D5 @ 100 > discontinued     DM type II  -Monitor labs  -ISS glucose control  -Hypoglycemia protocol initiated  -Increased scale d/t steroids and D5        DVT Prophylaxis Lovenox   PT/OT  Discharge planning     JUNE Bernabe CNP  2:30 PM  5/1/2022     Above note edited to reflect my thoughts   Can be dc'd tomorrow   Leukocytosis from steroids       I personally saw, examined and provided care for the patient. Radiographs, labs and medication list were reviewed by me independently. The case was discussed in detail and plans for care were established. Review of VICENTA Bernabe   , documentation was conducted and revisions were made as appropriate directly by me. I agree with the above [facility] exam, problem list, and plan of care.      Marivel Aranda MD  4:13 PM  5/1/2022

## 2022-05-02 LAB
ALBUMIN SERPL-MCNC: 3 G/DL (ref 3.5–5.2)
ALP BLD-CCNC: 71 U/L (ref 40–129)
ALT SERPL-CCNC: 23 U/L (ref 0–40)
ANION GAP SERPL CALCULATED.3IONS-SCNC: 10 MMOL/L (ref 7–16)
AST SERPL-CCNC: 20 U/L (ref 0–39)
BASOPHILS ABSOLUTE: 0.02 E9/L (ref 0–0.2)
BASOPHILS RELATIVE PERCENT: 0.1 % (ref 0–2)
BILIRUB SERPL-MCNC: 0.7 MG/DL (ref 0–1.2)
BUN BLDV-MCNC: 36 MG/DL (ref 6–23)
CALCIUM SERPL-MCNC: 9 MG/DL (ref 8.6–10.2)
CHLORIDE BLD-SCNC: 107 MMOL/L (ref 98–107)
CO2: 26 MMOL/L (ref 22–29)
CREAT SERPL-MCNC: 0.8 MG/DL (ref 0.7–1.2)
EOSINOPHILS ABSOLUTE: 0 E9/L (ref 0.05–0.5)
EOSINOPHILS RELATIVE PERCENT: 0 % (ref 0–6)
GFR AFRICAN AMERICAN: >60
GFR NON-AFRICAN AMERICAN: >60 ML/MIN/1.73
GLUCOSE BLD-MCNC: 299 MG/DL (ref 74–99)
HCT VFR BLD CALC: 44.2 % (ref 37–54)
HEMOGLOBIN: 14.9 G/DL (ref 12.5–16.5)
IMMATURE GRANULOCYTES #: 0.1 E9/L
IMMATURE GRANULOCYTES %: 0.7 % (ref 0–5)
LYMPHOCYTES ABSOLUTE: 0.75 E9/L (ref 1.5–4)
LYMPHOCYTES RELATIVE PERCENT: 5.3 % (ref 20–42)
MCH RBC QN AUTO: 29.9 PG (ref 26–35)
MCHC RBC AUTO-ENTMCNC: 33.7 % (ref 32–34.5)
MCV RBC AUTO: 88.6 FL (ref 80–99.9)
METER GLUCOSE: 177 MG/DL (ref 74–99)
METER GLUCOSE: 221 MG/DL (ref 74–99)
METER GLUCOSE: 242 MG/DL (ref 74–99)
METER GLUCOSE: 286 MG/DL (ref 74–99)
MONOCYTES ABSOLUTE: 0.24 E9/L (ref 0.1–0.95)
MONOCYTES RELATIVE PERCENT: 1.7 % (ref 2–12)
NEUTROPHILS ABSOLUTE: 13.03 E9/L (ref 1.8–7.3)
NEUTROPHILS RELATIVE PERCENT: 92.2 % (ref 43–80)
PDW BLD-RTO: 14.2 FL (ref 11.5–15)
PLATELET # BLD: 177 E9/L (ref 130–450)
PMV BLD AUTO: 10.2 FL (ref 7–12)
POTASSIUM REFLEX MAGNESIUM: 4.3 MMOL/L (ref 3.5–5)
RBC # BLD: 4.99 E12/L (ref 3.8–5.8)
SARS-COV-2, NAAT: DETECTED
SODIUM BLD-SCNC: 143 MMOL/L (ref 132–146)
TOTAL PROTEIN: 6.5 G/DL (ref 6.4–8.3)
WBC # BLD: 14.1 E9/L (ref 4.5–11.5)

## 2022-05-02 PROCEDURE — 6370000000 HC RX 637 (ALT 250 FOR IP): Performed by: NURSE PRACTITIONER

## 2022-05-02 PROCEDURE — 2060000000 HC ICU INTERMEDIATE R&B

## 2022-05-02 PROCEDURE — 6370000000 HC RX 637 (ALT 250 FOR IP): Performed by: INTERNAL MEDICINE

## 2022-05-02 PROCEDURE — 87635 SARS-COV-2 COVID-19 AMP PRB: CPT

## 2022-05-02 PROCEDURE — 85025 COMPLETE CBC W/AUTO DIFF WBC: CPT

## 2022-05-02 PROCEDURE — 82962 GLUCOSE BLOOD TEST: CPT

## 2022-05-02 PROCEDURE — 80053 COMPREHEN METABOLIC PANEL: CPT

## 2022-05-02 PROCEDURE — 6360000002 HC RX W HCPCS: Performed by: NURSE PRACTITIONER

## 2022-05-02 PROCEDURE — 36415 COLL VENOUS BLD VENIPUNCTURE: CPT

## 2022-05-02 PROCEDURE — 6360000002 HC RX W HCPCS: Performed by: INTERNAL MEDICINE

## 2022-05-02 PROCEDURE — 2580000003 HC RX 258: Performed by: INTERNAL MEDICINE

## 2022-05-02 RX ORDER — AMOXICILLIN AND CLAVULANATE POTASSIUM 875; 125 MG/1; MG/1
1 TABLET, FILM COATED ORAL 2 TIMES DAILY
Qty: 10 TABLET | Refills: 0 | Status: SHIPPED | OUTPATIENT
Start: 2022-05-02 | End: 2022-05-07

## 2022-05-02 RX ORDER — DOXYCYCLINE HYCLATE 100 MG/1
100 CAPSULE ORAL EVERY 12 HOURS SCHEDULED
Qty: 3 CAPSULE | Refills: 0 | Status: SHIPPED | OUTPATIENT
Start: 2022-05-02 | End: 2022-05-04

## 2022-05-02 RX ADMIN — DOXYCYCLINE HYCLATE 100 MG: 100 CAPSULE ORAL at 09:12

## 2022-05-02 RX ADMIN — SODIUM CHLORIDE 3000 MG: 900 INJECTION INTRAVENOUS at 20:59

## 2022-05-02 RX ADMIN — AMLODIPINE BESYLATE 10 MG: 10 TABLET ORAL at 09:12

## 2022-05-02 RX ADMIN — METOPROLOL SUCCINATE 25 MG: 25 TABLET, EXTENDED RELEASE ORAL at 09:12

## 2022-05-02 RX ADMIN — INSULIN LISPRO 9 UNITS: 100 INJECTION, SOLUTION INTRAVENOUS; SUBCUTANEOUS at 12:32

## 2022-05-02 RX ADMIN — Medication 2000 UNITS: at 09:12

## 2022-05-02 RX ADMIN — ENOXAPARIN SODIUM 40 MG: 100 INJECTION SUBCUTANEOUS at 09:13

## 2022-05-02 RX ADMIN — PETROLATUM: 420 OINTMENT TOPICAL at 20:35

## 2022-05-02 RX ADMIN — Medication 1 TABLET: at 09:12

## 2022-05-02 RX ADMIN — SODIUM CHLORIDE 3000 MG: 900 INJECTION INTRAVENOUS at 02:01

## 2022-05-02 RX ADMIN — SODIUM CHLORIDE 3000 MG: 900 INJECTION INTRAVENOUS at 15:42

## 2022-05-02 RX ADMIN — ASPIRIN 81 MG: 81 TABLET, COATED ORAL at 09:12

## 2022-05-02 RX ADMIN — DEXAMETHASONE SODIUM PHOSPHATE 6 MG: 10 INJECTION, SOLUTION INTRAMUSCULAR; INTRAVENOUS at 20:56

## 2022-05-02 RX ADMIN — INSULIN LISPRO 9 UNITS: 100 INJECTION, SOLUTION INTRAVENOUS; SUBCUTANEOUS at 09:14

## 2022-05-02 RX ADMIN — SODIUM CHLORIDE 3000 MG: 900 INJECTION INTRAVENOUS at 09:06

## 2022-05-02 RX ADMIN — INSULIN LISPRO 3 UNITS: 100 INJECTION, SOLUTION INTRAVENOUS; SUBCUTANEOUS at 18:20

## 2022-05-02 RX ADMIN — PETROLATUM: 420 OINTMENT TOPICAL at 09:14

## 2022-05-02 RX ADMIN — INSULIN LISPRO 3 UNITS: 100 INJECTION, SOLUTION INTRAVENOUS; SUBCUTANEOUS at 20:44

## 2022-05-02 RX ADMIN — DOXYCYCLINE HYCLATE 100 MG: 100 CAPSULE ORAL at 20:35

## 2022-05-02 ASSESSMENT — PAIN SCALES - GENERAL
PAINLEVEL_OUTOF10: 0

## 2022-05-02 NOTE — PLAN OF CARE
Problem: Chronic Conditions and Co-morbidities  Goal: Patient's chronic conditions and co-morbidity symptoms are monitored and maintained or improved  Outcome: Progressing     Problem: Discharge Planning  Goal: Discharge to home or other facility with appropriate resources  Outcome: Progressing     Problem: Skin/Tissue Integrity  Goal: Absence of new skin breakdown  Description: 1. Monitor for areas of redness and/or skin breakdown  2. Assess vascular access sites hourly  3. Every 4-6 hours minimum:  Change oxygen saturation probe site  4. Every 4-6 hours:  If on nasal continuous positive airway pressure, respiratory therapy assess nares and determine need for appliance change or resting period.   Outcome: Progressing

## 2022-05-02 NOTE — CARE COORDINATION
Return call received from Dr Shasha Puente. Discharge to be held today and she will reach out to the patient's wife later today. Discharge orders canceled and transportation canceled for today. Call again placed to the patient's wife Rogerio Carrasco to notify that the patient will not be discharged today. Call also placed to Maria Ines Cook, liaison with [facility] to notify that the patient will not be returning to the [facility] today. Will continue to follow.      Alyssa Felix RN.  [facility]    [phone]

## 2022-05-02 NOTE — CARE COORDINATION
Spoke with [facility], liaison with [facility]. Patient is a bed hold and can return when medically stable to do so, no pre-cert required. He is COVID positive and will need a test prior to return. REYNA, envelope and transfer paperwork in the soft chart. Patient on IV Unasyn Q 6 hrs and PO Doxycyline, will need plan for IV antibiotics prior to transition of care. Patient is on Room air. Will continue to follow for further transition of care planning needs.      Pilo Salinas RN.  [facility]  [phone]

## 2022-05-02 NOTE — CARE COORDINATION
Discharge orders noted. Dry COVID test provided to nursing to complete for transition of care. [facility] to [facility] to arrange transportation for the patient to return to the [facility]. Spoke with Harjinder Fuentes. Ambulance transport arranged for a  Pm pick-up time. Charge nurse Arturo Quispe and bedside nurse Raymond notified of above. MEssage out to [place], liaison with [facility] to notify of above. Call placed to the patient's wife Gemini Cortes to notify of above, [phone]. Detailed VM left with number for return call left re: transition of care. Envelope and transfer paperwork in the soft chart.      River Costello RN.  [facility]  [phone]

## 2022-05-02 NOTE — CARE COORDINATION
Notified by   Marcel kearns that the patient's wife is concerned about his return to [facility] today, his confusion, and is requesting to speak with someone about his condition before he is discharged. Message out to Dr Hansel Buckner re:  [facility] request with a phone number she can be reached at. Per Nursing, confusion is improved today. Patient to be discharged on PO Augmentin and doxycyline per med rec. Will continue to follow.      Naty Baker RN.  [facility]  [phone]

## 2022-05-03 ENCOUNTER — APPOINTMENT (OUTPATIENT)
Dept: GENERAL RADIOLOGY | Age: 82
DRG: 871 | End: 2022-05-03
Payer: MEDICARE

## 2022-05-03 LAB
ALBUMIN SERPL-MCNC: 3.1 G/DL (ref 3.5–5.2)
ALP BLD-CCNC: 71 U/L (ref 40–129)
ALT SERPL-CCNC: 23 U/L (ref 0–40)
ANION GAP SERPL CALCULATED.3IONS-SCNC: 10 MMOL/L (ref 7–16)
AST SERPL-CCNC: 18 U/L (ref 0–39)
BASOPHILS ABSOLUTE: 0.01 E9/L (ref 0–0.2)
BASOPHILS RELATIVE PERCENT: 0.1 % (ref 0–2)
BILIRUB SERPL-MCNC: 0.8 MG/DL (ref 0–1.2)
BUN BLDV-MCNC: 31 MG/DL (ref 6–23)
CALCIUM SERPL-MCNC: 9.1 MG/DL (ref 8.6–10.2)
CHLORIDE BLD-SCNC: 101 MMOL/L (ref 98–107)
CO2: 26 MMOL/L (ref 22–29)
CREAT SERPL-MCNC: 0.8 MG/DL (ref 0.7–1.2)
EOSINOPHILS ABSOLUTE: 0 E9/L (ref 0.05–0.5)
EOSINOPHILS RELATIVE PERCENT: 0 % (ref 0–6)
GFR AFRICAN AMERICAN: >60
GFR NON-AFRICAN AMERICAN: >60 ML/MIN/1.73
GLUCOSE BLD-MCNC: 301 MG/DL (ref 74–99)
HCT VFR BLD CALC: 46.4 % (ref 37–54)
HEMOGLOBIN: 15.5 G/DL (ref 12.5–16.5)
IMMATURE GRANULOCYTES #: 0.1 E9/L
IMMATURE GRANULOCYTES %: 0.9 % (ref 0–5)
LYMPHOCYTES ABSOLUTE: 0.77 E9/L (ref 1.5–4)
LYMPHOCYTES RELATIVE PERCENT: 6.9 % (ref 20–42)
MCH RBC QN AUTO: 29.5 PG (ref 26–35)
MCHC RBC AUTO-ENTMCNC: 33.4 % (ref 32–34.5)
MCV RBC AUTO: 88.4 FL (ref 80–99.9)
METER GLUCOSE: 196 MG/DL (ref 74–99)
METER GLUCOSE: 262 MG/DL (ref 74–99)
METER GLUCOSE: 266 MG/DL (ref 74–99)
METER GLUCOSE: 272 MG/DL (ref 74–99)
MONOCYTES ABSOLUTE: 0.19 E9/L (ref 0.1–0.95)
MONOCYTES RELATIVE PERCENT: 1.7 % (ref 2–12)
NEUTROPHILS ABSOLUTE: 10.02 E9/L (ref 1.8–7.3)
NEUTROPHILS RELATIVE PERCENT: 90.4 % (ref 43–80)
PDW BLD-RTO: 14.3 FL (ref 11.5–15)
PLATELET # BLD: 176 E9/L (ref 130–450)
PMV BLD AUTO: 10 FL (ref 7–12)
POTASSIUM REFLEX MAGNESIUM: 4.5 MMOL/L (ref 3.5–5)
RBC # BLD: 5.25 E12/L (ref 3.8–5.8)
SODIUM BLD-SCNC: 137 MMOL/L (ref 132–146)
TOTAL PROTEIN: 6.9 G/DL (ref 6.4–8.3)
WBC # BLD: 11.1 E9/L (ref 4.5–11.5)

## 2022-05-03 PROCEDURE — 2060000000 HC ICU INTERMEDIATE R&B

## 2022-05-03 PROCEDURE — 82962 GLUCOSE BLOOD TEST: CPT

## 2022-05-03 PROCEDURE — 36415 COLL VENOUS BLD VENIPUNCTURE: CPT

## 2022-05-03 PROCEDURE — 6370000000 HC RX 637 (ALT 250 FOR IP): Performed by: NURSE PRACTITIONER

## 2022-05-03 PROCEDURE — 80053 COMPREHEN METABOLIC PANEL: CPT

## 2022-05-03 PROCEDURE — 6370000000 HC RX 637 (ALT 250 FOR IP): Performed by: INTERNAL MEDICINE

## 2022-05-03 PROCEDURE — 71045 X-RAY EXAM CHEST 1 VIEW: CPT

## 2022-05-03 PROCEDURE — 85025 COMPLETE CBC W/AUTO DIFF WBC: CPT

## 2022-05-03 PROCEDURE — 2580000003 HC RX 258: Performed by: INTERNAL MEDICINE

## 2022-05-03 PROCEDURE — 6360000002 HC RX W HCPCS: Performed by: INTERNAL MEDICINE

## 2022-05-03 RX ADMIN — METOPROLOL SUCCINATE 25 MG: 25 TABLET, EXTENDED RELEASE ORAL at 08:59

## 2022-05-03 RX ADMIN — AMLODIPINE BESYLATE 10 MG: 10 TABLET ORAL at 08:59

## 2022-05-03 RX ADMIN — DOXYCYCLINE HYCLATE 100 MG: 100 CAPSULE ORAL at 08:59

## 2022-05-03 RX ADMIN — PETROLATUM: 420 OINTMENT TOPICAL at 20:45

## 2022-05-03 RX ADMIN — ENOXAPARIN SODIUM 40 MG: 100 INJECTION SUBCUTANEOUS at 08:59

## 2022-05-03 RX ADMIN — SODIUM CHLORIDE 3000 MG: 900 INJECTION INTRAVENOUS at 09:02

## 2022-05-03 RX ADMIN — SODIUM CHLORIDE 3000 MG: 900 INJECTION INTRAVENOUS at 01:10

## 2022-05-03 RX ADMIN — INSULIN LISPRO 5 UNITS: 100 INJECTION, SOLUTION INTRAVENOUS; SUBCUTANEOUS at 20:44

## 2022-05-03 RX ADMIN — INSULIN LISPRO 9 UNITS: 100 INJECTION, SOLUTION INTRAVENOUS; SUBCUTANEOUS at 18:34

## 2022-05-03 RX ADMIN — SODIUM CHLORIDE 3000 MG: 900 INJECTION INTRAVENOUS at 16:00

## 2022-05-03 RX ADMIN — INSULIN LISPRO 9 UNITS: 100 INJECTION, SOLUTION INTRAVENOUS; SUBCUTANEOUS at 09:03

## 2022-05-03 RX ADMIN — Medication 1 TABLET: at 08:59

## 2022-05-03 RX ADMIN — DOXYCYCLINE HYCLATE 100 MG: 100 CAPSULE ORAL at 20:44

## 2022-05-03 RX ADMIN — Medication 2000 UNITS: at 08:59

## 2022-05-03 RX ADMIN — ASPIRIN 81 MG: 81 TABLET, COATED ORAL at 08:59

## 2022-05-03 RX ADMIN — INSULIN LISPRO 3 UNITS: 100 INJECTION, SOLUTION INTRAVENOUS; SUBCUTANEOUS at 12:38

## 2022-05-03 RX ADMIN — PETROLATUM: 420 OINTMENT TOPICAL at 09:02

## 2022-05-03 ASSESSMENT — PAIN SCALES - GENERAL
PAINLEVEL_OUTOF10: 0
PAINLEVEL_OUTOF10: 0

## 2022-05-03 NOTE — PROGRESS NOTES
[facility]                                Progress note    Subjective: The patient is awake and alert. Currently requiring 4L NC, improving   Patient is doing well no acute issues  He is not confused-unclear why his wife thinks that he is confused-on my evaluation he is able to answer all questions appropriately    Objective:    BP (!) 141/80   Pulse 62   Temp 97.5 °F (36.4 °C) (Temporal)   Resp 18   Ht 5' 9\" (1.753 m)   Wt 146 lb (66.2 kg)   SpO2 93%   BMI 21.56 kg/m²     In: -   Out: 425   In: -   Out: 425 [Urine:425]    General appearance: NAD, conversant, Frail   HEENT: AT/NC, MMM  Neck: FROM, supple  Lungs: B/L rales, improving, improving cough. CV: RRR, no MRGs  Vasc: Radial pulses 2+  Abdomen: Soft, non-tender; no masses or HSM  Extremities: No peripheral edema or digital cyanosis  Skin: no rash, lesions or ulcers  Psych: Alert and oriented to person, place and time  Neuro: Alert and interactive     Recent Labs     05/01/22  0534 05/02/22  0621 05/03/22  0606   WBC 17.0* 14.1* 11.1   HGB 14.6 14.9 15.5   HCT 42.1 44.2 46.4    177 176       Recent Labs     05/01/22  0534 05/02/22  0621 05/03/22  0606    143 137   K 4.0 4.3 4.5    107 101   CO2 25 26 26   BUN 34* 36* 31*   CREATININE 0.8 0.8 0.8   CALCIUM 9.3 9.0 9.1       Assessment:    Principal Problem:    Pneumonia  Active Problems:    COVID-19    Sepsis ([facility] [address]    Acute hypoxemic respiratory failure (HCC)    Hypernatremia    Type 2 diabetes mellitus (HCC)  Resolved Problems:    * No resolved hospital problems. *      Plan:    Patient is a 79year old male admitted to [facility] for   Pneumonia  -Monitor labs  -WBC trending up today 18. 7-check daily, may also be secondary to Decadron  -IV Unasyn and doxy to cover for atypicals and aspiration-transition to p.o. antibiotic for discharge  -SLP to r/o aspiration-pending  -Legionella and strep pneumoniae unremarkable    Covid-19-controlled  -COVID still positive as of 5-22 however oxygen needs are minimal-on room air saturating mid 90s  -Repeat chest x-ray today for discharge  -IV decardon- switch to po   -Follow inflammatory markers  -Vitamin D, zinc, vitamin C  -Monitor O2 saturations and supplement oxygen to keep saturations greater than 93%, wean as necessary, incentive spirometer, no nebulizers  -Droplet plus isolation  -Encourage ISP use/self proning    Hypernatremia resolved  -Monitor labs, BMP daily  -Na 150+ > 140 today  -D5 @ 100 > discontinued     DM type II  -Monitor labs  -ISS glucose control  -Hypoglycemia protocol initiated  -Increased scale d/t steroids and D5        DVT Prophylaxis Lovenox   PT/OT  Discharge planning-okay for discharge today from medicine standpoint-we will discuss with wife    Amelia Wade MD  11:38 AM  5/3/2022

## 2022-05-03 NOTE — CARE COORDINATION
Call received from patient's wife Nichelle Bonilla re: transition of care. She is upset stating the doctor still hasn't called her re: the patient's condition and she still would like to speak with the doctor before the patient is discharged. Patient's wife is also requesting for the patient to be tested for a UTI. Message sent to Dr Daniel Willson re: the patient's wife request.  Per Dr Daniel Willson, she will call the patient's wife and the patient can be discharged. Call placed to [facility], spoke with Lino, and transportation via ambulance arranged for a 6 pm pick-up time. Message received from Dr Daniel Willson. She did reach out to the patient's wife, and VM left. Notified Dr Daniel Willson of transportation arrangements. Message out to [facility], liaison for [facility] and notified them of discharge arrangements. Call placed to the patient's wife and notified of discharge arrangements. Envelope and transport forms in the soft chart and charge nurse Mushtaq Garcia notified. REYNA completed. Will continue to follow.      Cassi Nair RN.  [facility]  [phone]

## 2022-05-04 VITALS
TEMPERATURE: 97.2 F | BODY MASS INDEX: 21.62 KG/M2 | HEART RATE: 62 BPM | DIASTOLIC BLOOD PRESSURE: 77 MMHG | SYSTOLIC BLOOD PRESSURE: 147 MMHG | HEIGHT: 69 IN | RESPIRATION RATE: 22 BRPM | WEIGHT: 146 LBS | OXYGEN SATURATION: 97 %

## 2022-05-04 LAB
ALBUMIN SERPL-MCNC: 2.5 G/DL (ref 3.5–5.2)
ALP BLD-CCNC: 68 U/L (ref 40–129)
ALT SERPL-CCNC: 28 U/L (ref 0–40)
ANION GAP SERPL CALCULATED.3IONS-SCNC: 14 MMOL/L (ref 7–16)
AST SERPL-CCNC: 28 U/L (ref 0–39)
BASOPHILS ABSOLUTE: 0.04 E9/L (ref 0–0.2)
BASOPHILS RELATIVE PERCENT: 0.4 % (ref 0–2)
BILIRUB SERPL-MCNC: 0.7 MG/DL (ref 0–1.2)
BUN BLDV-MCNC: 29 MG/DL (ref 6–23)
CALCIUM SERPL-MCNC: 8.9 MG/DL (ref 8.6–10.2)
CHLORIDE BLD-SCNC: 103 MMOL/L (ref 98–107)
CO2: 19 MMOL/L (ref 22–29)
CREAT SERPL-MCNC: 0.7 MG/DL (ref 0.7–1.2)
EOSINOPHILS ABSOLUTE: 0.06 E9/L (ref 0.05–0.5)
EOSINOPHILS RELATIVE PERCENT: 0.6 % (ref 0–6)
GFR AFRICAN AMERICAN: >60
GFR NON-AFRICAN AMERICAN: >60 ML/MIN/1.73
GLUCOSE BLD-MCNC: 214 MG/DL (ref 74–99)
HCT VFR BLD CALC: 47.2 % (ref 37–54)
HEMOGLOBIN: 16 G/DL (ref 12.5–16.5)
IMMATURE GRANULOCYTES #: 0.13 E9/L
IMMATURE GRANULOCYTES %: 1.3 % (ref 0–5)
LYMPHOCYTES ABSOLUTE: 1.65 E9/L (ref 1.5–4)
LYMPHOCYTES RELATIVE PERCENT: 16.6 % (ref 20–42)
MCH RBC QN AUTO: 31.3 PG (ref 26–35)
MCHC RBC AUTO-ENTMCNC: 33.9 % (ref 32–34.5)
MCV RBC AUTO: 92.4 FL (ref 80–99.9)
METER GLUCOSE: 228 MG/DL (ref 74–99)
MONOCYTES ABSOLUTE: 0.44 E9/L (ref 0.1–0.95)
MONOCYTES RELATIVE PERCENT: 4.4 % (ref 2–12)
NEUTROPHILS ABSOLUTE: 7.62 E9/L (ref 1.8–7.3)
NEUTROPHILS RELATIVE PERCENT: 76.7 % (ref 43–80)
PDW BLD-RTO: 15.8 FL (ref 11.5–15)
PLATELET # BLD: 154 E9/L (ref 130–450)
PMV BLD AUTO: 10.5 FL (ref 7–12)
POTASSIUM REFLEX MAGNESIUM: 4.3 MMOL/L (ref 3.5–5)
RBC # BLD: 5.11 E12/L (ref 3.8–5.8)
SODIUM BLD-SCNC: 136 MMOL/L (ref 132–146)
TOTAL PROTEIN: 6.6 G/DL (ref 6.4–8.3)
WBC # BLD: 9.9 E9/L (ref 4.5–11.5)

## 2022-05-04 PROCEDURE — 82962 GLUCOSE BLOOD TEST: CPT

## 2022-05-04 PROCEDURE — 85025 COMPLETE CBC W/AUTO DIFF WBC: CPT

## 2022-05-04 PROCEDURE — 80053 COMPREHEN METABOLIC PANEL: CPT

## 2022-05-04 PROCEDURE — 36415 COLL VENOUS BLD VENIPUNCTURE: CPT

## 2022-05-04 ASSESSMENT — PAIN SCALES - GENERAL: PAINLEVEL_OUTOF10: 0

## 2022-05-04 NOTE — PROGRESS NOTES
Patient still on unit this morning, called [facility], they stated they outsource to [facility], [facility] called [facility], [facility] stated they were called by someone and cancelled. [facility] stated they would be here within the hour at [address]. Discharge paperwork updated and Nicole Champion notified.

## 2022-05-14 NOTE — DISCHARGE SUMMARY
[facility]   Discharge summary   Patient ID:  Dewey Burciaga  [id number]  51 y.o.  1940    Admit date: 4/28/2022    Discharge date and time: 5/4/2022    Admission Diagnoses:   Patient Active Problem List   Diagnosis    Type 2 diabetes mellitus ([facility] [address]    Essential hypertension    Moderate protein-calorie malnutrition ([facility] [address]    PVD (peripheral vascular disease) with claudication ([facility] [address]    S/P peripheral artery angioplasty    Dizziness    Closed displaced fracture of right femoral neck ([facility] [address]    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 2 diabetes mellitus, with fat layer exposed ([facility] [address]    Diabetic ulcer of left foot associated with type 2 diabetes mellitus, with muscle involvement without evidence of necrosis (HCC)    Ulcerated, foot, left, with fat layer exposed ([facility] [address]    COVID-19    Pneumonia    Sepsis ([facility] [address]    Acute hypoxemic respiratory failure ([facility] [address]    Hypernatremia       Discharge Diagnoses: PNA    Consults: none    Procedures: None    Hospital Course: The patient is a 79 y.o. male of Coretta Xiao MD       Patient is a 79year old male admitted to [facility] for   Pneumonia  -Monitor labs  -WBC trending up today 18. 7-check daily, may also be secondary to Decadron  -IV Unasyn and doxy to cover for atypicals and aspiration-transition to p.o. antibiotic for discharge  -SLP to r/o aspiration-pending  -Legionella and strep pneumoniae unremarkable     Covid-19-controlled  -COVID still positive as of 5-22 however oxygen needs are minimal-on room air saturating mid 90s  -Repeat chest x-ray today for discharge  -IV decardon- switch to po   -Follow inflammatory markers  -Vitamin D, zinc, vitamin C  -Monitor O2 saturations and supplement oxygen to keep saturations greater than 93%, wean as necessary, incentive spirometer, no nebulizers  -Droplet plus isolation  -Encourage ISP use/self proning     Hypernatremia resolved  -Monitor labs, BMP daily  -Na 150+ > 140 today  -D5 @ 100 > discontinued      DM type II  -Monitor labs  -ISS glucose control  -Hypoglycemia protocol initiated  -Increased scale d/t steroids and D5     No results for input(s): WBC, HGB, HCT, PLT in the last 72 hours. No results for input(s): NA, K, CL, CO2, BUN, CREATININE, GLU, CALCIUM in the last 72 hours. XR CHEST PORTABLE    Result Date: 4/28/2022  EXAMINATION: ONE XRAY VIEW OF THE CHEST 4/28/2022 5:31 pm COMPARISON: 07/15/2021 HISTORY: ORDERING SYSTEM PROVIDED HISTORY: covid/hypoxia TECHNOLOGIST PROVIDED HISTORY: Reason for exam:->covid/hypoxia What reading provider will be dictating this exam?->CRC FINDINGS: The lungs are without acute focal process. There is no effusion or pneumothorax. The cardiomediastinal silhouette is without acute process. The osseous structures are without acute process. No acute process. CTA PULMONARY W CONTRAST    Result Date: 4/28/2022  EXAMINATION: CTA OF THE CHEST 4/28/2022 11:13 pm TECHNIQUE: CTA of the chest was performed after the administration of intravenous contrast.  Multiplanar reformatted images are provided for review. MIP images are provided for review. Dose modulation, iterative reconstruction, and/or weight based adjustment of the mA/kV was utilized to reduce the radiation dose to as low as reasonably achievable. COMPARISON: Portable chest performed earlier today at 1723 hours HISTORY: ORDERING SYSTEM PROVIDED HISTORY: ro pe TECHNOLOGIST PROVIDED HISTORY: Reason for exam:->ro pe Decision Support Exception - unselect if not a suspected or confirmed emergency medical condition->Emergency Medical Condition (MA) What reading provider will be dictating this exam?->CRC Hypoxia. COVID infection. FINDINGS: Pulmonary Arteries: Some images are partially degraded by patient motion. Allowing for this, no identified pulmonary embolism. Mediastinum: Borderline prominent upper right paratracheal lymph node measuring 1.2 x 1 cm.   There are other scattered borderline sized mediastinal, hilar and subcarinal lymph nodes. No aortic dissection. Heart size is normal.  No pericardial effusion. Scattered vascular calcifications. Lungs/pleura: No pneumothorax. Dense consolidation in the posterior left lower lobe base with multiple air bronchograms consistent with pneumonia. There is also some opacification of left lower lobe airways. Scattered areas of linear atelectasis also within the lingula and left lower lobe. Minimal linear atelectasis in the right lower lobe. Upper Abdomen: Densities in the distal esophagus and stomach likely represent ingested medication material.  Cholelithiasis. Soft Tissues/Bones: Degenerative changes are present in the spine and shoulders. Dense left lower lobe consolidation consistent with pneumonia. Partial opacification of left lower lobe airways could represent retained secretions but aspiration not excluded. Neoplasia less likely but follow-up to resolution recommended. Minimal areas of bilateral linear atelectasis. Scattered borderline sized lymph nodes which are favored to be reactive but could also be followed. Cholelithiasis. RECOMMENDATIONS: Unavailable       Discharge Exam:    HEENT: NCAT,  PERRLA, No JVD  Heart:  RRR, no murmurs, gallops, or rubs.   Lungs:  CTA bilaterally, no wheeze, rales or rhonchi  Abd: bowel sounds present, nontender, nondistended, no masses  Extrem:  No clubbing, cyanosis, or edema    Disposition: SNF     Patient Condition at Discharge: Stable    Patient Instructions:      Medication List      CONTINUE taking these medications    amLODIPine 5 MG tablet  Commonly known as: NORVASC  Take 2 tablets by mouth daily     aspirin 81 MG EC tablet     Garlic 0528 MG Tabs     insulin lispro 100 UNIT/ML injection vial  Commonly known as: HUMALOG     Januvia 100 MG tablet  Generic drug: SITagliptin     Jardiance 25 MG tablet  Generic drug: empagliflozin     metFORMIN 1000 MG tablet  Commonly known as: GLUCOPHAGE     metoprolol succinate 25 MG extended release tablet  Commonly known as: TOPROL XL  Take 1 tablet by mouth daily     sodium chloride 1 g tablet     therapeutic multivitamin-minerals tablet     vitamin C 1000 MG tablet     Vitamin D3 50 MCG (2000 UT) Tabs     zinc sulfate 220 (50 Zn) MG capsule  Commonly known as: ZINCATE        ASK your doctor about these medications    amoxicillin-clavulanate 875-125 MG per tablet  Commonly known as: AUGMENTIN  Take 1 tablet by mouth 2 times daily for 5 days  Ask about: Should I take this medication?     doxycycline hyclate 100 MG capsule  Commonly known as: VIBRAMYCIN  Take 1 capsule by mouth every 12 hours for 3 doses  Ask about: Should I take this medication? Where to Get Your Medications      These medications were sent to [address] [place] [address]/ [facility], [address] [address]    Phone: [phone]   · amoxicillin-clavulanate 875-125 MG per tablet  · doxycycline hyclate 100 MG capsule       Activity: activity as tolerated  Diet: cardiac diet and diabetic diet    Pt has been advised to: Follow-up with Mabel Hartman MD in 1 week.   Follow-up with consultants as recommended by them    Note that over 30 minutes was spent in preparing discharge papers, discussing discharge with patient, medication review, etc.    Signed:  Garth Zhang MD  5/4/2022

## 2022-06-06 ENCOUNTER — HOSPITAL ENCOUNTER (OUTPATIENT)
Dept: WOUND CARE | Age: 82
Discharge: HOME | End: 2022-06-06
Payer: MEDICARE

## 2022-06-06 VITALS
HEART RATE: 72 BPM | DIASTOLIC BLOOD PRESSURE: 68 MMHG | SYSTOLIC BLOOD PRESSURE: 140 MMHG | TEMPERATURE: 97 F | RESPIRATION RATE: 16 BRPM | HEIGHT: 69 IN | WEIGHT: 140 LBS | BODY MASS INDEX: 20.73 KG/M2

## 2022-06-06 DIAGNOSIS — L97.412 DIABETIC ULCER OF RIGHT MIDFOOT ASSOCIATED WITH DIABETES MELLITUS DUE TO UNDERLYING CONDITION, WITH FAT LAYER EXPOSED (HCC): ICD-10-CM

## 2022-06-06 DIAGNOSIS — E11.51 TYPE 2 DIABETES MELLITUS WITH DIABETIC PERIPHERAL ANGIOPATHY WITHOUT GANGRENE, WITH LONG-TERM CURRENT USE OF INSULIN (HCC): ICD-10-CM

## 2022-06-06 DIAGNOSIS — Z98.62 S/P PERIPHERAL ARTERY ANGIOPLASTY: ICD-10-CM

## 2022-06-06 DIAGNOSIS — E11.621 DIABETIC ULCER OF RIGHT HEEL ASSOCIATED WITH TYPE 2 DIABETES MELLITUS, WITH FAT LAYER EXPOSED (HCC): Primary | ICD-10-CM

## 2022-06-06 DIAGNOSIS — Z79.4 TYPE 2 DIABETES MELLITUS WITH DIABETIC PERIPHERAL ANGIOPATHY WITHOUT GANGRENE, WITH LONG-TERM CURRENT USE OF INSULIN (HCC): ICD-10-CM

## 2022-06-06 DIAGNOSIS — I73.9 PVD (PERIPHERAL VASCULAR DISEASE) WITH CLAUDICATION (HCC): ICD-10-CM

## 2022-06-06 DIAGNOSIS — E08.621 DIABETIC ULCER OF RIGHT MIDFOOT ASSOCIATED WITH DIABETES MELLITUS DUE TO UNDERLYING CONDITION, WITH FAT LAYER EXPOSED (HCC): ICD-10-CM

## 2022-06-06 DIAGNOSIS — L97.412 DIABETIC ULCER OF RIGHT HEEL ASSOCIATED WITH TYPE 2 DIABETES MELLITUS, WITH FAT LAYER EXPOSED (HCC): Primary | ICD-10-CM

## 2022-06-06 PROBLEM — L97.525 DIABETIC ULCER OF LEFT FOOT ASSOCIATED WITH TYPE 2 DIABETES MELLITUS, WITH MUSCLE INVOLVEMENT WITHOUT EVIDENCE OF NECROSIS (HCC): Status: RESOLVED | Noted: 2022-01-31 | Resolved: 2022-06-06

## 2022-06-06 PROBLEM — U07.1 COVID-19: Status: RESOLVED | Noted: 2022-04-28 | Resolved: 2022-06-06

## 2022-06-06 PROBLEM — A41.9 SEPSIS (HCC): Status: RESOLVED | Noted: 2022-04-29 | Resolved: 2022-06-06

## 2022-06-06 PROBLEM — S72.001A CLOSED DISPLACED FRACTURE OF RIGHT FEMORAL NECK (HCC): Status: RESOLVED | Noted: 2021-07-15 | Resolved: 2022-06-06

## 2022-06-06 PROBLEM — L97.522 ULCERATED, FOOT, LEFT, WITH FAT LAYER EXPOSED (HCC): Status: RESOLVED | Noted: 2022-02-10 | Resolved: 2022-06-06

## 2022-06-06 PROBLEM — J96.01 ACUTE HYPOXEMIC RESPIRATORY FAILURE (HCC): Status: RESOLVED | Noted: 2022-04-29 | Resolved: 2022-06-06

## 2022-06-06 PROBLEM — R42 DIZZINESS: Status: RESOLVED | Noted: 2021-07-15 | Resolved: 2022-06-06

## 2022-06-06 PROBLEM — E87.0 HYPERNATREMIA: Status: RESOLVED | Noted: 2022-04-29 | Resolved: 2022-06-06

## 2022-06-06 PROBLEM — J18.9 PNEUMONIA: Status: RESOLVED | Noted: 2022-04-29 | Resolved: 2022-06-06

## 2022-06-06 PROCEDURE — 99203 OFFICE O/P NEW LOW 30 MIN: CPT

## 2022-06-06 PROCEDURE — 11042 DBRDMT SUBQ TIS 1ST 20SQCM/<: CPT

## 2022-06-06 PROCEDURE — 99215 OFFICE O/P EST HI 40 MIN: CPT | Performed by: SURGERY

## 2022-06-06 PROCEDURE — 11042 DBRDMT SUBQ TIS 1ST 20SQCM/<: CPT | Performed by: SURGERY

## 2022-06-06 RX ORDER — LIDOCAINE HYDROCHLORIDE 20 MG/ML
JELLY TOPICAL ONCE
Status: CANCELLED | OUTPATIENT
Start: 2022-06-06 | End: 2022-06-06

## 2022-06-06 RX ORDER — GENTAMICIN SULFATE 1 MG/G
OINTMENT TOPICAL ONCE
Status: CANCELLED | OUTPATIENT
Start: 2022-06-06 | End: 2022-06-06

## 2022-06-06 RX ORDER — BACITRACIN ZINC AND POLYMYXIN B SULFATE 500; 1000 [USP'U]/G; [USP'U]/G
OINTMENT TOPICAL ONCE
Status: CANCELLED | OUTPATIENT
Start: 2022-06-06 | End: 2022-06-06

## 2022-06-06 RX ORDER — LIDOCAINE 50 MG/G
OINTMENT TOPICAL ONCE
Status: CANCELLED | OUTPATIENT
Start: 2022-06-06 | End: 2022-06-06

## 2022-06-06 RX ORDER — ASPIRIN 81 MG/1
81 TABLET, CHEWABLE ORAL DAILY
COMMUNITY

## 2022-06-06 RX ORDER — GINSENG 100 MG
CAPSULE ORAL ONCE
Status: CANCELLED | OUTPATIENT
Start: 2022-06-06 | End: 2022-06-06

## 2022-06-06 RX ORDER — BACITRACIN, NEOMYCIN, POLYMYXIN B 400; 3.5; 5 [USP'U]/G; MG/G; [USP'U]/G
OINTMENT TOPICAL ONCE
Status: CANCELLED | OUTPATIENT
Start: 2022-06-06 | End: 2022-06-06

## 2022-06-06 RX ORDER — LIDOCAINE HYDROCHLORIDE 40 MG/ML
SOLUTION TOPICAL ONCE
Status: DISCONTINUED | OUTPATIENT
Start: 2022-06-06 | End: 2022-06-07 | Stop reason: HOSPADM

## 2022-06-06 RX ORDER — CLOBETASOL PROPIONATE 0.5 MG/G
OINTMENT TOPICAL ONCE
Status: CANCELLED | OUTPATIENT
Start: 2022-06-06 | End: 2022-06-06

## 2022-06-06 RX ORDER — RIVASTIGMINE TARTRATE 1.5 MG/1
1.5 CAPSULE ORAL 3 TIMES DAILY
COMMUNITY

## 2022-06-06 RX ORDER — LIDOCAINE 40 MG/G
CREAM TOPICAL ONCE
Status: CANCELLED | OUTPATIENT
Start: 2022-06-06 | End: 2022-06-06

## 2022-06-06 RX ORDER — LIDOCAINE HYDROCHLORIDE 40 MG/ML
SOLUTION TOPICAL ONCE
Status: CANCELLED | OUTPATIENT
Start: 2022-06-06 | End: 2022-06-06

## 2022-06-06 RX ORDER — BETAMETHASONE DIPROPIONATE 0.05 %
OINTMENT (GRAM) TOPICAL ONCE
Status: CANCELLED | OUTPATIENT
Start: 2022-06-06 | End: 2022-06-06

## 2022-06-06 RX ORDER — INSULIN GLARGINE 100 [IU]/ML
13 INJECTION, SOLUTION SUBCUTANEOUS NIGHTLY
COMMUNITY

## 2022-06-06 NOTE — PLAN OF CARE
Problem: Chronic Conditions and Co-morbidities  Goal: Patient's chronic conditions and co-morbidity symptoms are monitored and maintained or improved  Outcome: Progressing     Problem: Wound:  Goal: Will show signs of wound healing; wound closure and no evidence of infection  Description: Will show signs of wound healing; wound closure and no evidence of infection  Outcome: Progressing     Problem: Blood Glucose:  Goal: Ability to maintain appropriate glucose levels will improve  Description: Ability to maintain appropriate glucose levels will improve  Outcome: Progressing

## 2022-06-06 NOTE — PROGRESS NOTES
Wound Healing Center /Hyperbarics   History and Physical/Consultation  Vascular    Referring Physician : Marshall Jordan MD  [address] RECORD NUMBER:  [id number]  AGE: 79 y.o. GENDER: male  : 1940  EPISODE DATE:  2022  Subjective:     Chief Complaint   Patient presents with    Wound Check     right foot          HISTORY of PRESENT ILLNESS HPI     Josafat Sierra is a 79 y.o. male who presents today for wound/ulcer evaluation. History of Wound Context:  The patient has had a wound of plantar surface of right foot underneath the metatarsal head which was first noted approximately at least 1 month ago, patient not sure, patient slightly confused as to the when the ulcer started. This has been treated by the patient in the facility. On their initial visit to the wound healing center, 22, the patient has noted that the wound has not been improving. The patient has had similar previous wounds in the past.        Patient was recently in the hospital, in April, with COVID-19 pneumonia, hypoxia etc. currently in the facility recuperating      Patient does have diabetes mellitus with diabetic neuropathy, currently not using any offloading inserts in the shoe    Pt is currently not on abx.       Wound/Ulcer Pain Timing/Severity: constant  Quality of pain: dull  Severity:  2 / 10   Modifying Factors: None  Associated Signs/Symptoms: drainage and numbness    Ulcer Identification:  Ulcer Type: arterial, diabetic and non-healing/non-surgical  Contributing Factors: diabetes and chronic pressure    Diabetic/Pressure/Non Pressure Ulcers only:  Ulcer: Diabetic ulcer, fat layer exposed    If patient has diabetic lower extremity wounds  Dale Classification of diabetic lower extremity wounds:    Grade Description   []  0 No open wound   []  1 Superficial ulcer involving the full skin thickness   []  2 Deep ulcer involves ligament, tendon, joint capsule, or fascia  No bone involvement or abscess presence   []  3 Deep Ulcer with abcess formation and/or osteomyelitis   []  4 Localized gangrene   []  5 Extensive gangrene of the foot     Wound: Patient does have ulcer underneath the first metatarsal head of the right foot, fat layer exposed without any obvious cellulitis or purulent drainage    Other pertinent information:  · Patient was hospitalized in April with COVID-19 pneumonia and acute hypoxic respiratory failure  · The lower extremity arterial Doppler study done in April 2022 was reviewed  ·   Narrative   On the right side, ankle-brachial index, 0.93 with biphasic plus ankle   Doppler tracing over the anterior tibial artery, with good arterial   flow to the right foot and the remaining toes, except over the right   fourth toe where the pulse volume recordings diminished       On the left side, ankle-brachial index within normal range of 0.99,   with biphasic plus ankle Doppler tracings and good arterial flow to   the foot and the remaining toes based upon the pulse volume recordings         June 6, 2022  · Discussed the patient, options risks benefits also explained, patient was recommend x-ray of the right foot from x-rays no underlying osteomyelitis, patient was informed that at the pandemia satisfactory artery circulation with palpable pulses the main issues pressure, diabetic trophic ulcer, needs custom made inserts to offload the first metatarsal head of the right foot, prescription given to the patient for the same  · All his questions were answered    PAST MEDICAL HISTORY      Diagnosis Date    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 ([address] 12/6/2019    S/P peripheral artery angioplasty 01/23/2020    bilateral legs -[place]    Skin ulcer of right foot with fat layer exposed ([address] 12/9/2019    Ulcer of right leg, with fat layer exposed ([address] 5/6/2019    Varicose veins of leg with edema, 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     Past Surgical History:   Procedure Laterality Date    COLONOSCOPY      CYSTOSCOPY N/A 5/18/2020    SUPRAPUBIC TUBE PLACEMENT performed by Jun Mathur MD at [address] [facility] [place] [facility]  1990's    lense implants bilaterally    FOOT DEBRIDEMENT Left 01/04/2017    wound debridement and delayed primary closure    FRACTURE SURGERY      L femur fracture surgery    HIP FRACTURE SURGERY Left 9/23/2020    LEFT HIP OPEN REDUCTION INTERNAL FIXATION performed by Neisha Tuttle MD at [address] Right 7/16/2021    RIGHT HIP HEMIARTHROPLASTY [facility] Budd Denver[facility]  04/23/2019    Dr. Ottoniel Frankel- LEILA ant tibial    OTHER SURGICAL HISTORY  12/17/2019    Dr Ottoniel Frankel - PCI Right popliteal and Anterior tibial    PROSTATE BIOPSY  04/2016    SKIN BIOPSY  sept of 2018 last time    head and ears    TOE AMPUTATION Left 12/31/2016    2nd    TOE AMPUTATION Right 4/26/2019    AMPUTATION RIGHT SECOND TOE, FOOT DEBRIDEMENT performed by Kevin Hall DPM at [facility] Right 12/10/2019    AMPUTATION RIGHT 3rd DIGIT WITH PARTIAL RESECTION OF THIRD METATARSAL performed by Kevin Hall DPM at [address] TURP N/A 5/18/2020    CYSTOSCOPY PLASMA LOOP TRANSURETHRAL RESECTION PROSTATE performed by Jun Mathur MD at [facility] OR    VASCULAR SURGERY       Family History   Problem Relation Age of Onset    Heart Disease Mother CHF    Heart Disease Father     Heart Attack Father      Social History     Tobacco Use    Smoking status: Former Smoker     Packs/day: 0.50     Years: 2.00     Pack years: 1.00     Types: Cigarettes     Quit date: 1960     Years since quittin.4    Smokeless tobacco: Never Used    Tobacco comment: quit   Vaping Use    Vaping Use: Never used   Substance Use Topics    Alcohol use: Not Currently    Drug use: Not Currently     Allergies   Allergen Reactions    Latex Other (See Comments)     Pt unsure of reaction    Ciprofloxacin In D5w Itching    Food     Other Rash     \"I get a rash on just my face if I eat Cumin or Chili. \"    Peanut-Containing Drug Products Itching     Current Outpatient Medications on File Prior to Encounter   Medication Sig Dispense Refill    insulin glargine (LANTUS) 100 UNIT/ML injection vial Inject 13 Units into the skin nightly      rivastigmine (EXELON) 1.5 mg capsule Take 1.5 mg by mouth 3 times daily      aspirin 81 MG chewable tablet Take 81 mg by mouth daily      sodium chloride 1 g tablet Take 1 g by mouth in the morning and at bedtime      zinc sulfate (ZINCATE) 220 (50 Zn) MG capsule Take 50 mg by mouth daily      empagliflozin (JARDIANCE) 25 MG tablet Take 25 mg by mouth daily      insulin lispro (HUMALOG) 100 UNIT/ML injection vial Inject into the skin 4 times daily per sliding scale: 150-199 = 2 units;    200-249 = 4;    250-299 = 6;    300-349 = 8;    > 350 = notify MD      SITagliptin (JANUVIA) 100 MG tablet Take 100 mg by mouth daily      metFORMIN (GLUCOPHAGE) 1000 MG tablet Take 500 mg by mouth 2 times daily       metoprolol succinate (TOPROL XL) 25 MG extended release tablet Take 1 tablet by mouth daily 30 tablet 3    amLODIPine (NORVASC) 5 MG tablet Take 2 tablets by mouth daily 30 tablet 3    Garlic 1728 MG TABS Take 1,250 mg by mouth daily       Multiple Vitamins-Minerals (THERAPEUTIC MULTIVITAMIN-MINERALS) tablet Take 1 tablet by mouth daily No current facility-administered medications on file prior to encounter.        REVIEW OF SYSTEMS   ROS : All others Negative if blank [], Positive if [x]  General Urinary   [] Fevers [] Hematuria   [] Chills [] Dysuria   [] Weight Loss Vascular   Skin [] Claudication   [x] Tissue Loss [] Rest Pain   Eyes Neurologic   [] Wears Glasses/Contacts [] Stroke/TIA   [] Vision Changes [] Focal weakness   Respiratory [] Slurred Speech    [] Shortness of breath ENT   Cardiovascular [] Difficulty swallowing   [] Chest Pain Gastrointestinal   [] Shortness of breath with exertion [] Abdominal Pain   Patient does have diabetic foot ulcer, underneath the metatarsal head of the right foot, with fat layer exposed, have diabetes with diabetic neuropathy, hospitalized in April, due to COVID-19 pneumonia, acute hypoxic respiratory failure, hypertension, hyperlipidemia, status post peripheral angioplasty done by Dr. Jamaal Morales in April and December 2019 [] Melena       [] Hematochezia               Objective:    BP (!) 140/68   Pulse 72   Temp 97 °F (36.1 °C) (Temporal)   Resp 16   Ht 5' 9\" (1.753 m)   Wt 140 lb (63.5 kg)   BMI 20.67 kg/m²   Wt Readings from Last 3 Encounters:   06/06/22 140 lb (63.5 kg)   04/28/22 146 lb (66.2 kg)   04/20/22 140 lb (63.5 kg)       PHYSICAL EXAM  CONSTITUTIONAL:   Awake, alert, cooperative  PSYCHIATRIC :  Oriented to time, place and person      normal insight to disease process  ENT:  External ears and nose without lesions    Hearing deficits is not noted  NECK: Supple, symmetrical, trachea midline    Thyroid goiter not appreciated    Carotid bruit is not noted bilaterally  LUNGS:  No increased work of breathing                  Clear to auscultation bilaterally   CARDIOVASCULAR:  regular rate and rhythm   ABDOMEN:  soft, non-distended, non-tender    Hernias is not noted   Aorta is not palpable   Lymphatics : Cervical lymphadenopathy is not noted     Femoral lymphadenopathy is not noted  SKIN: Skin color is normal   Texture and turgor is  normal   Induration is not noted  EXTREMITIES:   R UE Edema is not noted  L UE Edema is not noted  R LE Edema is not noted  L LE Edema is not noted    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    R femoral 2 L femoral 2   R dorsalis pedis 2 L dorsalis pedis 2   R posterior tibial 1 L posterior tibial 1     Assessment:     Problem List Items Addressed This Visit     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 due to underlying condition, with fat layer exposed ([address]    Relevant Medications    insulin glargine (LANTUS) 100 UNIT/ML injection vial    Other Relevant Orders    DME Order for (Specify) as OP    XR FOOT RIGHT (MIN 3 VIEWS)    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 2 diabetes mellitus, with fat layer exposed ([address] - Primary    Relevant Medications    insulin glargine (LANTUS) 100 UNIT/ML injection vial    lidocaine (XYLOCAINE) 4 % external solution    Other Relevant Orders    Initiate Outpatient Wound Care Protocol    XR FOOT RIGHT (MIN 3 VIEWS)    PVD (peripheral vascular disease) with claudication (HCC)    Relevant Medications    aspirin 81 MG chewable tablet    S/P peripheral artery angioplasty    Type 2 diabetes mellitus (HCC)    Relevant Medications    insulin glargine (LANTUS) 100 UNIT/ML injection vial        Procedure Note  Indications:  Based on my examination of this patient's wound(s)/ulcer(s) today, debridement is required to promote healing and evaluate the wound base. Performed by: Genaro Talbot MD    Consent obtained:  Yes    Time out taken:  Yes    Pain Control: Anesthetic  Anesthetic: 4% Lidocaine Liquid Topical     Debridement:Excisional Debridement    Using curette the wound(s)/ulcer(s) was/were sharply debrided down through and including the removal of epidermis, dermis and subcutaneous tissue.         Devitalized Tissue Debrided:  fibrin and slough    Pre Debridement Measurements:  Are located in the [place]  Documentation Flow Sheet    Wound/Ulcer #: 1    Post Debridement Measurements:  Wound/Ulcer Descriptions are Pre Debridement except measurements:    Wound 02/21/22 Foot Right;Plantar #2 (Active)   Wound Image   06/06/22 1334   Dressing Status New dressing applied 04/07/22 1111   Wound Cleansed Cleansed with saline 04/07/22 1111   Dressing/Treatment Dry dressing 04/07/22 1111   Offloading for Diabetic Foot Ulcers Diabetic shoes/inserts 02/28/22 1447   Wound Length (cm) 1 cm 06/06/22 1334   Wound Width (cm) 1.4 cm 06/06/22 1334   Wound Depth (cm) 0.2 cm 06/06/22 1334   Wound Surface Area (cm^2) 1.4 cm^2 06/06/22 1334   Change in Wound Size % (l*w) -1455.56 06/06/22 1334   Wound Volume (cm^3) 0.28 cm^3 06/06/22 1334   Wound Healing % -3011 06/06/22 1334   Post-Procedure Length (cm) 2 cm 04/14/22 1032   Post-Procedure Width (cm) 2 cm 04/14/22 1032   Post-Procedure Depth (cm) 0.3 cm 04/14/22 1032   Post-Procedure Surface Area (cm^2) 4 cm^2 04/14/22 1032   Post-Procedure Volume (cm^3) 1.2 cm^3 04/14/22 1032   Wound Assessment Pink/red 06/06/22 1334   Drainage Amount Moderate 06/06/22 1334   Drainage Description Serous; Yellow 06/06/22 1334   Odor None 06/06/22 1334   Latonya-wound Assessment Hyperkeratosis (callous) 06/06/22 1334   Number of days: 104       Wound 04/29/22 Foot Left;Plantar (Active)   Number of days: 38       Percent of Wound/Ulcer Debrided: 100%    Total Surface Area Debrided:  4 sq cm     Estimated Blood Loss:  Minimal    Hemostasis Achieved:  by pressure    Procedural Pain:  3  / 10     Post Procedural Pain:  2 / 10     Response to treatment:  Well tolerated by patient. A culture was not done.       Plan:     Pt is not a smoker     In my professional opinion and based off the information that is available at this time this patient has appropriate indication for HBO Therapy: No    Treatment Note please see attached Discharge Instructions    Written patient dismissal instructions given to patient and signed by patient or POA. Discharge Instructions       Visit Discharge/Physician Orders    Discharge condition: Stable    Assessment of pain at discharge: minimal    Anesthetic used: 4% lidocaine external solution    Discharge to: ECF     Left via:Private automobile    Accompanied by: accompanied by self    ECF/[facility]: [id number]     Dressing Orders:    Cleanse right foot ulcer with normal saline, apply ROSALBA and cover with a dry dressing daily    Treatment Orders:    X-ray of the right foot ordered today    Discussed need for diabetic shoe inserts    FOLLOW NUTRITIOUS DIET. CHOOSE FOODS HIGH IN PROTEIN -CHICKEN- FISH-AND EGGS,  CHOOSE FOODS HIGH IN VITAMIN C.   MULTIVITAMIN DAILY. [facility] followup visit __________1 week___________________  (Please note your next appointment above and if you are unable to keep, kindly give a 24 hour notice. Thank you.)    Physician signature:__________________________      If you experience any of the following, please call the [facility] during business hours:    * Increase in Pain  * Temperature over 101  * Increase in drainage from your wound  * Drainage with a foul odor  * Bleeding  * Increase in swelling  * Need for compression bandage changes due to slippage, breakthrough drainage. If you need medical attention outside of the business hours of the [facility] please contact your PCP or go to the nearest emergency room.         Electronically signed by Tammy Pryor MD on 6/6/2022 at 2:03 PM

## 2022-06-13 ENCOUNTER — HOSPITAL ENCOUNTER (OUTPATIENT)
Dept: WOUND CARE | Age: 82
Discharge: HOME | End: 2022-06-13
Payer: MEDICARE

## 2022-06-13 VITALS
HEART RATE: 94 BPM | TEMPERATURE: 97.7 F | RESPIRATION RATE: 18 BRPM | SYSTOLIC BLOOD PRESSURE: 126 MMHG | DIASTOLIC BLOOD PRESSURE: 62 MMHG | HEIGHT: 69 IN | WEIGHT: 140 LBS | BODY MASS INDEX: 20.73 KG/M2

## 2022-06-13 DIAGNOSIS — L97.412 DIABETIC ULCER OF RIGHT MIDFOOT ASSOCIATED WITH DIABETES MELLITUS DUE TO UNDERLYING CONDITION, WITH FAT LAYER EXPOSED (HCC): Primary | ICD-10-CM

## 2022-06-13 DIAGNOSIS — L97.412 DIABETIC ULCER OF RIGHT HEEL ASSOCIATED WITH TYPE 2 DIABETES MELLITUS, WITH FAT LAYER EXPOSED (HCC): ICD-10-CM

## 2022-06-13 DIAGNOSIS — E11.621 DIABETIC ULCER OF RIGHT HEEL ASSOCIATED WITH TYPE 2 DIABETES MELLITUS, WITH FAT LAYER EXPOSED (HCC): ICD-10-CM

## 2022-06-13 DIAGNOSIS — E08.621 DIABETIC ULCER OF RIGHT MIDFOOT ASSOCIATED WITH DIABETES MELLITUS DUE TO UNDERLYING CONDITION, WITH FAT LAYER EXPOSED (HCC): Primary | ICD-10-CM

## 2022-06-13 PROCEDURE — 11042 DBRDMT SUBQ TIS 1ST 20SQCM/<: CPT

## 2022-06-13 PROCEDURE — 11042 DBRDMT SUBQ TIS 1ST 20SQCM/<: CPT | Performed by: SURGERY

## 2022-06-13 RX ORDER — LIDOCAINE HYDROCHLORIDE 20 MG/ML
JELLY TOPICAL ONCE
Status: CANCELLED | OUTPATIENT
Start: 2022-06-13 | End: 2022-06-13

## 2022-06-13 RX ORDER — GINSENG 100 MG
CAPSULE ORAL ONCE
Status: CANCELLED | OUTPATIENT
Start: 2022-06-13 | End: 2022-06-13

## 2022-06-13 RX ORDER — LIDOCAINE 40 MG/G
CREAM TOPICAL ONCE
Status: CANCELLED | OUTPATIENT
Start: 2022-06-13 | End: 2022-06-13

## 2022-06-13 RX ORDER — CLOBETASOL PROPIONATE 0.5 MG/G
OINTMENT TOPICAL ONCE
Status: CANCELLED | OUTPATIENT
Start: 2022-06-13 | End: 2022-06-13

## 2022-06-13 RX ORDER — LIDOCAINE 50 MG/G
OINTMENT TOPICAL ONCE
Status: CANCELLED | OUTPATIENT
Start: 2022-06-13 | End: 2022-06-13

## 2022-06-13 RX ORDER — BACITRACIN, NEOMYCIN, POLYMYXIN B 400; 3.5; 5 [USP'U]/G; MG/G; [USP'U]/G
OINTMENT TOPICAL ONCE
Status: CANCELLED | OUTPATIENT
Start: 2022-06-13 | End: 2022-06-13

## 2022-06-13 RX ORDER — BETAMETHASONE DIPROPIONATE 0.05 %
OINTMENT (GRAM) TOPICAL ONCE
Status: CANCELLED | OUTPATIENT
Start: 2022-06-13 | End: 2022-06-13

## 2022-06-13 RX ORDER — BACITRACIN ZINC AND POLYMYXIN B SULFATE 500; 1000 [USP'U]/G; [USP'U]/G
OINTMENT TOPICAL ONCE
Status: CANCELLED | OUTPATIENT
Start: 2022-06-13 | End: 2022-06-13

## 2022-06-13 RX ORDER — LIDOCAINE HYDROCHLORIDE 40 MG/ML
SOLUTION TOPICAL ONCE
Status: CANCELLED | OUTPATIENT
Start: 2022-06-13 | End: 2022-06-13

## 2022-06-13 RX ORDER — LIDOCAINE HYDROCHLORIDE 40 MG/ML
SOLUTION TOPICAL ONCE
Status: COMPLETED | OUTPATIENT
Start: 2022-06-13 | End: 2022-06-13

## 2022-06-13 RX ORDER — GENTAMICIN SULFATE 1 MG/G
OINTMENT TOPICAL ONCE
Status: CANCELLED | OUTPATIENT
Start: 2022-06-13 | End: 2022-06-13

## 2022-06-13 RX ADMIN — LIDOCAINE HYDROCHLORIDE: 40 SOLUTION TOPICAL at 13:32

## 2022-06-13 ASSESSMENT — PAIN SCALES - GENERAL: PAINLEVEL_OUTOF10: 0

## 2022-06-13 NOTE — PLAN OF CARE
Problem: Chronic Conditions and Co-morbidities  Goal: Patient's chronic conditions and co-morbidity symptoms are monitored and maintained or improved  Outcome: Progressing     Problem: Chronic Conditions and Co-morbidities  Goal: Patient's chronic conditions and co-morbidity symptoms are monitored and maintained or improved  Outcome: Progressing     Problem: Wound:  Goal: Will show signs of wound healing; wound closure and no evidence of infection  Description: Will show signs of wound healing; wound closure and no evidence of infection  Outcome: Progressing     Problem: Blood Glucose:  Goal: Ability to maintain appropriate glucose levels will improve  Description: Ability to maintain appropriate glucose levels will improve  Outcome: Progressing

## 2022-06-13 NOTE — PROGRESS NOTES
Wound Healing Center Followup Visit Note    Referring Physician : Joaquim Martinez MD  [address] RECORD NUMBER:  [id number]  AGE: 79 y.o. GENDER: male  : 1940  EPISODE DATE:  2022    Subjective:     Chief Complaint   Patient presents with    Wound Check     right foot      HISTORY of PRESENT ILLNESS DELLA Hamilton is a 79 y.o. male who presents today in regards to follow up evaluation and treatment of wound/ulcer. That patient's past medical, family and social hx were reviewed and changes were made if present. History of Wound Context:  The patient has had a wound of plantar surface of right foot underneath the metatarsal head which was first noted approximately at least 1 month ago, patient not sure, patient slightly confused as to the when the ulcer started. This has been treated by the patient in the facility. On their initial visit to the wound healing center, 22, the patient has noted that the wound has not been improving.   The patient has had similar previous wounds in the past.          Patient was recently in the hospital, in April, with COVID-19 pneumonia, hypoxia etc. currently in the facility recuperating        Patient does have diabetes mellitus with diabetic neuropathy, currently not using any offloading inserts in the shoe     Pt is currently not on abx.      2022  · Patient overall better, wound improving, rediscussed with patient and nursing staff regarding making arrangements for the patient to undergo custom-made offloading insert for the right foot to alleviate pressure underneath the first metatarsal head  ·   ·     Wound/Ulcer Pain Timing/Severity: constant  Quality of pain: dull  Severity:  2 / 10   Modifying Factors: None  Associated Signs/Symptoms: drainage and numbness     Ulcer Identification:  Ulcer Type: arterial, diabetic and non-healing/non-surgical  Contributing Factors: diabetes and chronic pressure     Diabetic/Pressure/Non Pressure Ulcers only:  Ulcer: Diabetic ulcer, fat layer exposed     If patient has diabetic lower extremity wounds  Dale Classification of diabetic lower extremity wounds:     Grade Description   []? 0 No open wound   []? 1 Superficial ulcer involving the full skin thickness   []? 2 Deep ulcer involves ligament, tendon, joint capsule, or fascia  No bone involvement or abscess presence   []? 3 Deep Ulcer with abcess formation and/or osteomyelitis   []? 4 Localized gangrene   []?   5 Extensive gangrene of the foot      Wound: Patient does have ulcer underneath the first metatarsal head of the right foot, fat layer exposed without any obvious cellulitis or purulent drainage     Other pertinent information:  · Patient was hospitalized in April with COVID-19 pneumonia and acute hypoxic respiratory failure  · The lower extremity arterial Doppler study done in April 2022 was reviewed  ·    Narrative   On the right side, ankle-brachial index, 0.93 with biphasic plus ankle   Doppler tracing over the anterior tibial artery, with good arterial   flow to the right foot and the remaining toes, except over the right   fourth toe where the pulse volume recordings diminished       On the left side, ankle-brachial index within normal range of 0.99,   with biphasic plus ankle Doppler tracings and good arterial flow to   the foot and the remaining toes based upon the pulse volume recordings          June 6, 2022  · Discussed the patient, options risks benefits also explained, patient was recommend x-ray of the right foot from x-rays no underlying osteomyelitis, patient was informed that at the pandemia satisfactory artery circulation with palpable pulses the main issues pressure, diabetic trophic ulcer, needs custom made inserts to offload the first metatarsal head of the right foot, prescription given to the patient for the same  · All his questions were answered                   PAST MEDICAL HISTORY      Diagnosis Date    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  1990's    lense implants bilaterally    FOOT DEBRIDEMENT Left 01/04/2017    wound debridement and delayed primary closure    FRACTURE SURGERY      L femur fracture surgery    HIP FRACTURE SURGERY Left 9/23/2020    LEFT HIP OPEN REDUCTION INTERNAL FIXATION performed by Akanksha Melendez MD at [address] Right 7/16/2021    RIGHT HIP HEMIARTHROPLASTY performed by Vana Harada, DO at [address] [place] [address]  04/23/2019    Dr. Rodriguez Pratt- PTA ant tibial    OTHER SURGICAL HISTORY  12/17/2019    Dr Rodriguez Pratt - PCI Right popliteal and Anterior tibial    PROSTATE BIOPSY  04/2016    SKIN BIOPSY  sept of 2018 last time    head and ears    TOE AMPUTATION Left 12/31/2016    2nd    TOE AMPUTATION Right 4/26/2019    AMPUTATION RIGHT SECOND TOE, FOOT DEBRIDEMENT performed by Bianca Dorantes DPM at [facility] OR    TOE AMPUTATION Right 12/10/2019    AMPUTATION RIGHT 3rd DIGIT WITH PARTIAL RESECTION OF THIRD METATARSAL performed by Erik Wells DPM at [facility] TURP N/A 2020    CYSTOSCOPY PLASMA LOOP TRANSURETHRAL RESECTION PROSTATE performed [facility]lynn Wheatley MD at [facility] OR    VASCULAR SURGERY       Family History   Problem Relation Age of Onset    Heart Disease Mother         CHF    Heart Disease Father     Heart Attack Father      Social History     Tobacco Use    Smoking status: Former Smoker     Packs/day: 0.50     Years: 2.00     Pack years: 1.00     Types: Cigarettes     Quit date:      Years since quittin.4    Smokeless tobacco: Never Used    Tobacco comment: quit   Vaping Use    Vaping Use: Never used   Substance Use Topics    Alcohol use: Not Currently    Drug use: Not Currently     Allergies   Allergen Reactions    Latex Other (See Comments)     Pt unsure of reaction    Ciprofloxacin In D5w Itching    Food     Other Rash     \"I get a rash on just my face if I eat Cumin or Chili. \"    Peanut-Containing Drug Products Itching     Current Outpatient Medications on File Prior to Encounter   Medication Sig Dispense Refill    insulin glargine (LANTUS) 100 UNIT/ML injection vial Inject 13 Units into the skin nightly      rivastigmine (EXELON) 1.5 mg capsule Take 1.5 mg by mouth 3 times daily      aspirin 81 MG chewable tablet Take 81 mg by mouth daily      sodium chloride 1 g tablet Take 1 g by mouth in the morning and at bedtime      zinc sulfate (ZINCATE) 220 (50 Zn) MG capsule Take 50 mg by mouth daily      empagliflozin (JARDIANCE) 25 MG tablet Take 25 mg by mouth daily      insulin lispro (HUMALOG) 100 UNIT/ML injection vial Inject into the skin 4 times daily per sliding scale: 150-199 = 2 units;    200-249 = 4;    250-299 = 6;    300-349 = 8;    > 350 = notify MD      SITagliptin (JANUVIA) 100 MG tablet Take 100 mg by mouth daily      metFORMIN (GLUCOPHAGE) 1000 MG tablet Take 500 mg by mouth 2 times daily       metoprolol succinate (TOPROL XL) 25 MG extended release tablet Take 1 tablet by mouth daily 30 tablet 3    amLODIPine (NORVASC) 5 MG tablet Take 2 tablets by mouth daily 30 tablet 3    Garlic 0060 MG TABS Take 1,250 mg by mouth daily       Multiple Vitamins-Minerals (THERAPEUTIC MULTIVITAMIN-MINERALS) tablet Take 1 tablet by mouth daily       No current facility-administered medications on file prior to encounter.        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 due to underlying condition, with fat layer exposed ([facility] - Primary    Relevant Orders    Initiate Outpatient Wound Care Protocol    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 2 diabetes mellitus, with fat layer exposed [facility])    Relevant Orders    Initiate Outpatient Wound Care Protocol          Pre Debridement Measurements:  Are located in the [facility]  Documentation Flow Sheet  Post Debridement Measurements:  Wound/Ulcer Descriptions are Pre Debridement except measurements:     Wound 02/21/22 Foot Right;Plantar #2 (Active)   Wound Image   06/06/22 1334   Dressing Status New dressing applied 04/07/22 1111   Wound Cleansed Cleansed with saline 04/07/22 1111   Dressing/Treatment Dry dressing 04/07/22 1111   Offloading for Diabetic Foot Ulcers Diabetic shoes/inserts 02/28/22 1447   Wound Length (cm) 0.7 cm 06/13/22 1327   Wound Width (cm) 1.2 cm 06/13/22 1327   Wound Depth (cm) 0.1 cm 06/13/22 1327   Wound Surface Area (cm^2) 0.84 cm^2 06/13/22 1327   Change in Wound Size % (l*w) -833.33 06/13/22 1327   Wound Volume (cm^3) 0.084 cm^3 06/13/22 1327   Wound Healing % -833 06/13/22 1327   Post-Procedure Length (cm) 0.8 cm 06/13/22 1344   Post-Procedure Width (cm) 1.3 cm 06/13/22 1344   Post-Procedure Depth (cm) 0.2 cm 06/13/22 1344   Post-Procedure Surface Area (cm^2) 1.04 cm^2 06/13/22 1344   Post-Procedure Volume (cm^3) 0.208 cm^3 06/13/22 1344   Wound Assessment Pink/red 06/13/22 1327   Drainage Amount Small 06/13/22 1327   Drainage Description Serous; Yellow 06/13/22 1327   Odor None 06/13/22 1327   Latonya-wound Assessment Hyperkeratosis (callous) 06/13/22 1327   Number of days: 111       Wound 04/29/22 Foot Left;Plantar (Active)   Number of days: 45          Procedure Note  Indications:  Based on my examination of this patient's wound(s)/ulcer(s) today, debridement is required to promote healing and evaluate the wound base. Performed by: Enmanuel Holguin MD    Consent obtained:  Yes    Time out taken:  Yes    Pain Control: Anesthetic  Anesthetic: 4% Lidocaine Liquid Topical     Debridement:Excisional Debridement    Using curette, #15 blade scalpel and forceps the wound(s)/ulcer(s) was/were sharply debrided down through and including the removal of epidermis, dermis and subcutaneous tissue. Devitalized Tissue Debrided:  fibrin to stimulate bleeding to promote healing, post debridement good bleeding base and wound edges noted    Wound/Ulcer #: 1    Percent of Wound/Ulcer Debrided: 100%    Total Surface Area Debrided:  1 sq cm     Estimated Blood Loss:  Minimal  Hemostasis Achieved:  by pressure    Procedural Pain:  3  / 10   Post Procedural Pain:  2 / 10     Response to treatment:  Well tolerated by patient.      Plan:   Treatment Note please see attached Discharge Instructions    Written patient dismissal instructions given to patient and signed by patient or POA. Discharge Instructions       Visit Discharge/Physician Orders    Discharge condition: Stable    Assessment of pain at discharge: minimal    Anesthetic used: 4% lidocaine external solution    Discharge to: ECF     Left via:Private automobile    Accompanied by: accompanied by self    ECF/[facility]: [address] [facility]     Dressing Orders:    Cleanse right foot ulcer with normal saline, apply ROSALBA and cover with a dry dressing daily    Treatment Orders:    X-ray of the right foot ordered last visit, to be done at [facility]. Discussed need for diabetic shoe inserts, please obtain ASAP    FOLLOW NUTRITIOUS DIET. CHOOSE FOODS HIGH IN PROTEIN -CHICKEN- FISH-AND EGGS,  CHOOSE FOODS HIGH IN VITAMIN C.   MULTIVITAMIN DAILY. [facility] followup visit __________1 week___________________  (Please note your next appointment above and if you are unable to keep, kindly give a 24 hour notice. Thank you.)    Physician signature:__________________________      If you experience any of the following, please call the [facility] during business hours:    * Increase in Pain  * Temperature over 101  * Increase in drainage from your wound  * Drainage with a foul odor  * Bleeding  * Increase in swelling  * Need for compression bandage changes due to slippage, breakthrough drainage. If you need medical attention outside of the business hours of the [facility] please contact your PCP or go to the nearest emergency room.           Electronically signed by Twyla Greco MD on 6/13/2022 at 1:50 PM

## 2022-06-20 ENCOUNTER — HOSPITAL ENCOUNTER (OUTPATIENT)
Dept: WOUND CARE | Age: 82
Discharge: HOME | End: 2022-06-20
Payer: MEDICARE

## 2022-06-20 VITALS
DIASTOLIC BLOOD PRESSURE: 60 MMHG | WEIGHT: 140 LBS | HEIGHT: 70 IN | RESPIRATION RATE: 18 BRPM | SYSTOLIC BLOOD PRESSURE: 108 MMHG | TEMPERATURE: 96.7 F | BODY MASS INDEX: 20.04 KG/M2 | HEART RATE: 64 BPM

## 2022-06-20 DIAGNOSIS — E08.621 DIABETIC ULCER OF RIGHT MIDFOOT ASSOCIATED WITH DIABETES MELLITUS DUE TO UNDERLYING CONDITION, WITH FAT LAYER EXPOSED (HCC): ICD-10-CM

## 2022-06-20 DIAGNOSIS — L97.412 DIABETIC ULCER OF RIGHT MIDFOOT ASSOCIATED WITH DIABETES MELLITUS DUE TO UNDERLYING CONDITION, WITH FAT LAYER EXPOSED (HCC): ICD-10-CM

## 2022-06-20 DIAGNOSIS — L97.412 DIABETIC ULCER OF RIGHT HEEL ASSOCIATED WITH TYPE 2 DIABETES MELLITUS, WITH FAT LAYER EXPOSED (HCC): Primary | ICD-10-CM

## 2022-06-20 DIAGNOSIS — E11.621 DIABETIC ULCER OF RIGHT HEEL ASSOCIATED WITH TYPE 2 DIABETES MELLITUS, WITH FAT LAYER EXPOSED (HCC): Primary | ICD-10-CM

## 2022-06-20 DIAGNOSIS — B35.9 DERMATOPHYTOSIS: ICD-10-CM

## 2022-06-20 PROCEDURE — 11042 DBRDMT SUBQ TIS 1ST 20SQCM/<: CPT | Performed by: SURGERY

## 2022-06-20 PROCEDURE — 11042 DBRDMT SUBQ TIS 1ST 20SQCM/<: CPT

## 2022-06-20 PROCEDURE — 6370000000 HC RX 637 (ALT 250 FOR IP): Performed by: PODIATRIST

## 2022-06-20 RX ORDER — LIDOCAINE HYDROCHLORIDE 40 MG/ML
SOLUTION TOPICAL ONCE
Status: CANCELLED | OUTPATIENT
Start: 2022-06-20 | End: 2022-06-20

## 2022-06-20 RX ORDER — LIDOCAINE 40 MG/G
CREAM TOPICAL ONCE
Status: CANCELLED | OUTPATIENT
Start: 2022-06-20 | End: 2022-06-20

## 2022-06-20 RX ORDER — GINSENG 100 MG
CAPSULE ORAL ONCE
Status: CANCELLED | OUTPATIENT
Start: 2022-06-20 | End: 2022-06-20

## 2022-06-20 RX ORDER — BACITRACIN, NEOMYCIN, POLYMYXIN B 400; 3.5; 5 [USP'U]/G; MG/G; [USP'U]/G
OINTMENT TOPICAL ONCE
Status: CANCELLED | OUTPATIENT
Start: 2022-06-20 | End: 2022-06-20

## 2022-06-20 RX ORDER — GENTAMICIN SULFATE 1 MG/G
OINTMENT TOPICAL ONCE
Status: CANCELLED | OUTPATIENT
Start: 2022-06-20 | End: 2022-06-20

## 2022-06-20 RX ORDER — LIDOCAINE HYDROCHLORIDE 40 MG/ML
SOLUTION TOPICAL ONCE
Status: COMPLETED | OUTPATIENT
Start: 2022-06-20 | End: 2022-06-20

## 2022-06-20 RX ORDER — BACITRACIN ZINC AND POLYMYXIN B SULFATE 500; 1000 [USP'U]/G; [USP'U]/G
OINTMENT TOPICAL ONCE
Status: CANCELLED | OUTPATIENT
Start: 2022-06-20 | End: 2022-06-20

## 2022-06-20 RX ORDER — CLOBETASOL PROPIONATE 0.5 MG/G
OINTMENT TOPICAL ONCE
Status: CANCELLED | OUTPATIENT
Start: 2022-06-20 | End: 2022-06-20

## 2022-06-20 RX ORDER — BETAMETHASONE DIPROPIONATE 0.05 %
OINTMENT (GRAM) TOPICAL ONCE
Status: CANCELLED | OUTPATIENT
Start: 2022-06-20 | End: 2022-06-20

## 2022-06-20 RX ORDER — LIDOCAINE 50 MG/G
OINTMENT TOPICAL ONCE
Status: CANCELLED | OUTPATIENT
Start: 2022-06-20 | End: 2022-06-20

## 2022-06-20 RX ORDER — LIDOCAINE HYDROCHLORIDE 20 MG/ML
JELLY TOPICAL ONCE
Status: CANCELLED | OUTPATIENT
Start: 2022-06-20 | End: 2022-06-20

## 2022-06-20 RX ADMIN — LIDOCAINE HYDROCHLORIDE 10 ML: 40 SOLUTION TOPICAL at 13:48

## 2022-06-21 NOTE — PROGRESS NOTES
[facility]aling Center Followup Visit Note    Referring Physician : Berta Luther MD  [address] RECORD NUMBER:  [id number]  AGE: 79 y.o. GENDER: male  : 1940  EPISODE DATE:  2022    Subjective:     Chief Complaint   Patient presents with    Wound Check      HISTORY of PRESENT ILLNESS HPI   Germania Deleon is a 79 y.o. male who presents today in regards to follow up evaluation and treatment of wound/ulcer. That patient's past medical, family and social hx were reviewed and changes were made if present. History of Wound Context:  The patient has had a wound of plantar surface of right foot underneath the metatarsal head which was first noted approximately at least 1 month ago, patient not sure, patient slightly confused as to the when the ulcer started. This has been treated by the patient in the facility. On their initial visit to the wound healing center, 22, the patient has noted that the wound has not been improving.   The patient has had similar previous wounds in the past.          Patient was recently in the hospital, in April, with COVID-19 pneumonia, hypoxia etc. currently in the facility recuperating        Patient does have diabetes mellitus with diabetic neuropathy, currently not using any offloading inserts in the shoe     Pt is currently not on abx.      2022  · Patient overall better, wound improving, rediscussed with patient and nursing staff regarding making arrangements for the patient to undergo custom-made offloading insert for the right foot to alleviate pressure underneath the first metatarsal head  2022  · Right foot wound, stable with some maceration  · We discussed the patient nursing staff regarding custom-made offloading insert for right foot again    Wound/Ulcer Pain Timing/Severity: constant  Quality of pain: dull  Severity:  2 / 10   Modifying Factors: None  Associated Signs/Symptoms: drainage and numbness     Ulcer Identification:  Ulcer Type: arterial, diabetic and non-healing/non-surgical  Contributing Factors: diabetes and chronic pressure     Diabetic/Pressure/Non Pressure Ulcers only:  Ulcer: Diabetic ulcer, fat layer exposed     If patient has diabetic lower extremity wounds  Dale Classification of diabetic lower extremity wounds:     Grade Description   []? 0 No open wound   []? 1 Superficial ulcer involving the full skin thickness   []? 2 Deep ulcer involves ligament, tendon, joint capsule, or fascia  No bone involvement or abscess presence   []? 3 Deep Ulcer with abcess formation and/or osteomyelitis   []? 4 Localized gangrene   []?   5 Extensive gangrene of the foot      Wound: Patient does have ulcer underneath the first metatarsal head of the right foot, fat layer exposed without any obvious cellulitis or purulent drainage     Other pertinent information:  · Patient was hospitalized in April with COVID-19 pneumonia and acute hypoxic respiratory failure  · The lower extremity arterial Doppler study done in April 2022 was reviewed  ·    Narrative   On the right side, ankle-brachial index, 0.93 with biphasic plus ankle   Doppler tracing over the anterior tibial artery, with good arterial   flow to the right foot and the remaining toes, except over the right   fourth toe where the pulse volume recordings diminished       On the left side, ankle-brachial index within normal range of 0.99,   with biphasic plus ankle Doppler tracings and good arterial flow to   the foot and the remaining toes based upon the pulse volume recordings          June 6, 2022  · Discussed the patient, options risks benefits also explained, patient was recommend x-ray of the right foot from x-rays no underlying osteomyelitis, patient was informed that at the pandemia satisfactory artery circulation with palpable pulses the main issues pressure, diabetic trophic ulcer, needs custom made inserts to offload the first metatarsal head of the right foot, prescription given to the patient for the same  · All his questions were answered                   PAST MEDICAL HISTORY      Diagnosis Date    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 ([address] 12/6/2019    S/P peripheral artery angioplasty 01/23/2020    bilateral legs -[place]    Skin ulcer of right foot with fat layer exposed ([address] 12/9/2019    Ulcer of right leg, with fat layer exposed ([address] 5/6/2019    Varicose veins of leg with edema, 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     Past Surgical History:   Procedure Laterality Date    COLONOSCOPY      CYSTOSCOPY N/A 5/18/2020    SUPRAPUBIC TUBE PLACEMENT performed by Nikhil Pena MD at [address] [facility] [facility]  1990's    lense implants bilaterally    FOOT DEBRIDEMENT Left 01/04/2017    wound debridement and delayed primary closure    FRACTURE SURGERY      L femur fracture surgery    HIP FRACTURE SURGERY Left 9/23/2020    LEFT HIP OPEN REDUCTION INTERNAL FIXATION performed by Shruti Concepcion MD at [address] Right 7/16/2021    RIGHT HIP HEMIARTHROPLASTY performed by Willem Trejo DO at [address]  04/23/2019    Dr. Rosina Camarillo- PTA ant tibial    OTHER SURGICAL HISTORY  12/17/2019    Dr Rosina Camarillo - PCI Right popliteal and Anterior tibial    PROSTATE BIOPSY  04/2016    SKIN BIOPSY  sept of 2018 last time    head and ears    TOE AMPUTATION Left 2016    2nd    TOE AMPUTATION Right 2019    AMPUTATION RIGHT SECOND TOE, FOOT DEBRIDEMENT performed by Kandice West DPM at [facility] 22 TOE AMPUTATION Right 12/10/2019    AMPUTATION RIGHT 3rd DIGIT WITH PARTIAL RESECTION OF THIRD METATARSAL performed by Kandice West DPM at [facility] 22 TURP N/A 2020    CYSTOSCOPY PLASMA LOOP TRANSURETHRAL RESECTION PROSTATE performed by Juanis Kelsey MD at [facility] OR    VASCULAR SURGERY       Family History   Problem Relation Age of Onset    Heart Disease Mother         CHF    Heart Disease Father     Heart Attack Father      Social History     Tobacco Use    Smoking status: Former Smoker     Packs/day: 0.50     Years: 2.00     Pack years: 1.00     Types: Cigarettes     Quit date: 1960     Years since quittin.5    Smokeless tobacco: Never Used    Tobacco comment: quit   Vaping Use    Vaping Use: Never used   Substance Use Topics    Alcohol use: Not Currently    Drug use: Not Currently     Allergies   Allergen Reactions    Latex Other (See Comments)     Pt unsure of reaction    Ciprofloxacin In D5w Itching    Food     Other Rash     \"I get a rash on just my face if I eat Cumin or Chili. \"    Peanut-Containing Drug Products Itching     Current Outpatient Medications on File Prior to Encounter   Medication Sig Dispense Refill    insulin glargine (LANTUS) 100 UNIT/ML injection vial Inject 13 Units into the skin nightly      rivastigmine (EXELON) 1.5 mg capsule Take 1.5 mg by mouth 3 times daily      aspirin 81 MG chewable tablet Take 81 mg by mouth daily      sodium chloride 1 g tablet Take 1 g by mouth in the morning and at bedtime      zinc sulfate (ZINCATE) 220 (50 Zn) MG capsule Take 50 mg by mouth daily      empagliflozin (JARDIANCE) 25 MG tablet Take 25 mg by mouth daily      insulin lispro (HUMALOG) 100 UNIT/ML injection vial Inject into the skin 4 times daily per sliding scale: 150-199 = 2 units;    200-249 = 4;    250-299 = 6;    300-349 = 8;    > 350 = notify MD      SITagliptin (JANUVIA) 100 MG tablet Take 100 mg by mouth daily      metFORMIN (GLUCOPHAGE) 1000 MG tablet Take 500 mg by mouth 2 times daily       metoprolol succinate (TOPROL XL) 25 MG extended release tablet Take 1 tablet by mouth daily 30 tablet 3    amLODIPine (NORVASC) 5 MG tablet Take 2 tablets by mouth daily 30 tablet 3    Garlic 1682 MG TABS Take 1,250 mg by mouth daily       Multiple Vitamins-Minerals (THERAPEUTIC MULTIVITAMIN-MINERALS) tablet Take 1 tablet by mouth daily       No current facility-administered medications on file prior to encounter. 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 due to underlying condition, with fat layer exposed ([address]    Dermatophytosis    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 2 diabetes mellitus, with fat layer exposed ([address] - Primary          Pre Debridement Measurements:  Are located in the [place]  Documentation Flow Sheet  Post Debridement Measurements:  Wound/Ulcer Descriptions are Pre Debridement except measurements:     Wound 02/21/22 Foot Right;Plantar #2 (Active)   Wound Image   06/06/22 1334   Dressing Status New dressing applied;Clean;Dry; Intact 06/20/22 1529   Wound Cleansed Cleansed with saline 06/20/22 1529   Dressing/Treatment Dry dressing;Alginate with Ag 06/20/22 1529   Offloading for Diabetic Foot Ulcers Diabetic shoes/inserts 02/28/22 1447   Wound Length (cm) 0.7 cm 06/20/22 1345   Wound Width (cm) 1.3 cm 06/20/22 1345   Wound Depth (cm) 0.1 cm 06/20/22 1345   Wound Surface Area (cm^2) 0.91 cm^2 06/20/22 1345   Change in Wound Size % (l*w) -911.11 06/20/22 1345   Wound Volume (cm^3) 0.091 cm^3 06/20/22 1345   Wound Healing % -911 06/20/22 1345   Post-Procedure Length (cm) 0.8 cm 06/13/22 1344   Post-Procedure Width (cm) 1.3 cm 06/13/22 1344   Post-Procedure Depth (cm) 0.2 cm 06/13/22 1344   Post-Procedure Surface Area (cm^2) 1.04 cm^2 06/13/22 1344   Post-Procedure Volume (cm^3) 0.208 cm^3 06/13/22 1344   Wound Assessment Pink/red 06/20/22 1345   Drainage Amount Small 06/20/22 1345   Drainage Description Serosanguinous; Yellow 06/20/22 1345   Odor None 06/20/22 1345   Latonya-wound Assessment Hyperkeratosis (callous) 06/20/22 1345   Number of days: 119       Wound 04/29/22 Foot Left;Plantar (Active)   Number of days: 52          Procedure Note  Indications:  Based on my examination of this patient's wound(s)/ulcer(s) today, debridement is required to promote healing and evaluate the wound base. Performed by: Manuel Jonas MD    Consent obtained:  Yes    Time out taken:  Yes    Pain Control: Anesthetic  Anesthetic: 4% Lidocaine Liquid Topical     Debridement:Excisional Debridement    Using curette, #15 blade scalpel and forceps the wound(s)/ulcer(s) was/were sharply debrided down through and including the removal of epidermis, dermis and subcutaneous tissue.         Devitalized Tissue Debrided:  fibrin to stimulate bleeding to promote healing, post debridement good bleeding base and wound edges noted    Wound/Ulcer #: 1    Percent of Wound/Ulcer Debrided: 100%    Total Surface Area Debrided:  1 sq cm     Estimated Blood Loss:  Minimal  Hemostasis Achieved:  by pressure    Procedural Pain:  3  / 10   Post Procedural Pain:  2 / 10     Response to treatment:  Well tolerated by patient. Plan:   Treatment Note please see attached Discharge Instructions    Written patient dismissal instructions given to patient and signed by patient or POA. Discharge Instructions       Visit Discharge/Physician Orders    Discharge condition: Stable    Assessment of pain at discharge: minimal    Anesthetic used: 4% lidocaine external solution    Discharge to: ECF     Left via:Private automobile    Accompanied by: accompanied by self    ECF/HHA: [facility]    Dressing Orders:    Cleanse right foot ulcer with normal saline, apply alginate AG and cover with a dry dressing daily    Miconazole ointment prescription given today at the clinic, use as prescribed    Treatment Orders:    X-ray of the right foot ordered last visit, to be done at [facility]. Discussed need for diabetic shoe inserts, please obtain ASAP    FOLLOW NUTRITIOUS DIET. CHOOSE FOODS HIGH IN PROTEIN -CHICKEN- FISH-AND EGGS,  CHOOSE FOODS HIGH IN VITAMIN C.   MULTIVITAMIN DAILY. [facility] followup visit __________1 week___________________  (Please note your next appointment above and if you are unable to keep, kindly give a 24 hour notice. Thank you.)    Physician signature:__________________________      If you experience any of the following, please call the [address] [place] [address] Road during business hours:    * Increase in Pain  * Temperature over 101  * Increase in drainage from your wound  * Drainage with a foul odor  * Bleeding  * Increase in swelling  * Need for compression bandage changes due to slippage, breakthrough drainage. If you need medical attention outside of the business hours of the [address] [place] [address] Road please contact your PCP or go to the nearest emergency room.           Electronically signed by William Ramirez MD on 6/21/2022 at 12:43 PM

## 2022-06-27 ENCOUNTER — HOSPITAL ENCOUNTER (OUTPATIENT)
Dept: WOUND CARE | Age: 82
Discharge: HOME | End: 2022-06-27
Payer: MEDICARE

## 2022-06-27 VITALS
RESPIRATION RATE: 18 BRPM | BODY MASS INDEX: 20.04 KG/M2 | SYSTOLIC BLOOD PRESSURE: 146 MMHG | WEIGHT: 140 LBS | TEMPERATURE: 97.5 F | HEART RATE: 64 BPM | DIASTOLIC BLOOD PRESSURE: 70 MMHG | HEIGHT: 70 IN

## 2022-06-27 DIAGNOSIS — E08.621 DIABETIC ULCER OF RIGHT MIDFOOT ASSOCIATED WITH DIABETES MELLITUS DUE TO UNDERLYING CONDITION, WITH FAT LAYER EXPOSED (HCC): Primary | ICD-10-CM

## 2022-06-27 DIAGNOSIS — L97.412 DIABETIC ULCER OF RIGHT MIDFOOT ASSOCIATED WITH DIABETES MELLITUS DUE TO UNDERLYING CONDITION, WITH FAT LAYER EXPOSED (HCC): Primary | ICD-10-CM

## 2022-06-27 DIAGNOSIS — E11.621 DIABETIC ULCER OF RIGHT HEEL ASSOCIATED WITH TYPE 2 DIABETES MELLITUS, WITH FAT LAYER EXPOSED (HCC): ICD-10-CM

## 2022-06-27 DIAGNOSIS — L97.412 DIABETIC ULCER OF RIGHT HEEL ASSOCIATED WITH TYPE 2 DIABETES MELLITUS, WITH FAT LAYER EXPOSED (HCC): ICD-10-CM

## 2022-06-27 PROCEDURE — 6370000000 HC RX 637 (ALT 250 FOR IP): Performed by: PODIATRIST

## 2022-06-27 PROCEDURE — 11042 DBRDMT SUBQ TIS 1ST 20SQCM/<: CPT | Performed by: SURGERY

## 2022-06-27 PROCEDURE — 11042 DBRDMT SUBQ TIS 1ST 20SQCM/<: CPT

## 2022-06-27 RX ORDER — BACITRACIN, NEOMYCIN, POLYMYXIN B 400; 3.5; 5 [USP'U]/G; MG/G; [USP'U]/G
OINTMENT TOPICAL ONCE
Status: CANCELLED | OUTPATIENT
Start: 2022-06-27 | End: 2022-06-27

## 2022-06-27 RX ORDER — GINSENG 100 MG
CAPSULE ORAL ONCE
Status: CANCELLED | OUTPATIENT
Start: 2022-06-27 | End: 2022-06-27

## 2022-06-27 RX ORDER — LIDOCAINE HYDROCHLORIDE 40 MG/ML
SOLUTION TOPICAL ONCE
Status: CANCELLED | OUTPATIENT
Start: 2022-06-27 | End: 2022-06-27

## 2022-06-27 RX ORDER — LIDOCAINE HYDROCHLORIDE 20 MG/ML
JELLY TOPICAL ONCE
Status: CANCELLED | OUTPATIENT
Start: 2022-06-27 | End: 2022-06-27

## 2022-06-27 RX ORDER — LIDOCAINE 50 MG/G
OINTMENT TOPICAL ONCE
Status: CANCELLED | OUTPATIENT
Start: 2022-06-27 | End: 2022-06-27

## 2022-06-27 RX ORDER — GENTAMICIN SULFATE 1 MG/G
OINTMENT TOPICAL ONCE
Status: CANCELLED | OUTPATIENT
Start: 2022-06-27 | End: 2022-06-27

## 2022-06-27 RX ORDER — BACITRACIN ZINC AND POLYMYXIN B SULFATE 500; 1000 [USP'U]/G; [USP'U]/G
OINTMENT TOPICAL ONCE
Status: CANCELLED | OUTPATIENT
Start: 2022-06-27 | End: 2022-06-27

## 2022-06-27 RX ORDER — CLOBETASOL PROPIONATE 0.5 MG/G
OINTMENT TOPICAL ONCE
Status: CANCELLED | OUTPATIENT
Start: 2022-06-27 | End: 2022-06-27

## 2022-06-27 RX ORDER — LIDOCAINE HYDROCHLORIDE 40 MG/ML
SOLUTION TOPICAL ONCE
Status: COMPLETED | OUTPATIENT
Start: 2022-06-27 | End: 2022-06-27

## 2022-06-27 RX ORDER — BETAMETHASONE DIPROPIONATE 0.05 %
OINTMENT (GRAM) TOPICAL ONCE
Status: CANCELLED | OUTPATIENT
Start: 2022-06-27 | End: 2022-06-27

## 2022-06-27 RX ORDER — LIDOCAINE 40 MG/G
CREAM TOPICAL ONCE
Status: CANCELLED | OUTPATIENT
Start: 2022-06-27 | End: 2022-06-27

## 2022-06-27 RX ADMIN — LIDOCAINE HYDROCHLORIDE 5 ML: 40 SOLUTION TOPICAL at 08:33

## 2022-06-27 ASSESSMENT — PAIN SCALES - GENERAL: PAINLEVEL_OUTOF10: 0

## 2022-06-27 NOTE — PLAN OF CARE
Problem: Chronic Conditions and Co-morbidities  Goal: Patient's chronic conditions and co-morbidity symptoms are monitored and maintained or improved  Outcome: Progressing     Problem: Pain  Goal: Verbalizes/displays adequate comfort level or baseline comfort level  Outcome: Progressing     Problem: Wound:  Goal: Will show signs of wound healing; wound closure and no evidence of infection  Description: Will show signs of wound healing; wound closure and no evidence of infection  Outcome: Progressing     Problem: Blood Glucose:  Goal: Ability to maintain appropriate glucose levels will improve  Description: Ability to maintain appropriate glucose levels will improve  Outcome: Progressing

## 2022-06-27 NOTE — PROGRESS NOTES
Wound Healing Center Followup Visit Note    Referring Physician : Davie Ryan MD  [address] RECORD NUMBER:  [id number]  AGE: 79 y.o. GENDER: male  : 1940  EPISODE DATE:  2022    Subjective:     Chief Complaint   Patient presents with    Wound Check     right foot      HISTORY of PRESENT ILLNESS HPI   Elizabeth Infante is a 79 y.o. male who presents today in regards to follow up evaluation and treatment of wound/ulcer. That patient's past medical, family and social hx were reviewed and changes were made if present. History of Wound Context:  The patient has had a wound of plantar surface of right foot underneath the metatarsal head which was first noted approximately at least 1 month ago, patient not sure, patient slightly confused as to the when the ulcer started. This has been treated by the patient in the facility. On their initial visit to the wound healing center, 22, the patient has noted that the wound has not been improving.   The patient has had similar previous wounds in the past.          Patient was recently in the hospital, in April, with COVID-19 pneumonia, hypoxia etc. currently in the facility recuperating        Patient does have diabetes mellitus with diabetic neuropathy, currently not using any offloading inserts in the shoe     Pt is currently not on abx.      2022  · Patient overall better, wound improving, rediscussed with patient and nursing staff regarding making arrangements for the patient to undergo custom-made offloading insert for the right foot to alleviate pressure underneath the first metatarsal head  2022  · Right foot wound, stable with some maceration  · We discussed the patient nursing staff regarding custom-made offloading insert for right foot again    2022  · Patient tells me, that one of the [employer] came to his place to measure the insert, told him to make sure that he and the plantar surface left heel is offloaded, because of callus formation there  · Right foot wound looks better          Wound/Ulcer Pain Timing/Severity: constant  Quality of pain: dull  Severity:  2 / 10   Modifying Factors: None  Associated Signs/Symptoms: drainage and numbness     Ulcer Identification:  Ulcer Type: arterial, diabetic and non-healing/non-surgical  Contributing Factors: diabetes and chronic pressure     Diabetic/Pressure/Non Pressure Ulcers only:  Ulcer: Diabetic ulcer, fat layer exposed     If patient has diabetic lower extremity wounds  Dale Classification of diabetic lower extremity wounds:     Grade Description   []? 0 No open wound   []? 1 Superficial ulcer involving the full skin thickness   []? 2 Deep ulcer involves ligament, tendon, joint capsule, or fascia  No bone involvement or abscess presence   []? 3 Deep Ulcer with abcess formation and/or osteomyelitis   []? 4 Localized gangrene   []?   5 Extensive gangrene of the foot      Wound: Patient does have ulcer underneath the first metatarsal head of the right foot, fat layer exposed without any obvious cellulitis or purulent drainage     Other pertinent information:  · Patient was hospitalized in April with COVID-19 pneumonia and acute hypoxic respiratory failure  · The lower extremity arterial Doppler study done in April 2022 was reviewed  ·    Narrative   On the right side, ankle-brachial index, 0.93 with biphasic plus ankle   Doppler tracing over the anterior tibial artery, with good arterial   flow to the right foot and the remaining toes, except over the right   fourth toe where the pulse volume recordings diminished       On the left side, ankle-brachial index within normal range of 0.99,   with biphasic plus ankle Doppler tracings and good arterial flow to   the foot and the remaining toes based upon the pulse volume recordings          June 6, 2022  · Discussed the patient, options risks benefits also explained, patient was recommend x-ray of the right foot from x-rays no underlying osteomyelitis, [facility] with palpable pulses the main issues pressure, diabetic trophic ulcer, needs custom made inserts to offload the first metatarsal head of the right foot, prescription given to the patient for the same  · All his questions were answered                   PAST MEDICAL HISTORY      Diagnosis Date    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 ([address] 12/6/2019    S/P peripheral artery angioplasty 01/23/2020    bilateral legs -Robbert Breath    Skin ulcer of right foot with fat layer exposed ([address] 12/9/2019    Ulcer of right leg, with fat layer exposed ([address] 5/6/2019    Varicose veins of leg with edema, 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     Past Surgical History:   Procedure Laterality Date    COLONOSCOPY      CYSTOSCOPY N/A 5/18/2020    SUPRAPUBIC TUBE PLACEMENT performed by Leonardo Gibson MD at [address] [facility]  1990's    lense implants bilaterally    FOOT DEBRIDEMENT Left 01/04/2017    wound debridement and delayed primary closure    FRACTURE SURGERY      L femur fracture surgery    HIP FRACTURE SURGERY Left 9/23/2020    LEFT HIP OPEN REDUCTION INTERNAL FIXATION performed by Sudhir Jones MD at [address] Right 7/16/2021    RIGHT HIP HEMIARTHROPLASTY performed [facility]  2019    Dr. Zoey Rojas- PTA ant tibial    OTHER SURGICAL HISTORY  2019    Dr Zoey Rojas - PCI Right popliteal and Anterior tibial    PROSTATE BIOPSY  2016    SKIN BIOPSY   last time    head and ears    TOE AMPUTATION Left 2016    2nd    TOE AMPUTATION Right 2019    AMPUTATION RIGHT SECOND TOE, FOOT DEBRIDEMENT performed by Heydi Phillip DPM at [facility] 22 TOE AMPUTATION Right 12/10/2019    AMPUTATION RIGHT 3rd DIGIT WITH PARTIAL RESECTION OF THIRD METATARSAL performed by Heydi Phillip DPM at [facility] 22 TURP N/A 2020    CYSTOSCOPY PLASMA LOOP TRANSURETHRAL RESECTION PROSTATE performed by Hi Wood MD at [facility] OR    VASCULAR SURGERY       Family History   Problem Relation Age of Onset    Heart Disease Mother         CHF    Heart Disease Father     Heart Attack Father      Social History     Tobacco Use    Smoking status: Former Smoker     Packs/day: 0.50     Years: 2.00     Pack years: 1.00     Types: Cigarettes     Quit date:      Years since quittin.5    Smokeless tobacco: Never Used    Tobacco comment: quit   Vaping Use    Vaping Use: Never used   Substance Use Topics    Alcohol use: Not Currently    Drug use: Not Currently     Allergies   Allergen Reactions    Latex Other (See Comments)     Pt unsure of reaction    Ciprofloxacin In D5w Itching    Food     Other Rash     \"I get a rash on just my face if I eat Cumin or Chili. \"    Peanut-Containing Drug Products Itching     Current Outpatient Medications on File Prior to Encounter   Medication Sig Dispense Refill    miconazole nitrate 2 % OINT Please apply to the toes, in between the toes, both legs up to the upper calf, twice a day for 1 month 1 each 10    insulin glargine (LANTUS) 100 UNIT/ML injection vial Inject 13 Units into the skin nightly      rivastigmine (EXELON) 1.5 mg capsule Take 1.5 mg by mouth 3 times daily      aspirin 81 MG chewable tablet Take 81 mg by mouth daily      sodium chloride 1 g tablet Take 1 g by mouth in the morning and at bedtime      zinc sulfate (ZINCATE) 220 (50 Zn) MG capsule Take 50 mg by mouth daily      empagliflozin (JARDIANCE) 25 MG tablet Take 25 mg by mouth daily      insulin lispro (HUMALOG) 100 UNIT/ML injection vial Inject into the skin 4 times daily per sliding scale: 150-199 = 2 units;    200-249 = 4;    250-299 = 6;    300-349 = 8;    > 350 = notify MD      SITagliptin (JANUVIA) 100 MG tablet Take 100 mg by mouth daily      metFORMIN (GLUCOPHAGE) 1000 MG tablet Take 500 mg by mouth 2 times daily       metoprolol succinate (TOPROL XL) 25 MG extended release tablet Take 1 tablet by mouth daily 30 tablet 3    amLODIPine (NORVASC) 5 MG tablet Take 2 tablets by mouth daily 30 tablet 3    Garlic 6083 MG TABS Take 1,250 mg by mouth daily       Multiple Vitamins-Minerals (THERAPEUTIC MULTIVITAMIN-MINERALS) tablet Take 1 tablet by mouth daily       No current facility-administered medications on file prior to encounter.        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 due to underlying condition, with fat layer exposed ([address] - Primary    Relevant Orders    Initiate Outpatient Wound Care Protocol    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 2 diabetes mellitus, with fat layer exposed ([address] Relevant Orders    Initiate Outpatient Wound Care Protocol          Pre Debridement Measurements:  Are located in the [place]  Documentation Flow Sheet  Post Debridement Measurements:  Wound/Ulcer Descriptions are Pre Debridement except measurements:     Wound 02/21/22 Foot Right;Plantar #2 (Active)   Wound Image   06/27/22 0831   Dressing Status New dressing applied;Clean;Dry; Intact 06/20/22 1529   Wound Cleansed Cleansed with saline 06/20/22 1529   Dressing/Treatment Dry dressing;Alginate with Ag 06/20/22 1529   Offloading for Diabetic Foot Ulcers Diabetic shoes/inserts 02/28/22 1447   Wound Length (cm) 0.8 cm 06/27/22 0831   Wound Width (cm) 1.3 cm 06/27/22 0831   Wound Depth (cm) 0.1 cm 06/27/22 0831   Wound Surface Area (cm^2) 1.04 cm^2 06/27/22 0831   Change in Wound Size % (l*w) -1055.56 06/27/22 0831   Wound Volume (cm^3) 0.104 cm^3 06/27/22 0831   Wound Healing % -1056 06/27/22 0831   Post-Procedure Length (cm) 1 cm 06/27/22 0856   Post-Procedure Width (cm) 1.4 cm 06/27/22 0856   Post-Procedure Depth (cm) 0.2 cm 06/27/22 0856   Post-Procedure Surface Area (cm^2) 1.4 cm^2 06/27/22 0856   Post-Procedure Volume (cm^3) 0.28 cm^3 06/27/22 0856   Wound Assessment Fibrin;Granulation tissue 06/27/22 0831   Drainage Amount Moderate 06/27/22 0831   Drainage Description Serous 06/27/22 0831   Odor None 06/27/22 0831   Latonya-wound Assessment Maceration; Hyperkeratosis (callous) 06/27/22 0831   Number of days: 125       Wound 04/29/22 Foot Left;Plantar (Active)   Number of days: 58          Procedure Note  Indications:  Based on my examination of this patient's wound(s)/ulcer(s) today, debridement is required to promote healing and evaluate the wound base.     Performed by: Kori Jhaveri MD    Consent obtained:  Yes    Time out taken:  Yes    Pain Control: Anesthetic  Anesthetic: 4% Lidocaine Liquid Topical     Debridement:Excisional Debridement    Using curette, #15 blade scalpel and forceps the wound(s)/ulcer(s) was/were sharply debrided down through and including the removal of epidermis, dermis and subcutaneous tissue. Devitalized Tissue Debrided:  fibrin to stimulate bleeding to promote healing, post debridement good bleeding base and wound edges noted    Wound/Ulcer #: 1    Percent of Wound/Ulcer Debrided: 100%    Total Surface Area Debrided:  1 sq cm     Estimated Blood Loss:  Minimal  Hemostasis Achieved:  by pressure    Procedural Pain:  3  / 10   Post Procedural Pain:  2 / 10     Response to treatment:  Well tolerated by patient. Plan:   Treatment Note please see attached Discharge Instructions    Written patient dismissal instructions given to patient and signed by patient or POA. Discharge Instructions       Visit Discharge/Physician Orders     Discharge condition: Stable     Assessment of pain at discharge: minimal     Anesthetic used: 4% lidocaine external solution     Discharge to: ECF      Left via:Private automobile     Accompanied by: accompanied by self     ECF/[facility]HA: [facility] PUT ON PODIATRY LIST FOR TOE NAIL TRIM      Dressing Orders:     Cleanse right foot ulcer with normal saline, apply alginate AG and cover with a dry dressing daily     Miconazole ointment prescription given today at the clinic, use as prescribed     Treatment Orders:     X-ray of the right foot ordered last visit, to be done at [facility].     Discussed need for diabetic shoe inserts, please obtain ASAP     FOLLOW NUTRITIOUS DIET. CHOOSE FOODS HIGH IN PROTEIN -CHICKEN- FISH-AND EGGS,  CHOOSE FOODS HIGH IN VITAMIN C.   MULTIVITAMIN DAILY.       [facility] followup visit __________2 week___________________  (Please note your next appointment above and if you are unable to keep, kindly give a 24 hour notice.  Thank you.)     Physician signature:__________________________        If you experience any of the following, please call the [facility] [address] during business hours:     * Increase in Pain  * Temperature over 101  * Increase in drainage from your wound  * Drainage with a foul odor  * Bleeding  * Increase in swelling  * Need for compression bandage changes due to slippage, breakthrough drainage.     If you need medical attention outside of the business hours of the [facility] please contact your PCP or go to the nearest emergency room.         Electronically signed by Bhupinder Jeter MD on 6/27/2022 at 9:01 AM

## 2022-07-11 ENCOUNTER — HOSPITAL ENCOUNTER (OUTPATIENT)
Dept: WOUND CARE | Age: 82
Discharge: HOME | End: 2022-07-11
Payer: MEDICARE

## 2022-07-11 VITALS
HEART RATE: 66 BPM | HEIGHT: 70 IN | RESPIRATION RATE: 18 BRPM | DIASTOLIC BLOOD PRESSURE: 72 MMHG | TEMPERATURE: 96.9 F | BODY MASS INDEX: 20.04 KG/M2 | WEIGHT: 140 LBS | SYSTOLIC BLOOD PRESSURE: 144 MMHG

## 2022-07-11 DIAGNOSIS — L97.412 DIABETIC ULCER OF RIGHT HEEL ASSOCIATED WITH TYPE 2 DIABETES MELLITUS, WITH FAT LAYER EXPOSED (HCC): ICD-10-CM

## 2022-07-11 DIAGNOSIS — L97.412 DIABETIC ULCER OF RIGHT MIDFOOT ASSOCIATED WITH DIABETES MELLITUS DUE TO UNDERLYING CONDITION, WITH FAT LAYER EXPOSED (HCC): Primary | ICD-10-CM

## 2022-07-11 DIAGNOSIS — E08.621 DIABETIC ULCER OF RIGHT MIDFOOT ASSOCIATED WITH DIABETES MELLITUS DUE TO UNDERLYING CONDITION, WITH FAT LAYER EXPOSED (HCC): Primary | ICD-10-CM

## 2022-07-11 DIAGNOSIS — E11.621 DIABETIC ULCER OF RIGHT HEEL ASSOCIATED WITH TYPE 2 DIABETES MELLITUS, WITH FAT LAYER EXPOSED (HCC): ICD-10-CM

## 2022-07-11 PROCEDURE — 11042 DBRDMT SUBQ TIS 1ST 20SQCM/<: CPT | Performed by: SURGERY

## 2022-07-11 PROCEDURE — 11042 DBRDMT SUBQ TIS 1ST 20SQCM/<: CPT

## 2022-07-11 PROCEDURE — 6370000000 HC RX 637 (ALT 250 FOR IP): Performed by: PODIATRIST

## 2022-07-11 RX ORDER — LIDOCAINE HYDROCHLORIDE 40 MG/ML
SOLUTION TOPICAL ONCE
Status: CANCELLED | OUTPATIENT
Start: 2022-07-11 | End: 2022-07-11

## 2022-07-11 RX ORDER — LIDOCAINE 50 MG/G
OINTMENT TOPICAL ONCE
Status: CANCELLED | OUTPATIENT
Start: 2022-07-11 | End: 2022-07-11

## 2022-07-11 RX ORDER — BACITRACIN, NEOMYCIN, POLYMYXIN B 400; 3.5; 5 [USP'U]/G; MG/G; [USP'U]/G
OINTMENT TOPICAL ONCE
Status: CANCELLED | OUTPATIENT
Start: 2022-07-11 | End: 2022-07-11

## 2022-07-11 RX ORDER — LIDOCAINE 40 MG/G
CREAM TOPICAL ONCE
Status: CANCELLED | OUTPATIENT
Start: 2022-07-11 | End: 2022-07-11

## 2022-07-11 RX ORDER — LIDOCAINE HYDROCHLORIDE 40 MG/ML
SOLUTION TOPICAL ONCE
Status: COMPLETED | OUTPATIENT
Start: 2022-07-11 | End: 2022-07-11

## 2022-07-11 RX ORDER — BACITRACIN ZINC AND POLYMYXIN B SULFATE 500; 1000 [USP'U]/G; [USP'U]/G
OINTMENT TOPICAL ONCE
Status: CANCELLED | OUTPATIENT
Start: 2022-07-11 | End: 2022-07-11

## 2022-07-11 RX ORDER — CLOBETASOL PROPIONATE 0.5 MG/G
OINTMENT TOPICAL ONCE
Status: CANCELLED | OUTPATIENT
Start: 2022-07-11 | End: 2022-07-11

## 2022-07-11 RX ORDER — LIDOCAINE HYDROCHLORIDE 20 MG/ML
JELLY TOPICAL ONCE
Status: CANCELLED | OUTPATIENT
Start: 2022-07-11 | End: 2022-07-11

## 2022-07-11 RX ORDER — GENTAMICIN SULFATE 1 MG/G
OINTMENT TOPICAL ONCE
Status: CANCELLED | OUTPATIENT
Start: 2022-07-11 | End: 2022-07-11

## 2022-07-11 RX ORDER — BETAMETHASONE DIPROPIONATE 0.05 %
OINTMENT (GRAM) TOPICAL ONCE
Status: CANCELLED | OUTPATIENT
Start: 2022-07-11 | End: 2022-07-11

## 2022-07-11 RX ORDER — GINSENG 100 MG
CAPSULE ORAL ONCE
Status: CANCELLED | OUTPATIENT
Start: 2022-07-11 | End: 2022-07-11

## 2022-07-11 RX ADMIN — LIDOCAINE HYDROCHLORIDE 10 ML: 40 SOLUTION TOPICAL at 14:17

## 2022-07-11 NOTE — PROGRESS NOTES
[facility]aling Center Followup Visit Note    Referring Physician : Carlos Albrecht MD  [address] RECORD NUMBER:  [id number]  AGE: 79 y.o. GENDER: male  : 1940  EPISODE DATE:  2022    Subjective:     Chief Complaint   Patient presents with    Wound Check     foot right      HISTORY of PRESENT ILLNESS HPI   Gibson Ruelas is a 79 y.o. male who presents today in regards to follow up evaluation and treatment of wound/ulcer. That patient's past medical, family and social hx were reviewed and changes were made if present. History of Wound Context:  The patient has had a wound of plantar surface of right foot underneath the metatarsal head which was first noted approximately at least 1 month ago, patient not sure, patient slightly confused as to the when the ulcer started. This has been treated by the patient in the facility. On their initial visit to the wound healing center, 22, the patient has noted that the wound has not been improving.   The patient has had similar previous wounds in the past.          Patient was recently in the hospital, in April, with COVID-19 pneumonia, hypoxia etc. currently in the facility recuperating        Patient does have diabetes mellitus with diabetic neuropathy, currently not using any offloading inserts in the shoe     Pt is currently not on abx.      2022  · Patient overall better, wound improving, rediscussed with patient and nursing staff regarding making arrangements for the patient to undergo custom-made offloading insert for the right foot to alleviate pressure underneath the first metatarsal head  2022  · Right foot wound, stable with some maceration  · We discussed the patient nursing staff regarding custom-made offloading insert for right foot again    2022  · Patient tells me, that one of the [employer] came to his place to measure the insert, told him to make sure that he and the plantar surface left heel is offloaded, because of callus formation there  · Right foot wound looks better  7/11/2022  · Ulcer over the plantar surface, foot, improved, left heel callus stable, patient still does not have custom made insert for right foot, waiting for it        Wound/Ulcer Pain Timing/Severity: constant  Quality of pain: dull  Severity:  2 / 10   Modifying Factors: None  Associated Signs/Symptoms: drainage and numbness     Ulcer Identification:  Ulcer Type: arterial, diabetic and non-healing/non-surgical  Contributing Factors: diabetes and chronic pressure     Diabetic/Pressure/Non Pressure Ulcers only:  Ulcer: Diabetic ulcer, fat layer exposed     If patient has diabetic lower extremity wounds  Dale Classification of diabetic lower extremity wounds:     Grade Description   []? 0 No open wound   []? 1 Superficial ulcer involving the full skin thickness   []? 2 Deep ulcer involves ligament, tendon, joint capsule, or fascia  No bone involvement or abscess presence   []? 3 Deep Ulcer with abcess formation and/or osteomyelitis   []? 4 Localized gangrene   []?   5 Extensive gangrene of the foot      Wound: Patient does have ulcer underneath the first metatarsal head of the right foot, fat layer exposed without any obvious cellulitis or purulent drainage     Other pertinent information:  · Patient was hospitalized in April with COVID-19 pneumonia and acute hypoxic respiratory failure  · The lower extremity arterial Doppler study done in April 2022 was reviewed  ·    Narrative   On the right side, ankle-brachial index, 0.93 with biphasic plus ankle   Doppler tracing over the anterior tibial artery, with good arterial   flow to the right foot and the remaining toes, except over the right   fourth toe where the pulse volume recordings diminished       On the left side, ankle-brachial index within normal range of 0.99,   with biphasic plus ankle Doppler tracings and good arterial flow to   the foot and the remaining toes based upon the pulse volume recordings          June 6, 2022  · Discussed the patient, options risks benefits also explained, patient was recommend x-ray of the right foot from x-rays no underlying osteomyelitis, patient was informed that at the pandemia satisfactory artery circulation with palpable pulses the main issues pressure, diabetic trophic ulcer, needs custom made inserts to offload the first metatarsal head of the right foot, prescription given to the patient for the same  · All his questions were answered                   PAST MEDICAL HISTORY      Diagnosis Date    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 ([address] 12/6/2019    S/P peripheral artery angioplasty 01/23/2020    bilateral legs [place]    Skin ulcer of right foot with fat layer exposed ([address] 12/9/2019    Ulcer of right leg, with fat layer exposed ([address] 5/6/2019    Varicose veins of leg with edema, 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     Past Surgical History:   Procedure Laterality Date    COLONOSCOPY      CYSTOSCOPY N/A 5/18/2020    SUPRAPUBIC TUBE PLACEMENT performed by Gage Bingham MD at [address]    lense implants bilaterally    FOOT DEBRIDEMENT Left 01/04/2017    wound debridement and delayed primary closure    FRACTURE SURGERY      L femur fracture surgery    HIP FRACTURE SURGERY Left 9/23/2020    LEFT HIP OPEN REDUCTION INTERNAL FIXATION performed by Melony Raphael MD at [address] Right 2021    RIGHT HIP HEMIARTHROPLASTY performed by DO Brandt at [address] HISTORY  2019    Dr. Arianne Aponte- PTA ant tibial    OTHER SURGICAL HISTORY  2019    Dr Arianne Aponte - PCI Right popliteal and Anterior tibial    PROSTATE BIOPSY  2016    SKIN BIOPSY   last time    head and ears    TOE AMPUTATION Left 2016    2nd    TOE AMPUTATION Right 2019    AMPUTATION RIGHT SECOND TOE, FOOT DEBRIDEMENT performed by Paul Tellez DPM at [facility] 22 TOE AMPUTATION Right 12/10/2019    AMPUTATION RIGHT 3rd DIGIT WITH PARTIAL RESECTION OF THIRD METATARSAL performed by Paul Tellez DPM at [facility] 22 TURP N/A 2020    CYSTOSCOPY PLASMA LOOP TRANSURETHRAL RESECTION PROSTATE performed by Leif Yen MD at [facility] OR    VASCULAR SURGERY       Family History   Problem Relation Age of Onset    Heart Disease Mother         CHF    Heart Disease Father     Heart Attack Father      Social History     Tobacco Use    Smoking status: Former Smoker     Packs/day: 0.50     Years: 2.00     Pack years: 1.00     Types: Cigarettes     Quit date:      Years since quittin.5    Smokeless tobacco: Never Used    Tobacco comment: quit   Vaping Use    Vaping Use: Never used   Substance Use Topics    Alcohol use: Not Currently    Drug use: Not Currently     Allergies   Allergen Reactions    Latex Other (See Comments)     Pt unsure of reaction    Ciprofloxacin In D5w Itching    Food     Other Rash     \"I get a rash on just my face if I eat Cumin or Chili. \"    Peanut-Containing Drug Products Itching     Current Outpatient Medications on File Prior to Encounter   Medication Sig Dispense Refill    miconazole nitrate 2 % OINT Please apply to the toes, in between the toes, both legs up to the upper calf, twice a day for 1 month 1 each 10    insulin glargine (LANTUS) 100 UNIT/ML injection vial Inject 13 Units into the skin nightly      rivastigmine (EXELON) 1.5 mg capsule Take 1.5 mg by mouth 3 times daily      aspirin 81 MG chewable tablet Take 81 mg by mouth daily      sodium chloride 1 g tablet Take 1 g by mouth in the morning and at bedtime      zinc sulfate (ZINCATE) 220 (50 Zn) MG capsule Take 50 mg by mouth daily      empagliflozin (JARDIANCE) 25 MG tablet Take 25 mg by mouth daily      insulin lispro (HUMALOG) 100 UNIT/ML injection vial Inject into the skin 4 times daily per sliding scale: 150-199 = 2 units;    200-249 = 4;    250-299 = 6;    300-349 = 8;    > 350 = notify MD      SITagliptin (JANUVIA) 100 MG tablet Take 100 mg by mouth daily      metFORMIN (GLUCOPHAGE) 1000 MG tablet Take 500 mg by mouth 2 times daily       metoprolol succinate (TOPROL XL) 25 MG extended release tablet Take 1 tablet by mouth daily 30 tablet 3    amLODIPine (NORVASC) 5 MG tablet Take 2 tablets by mouth daily 30 tablet 3    Garlic 2870 MG TABS Take 1,250 mg by mouth daily       Multiple Vitamins-Minerals (THERAPEUTIC MULTIVITAMIN-MINERALS) tablet Take 1 tablet by mouth daily       No current facility-administered medications on file prior to encounter.        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 due to underlying condition, with fat layer exposed ([address] - Primary Relevant Orders    Initiate Outpatient Wound Care Protocol    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 2 diabetes mellitus, with fat layer exposed [facility])    Relevant Orders    Initiate Outpatient Wound Care Protocol          Pre Debridement Measurements:  Are located in the [place]  Documentation Flow Sheet  Post Debridement Measurements:  Wound/Ulcer Descriptions are Pre Debridement except measurements:     Wound 02/21/22 Foot Right;Plantar #2 (Active)   Wound Image   06/27/22 0831   Dressing Status New dressing applied;Clean;Dry; Intact 06/27/22 0911   Wound Cleansed Cleansed with saline 06/27/22 0911   Dressing/Treatment Dry dressing 06/27/22 0911   Offloading for Diabetic Foot Ulcers Diabetic shoes/inserts 06/27/22 0911   Wound Length (cm) 0.9 cm 07/11/22 1412   Wound Width (cm) 1.4 cm 07/11/22 1412   Wound Depth (cm) 0.1 cm 07/11/22 1412   Wound Surface Area (cm^2) 1.26 cm^2 07/11/22 1412   Change in Wound Size % (l*w) -1300 07/11/22 1412   Wound Volume (cm^3) 0.126 cm^3 07/11/22 1412   Wound Healing % -1300 07/11/22 1412   Post-Procedure Length (cm) 1 cm 07/11/22 1451   Post-Procedure Width (cm) 1.5 cm 07/11/22 1451   Post-Procedure Depth (cm) 0.2 cm 07/11/22 1451   Post-Procedure Surface Area (cm^2) 1.5 cm^2 07/11/22 1451   Post-Procedure Volume (cm^3) 0.3 cm^3 07/11/22 1451   Wound Assessment Granulation tissue;Pink/red 07/11/22 1412   Drainage Amount Moderate 07/11/22 1412   Drainage Description Serosanguinous 07/11/22 1412   Odor None 07/11/22 1412   Latonya-wound Assessment Maceration; Hyperkeratosis (callous) 07/11/22 1412   Number of days: 140       Wound 04/29/22 Foot Left;Plantar (Active)   Number of days: 73          Procedure Note  Indications:  Based on my examination of this patient's wound(s)/ulcer(s) today, debridement is required to promote healing and evaluate the wound base.     Performed by: Natali Pritchard MD    Consent obtained:  Yes    Time out taken:  Yes    Pain Control: Anesthetic  Anesthetic: 4% Lidocaine Liquid Topical     Debridement:Excisional Debridement    Using curette, #15 blade scalpel and forceps the wound(s)/ulcer(s) was/were sharply debrided down through and including the removal of epidermis, dermis and subcutaneous tissue. Devitalized Tissue Debrided:  fibrin to stimulate bleeding to promote healing, post debridement good bleeding base and wound edges noted    Wound/Ulcer #: 1    Percent of Wound/Ulcer Debrided: 100%    Total Surface Area Debrided:  1 sq cm     Estimated Blood Loss:  Minimal  Hemostasis Achieved:  by pressure    Procedural Pain:  3  / 10   Post Procedural Pain:  2 / 10     Response to treatment:  Well tolerated by patient. Plan:   Treatment Note please see attached Discharge Instructions    Written patient dismissal instructions given to patient and signed by patient or POA. Discharge Instructions       Visit Discharge/Physician Orders     Discharge condition: Stable     Assessment of pain at discharge: minimal     Anesthetic used: 4% lidocaine external solution     Discharge to: ECF      Left via:Private automobile     Accompanied by: accompanied by self     ECF/HHA: [id number]      Dressing Orders:     Cleanse right foot ulcer with normal saline, apply alginate AG and cover with a dry dressing daily     Miconazole ointment use as prescribed     Treatment Orders:     X-ray of the right foot reviewed.     Discussed need for diabetic shoe inserts, please obtain ASAP     FOLLOW NUTRITIOUS DIET. CHOOSE FOODS HIGH IN PROTEIN -CHICKEN- FISH-AND EGGS,  CHOOSE FOODS HIGH IN VITAMIN C.   MULTIVITAMIN DAILY.       [facility] followup visit __________1 week__________________  (Please note your next appointment above and if you are unable to keep, kindly give a 24 hour notice.  Thank you.)     Physician signature:__________________________        If you experience any of the following, please call the [address] [place] [address] during business hours:     * Increase in Pain  * Temperature over 101  * Increase in drainage from your wound  * Drainage with a foul odor  * Bleeding  * Increase in swelling  * Need for compression bandage changes due to slippage, breakthrough drainage.     If you need medical attention outside of the business hours of the [address] [place] [address] Road please contact your PCP or go to the nearest emergency room.         Electronically signed by Nuzhat Caldera MD on 7/11/2022 at 3:30 PM

## 2022-07-11 NOTE — PLAN OF CARE
Problem: Chronic Conditions and Co-morbidities  Goal: Patient's chronic conditions and co-morbidity symptoms are monitored and maintained or improved  Outcome: Progressing     Problem: Chronic Conditions and Co-morbidities  Goal: Patient's chronic conditions and co-morbidity symptoms are monitored and maintained or improved  Outcome: Progressing     Problem: Wound:  Goal: Will show signs of wound healing; wound closure and no evidence of infection  Description: Will show signs of wound healing; wound closure and no evidence of infection  Outcome: Progressing     Problem: Blood Glucose:  Goal: Ability to maintain appropriate glucose levels will improve  Description: Ability to maintain appropriate glucose levels will improve  Outcome: Progressing     Problem: Pain  Goal: Verbalizes/displays adequate comfort level or baseline comfort level  Outcome: Progressing

## 2022-07-18 NOTE — PROGRESS NOTES
[facility]                                Progress note    Subjective: The patient is awake and alert. Currently requiring 4L NC, improving     Objective:    BP (!) 147/77   Pulse 62   Temp 97.2 °F (36.2 °C) (Temporal)   Resp 22   Ht 5' 9\" (1.753 m)   Wt 146 lb (66.2 kg)   SpO2 97%   BMI 21.56 kg/m²     No intake/output data recorded. No intake/output data recorded. General appearance: NAD, conversant, Frail   HEENT: AT/NC, MMM  Neck: FROM, supple  Lungs: B/L rales, improving, improving cough. CV: RRR, no MRGs  Vasc: Radial pulses 2+  Abdomen: Soft, non-tender; no masses or HSM  Extremities: No peripheral edema or digital cyanosis  Skin: no rash, lesions or ulcers  Psych: Alert and oriented to person, place and time  Neuro: Alert and interactive     No results for input(s): WBC, HGB, HCT, PLT in the last 72 hours. No results for input(s): NA, K, CL, CO2, BUN, CREATININE, GLU, CALCIUM in the last 72 hours. Assessment:    Principal Problem (Resolved):    Pneumonia  Active Problems:    Type 2 diabetes mellitus ([facility]  Resolved Problems:    COVID-19    Sepsis ([facility]    Acute hypoxemic respiratory failure (HCC)    Hypernatremia      Plan:    Patient is a 79year old male admitted to [facility] for   Pneumonia  -Monitor labs  -WBC trending up today 18. 7-check daily, may also be secondary to Decadron  -IV Unasyn and doxy to cover for atypicals and aspiration  -SLP to r/o aspiration-pending  -Legionella and strep pneumoniae unremarkable    Covid-19  -IV decardon, not requiring remdesivir on 4 L by nasal cannula- switch to po   -Follow inflammatory markers  -Vitamin D, zinc, vitamin C  -Monitor O2 saturations and supplement oxygen to keep saturations greater than 93%, wean as necessary, incentive spirometer, no nebulizers  -Droplet plus isolation  -Encourage ISP use/self proning    Hypernatremia  -Monitor labs, BMP daily  -Na 150+ > 140 today  -D5 @ 100 > discontinued     DM type II  -Monitor labs  -ISS glucose control  -Hypoglycemia protocol initiated  -Increased scale d/t steroids and D5    Above note edited to reflect my thoughts   Can be dc'd tomorrow   Leukocytosis from steroids       Aria Faith MD  5/2/2022

## 2022-07-19 NOTE — DISCHARGE INSTRUCTIONS
Visit Discharge/Physician Orders     Discharge condition: Stable     Assessment of pain at discharge: minimal     Anesthetic used: 4% lidocaine external solution     Discharge to: ECF     Left via:Private automobile     Accompanied by: accompanied by self     ECF/A: [id number]      Dressing Orders:     Cleanse right foot ulcer with normal saline, apply alginate AG and cover with a dry dressing daily     Miconazole ointment use as prescribed     Treatment Orders:     X-ray of the right foot reviewed. Discussed need for diabetic shoe inserts, please obtain ASAP     FOLLOW NUTRITIOUS DIET. CHOOSE FOODS HIGH IN PROTEIN -CHICKEN- FISH-AND EGGS,  CHOOSE FOODS HIGH IN VITAMIN C.   MULTIVITAMIN DAILY. [address] [place] [address],[address] followup visit __________1 week__________________  (Please note your next appointment above and if you are unable to keep, kindly give a 24 hour notice. Thank you.)     Physician signature:__________________________        If you experience any of the following, please call the [facility] during business hours:     * Increase in Pain  * Temperature over 101  * Increase in drainage from your wound  * Drainage with a foul odor  * Bleeding  * Increase in swelling  * Need for compression bandage changes due to slippage, breakthrough drainage. If you need medical attention outside of the business hours of the [facility] [employer] [facility] please contact your PCP or go to the nearest emergency room.

## 2022-07-25 ENCOUNTER — HOSPITAL ENCOUNTER (OUTPATIENT)
Dept: WOUND CARE | Age: 82
Discharge: HOME | End: 2022-07-25

## 2022-07-28 NOTE — DISCHARGE INSTRUCTIONS
Visit Discharge/Physician Orders     Discharge condition: Stable     Assessment of pain at discharge: minimal     Anesthetic used: 4% lidocaine external solution     Discharge to: ECF     Left via:Private automobile     Accompanied by: accompanied by self     ECF/HHA: [id number]      Dressing Orders:     Cleanse right foot ulcer with normal saline, apply alginate AG and cover with a dry dressing daily     Miconazole ointment use as prescribed     Treatment Orders:     X-ray of the right foot reviewed. Discussed need for diabetic shoe inserts, please obtain ASAP     FOLLOW NUTRITIOUS DIET. CHOOSE FOODS HIGH IN PROTEIN -CHICKEN- FISH-AND EGGS,  CHOOSE FOODS HIGH IN VITAMIN C.   MULTIVITAMIN DAILY. [facility] followup visit __________1 week__________________  (Please note your next appointment above and if you are unable to keep, kindly give a 24 hour notice. Thank you.)     Physician signature:__________________________        If you experience any of the following, please call the [facility] during business hours:     * Increase in Pain  * Temperature over 101  * Increase in drainage from your wound  * Drainage with a foul odor  * Bleeding  * Increase in swelling  * Need for compression bandage changes due to slippage, breakthrough drainage. If you need medical attention outside of the business hours of the [facility] please contact your PCP or go to the nearest emergency room.

## 2022-08-01 ENCOUNTER — HOSPITAL ENCOUNTER (OUTPATIENT)
Dept: WOUND CARE | Age: 82
Discharge: HOME | End: 2022-08-01

## 2022-08-03 NOTE — DISCHARGE INSTRUCTIONS
Visit Discharge/Physician Orders     Discharge condition: Stable  Assessment of pain at discharge: minimal  Anesthetic used: 4% lidocaine external solution  Discharge to: ECF  Left via:Private automobile  Accompanied by: accompanied by self  ECF/HHA: [facility]     Dressing Orders:     Cleanse right foot ulcer with normal saline, apply alginate AG and cover with a dry dressing daily     Miconazole ointment use as prescribed     Treatment Orders:     X-ray of the right foot reviewed. Continue using diabetic shoe inserts     FOLLOW NUTRITIOUS DIET. CHOOSE FOODS HIGH IN PROTEIN -CHICKEN- FISH-AND EGGS,  CHOOSE FOODS HIGH IN VITAMIN C.   MULTIVITAMIN DAILY. [facility] followup visit __________2 weeks__________________  (Please note your next appointment above and if you are unable to keep, kindly give a 24 hour notice. Thank you.)     Physician signature:__________________________        If you experience any of the following, please call the [facility] during business hours:     * Increase in Pain  * Temperature over 101  * Increase in drainage from your wound  * Drainage with a foul odor  * Bleeding  * Increase in swelling  * Need for compression bandage changes due to slippage, breakthrough drainage. If you need medical attention outside of the business hours of the [facility] please contact your PCP or go to the nearest emergency room.

## 2022-08-08 ENCOUNTER — HOSPITAL ENCOUNTER (OUTPATIENT)
Dept: WOUND CARE | Age: 82
Discharge: HOME | End: 2022-08-08
Payer: MEDICARE

## 2022-08-08 VITALS
TEMPERATURE: 96.7 F | WEIGHT: 140 LBS | HEIGHT: 70 IN | RESPIRATION RATE: 16 BRPM | DIASTOLIC BLOOD PRESSURE: 68 MMHG | SYSTOLIC BLOOD PRESSURE: 122 MMHG | BODY MASS INDEX: 20.04 KG/M2 | HEART RATE: 80 BPM

## 2022-08-08 DIAGNOSIS — E11.621 DIABETIC ULCER OF RIGHT HEEL ASSOCIATED WITH TYPE 2 DIABETES MELLITUS, WITH FAT LAYER EXPOSED (HCC): Primary | ICD-10-CM

## 2022-08-08 DIAGNOSIS — L97.412 DIABETIC ULCER OF RIGHT HEEL ASSOCIATED WITH TYPE 2 DIABETES MELLITUS, WITH FAT LAYER EXPOSED (HCC): Primary | ICD-10-CM

## 2022-08-08 DIAGNOSIS — E08.621 DIABETIC ULCER OF RIGHT MIDFOOT ASSOCIATED WITH DIABETES MELLITUS DUE TO UNDERLYING CONDITION, WITH FAT LAYER EXPOSED (HCC): ICD-10-CM

## 2022-08-08 DIAGNOSIS — L97.412 DIABETIC ULCER OF RIGHT MIDFOOT ASSOCIATED WITH DIABETES MELLITUS DUE TO UNDERLYING CONDITION, WITH FAT LAYER EXPOSED (HCC): ICD-10-CM

## 2022-08-08 PROCEDURE — 11042 DBRDMT SUBQ TIS 1ST 20SQCM/<: CPT

## 2022-08-08 PROCEDURE — 11042 DBRDMT SUBQ TIS 1ST 20SQCM/<: CPT | Performed by: SURGERY

## 2022-08-08 PROCEDURE — 6370000000 HC RX 637 (ALT 250 FOR IP): Performed by: PODIATRIST

## 2022-08-08 RX ORDER — LIDOCAINE 40 MG/G
CREAM TOPICAL ONCE
Status: CANCELLED | OUTPATIENT
Start: 2022-08-08 | End: 2022-08-08

## 2022-08-08 RX ORDER — BETAMETHASONE DIPROPIONATE 0.05 %
OINTMENT (GRAM) TOPICAL ONCE
Status: CANCELLED | OUTPATIENT
Start: 2022-08-08 | End: 2022-08-08

## 2022-08-08 RX ORDER — LIDOCAINE HYDROCHLORIDE 20 MG/ML
JELLY TOPICAL ONCE
Status: CANCELLED | OUTPATIENT
Start: 2022-08-08 | End: 2022-08-08

## 2022-08-08 RX ORDER — GENTAMICIN SULFATE 1 MG/G
OINTMENT TOPICAL ONCE
Status: CANCELLED | OUTPATIENT
Start: 2022-08-08 | End: 2022-08-08

## 2022-08-08 RX ORDER — GINSENG 100 MG
CAPSULE ORAL ONCE
Status: CANCELLED | OUTPATIENT
Start: 2022-08-08 | End: 2022-08-08

## 2022-08-08 RX ORDER — BACITRACIN, NEOMYCIN, POLYMYXIN B 400; 3.5; 5 [USP'U]/G; MG/G; [USP'U]/G
OINTMENT TOPICAL ONCE
Status: CANCELLED | OUTPATIENT
Start: 2022-08-08 | End: 2022-08-08

## 2022-08-08 RX ORDER — LIDOCAINE 50 MG/G
OINTMENT TOPICAL ONCE
Status: CANCELLED | OUTPATIENT
Start: 2022-08-08 | End: 2022-08-08

## 2022-08-08 RX ORDER — LIDOCAINE HYDROCHLORIDE 40 MG/ML
SOLUTION TOPICAL ONCE
Status: CANCELLED | OUTPATIENT
Start: 2022-08-08 | End: 2022-08-08

## 2022-08-08 RX ORDER — BACITRACIN ZINC AND POLYMYXIN B SULFATE 500; 1000 [USP'U]/G; [USP'U]/G
OINTMENT TOPICAL ONCE
Status: CANCELLED | OUTPATIENT
Start: 2022-08-08 | End: 2022-08-08

## 2022-08-08 RX ORDER — CLOBETASOL PROPIONATE 0.5 MG/G
OINTMENT TOPICAL ONCE
Status: CANCELLED | OUTPATIENT
Start: 2022-08-08 | End: 2022-08-08

## 2022-08-08 RX ORDER — LIDOCAINE HYDROCHLORIDE 40 MG/ML
SOLUTION TOPICAL ONCE
Status: COMPLETED | OUTPATIENT
Start: 2022-08-08 | End: 2022-08-08

## 2022-08-08 RX ADMIN — LIDOCAINE HYDROCHLORIDE 5 ML: 40 SOLUTION TOPICAL at 14:09

## 2022-08-08 NOTE — PLAN OF CARE
Problem: Chronic Conditions and Co-morbidities  Goal: Patient's chronic conditions and co-morbidity symptoms are monitored and maintained or improved  Outcome: Progressing     Problem: Chronic Conditions and Co-morbidities  Goal: Patient's chronic conditions and co-morbidity symptoms are monitored and maintained or improved  Outcome: Progressing     Problem: Wound:  Goal: Will show signs of wound healing; wound closure and no evidence of infection  Description: Will show signs of wound healing; wound closure and no evidence of infection  Outcome: Progressing     Problem: Pain  Goal: Verbalizes/displays adequate comfort level or baseline comfort level  Outcome: Progressing

## 2022-08-08 NOTE — PROGRESS NOTES
Wound Healing Center Followup Visit Note    Referring Physician : Jose Kothari MD  [address] RECORD NUMBER:  [id number]  AGE: 79 y.o. GENDER: male  : 1940  EPISODE DATE:  2022    Subjective:     Chief Complaint   Patient presents with    Wound Check     Right foot      HISTORY of PRESENT ILLNESS HPI   Alysa Yañez is a 79 y.o. male who presents today in regards to follow up evaluation and treatment of wound/ulcer. That patient's past medical, family and social hx were reviewed and changes were made if present. History of Wound Context:  The patient has had a wound of plantar surface of right foot underneath the metatarsal head which was first noted approximately at least 1 month ago, patient not sure, patient slightly confused as to the when the ulcer started. This has been treated by the patient in the facility. On their initial visit to the wound healing center, 22, the patient has noted that the wound has not been improving.   The patient has had similar previous wounds in the past.          Patient was recently in the hospital, in April, with COVID-19 pneumonia, hypoxia etc. currently in the facility recuperating        Patient does have diabetes mellitus with diabetic neuropathy, currently not using any offloading inserts in the shoe     Pt is currently not on abx.      2022  Patient overall better, wound improving, rediscussed with patient and nursing staff regarding making arrangements for the patient to undergo custom-made offloading insert for the right foot to alleviate pressure underneath the first metatarsal head  2022  Right foot wound, stable with some maceration  We discussed the patient nursing staff regarding custom-made offloading insert for right foot again    2022  Patient tells me, that one of the Shoemaker's came to his place to measure the insert, told him to make sure that he and the plantar surface left heel is offloaded, because of callus formation there  Right foot wound looks better  7/11/2022  Ulcer over the plantar surface, foot, improved, left heel callus stable, patient still does not have custom made insert for right foot, waiting for it  8/8/2022  Right foot ulcer underneath the first metatarsal from the joint looks much better, minimal fat layer exposed, left heel callus stable, advised him to discuss with his orthotist regarding making the insert little deeper at the heel area to offload the heel      Wound/Ulcer Pain Timing/Severity: constant  Quality of pain: dull  Severity:  2 / 10   Modifying Factors: None  Associated Signs/Symptoms: drainage and numbness     Ulcer Identification:  Ulcer Type: arterial, diabetic and non-healing/non-surgical  Contributing Factors: diabetes and chronic pressure     Diabetic/Pressure/Non Pressure Ulcers only:  Ulcer: Diabetic ulcer, fat layer exposed     If patient has diabetic lower extremity wounds  Dale Classification of diabetic lower extremity wounds:     Grade Description   []  0 No open wound   []  1 Superficial ulcer involving the full skin thickness   []  2 Deep ulcer involves ligament, tendon, joint capsule, or fascia  No bone involvement or abscess presence   []  3 Deep Ulcer with abcess formation and/or osteomyelitis   []  4 Localized gangrene   []  5 Extensive gangrene of the foot      Wound: Patient does have ulcer underneath the first metatarsal head of the right foot, fat layer exposed without any obvious cellulitis or purulent drainage     Other pertinent information:  Patient was hospitalized in April with COVID-19 pneumonia and acute hypoxic respiratory failure  The lower extremity arterial Doppler study done in April 2022 was reviewed     Narrative   On the right side, ankle-brachial index, 0.93 with biphasic plus ankle   Doppler tracing over the anterior tibial artery, with good arterial   flow to the right foot and the remaining toes, except over the right   fourth toe where the pulse volume recordings diminished       On the left side, ankle-brachial index within normal range of 0.99,   with biphasic plus ankle Doppler tracings and good arterial flow to   the foot and the remaining toes based upon the pulse volume recordings          June 6, 2022  Discussed the patient, options risks benefits also explained, patient was recommend x-ray of the right foot from x-rays no underlying osteomyelitis, patient was informed that at the pandemia satisfactory artery circulation with palpable pulses the main issues pressure, diabetic trophic ulcer, needs custom made inserts to offload the first metatarsal head of the right foot, prescription given to the patient for the same  All his questions were answered                   PAST MEDICAL HISTORY      Diagnosis Date    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 ([address] 12/6/2019    S/P peripheral artery angioplasty 01/23/2020    bilateral legs -[place]    Skin ulcer of right foot with fat layer exposed ([address] 12/9/2019    Ulcer of right leg, with fat layer exposed ([address] 5/6/2019    Varicose veins of leg with edema, 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     Past Surgical History:   Procedure Laterality Date    COLONOSCOPY      CYSTOSCOPY N/A 5/18/2020    SUPRAPUBIC TUBE PLACEMENT performed by Danyell De La O MD at [address] [facility] [facility]  1990's    lense implants Medication Sig Dispense Refill    miconazole nitrate 2 % OINT Please apply to the toes, in between the toes, both legs up to the upper calf, twice a day for 1 month 1 each 10    insulin glargine (LANTUS) 100 UNIT/ML injection vial Inject 13 Units into the skin nightly      rivastigmine (EXELON) 1.5 mg capsule Take 1.5 mg by mouth 3 times daily      aspirin 81 MG chewable tablet Take 81 mg by mouth daily      sodium chloride 1 g tablet Take 1 g by mouth in the morning and at bedtime      zinc sulfate (ZINCATE) 220 (50 Zn) MG capsule Take 50 mg by mouth daily      empagliflozin (JARDIANCE) 25 MG tablet Take 25 mg by mouth daily      insulin lispro (HUMALOG) 100 UNIT/ML injection vial Inject into the skin 4 times daily per sliding scale: 150-199 = 2 units;    200-249 = 4;    250-299 = 6;    300-349 = 8;    > 350 = notify MD      SITagliptin (JANUVIA) 100 MG tablet Take 100 mg by mouth daily      metFORMIN (GLUCOPHAGE) 1000 MG tablet Take 500 mg by mouth 2 times daily       metoprolol succinate (TOPROL XL) 25 MG extended release tablet Take 1 tablet by mouth daily 30 tablet 3    amLODIPine (NORVASC) 5 MG tablet Take 2 tablets by mouth daily 30 tablet 3    Garlic 8912 MG TABS Take 1,250 mg by mouth daily       Multiple Vitamins-Minerals (THERAPEUTIC MULTIVITAMIN-MINERALS) tablet Take 1 tablet by mouth daily       No current facility-administered medications on file prior to encounter.        REVIEW OF SYSTEMS See HPI    Objective:    /68   Pulse 80   Temp (!) 96.7 °F (35.9 °C) (Temporal)   Resp 16   Ht 5' 10\" (1.778 m)   Wt 140 lb (63.5 kg)   BMI 20.09 kg/m²   Wt Readings from Last 3 Encounters:   08/08/22 140 lb (63.5 kg)   07/11/22 140 lb (63.5 kg)   06/27/22 140 lb (63.5 kg)     PHYSICAL EXAM  CONSTITUTIONAL:   Awake, alert, cooperative   EYES:  lids and lashes normal   ENT: external ears and nose without lesions   NECK:  supple, symmetrical, trachea midline   SKIN:  Open wound Present    Assessment: wound(s)/ulcer(s) today, debridement is required to promote healing and evaluate the wound base. Performed by: Omaira Otero MD    Consent obtained:  Yes    Time out taken:  Yes    Pain Control: Anesthetic  Anesthetic: 4% Lidocaine Liquid Topical     Debridement:Excisional Debridement    Using curette, #15 blade scalpel and forceps the wound(s)/ulcer(s) was/were sharply debrided down through and including the removal of epidermis, dermis and subcutaneous tissue. Devitalized Tissue Debrided:  fibrin to stimulate bleeding to promote healing, post debridement good bleeding base and wound edges noted    Wound/Ulcer #: 1    Percent of Wound/Ulcer Debrided: 100%    Total Surface Area Debrided:  0.2 sq cm     Estimated Blood Loss:  Minimal  Hemostasis Achieved:  by pressure    Procedural Pain:  3  / 10   Post Procedural Pain:  2 / 10     Response to treatment:  Well tolerated by patient. Plan:   Treatment Note please see attached Discharge Instructions    Written patient dismissal instructions given to patient and signed by patient or POA. Discharge Instructions         Visit Discharge/Physician Orders     Discharge condition: Stable  Assessment of pain at discharge: minimal  Anesthetic used: 4% lidocaine external solution  Discharge to: ECF  Left via:Private automobile  Accompanied by: accompanied by self  ECF/H[facility]: [facility]     Dressing Orders:     Cleanse right foot ulcer with normal saline, apply alginate AG and cover with a dry dressing daily     Miconazole ointment use as prescribed     Treatment Orders:     X-ray of the right foot reviewed. Continue using diabetic shoe inserts,     FOLLOW NUTRITIOUS DIET. CHOOSE FOODS HIGH IN PROTEIN -CHICKEN- FISH-AND EGGS,  CHOOSE FOODS HIGH IN VITAMIN C.   MULTIVITAMIN DAILY. [facility] followup visit __________2 weeks__________________  (Please note your next appointment above and if you are unable to keep, kindly give a 24 hour notice.  Thank you.)     Physician signature:__________________________        If you experience any of the following, please call the [facility] [address] during business hours:     * Increase in Pain  * Temperature over 101  * Increase in drainage from your wound  * Drainage with a foul odor  * Bleeding  * Increase in swelling  * Need for compression bandage changes due to slippage, breakthrough drainage. If you need medical attention outside of the business hours of the [address] [place] [address] Road please contact your PCP or go to the nearest emergency room.       Electronically signed by Howard Aguilar MD on 8/8/2022 at 2:26 PM

## 2022-08-22 ENCOUNTER — HOSPITAL ENCOUNTER (OUTPATIENT)
Dept: WOUND CARE | Age: 82
Discharge: HOME | End: 2022-08-22
Payer: MEDICARE

## 2022-08-22 VITALS
RESPIRATION RATE: 18 BRPM | SYSTOLIC BLOOD PRESSURE: 134 MMHG | WEIGHT: 140 LBS | HEIGHT: 70 IN | TEMPERATURE: 97.7 F | DIASTOLIC BLOOD PRESSURE: 64 MMHG | HEART RATE: 74 BPM | BODY MASS INDEX: 20.04 KG/M2

## 2022-08-22 DIAGNOSIS — E08.621 DIABETIC ULCER OF RIGHT MIDFOOT ASSOCIATED WITH DIABETES MELLITUS DUE TO UNDERLYING CONDITION, WITH FAT LAYER EXPOSED (HCC): ICD-10-CM

## 2022-08-22 DIAGNOSIS — I73.9 PVD (PERIPHERAL VASCULAR DISEASE) WITH CLAUDICATION (HCC): ICD-10-CM

## 2022-08-22 DIAGNOSIS — E11.621 DIABETIC ULCER OF RIGHT HEEL ASSOCIATED WITH TYPE 2 DIABETES MELLITUS, WITH FAT LAYER EXPOSED (HCC): Primary | ICD-10-CM

## 2022-08-22 DIAGNOSIS — L97.412 DIABETIC ULCER OF RIGHT MIDFOOT ASSOCIATED WITH DIABETES MELLITUS DUE TO UNDERLYING CONDITION, WITH FAT LAYER EXPOSED (HCC): ICD-10-CM

## 2022-08-22 DIAGNOSIS — Z98.62 S/P PERIPHERAL ARTERY ANGIOPLASTY: ICD-10-CM

## 2022-08-22 DIAGNOSIS — L97.412 DIABETIC ULCER OF RIGHT HEEL ASSOCIATED WITH TYPE 2 DIABETES MELLITUS, WITH FAT LAYER EXPOSED (HCC): Primary | ICD-10-CM

## 2022-08-22 PROCEDURE — 99213 OFFICE O/P EST LOW 20 MIN: CPT

## 2022-08-22 PROCEDURE — 99212 OFFICE O/P EST SF 10 MIN: CPT | Performed by: SURGERY

## 2022-08-22 RX ORDER — LIDOCAINE HYDROCHLORIDE 20 MG/ML
JELLY TOPICAL ONCE
OUTPATIENT
Start: 2022-08-22 | End: 2022-08-22

## 2022-08-22 RX ORDER — LIDOCAINE HYDROCHLORIDE 40 MG/ML
SOLUTION TOPICAL ONCE
Status: DISCONTINUED | OUTPATIENT
Start: 2022-08-22 | End: 2022-08-22

## 2022-08-22 RX ORDER — LIDOCAINE 50 MG/G
OINTMENT TOPICAL ONCE
OUTPATIENT
Start: 2022-08-22 | End: 2022-08-22

## 2022-08-22 RX ORDER — CLOBETASOL PROPIONATE 0.5 MG/G
OINTMENT TOPICAL ONCE
OUTPATIENT
Start: 2022-08-22 | End: 2022-08-22

## 2022-08-22 RX ORDER — BACITRACIN ZINC AND POLYMYXIN B SULFATE 500; 1000 [USP'U]/G; [USP'U]/G
OINTMENT TOPICAL ONCE
OUTPATIENT
Start: 2022-08-22 | End: 2022-08-22

## 2022-08-22 RX ORDER — LIDOCAINE 40 MG/G
CREAM TOPICAL ONCE
OUTPATIENT
Start: 2022-08-22 | End: 2022-08-22

## 2022-08-22 RX ORDER — LIDOCAINE HYDROCHLORIDE 40 MG/ML
SOLUTION TOPICAL ONCE
OUTPATIENT
Start: 2022-08-22 | End: 2022-08-22

## 2022-08-22 RX ORDER — GINSENG 100 MG
CAPSULE ORAL ONCE
OUTPATIENT
Start: 2022-08-22 | End: 2022-08-22

## 2022-08-22 RX ORDER — BETAMETHASONE DIPROPIONATE 0.05 %
OINTMENT (GRAM) TOPICAL ONCE
OUTPATIENT
Start: 2022-08-22 | End: 2022-08-22

## 2022-08-22 RX ORDER — GENTAMICIN SULFATE 1 MG/G
OINTMENT TOPICAL ONCE
OUTPATIENT
Start: 2022-08-22 | End: 2022-08-22

## 2022-08-22 RX ORDER — BACITRACIN, NEOMYCIN, POLYMYXIN B 400; 3.5; 5 [USP'U]/G; MG/G; [USP'U]/G
OINTMENT TOPICAL ONCE
OUTPATIENT
Start: 2022-08-22 | End: 2022-08-22

## 2022-08-22 ASSESSMENT — PAIN SCALES - GENERAL: PAINLEVEL_OUTOF10: 0

## 2022-08-22 NOTE — PROGRESS NOTES
Wound Healing Center Followup Visit Note    Referring Physician : Lev Jimenez MD  [address] RECORD NUMBER:  [id number]  AGE: 79 y.o. GENDER: male  : 1940  EPISODE DATE:  2022    Subjective:     Chief Complaint   Patient presents with    Wound Check     Right foot wounds      HISTORY of PRESENT ILLNESS HPI   Mojgan Velazquez is a 79 y.o. male who presents today in regards to follow up evaluation and treatment of wound/ulcer. That patient's past medical, family and social hx were reviewed and changes were made if present. History of Wound Context:  The patient has had a wound of plantar surface of right foot underneath the metatarsal head which was first noted approximately at least 1 month ago, patient not sure, patient slightly confused as to the when the ulcer started. This has been treated by the patient in the facility. On their initial visit to the wound healing center, 22, the patient has noted that the wound has not been improving.   The patient has had similar previous wounds in the past.          Patient was recently in the hospital, in April, with COVID-19 pneumonia, hypoxia etc. currently in the facility recuperating        Patient does have diabetes mellitus with diabetic neuropathy, currently not using any offloading inserts in the shoe     Pt is currently not on abx.      2022  Patient overall better, wound improving, rediscussed with patient and nursing staff regarding making arrangements for the patient to undergo custom-made offloading insert for the right foot to alleviate pressure underneath the first metatarsal head  2022  Right foot wound, stable with some maceration  We discussed the patient nursing staff regarding custom-made offloading insert for right foot again    2022  Patient tells me, that one of the [employer]emaker's came to his place to measure the insert, told him to make sure that he and the plantar surface left heel is offloaded, because of callus formation there  Right foot wound looks better  7/11/2022  Ulcer over the plantar surface, foot, improved, left heel callus stable, patient still does not have custom made insert for right foot, waiting for it  8/8/2022  Right foot ulcer underneath the first metatarsal from the joint looks much better, minimal fat layer exposed, left heel callus stable, advised him to discuss with his orthotist regarding making the insert little deeper at the heel area to offload the heel  8/22/2022  Wounds healed, has callus formation, patient was recommended to discuss with the orthotics maker, to see potentially they can revise the first metatarsophalangeal joint area in the heel area  Patient was recommended follow-up appoint see me in May of next year but call anytime immediately with any recurrence of ulcers, all his questions were answered    Wound/Ulcer Pain Timing/Severity: constant  Quality of pain: dull  Severity:  2 / 10   Modifying Factors: None  Associated Signs/Symptoms: drainage and numbness     Ulcer Identification:  Ulcer Type: arterial, diabetic and non-healing/non-surgical  Contributing Factors: diabetes and chronic pressure     Diabetic/Pressure/Non Pressure Ulcers only:  Ulcer: Diabetic ulcer, fat layer exposed     If patient has diabetic lower extremity wounds  Dale Classification of diabetic lower extremity wounds:     Grade Description   []  0 No open wound   []  1 Superficial ulcer involving the full skin thickness   []  2 Deep ulcer involves ligament, tendon, joint capsule, or fascia  No bone involvement or abscess presence   []  3 Deep Ulcer with abcess formation and/or osteomyelitis   []  4 Localized gangrene   []  5 Extensive gangrene of the foot      Wound: Patient does have ulcer underneath the first metatarsal head of the right foot, fat layer exposed without any obvious cellulitis or purulent drainage     Other pertinent information:  Patient was hospitalized in April with COVID-19 pneumonia and acute hypoxic respiratory failure  The lower extremity arterial Doppler study done in April 2022 was reviewed     Narrative   On the right side, ankle-brachial index, 0.93 with biphasic plus ankle   Doppler tracing over the anterior tibial artery, with good arterial   flow to the right foot and the remaining toes, except over the right   fourth toe where the pulse volume recordings diminished       On the left side, ankle-brachial index within normal range of 0.99,   with biphasic plus ankle Doppler tracings and good arterial flow to   the foot and the remaining toes based upon the pulse volume recordings          June 6, 2022  Discussed the patient, options risks benefits also explained, patient was recommend x-ray of the right foot from x-rays no underlying osteomyelitis, patient was informed that at the pandemia satisfactory artery circulation with palpable pulses the main issues pressure, diabetic trophic ulcer, needs custom made inserts to offload the first metatarsal head of the right foot, prescription given to the patient for the same  All his questions were answered                   PAST MEDICAL HISTORY      Diagnosis Date    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 ([address] 12/6/2019    S/P peripheral artery angioplasty 01/23/2020    bilateral legs -[place]    Skin ulcer of right foot with fat layer exposed [facility] 2019    Ulcer of right leg, with fat layer exposed ([facility] 2019    Varicose veins of leg with edema, right 2019     Past Surgical History:   Procedure Laterality Date    COLONOSCOPY      CYSTOSCOPY N/A 2020    SUPRAPUBIC TUBE PLACEMENT performed by Jessie Liang MD at [address]    lense implants bilaterally    FOOT DEBRIDEMENT Left 2017    wound debridement and delayed primary closure    FRACTURE SURGERY      L femur fracture surgery    HIP FRACTURE SURGERY Left 2020    LEFT HIP OPEN REDUCTION INTERNAL FIXATION performed by Marie St MD at [address] [place] [address] Right 2021    RIGHT HIP HEMIARTHROPLASTY performed by Wong [facility] at [facility]  2019    Dr. Phan Sullivan- PTA ant tibial    OTHER SURGICAL HISTORY  2019    Dr Phan Sullivan - PCI Right popliteal and Anterior tibial    PROSTATE BIOPSY  2016    SKIN BIOPSY   last time    head and ears    TOE AMPUTATION Left 2016    2nd    TOE AMPUTATION Right 2019    AMPUTATION RIGHT SECOND TOE, FOOT DEBRIDEMENT performed by Dao Fitzgerald DPM at [address] Right 12/10/2019    AMPUTATION RIGHT 3rd DIGIT WITH PARTIAL RESECTION OF THIRD METATARSAL performed by Dao Fitzgerald DPM at [address]    TURP N/A 2020    CYSTOSCOPY PLASMA LOOP TRANSURETHRAL RESECTION PROSTATE performed by Jessie Liang MD at [facility]       Family History   Problem Relation Age of Onset    Heart Disease Mother         CHF    Heart Disease Father     Heart Attack Father      Social History     Tobacco Use    Smoking status: Former     Packs/day: 0.50     Years: 2.00     Pack years: 1.00     Types: Cigarettes     Quit date: 1960     Years since quittin.6    Smokeless tobacco: Never    Tobacco comments:     quit   Vaping Use    Vaping Use: Never used   Substance Use Topics    Alcohol Wt Readings from Last 3 Encounters:   08/22/22 140 lb (63.5 kg)   08/08/22 140 lb (63.5 kg)   07/11/22 140 lb (63.5 kg)     PHYSICAL EXAM  CONSTITUTIONAL:   Awake, alert, cooperative   EYES:  lids and lashes normal   ENT: external ears and nose without lesions   NECK:  supple, symmetrical, trachea midline   SKIN:  Open wound Present    Assessment:     Problem List Items Addressed This Visit          High    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 due to underlying condition, with fat layer exposed ([address]    Relevant Medications    lidocaine (XYLOCAINE) 4 % external solution       Unprioritized    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 2 diabetes mellitus, with fat layer exposed ([address] - Primary    Relevant Medications    lidocaine (XYLOCAINE) 4 % external solution    PVD (peripheral vascular disease) with claudication ([facility])    S/P peripheral artery angioplasty       Pre Debridement Measurements:  Are located in the [facility]  Documentation Flow Sheet  Post Debridement Measurements:  Wound/Ulcer Descriptions are Pre Debridement except measurements:     Wound 02/21/22 Foot Right;Plantar #2 (Active)   Wound Image   08/22/22 1327   Dressing Status New dressing applied;Clean;Dry; Intact 06/27/22 0911   Wound Cleansed Cleansed with saline 06/27/22 0911   Dressing/Treatment Dry dressing 06/27/22 0911   Offloading for Diabetic Foot Ulcers Diabetic shoes/inserts 08/08/22 1422   Wound Length (cm) 0 cm 08/22/22 1327   Wound Width (cm) 0 cm 08/22/22 1327   Wound Depth (cm) 0 cm 08/22/22 1327   Wound Surface Area (cm^2) 0 cm^2 08/22/22 1327   Change in Wound Size % (l*w) 100 08/22/22 1327   Wound Volume (cm^3) 0 cm^3 08/22/22 1327   Wound Healing % 100 08/22/22 1327   Post-Procedure Length (cm) 0.3 cm 08/08/22 1422   Post-Procedure Width (cm) 0.3 cm 08/08/22 1422   Post-Procedure Depth (cm) 0.3 cm 08/08/22 1422   Post-Procedure Surface Area (cm^2) 0.09 cm^2 08/08/22 1422   Post-Procedure Volume (cm^3) 0.027 cm^3 08/08/22 1422   Wound Assessment Granulation tissue;Fibrin 08/08/22 1402   Drainage Amount Scant 08/08/22 1402   Drainage Description Serous 08/08/22 1402   Odor None 08/08/22 1402   Latonya-wound Assessment Hyperkeratosis (callous) 08/08/22 1402   Number of days: 181       Wound 04/29/22 Foot Left;Plantar (Active)   Number of days: 115          Procedure Note  Indications:  Based on my examination of this patient's wound(s)/ulcer(s) today, debridement is required to promote healing and evaluate the wound base. Performed by: Jennifer Rowland MD    Consent obtained:  Yes    Time out taken:  Yes    Pain Control: Anesthetic  Anesthetic: 4% Lidocaine Liquid Topical     Debridement: Wound not debrided, wound healed    Response to treatment:  Well tolerated by patient. Plan:   Treatment Note please see attached Discharge Instructions    Written patient dismissal instructions given to patient and signed by patient or POA. Discharge Instructions              Visit Discharge/Physician Orders     Discharge condition: Stable  Assessment of pain at discharge: minimal  Anesthetic used: 4% lidocaine external solution  Discharge to: ECF  Left via:Private automobile  Accompanied by: accompanied by self  ECF[facility]HHA: [facility]     Dressing Orders:     Wound healed out today. If anything arises or have any questions please call us at the [facility]. Treatment Orders:     X-ray of the right foot reviewed. Continue using diabetic shoe inserts,     FOLLOW NUTRITIOUS DIET. CHOOSE FOODS HIGH IN PROTEIN -CHICKEN- FISH-AND EGGS,  CHOOSE FOODS HIGH IN VITAMIN C.   MULTIVITAMIN DAILY. [facility] followup visit _____No follow up, unless needed__________________  (Please note your next appointment above and if you are unable to keep, kindly give a 24 hour notice.  Thank you.)     Physician signature:__________________________        If you experience any of the following, please call the [address] [place] [address] during business hours:     * Increase in Pain  * Temperature over 101  * Increase in drainage from your wound  * Drainage with a foul odor  * Bleeding  * Increase in swelling  * Need for compression bandage changes due to slippage, breakthrough drainage. If you need medical attention outside of the business hours of the [address] [place] [address] Road please contact your PCP or go to the nearest emergency room.          Electronically signed by Lea Díaz MD on 8/22/2022 at 2:07 PM

## 2022-08-29 ENCOUNTER — TELEPHONE (OUTPATIENT)
Dept: VASCULAR SURGERY | Age: 82
End: 2022-08-29

## 2022-12-05 ENCOUNTER — HOSPITAL ENCOUNTER (OUTPATIENT)
Dept: WOUND CARE | Age: 82
Discharge: HOME | End: 2022-12-05
Payer: MEDICARE

## 2022-12-05 VITALS
SYSTOLIC BLOOD PRESSURE: 134 MMHG | HEART RATE: 80 BPM | HEIGHT: 69 IN | DIASTOLIC BLOOD PRESSURE: 62 MMHG | RESPIRATION RATE: 16 BRPM | WEIGHT: 140 LBS | TEMPERATURE: 97.5 F | BODY MASS INDEX: 20.73 KG/M2

## 2022-12-05 DIAGNOSIS — E08.621 DIABETIC ULCER OF RIGHT MIDFOOT ASSOCIATED WITH DIABETES MELLITUS DUE TO UNDERLYING CONDITION, WITH FAT LAYER EXPOSED (HCC): Primary | ICD-10-CM

## 2022-12-05 DIAGNOSIS — E11.51 TYPE 2 DIABETES MELLITUS WITH DIABETIC PERIPHERAL ANGIOPATHY WITHOUT GANGRENE, WITH LONG-TERM CURRENT USE OF INSULIN (HCC): ICD-10-CM

## 2022-12-05 DIAGNOSIS — I73.9 PVD (PERIPHERAL VASCULAR DISEASE) WITH CLAUDICATION (HCC): ICD-10-CM

## 2022-12-05 DIAGNOSIS — L97.412 DIABETIC ULCER OF RIGHT HEEL ASSOCIATED WITH TYPE 2 DIABETES MELLITUS, WITH FAT LAYER EXPOSED (HCC): ICD-10-CM

## 2022-12-05 DIAGNOSIS — Z98.62 S/P PERIPHERAL ARTERY ANGIOPLASTY: ICD-10-CM

## 2022-12-05 DIAGNOSIS — Z79.4 TYPE 2 DIABETES MELLITUS WITH DIABETIC PERIPHERAL ANGIOPATHY WITHOUT GANGRENE, WITH LONG-TERM CURRENT USE OF INSULIN (HCC): ICD-10-CM

## 2022-12-05 DIAGNOSIS — L97.412 DIABETIC ULCER OF RIGHT MIDFOOT ASSOCIATED WITH DIABETES MELLITUS DUE TO UNDERLYING CONDITION, WITH FAT LAYER EXPOSED (HCC): Primary | ICD-10-CM

## 2022-12-05 DIAGNOSIS — E11.621 DIABETIC ULCER OF RIGHT HEEL ASSOCIATED WITH TYPE 2 DIABETES MELLITUS, WITH FAT LAYER EXPOSED (HCC): ICD-10-CM

## 2022-12-05 PROCEDURE — 11042 DBRDMT SUBQ TIS 1ST 20SQCM/<: CPT

## 2022-12-05 PROCEDURE — 99214 OFFICE O/P EST MOD 30 MIN: CPT | Performed by: SURGERY

## 2022-12-05 PROCEDURE — 11042 DBRDMT SUBQ TIS 1ST 20SQCM/<: CPT | Performed by: SURGERY

## 2022-12-05 PROCEDURE — 99213 OFFICE O/P EST LOW 20 MIN: CPT

## 2022-12-05 RX ORDER — CLOBETASOL PROPIONATE 0.5 MG/G
OINTMENT TOPICAL ONCE
OUTPATIENT
Start: 2022-12-05 | End: 2022-12-05

## 2022-12-05 RX ORDER — ZINC SULFATE 50(220)MG
50 CAPSULE ORAL DAILY
COMMUNITY

## 2022-12-05 RX ORDER — GUAIFENESIN/DEXTROMETHORPHAN 100-10MG/5
10 SYRUP ORAL 3 TIMES DAILY PRN
COMMUNITY

## 2022-12-05 RX ORDER — LIDOCAINE 40 MG/G
CREAM TOPICAL ONCE
OUTPATIENT
Start: 2022-12-05 | End: 2022-12-05

## 2022-12-05 RX ORDER — LIDOCAINE HYDROCHLORIDE 20 MG/ML
JELLY TOPICAL ONCE
OUTPATIENT
Start: 2022-12-05 | End: 2022-12-05

## 2022-12-05 RX ORDER — BACITRACIN, NEOMYCIN, POLYMYXIN B 400; 3.5; 5 [USP'U]/G; MG/G; [USP'U]/G
OINTMENT TOPICAL ONCE
OUTPATIENT
Start: 2022-12-05 | End: 2022-12-05

## 2022-12-05 RX ORDER — GENTAMICIN SULFATE 1 MG/G
OINTMENT TOPICAL ONCE
OUTPATIENT
Start: 2022-12-05 | End: 2022-12-05

## 2022-12-05 RX ORDER — GINSENG 100 MG
CAPSULE ORAL ONCE
OUTPATIENT
Start: 2022-12-05 | End: 2022-12-05

## 2022-12-05 RX ORDER — LIDOCAINE HYDROCHLORIDE 40 MG/ML
SOLUTION TOPICAL ONCE
OUTPATIENT
Start: 2022-12-05 | End: 2022-12-05

## 2022-12-05 RX ORDER — ZINC GLUCONATE 50 MG
50 TABLET ORAL DAILY
COMMUNITY

## 2022-12-05 RX ORDER — LIDOCAINE HYDROCHLORIDE 40 MG/ML
SOLUTION TOPICAL ONCE
Status: COMPLETED | OUTPATIENT
Start: 2022-12-05 | End: 2022-12-05

## 2022-12-05 RX ORDER — LIDOCAINE 50 MG/G
OINTMENT TOPICAL ONCE
OUTPATIENT
Start: 2022-12-05 | End: 2022-12-05

## 2022-12-05 RX ORDER — ACETAMINOPHEN 325 MG/1
650 TABLET ORAL EVERY 4 HOURS PRN
COMMUNITY

## 2022-12-05 RX ORDER — BETAMETHASONE DIPROPIONATE 0.05 %
OINTMENT (GRAM) TOPICAL ONCE
OUTPATIENT
Start: 2022-12-05 | End: 2022-12-05

## 2022-12-05 RX ORDER — BACITRACIN ZINC AND POLYMYXIN B SULFATE 500; 1000 [USP'U]/G; [USP'U]/G
OINTMENT TOPICAL ONCE
OUTPATIENT
Start: 2022-12-05 | End: 2022-12-05

## 2022-12-05 RX ADMIN — LIDOCAINE HYDROCHLORIDE 6 ML: 40 SOLUTION TOPICAL at 15:06

## 2022-12-05 ASSESSMENT — PAIN DESCRIPTION - FREQUENCY: FREQUENCY: INTERMITTENT

## 2022-12-05 ASSESSMENT — PAIN DESCRIPTION - LOCATION: LOCATION: FOOT

## 2022-12-05 ASSESSMENT — PAIN DESCRIPTION - PAIN TYPE: TYPE: CHRONIC PAIN

## 2022-12-05 ASSESSMENT — PAIN DESCRIPTION - ORIENTATION: ORIENTATION: RIGHT

## 2022-12-05 ASSESSMENT — PAIN - FUNCTIONAL ASSESSMENT: PAIN_FUNCTIONAL_ASSESSMENT: ACTIVITIES ARE NOT PREVENTED

## 2022-12-05 ASSESSMENT — PAIN DESCRIPTION - ONSET: ONSET: PROGRESSIVE

## 2022-12-05 ASSESSMENT — PAIN SCALES - GENERAL: PAINLEVEL_OUTOF10: 2

## 2022-12-05 NOTE — PROGRESS NOTES
Wound Healing Center /Hyperbarics   History and Physical/Consultation  Vascular    Referring Physician : Duane Mccollum MD  [address] RECORD NUMBER:  [id number]  AGE: 79 y.o. GENDER: male  : 1940  EPISODE DATE:  2022  Subjective:     Chief Complaint   Patient presents with    Wound Check     Wounds right foot         HISTORY of PRESENT ILLNESS HPI     Dave Herr is a 79 y.o. male who presents today for wound/ulcer evaluation. History of Wound Context:  The patient has had a wound of right foot overlying the plantar surface of the right heel and plantar surface of the right first metatarsophalangeal joint which was first noted approximately at least 3 months ago. This has been treated by the patient in the facility, but patient did not call the wound care center as he was instructed in the past call immediately for any future ulcers. On their initial visit to the wound healing center, 22, the patient has noted that the wound has not been improving. The patient has had similar previous wounds in the past.      Patient in the past has undergone right femoral angiogram and angioplasty of right popliteal artery and tibial arteries by Dr. Rena Nagy in 2019    Patient has a diabetes mellitus diabetic neuropathy    Pt is currently not on abx.       Wound/Ulcer Pain Timing/Severity: constant  Quality of pain: dull, aching  Severity:  3 / 10   Modifying Factors: Pain worsens with walking  Associated Signs/Symptoms: drainage and pain    Ulcer Identification:  Ulcer Type: arterial, diabetic, pressure, and non-healing/non-surgical  Contributing Factors: diabetes, chronic pressure, decreased mobility, shear force, and arterial insufficiency    Diabetic/Pressure/Non Pressure Ulcers only:  Ulcer: Diabetic ulcer, fat layer exposed    If patient has diabetic lower extremity wounds  Dale Classification of diabetic lower extremity wounds:    Grade Description   []  0 No open wound   [] 1 Superficial ulcer involving the full skin thickness   []  2 Deep ulcer involves ligament, tendon, joint capsule, or fascia  No bone involvement or abscess presence   []  3 Deep Ulcer with abcess formation and/or osteomyelitis   []  4 Localized gangrene   []  5 Extensive gangrene of the foot     Wound: Patient does have diabetic foot ulcers, pressure ulcers, trophic ulcers, with a fat layer exposed underneath the first metatarsal phalangeal joint as well as the right heel overlying the plantar surface, patient recently did have a new set of inserts made to call the patient, patient was instructed make sure he contacts his orthotist and modify them to alleviate pressure overlying the ulcer area    Other pertinent information:    1.   The last lower extremity arterial Doppler study, done in April 2022 was personally reviewed by me      Narrative   On the right side, ankle-brachial index, 0.93 with biphasic plus ankle   Doppler tracing over the anterior tibial artery, with good arterial   flow to the right foot and the remaining toes, except over the right   fourth toe where the pulse volume recordings diminished       On the left side, ankle-brachial index within normal range of 0.99,   with biphasic plus ankle Doppler tracings and good arterial flow to   the foot and the remaining toes based upon the pulse volume recordings       12/5/2022  Discussed the patient regarding all options, risks benefits and alternatives, was recommend complete work-up including CBC, CMP, x-ray of the right foot, without osteomyelitis, leg and artery Doppler study to assess distal arterial perfusion  He was also given history, to call immediately extremities worsening of the ulcers  Patient also was instructed to contact his orthotist to make additional modifications of the inserts to alleviate pressure over the ulcerated areas of the right foot  All his questions were answered        PAST MEDICAL HISTORY      Diagnosis Date 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 ([address] 12/6/2019    S/P peripheral artery angioplasty 01/23/2020    bilateral legs -Shyann Bustard    Skin ulcer of right foot with fat layer exposed ([address] 12/9/2019    Ulcer of right leg, with fat layer exposed ([address] 5/6/2019    Varicose veins of leg with edema, 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     Past Surgical History:   Procedure Laterality Date    COLONOSCOPY      CYSTOSCOPY N/A 5/18/2020    SUPRAPUBIC TUBE PLACEMENT performed by Clare Henson MD at [address] [place] [address]    lense implants bilaterally    FOOT DEBRIDEMENT Left 01/04/2017    wound debridement and delayed primary closure    FRACTURE SURGERY      L femur fracture surgery    HIP FRACTURE SURGERY Left 9/23/2020    LEFT HIP OPEN REDUCTION INTERNAL FIXATION performed by Kierra Cevallos MD at [address] [place] [address] Right 7/16/2021    RIGHT HIP HEMIARTHROPLASTY performed by Adilene Glover DO at [facility]  04/23/2019    Dr. Jose Quintero- PTA ant tibial    OTHER SURGICAL HISTORY  12/17/2019    Dr Jose Quintero - PCI Right popliteal and Anterior tibial    PROSTATE BIOPSY  04/2016    SKIN BIOPSY  sept of 2018 last time    head and ears    TOE AMPUTATION Left 12/31/2016    2nd    TOE AMPUTATION Right 4/26/2019    AMPUTATION RIGHT SECOND TOE, FOOT DEBRIDEMENT performed by Sona Stone DPM at [facility]    TOE AMPUTATION Right 12/10/2019    AMPUTATION RIGHT 3rd DIGIT WITH PARTIAL RESECTION OF THIRD METATARSAL performed by Aimee De La Paz DPM at [address]    TURP N/A 2020    CYSTOSCOPY PLASMA LOOP TRANSURETHRAL RESECTION PROSTATE performed by Jim Cedillo MD at [facility]       Family History   Problem Relation Age of Onset    Heart Disease Mother         CHF    Heart Disease Father     Heart Attack Father      Social History     Tobacco Use    Smoking status: Former     Packs/day: 0.50     Years: 2.00     Pack years: 1.00     Types: Cigarettes     Quit date: 1960     Years since quittin.9    Smokeless tobacco: Never    Tobacco comments:     quit   Vaping Use    Vaping Use: Never used   Substance Use Topics    Alcohol use: Not Currently    Drug use: Not Currently     Allergies   Allergen Reactions    Latex Other (See Comments)     Pt unsure of reaction    Ciprofloxacin In D5w Itching    Food     Pcn [Penicillins] Other (See Comments)     \"I just know my body doesn't like it. \" \"I had a reaction a long time ago, I know it affects my kidneys. \"    Aspirin Other (See Comments)     \"It affects my kidneys. \"  Other reaction(s): Renal reactions    Other Rash     \"I get a rash on just my face if I eat Cumin or Chili. \"    Peanut-Containing Drug Products Itching     Current Outpatient Medications on File Prior to Encounter   Medication Sig Dispense Refill    vitamin D (CHOLECALCIFEROL) 125 MCG (5000 UT) CAPS capsule Take 5,000 Units by mouth daily      guaiFENesin-dextromethorphan (ROBITUSSIN DM) 100-10 MG/5ML syrup Take 10 mLs by mouth 3 times daily as needed for Cough      metFORMIN (GLUCOPHAGE) 500 MG tablet Take 500 mg by mouth at bedtime      magnesium hydroxide (MILK OF MAGNESIA) 400 MG/5ML suspension Take 30 mLs by mouth daily as needed for Constipation      zinc gluconate 50 MG tablet Take 50 mg by mouth daily      zinc sulfate (ZINCATE) 220 (50 Zn) MG capsule Take 50 mg by mouth daily      acetaminophen (TYLENOL) 325 MG tablet Take 650 mg by mouth every 4 hours as needed for Pain      miconazole nitrate 2 % OINT Please apply to the toes, in between the toes, both legs up to the upper calf, twice a day for 1 month 1 each 10    insulin glargine (LANTUS) 100 UNIT/ML injection vial Inject 10 Units into the skin nightly      rivastigmine (EXELON) 1.5 mg capsule Take 1.5 mg by mouth 3 times daily      aspirin 81 MG chewable tablet Take 81 mg by mouth daily      sodium chloride 1 g tablet Take 1 g by mouth in the morning and at bedtime      zinc sulfate (ZINCATE) 220 (50 Zn) MG capsule Take 50 mg by mouth daily      empagliflozin (JARDIANCE) 25 MG tablet Take 25 mg by mouth daily      insulin lispro (HUMALOG) 100 UNIT/ML injection vial Inject into the skin 4 times daily per sliding scale: 150-199 = 2 units;    200-249 = 4;    250-299 = 6;    300-349 = 8;    > 350 = notify MD      SITagliptin (JANUVIA) 100 MG tablet Take 100 mg by mouth daily      metFORMIN (GLUCOPHAGE) 1000 MG tablet Take 1,000 mg by mouth daily (with breakfast)      metoprolol succinate (TOPROL XL) 25 MG extended release tablet Take 1 tablet by mouth daily 30 tablet 3    amLODIPine (NORVASC) 5 MG tablet Take 2 tablets by mouth daily 30 tablet 3    Garlic 2132 MG TABS Take 1,250 mg by mouth daily       Multiple Vitamins-Minerals (THERAPEUTIC MULTIVITAMIN-MINERALS) tablet Take 1 tablet by mouth daily       No current facility-administered medications on file prior to encounter.        REVIEW OF SYSTEMS   ROS : All others Negative if blank [], Positive if [x]  General Urinary   [] Fevers [] Hematuria   [] Chills [] Dysuria   [] Weight Loss Vascular   Skin [] Claudication   [x] Tissue Loss [] Rest Pain   Eyes Neurologic   [] Wears Glasses/Contacts [] Stroke/TIA   [] Vision Changes [] Focal weakness   Respiratory [] Slurred Speech    [] Shortness of breath ENT   Cardiovascular [] Difficulty swallowing   [] Chest Pain Gastrointestinal [] Shortness of breath with exertion [] Abdominal Pain   Patient is known to have diabetes mellitus with diabetic neuropathy, hypertension, hyperlipidemia, status post peripheral angioplasty of right popliteal artery and tibial arteries done in April and December 2019 by Dr. Jose Quintero and associates [] Melena       [] Hematochezia               Objective:    /62   Pulse 80   Temp 97.5 °F (36.4 °C) (Temporal)   Resp 16   Ht 5' 9\" (1.753 m)   Wt 140 lb (63.5 kg)   BMI 20.67 kg/m²   Wt Readings from Last 3 Encounters:   12/05/22 140 lb (63.5 kg)   08/22/22 140 lb (63.5 kg)   08/08/22 140 lb (63.5 kg)       PHYSICAL EXAM  CONSTITUTIONAL:   Awake, alert, cooperative  PSYCHIATRIC :  Oriented to time, place and person      normal insight to disease process  ENT:  External ears and nose without lesions    Hearing deficits is not noted  NECK: Supple, symmetrical, trachea midline    Thyroid goiter not appreciated    Carotid bruit is not noted bilaterally  LUNGS:  No increased work of breathing                  Clear to auscultation bilaterally   CARDIOVASCULAR:  regular rate and rhythm   ABDOMEN:  soft, non-distended, non-tender    Hernias is not noted   Aorta is not palpable   Lymphatics : Cervical lymphadenopathy is not noted     Femoral lymphadenopathy is not noted  SKIN:   Skin color is normal   Texture and turgor is  normal   Induration is not noted  EXTREMITIES:   R UE Edema is not noted  L UE Edema is not noted  R LE Edema is not noted  L LE Edema is not noted  R femoral 2 L femoral 2   R dorsalis pedis 1 L dorsalis pedis 2   R posterior tibial 1 L posterior tibial 1     Assessment:     Problem List Items Addressed This Visit          High    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 due to underlying condition, with fat layer exposed ([address] - Primary    Relevant Medications    metFORMIN (GLUCOPHAGE) 500 MG tablet    Other Relevant Orders    Initiate Outpatient Wound Care Protocol CBC    Comprehensive Metabolic Panel    XR FOOT RIGHT (MIN 3 VIEWS)    VL LOWER EXTREMITY ARTERIAL SEGMENTAL PRESSURES W PPG       Medium    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 2 diabetes mellitus, with fat layer exposed (HCC)    Relevant Medications    metFORMIN (GLUCOPHAGE) 500 MG tablet    Other Relevant Orders    Initiate Outpatient Wound Care Protocol    CBC    Comprehensive Metabolic Panel    XR FOOT RIGHT (MIN 3 VIEWS)    VL LOWER EXTREMITY ARTERIAL SEGMENTAL PRESSURES W PPG    PVD (peripheral vascular disease) with claudication (HCC)    Relevant Medications    acetaminophen (TYLENOL) 325 MG tablet    Other Relevant Orders    CBC    Comprehensive Metabolic Panel    VL LOWER EXTREMITY ARTERIAL SEGMENTAL PRESSURES W PPG    S/P peripheral artery angioplasty    Relevant Orders    CBC    Comprehensive Metabolic Panel    VL LOWER EXTREMITY ARTERIAL SEGMENTAL PRESSURES W PPG    Type 2 diabetes mellitus (HCC)    Relevant Medications    metFORMIN (GLUCOPHAGE) 500 MG tablet    Other Relevant Orders    CBC    Comprehensive Metabolic Panel     Procedure Note  Indications:  Based on my examination of this patient's wound(s)/ulcer(s) today, debridement is required to promote healing and evaluate the wound base. Performed by: Laureen Fallon MD    Consent obtained:  Yes    Time out taken:  Yes    Pain Control: Anesthetic  Anesthetic: 4% Lidocaine Liquid Topical     Debridement:Excisional Debridement    Using curette, scissors, and forceps the wound(s)/ulcer(s) was/were sharply debrided down through and including the removal of epidermis, dermis, and subcutaneous tissue.         Devitalized Tissue Debrided:  fibrin, slough, and necrotic/eschar    Pre Debridement Measurements:  Are located in the [place]  Documentation Flow Sheet    Wound/Ulcer #: 1 and 2    Post Debridement Measurements:  Wound/Ulcer Descriptions are Pre Debridement except measurements:    Wound 02/21/22 Foot Right;Plantar #2 (Active)   Wound Image   08/22/22 1327   Dressing Status New dressing applied;Clean;Dry; Intact 06/27/22 0911   Wound Cleansed Cleansed with saline 06/27/22 0911   Dressing/Treatment Dry dressing 06/27/22 0911   Offloading for Diabetic Foot Ulcers Diabetic shoes/inserts 08/08/22 1422   Wound Length (cm) 0 cm 08/22/22 1327   Wound Width (cm) 0 cm 08/22/22 1327   Wound Depth (cm) 0 cm 08/22/22 1327   Wound Surface Area (cm^2) 0 cm^2 08/22/22 1327   Change in Wound Size % (l*w) 100 08/22/22 1327   Wound Volume (cm^3) 0 cm^3 08/22/22 1327   Wound Healing % 100 08/22/22 1327   Post-Procedure Length (cm) 0.3 cm 08/08/22 1422   Post-Procedure Width (cm) 0.3 cm 08/08/22 1422   Post-Procedure Depth (cm) 0.3 cm 08/08/22 1422   Post-Procedure Surface Area (cm^2) 0.09 cm^2 08/08/22 1422   Post-Procedure Volume (cm^3) 0.027 cm^3 08/08/22 1422   Wound Assessment Granulation tissue;Fibrin 08/08/22 1402   Drainage Amount Scant 08/08/22 1402   Drainage Description Serous 08/08/22 1402   Odor None 08/08/22 1402   Latonya-wound Assessment Hyperkeratosis (callous) 08/08/22 1402   Number of days: 287       Wound 04/29/22 Foot Left;Plantar (Active)   Number of days: 220       Wound 12/05/22 Foot Right;Plantar;Proximal new wound, #1 right plantar foot (Active)   Wound Image   12/05/22 1503   Wound Length (cm) 2 cm 12/05/22 1503   Wound Width (cm) 1.2 cm 12/05/22 1503   Wound Depth (cm) 0.1 cm 12/05/22 1503   Wound Surface Area (cm^2) 2.4 cm^2 12/05/22 1503   Wound Volume (cm^3) 0.24 cm^3 12/05/22 1503   Post-Procedure Length (cm) 2.1 cm 12/05/22 1545   Post-Procedure Width (cm) 1.3 cm 12/05/22 1545   Post-Procedure Depth (cm) 0.2 cm 12/05/22 1545   Post-Procedure Surface Area (cm^2) 2.73 cm^2 12/05/22 1545   Post-Procedure Volume (cm^3) 0.546 cm^3 12/05/22 1545   Wound Assessment Fibrin;Pink/red 12/05/22 1503   Drainage Amount Scant 12/05/22 1503   Drainage Description Yellow 12/05/22 1503   Odor Mild 12/05/22 1503   Latonya-wound Assessment Hyperkeratosis (callous) 12/05/22 1503   Number of days: 0       Wound 12/05/22 Heel Right;Plantar wound #2 right plantar heel (Active)   Wound Image   12/05/22 1503   Wound Length (cm) 2 cm 12/05/22 1503   Wound Width (cm) 1.9 cm 12/05/22 1503   Wound Depth (cm) 0.2 cm 12/05/22 1503   Wound Surface Area (cm^2) 3.8 cm^2 12/05/22 1503   Wound Volume (cm^3) 0.76 cm^3 12/05/22 1503   Post-Procedure Length (cm) 2.1 cm 12/05/22 1545   Post-Procedure Width (cm) 2 cm 12/05/22 1545   Post-Procedure Depth (cm) 0.3 cm 12/05/22 1545   Post-Procedure Surface Area (cm^2) 4.2 cm^2 12/05/22 1545   Post-Procedure Volume (cm^3) 1.26 cm^3 12/05/22 1545   Wound Assessment Pink/red 12/05/22 1503   Drainage Amount Small 12/05/22 1503   Drainage Description Yellow 12/05/22 1503   Odor Mild 12/05/22 1503   Latonya-wound Assessment Hyperkeratosis (callous) 12/05/22 1503   Number of days: 0       Percent of Wound/Ulcer Debrided: 100%    Total Surface Area Debrided:  7 sq cm     Estimated Blood Loss:  Minimal    Hemostasis Achieved:  by pressure    Procedural Pain:  4  / 10     Post Procedural Pain:  3 / 10     Response to treatment:  Well tolerated by patient. A culture was not done. Plan:     Pt is not a smoker     In my professional opinion and based off the information that is available at this time this patient has appropriate indication for HBO Therapy: No    Treatment Note please see attached Discharge Instructions    Written patient dismissal instructions given to patient and signed by patient or POA. Discharge Instructions         Visit Discharge/Physician Orders     Discharge condition: Stable  Assessment of pain at discharge: minimal  Anesthetic used: 4% lidocaine external solution  Discharge to: ECF  Left via:Private automobile  Accompanied by: accompanied by self  ECF/HHA: [facility] ([facility])     Dressing Orders:      To right foot wounds, cleanse with normal saline solution and apply alginate Ag and cover with a dry dressing. Change daily. Treatment Orders:    CBC and CMP (can be done at the facility)  X-ray of foot ordered (can be done at facility)  Doppler study ordered and scheduled at [facility] ([facility])  Need to obtain diabetic shoe inserts to keep pressure off of the wounds     FOLLOW NUTRITIOUS DIET. CHOOSE FOODS HIGH IN PROTEIN -CHICKEN- FISH-AND EGGS,  CHOOSE FOODS HIGH IN VITAMIN C.   MULTIVITAMIN DAILY. [address] [place] [address],[address] followup visit _____1 week__________________  (Please note your next appointment above and if you are unable to keep, kindly give a 24 hour notice. Thank you.)     Physician signature:__________________________        If you experience any of the following, please call the [facility] during business hours:     * Increase in Pain  * Temperature over 101  * Increase in drainage from your wound  * Drainage with a foul odor  * Bleeding  * Increase in swelling  * Need for compression bandage changes due to slippage, breakthrough drainage. If you need medical attention outside of the business hours of the [facility] please contact your PCP or go to the nearest emergency room.            Electronically signed by Laureen Fallon MD on 12/5/2022 at 3:48 PM

## 2022-12-05 NOTE — DISCHARGE INSTRUCTIONS
Visit Discharge/Physician Orders     Discharge condition: Stable  Assessment of pain at discharge: minimal  Anesthetic used: 4% lidocaine external solution  Discharge to: ECF  Left via:Private automobile  Accompanied by: accompanied by self  ECF/HHA: [facility]      Dressing Orders: To right foot wounds, cleanse with normal saline solution and apply alginate Ag and cover with a dry dressing. Change daily. Treatment Orders:     CBC and CMP and x-ray reviewed  Vasculars scheduled for 12/13/22  Need to obtain diabetic shoe inserts to keep pressure off of the wounds     FOLLOW NUTRITIOUS DIET. CHOOSE FOODS HIGH IN PROTEIN -CHICKEN- FISH-AND EGGS,  CHOOSE FOODS HIGH IN VITAMIN C.   MULTIVITAMIN DAILY. [address] [place] [address],[address] followup visit _____1 week__________________  (Please note your next appointment above and if you are unable to keep, kindly give a 24 hour notice. Thank you.)     Physician signature:__________________________        If you experience any of the following, please call the [employer] during business hours:     * Increase in Pain  * Temperature over 101  * Increase in drainage from your wound  * Drainage with a foul odor  * Bleeding  * Increase in swelling  * Need for compression bandage changes due to slippage, breakthrough drainage. If you need medical attention outside of the business hours of the [employer] please contact your PCP or go to the nearest emergency room.

## 2022-12-05 NOTE — DISCHARGE INSTRUCTIONS
Visit Discharge/Physician Orders     Discharge condition: Stable  Assessment of pain at discharge: minimal  Anesthetic used: 4% lidocaine external solution  Discharge to: ECF  Left via:Private automobile  Accompanied by: accompanied by self  ECF/HHA: [facility]     Dressing Orders: To right foot wounds, cleanse with normal saline solution and apply alginate Ag and cover with a dry dressing. Change daily. Treatment Orders:    CBC and CMP (can be done at the facility)  X-ray of foot ordered (can be done at facility)  Doppler study ordered and scheduled at [facility] ([facility])  Need to obtain diabetic shoe inserts to keep pressure off of the wounds     FOLLOW NUTRITIOUS DIET. CHOOSE FOODS HIGH IN PROTEIN -CHICKEN- FISH-AND EGGS,  CHOOSE FOODS HIGH IN VITAMIN C.   MULTIVITAMIN DAILY. [facility] followup visit _____1 week__________________  (Please note your next appointment above and if you are unable to keep, kindly give a 24 hour notice. Thank you.)     Physician signature:__________________________        If you experience any of the following, please call the [facility] during business hours:     * Increase in Pain  * Temperature over 101  * Increase in drainage from your wound  * Drainage with a foul odor  * Bleeding  * Increase in swelling  * Need for compression bandage changes due to slippage, breakthrough drainage. If you need medical attention outside of the business hours of the [address] please contact your PCP or go to the nearest emergency room.

## 2022-12-12 ENCOUNTER — HOSPITAL ENCOUNTER (OUTPATIENT)
Dept: WOUND CARE | Age: 82
Discharge: HOME | End: 2022-12-12
Payer: MEDICARE

## 2022-12-12 VITALS
TEMPERATURE: 97.4 F | BODY MASS INDEX: 20.73 KG/M2 | WEIGHT: 140 LBS | HEIGHT: 69 IN | HEART RATE: 66 BPM | RESPIRATION RATE: 16 BRPM | DIASTOLIC BLOOD PRESSURE: 78 MMHG | SYSTOLIC BLOOD PRESSURE: 150 MMHG

## 2022-12-12 DIAGNOSIS — L97.412 DIABETIC ULCER OF RIGHT HEEL ASSOCIATED WITH TYPE 2 DIABETES MELLITUS, WITH FAT LAYER EXPOSED (HCC): ICD-10-CM

## 2022-12-12 DIAGNOSIS — E08.621 DIABETIC ULCER OF RIGHT MIDFOOT ASSOCIATED WITH DIABETES MELLITUS DUE TO UNDERLYING CONDITION, WITH FAT LAYER EXPOSED (HCC): Primary | ICD-10-CM

## 2022-12-12 DIAGNOSIS — L97.412 DIABETIC ULCER OF RIGHT MIDFOOT ASSOCIATED WITH DIABETES MELLITUS DUE TO UNDERLYING CONDITION, WITH FAT LAYER EXPOSED (HCC): Primary | ICD-10-CM

## 2022-12-12 DIAGNOSIS — E11.621 DIABETIC ULCER OF RIGHT HEEL ASSOCIATED WITH TYPE 2 DIABETES MELLITUS, WITH FAT LAYER EXPOSED (HCC): ICD-10-CM

## 2022-12-12 PROCEDURE — 11042 DBRDMT SUBQ TIS 1ST 20SQCM/<: CPT | Performed by: SURGERY

## 2022-12-12 PROCEDURE — 11042 DBRDMT SUBQ TIS 1ST 20SQCM/<: CPT

## 2022-12-12 RX ORDER — LIDOCAINE 40 MG/G
CREAM TOPICAL ONCE
OUTPATIENT
Start: 2022-12-12 | End: 2022-12-12

## 2022-12-12 RX ORDER — LIDOCAINE HYDROCHLORIDE 40 MG/ML
SOLUTION TOPICAL ONCE
OUTPATIENT
Start: 2022-12-12 | End: 2022-12-12

## 2022-12-12 RX ORDER — LIDOCAINE HYDROCHLORIDE 20 MG/ML
JELLY TOPICAL ONCE
OUTPATIENT
Start: 2022-12-12 | End: 2022-12-12

## 2022-12-12 RX ORDER — GINSENG 100 MG
CAPSULE ORAL ONCE
OUTPATIENT
Start: 2022-12-12 | End: 2022-12-12

## 2022-12-12 RX ORDER — BETAMETHASONE DIPROPIONATE 0.05 %
OINTMENT (GRAM) TOPICAL ONCE
OUTPATIENT
Start: 2022-12-12 | End: 2022-12-12

## 2022-12-12 RX ORDER — LIDOCAINE HYDROCHLORIDE 40 MG/ML
SOLUTION TOPICAL ONCE
Status: COMPLETED | OUTPATIENT
Start: 2022-12-12 | End: 2022-12-12

## 2022-12-12 RX ORDER — LIDOCAINE 50 MG/G
OINTMENT TOPICAL ONCE
OUTPATIENT
Start: 2022-12-12 | End: 2022-12-12

## 2022-12-12 RX ORDER — GENTAMICIN SULFATE 1 MG/G
OINTMENT TOPICAL ONCE
OUTPATIENT
Start: 2022-12-12 | End: 2022-12-12

## 2022-12-12 RX ORDER — BACITRACIN ZINC AND POLYMYXIN B SULFATE 500; 1000 [USP'U]/G; [USP'U]/G
OINTMENT TOPICAL ONCE
OUTPATIENT
Start: 2022-12-12 | End: 2022-12-12

## 2022-12-12 RX ORDER — CLOBETASOL PROPIONATE 0.5 MG/G
OINTMENT TOPICAL ONCE
OUTPATIENT
Start: 2022-12-12 | End: 2022-12-12

## 2022-12-12 RX ORDER — BACITRACIN, NEOMYCIN, POLYMYXIN B 400; 3.5; 5 [USP'U]/G; MG/G; [USP'U]/G
OINTMENT TOPICAL ONCE
OUTPATIENT
Start: 2022-12-12 | End: 2022-12-12

## 2022-12-12 RX ADMIN — LIDOCAINE HYDROCHLORIDE 4 ML: 40 SOLUTION TOPICAL at 14:07

## 2022-12-12 ASSESSMENT — PAIN SCALES - GENERAL: PAINLEVEL_OUTOF10: 0

## 2022-12-12 NOTE — PROGRESS NOTES
[facility]aling Center Followup Visit Note    Referring Physician : Shameka Mcintosh MD  [address] RECORD NUMBER:  [id number]  AGE: 79 y.o. GENDER: male  : 1940  EPISODE DATE:  2022    Subjective:     Chief Complaint   Patient presents with    Wound Check     Right heel and toe wound      HISTORY of PRESENT ILLNESS DELLA Peralta is a 79 y.o. male who presents today in regards to follow up evaluation and treatment of wound/ulcer. That patient's past medical, family and social hx were reviewed and changes were made if present. History of Wound Context:  The patient has had a wound of right foot overlying the plantar surface of the right heel and plantar surface of the right first metatarsophalangeal joint which was first noted approximately at least 3 months ago. This has been treated by the patient in the facility, but patient did not call the wound care center as he was instructed in the past call immediately for any future ulcers. On their initial visit to the wound healing center, 22, the patient has noted that the wound has not been improving. The patient has had similar previous wounds in the past.       Patient in the past has undergone right femoral angiogram and angioplasty of right popliteal artery and tibial arteries by Dr. Lena Frankel in 2019     Patient has a diabetes mellitus diabetic neuropathy     Pt is currently not on abx.       Wound/Ulcer Pain Timing/Severity: constant  Quality of pain: dull, aching  Severity:  3 / 10   Modifying Factors: Pain worsens with walking  Associated Signs/Symptoms: drainage and pain       2022  Patient tells me that he saw his orthotist, the custom made insert was modified  Right foot wounds look improved          Ulcer Identification:  Ulcer Type: arterial, diabetic, pressure, and non-healing/non-surgical  Contributing Factors: diabetes, chronic pressure, decreased mobility, shear force, and arterial insufficiency Diabetic/Pressure/Non Pressure Ulcers only:  Ulcer: Diabetic ulcer, fat layer exposed     If patient has diabetic lower extremity wounds  Dale Classification of diabetic lower extremity wounds:     Grade Description   []  0 No open wound   []  1 Superficial ulcer involving the full skin thickness   []  2 Deep ulcer involves ligament, tendon, joint capsule, or fascia  No bone involvement or abscess presence   []  3 Deep Ulcer with abcess formation and/or osteomyelitis   []  4 Localized gangrene   []  5 Extensive gangrene of the foot      Wound: Patient does have diabetic foot ulcers, pressure ulcers, trophic ulcers, with a fat layer exposed underneath the first metatarsal phalangeal joint as well as the right heel overlying the plantar surface, patient recently did have a new set of inserts made to call the patient, patient was instructed make sure he contacts his orthotist and modify them to alleviate pressure overlying the ulcer area     Other pertinent information:     1.   The last lower extremity arterial Doppler study, done in April 2022 was personally reviewed by me        Narrative   On the right side, ankle-brachial index, 0.93 with biphasic plus ankle   Doppler tracing over the anterior tibial artery, with good arterial   flow to the right foot and the remaining toes, except over the right   fourth toe where the pulse volume recordings diminished       On the left side, ankle-brachial index within normal range of 0.99,   with biphasic plus ankle Doppler tracings and good arterial flow to   the foot and the remaining toes based upon the pulse volume recordings         12/5/2022  Discussed the patient regarding all options, risks benefits and alternatives, was recommend complete work-up including CBC, CMP, x-ray of the right foot, without osteomyelitis, leg and artery Doppler study to assess distal arterial perfusion  He was also given history, to call immediately extremities worsening of the ulcers  Patient also was instructed to contact his orthotist to make additional modifications of the inserts to alleviate pressure over the ulcerated areas of the right foot  All his questions were answered                      PAST MEDICAL HISTORY      Diagnosis Date    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 ([address] 12/6/2019    S/P peripheral artery angioplasty 01/23/2020    bilateral legs -Valerie Sox    Skin ulcer of right foot with fat layer exposed ([address] 12/9/2019    Ulcer of right leg, with fat layer exposed ([address] 5/6/2019    Varicose veins of leg with edema, 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     Past Surgical History:   Procedure Laterality Date    COLONOSCOPY      CYSTOSCOPY N/A 5/18/2020    SUPRAPUBIC TUBE PLACEMENT performed by Winnie Mckinney MD at [address]    lense implants bilaterally    FOOT DEBRIDEMENT Left 01/04/2017    wound debridement and delayed primary closure    FRACTURE SURGERY      L femur fracture surgery    HIP FRACTURE SURGERY Left 9/23/2020    LEFT HIP OPEN REDUCTION INTERNAL FIXATION performed by Alex Manecra MD at [address] [place] [address] Right 7/16/2021    RIGHT HIP HEMIARTHROPLASTY performed by Eleazar Castro DO at [facility]  04/23/2019    Dr. Andrae Benton- PTA ant tibial    OTHER SURGICAL HISTORY  12/17/2019    Dr Andrae Benton - PCI Right popliteal and Anterior tibial    PROSTATE BIOPSY  2016    SKIN BIOPSY   last time    head and ears    TOE AMPUTATION Left 2016    2nd    TOE AMPUTATION Right 2019    AMPUTATION RIGHT SECOND TOE, FOOT DEBRIDEMENT performed by Ayana Amos DPM at [address] Right 12/10/2019    AMPUTATION RIGHT 3rd DIGIT WITH PARTIAL RESECTION OF THIRD METATARSAL performed by Ayana Amos DPM at [address]    TURP N/A 2020    CYSTOSCOPY PLASMA LOOP TRANSURETHRAL RESECTION PROSTATE performed by Lucia Ruiz MD at [facility]       Family History   Problem Relation Age of Onset    Heart Disease Mother         CHF    Heart Disease Father     Heart Attack Father      Social History     Tobacco Use    Smoking status: Former     Packs/day: 0.50     Years: 2.00     Pack years: 1.00     Types: Cigarettes     Quit date:      Years since quittin.9    Smokeless tobacco: Never    Tobacco comments:     quit   Vaping Use    Vaping Use: Never used   Substance Use Topics    Alcohol use: Not Currently    Drug use: Not Currently     Allergies   Allergen Reactions    Latex Other (See Comments)     Pt unsure of reaction    Ciprofloxacin In D5w Itching    Food     Pcn [Penicillins] Other (See Comments)     \"I just know my body doesn't like it. \" \"I had a reaction a long time ago, I know it affects my kidneys. \"    Aspirin Other (See Comments)     \"It affects my kidneys. \"  Other reaction(s): Renal reactions    Other Rash     \"I get a rash on just my face if I eat Cumin or Chili. \"    Peanut-Containing Drug Products Itching     Current Outpatient Medications on File Prior to Encounter   Medication Sig Dispense Refill    vitamin D (CHOLECALCIFEROL) 125 MCG (5000 UT) CAPS capsule Take 5,000 Units by mouth daily      guaiFENesin-dextromethorphan (ROBITUSSIN DM) 100-10 MG/5ML syrup Take 10 mLs by mouth 3 times daily as needed for Cough      metFORMIN (GLUCOPHAGE) 500 MG tablet Take 500 mg by mouth at bedtime      magnesium hydroxide (MILK OF MAGNESIA) 400 MG/5ML suspension Take 30 mLs by mouth daily as needed for Constipation      zinc gluconate 50 MG tablet Take 50 mg by mouth daily      zinc sulfate (ZINCATE) 220 (50 Zn) MG capsule Take 50 mg by mouth daily      acetaminophen (TYLENOL) 325 MG tablet Take 650 mg by mouth every 4 hours as needed for Pain      miconazole nitrate 2 % OINT Please apply to the toes, in between the toes, both legs up to the upper calf, twice a day for 1 month 1 each 10    insulin glargine (LANTUS) 100 UNIT/ML injection vial Inject 10 Units into the skin nightly      rivastigmine (EXELON) 1.5 mg capsule Take 1.5 mg by mouth 3 times daily      aspirin 81 MG chewable tablet Take 81 mg by mouth daily      sodium chloride 1 g tablet Take 1 g by mouth in the morning and at bedtime      zinc sulfate (ZINCATE) 220 (50 Zn) MG capsule Take 50 mg by mouth daily      empagliflozin (JARDIANCE) 25 MG tablet Take 25 mg by mouth daily      insulin lispro (HUMALOG) 100 UNIT/ML injection vial Inject into the skin 4 times daily per sliding scale: 150-199 = 2 units;    200-249 = 4;    250-299 = 6;    300-349 = 8;    > 350 = notify MD      SITagliptin (JANUVIA) 100 MG tablet Take 100 mg by mouth daily      metFORMIN (GLUCOPHAGE) 1000 MG tablet Take 1,000 mg by mouth daily (with breakfast)      metoprolol succinate (TOPROL XL) 25 MG extended release tablet Take 1 tablet by mouth daily 30 tablet 3    amLODIPine (NORVASC) 5 MG tablet Take 2 tablets by mouth daily 30 tablet 3    Garlic 0027 MG TABS Take 1,250 mg by mouth daily       Multiple Vitamins-Minerals (THERAPEUTIC MULTIVITAMIN-MINERALS) tablet Take 1 tablet by mouth daily       No current facility-administered medications on file prior to encounter.        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 due to underlying condition, with fat layer exposed ([address] - Primary    Relevant Orders    Initiate Outpatient Wound Care Protocol       Medium    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 2 diabetes mellitus, with fat layer exposed ([address]    Relevant Orders    Initiate Outpatient Wound Care Protocol       Pre Debridement Measurements:  Are located in the [facility]  Documentation Flow Sheet  Post Debridement Measurements:  Wound/Ulcer Descriptions are Pre Debridement except measurements:    Wound 04/29/22 Foot Left;Plantar (Active)   Number of days: 227       Wound 12/05/22 Foot Right;Plantar;Proximal new wound, #1 right plantar foot (Active)   Wound Image   12/05/22 1503   Dressing Status New dressing applied 12/12/22 1512   Wound Cleansed Cleansed with saline 12/12/22 1512   Dressing/Treatment Alginate with Ag;Dry dressing;Roll gauze 12/12/22 1512   Offloading for Diabetic Foot Ulcers Orthowedge/inserts 12/12/22 1512   Wound Length (cm) 1 cm 12/12/22 1400   Wound Width (cm) 0.7 cm 12/12/22 1400   Wound Depth (cm) 0 cm 12/12/22 1400   Wound Surface Area (cm^2) 0.7 cm^2 12/12/22 1400   Change in Wound Size % (l*w) 70.83 12/12/22 1400   Wound Volume (cm^3) 0 cm^3 12/12/22 1400   Wound Healing % 100 12/12/22 1400   Post-Procedure Length (cm) 1.1 cm 12/12/22 1508   Post-Procedure Width (cm) 0.8 cm 12/12/22 1508   Post-Procedure Depth (cm) 0.1 cm 12/12/22 1508   Post-Procedure Surface Area (cm^2) 0.88 cm^2 12/12/22 1508   Post-Procedure Volume (cm^3) 0.088 cm^3 12/12/22 1508   Wound Assessment Fibrin;Pink/red 12/12/22 1400   Drainage Amount Moderate 12/12/22 1400   Drainage Description Brown 12/12/22 1400   Odor None 12/12/22 1400   Latonya-wound Assessment Hyperkeratosis (callous) 12/12/22 1400   Number of days: 7       Wound 12/05/22 Heel Right;Plantar wound #2 right plantar heel (Active)   Wound Image   12/05/22 1503   Dressing Status New dressing applied 12/12/22 1512   Wound Cleansed Cleansed with saline 12/12/22 1512   Dressing/Treatment Alginate with Ag;Dry dressing;Roll gauze 12/12/22 1512   Offloading for Diabetic Foot Ulcers Orthowedge/inserts 12/12/22 1512   Wound Length (cm) 1.3 cm 12/12/22 1400   Wound Width (cm) 1.7 cm 12/12/22 1400   Wound Depth (cm) 0.2 cm 12/12/22 1400   Wound Surface Area (cm^2) 2.21 cm^2 12/12/22 1400   Change in Wound Size % (l*w) 41.84 12/12/22 1400   Wound Volume (cm^3) 0.442 cm^3 12/12/22 1400   Wound Healing % 42 12/12/22 1400   Post-Procedure Length (cm) 1.4 cm 12/12/22 1508   Post-Procedure Width (cm) 1.8 cm 12/12/22 1508   Post-Procedure Depth (cm) 0.3 cm 12/12/22 1508   Post-Procedure Surface Area (cm^2) 2.52 cm^2 12/12/22 1508   Post-Procedure Volume (cm^3) 0.756 cm^3 12/12/22 1508   Wound Assessment Pale granulation tissue;Fibrin 12/12/22 1400   Drainage Amount Moderate 12/12/22 1400   Drainage Description Brown 12/12/22 1400   Odor None 12/12/22 1400   Latonya-wound Assessment Hyperkeratosis (callous) 12/12/22 1400   Number of days: 7          Procedure Note  Indications:  Based on my examination of this patient's wound(s)/ulcer(s) today, debridement is required to promote healing and evaluate the wound base.     Performed by: Alise Cloud MD    Consent obtained:  Yes    Time out taken:  Yes    Pain Control: Anesthetic  Anesthetic: 4% Lidocaine Liquid Topical     Debridement:Excisional Debridement    Using curette, scissors, and forceps the wound(s)/ulcer(s) was/were sharply debrided down through and including the removal of epidermis, dermis, and subcutaneous tissue. Devitalized Tissue Debrided:  fibrin and slough to stimulate bleeding to promote healing, post debridement good bleeding base and wound edges noted    Wound/Ulcer #: 1 and 2    Percent of Wound/Ulcer Debrided: 100%    Total Surface Area Debrided:  2.5 sq cm     Estimated Blood Loss:  Minimal  Hemostasis Achieved:  by pressure    Procedural Pain:  3  / 10   Post Procedural Pain:  2 / 10     Response to treatment:  Well tolerated by patient. Plan:   Treatment Note please see attached Discharge Instructions    Written patient dismissal instructions given to patient and signed by patient or POA. Discharge Instructions              Visit Discharge/Physician Orders     Discharge condition: Stable  Assessment of pain at discharge: minimal  Anesthetic used: 4% lidocaine external solution  Discharge to: ECF  Left via:Private automobile  Accompanied by: accompanied by self  ECF/A: [address] [place] [address]      Dressing Orders: To right foot wounds, cleanse with normal saline solution and apply alginate Ag and cover with a dry dressing. Change daily. Treatment Orders:     CBC and CMP and x-ray reviewed  Vasculars scheduled for 12/13/22  Need to obtain diabetic shoe inserts to keep pressure off of the wounds     FOLLOW NUTRITIOUS DIET. CHOOSE FOODS HIGH IN PROTEIN -CHICKEN- FISH-AND EGGS,  CHOOSE FOODS HIGH IN VITAMIN C.   MULTIVITAMIN DAILY. [facility] followup visit _____1 week__________________  (Please note your next appointment above and if you are unable to keep, kindly give a 24 hour notice.  Thank you.)     Physician signature:__________________________        If you experience any of the following, please call the [address] [place] [address] during business hours:     * Increase in Pain  * Temperature over 101  * Increase in drainage from your wound  * Drainage with a foul odor  * Bleeding  * Increase in swelling  * Need for compression bandage changes due to slippage, breakthrough drainage. If you need medical attention outside of the business hours of the [address] [place] [address] Road please contact your PCP or go to the nearest emergency room.         Electronically signed by Mikey Yanes MD on 12/12/2022 at 4:24 PM

## 2022-12-12 NOTE — PLAN OF CARE
Problem: Chronic Conditions and Co-morbidities  Goal: Patient's chronic conditions and co-morbidity symptoms are monitored and maintained or improved  Outcome: Progressing     Problem: Cognitive:  Goal: Knowledge of wound care  Description: Knowledge of wound care  Outcome: Completed  Goal: Understands risk factors for wounds  Description: Understands risk factors for wounds  Outcome: Completed     Problem: Wound:  Goal: Will show signs of wound healing; wound closure and no evidence of infection  Description: Will show signs of wound healing; wound closure and no evidence of infection  Outcome: Progressing

## 2022-12-13 ENCOUNTER — HOSPITAL ENCOUNTER (OUTPATIENT)
Dept: INTERVENTIONAL RADIOLOGY/VASCULAR | Age: 82
Discharge: HOME | End: 2022-12-15
Payer: MEDICARE

## 2022-12-13 DIAGNOSIS — Z98.62 S/P PERIPHERAL ARTERY ANGIOPLASTY: ICD-10-CM

## 2022-12-13 DIAGNOSIS — E08.621 DIABETIC ULCER OF RIGHT MIDFOOT ASSOCIATED WITH DIABETES MELLITUS DUE TO UNDERLYING CONDITION, WITH FAT LAYER EXPOSED (HCC): ICD-10-CM

## 2022-12-13 DIAGNOSIS — L97.412 DIABETIC ULCER OF RIGHT HEEL ASSOCIATED WITH TYPE 2 DIABETES MELLITUS, WITH FAT LAYER EXPOSED (HCC): ICD-10-CM

## 2022-12-13 DIAGNOSIS — I73.9 PVD (PERIPHERAL VASCULAR DISEASE) WITH CLAUDICATION (HCC): ICD-10-CM

## 2022-12-13 DIAGNOSIS — E11.621 DIABETIC ULCER OF RIGHT HEEL ASSOCIATED WITH TYPE 2 DIABETES MELLITUS, WITH FAT LAYER EXPOSED (HCC): ICD-10-CM

## 2022-12-13 DIAGNOSIS — L97.412 DIABETIC ULCER OF RIGHT MIDFOOT ASSOCIATED WITH DIABETES MELLITUS DUE TO UNDERLYING CONDITION, WITH FAT LAYER EXPOSED (HCC): ICD-10-CM

## 2022-12-13 PROCEDURE — 93923 UPR/LXTR ART STDY 3+ LVLS: CPT

## 2022-12-14 NOTE — DISCHARGE INSTRUCTIONS
Visit Discharge/Physician Orders     Discharge condition: Stable  Assessment of pain at discharge: minimal  Anesthetic used: 4% lidocaine external solution  Discharge to: ECF  Left via:Private automobile  Accompanied by: accompanied by self  ECF/HHA: [facility]     Dressing Orders: To right foot wounds, cleanse with normal saline solution and apply alginate Ag and cover with a dry dressing. Change daily. Treatment Orders:     CBC and CMP and x-ray- REVIEWED  Vasculars scheduled for 12/13/22- REVIEWED  Continue using diabetic shoe inserts, keep pressure off of wound sites     FOLLOW NUTRITIOUS DIET. CHOOSE FOODS HIGH IN PROTEIN -CHICKEN- FISH-AND EGGS,  CHOOSE FOODS HIGH IN VITAMIN C.   MULTIVITAMIN DAILY. [facility] followup visit _____1 week Tuesday PM__________________  (Please note your next appointment above and if you are unable to keep, kindly give a 24 hour notice. Thank you.)     Physician signature:__________________________        If you experience any of the following, please call the [facility] during business hours:     * Increase in Pain  * Temperature over 101  * Increase in drainage from your wound  * Drainage with a foul odor  * Bleeding  * Increase in swelling  * Need for compression bandage changes due to slippage, breakthrough drainage. If you need medical attention outside of the business hours of the [facility] please contact your PCP or go to the nearest emergency room.

## 2022-12-19 ENCOUNTER — HOSPITAL ENCOUNTER (OUTPATIENT)
Dept: WOUND CARE | Age: 82
Discharge: HOME | End: 2022-12-19
Payer: MEDICARE

## 2022-12-19 VITALS
TEMPERATURE: 96.1 F | BODY MASS INDEX: 20.73 KG/M2 | HEIGHT: 69 IN | RESPIRATION RATE: 18 BRPM | DIASTOLIC BLOOD PRESSURE: 65 MMHG | WEIGHT: 140 LBS | SYSTOLIC BLOOD PRESSURE: 143 MMHG

## 2022-12-19 DIAGNOSIS — E11.621 DIABETIC ULCER OF RIGHT HEEL ASSOCIATED WITH TYPE 2 DIABETES MELLITUS, WITH FAT LAYER EXPOSED (HCC): ICD-10-CM

## 2022-12-19 DIAGNOSIS — E08.621 DIABETIC ULCER OF RIGHT MIDFOOT ASSOCIATED WITH DIABETES MELLITUS DUE TO UNDERLYING CONDITION, WITH FAT LAYER EXPOSED (HCC): Primary | ICD-10-CM

## 2022-12-19 DIAGNOSIS — Z98.62 S/P PERIPHERAL ARTERY ANGIOPLASTY: ICD-10-CM

## 2022-12-19 DIAGNOSIS — L97.412 DIABETIC ULCER OF RIGHT HEEL ASSOCIATED WITH TYPE 2 DIABETES MELLITUS, WITH FAT LAYER EXPOSED (HCC): ICD-10-CM

## 2022-12-19 DIAGNOSIS — L97.412 DIABETIC ULCER OF RIGHT MIDFOOT ASSOCIATED WITH DIABETES MELLITUS DUE TO UNDERLYING CONDITION, WITH FAT LAYER EXPOSED (HCC): Primary | ICD-10-CM

## 2022-12-19 PROCEDURE — 11042 DBRDMT SUBQ TIS 1ST 20SQCM/<: CPT

## 2022-12-19 RX ORDER — LIDOCAINE 40 MG/G
CREAM TOPICAL ONCE
OUTPATIENT
Start: 2022-12-19 | End: 2022-12-19

## 2022-12-19 RX ORDER — LIDOCAINE HYDROCHLORIDE 40 MG/ML
SOLUTION TOPICAL ONCE
Status: COMPLETED | OUTPATIENT
Start: 2022-12-19 | End: 2022-12-19

## 2022-12-19 RX ORDER — BACITRACIN, NEOMYCIN, POLYMYXIN B 400; 3.5; 5 [USP'U]/G; MG/G; [USP'U]/G
OINTMENT TOPICAL ONCE
OUTPATIENT
Start: 2022-12-19 | End: 2022-12-19

## 2022-12-19 RX ORDER — BACITRACIN ZINC AND POLYMYXIN B SULFATE 500; 1000 [USP'U]/G; [USP'U]/G
OINTMENT TOPICAL ONCE
OUTPATIENT
Start: 2022-12-19 | End: 2022-12-19

## 2022-12-19 RX ORDER — LIDOCAINE HYDROCHLORIDE 20 MG/ML
JELLY TOPICAL ONCE
OUTPATIENT
Start: 2022-12-19 | End: 2022-12-19

## 2022-12-19 RX ORDER — BETAMETHASONE DIPROPIONATE 0.05 %
OINTMENT (GRAM) TOPICAL ONCE
OUTPATIENT
Start: 2022-12-19 | End: 2022-12-19

## 2022-12-19 RX ORDER — GENTAMICIN SULFATE 1 MG/G
OINTMENT TOPICAL ONCE
OUTPATIENT
Start: 2022-12-19 | End: 2022-12-19

## 2022-12-19 RX ORDER — LIDOCAINE HYDROCHLORIDE 40 MG/ML
SOLUTION TOPICAL ONCE
OUTPATIENT
Start: 2022-12-19 | End: 2022-12-19

## 2022-12-19 RX ORDER — GINSENG 100 MG
CAPSULE ORAL ONCE
OUTPATIENT
Start: 2022-12-19 | End: 2022-12-19

## 2022-12-19 RX ORDER — CLOBETASOL PROPIONATE 0.5 MG/G
OINTMENT TOPICAL ONCE
OUTPATIENT
Start: 2022-12-19 | End: 2022-12-19

## 2022-12-19 RX ORDER — LIDOCAINE 50 MG/G
OINTMENT TOPICAL ONCE
OUTPATIENT
Start: 2022-12-19 | End: 2022-12-19

## 2022-12-19 RX ADMIN — LIDOCAINE HYDROCHLORIDE 5 ML: 40 SOLUTION TOPICAL at 13:58

## 2022-12-19 NOTE — PLAN OF CARE
Problem: Chronic Conditions and Co-morbidities  Goal: Patient's chronic conditions and co-morbidity symptoms are monitored and maintained or improved  Outcome: Progressing     Problem: Chronic Conditions and Co-morbidities  Goal: Patient's chronic conditions and co-morbidity symptoms are monitored and maintained or improved  Outcome: Progressing     Problem: Wound:  Goal: Will show signs of wound healing; wound closure and no evidence of infection  Description: Will show signs of wound healing; wound closure and no evidence of infection  Outcome: Progressing

## 2022-12-19 NOTE — PROGRESS NOTES
Wound Healing Center Followup Visit Note    Referring Physician : Jeannie Simon MD  [address] RECORD NUMBER:  [id number]  AGE: 79 y.o. GENDER: male  : 1940  EPISODE DATE:  2022    Subjective:     Chief Complaint   Patient presents with    Wound Check     Right foot      HISTORY of PRESENT ILLNESS HPI   Kiesha Kearney is a 79 y.o. male who presents today in regards to follow up evaluation and treatment of wound/ulcer. That patient's past medical, family and social hx were reviewed and changes were made if present. History of Wound Context:  The patient has had a wound of right foot overlying the plantar surface of the right heel and plantar surface of the right first metatarsophalangeal joint which was first noted approximately at least 3 months ago. This has been treated by the patient in the facility, but patient did not call the wound care center as he was instructed in the past call immediately for any future ulcers. On their initial visit to the wound healing center, 22, the patient has noted that the wound has not been improving. The patient has had similar previous wounds in the past.       Patient in the past has undergone right femoral angiogram and angioplasty of right popliteal artery and tibial arteries by Dr. Mychal Curry in 2019     Patient has a diabetes mellitus diabetic neuropathy     Pt is currently not on abx.       Wound/Ulcer Pain Timing/Severity: constant  Quality of pain: dull, aching  Severity:  3 / 10   Modifying Factors: Pain worsens with walking  Associated Signs/Symptoms: drainage and pain       2022  Patient tells me that he saw his orthotist, the custom made insert was modified  Right foot wounds look improved    2022  Right foot wound slowly improving, patient is wearing the modified custom made orthotic of the right foot  The ankle-brachial index that was done was reviewed, overall adequate flow for tissue perfusion, 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, with adequate collateral flow to the foot   and the remaining toes based upon the pulse volume recordings       On the left side, normal ankle arm index, with triphasic ankle Doppler   tracings and good arterial flow to the foot and the toes based upon   the pulse volume recordings       Overall, no significant change noted compared the study that was in   April 2022         Ulcer Identification:  Ulcer Type: arterial, diabetic, pressure, and non-healing/non-surgical  Contributing Factors: diabetes, chronic pressure, decreased mobility, shear force, and arterial insufficiency     Diabetic/Pressure/Non Pressure Ulcers only:  Ulcer: Diabetic ulcer, fat layer exposed     If patient has diabetic lower extremity wounds  Dale Classification of diabetic lower extremity wounds:     Grade Description   []  0 No open wound   []  1 Superficial ulcer involving the full skin thickness   []  2 Deep ulcer involves ligament, tendon, joint capsule, or fascia  No bone involvement or abscess presence   []  3 Deep Ulcer with abcess formation and/or osteomyelitis   []  4 Localized gangrene   []  5 Extensive gangrene of the foot      Wound: Patient does have diabetic foot ulcers, pressure ulcers, trophic ulcers, with a fat layer exposed underneath the first metatarsal phalangeal joint as well as the right heel overlying the plantar surface, patient recently did have a new set of inserts made to call the patient, patient was instructed make sure he contacts his orthotist and modify them to alleviate pressure overlying the ulcer area     Other pertinent information:     1.   The last lower extremity arterial Doppler study, done in April 2022 was personally reviewed by me Narrative   On the right side, ankle-brachial index, 0.93 with biphasic plus ankle   Doppler tracing over the anterior tibial artery, with good arterial   flow to the right foot and the remaining toes, except over the right   fourth toe where the pulse volume recordings diminished       On the left side, ankle-brachial index within normal range of 0.99,   with biphasic plus ankle Doppler tracings and good arterial flow to   the foot and the remaining toes based upon the pulse volume recordings         12/5/2022  Discussed the patient regarding all options, risks benefits and alternatives, was recommend complete work-up including CBC, CMP, x-ray of the right foot, without osteomyelitis, leg and artery Doppler study to assess distal arterial perfusion  He was also given history, to call immediately extremities worsening of the ulcers  Patient also was instructed to contact his orthotist to make additional modifications of the inserts to alleviate pressure over the ulcerated areas of the right foot  All his questions were answered                      PAST MEDICAL HISTORY      Diagnosis Date    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 ([address] 12/6/2019    S/P peripheral artery angioplasty 01/23/2020    bilateral legs - [place]    Skin ulcer of right foot with fat layer exposed ([address] 12/9/2019    Ulcer of right leg, with fat layer exposed ([facility] 2019    Varicose veins of leg with edema, right 2019     Past Surgical History:   Procedure Laterality Date    COLONOSCOPY      CYSTOSCOPY N/A 2020    SUPRAPUBIC TUBE PLACEMENT performed by Chralotte Santiago MD at [address]    lense implants bilaterally    FOOT DEBRIDEMENT Left 2017    wound debridement and delayed primary closure    FRACTURE SURGERY      L femur fracture surgery    HIP FRACTURE SURGERY Left 2020    LEFT HIP OPEN REDUCTION INTERNAL FIXATION performed by Elizabeth Dia MD at [address] Right 2021    RIGHT HIP HEMIARTHROPLASTY performed by Quiana Jones DO at [address] [facility]  2019    Dr. Horowitz Gut- PTA ant tibial    OTHER SURGICAL HISTORY  2019    Dr Horowitz Gut - PCI Right popliteal and Anterior tibial    PROSTATE BIOPSY  2016    SKIN BIOPSY   last time    head and ears    TOE AMPUTATION Left 2016    2nd    TOE AMPUTATION Right 2019    AMPUTATION RIGHT SECOND TOE, FOOT DEBRIDEMENT performed by Charo Jay DPM at [address] Right 12/10/2019    AMPUTATION RIGHT 3rd DIGIT WITH PARTIAL RESECTION OF THIRD METATARSAL performed by Charo Jay DPM at [address]    TURP N/A 2020    CYSTOSCOPY PLASMA LOOP TRANSURETHRAL RESECTION PROSTATE performed by Charlotte Santiago MD at [facility]       Family History   Problem Relation Age of Onset    Heart Disease Mother         CHF    Heart Disease Father     Heart Attack Father      Social History     Tobacco Use    Smoking status: Former     Packs/day: 0.50     Years: 2.00     Pack years: 1.00     Types: Cigarettes     Quit date: 1960     Years since quittin.0    Smokeless tobacco: Never    Tobacco comments:     quit   Vaping Use    Vaping Use: Never used   Substance Use Topics    Alcohol use: Not Currently    Drug use: Not Currently     Allergies Allergen Reactions    Latex Other (See Comments)     Pt unsure of reaction    Ciprofloxacin In D5w Itching    Food     Pcn [Penicillins] Other (See Comments)     \"I just know my body doesn't like it. \" \"I had a reaction a long time ago, I know it affects my kidneys. \"    Aspirin Other (See Comments)     \"It affects my kidneys. \"  Other reaction(s): Renal reactions    Other Rash     \"I get a rash on just my face if I eat Cumin or Chili. \"    Peanut-Containing Drug Products Itching     Current Outpatient Medications on File Prior to Encounter   Medication Sig Dispense Refill    vitamin D (CHOLECALCIFEROL) 125 MCG (5000 UT) CAPS capsule Take 5,000 Units by mouth daily      guaiFENesin-dextromethorphan (ROBITUSSIN DM) 100-10 MG/5ML syrup Take 10 mLs by mouth 3 times daily as needed for Cough      metFORMIN (GLUCOPHAGE) 500 MG tablet Take 500 mg by mouth at bedtime      magnesium hydroxide (MILK OF MAGNESIA) 400 MG/5ML suspension Take 30 mLs by mouth daily as needed for Constipation      zinc gluconate 50 MG tablet Take 50 mg by mouth daily      zinc sulfate (ZINCATE) 220 (50 Zn) MG capsule Take 50 mg by mouth daily      acetaminophen (TYLENOL) 325 MG tablet Take 650 mg by mouth every 4 hours as needed for Pain      miconazole nitrate 2 % OINT Please apply to the toes, in between the toes, both legs up to the upper calf, twice a day for 1 month 1 each 10    insulin glargine (LANTUS) 100 UNIT/ML injection vial Inject 10 Units into the skin nightly      rivastigmine (EXELON) 1.5 mg capsule Take 1.5 mg by mouth 3 times daily      aspirin 81 MG chewable tablet Take 81 mg by mouth daily      sodium chloride 1 g tablet Take 1 g by mouth in the morning and at bedtime      zinc sulfate (ZINCATE) 220 (50 Zn) MG capsule Take 50 mg by mouth daily      empagliflozin (JARDIANCE) 25 MG tablet Take 25 mg by mouth daily      insulin lispro (HUMALOG) 100 UNIT/ML injection vial Inject into the skin 4 times daily per sliding scale: 150-199 = 2 units;    200-249 = 4;    250-299 = 6;    300-349 = 8;    > 350 = notify MD      SITagliptin (JANUVIA) 100 MG tablet Take 100 mg by mouth daily      metFORMIN (GLUCOPHAGE) 1000 MG tablet Take 1,000 mg by mouth daily (with breakfast)      metoprolol succinate (TOPROL XL) 25 MG extended release tablet Take 1 tablet by mouth daily 30 tablet 3    amLODIPine (NORVASC) 5 MG tablet Take 2 tablets by mouth daily 30 tablet 3    Garlic 9672 MG TABS Take 1,250 mg by mouth daily       Multiple Vitamins-Minerals (THERAPEUTIC MULTIVITAMIN-MINERALS) tablet Take 1 tablet by mouth daily       No current facility-administered medications on file prior to encounter.        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 due to underlying condition, with fat layer exposed ([address] - Primary    Relevant Orders    Initiate Outpatient Wound Care Protocol       Medium    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 2 diabetes mellitus, with fat layer exposed ([address]    Relevant Orders    Initiate Outpatient Wound Care Protocol    S/P peripheral artery angioplasty       Pre Debridement Measurements:  Are located in the [facility]  Documentation Flow Sheet  Post Debridement Measurements:  Wound/Ulcer Descriptions are Pre Debridement except measurements:    Wound 04/29/22 Foot Left;Plantar (Active)   Number of days: 234       Wound 12/05/22 Foot Right;Plantar;Proximal new wound, #1 right plantar foot (Active)   Wound Image   12/05/22 1503   Dressing Status New dressing applied 12/12/22 1512   Wound Cleansed Cleansed with saline 12/12/22 1512   Dressing/Treatment Alginate with Ag;Dry dressing;Roll gauze 12/12/22 1512   Offloading for Diabetic Foot Ulcers Orthowedge/inserts 12/12/22 1512   Wound Length (cm) 0.45 cm 12/19/22 1354   Wound Width (cm) 0.5 cm 12/19/22 1354   Wound Depth (cm) 0.1 cm 12/19/22 1354   Wound Surface Area (cm^2) 0.225 cm^2 12/19/22 1354   Change in Wound Size % (l*w) 90.63 12/19/22 1354   Wound Volume (cm^3) 0.0225 cm^3 12/19/22 1354   Wound Healing % 91 12/19/22 1354   Post-Procedure Length (cm) 0.6 cm 12/19/22 1414   Post-Procedure Width (cm) 0.6 cm 12/19/22 1414   Post-Procedure Depth (cm) 0.2 cm 12/19/22 1414   Post-Procedure Surface Area (cm^2) 0.36 cm^2 12/19/22 1414   Post-Procedure Volume (cm^3) 0.072 cm^3 12/19/22 1414   Wound Assessment Fibrin;Pink/red 12/19/22 1354   Drainage Amount Moderate 12/19/22 1354   Drainage Description Brown 12/19/22 1354   Odor None 12/19/22 1354   Latonya-wound Assessment Hyperkeratosis (callous) 12/19/22 1354   Number of days: 13       Wound 12/05/22 Heel Right;Plantar wound #2 right plantar heel (Active)   Wound Image   12/05/22 1503   Dressing Status New dressing applied 12/12/22 1512   Wound Cleansed Cleansed with saline 12/12/22 1512   Dressing/Treatment Alginate with Ag;Dry dressing;Roll gauze 12/12/22 1512   Offloading for Diabetic Foot Ulcers Orthowedge/inserts 12/12/22 1512   Wound Length (cm) 1.2 cm 12/19/22 1354   Wound Width (cm) 1.6 cm 12/19/22 1354   Wound Depth (cm) 0.2 cm 12/19/22 1354   Wound Surface Area (cm^2) 1.92 cm^2 12/19/22 1354   Change in Wound Size % (l*w) 49.47 12/19/22 1354   Wound Volume (cm^3) 0.384 cm^3 12/19/22 1354   Wound Healing % 49 12/19/22 1354   Post-Procedure Length (cm) 1.3 cm 12/19/22 1414   Post-Procedure Width (cm) 1.7 cm 12/19/22 1414   Post-Procedure Depth (cm) 0.3 cm 12/19/22 1414   Post-Procedure Surface Area (cm^2) 2.21 cm^2 12/19/22 1414   Post-Procedure Volume (cm^3) 0.663 cm^3 12/19/22 1414   Wound Assessment Pale granulation tissue;Fibrin 12/19/22 1354   Drainage Amount Moderate 12/19/22 1354   Drainage Description [place] 12/19/22 1354   Odor Mild 12/19/22 1354   Latonya-wound Assessment Hyperkeratosis (callous) 12/19/22 1354   Number of days: 13          Procedure Note  Indications:  Based on my examination of this patient's wound(s)/ulcer(s) today, debridement is required to promote healing and evaluate the wound base. Performed by: Lon Jarrett MD    Consent obtained:  Yes    Time out taken:  Yes    Pain Control: Anesthetic  Anesthetic: 4% Lidocaine Liquid Topical     Debridement:Excisional Debridement    Using curette, scissors, and forceps the wound(s)/ulcer(s) was/were sharply debrided down through and including the removal of epidermis, dermis, and subcutaneous tissue. Devitalized Tissue Debrided:  fibrin and slough to stimulate bleeding to promote healing, post debridement good bleeding base and wound edges noted    Wound/Ulcer #: 1 and 2    Percent of Wound/Ulcer Debrided: 100%    Total Surface Area Debrided:  2.5 sq cm     Estimated Blood Loss:  Minimal  Hemostasis Achieved:  by pressure    Procedural Pain:  3  / 10   Post Procedural Pain:  2 / 10     Response to treatment:  Well tolerated by patient. Plan:   Treatment Note please see attached Discharge Instructions    Written patient dismissal instructions given to patient and signed by patient or POA. Discharge Instructions         Visit Discharge/Physician Orders     Discharge condition: Stable  Assessment of pain at discharge: minimal  Anesthetic used: 4% lidocaine external solution  Discharge to: ECF  Left via:Private automobile  Accompanied by: accompanied by self  ECF/[facility]HA: [facility]      Dressing Orders:      To right foot wounds, cleanse with normal saline solution and apply alginate Ag and cover with a dry dressing. Change daily. Treatment Orders:     CBC and CMP and x-ray- REVIEWED  Vasculars scheduled for 12/13/22- REVIEWED  Continue using diabetic shoe inserts, keep pressure off of wound sites     FOLLOW NUTRITIOUS DIET. CHOOSE FOODS HIGH IN PROTEIN -CHICKEN- FISH-AND EGGS,  CHOOSE FOODS HIGH IN VITAMIN C.   MULTIVITAMIN DAILY. [address] [place] [address],[address] followup visit _____1 week Tuesday PM__________________  (Please note your next appointment above and if you are unable to keep, kindly give a 24 hour notice. Thank you.)     Physician signature:__________________________        If you experience any of the following, please call the [facility] during business hours:     * Increase in Pain  * Temperature over 101  * Increase in drainage from your wound  * Drainage with a foul odor  * Bleeding  * Increase in swelling  * Need for compression bandage changes due to slippage, breakthrough drainage. If you need medical attention outside of the business hours of the [facility] please contact your PCP or go to the nearest emergency room.             Electronically signed by Helen Song MD on 12/19/2022 at 2:17 PM

## 2022-12-27 ENCOUNTER — HOSPITAL ENCOUNTER (OUTPATIENT)
Dept: WOUND CARE | Age: 82
Discharge: HOME | End: 2022-12-27
Payer: MEDICARE

## 2022-12-27 VITALS
SYSTOLIC BLOOD PRESSURE: 152 MMHG | DIASTOLIC BLOOD PRESSURE: 70 MMHG | RESPIRATION RATE: 18 BRPM | HEART RATE: 68 BPM | TEMPERATURE: 97.4 F

## 2022-12-27 DIAGNOSIS — E08.621 DIABETIC ULCER OF RIGHT MIDFOOT ASSOCIATED WITH DIABETES MELLITUS DUE TO UNDERLYING CONDITION, WITH FAT LAYER EXPOSED (HCC): Primary | ICD-10-CM

## 2022-12-27 DIAGNOSIS — L97.412 DIABETIC ULCER OF RIGHT MIDFOOT ASSOCIATED WITH DIABETES MELLITUS DUE TO UNDERLYING CONDITION, WITH FAT LAYER EXPOSED (HCC): Primary | ICD-10-CM

## 2022-12-27 DIAGNOSIS — L97.412 DIABETIC ULCER OF RIGHT HEEL ASSOCIATED WITH TYPE 2 DIABETES MELLITUS, WITH FAT LAYER EXPOSED (HCC): ICD-10-CM

## 2022-12-27 DIAGNOSIS — E11.621 DIABETIC ULCER OF RIGHT HEEL ASSOCIATED WITH TYPE 2 DIABETES MELLITUS, WITH FAT LAYER EXPOSED (HCC): ICD-10-CM

## 2022-12-27 PROCEDURE — 11042 DBRDMT SUBQ TIS 1ST 20SQCM/<: CPT | Performed by: SURGERY

## 2022-12-27 PROCEDURE — 11042 DBRDMT SUBQ TIS 1ST 20SQCM/<: CPT

## 2022-12-27 RX ORDER — LIDOCAINE HYDROCHLORIDE 20 MG/ML
JELLY TOPICAL ONCE
OUTPATIENT
Start: 2022-12-27 | End: 2022-12-27

## 2022-12-27 RX ORDER — BETAMETHASONE DIPROPIONATE 0.05 %
OINTMENT (GRAM) TOPICAL ONCE
OUTPATIENT
Start: 2022-12-27 | End: 2022-12-27

## 2022-12-27 RX ORDER — CLOBETASOL PROPIONATE 0.5 MG/G
OINTMENT TOPICAL ONCE
OUTPATIENT
Start: 2022-12-27 | End: 2022-12-27

## 2022-12-27 RX ORDER — GENTAMICIN SULFATE 1 MG/G
OINTMENT TOPICAL ONCE
OUTPATIENT
Start: 2022-12-27 | End: 2022-12-27

## 2022-12-27 RX ORDER — LIDOCAINE HYDROCHLORIDE 40 MG/ML
SOLUTION TOPICAL ONCE
OUTPATIENT
Start: 2022-12-27 | End: 2022-12-27

## 2022-12-27 RX ORDER — GINSENG 100 MG
CAPSULE ORAL ONCE
OUTPATIENT
Start: 2022-12-27 | End: 2022-12-27

## 2022-12-27 RX ORDER — LIDOCAINE 50 MG/G
OINTMENT TOPICAL ONCE
OUTPATIENT
Start: 2022-12-27 | End: 2022-12-27

## 2022-12-27 RX ORDER — LIDOCAINE HYDROCHLORIDE 40 MG/ML
SOLUTION TOPICAL ONCE
Status: DISCONTINUED | OUTPATIENT
Start: 2022-12-27 | End: 2022-12-28 | Stop reason: HOSPADM

## 2022-12-27 RX ORDER — LIDOCAINE 40 MG/G
CREAM TOPICAL ONCE
OUTPATIENT
Start: 2022-12-27 | End: 2022-12-27

## 2022-12-27 RX ORDER — BACITRACIN, NEOMYCIN, POLYMYXIN B 400; 3.5; 5 [USP'U]/G; MG/G; [USP'U]/G
OINTMENT TOPICAL ONCE
OUTPATIENT
Start: 2022-12-27 | End: 2022-12-27

## 2022-12-27 RX ORDER — BACITRACIN ZINC AND POLYMYXIN B SULFATE 500; 1000 [USP'U]/G; [USP'U]/G
OINTMENT TOPICAL ONCE
OUTPATIENT
Start: 2022-12-27 | End: 2022-12-27

## 2022-12-27 NOTE — PLAN OF CARE
Problem: Chronic Conditions and Co-morbidities  Goal: Patient's chronic conditions and co-morbidity symptoms are monitored and maintained or improved  Outcome: Progressing     Problem: Wound:  Goal: Will show signs of wound healing; wound closure and no evidence of infection  Description: Will show signs of wound healing; wound closure and no evidence of infection  Outcome: Progressing     Problem: Chronic Conditions and Co-morbidities  Goal: Patient's chronic conditions and co-morbidity symptoms are monitored and maintained or improved  Outcome: Progressing

## 2022-12-27 NOTE — DISCHARGE INSTRUCTIONS
Visit Discharge/Physician Orders     Discharge condition: Stable  Assessment of pain at discharge: minimal  Anesthetic used: 4% lidocaine external solution  Discharge to: ECF  Left via:Private automobile  Accompanied by: accompanied by self  ECF/HHA: [facility]      Dressing Orders: To right foot wounds, cleanse with normal saline solution and apply alginate Ag and cover with a dry dressing. Change daily. Treatment Orders:     CBC and CMP and x-ray- REVIEWED  Vasculars scheduled for 12/13/22- REVIEWED  Continue using diabetic shoe inserts, keep pressure off of wound sites     FOLLOW NUTRITIOUS DIET. CHOOSE FOODS HIGH IN PROTEIN -CHICKEN- FISH-AND EGGS,  CHOOSE FOODS HIGH IN VITAMIN C.   MULTIVITAMIN DAILY. [facility] followup visit _____2 weeks Monday PM __________________  (Please note your next appointment above and if you are unable to keep, kindly give a 24 hour notice. Thank you.)     Physician signature:__________________________        If you experience any of the following, please call the [facility] during business hours:     * Increase in Pain  * Temperature over 101  * Increase in drainage from your wound  * Drainage with a foul odor  * Bleeding  * Increase in swelling  * Need for compression bandage changes due to slippage, breakthrough drainage. If you need medical attention outside of the business hours of the [facility] please contact your PCP or go to the nearest emergency room.

## 2022-12-27 NOTE — PROGRESS NOTES
Wound Healing Center Followup Visit Note    Referring Physician : Gabby Castillo MD  [address] RECORD NUMBER:  [id number]  AGE: 79 y.o. GENDER: male  : 1940  EPISODE DATE:  2022    Subjective:     No chief complaint on file. HISTORY of PRESENT ILLNESS HPI   Genesis Rodriguez is a 79 y.o. male who presents today in regards to follow up evaluation and treatment of wound/ulcer. That patient's past medical, family and social hx were reviewed and changes were made if present. History of Wound Context:  The patient has had a wound of right foot overlying the plantar surface of the right heel and plantar surface of the right first metatarsophalangeal joint which was first noted approximately at least 3 months ago. This has been treated by the patient in the facility, but patient did not call the wound care center as he was instructed in the past call immediately for any future ulcers. On their initial visit to the wound healing center, 22, the patient has noted that the wound has not been improving. The patient has had similar previous wounds in the past.       Patient in the past has undergone right femoral angiogram and angioplasty of right popliteal artery and tibial arteries by Dr. Ana Curiel in 2019     Patient has a diabetes mellitus diabetic neuropathy     Pt is currently not on abx.       Wound/Ulcer Pain Timing/Severity: constant  Quality of pain: dull, aching  Severity:  3 / 10   Modifying Factors: Pain worsens with walking  Associated Signs/Symptoms: drainage and pain       2022  Patient tells me that he saw his orthotist, the custom made insert was modified  Right foot wounds look improved    2022  Right foot wound slowly improving, patient is wearing the modified custom made orthotic of the right foot  The ankle-brachial index that was done was reviewed, overall adequate flow for tissue perfusion, 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, with adequate collateral flow to the foot   and the remaining toes based upon the pulse volume recordings       On the left side, normal ankle arm index, with triphasic ankle Doppler   tracings and good arterial flow to the foot and the toes based upon   the pulse volume recordings       Overall, no significant change noted compared the study that was in   April 2022 12/27/2022  Ulcers of the right foot, slowly improving, continue to offload as much as possible    Ulcer Identification:  Ulcer Type: arterial, diabetic, pressure, and non-healing/non-surgical  Contributing Factors: diabetes, chronic pressure, decreased mobility, shear force, and arterial insufficiency     Diabetic/Pressure/Non Pressure Ulcers only:  Ulcer: Diabetic ulcer, fat layer exposed     If patient has diabetic lower extremity wounds  Dale Classification of diabetic lower extremity wounds:     Grade Description   []  0 No open wound   []  1 Superficial ulcer involving the full skin thickness   []  2 Deep ulcer involves ligament, tendon, joint capsule, or fascia  No bone involvement or abscess presence   []  3 Deep Ulcer with abcess formation and/or osteomyelitis   []  4 Localized gangrene   []  5 Extensive gangrene of the foot      Wound: Patient does have diabetic foot ulcers, pressure ulcers, trophic ulcers, with a fat layer exposed underneath the first metatarsal phalangeal joint as well as the right heel overlying the plantar surface, patient recently did have a new set of inserts made to call the patient, patient was instructed make sure he contacts his orthotist and modify them to alleviate pressure overlying the ulcer area     Other pertinent information:     1.   The last lower extremity arterial Doppler study, done in April 2022 was personally reviewed by me        Narrative   On the right side, ankle-brachial index, 0.93 with biphasic plus ankle   Doppler tracing over the anterior tibial artery, with good arterial   flow to the right foot and the remaining toes, except over the right   fourth toe where the pulse volume recordings diminished       On the left side, ankle-brachial index within normal range of 0.99,   with biphasic plus ankle Doppler tracings and good arterial flow to   the foot and the remaining toes based upon the pulse volume recordings         12/5/2022  Discussed the patient regarding all options, risks benefits and alternatives, was recommend complete work-up including CBC, CMP, x-ray of the right foot, without osteomyelitis, leg and artery Doppler study to assess distal arterial perfusion  He was also given history, to call immediately extremities worsening of the ulcers  Patient also was instructed to contact his orthotist to make additional modifications of the inserts to alleviate pressure over the ulcerated areas of the right foot  All his questions were answered                      PAST MEDICAL HISTORY      Diagnosis Date    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 ([address] 12/6/2019    S/P peripheral artery angioplasty 01/23/2020    bilateral legs -[place]    Skin ulcer of right foot with fat layer exposed ([facility] 2019    Ulcer of right leg, with fat layer exposed ([facility] 2019    Varicose veins of leg with edema, right 2019     Past Surgical History:   Procedure Laterality Date    COLONOSCOPY      CYSTOSCOPY N/A 2020    SUPRAPUBIC TUBE PLACEMENT performed by Bonnie Medina MD at [address]  1990's    lense implants bilaterally    FOOT DEBRIDEMENT Left 2017    wound debridement and delayed primary closure    FRACTURE SURGERY      L femur fracture surgery    HIP FRACTURE SURGERY Left 2020    LEFT HIP OPEN REDUCTION INTERNAL FIXATION performed by Breana Melendez MD at [address] [place] [address] Right 2021    RIGHT HIP HEMIARTHROPLASTY performed by Tonya Griggs DO at [facility]  2019    Dr. Angelina Martinez- PTA ant tibial    OTHER SURGICAL HISTORY  2019    Dr Angelina Martinez - PCI Right popliteal and Anterior tibial    PROSTATE BIOPSY  2016    SKIN BIOPSY   last time    head and ears    TOE AMPUTATION Left 2016    2nd    TOE AMPUTATION Right 2019    AMPUTATION RIGHT SECOND TOE, FOOT DEBRIDEMENT performed by Shashank Travis DPM at [address] Right 12/10/2019    AMPUTATION RIGHT 3rd DIGIT WITH PARTIAL RESECTION OF THIRD METATARSAL performed by Shashank Travis DPM at [address]    TURP N/A 2020    CYSTOSCOPY PLASMA LOOP TRANSURETHRAL RESECTION PROSTATE performed by Bonnie Medina MD at [facility]       Family History   Problem Relation Age of Onset    Heart Disease Mother         CHF    Heart Disease Father     Heart Attack Father      Social History     Tobacco Use    Smoking status: Former     Packs/day: 0.50     Years: 2.00     Pack years: 1.00     Types: Cigarettes     Quit date: 1960     Years since quittin.0    Smokeless tobacco: Never    Tobacco comments:     quit   Vaping Use    Vaping Use: Never used   Substance Use Topics    Alcohol use: Not Currently    Drug use: Not Currently     Allergies   Allergen Reactions    Latex Other (See Comments)     Pt unsure of reaction    Ciprofloxacin In D5w Itching    Food     Pcn [Penicillins] Other (See Comments)     \"I just know my body doesn't like it. \" \"I had a reaction a long time ago, I know it affects my kidneys. \"    Aspirin Other (See Comments)     \"It affects my kidneys. \"  Other reaction(s): Renal reactions    Other Rash     \"I get a rash on just my face if I eat Cumin or Chili. \"    Peanut-Containing Drug Products Itching     Current Outpatient Medications on File Prior to Encounter   Medication Sig Dispense Refill    vitamin D (CHOLECALCIFEROL) 125 MCG (5000 UT) CAPS capsule Take 5,000 Units by mouth daily      guaiFENesin-dextromethorphan (ROBITUSSIN DM) 100-10 MG/5ML syrup Take 10 mLs by mouth 3 times daily as needed for Cough      metFORMIN (GLUCOPHAGE) 500 MG tablet Take 500 mg by mouth at bedtime      magnesium hydroxide (MILK OF MAGNESIA) 400 MG/5ML suspension Take 30 mLs by mouth daily as needed for Constipation      zinc gluconate 50 MG tablet Take 50 mg by mouth daily      zinc sulfate (ZINCATE) 220 (50 Zn) MG capsule Take 50 mg by mouth daily      acetaminophen (TYLENOL) 325 MG tablet Take 650 mg by mouth every 4 hours as needed for Pain      miconazole nitrate 2 % OINT Please apply to the toes, in between the toes, both legs up to the upper calf, twice a day for 1 month 1 each 10    insulin glargine (LANTUS) 100 UNIT/ML injection vial Inject 10 Units into the skin nightly      rivastigmine (EXELON) 1.5 mg capsule Take 1.5 mg by mouth 3 times daily      aspirin 81 MG chewable tablet Take 81 mg by mouth daily      sodium chloride 1 g tablet Take 1 g by mouth in the morning and at bedtime      zinc sulfate (ZINCATE) 220 (50 Zn) MG capsule Take 50 mg by mouth daily      empagliflozin (JARDIANCE) 25 MG tablet Take 25 mg by mouth daily      insulin lispro (HUMALOG) 100 UNIT/ML injection vial Inject into the skin 4 times daily per sliding scale: 150-199 = 2 units;    200-249 = 4;    250-299 = 6;    300-349 = 8;    > 350 = notify MD      SITagliptin (JANUVIA) 100 MG tablet Take 100 mg by mouth daily      metFORMIN (GLUCOPHAGE) 1000 MG tablet Take 1,000 mg by mouth daily (with breakfast)      metoprolol succinate (TOPROL XL) 25 MG extended release tablet Take 1 tablet by mouth daily 30 tablet 3    amLODIPine (NORVASC) 5 MG tablet Take 2 tablets by mouth daily 30 tablet 3    Garlic 2798 MG TABS Take 1,250 mg by mouth daily       Multiple Vitamins-Minerals (THERAPEUTIC MULTIVITAMIN-MINERALS) tablet Take 1 tablet by mouth daily       No current facility-administered medications on file prior to encounter.        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 due to underlying condition, with fat layer exposed ([address] - Primary    Relevant Medications    lidocaine (XYLOCAINE) 4 % external solution    Other Relevant Orders    Initiate Outpatient Wound Care Protocol       Medium    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 2 diabetes mellitus, with fat layer exposed ([address]    Relevant Medications    lidocaine (XYLOCAINE) 4 % external solution    Other Relevant Orders    Initiate Outpatient Wound Care Protocol       Pre Debridement Measurements:  Are located in the [facility] [place]  Documentation Flow Sheet  Post Debridement Measurements:  Wound/Ulcer Descriptions are Pre Debridement except measurements:    Wound 04/29/22 Foot Left;Plantar (Active)   Number of days: 242       Wound 12/05/22 Foot Right;Plantar;Proximal new wound, #1 right plantar foot (Active)   Wound Image   12/05/22 1503   Dressing Status New dressing applied 12/19/22 1435   Wound Cleansed Cleansed with saline 12/19/22 1435   Dressing/Treatment Alginate with Ag;Dry dressing 12/19/22 1435   Offloading for Diabetic Foot Ulcers Orthowedge/inserts 12/19/22 1435   Wound Length (cm) 0.5 cm 12/27/22 1436   Wound Width (cm) 0.5 cm 12/27/22 1436   Wound Depth (cm) 0.1 cm 12/27/22 1436   Wound Surface Area (cm^2) 0.25 cm^2 12/27/22 1436   Change in Wound Size % (l*w) 89.58 12/27/22 1436   Wound Volume (cm^3) 0.025 cm^3 12/27/22 1436   Wound Healing % 90 12/27/22 1436   Post-Procedure Length (cm) 0.6 cm 12/27/22 1500   Post-Procedure Width (cm) 0.6 cm 12/27/22 1500   Post-Procedure Depth (cm) 0.2 cm 12/27/22 1500   Post-Procedure Surface Area (cm^2) 0.36 cm^2 12/27/22 1500   Post-Procedure Volume (cm^3) 0.072 cm^3 12/27/22 1500   Wound Assessment Fibrin;Pink/red 12/27/22 1436   Drainage Amount Small 12/27/22 1436   Drainage Description Serosanguinous 12/27/22 1436   Odor None 12/27/22 1436   Latonya-wound Assessment Hyperkeratosis (callous) 12/27/22 1436   Number of days: 22       Wound 12/05/22 Heel Right;Plantar wound #2 right plantar heel (Active)   Wound Image   12/05/22 1503   Dressing Status New dressing applied 12/19/22 1435   Wound Cleansed Cleansed with saline 12/19/22 1435   Dressing/Treatment Alginate with Ag;Dry dressing 12/19/22 1435   Offloading for Diabetic Foot Ulcers Orthowedge/inserts 12/19/22 1435   Wound Length (cm) 0.7 cm 12/27/22 1436   Wound Width (cm) 0.9 cm 12/27/22 1436   Wound Depth (cm) 0.1 cm 12/27/22 1436   Wound Surface Area (cm^2) 0.63 cm^2 12/27/22 1436   Change in Wound Size % (l*w) 83.42 12/27/22 1436   Wound Volume (cm^3) 0.063 cm^3 12/27/22 1436   Wound Healing % 92 12/27/22 1436   Post-Procedure Length (cm) 0.8 cm 12/27/22 1500   Post-Procedure Width (cm) 1 cm 12/27/22 1500   Post-Procedure Depth (cm) 0.2 cm 12/27/22 1500   Post-Procedure Surface Area (cm^2) 0.8 cm^2 12/27/22 1500   Post-Procedure Volume (cm^3) 0.16 cm^3 12/27/22 1500   Wound Assessment Pale granulation tissue;Fibrin 12/27/22 1436   Drainage Amount Moderate 12/27/22 1436   Drainage Description Serosanguinous 12/27/22 1436   Odor None 12/27/22 1436   Latonya-wound Assessment Hyperkeratosis (callous) 12/27/22 1436   Number of days: 22          Procedure Note  Indications:  Based on my examination of this patient's wound(s)/ulcer(s) today, debridement is required to promote healing and evaluate the wound base. Performed by: Michael Thomas MD    Consent obtained:  Yes    Time out taken:  Yes    Pain Control: Anesthetic  Anesthetic: 4% Lidocaine Liquid Topical     Debridement:Excisional Debridement    Using curette, scissors, and forceps the wound(s)/ulcer(s) was/were sharply debrided down through and including the removal of epidermis, dermis, and subcutaneous tissue. Devitalized Tissue Debrided:  fibrin and slough to stimulate bleeding to promote healing, post debridement good bleeding base and wound edges noted    Wound/Ulcer #: 1 and 2    Percent of Wound/Ulcer Debrided: 100%    Total Surface Area Debrided:  1.5 sq cm     Estimated Blood Loss:  Minimal  Hemostasis Achieved:  by pressure    Procedural Pain:  3  / 10   Post Procedural Pain:  2 / 10     Response to treatment:  Well tolerated by patient. Plan:   Treatment Note please see attached Discharge Instructions    Written patient dismissal instructions given to patient and signed by patient or POA.          Discharge Instructions         Visit Discharge/Physician Orders     Discharge condition: Stable  Assessment of pain at discharge: minimal  Anesthetic used: 4% lidocaine external solution  Discharge to: [facility]  Left via:Private automobile  Accompanied by: accompanied by self  ECF/HHA: [facility] Orders: To right foot wounds, cleanse with normal saline solution and apply alginate Ag and cover with a dry dressing. Change daily. Treatment Orders:     CBC and CMP and x-ray- REVIEWED  Vasculars scheduled for 12/13/22- REVIEWED  Continue using diabetic shoe inserts, keep pressure off of wound sites     FOLLOW NUTRITIOUS DIET. CHOOSE FOODS HIGH IN PROTEIN -CHICKEN- FISH-AND EGGS,  CHOOSE FOODS HIGH IN VITAMIN C.   MULTIVITAMIN DAILY. [facility] followup visit _____2 weeks Monday PM __________________  (Please note your next appointment above and if you are unable to keep, kindly give a 24 hour notice. Thank you.)     Physician signature:__________________________        If you experience any of the following, please call the [facility] during business hours:     * Increase in Pain  * Temperature over 101  * Increase in drainage from your wound  * Drainage with a foul odor  * Bleeding  * Increase in swelling  * Need for compression bandage changes due to slippage, breakthrough drainage. If you need medical attention outside of the business hours of the [facility] please contact your PCP or go to the nearest emergency room.       Electronically signed by Alon Lee MD on 12/27/2022 at 3:03 PM

## 2023-01-04 NOTE — DISCHARGE INSTRUCTIONS
Visit Discharge/Physician Orders     Discharge condition: Stable  Assessment of pain at discharge: minimal  Anesthetic used: 4% lidocaine external solution  Discharge to: ECF  Left via:Private automobile  Accompanied by: accompanied by self  ECF/[facility]: [id number] [facility]      Dressing Orders: To right foot wounds, cleanse with normal saline solution and apply alginate Ag and cover with a dry dressing. Change daily. Treatment Orders:     CBC and CMP and x-ray- REVIEWED  Vasculars scheduled for 12/13/22- REVIEWED  Continue using diabetic shoe inserts, keep pressure off of wound sites     FOLLOW NUTRITIOUS DIET. CHOOSE FOODS HIGH IN PROTEIN -CHICKEN- FISH-AND EGGS,  CHOOSE FOODS HIGH IN VITAMIN C.   MULTIVITAMIN DAILY. [address] [facility] followup visit _____2 weeks Monday __________________  (Please note your next appointment above and if you are unable to keep, kindly give a 24 hour notice. Thank you.)     Physician signature:__________________________        If you experience any of the following, please call the [facility] during business hours:     * Increase in Pain  * Temperature over 101  * Increase in drainage from your wound  * Drainage with a foul odor  * Bleeding  * Increase in swelling  * Need for compression bandage changes due to slippage, breakthrough drainage. If you need medical attention outside of the business hours of the [facility] please contact your PCP or go to the nearest emergency room.

## 2023-01-09 ENCOUNTER — HOSPITAL ENCOUNTER (OUTPATIENT)
Dept: WOUND CARE | Age: 83
Discharge: HOME | End: 2023-01-09
Payer: MEDICARE

## 2023-01-09 VITALS
RESPIRATION RATE: 18 BRPM | BODY MASS INDEX: 20.73 KG/M2 | DIASTOLIC BLOOD PRESSURE: 69 MMHG | WEIGHT: 140 LBS | SYSTOLIC BLOOD PRESSURE: 144 MMHG | TEMPERATURE: 96.3 F | HEART RATE: 69 BPM | HEIGHT: 69 IN

## 2023-01-09 DIAGNOSIS — L97.412 DIABETIC ULCER OF RIGHT HEEL ASSOCIATED WITH TYPE 2 DIABETES MELLITUS, WITH FAT LAYER EXPOSED (HCC): ICD-10-CM

## 2023-01-09 DIAGNOSIS — L97.411 DIABETIC ULCER OF RIGHT HEEL ASSOCIATED WITH TYPE 2 DIABETES MELLITUS, LIMITED TO BREAKDOWN OF SKIN (HCC): ICD-10-CM

## 2023-01-09 DIAGNOSIS — E08.621 DIABETIC ULCER OF RIGHT MIDFOOT ASSOCIATED WITH DIABETES MELLITUS DUE TO UNDERLYING CONDITION, WITH FAT LAYER EXPOSED (HCC): Primary | ICD-10-CM

## 2023-01-09 DIAGNOSIS — L97.411 DIABETIC ULCER OF RIGHT MIDFOOT ASSOCIATED WITH TYPE 2 DIABETES MELLITUS, LIMITED TO BREAKDOWN OF SKIN (HCC): ICD-10-CM

## 2023-01-09 DIAGNOSIS — E11.621 DIABETIC ULCER OF RIGHT HEEL ASSOCIATED WITH TYPE 2 DIABETES MELLITUS, WITH FAT LAYER EXPOSED (HCC): ICD-10-CM

## 2023-01-09 DIAGNOSIS — L97.412 DIABETIC ULCER OF RIGHT MIDFOOT ASSOCIATED WITH DIABETES MELLITUS DUE TO UNDERLYING CONDITION, WITH FAT LAYER EXPOSED (HCC): Primary | ICD-10-CM

## 2023-01-09 DIAGNOSIS — E11.621 DIABETIC ULCER OF RIGHT MIDFOOT ASSOCIATED WITH TYPE 2 DIABETES MELLITUS, LIMITED TO BREAKDOWN OF SKIN (HCC): ICD-10-CM

## 2023-01-09 DIAGNOSIS — E11.621 DIABETIC ULCER OF RIGHT HEEL ASSOCIATED WITH TYPE 2 DIABETES MELLITUS, LIMITED TO BREAKDOWN OF SKIN (HCC): ICD-10-CM

## 2023-01-09 PROCEDURE — 97597 DBRDMT OPN WND 1ST 20 CM/<: CPT

## 2023-01-09 PROCEDURE — 97597 DBRDMT OPN WND 1ST 20 CM/<: CPT | Performed by: SURGERY

## 2023-01-09 RX ORDER — LIDOCAINE HYDROCHLORIDE 20 MG/ML
JELLY TOPICAL ONCE
OUTPATIENT
Start: 2023-01-09 | End: 2023-01-09

## 2023-01-09 RX ORDER — GENTAMICIN SULFATE 1 MG/G
OINTMENT TOPICAL ONCE
OUTPATIENT
Start: 2023-01-09 | End: 2023-01-09

## 2023-01-09 RX ORDER — BACITRACIN ZINC AND POLYMYXIN B SULFATE 500; 1000 [USP'U]/G; [USP'U]/G
OINTMENT TOPICAL ONCE
OUTPATIENT
Start: 2023-01-09 | End: 2023-01-09

## 2023-01-09 RX ORDER — LIDOCAINE 40 MG/G
CREAM TOPICAL ONCE
OUTPATIENT
Start: 2023-01-09 | End: 2023-01-09

## 2023-01-09 RX ORDER — LIDOCAINE 50 MG/G
OINTMENT TOPICAL ONCE
OUTPATIENT
Start: 2023-01-09 | End: 2023-01-09

## 2023-01-09 RX ORDER — LIDOCAINE HYDROCHLORIDE 40 MG/ML
SOLUTION TOPICAL ONCE
OUTPATIENT
Start: 2023-01-09 | End: 2023-01-09

## 2023-01-09 RX ORDER — BETAMETHASONE DIPROPIONATE 0.05 %
OINTMENT (GRAM) TOPICAL ONCE
OUTPATIENT
Start: 2023-01-09 | End: 2023-01-09

## 2023-01-09 RX ORDER — LIDOCAINE HYDROCHLORIDE 40 MG/ML
SOLUTION TOPICAL ONCE
Status: COMPLETED | OUTPATIENT
Start: 2023-01-09 | End: 2023-01-09

## 2023-01-09 RX ORDER — GINSENG 100 MG
CAPSULE ORAL ONCE
OUTPATIENT
Start: 2023-01-09 | End: 2023-01-09

## 2023-01-09 RX ORDER — BACITRACIN, NEOMYCIN, POLYMYXIN B 400; 3.5; 5 [USP'U]/G; MG/G; [USP'U]/G
OINTMENT TOPICAL ONCE
OUTPATIENT
Start: 2023-01-09 | End: 2023-01-09

## 2023-01-09 RX ORDER — CLOBETASOL PROPIONATE 0.5 MG/G
OINTMENT TOPICAL ONCE
OUTPATIENT
Start: 2023-01-09 | End: 2023-01-09

## 2023-01-09 RX ADMIN — LIDOCAINE HYDROCHLORIDE 8 ML: 40 SOLUTION TOPICAL at 14:07

## 2023-01-09 ASSESSMENT — PAIN SCALES - GENERAL: PAINLEVEL_OUTOF10: 0

## 2023-01-09 NOTE — PROGRESS NOTES
Wound Healing Center Followup Visit Note    Referring Physician : Camilla Cruz MD  [address] RECORD NUMBER:  [id number]  AGE: 79 y.o. GENDER: male  : 1940  EPISODE DATE:  2023    Subjective:     Chief Complaint   Patient presents with    Wound Check     Left foot wounds        HISTORY of PRESENT ILLNESS HPI   Rojelio Mccord is a 79 y.o. male who presents today in regards to follow up evaluation and treatment of wound/ulcer. That patient's past medical, family and social hx were reviewed and changes were made if present. History of Wound Context:  The patient has had a wound of right foot overlying the plantar surface of the right heel and plantar surface of the right first metatarsophalangeal joint which was first noted approximately at least 3 months ago. This has been treated by the patient in the facility, but patient did not call the wound care center as he was instructed in the past call immediately for any future ulcers. On their initial visit to the wound healing center, 22, the patient has noted that the wound has not been improving. The patient has had similar previous wounds in the past.       Patient in the past has undergone right femoral angiogram and angioplasty of right popliteal artery and tibial arteries by Dr. Lesa Dodd in 2019     Patient has a diabetes mellitus diabetic neuropathy     Pt is currently not on abx.       Wound/Ulcer Pain Timing/Severity: constant  Quality of pain: dull, aching  Severity:  3 / 10   Modifying Factors: Pain worsens with walking  Associated Signs/Symptoms: drainage and pain       2022  Patient tells me that he saw his orthotist, the custom made insert was modified  Right foot wounds look improved    2022  Right foot wound slowly improving, patient is wearing the modified custom made orthotic of the right foot  The ankle-brachial index that was done was reviewed, overall adequate flow for tissue perfusion, 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, with adequate collateral flow to the foot   and the remaining toes based upon the pulse volume recordings       On the left side, normal ankle arm index, with triphasic ankle Doppler   tracings and good arterial flow to the foot and the toes based upon   the pulse volume recordings       Overall, no significant change noted compared the study that was in   April 2022 12/27/2022  Ulcers of the right foot, slowly improving, continue to offload as much as possible  1/9/2023  Ulcers right foot improving, mainly skin only    Ulcer Identification:  Ulcer Type: arterial, diabetic, pressure, and non-healing/non-surgical  Contributing Factors: diabetes, chronic pressure, decreased mobility, shear force, and arterial insufficiency     Diabetic/Pressure/Non Pressure Ulcers only:  Ulcer: Diabetic ulcer, fat layer exposed     If patient has diabetic lower extremity wounds  Dale Classification of diabetic lower extremity wounds:     Grade Description   []  0 No open wound   []  1 Superficial ulcer involving the full skin thickness   []  2 Deep ulcer involves ligament, tendon, joint capsule, or fascia  No bone involvement or abscess presence   []  3 Deep Ulcer with abcess formation and/or osteomyelitis   []  4 Localized gangrene   []  5 Extensive gangrene of the foot      Wound: Patient does have diabetic foot ulcers, pressure ulcers, trophic ulcers, with a fat layer exposed underneath the first metatarsal phalangeal joint as well as the right heel overlying the plantar surface, patient recently did have a new set of inserts made to call the patient, patient was instructed make sure he contacts his orthotist and modify them to alleviate pressure overlying the ulcer area     Other pertinent information:     1. The last lower extremity arterial Doppler study, done in April 2022 was personally reviewed by me        Narrative   On the right side, ankle-brachial index, 0.93 with biphasic plus ankle   Doppler tracing over the anterior tibial artery, with good arterial   flow to the right foot and the remaining toes, except over the right   fourth toe where the pulse volume recordings diminished       On the left side, ankle-brachial index within normal range of 0.99,   with biphasic plus ankle Doppler tracings and good arterial flow to   the foot and the remaining toes based upon the pulse volume recordings         12/5/2022  Discussed the patient regarding all options, risks benefits and alternatives, was recommend complete work-up including CBC, CMP, x-ray of the right foot, without osteomyelitis, leg and artery Doppler study to assess distal arterial perfusion  He was also given history, to call immediately extremities worsening of the ulcers  Patient also was instructed to contact his orthotist to make additional modifications of the inserts to alleviate pressure over the ulcerated areas of the right foot  All his questions were answered                      PAST MEDICAL HISTORY      Diagnosis Date    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, limited to breakdown of skin [facility] 1/9/2023    Femoral-popliteal atherosclerosis ([address] 1/1/2017    Heel ulcer, right, with fat layer exposed ([address] 5/6/2019    Hypertension     Osteomyelitis of third toe of right foot ([address] 12/6/2019    S/P peripheral artery angioplasty 01/23/2020    bilateral legs -Mertha Quick    Skin ulcer of right foot with fat layer exposed ([address] 12/9/2019    Ulcer of right leg, with fat layer exposed ([address] 5/6/2019    Varicose veins of leg with edema, 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     Past Surgical History:   Procedure Laterality Date    COLONOSCOPY      CYSTOSCOPY N/A 5/18/2020    SUPRAPUBIC TUBE PLACEMENT performed by Suzanne Dunbar MD at [address] [place] [address]    lense implants bilaterally    FOOT DEBRIDEMENT Left 01/04/2017    wound debridement and delayed primary closure    FRACTURE SURGERY      L femur fracture surgery    HIP FRACTURE SURGERY Left 9/23/2020    LEFT HIP OPEN REDUCTION INTERNAL FIXATION performed by Trini Rivera MD at [address] [place] [address] Right 7/16/2021    RIGHT HIP HEMIARTHROPLASTY performed by Mouna Glover DO at [facility]  04/23/2019    Dr. Giuseppe Page- PTA ant tibial    OTHER SURGICAL HISTORY  12/17/2019    Dr Giuseppe Page - PCI Right popliteal and Anterior tibial    PROSTATE BIOPSY  04/2016    SKIN BIOPSY  sept of 2018 last time    head and ears    TOE AMPUTATION Left 12/31/2016    2nd    TOE AMPUTATION Right 4/26/2019    AMPUTATION RIGHT SECOND TOE, FOOT DEBRIDEMENT performed by Calderon Joshi DPM at [address] Right 12/10/2019    AMPUTATION RIGHT 3rd DIGIT WITH PARTIAL RESECTION OF THIRD METATARSAL performed by Calderon Joshi DPM at [address]    TURP N/A 5/18/2020    CYSTOSCOPY PLASMA LOOP TRANSURETHRAL RESECTION PROSTATE performed by Suzanne Dunbar MD at [facility] OR    VASCULAR SURGERY       Family History   Problem Relation Age of Onset    Heart Disease Mother         CHF    Heart Disease Father Heart Attack Father      Social History     Tobacco Use    Smoking status: Former     Packs/day: 0.50     Years: 2.00     Pack years: 1.00     Types: Cigarettes     Quit date: 1960     Years since quittin.0    Smokeless tobacco: Never    Tobacco comments:     quit   Vaping Use    Vaping Use: Never used   Substance Use Topics    Alcohol use: Not Currently    Drug use: Not Currently     Allergies   Allergen Reactions    Latex Other (See Comments)     Pt unsure of reaction    Ciprofloxacin In D5w Itching    Food     Pcn [Penicillins] Other (See Comments)     \"I just know my body doesn't like it. \" \"I had a reaction a long time ago, I know it affects my kidneys. \"    Aspirin Other (See Comments)     \"It affects my kidneys. \"  Other reaction(s): Renal reactions    Other Rash     \"I get a rash on just my face if I eat Cumin or Chili. \"    Peanut-Containing Drug Products Itching     Current Outpatient Medications on File Prior to Encounter   Medication Sig Dispense Refill    vitamin D (CHOLECALCIFEROL) 125 MCG (5000 UT) CAPS capsule Take 5,000 Units by mouth daily      guaiFENesin-dextromethorphan (ROBITUSSIN DM) 100-10 MG/5ML syrup Take 10 mLs by mouth 3 times daily as needed for Cough      metFORMIN (GLUCOPHAGE) 500 MG tablet Take 500 mg by mouth at bedtime      magnesium hydroxide (MILK OF MAGNESIA) 400 MG/5ML suspension Take 30 mLs by mouth daily as needed for Constipation      zinc gluconate 50 MG tablet Take 50 mg by mouth daily      zinc sulfate (ZINCATE) 220 (50 Zn) MG capsule Take 50 mg by mouth daily      acetaminophen (TYLENOL) 325 MG tablet Take 650 mg by mouth every 4 hours as needed for Pain      miconazole nitrate 2 % OINT Please apply to the toes, in between the toes, both legs up to the upper calf, twice a day for 1 month 1 each 10    insulin glargine (LANTUS) 100 UNIT/ML injection vial Inject 10 Units into the skin nightly      rivastigmine (EXELON) 1.5 mg capsule Take 1.5 mg by mouth 3 times daily aspirin 81 MG chewable tablet Take 81 mg by mouth daily      sodium chloride 1 g tablet Take 1 g by mouth in the morning and at bedtime      zinc sulfate (ZINCATE) 220 (50 Zn) MG capsule Take 50 mg by mouth daily      empagliflozin (JARDIANCE) 25 MG tablet Take 25 mg by mouth daily      insulin lispro (HUMALOG) 100 UNIT/ML injection vial Inject into the skin 4 times daily per sliding scale: 150-199 = 2 units;    200-249 = 4;    250-299 = 6;    300-349 = 8;    > 350 = notify MD      SITagliptin (JANUVIA) 100 MG tablet Take 100 mg by mouth daily      metFORMIN (GLUCOPHAGE) 1000 MG tablet Take 1,000 mg by mouth daily (with breakfast)      metoprolol succinate (TOPROL XL) 25 MG extended release tablet Take 1 tablet by mouth daily 30 tablet 3    amLODIPine (NORVASC) 5 MG tablet Take 2 tablets by mouth daily 30 tablet 3    Garlic 3387 MG TABS Take 1,250 mg by mouth daily       Multiple Vitamins-Minerals (THERAPEUTIC MULTIVITAMIN-MINERALS) tablet Take 1 tablet by mouth daily       No current facility-administered medications on file prior to encounter.        REVIEW OF SYSTEMS See HPI    Objective:    BP (!) 144/69   Pulse 69   Temp (!) 96.3 °F (35.7 °C) (Temporal)   Resp 18   Ht 5' 9\" (1.753 m)   Wt 140 lb (63.5 kg)   BMI 20.67 kg/m²   Wt Readings from Last 3 Encounters:   01/09/23 140 lb (63.5 kg)   12/19/22 140 lb (63.5 kg)   12/12/22 140 lb (63.5 kg)     PHYSICAL EXAM  CONSTITUTIONAL:   Awake, alert, cooperative   EYES:  lids and lashes normal   ENT: external ears and nose without lesions   NECK:  supple, symmetrical, trachea midline   SKIN:  Open wound Present    Assessment:     Problem List Items Addressed This Visit          High    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 2 diabetes mellitus, limited to breakdown of skin ([address]    Diabetic ulcer of right midfoot associated with type 2 diabetes mellitus, limited to breakdown of skin ([address]       Medium    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 2 diabetes mellitus, with fat layer exposed ([facility]    Relevant Orders    Initiate Outpatient Wound Care Protocol    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 due to underlying condition, with fat layer exposed ([facility] - Primary    Relevant Orders    Initiate Outpatient Wound Care Protocol       Pre Debridement Measurements:  Are located in the [place]  Documentation Flow Sheet  Post Debridement Measurements:  Wound/Ulcer Descriptions are Pre Debridement except measurements:    Wound 04/29/22 Foot Left;Plantar (Active)   Number of days: 255       Wound 12/05/22 Foot Right;Plantar;Proximal new wound, #1 right plantar foot (Active)   Wound Image   01/09/23 1358   Dressing Status New dressing applied;Clean;Dry; Intact 12/27/22 1512   Wound Cleansed Cleansed with saline 12/27/22 1512   Dressing/Treatment Alginate with Ag;Dry dressing 12/27/22 1512   Offloading for Diabetic Foot Ulcers Orthowedge/inserts 12/27/22 1512   Wound Length (cm) 1.7 cm 01/09/23 1358   Wound Width (cm) 1.5 cm 01/09/23 1358   Wound Depth (cm) 0.1 cm 01/09/23 1358   Wound Surface Area (cm^2) 2.55 cm^2 01/09/23 1358   Change in Wound Size % (l*w) -6.25 01/09/23 1358   Wound Volume (cm^3) 0.255 cm^3 01/09/23 1358   Wound Healing % -6 01/09/23 1358   Post-Procedure Length (cm) 0.6 cm 12/27/22 1500   Post-Procedure Width (cm) 0.6 cm 12/27/22 1500   Post-Procedure Depth (cm) 0.2 cm 12/27/22 1500   Post-Procedure Surface Area (cm^2) 0.36 cm^2 12/27/22 1500   Post-Procedure Volume (cm^3) 0.072 cm^3 12/27/22 1500   Wound Assessment Fibrin;Dry 01/09/23 1358   Drainage Amount None 01/09/23 1358   Drainage Description Serosanguinous 12/27/22 1436   Odor None 01/09/23 1358   Latonya-wound Assessment Hyperkeratosis (callous) 01/09/23 1358   Number of days: 35       Wound 12/05/22 Heel Right;Plantar wound #2 right plantar heel (Active)   Wound Image   01/09/23 1358   Dressing Status New dressing applied;Clean;Dry; Intact 12/27/22 0678   Wound Cleansed Cleansed with saline 12/27/22 1512   Dressing/Treatment Alginate with Ag;Dry dressing 12/27/22 1512   Offloading for Diabetic Foot Ulcers Orthowedge/inserts 12/27/22 1512   Wound Length (cm) 0.7 cm 01/09/23 1358   Wound Width (cm) 0.9 cm 01/09/23 1358   Wound Depth (cm) 0.1 cm 01/09/23 1358   Wound Surface Area (cm^2) 0.63 cm^2 01/09/23 1358   Change in Wound Size % (l*w) 83.42 01/09/23 1358   Wound Volume (cm^3) 0.063 cm^3 01/09/23 1358   Wound Healing % 92 01/09/23 1358   Post-Procedure Length (cm) 0.8 cm 12/27/22 1500   Post-Procedure Width (cm) 1 cm 12/27/22 1500   Post-Procedure Depth (cm) 0.2 cm 12/27/22 1500   Post-Procedure Surface Area (cm^2) 0.8 cm^2 12/27/22 1500   Post-Procedure Volume (cm^3) 0.16 cm^3 12/27/22 1500   Wound Assessment Pale granulation tissue;Fibrin 01/09/23 1358   Drainage Amount Small 01/09/23 1358   Drainage Description Yellow 01/09/23 1358   Odor None 01/09/23 1358   Latonya-wound Assessment Hyperkeratosis (callous) 01/09/23 1358   Number of days: 34          Procedure Note  Indications:  Based on my examination of this patient's wound(s)/ulcer(s) today, debridement is required to promote healing and evaluate the wound base. Performed by: Maria Eugenia Rodney MD    Consent obtained:  Yes    Time out taken:  Yes    Pain Control: Anesthetic  Anesthetic: 4% Lidocaine Liquid Topical     Debridement:Excisional Debridement    Using curette, scissors, and forceps the wound(s)/ulcer(s) was/were sharply debrided down through and including the removal of epidermis and dermis.         Devitalized Tissue Debrided:  fibrin and slough to stimulate bleeding to promote healing, post debridement good bleeding base and wound edges noted    Wound/Ulcer #: 1 and 2    Percent of Wound/Ulcer Debrided: 100%    Total Surface Area Debrided:  1.5 sq cm     Estimated Blood Loss:  Minimal  Hemostasis Achieved:  by pressure    Procedural Pain:  3  / 10   Post Procedural Pain:  2 / 10     Response to treatment:  Well tolerated by patient. Plan:   Treatment Note please see attached Discharge Instructions    Written patient dismissal instructions given to patient and signed by patient or POA. Discharge Instructions         Visit Discharge/Physician Orders     Discharge condition: Stable  Assessment of pain at discharge: minimal  Anesthetic used: 4% lidocaine external solution  Discharge to: ECF  Left via:Private automobile  Accompanied by: accompanied by self  ECF/[facility]HA: [id number]      Dressing Orders: To right foot wounds, cleanse with normal saline solution and apply alginate Ag and cover with a dry dressing. Change daily. Treatment Orders:     CBC and CMP and x-ray- REVIEWED  Vasculars scheduled for 12/13/22- REVIEWED  Continue using diabetic shoe inserts, keep pressure off of wound sites     FOLLOW NUTRITIOUS DIET. CHOOSE FOODS HIGH IN PROTEIN -CHICKEN- FISH-AND EGGS,  CHOOSE FOODS HIGH IN VITAMIN C.   MULTIVITAMIN DAILY. [address] [place] [address],[address] followup visit _____2 weeks Monday __________________  (Please note your next appointment above and if you are unable to keep, kindly give a 24 hour notice. Thank you.)     Physician signature:__________________________        If you experience any of the following, please call the [email or web address] during business hours:     * Increase in Pain  * Temperature over 101  * Increase in drainage from your wound  * Drainage with a foul odor  * Bleeding  * Increase in swelling  * Need for compression bandage changes due to slippage, breakthrough drainage. If you need medical attention outside of the business hours of the [email or web address] please contact your PCP or go to the nearest emergency room.             Electronically signed by Maria Eugenia Rodney MD on 1/9/2023 at 2:53 PM

## 2023-01-18 NOTE — DISCHARGE INSTRUCTIONS
Visit Discharge/Physician Orders     Discharge condition: Stable  Assessment of pain at discharge: minimal  Anesthetic used: 4% lidocaine external solution  Discharge to: ECF  Left via:Private automobile  Accompanied by: accompanied by self  ECF/HHA: [id number]      Dressing Orders: To right foot wounds, cleanse with normal saline solution and apply alginate Ag and cover with a dry dressing. Change daily. Treatment Orders:     CBC and CMP and x-ray- REVIEWED  Vasculars scheduled for 12/13/22- REVIEWED  Continue using diabetic shoe inserts, keep pressure off of wound sites     FOLLOW NUTRITIOUS DIET. CHOOSE FOODS HIGH IN PROTEIN -CHICKEN- FISH-AND EGGS,  CHOOSE FOODS HIGH IN VITAMIN C.   MULTIVITAMIN DAILY. [facility] followup visit _____2 weeks Monday __________________  (Please note your next appointment above and if you are unable to keep, kindly give a 24 hour notice. Thank you.)     Physician signature:__________________________        If you experience any of the following, please call the [facility] during business hours:     * Increase in Pain  * Temperature over 101  * Increase in drainage from your wound  * Drainage with a foul odor  * Bleeding  * Increase in swelling  * Need for compression bandage changes due to slippage, breakthrough drainage. If you need medical attention outside of the business hours of the [facility] please contact your PCP or go to the nearest emergency room.

## 2023-01-23 ENCOUNTER — HOSPITAL ENCOUNTER (OUTPATIENT)
Dept: WOUND CARE | Age: 83
Discharge: HOME | End: 2023-01-23
Payer: MEDICARE

## 2023-01-23 VITALS
TEMPERATURE: 96.5 F | HEART RATE: 76 BPM | DIASTOLIC BLOOD PRESSURE: 74 MMHG | HEIGHT: 69 IN | BODY MASS INDEX: 20.73 KG/M2 | RESPIRATION RATE: 16 BRPM | SYSTOLIC BLOOD PRESSURE: 138 MMHG | WEIGHT: 140 LBS

## 2023-01-23 DIAGNOSIS — E08.621 DIABETIC ULCER OF RIGHT MIDFOOT ASSOCIATED WITH DIABETES MELLITUS DUE TO UNDERLYING CONDITION, WITH FAT LAYER EXPOSED (HCC): Primary | ICD-10-CM

## 2023-01-23 DIAGNOSIS — L97.411 DIABETIC ULCER OF RIGHT HEEL ASSOCIATED WITH TYPE 2 DIABETES MELLITUS, LIMITED TO BREAKDOWN OF SKIN (HCC): ICD-10-CM

## 2023-01-23 DIAGNOSIS — L97.411 DIABETIC ULCER OF RIGHT MIDFOOT ASSOCIATED WITH TYPE 2 DIABETES MELLITUS, LIMITED TO BREAKDOWN OF SKIN (HCC): ICD-10-CM

## 2023-01-23 DIAGNOSIS — E11.621 DIABETIC ULCER OF RIGHT HEEL ASSOCIATED WITH TYPE 2 DIABETES MELLITUS, WITH FAT LAYER EXPOSED (HCC): ICD-10-CM

## 2023-01-23 DIAGNOSIS — E11.621 DIABETIC ULCER OF RIGHT HEEL ASSOCIATED WITH TYPE 2 DIABETES MELLITUS, LIMITED TO BREAKDOWN OF SKIN (HCC): ICD-10-CM

## 2023-01-23 DIAGNOSIS — L97.412 DIABETIC ULCER OF RIGHT MIDFOOT ASSOCIATED WITH DIABETES MELLITUS DUE TO UNDERLYING CONDITION, WITH FAT LAYER EXPOSED (HCC): Primary | ICD-10-CM

## 2023-01-23 DIAGNOSIS — E11.621 DIABETIC ULCER OF RIGHT MIDFOOT ASSOCIATED WITH TYPE 2 DIABETES MELLITUS, LIMITED TO BREAKDOWN OF SKIN (HCC): ICD-10-CM

## 2023-01-23 DIAGNOSIS — L97.412 DIABETIC ULCER OF RIGHT HEEL ASSOCIATED WITH TYPE 2 DIABETES MELLITUS, WITH FAT LAYER EXPOSED (HCC): ICD-10-CM

## 2023-01-23 PROCEDURE — 97597 DBRDMT OPN WND 1ST 20 CM/<: CPT

## 2023-01-23 PROCEDURE — 97597 DBRDMT OPN WND 1ST 20 CM/<: CPT | Performed by: SURGERY

## 2023-01-23 RX ORDER — LIDOCAINE 40 MG/G
CREAM TOPICAL ONCE
OUTPATIENT
Start: 2023-01-23 | End: 2023-01-23

## 2023-01-23 RX ORDER — BACITRACIN ZINC AND POLYMYXIN B SULFATE 500; 1000 [USP'U]/G; [USP'U]/G
OINTMENT TOPICAL ONCE
OUTPATIENT
Start: 2023-01-23 | End: 2023-01-23

## 2023-01-23 RX ORDER — LIDOCAINE HYDROCHLORIDE 20 MG/ML
JELLY TOPICAL ONCE
OUTPATIENT
Start: 2023-01-23 | End: 2023-01-23

## 2023-01-23 RX ORDER — LIDOCAINE HYDROCHLORIDE 40 MG/ML
SOLUTION TOPICAL ONCE
OUTPATIENT
Start: 2023-01-23 | End: 2023-01-23

## 2023-01-23 RX ORDER — BACITRACIN, NEOMYCIN, POLYMYXIN B 400; 3.5; 5 [USP'U]/G; MG/G; [USP'U]/G
OINTMENT TOPICAL ONCE
OUTPATIENT
Start: 2023-01-23 | End: 2023-01-23

## 2023-01-23 RX ORDER — BETAMETHASONE DIPROPIONATE 0.05 %
OINTMENT (GRAM) TOPICAL ONCE
OUTPATIENT
Start: 2023-01-23 | End: 2023-01-23

## 2023-01-23 RX ORDER — CLOBETASOL PROPIONATE 0.5 MG/G
OINTMENT TOPICAL ONCE
OUTPATIENT
Start: 2023-01-23 | End: 2023-01-23

## 2023-01-23 RX ORDER — FERROUS SULFATE 300 MG/5ML
300 LIQUID (ML) ORAL DAILY
COMMUNITY

## 2023-01-23 RX ORDER — GINSENG 100 MG
CAPSULE ORAL ONCE
OUTPATIENT
Start: 2023-01-23 | End: 2023-01-23

## 2023-01-23 RX ORDER — LIDOCAINE HYDROCHLORIDE 40 MG/ML
SOLUTION TOPICAL ONCE
Status: COMPLETED | OUTPATIENT
Start: 2023-01-23 | End: 2023-01-23

## 2023-01-23 RX ORDER — LIDOCAINE 50 MG/G
OINTMENT TOPICAL ONCE
OUTPATIENT
Start: 2023-01-23 | End: 2023-01-23

## 2023-01-23 RX ORDER — GENTAMICIN SULFATE 1 MG/G
OINTMENT TOPICAL ONCE
OUTPATIENT
Start: 2023-01-23 | End: 2023-01-23

## 2023-01-23 RX ADMIN — LIDOCAINE HYDROCHLORIDE 10 ML: 40 SOLUTION TOPICAL at 13:50

## 2023-01-23 NOTE — PLAN OF CARE
Problem: Chronic Conditions and Co-morbidities  Goal: Patient's chronic conditions and co-morbidity symptoms are monitored and maintained or improved  Outcome: Progressing     Problem: Wound:  Goal: Will show signs of wound healing; wound closure and no evidence of infection  Description: Will show signs of wound healing; wound closure and no evidence of infection  Outcome: Progressing     Problem: Pain  Goal: Verbalizes/displays adequate comfort level or baseline comfort level  Outcome: Completed

## 2023-01-23 NOTE — PROGRESS NOTES
[facility] Healing Center Followup Visit Note    Referring Physician : Bedelia Spurling, MD  [address] RECORD NUMBER:  [id number]  AGE: 79 y.o. GENDER: male  : 1940  EPISODE DATE:  2023    Subjective:     Chief Complaint   Patient presents with    Wound Check     Right foot        HISTORY of PRESENT ILLNESS DELLA Feldman is a 79 y.o. male who presents today in regards to follow up evaluation and treatment of wound/ulcer. That patient's past medical, family and social hx were reviewed and changes were made if present. History of Wound Context:  The patient has had a wound of right foot overlying the plantar surface of the right heel and plantar surface of the right first metatarsophalangeal joint which was first noted approximately at least 3 months ago. This has been treated by the patient in the facility, but patient did not call the wound care center as he was instructed in the past call immediately for any future ulcers. On their initial visit to the wound healing center, 22, the patient has noted that the wound has not been improving. The patient has had similar previous wounds in the past.       Patient in the past has undergone right femoral angiogram and angioplasty of right popliteal artery and tibial arteries by Dr. Barry Washington in 2019     Patient has a diabetes mellitus diabetic neuropathy     Pt is currently not on abx.       Wound/Ulcer Pain Timing/Severity: constant  Quality of pain: dull, aching  Severity:  3 / 10   Modifying Factors: Pain worsens with walking  Associated Signs/Symptoms: drainage and pain       2022  Patient tells me that he saw his orthotist, the custom made insert was modified  Right foot wounds look improved    2022  Right foot wound slowly improving, patient is wearing the modified custom made orthotic of the right foot  The ankle-brachial index that was done was reviewed, overall adequate flow for tissue perfusion, 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, with adequate collateral flow to the foot   and the remaining toes based upon the pulse volume recordings       On the left side, normal ankle arm index, with triphasic ankle Doppler   tracings and good arterial flow to the foot and the toes based upon   the pulse volume recordings       Overall, no significant change noted compared the study that was in   April 2022 12/27/2022  Ulcers of the right foot, slowly improving, continue to offload as much as possible  1/9/2023  Ulcers right foot improving, mainly skin only  1/23/2023  Ulcer underneath the first metatarsal head improving, skin only, with callus and small eschar debrided, ulcer underneath the heel, minimal fat layer exposed, discussed the patient again regarding importance of offloading      Ulcer Identification:  Ulcer Type: arterial, diabetic, pressure, and non-healing/non-surgical  Contributing Factors: diabetes, chronic pressure, decreased mobility, shear force, and arterial insufficiency     Diabetic/Pressure/Non Pressure Ulcers only:  Ulcer: Diabetic ulcer, fat layer exposed     If patient has diabetic lower extremity wounds  Dale Classification of diabetic lower extremity wounds:     Grade Description   []  0 No open wound   []  1 Superficial ulcer involving the full skin thickness   []  2 Deep ulcer involves ligament, tendon, joint capsule, or fascia  No bone involvement or abscess presence   []  3 Deep Ulcer with abcess formation and/or osteomyelitis   []  4 Localized gangrene   []  5 Extensive gangrene of the foot      Wound: Patient does have diabetic foot ulcers, pressure ulcers, trophic ulcers, with a fat layer exposed underneath the first metatarsal phalangeal joint as well as the right heel overlying the plantar surface, patient recently did have a new set of inserts made to call the patient, patient was instructed make sure he contacts his orthotist and modify them to alleviate pressure overlying the ulcer area     Other pertinent information:     1. The last lower extremity arterial Doppler study, done in April 2022 was personally reviewed by me        Narrative   On the right side, ankle-brachial index, 0.93 with biphasic plus ankle   Doppler tracing over the anterior tibial artery, with good arterial   flow to the right foot and the remaining toes, except over the right   fourth toe where the pulse volume recordings diminished       On the left side, ankle-brachial index within normal range of 0.99,   with biphasic plus ankle Doppler tracings and good arterial flow to   the foot and the remaining toes based upon the pulse volume recordings         12/5/2022  Discussed the patient regarding all options, risks benefits and alternatives, was recommend complete work-up including CBC, CMP, x-ray of the right foot, without osteomyelitis, leg and artery Doppler study to assess distal arterial perfusion  He was also given history, to call immediately extremities worsening of the ulcers  Patient also was instructed to contact his orthotist to make additional modifications of the inserts to alleviate pressure over the ulcerated areas of the right foot  All his questions were answered                      PAST MEDICAL HISTORY      Diagnosis Date    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, limited to breakdown of skin ([address] 1/9/2023    Femoral-popliteal atherosclerosis ([address] 1/1/2017    Heel ulcer, right, with fat layer exposed ([address] 5/6/2019    Hypertension     Osteomyelitis of third toe of right foot ([address] 12/6/2019    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    Skin ulcer of right foot with fat layer exposed ([address] 12/9/2019    Ulcer of right leg, with fat layer exposed ([address] 5/6/2019    Varicose veins of leg with edema, 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     Past Surgical History:   Procedure Laterality Date    COLONOSCOPY      CYSTOSCOPY N/A 5/18/2020    SUPRAPUBIC TUBE PLACEMENT performed by Ramonita Ricketts MD at [address]    lense implants bilaterally    FOOT DEBRIDEMENT Left 01/04/2017    wound debridement and delayed primary closure    FRACTURE SURGERY      L femur fracture surgery    HIP FRACTURE SURGERY Left 9/23/2020    LEFT HIP OPEN REDUCTION INTERNAL FIXATION performed by Jaimie Maldonado MD at [address] Right 7/16/2021    RIGHT HIP HEMIARTHROPLASTY performed by Scarlet Reynolds DO at [facility]  04/23/2019    Dr. Brianne Savage- PTA ant tibial    OTHER SURGICAL HISTORY  12/17/2019    Dr Brianne Savage - PCI Right popliteal and Anterior tibial    PROSTATE BIOPSY  04/2016    SKIN BIOPSY  sept of 2018 last time    head and ears    TOE AMPUTATION Left 12/31/2016    2nd    TOE AMPUTATION Right 4/26/2019    AMPUTATION RIGHT SECOND TOE, FOOT DEBRIDEMENT performed by Carli Reyes DPM at [address] Right 12/10/2019    AMPUTATION RIGHT 3rd DIGIT WITH PARTIAL RESECTION OF THIRD METATARSAL performed by Carli Reyes DPM at [address] [facility] N/A 5/18/2020    CYSTOSCOPY PLASMA LOOP TRANSURETHRAL RESECTION PROSTATE performed by Devon Elena MD at [address] [facility]       Family History   Problem Relation Age of Onset    Heart Disease Mother         CHF    Heart Disease Father     Heart Attack Father      Social History     Tobacco Use    Smoking status: Former     Packs/day: 0.50     Years: 2.00     Pack years: 1.00     Types: Cigarettes     Quit date:      Years since quittin.1    Smokeless tobacco: Never    Tobacco comments:     quit   Vaping Use    Vaping Use: Never used   Substance Use Topics    Alcohol use: Not Currently    Drug use: Not Currently     Allergies   Allergen Reactions    Latex Other (See Comments)     Pt unsure of reaction    Ciprofloxacin In D5w Itching    Food     Pcn [Penicillins] Other (See Comments)     \"I just know my body doesn't like it. \" \"I had a reaction a long time ago, I know it affects my kidneys. \"    Aspirin Other (See Comments)     \"It affects my kidneys. \"  Other reaction(s): Renal reactions    Other Rash     \"I get a rash on just my face if I eat Cumin or Chili. \"    Peanut-Containing Drug Products Itching     Current Outpatient Medications on File Prior to Encounter   Medication Sig Dispense Refill    ferrous sulfate 300 (60 Fe) MG/5ML syrup Take 300 mg by mouth daily      vitamin D (CHOLECALCIFEROL) 125 MCG (5000 UT) CAPS capsule Take 5,000 Units by mouth daily      guaiFENesin-dextromethorphan (ROBITUSSIN DM) 100-10 MG/5ML syrup Take 10 mLs by mouth 3 times daily as needed for Cough      metFORMIN (GLUCOPHAGE) 500 MG tablet Take 500 mg by mouth at bedtime      magnesium hydroxide (MILK OF MAGNESIA) 400 MG/5ML suspension Take 30 mLs by mouth daily as needed for Constipation      zinc gluconate 50 MG tablet Take 50 mg by mouth daily      zinc sulfate (ZINCATE) 220 (50 Zn) MG capsule Take 50 mg by mouth daily      acetaminophen (TYLENOL) 325 MG tablet Take 650 mg by mouth every 4 hours as needed for Pain      miconazole nitrate 2 % OINT Please apply to the toes, in between the toes, both legs up to the upper calf, twice a day for 1 month 1 each 10    insulin glargine (LANTUS) 100 UNIT/ML injection vial Inject 10 Units into the skin nightly      rivastigmine (EXELON) 1.5 mg capsule Take 1.5 mg by mouth 3 times daily      aspirin 81 MG chewable tablet Take 81 mg by mouth daily      sodium chloride 1 g tablet Take 1 g by mouth in the morning and at bedtime      zinc sulfate (ZINCATE) 220 (50 Zn) MG capsule Take 50 mg by mouth daily      empagliflozin (JARDIANCE) 25 MG tablet Take 25 mg by mouth daily      insulin lispro (HUMALOG) 100 UNIT/ML injection vial Inject into the skin 4 times daily per sliding scale: 150-199 = 2 units;    200-249 = 4;    250-299 = 6;    300-349 = 8;    > 350 = notify MD      SITagliptin (JANUVIA) 100 MG tablet Take 100 mg by mouth daily      metFORMIN (GLUCOPHAGE) 1000 MG tablet Take 1,000 mg by mouth daily (with breakfast)      metoprolol succinate (TOPROL XL) 25 MG extended release tablet Take 1 tablet by mouth daily 30 tablet 3    amLODIPine (NORVASC) 5 MG tablet Take 2 tablets by mouth daily 30 tablet 3    Garlic 6806 MG TABS Take 1,250 mg by mouth daily       Multiple Vitamins-Minerals (THERAPEUTIC MULTIVITAMIN-MINERALS) tablet Take 1 tablet by mouth daily       No current facility-administered medications on file prior to encounter.        REVIEW OF SYSTEMS See HPI    Objective:    /74   Pulse 76   Temp (!) 96.5 °F (35.8 °C) (Temporal)   Resp 16   Ht 5' 9\" (1.753 m)   Wt 140 lb (63.5 kg)   BMI 20.67 kg/m²   Wt Readings from Last 3 Encounters:   01/23/23 140 lb (63.5 kg)   01/09/23 140 lb (63.5 kg)   12/19/22 140 lb (63.5 kg)     PHYSICAL EXAM  CONSTITUTIONAL:   Awake, alert, cooperative   EYES:  lids and lashes normal   ENT: external ears and nose without lesions   NECK:  supple, symmetrical, trachea midline   SKIN:  Open wound Present    Assessment:     Problem List Items Addressed This Visit High    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 2 diabetes mellitus, limited to breakdown of skin ([address]    Diabetic ulcer of right midfoot associated with type 2 diabetes mellitus, limited to breakdown of skin ([address]       Medium    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 2 diabetes mellitus, with fat layer exposed ([address]    Relevant Orders    Initiate Outpatient Wound Care Protocol    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 due to underlying condition, with fat layer exposed ([address] - Primary    Relevant Orders    Initiate Outpatient Wound Care Protocol       Pre Debridement Measurements:  Are located in the [place]  Documentation Flow Sheet  Post Debridement Measurements:  Wound/Ulcer Descriptions are Pre Debridement except measurements:    Wound 04/29/22 Foot Left;Plantar (Active)   Number of days: 269       Wound 12/05/22 Foot Right;Plantar;Proximal new wound, #1 right plantar foot (Active)   Wound Image   01/09/23 1358   Dressing Status New dressing applied;Clean;Dry; Intact 01/09/23 1530   Wound Cleansed Cleansed with saline 01/09/23 1530   Dressing/Treatment Alginate with Ag;Dry dressing 01/09/23 1530   Offloading for Diabetic Foot Ulcers Orthowedge/inserts 01/09/23 1530   Wound Length (cm) 1.5 cm 01/23/23 1343   Wound Width (cm) 1 cm 01/23/23 1343   Wound Depth (cm) 0.1 cm 01/23/23 1343   Wound Surface Area (cm^2) 1.5 cm^2 01/23/23 1343   Change in Wound Size % (l*w) 37.5 01/23/23 1343   Wound Volume (cm^3) 0.15 cm^3 01/23/23 1343   Wound Healing % 38 01/23/23 1343   Post-Procedure Length (cm) 1.8 cm 01/09/23 1454   Post-Procedure Width (cm) 1.6 cm 01/09/23 1454   Post-Procedure Depth (cm) 0.2 cm 01/09/23 1454   Post-Procedure Surface Area (cm^2) 2.88 cm^2 01/09/23 1454   Post-Procedure Volume (cm^3) 0.576 cm^3 01/09/23 1454   Wound Assessment Eschar dry 01/23/23 1343   Drainage Amount None 01/23/23 1343   Drainage Description Serosanguinous 12/27/22 1436   Odor None 01/23/23 1343   Latonya-wound Assessment Hyperkeratosis (callous) 01/23/23 1343   Number of days: 48       Wound 12/05/22 Heel Right;Plantar wound #2 right plantar heel (Active)   Wound Image   01/09/23 1358   Dressing Status New dressing applied;Clean;Dry; Intact 01/09/23 1530   Wound Cleansed Cleansed with saline 01/09/23 1530   Dressing/Treatment Alginate with Ag;Dry dressing 01/09/23 1530   Offloading for Diabetic Foot Ulcers Orthowedge/inserts 01/09/23 1530   Wound Length (cm) 0.7 cm 01/23/23 1343   Wound Width (cm) 1.4 cm 01/23/23 1343   Wound Depth (cm) 0.2 cm 01/23/23 1343   Wound Surface Area (cm^2) 0.98 cm^2 01/23/23 1343   Change in Wound Size % (l*w) 74.21 01/23/23 1343   Wound Volume (cm^3) 0.196 cm^3 01/23/23 1343   Wound Healing % 74 01/23/23 1343   Post-Procedure Length (cm) 0.8 cm 01/09/23 1454   Post-Procedure Width (cm) 1 cm 01/09/23 1454   Post-Procedure Depth (cm) 0.2 cm 01/09/23 1454   Post-Procedure Surface Area (cm^2) 0.8 cm^2 01/09/23 1454   Post-Procedure Volume (cm^3) 0.16 cm^3 01/09/23 1454   Wound Assessment Pink/red;Fibrin 01/23/23 1343   Drainage Amount Moderate 01/23/23 1343   Drainage Description Serosanguinous; Yellow 01/23/23 1343   Odor None 01/23/23 1343   Latonya-wound Assessment Dry/flaky; Hyperkeratosis (callous) 01/23/23 1343   Number of days: 48          Procedure Note  Indications:  Based on my examination of this patient's wound(s)/ulcer(s) today, debridement is required to promote healing and evaluate the wound base. Performed by: Melissa Calvillo MD    Consent obtained:  Yes    Time out taken:  Yes    Pain Control: Anesthetic  Anesthetic: 4% Lidocaine Liquid Topical     Debridement:Excisional Debridement    Using curette, scissors, and forceps the wound(s)/ulcer(s) was/were sharply debrided down through and including the removal of epidermis and dermis.         Devitalized Tissue Debrided:  fibrin and slough to stimulate bleeding to promote healing, post debridement good bleeding base and wound edges noted    Wound/Ulcer #: 1 and 2    Percent of Wound/Ulcer Debrided: 100%    Total Surface Area Debrided:  1.5 sq cm     Estimated Blood Loss:  Minimal  Hemostasis Achieved:  by pressure    Procedural Pain:  3  / 10   Post Procedural Pain:  2 / 10     Response to treatment:  Well tolerated by patient. Plan:   Treatment Note please see attached Discharge Instructions    Written patient dismissal instructions given to patient and signed by patient or POA. Discharge Instructions         Visit Discharge/Physician Orders     Discharge condition: Stable  Assessment of pain at discharge: minimal  Anesthetic used: 4% lidocaine external solution  Discharge to: ECF  Left via:Private automobile  Accompanied by: accompanied by self  ECF/[facility]HA: [facility]      Dressing Orders: To right foot wounds, cleanse with normal saline solution and apply alginate Ag and cover with a dry dressing. Change daily. Treatment Orders:     CBC and CMP and x-ray- REVIEWED  [facility] scheduled for 12/13/22- REVIEWED  Continue using diabetic shoe inserts, keep pressure off of wound sites     FOLLOW NUTRITIOUS DIET. CHOOSE FOODS HIGH IN PROTEIN -CHICKEN- FISH-AND EGGS,  CHOOSE FOODS HIGH IN VITAMIN C.   MULTIVITAMIN DAILY. [facility] followup visit _____2 weeks Monday __________________  (Please note your next appointment above and if you are unable to keep, kindly give a 24 hour notice. Thank you.)     Physician signature:__________________________        If you experience any of the following, please call the [facility] during business hours:     * Increase in Pain  * Temperature over 101  * Increase in drainage from your wound  * Drainage with a foul odor  * Bleeding  * Increase in swelling  * Need for compression bandage changes due to slippage, breakthrough drainage.      If you need medical attention outside of the business hours of the [facility] please contact your PCP or go to the nearest emergency room.       Electronically signed by Omaira Otero MD on 1/23/2023 at 2:21 PM

## 2023-02-03 NOTE — DISCHARGE INSTRUCTIONS
Visit Discharge/Physician Orders     Discharge condition: Stable  Assessment of pain at discharge: minimal  Anesthetic used: 4% lidocaine external solution  Discharge to: ECF  Left via:Private automobile  Accompanied by: accompanied by self  ECF/HHA: [facility]     Dressing Orders: To right foot wounds, cleanse with normal saline solution and apply alginate Ag and cover with a dry dressing. Change daily. Treatment Orders:     CBC and CMP and x-ray- REVIEWED  Vasculars scheduled for 12/13/22- REVIEWED  Continue using diabetic shoe inserts, keep pressure off of wound sites     FOLLOW NUTRITIOUS DIET. CHOOSE FOODS HIGH IN PROTEIN -CHICKEN- FISH-AND EGGS,  CHOOSE FOODS HIGH IN VITAMIN C.   MULTIVITAMIN DAILY. [facility] followup visit _____3 weeks Monday __________________  (Please note your next appointment above and if you are unable to keep, kindly give a 24 hour notice. Thank you.)     Physician signature:__________________________        If you experience any of the following, please call the [email or web address] during business hours:     * Increase in Pain  * Temperature over 101  * Increase in drainage from your wound  * Drainage with a foul odor  * Bleeding  * Increase in swelling  * Need for compression bandage changes due to slippage, breakthrough drainage. If you need medical attention outside of the business hours of the [facility] please contact your PCP or go to the nearest emergency room.

## 2023-02-06 ENCOUNTER — HOSPITAL ENCOUNTER (OUTPATIENT)
Dept: WOUND CARE | Age: 83
Discharge: HOME | End: 2023-02-06
Payer: MEDICARE

## 2023-02-06 VITALS
HEIGHT: 69 IN | RESPIRATION RATE: 16 BRPM | TEMPERATURE: 96.6 F | DIASTOLIC BLOOD PRESSURE: 64 MMHG | HEART RATE: 84 BPM | SYSTOLIC BLOOD PRESSURE: 132 MMHG | WEIGHT: 140 LBS | BODY MASS INDEX: 20.73 KG/M2

## 2023-02-06 DIAGNOSIS — L97.412 DIABETIC ULCER OF RIGHT HEEL ASSOCIATED WITH TYPE 2 DIABETES MELLITUS, WITH FAT LAYER EXPOSED (HCC): ICD-10-CM

## 2023-02-06 DIAGNOSIS — E11.621 DIABETIC ULCER OF RIGHT HEEL ASSOCIATED WITH TYPE 2 DIABETES MELLITUS, WITH FAT LAYER EXPOSED (HCC): ICD-10-CM

## 2023-02-06 DIAGNOSIS — E08.621 DIABETIC ULCER OF RIGHT MIDFOOT ASSOCIATED WITH DIABETES MELLITUS DUE TO UNDERLYING CONDITION, WITH FAT LAYER EXPOSED (HCC): Primary | ICD-10-CM

## 2023-02-06 DIAGNOSIS — L97.412 DIABETIC ULCER OF RIGHT MIDFOOT ASSOCIATED WITH DIABETES MELLITUS DUE TO UNDERLYING CONDITION, WITH FAT LAYER EXPOSED (HCC): Primary | ICD-10-CM

## 2023-02-06 PROCEDURE — 99212 OFFICE O/P EST SF 10 MIN: CPT | Performed by: SURGERY

## 2023-02-06 PROCEDURE — 99213 OFFICE O/P EST LOW 20 MIN: CPT

## 2023-02-06 RX ORDER — LIDOCAINE HYDROCHLORIDE 20 MG/ML
JELLY TOPICAL ONCE
OUTPATIENT
Start: 2023-02-06 | End: 2023-02-06

## 2023-02-06 RX ORDER — BACITRACIN, NEOMYCIN, POLYMYXIN B 400; 3.5; 5 [USP'U]/G; MG/G; [USP'U]/G
OINTMENT TOPICAL ONCE
OUTPATIENT
Start: 2023-02-06 | End: 2023-02-06

## 2023-02-06 RX ORDER — LIDOCAINE HYDROCHLORIDE 40 MG/ML
SOLUTION TOPICAL ONCE
Status: COMPLETED | OUTPATIENT
Start: 2023-02-06 | End: 2023-02-06

## 2023-02-06 RX ORDER — BACITRACIN ZINC AND POLYMYXIN B SULFATE 500; 1000 [USP'U]/G; [USP'U]/G
OINTMENT TOPICAL ONCE
OUTPATIENT
Start: 2023-02-06 | End: 2023-02-06

## 2023-02-06 RX ORDER — BETAMETHASONE DIPROPIONATE 0.05 %
OINTMENT (GRAM) TOPICAL ONCE
OUTPATIENT
Start: 2023-02-06 | End: 2023-02-06

## 2023-02-06 RX ORDER — LIDOCAINE HYDROCHLORIDE 40 MG/ML
SOLUTION TOPICAL ONCE
OUTPATIENT
Start: 2023-02-06 | End: 2023-02-06

## 2023-02-06 RX ORDER — GENTAMICIN SULFATE 1 MG/G
OINTMENT TOPICAL ONCE
OUTPATIENT
Start: 2023-02-06 | End: 2023-02-06

## 2023-02-06 RX ORDER — CLOBETASOL PROPIONATE 0.5 MG/G
OINTMENT TOPICAL ONCE
OUTPATIENT
Start: 2023-02-06 | End: 2023-02-06

## 2023-02-06 RX ORDER — LIDOCAINE 50 MG/G
OINTMENT TOPICAL ONCE
OUTPATIENT
Start: 2023-02-06 | End: 2023-02-06

## 2023-02-06 RX ORDER — GINSENG 100 MG
CAPSULE ORAL ONCE
OUTPATIENT
Start: 2023-02-06 | End: 2023-02-06

## 2023-02-06 RX ORDER — LIDOCAINE 40 MG/G
CREAM TOPICAL ONCE
OUTPATIENT
Start: 2023-02-06 | End: 2023-02-06

## 2023-02-06 RX ADMIN — LIDOCAINE HYDROCHLORIDE 10 ML: 40 SOLUTION TOPICAL at 14:11

## 2023-02-06 NOTE — PROGRESS NOTES
Wound Healing Center Followup Visit Note    Referring Physician : Earlene Canas MD  [address] RECORD NUMBER:  [id number]  AGE: 79 y.o. GENDER: male  : 1940  EPISODE DATE:  2023    Subjective:     Chief Complaint   Patient presents with    Wound Check     Right foot/heel        HISTORY of PRESENT ILLNESS HPI   Francisca Sumner is a 79 y.o. male who presents today in regards to follow up evaluation and treatment of wound/ulcer. That patient's past medical, family and social hx were reviewed and changes were made if present. History of Wound Context:  The patient has had a wound of right foot overlying the plantar surface of the right heel and plantar surface of the right first metatarsophalangeal joint which was first noted approximately at least 3 months ago. This has been treated by the patient in the facility, but patient did not call the wound care center as he was instructed in the past call immediately for any future ulcers. On their initial visit to the wound healing center, 22, the patient has noted that the wound has not been improving. The patient has had similar previous wounds in the past.       Patient in the past has undergone right femoral angiogram and angioplasty of right popliteal artery and tibial arteries by Dr. Neldon Apley in 2019     Patient has a diabetes mellitus diabetic neuropathy     Pt is currently not on abx.       Wound/Ulcer Pain Timing/Severity: constant  Quality of pain: dull, aching  Severity:  3 / 10   Modifying Factors: Pain worsens with walking  Associated Signs/Symptoms: drainage and pain       2022  Patient tells me that he saw his orthotist, the custom made insert was modified  Right foot wounds look improved    2022  Right foot wound slowly improving, patient is wearing the modified custom made orthotic of the right foot  The ankle-brachial index that was done was reviewed, overall adequate flow for tissue perfusion, 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, with adequate collateral flow to the foot   and the remaining toes based upon the pulse volume recordings       On the left side, normal ankle arm index, with triphasic ankle Doppler   tracings and good arterial flow to the foot and the toes based upon   the pulse volume recordings       Overall, no significant change noted compared the study that was in   April 2022 12/27/2022  Ulcers of the right foot, slowly improving, continue to offload as much as possible  1/9/2023  Ulcers right foot improving, mainly skin only  1/23/2023  Ulcer underneath the first metatarsal head improving, skin only, with callus and small eschar debrided, ulcer underneath the heel, minimal fat layer exposed, discussed the patient again regarding importance of offloading  2/6/2023  Black scabs noted with minimal eschar not debrided the importance of offloading was rediscussed with the patient    Ulcer Identification:  Ulcer Type: arterial, diabetic, pressure, and non-healing/non-surgical  Contributing Factors: diabetes, chronic pressure, decreased mobility, shear force, and arterial insufficiency     Diabetic/Pressure/Non Pressure Ulcers only:  Ulcer: Diabetic ulcer, fat layer exposed     If patient has diabetic lower extremity wounds  Dale Classification of diabetic lower extremity wounds:     Grade Description   []  0 No open wound   []  1 Superficial ulcer involving the full skin thickness   []  2 Deep ulcer involves ligament, tendon, joint capsule, or fascia  No bone involvement or abscess presence   []  3 Deep Ulcer with abcess formation and/or osteomyelitis   []  4 Localized gangrene   []  5 Extensive gangrene of the foot      Wound: Patient does have diabetic foot ulcers, pressure ulcers, trophic ulcers, with a fat layer exposed underneath the first metatarsal phalangeal joint as well as the right heel overlying the plantar surface, patient recently did have a new set of inserts made to call the patient, patient was instructed make sure he contacts his orthotist and modify them to alleviate pressure overlying the ulcer area     Other pertinent information:     1.   The last lower extremity arterial Doppler study, done in April 2022 was personally reviewed by me        Narrative   On the right side, ankle-brachial index, 0.93 with biphasic plus ankle   Doppler tracing over the anterior tibial artery, with good arterial   flow to the right foot and the remaining toes, except over the right   fourth toe where the pulse volume recordings diminished       On the left side, ankle-brachial index within normal range of 0.99,   with biphasic plus ankle Doppler tracings and good arterial flow to   the foot and the remaining toes based upon the pulse volume recordings         12/5/2022  Discussed the patient regarding all options, risks benefits and alternatives, was recommend complete work-up including CBC, CMP, x-ray of the right foot, without osteomyelitis, leg and artery Doppler study to assess distal arterial perfusion  He was also given history, to call immediately extremities worsening of the ulcers  Patient also was instructed to contact his orthotist to make additional modifications of the inserts to alleviate pressure over the ulcerated areas of the right foot  All his questions were answered                      PAST MEDICAL HISTORY      Diagnosis Date    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, limited to breakdown of skin ([address] 1/9/2023    Femoral-popliteal atherosclerosis ([address] 1/1/2017    Heel ulcer, right, with fat layer exposed ([address] 5/6/2019    Hypertension     Osteomyelitis of third toe of right foot ([address] 12/6/2019    S/P peripheral artery angioplasty 01/23/2020    bilateral legs -[place]    Skin ulcer of right foot with fat layer exposed ([address] 12/9/2019    Ulcer of right leg, with fat layer exposed ([address] 5/6/2019    Varicose veins of leg with edema, 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     Past Surgical History:   Procedure Laterality Date    COLONOSCOPY      CYSTOSCOPY N/A 5/18/2020    SUPRAPUBIC TUBE PLACEMENT performed by Margot Ny MD at [address]   1990's    lense implants bilaterally    FOOT DEBRIDEMENT Left 01/04/2017    wound debridement and delayed primary closure    FRACTURE SURGERY      L femur fracture surgery    HIP FRACTURE SURGERY Left 9/23/2020    LEFT HIP OPEN REDUCTION INTERNAL FIXATION performed by Leellen Cheadle, MD at [address] Right 7/16/2021    RIGHT HIP HEMIARTHROPLASTY performed by Chong Crowe DO at [address]  04/23/2019    Dr. Maame Fernandes- PTA ant tibial    OTHER SURGICAL HISTORY  12/17/2019    Dr Maame Fernandes - PCI Right popliteal and Anterior tibial    PROSTATE BIOPSY  04/2016    SKIN BIOPSY  sept of 2018 last time    head and ears    TOE AMPUTATION Left 12/31/2016    2nd    TOE AMPUTATION Right 4/26/2019    AMPUTATION RIGHT SECOND TOE, FOOT DEBRIDEMENT performed by Angelica Gibbons DPM at [address] Right 12/10/2019    AMPUTATION RIGHT 3rd DIGIT WITH PARTIAL RESECTION OF THIRD METATARSAL performed by Sonu Sterling DPM at [facility] OR    TURP N/A 2020    CYSTOSCOPY PLASMA LOOP TRANSURETHRAL RESECTION PROSTATE performed by Mauricio Christian MD at [facility]       Family History   Problem Relation Age of Onset    Heart Disease Mother         CHF    Heart Disease Father     Heart Attack Father      Social History     Tobacco Use    Smoking status: Former     Packs/day: 0.50     Years: 2.00     Pack years: 1.00     Types: Cigarettes     Quit date:      Years since quittin.1    Smokeless tobacco: Never    Tobacco comments:     quit   Vaping Use    Vaping Use: Never used   Substance Use Topics    Alcohol use: Not Currently    Drug use: Not Currently     Allergies   Allergen Reactions    Latex Other (See Comments)     Pt unsure of reaction    Ciprofloxacin In D5w Itching    Food     Pcn [Penicillins] Other (See Comments)     \"I just know my body doesn't like it. \" \"I had a reaction a long time ago, I know it affects my kidneys. \"    Aspirin Other (See Comments)     \"It affects my kidneys. \"  Other reaction(s): Renal reactions    Other Rash     \"I get a rash on just my face if I eat Cumin or Chili. \"    Peanut-Containing Drug Products Itching     Current Outpatient Medications on File Prior to Encounter   Medication Sig Dispense Refill    ferrous sulfate 300 (60 Fe) MG/5ML syrup Take 300 mg by mouth daily      vitamin D (CHOLECALCIFEROL) 125 MCG (5000 UT) CAPS capsule Take 5,000 Units by mouth daily      guaiFENesin-dextromethorphan (ROBITUSSIN DM) 100-10 MG/5ML syrup Take 10 mLs by mouth 3 times daily as needed for Cough      metFORMIN (GLUCOPHAGE) 500 MG tablet Take 500 mg by mouth at bedtime      magnesium hydroxide (MILK OF MAGNESIA) 400 MG/5ML suspension Take 30 mLs by mouth daily as needed for Constipation      zinc gluconate 50 MG tablet Take 50 mg by mouth daily      zinc sulfate (ZINCATE) 220 (50 Zn) MG capsule Take 50 mg by mouth daily      acetaminophen (TYLENOL) 325 MG tablet Take 650 mg by mouth every 4 hours as needed for Pain      miconazole nitrate 2 % OINT Please apply to the toes, in between the toes, both legs up to the upper calf, twice a day for 1 month 1 each 10    insulin glargine (LANTUS) 100 UNIT/ML injection vial Inject 10 Units into the skin nightly      rivastigmine (EXELON) 1.5 mg capsule Take 1.5 mg by mouth 3 times daily      aspirin 81 MG chewable tablet Take 81 mg by mouth daily      sodium chloride 1 g tablet Take 1 g by mouth in the morning and at bedtime      zinc sulfate (ZINCATE) 220 (50 Zn) MG capsule Take 50 mg by mouth daily      empagliflozin (JARDIANCE) 25 MG tablet Take 25 mg by mouth daily      insulin lispro (HUMALOG) 100 UNIT/ML injection vial Inject into the skin 4 times daily per sliding scale: 150-199 = 2 units;    200-249 = 4;    250-299 = 6;    300-349 = 8;    > 350 = notify MD      SITagliptin (JANUVIA) 100 MG tablet Take 100 mg by mouth daily      metFORMIN (GLUCOPHAGE) 1000 MG tablet Take 1,000 mg by mouth daily (with breakfast)      metoprolol succinate (TOPROL XL) 25 MG extended release tablet Take 1 tablet by mouth daily 30 tablet 3    amLODIPine (NORVASC) 5 MG tablet Take 2 tablets by mouth daily 30 tablet 3    Garlic 9409 MG TABS Take 1,250 mg by mouth daily       Multiple Vitamins-Minerals (THERAPEUTIC MULTIVITAMIN-MINERALS) tablet Take 1 tablet by mouth daily       No current facility-administered medications on file prior to encounter.        REVIEW OF SYSTEMS See HPI    Objective:    /64   Pulse 84   Temp (!) 96.6 °F (35.9 °C) (Temporal)   Resp 16   Ht 5' 9\" (1.753 m)   Wt 140 lb (63.5 kg)   BMI 20.67 kg/m²   Wt Readings from Last 3 Encounters:   02/06/23 140 lb (63.5 kg)   01/23/23 140 lb (63.5 kg)   01/09/23 140 lb (63.5 kg)     PHYSICAL EXAM  CONSTITUTIONAL:   Awake, alert, cooperative   EYES:  lids and lashes normal   ENT: external ears and nose without lesions   NECK: supple, symmetrical, trachea midline   SKIN:  Open wound Present    Assessment:     Problem List Items Addressed This Visit          Medium    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 2 diabetes mellitus, with fat layer exposed ([address]    Relevant Orders    Initiate Outpatient Wound Care Protocol    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 due to underlying condition, with fat layer exposed ([address] - Primary    Relevant Orders    Initiate Outpatient Wound Care Protocol       Pre Debridement Measurements:  Are located in the [facility]  Documentation Flow Sheet  Post Debridement Measurements:  Wound/Ulcer Descriptions are Pre Debridement except measurements:    Wound 04/29/22 Foot Left;Plantar (Active)   Number of days: 283       Wound 12/05/22 Foot Right;Plantar;Proximal new wound, #1 right plantar foot (Active)   Wound Image   01/09/23 1358   Dressing Status New dressing applied 01/23/23 1439   Wound Cleansed Cleansed with saline 01/23/23 1439   Dressing/Treatment Alginate with Ag;Dry dressing 01/23/23 1439   Offloading for Diabetic Foot Ulcers Orthowedge/inserts 01/23/23 1439   Wound Length (cm) 1 cm 02/06/23 1407   Wound Width (cm) 0.4 cm 02/06/23 1407   Wound Depth (cm) 0.1 cm 02/06/23 1407   Wound Surface Area (cm^2) 0.4 cm^2 02/06/23 1407   Change in Wound Size % (l*w) 83.33 02/06/23 1407   Wound Volume (cm^3) 0.04 cm^3 02/06/23 1407   Wound Healing % 83 02/06/23 1407   Post-Procedure Length (cm) 1.6 cm 01/23/23 1423   Post-Procedure Width (cm) 1.1 cm 01/23/23 1423   Post-Procedure Depth (cm) 0.2 cm 01/23/23 1423   Post-Procedure Surface Area (cm^2) 1.76 cm^2 01/23/23 1423   Post-Procedure Volume (cm^3) 0.352 cm^3 01/23/23 1423   Wound Assessment Eschar dry 02/06/23 1407   Drainage Amount None 02/06/23 1407   Drainage Description Serosanguinous 12/27/22 1436   Odor None 02/06/23 1407   Latonya-wound Assessment Hyperkeratosis (callous) 02/06/23 1407   Number of days: 63       Wound 12/05/22 Heel Right;Plantar wound #2 right plantar heel (Active)   Wound Image   01/09/23 1358   Dressing Status New dressing applied 01/23/23 1439   Wound Cleansed Cleansed with saline 01/23/23 1439   Dressing/Treatment Alginate with Ag;Dry dressing 01/23/23 1439   Offloading for Diabetic Foot Ulcers Orthowedge/inserts 01/23/23 1439   Wound Length (cm) 0.8 cm 02/06/23 1407   Wound Width (cm) 1.8 cm 02/06/23 1407   Wound Depth (cm) 0.1 cm 02/06/23 1407   Wound Surface Area (cm^2) 1.44 cm^2 02/06/23 1407   Change in Wound Size % (l*w) 62.11 02/06/23 1407   Wound Volume (cm^3) 0.144 cm^3 02/06/23 1407   Wound Healing % 81 02/06/23 1407   Post-Procedure Length (cm) 0.8 cm 01/23/23 1423   Post-Procedure Width (cm) 1.5 cm 01/23/23 1423   Post-Procedure Depth (cm) 0.3 cm 01/23/23 1423   Post-Procedure Surface Area (cm^2) 1.2 cm^2 01/23/23 1423   Post-Procedure Volume (cm^3) 0.36 cm^3 01/23/23 1423   Wound Assessment Eschar dry 02/06/23 1407   Drainage Amount None 02/06/23 1407   Drainage Description Serosanguinous; Yellow 01/23/23 1343   Odor None 02/06/23 1407   Latonya-wound Assessment Hyperkeratosis (callous) 02/06/23 1407   Number of days: 62          Procedure Note  Indications:  Based on my examination of this patient's wound(s)/ulcer(s) today, debridement is required to promote healing and evaluate the wound base. Performed by: Terri Jay MD    Consent obtained:  Yes    Time out taken:  Yes    Pain Control: Anesthetic  Anesthetic: 4% Lidocaine Liquid Topical     Debridement: Wound not debrided today, mainly scabs and eschar only improving    Response to treatment:  Well tolerated by patient. Plan:   Treatment Note please see attached Discharge Instructions    Written patient dismissal instructions given to patient and signed by patient or POA.          Discharge Instructions              Visit Discharge/Physician Orders     Discharge condition: Stable  Assessment of pain at discharge: minimal  Anesthetic used: 4% lidocaine external solution  Discharge to: ECF  Left via:Private automobile  Accompanied by: accompanied by self  ECF/HHA: [id number]      Dressing Orders: To right foot wounds, cleanse with normal saline solution and apply alginate Ag and cover with a dry dressing. Change daily. Treatment Orders:     CBC and CMP and x-ray- REVIEWED  Vasculars scheduled for 12/13/22- REVIEWED  Continue using diabetic shoe inserts, keep pressure off of wound sites     FOLLOW NUTRITIOUS DIET. CHOOSE FOODS HIGH IN PROTEIN -CHICKEN- FISH-AND EGGS,  CHOOSE FOODS HIGH IN VITAMIN C.   MULTIVITAMIN DAILY. [address] [place] [address],[address] followup visit _____2 weeks Monday __________________  (Please note your next appointment above and if you are unable to keep, kindly give a 24 hour notice. Thank you.)     Physician signature:__________________________        If you experience any of the following, please call the [facility] during business hours:     * Increase in Pain  * Temperature over 101  * Increase in drainage from your wound  * Drainage with a foul odor  * Bleeding  * Increase in swelling  * Need for compression bandage changes due to slippage, breakthrough drainage. If you need medical attention outside of the business hours of the [facility] please contact your PCP or go to the nearest emergency room.          Electronically signed by Alexandra Marítnez MD on 2/6/2023 at 3:00 PM

## 2023-02-20 NOTE — DISCHARGE INSTRUCTIONS
Visit Discharge/Physician Orders     Discharge condition: Stable  Assessment of pain at discharge: minimal  Anesthetic used: 4% lidocaine external solution  Discharge to: ECF  Left via:Private automobile  Accompanied by: accompanied by self  ECF/HHA: [id number]      Dressing Orders: To right foot wounds, cleanse with normal saline solution and apply alginate Ag and cover with a dry dressing. Change daily. Treatment Orders:     CBC and CMP and x-ray- REVIEWED  Vasculars scheduled for 12/13/22- REVIEWED  Continue using diabetic shoe inserts, keep pressure off of wound sites     FOLLOW NUTRITIOUS DIET. CHOOSE FOODS HIGH IN PROTEIN -CHICKEN- FISH-AND EGGS,  CHOOSE FOODS HIGH IN VITAMIN C.   MULTIVITAMIN DAILY. [facility],[address] followup visit _____2 weeks Monday __________________  (Please note your next appointment above and if you are unable to keep, kindly give a 24 hour notice. Thank you.)     Physician signature:__________________________        If you experience any of the following, please call the [facility] during business hours:     * Increase in Pain  * Temperature over 101  * Increase in drainage from your wound  * Drainage with a foul odor  * Bleeding  * Increase in swelling  * Need for compression bandage changes due to slippage, breakthrough drainage. If you need medical attention outside of the business hours of the [facility] please contact your PCP or go to the nearest emergency room.

## 2023-02-27 ENCOUNTER — HOSPITAL ENCOUNTER (OUTPATIENT)
Dept: WOUND CARE | Age: 83
Discharge: HOME | End: 2023-02-27
Payer: MEDICARE

## 2023-02-27 VITALS
RESPIRATION RATE: 16 BRPM | TEMPERATURE: 97.2 F | HEART RATE: 75 BPM | DIASTOLIC BLOOD PRESSURE: 64 MMHG | SYSTOLIC BLOOD PRESSURE: 129 MMHG

## 2023-02-27 DIAGNOSIS — L97.412 DIABETIC ULCER OF RIGHT HEEL ASSOCIATED WITH TYPE 2 DIABETES MELLITUS, WITH FAT LAYER EXPOSED (HCC): ICD-10-CM

## 2023-02-27 DIAGNOSIS — E08.621 DIABETIC ULCER OF RIGHT MIDFOOT ASSOCIATED WITH DIABETES MELLITUS DUE TO UNDERLYING CONDITION, WITH FAT LAYER EXPOSED (HCC): Primary | ICD-10-CM

## 2023-02-27 DIAGNOSIS — L97.412 DIABETIC ULCER OF RIGHT MIDFOOT ASSOCIATED WITH DIABETES MELLITUS DUE TO UNDERLYING CONDITION, WITH FAT LAYER EXPOSED (HCC): Primary | ICD-10-CM

## 2023-02-27 DIAGNOSIS — E11.621 DIABETIC ULCER OF RIGHT HEEL ASSOCIATED WITH TYPE 2 DIABETES MELLITUS, LIMITED TO BREAKDOWN OF SKIN (HCC): ICD-10-CM

## 2023-02-27 DIAGNOSIS — E11.621 DIABETIC ULCER OF RIGHT HEEL ASSOCIATED WITH TYPE 2 DIABETES MELLITUS, WITH FAT LAYER EXPOSED (HCC): ICD-10-CM

## 2023-02-27 DIAGNOSIS — L97.411 DIABETIC ULCER OF RIGHT HEEL ASSOCIATED WITH TYPE 2 DIABETES MELLITUS, LIMITED TO BREAKDOWN OF SKIN (HCC): ICD-10-CM

## 2023-02-27 PROCEDURE — 97597 DBRDMT OPN WND 1ST 20 CM/<: CPT | Performed by: SURGERY

## 2023-02-27 PROCEDURE — 97597 DBRDMT OPN WND 1ST 20 CM/<: CPT

## 2023-02-27 RX ORDER — LIDOCAINE 50 MG/G
OINTMENT TOPICAL ONCE
OUTPATIENT
Start: 2023-02-27 | End: 2023-02-27

## 2023-02-27 RX ORDER — LIDOCAINE HYDROCHLORIDE 20 MG/ML
JELLY TOPICAL ONCE
OUTPATIENT
Start: 2023-02-27 | End: 2023-02-27

## 2023-02-27 RX ORDER — CLOBETASOL PROPIONATE 0.5 MG/G
OINTMENT TOPICAL ONCE
OUTPATIENT
Start: 2023-02-27 | End: 2023-02-27

## 2023-02-27 RX ORDER — GENTAMICIN SULFATE 1 MG/G
OINTMENT TOPICAL ONCE
OUTPATIENT
Start: 2023-02-27 | End: 2023-02-27

## 2023-02-27 RX ORDER — BETAMETHASONE DIPROPIONATE 0.05 %
OINTMENT (GRAM) TOPICAL ONCE
OUTPATIENT
Start: 2023-02-27 | End: 2023-02-27

## 2023-02-27 RX ORDER — BACITRACIN, NEOMYCIN, POLYMYXIN B 400; 3.5; 5 [USP'U]/G; MG/G; [USP'U]/G
OINTMENT TOPICAL ONCE
OUTPATIENT
Start: 2023-02-27 | End: 2023-02-27

## 2023-02-27 RX ORDER — GINSENG 100 MG
CAPSULE ORAL ONCE
OUTPATIENT
Start: 2023-02-27 | End: 2023-02-27

## 2023-02-27 RX ORDER — BACITRACIN ZINC AND POLYMYXIN B SULFATE 500; 1000 [USP'U]/G; [USP'U]/G
OINTMENT TOPICAL ONCE
OUTPATIENT
Start: 2023-02-27 | End: 2023-02-27

## 2023-02-27 RX ORDER — LIDOCAINE HYDROCHLORIDE 40 MG/ML
SOLUTION TOPICAL ONCE
OUTPATIENT
Start: 2023-02-27 | End: 2023-02-27

## 2023-02-27 RX ORDER — LIDOCAINE 40 MG/G
CREAM TOPICAL ONCE
OUTPATIENT
Start: 2023-02-27 | End: 2023-02-27

## 2023-02-27 RX ORDER — LIDOCAINE HYDROCHLORIDE 40 MG/ML
SOLUTION TOPICAL ONCE
Status: DISCONTINUED | OUTPATIENT
Start: 2023-02-27 | End: 2023-02-28 | Stop reason: HOSPADM

## 2023-02-27 NOTE — PROGRESS NOTES
Wound Healing Center Followup Visit Note    Referring Physician : Sofia Clarke MD  [address] RECORD NUMBER:  [id number]  AGE: 79 y.o. GENDER: male  : 1940  EPISODE DATE:  2023    Subjective:     Chief Complaint   Patient presents with    Wound Check     Foot right        HISTORY of PRESENT ILLNESS HPI   Gilda Brandon is a 79 y.o. male who presents today in regards to follow up evaluation and treatment of wound/ulcer. That patient's past medical, family and social hx were reviewed and changes were made if present. History of Wound Context:  The patient has had a wound of right foot overlying the plantar surface of the right heel and plantar surface of the right first metatarsophalangeal joint which was first noted approximately at least 3 months ago. This has been treated by the patient in the facility, but patient did not call the wound care center as he was instructed in the past call immediately for any future ulcers. On their initial visit to the wound healing center, 22, the patient has noted that the wound has not been improving. The patient has had similar previous wounds in the past.       Patient in the past has undergone right femoral angiogram and angioplasty of right popliteal artery and tibial arteries by Dr. Evelyn Camacho in 2019     Patient has a diabetes mellitus diabetic neuropathy     Pt is currently not on abx.       Wound/Ulcer Pain Timing/Severity: constant  Quality of pain: dull, aching  Severity:  3 / 10   Modifying Factors: Pain worsens with walking  Associated Signs/Symptoms: drainage and pain       2022  Patient tells me that he saw his orthotist, the custom made insert was modified  Right foot wounds look improved    2022  Right foot wound slowly improving, patient is wearing the modified custom made orthotic of the right foot  The ankle-brachial index that was done was reviewed, overall adequate flow for tissue perfusion, 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, with adequate collateral flow to the foot   and the remaining toes based upon the pulse volume recordings       On the left side, normal ankle arm index, with triphasic ankle Doppler   tracings and good arterial flow to the foot and the toes based upon   the pulse volume recordings       Overall, no significant change noted compared the study that was in   April 2022 12/27/2022  Ulcers of the right foot, slowly improving, continue to offload as much as possible  1/9/2023  Ulcers right foot improving, mainly skin only  1/23/2023  Ulcer underneath the first metatarsal head improving, skin only, with callus and small eschar debrided, ulcer underneath the heel, minimal fat layer exposed, discussed the patient again regarding importance of offloading  2/6/2023  Black scabs noted with minimal eschar not debrided the importance of offloading was rediscussed with the patient  2/27/2023  Right proximal wound underneath the metatarsal head, healed  Still has a skin ulcer with eschar formation over the plantar surface of the heel, debrided, mainly skin, recommended try to offload to the best he can        Ulcer Identification:  Ulcer Type: arterial, diabetic, pressure, and non-healing/non-surgical  Contributing Factors: diabetes, chronic pressure, decreased mobility, shear force, and arterial insufficiency     Diabetic/Pressure/Non Pressure Ulcers only:  Ulcer: Diabetic ulcer, fat layer exposed     If patient has diabetic lower extremity wounds  Dale Classification of diabetic lower extremity wounds:     Grade Description   []  0 No open wound   []  1 Superficial ulcer involving the full skin thickness   []  2 Deep ulcer involves ligament, tendon, joint capsule, or fascia  No bone involvement or abscess presence   []  3 Deep Ulcer with abcess formation and/or osteomyelitis   []  4 Localized gangrene   []  5 Extensive gangrene of the foot      Wound: Patient does have diabetic foot ulcers, pressure ulcers, trophic ulcers, with a fat layer exposed underneath the first metatarsal phalangeal joint as well as the right heel overlying the plantar surface, patient recently did have a new set of inserts made to call the patient, patient was instructed make sure he contacts his orthotist and modify them to alleviate pressure overlying the ulcer area     Other pertinent information:     1.   The last lower extremity arterial Doppler study, done in April 2022 was personally reviewed by me        Narrative   On the right side, ankle-brachial index, 0.93 with biphasic plus ankle   Doppler tracing over the anterior tibial artery, with good arterial   flow to the right foot and the remaining toes, except over the right   fourth toe where the pulse volume recordings diminished       On the left side, ankle-brachial index within normal range of 0.99,   with biphasic plus ankle Doppler tracings and good arterial flow to   the foot and the remaining toes based upon the pulse volume recordings         12/5/2022  Discussed the patient regarding all options, risks benefits and alternatives, was recommend complete work-up including CBC, CMP, x-ray of the right foot, without osteomyelitis, leg and artery Doppler study to assess distal arterial perfusion  He was also given history, to call immediately extremities worsening of the ulcers  Patient also was instructed to contact his orthotist to make additional modifications of the inserts to alleviate pressure over the ulcerated areas of the right foot  All his questions were answered                      PAST MEDICAL HISTORY      Diagnosis Date    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, limited to breakdown of skin ([address] 1/9/2023    Femoral-popliteal atherosclerosis ([address] 1/1/2017    Heel ulcer, right, with fat layer exposed ([address] 5/6/2019    Hypertension     Osteomyelitis of third toe of right foot ([address] 12/6/2019    S/P peripheral artery angioplasty 01/23/2020    bilateral legs -[place]    Skin ulcer of right foot with fat layer exposed ([address] 12/9/2019    Ulcer of right leg, with fat layer exposed ([address] 5/6/2019    Varicose veins of leg with edema, 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     Past Surgical History:   Procedure Laterality Date    COLONOSCOPY      CYSTOSCOPY N/A 5/18/2020    SUPRAPUBIC TUBE PLACEMENT performed by Kendy Ceron MD at [address]    lense implants bilaterally    FOOT DEBRIDEMENT Left 01/04/2017    wound debridement and delayed primary closure    FRACTURE SURGERY      L femur fracture surgery    HIP FRACTURE SURGERY Left 9/23/2020    LEFT HIP OPEN REDUCTION INTERNAL FIXATION performed by Nicci Gutierres MD at [address] [place] [address] Right 7/16/2021    RIGHT HIP HEMIARTHROPLASTY performed by Sarah Ta DO at [facility]  04/23/2019    Dr. Alexa Davidson- PTA ant tibial    OTHER SURGICAL HISTORY  12/17/2019    Dr Alexa Davidson - PCI Right popliteal and Anterior tibial    PROSTATE BIOPSY  04/2016    SKIN BIOPSY  sept of 2018 last time    head and ears    TOE AMPUTATION Left 2016    2nd    TOE AMPUTATION Right 2019    AMPUTATION RIGHT SECOND TOE, FOOT DEBRIDEMENT performed by Hemal Marquez DPM at [address] Right 12/10/2019    AMPUTATION RIGHT 3rd DIGIT WITH PARTIAL RESECTION OF THIRD METATARSAL performed by Hemal Marquez DPM at [address]    TURP N/A 2020    CYSTOSCOPY PLASMA LOOP TRANSURETHRAL RESECTION PROSTATE performed by Estrellita Tripathi MD at [facility]       Family History   Problem Relation Age of Onset    Heart Disease Mother         CHF    Heart Disease Father     Heart Attack Father      Social History     Tobacco Use    Smoking status: Former     Packs/day: 0.50     Years: 2.00     Pack years: 1.00     Types: Cigarettes     Quit date: 1960     Years since quittin.2    Smokeless tobacco: Never    Tobacco comments:     quit   Vaping Use    Vaping Use: Never used   Substance Use Topics    Alcohol use: Not Currently    Drug use: Not Currently     Allergies   Allergen Reactions    Latex Other (See Comments)     Pt unsure of reaction    Ciprofloxacin In D5w Itching    Food     Pcn [Penicillins] Other (See Comments)     \"I just know my body doesn't like it. \" \"I had a reaction a long time ago, I know it affects my kidneys. \"    Aspirin Other (See Comments)     \"It affects my kidneys. \"  Other reaction(s): Renal reactions    Other Rash     \"I get a rash on just my face if I eat Cumin or Chili. \"    Peanut-Containing Drug Products Itching     Current Outpatient Medications on File Prior to Encounter   Medication Sig Dispense Refill    ferrous sulfate 300 (60 Fe) MG/5ML syrup Take 300 mg by mouth daily      vitamin D (CHOLECALCIFEROL) 125 MCG (5000 UT) CAPS capsule Take 5,000 Units by mouth daily      guaiFENesin-dextromethorphan (ROBITUSSIN DM) 100-10 MG/5ML syrup Take 10 mLs by mouth 3 times daily as needed for Cough      metFORMIN (GLUCOPHAGE) 500 MG tablet Take 500 mg by mouth at bedtime      magnesium hydroxide (MILK OF MAGNESIA) 400 MG/5ML suspension Take 30 mLs by mouth daily as needed for Constipation      zinc gluconate 50 MG tablet Take 50 mg by mouth daily      zinc sulfate (ZINCATE) 220 (50 Zn) MG capsule Take 50 mg by mouth daily      acetaminophen (TYLENOL) 325 MG tablet Take 650 mg by mouth every 4 hours as needed for Pain      miconazole nitrate 2 % OINT Please apply to the toes, in between the toes, both legs up to the upper calf, twice a day for 1 month 1 each 10    insulin glargine (LANTUS) 100 UNIT/ML injection vial Inject 10 Units into the skin nightly      rivastigmine (EXELON) 1.5 mg capsule Take 1.5 mg by mouth 3 times daily      aspirin 81 MG chewable tablet Take 81 mg by mouth daily      sodium chloride 1 g tablet Take 1 g by mouth in the morning and at bedtime      zinc sulfate (ZINCATE) 220 (50 Zn) MG capsule Take 50 mg by mouth daily      empagliflozin (JARDIANCE) 25 MG tablet Take 25 mg by mouth daily      insulin lispro (HUMALOG) 100 UNIT/ML injection vial Inject into the skin 4 times daily per sliding scale: 150-199 = 2 units;    200-249 = 4;    250-299 = 6;    300-349 = 8;    > 350 = notify MD      SITagliptin (JANUVIA) 100 MG tablet Take 100 mg by mouth daily      metFORMIN (GLUCOPHAGE) 1000 MG tablet Take 1,000 mg by mouth daily (with breakfast)      metoprolol succinate (TOPROL XL) 25 MG extended release tablet Take 1 tablet by mouth daily 30 tablet 3    amLODIPine (NORVASC) 5 MG tablet Take 2 tablets by mouth daily 30 tablet 3    Garlic 4496 MG TABS Take 1,250 mg by mouth daily       Multiple Vitamins-Minerals (THERAPEUTIC MULTIVITAMIN-MINERALS) tablet Take 1 tablet by mouth daily       No current facility-administered medications on file prior to encounter.        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 2 diabetes mellitus, limited to breakdown of skin ([address]       Medium    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 2 diabetes mellitus, with fat layer exposed ([address]    Relevant Medications    lidocaine (XYLOCAINE) 4 % external solution    Other Relevant Orders    Initiate Outpatient Wound Care Protocol    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 due to underlying condition, with fat layer exposed ([address] - Primary    Relevant Medications    lidocaine (XYLOCAINE) 4 % external solution    Other Relevant Orders    Initiate Outpatient Wound Care Protocol       Pre Debridement Measurements:  Are located in the [facility]  Documentation Flow Sheet  Post Debridement Measurements:  Wound/Ulcer Descriptions are Pre Debridement except measurements:    Wound 04/29/22 Foot Left;Plantar (Active)   Number of days: 304       Wound 12/05/22 Foot Right;Plantar;Proximal new wound, #1 right plantar foot (Active)   Wound Image   02/27/23 1403   Dressing Status New dressing applied 02/06/23 1507   Wound Cleansed Cleansed with saline 02/06/23 1507   Dressing/Treatment Alginate with Ag;Dry dressing 02/06/23 1507   Offloading for Diabetic Foot Ulcers Orthowedge/inserts 02/06/23 1507   Wound Length (cm) 0.2 cm 02/27/23 1403   Wound Width (cm) 0.5 cm 02/27/23 1403   Wound Depth (cm) 0.1 cm 02/27/23 1403   Wound Surface Area (cm^2) 0.1 cm^2 02/27/23 1403   Change in Wound Size % (l*w) 95.83 02/27/23 1403   Wound Volume (cm^3) 0.01 cm^3 02/27/23 1403   Wound Healing % 96 02/27/23 1403   Post-Procedure Length (cm) 1.6 cm 01/23/23 1423   Post-Procedure Width (cm) 1.1 cm 01/23/23 1423   Post-Procedure Depth (cm) 0.2 cm 01/23/23 1423   Post-Procedure Surface Area (cm^2) 1.76 cm^2 01/23/23 1423   Post-Procedure Volume (cm^3) 0.352 cm^3 01/23/23 1423   Wound Assessment Fibrin;Pink/red 02/27/23 1403   Drainage Amount None 02/27/23 1403   Drainage Description Serosanguinous 12/27/22 1436   Odor None 02/27/23 1403   Latonya-wound Assessment Hyperkeratosis (callous) 02/27/23 1403   Number of days: 83       Wound 12/05/22 Heel Right;Plantar wound #2 right plantar heel (Active)   Wound Image   02/27/23 1403   Dressing Status New dressing applied 02/06/23 1507   Wound Cleansed Cleansed with saline 02/06/23 1507   Dressing/Treatment Alginate with Ag;Dry dressing 02/06/23 1507   Offloading for Diabetic Foot Ulcers Orthowedge/inserts 02/06/23 1507   Wound Length (cm) 1.2 cm 02/27/23 1403   Wound Width (cm) 1.8 cm 02/27/23 1403   Wound Depth (cm) 0 cm 02/27/23 1403   Wound Surface Area (cm^2) 2.16 cm^2 02/27/23 1403   Change in Wound Size % (l*w) 43.16 02/27/23 1403   Wound Volume (cm^3) 0 cm^3 02/27/23 1403   Wound Healing % 100 02/27/23 1403   Post-Procedure Length (cm) 1.3 cm 02/27/23 1439   Post-Procedure Width (cm) 1.4 cm 02/27/23 1439   Post-Procedure Depth (cm) 0.1 cm 02/27/23 1439   Post-Procedure Surface Area (cm^2) 1.82 cm^2 02/27/23 1439   Post-Procedure Volume (cm^3) 0.182 cm^3 02/27/23 1439   Wound Assessment Eschar dry 02/27/23 1403   Drainage Amount None 02/27/23 1403   Drainage Description Serosanguinous; Yellow 01/23/23 1343   Odor None 02/27/23 1403   Latonya-wound Assessment Hyperkeratosis (callous) 02/27/23 1403   Number of days: 83          Procedure Note  Indications:  Based on my examination of this patient's wound(s)/ulcer(s) today, debridement is required to promote healing and evaluate the wound base.     Performed by: Megan Fuentes MD    Consent obtained:  Yes    Time out taken:  Yes    Pain Control: Anesthetic  Anesthetic: 4% Lidocaine Liquid Topical     Debridement: Wound #2  debrided today, mainly skin eschar  Response to treatment:  Well tolerated by patient. Plan:   Treatment Note please see attached Discharge Instructions    Written patient dismissal instructions given to patient and signed by patient or POA. Discharge Instructions              Visit Discharge/Physician Orders     Discharge condition: Stable  Assessment of pain at discharge: minimal  Anesthetic used: 4% lidocaine external solution  Discharge to: ECF  Left via:Private automobile  Accompanied by: accompanied by self  ECF/[facility]HA: [id number]      Dressing Orders: To right foot wounds, cleanse with normal saline solution and apply alginate Ag and cover with a dry dressing. Change daily. Treatment Orders:     CBC and CMP and x-ray- REVIEWED  Vasculars scheduled for 12/13/22- REVIEWED  Continue using diabetic shoe inserts, keep pressure off of wound sites     FOLLOW NUTRITIOUS DIET. CHOOSE FOODS HIGH IN PROTEIN -CHICKEN- FISH-AND EGGS,  CHOOSE FOODS HIGH IN VITAMIN C.   MULTIVITAMIN DAILY. [facility] followup visit _____2 weeks Monday __________________  (Please note your next appointment above and if you are unable to keep, kindly give a 24 hour notice. Thank you.)     Physician signature:__________________________        If you experience any of the following, please call the [address] during business hours:     * Increase in Pain  * Temperature over 101  * Increase in drainage from your wound  * Drainage with a foul odor  * Bleeding  * Increase in swelling  * Need for compression bandage changes due to slippage, breakthrough drainage. If you need medical attention outside of the business hours of the [address] [place] [address] [address] please contact your PCP or go to the nearest emergency room.       Electronically signed by Ally Mcneil MD on 2/27/2023 at 2:41 PM

## 2023-03-13 ENCOUNTER — HOSPITAL ENCOUNTER (OUTPATIENT)
Dept: WOUND CARE | Age: 83
Discharge: HOME | End: 2023-03-13
Payer: MEDICARE

## 2023-03-13 VITALS
WEIGHT: 140 LBS | HEART RATE: 72 BPM | DIASTOLIC BLOOD PRESSURE: 66 MMHG | TEMPERATURE: 97.4 F | HEIGHT: 69 IN | BODY MASS INDEX: 20.73 KG/M2 | SYSTOLIC BLOOD PRESSURE: 128 MMHG | RESPIRATION RATE: 16 BRPM

## 2023-03-13 DIAGNOSIS — E11.621 DIABETIC ULCER OF RIGHT HEEL ASSOCIATED WITH TYPE 2 DIABETES MELLITUS, WITH FAT LAYER EXPOSED (HCC): ICD-10-CM

## 2023-03-13 DIAGNOSIS — L97.412 DIABETIC ULCER OF RIGHT HEEL ASSOCIATED WITH TYPE 2 DIABETES MELLITUS, WITH FAT LAYER EXPOSED (HCC): ICD-10-CM

## 2023-03-13 DIAGNOSIS — L97.412 DIABETIC ULCER OF RIGHT MIDFOOT ASSOCIATED WITH DIABETES MELLITUS DUE TO UNDERLYING CONDITION, WITH FAT LAYER EXPOSED (HCC): Primary | ICD-10-CM

## 2023-03-13 DIAGNOSIS — E08.621 DIABETIC ULCER OF RIGHT MIDFOOT ASSOCIATED WITH DIABETES MELLITUS DUE TO UNDERLYING CONDITION, WITH FAT LAYER EXPOSED (HCC): Primary | ICD-10-CM

## 2023-03-13 PROCEDURE — 87070 CULTURE OTHR SPECIMN AEROBIC: CPT

## 2023-03-13 PROCEDURE — 87077 CULTURE AEROBIC IDENTIFY: CPT

## 2023-03-13 PROCEDURE — 87186 SC STD MICRODIL/AGAR DIL: CPT

## 2023-03-13 PROCEDURE — 87075 CULTR BACTERIA EXCEPT BLOOD: CPT

## 2023-03-13 PROCEDURE — 11042 DBRDMT SUBQ TIS 1ST 20SQCM/<: CPT

## 2023-03-13 PROCEDURE — 87205 SMEAR GRAM STAIN: CPT

## 2023-03-13 PROCEDURE — 11042 DBRDMT SUBQ TIS 1ST 20SQCM/<: CPT | Performed by: SURGERY

## 2023-03-13 RX ORDER — LIDOCAINE HYDROCHLORIDE 40 MG/ML
SOLUTION TOPICAL ONCE
OUTPATIENT
Start: 2023-03-13 | End: 2023-03-13

## 2023-03-13 RX ORDER — LIDOCAINE 50 MG/G
OINTMENT TOPICAL ONCE
OUTPATIENT
Start: 2023-03-13 | End: 2023-03-13

## 2023-03-13 RX ORDER — LIDOCAINE HYDROCHLORIDE 20 MG/ML
JELLY TOPICAL ONCE
OUTPATIENT
Start: 2023-03-13 | End: 2023-03-13

## 2023-03-13 RX ORDER — BETAMETHASONE DIPROPIONATE 0.05 %
OINTMENT (GRAM) TOPICAL ONCE
OUTPATIENT
Start: 2023-03-13 | End: 2023-03-13

## 2023-03-13 RX ORDER — BACITRACIN ZINC AND POLYMYXIN B SULFATE 500; 1000 [USP'U]/G; [USP'U]/G
OINTMENT TOPICAL ONCE
OUTPATIENT
Start: 2023-03-13 | End: 2023-03-13

## 2023-03-13 RX ORDER — BACITRACIN, NEOMYCIN, POLYMYXIN B 400; 3.5; 5 [USP'U]/G; MG/G; [USP'U]/G
OINTMENT TOPICAL ONCE
OUTPATIENT
Start: 2023-03-13 | End: 2023-03-13

## 2023-03-13 RX ORDER — LIDOCAINE 40 MG/G
CREAM TOPICAL ONCE
OUTPATIENT
Start: 2023-03-13 | End: 2023-03-13

## 2023-03-13 RX ORDER — GINSENG 100 MG
CAPSULE ORAL ONCE
OUTPATIENT
Start: 2023-03-13 | End: 2023-03-13

## 2023-03-13 RX ORDER — CLOBETASOL PROPIONATE 0.5 MG/G
OINTMENT TOPICAL ONCE
OUTPATIENT
Start: 2023-03-13 | End: 2023-03-13

## 2023-03-13 RX ORDER — GENTAMICIN SULFATE 1 MG/G
OINTMENT TOPICAL ONCE
OUTPATIENT
Start: 2023-03-13 | End: 2023-03-13

## 2023-03-13 RX ORDER — LIDOCAINE HYDROCHLORIDE 40 MG/ML
SOLUTION TOPICAL ONCE
Status: COMPLETED | OUTPATIENT
Start: 2023-03-13 | End: 2023-03-13

## 2023-03-13 RX ADMIN — LIDOCAINE HYDROCHLORIDE 5 ML: 40 SOLUTION TOPICAL at 08:39

## 2023-03-13 NOTE — DISCHARGE INSTRUCTIONS
Visit Discharge/Physician Orders     Discharge condition: Stable  Assessment of pain at discharge: minimal  Anesthetic used: 4% lidocaine external solution  Discharge to: ECF  Left via:Private automobile  Accompanied by: accompanied by self  ECF/HHA: [facility]      Dressing Orders: To right heel wound, cleanse with normal saline solution and apply alginate Ag and cover with a dry dressing. Change daily. Treatment Orders:     CBC and CMP and x-ray- REVIEWED  Vasculars scheduled for 12/13/22- REVIEWED  Continue using diabetic shoe inserts, keep pressure off of wound sites     FOLLOW NUTRITIOUS DIET. CHOOSE FOODS HIGH IN PROTEIN -CHICKEN- FISH-AND EGGS,  CHOOSE FOODS HIGH IN VITAMIN C.   MULTIVITAMIN DAILY. [address] [place] [address],[address] followup visit _____1 week Monday __________________  (Please note your next appointment above and if you are unable to keep, kindly give a 24 hour notice. Thank you.)     Physician signature:__________________________        If you experience any of the following, please call the [facility] during business hours:     * Increase in Pain  * Temperature over 101  * Increase in drainage from your wound  * Drainage with a foul odor  * Bleeding  * Increase in swelling  * Need for compression bandage changes due to slippage, breakthrough drainage. If you need medical attention outside of the business hours of the [facility] please contact your PCP or go to the nearest emergency room.

## 2023-03-13 NOTE — PROGRESS NOTES
Wound Healing Center Followup Visit Note    Referring Physician : Wyatt Moreau MD  [address] RECORD NUMBER:  [id number]  AGE: 79 y.o. GENDER: male  : 1940  EPISODE DATE:  3/13/2023    Subjective:     Chief Complaint   Patient presents with    Wound Check     Right foot/heel        HISTORY of PRESENT ILLNESS HPI   Corrinne Rings is a 79 y.o. male who presents today in regards to follow up evaluation and treatment of wound/ulcer. That patient's past medical, family and social hx were reviewed and changes were made if present. History of Wound Context:  The patient has had a wound of right foot overlying the plantar surface of the right heel and plantar surface of the right first metatarsophalangeal joint which was first noted approximately at least 3 months ago. This has been treated by the patient in the facility, but patient did not call the wound care center as he was instructed in the past call immediately for any future ulcers. On their initial visit to the wound healing center, 22, the patient has noted that the wound has not been improving. The patient has had similar previous wounds in the past.       Patient in the past has undergone right femoral angiogram and angioplasty of right popliteal artery and tibial arteries by Dr. Cristine Villela in 2019     Patient has a diabetes mellitus diabetic neuropathy     Pt is currently not on abx.       Wound/Ulcer Pain Timing/Severity: constant  Quality of pain: dull, aching  Severity:  3 / 10   Modifying Factors: Pain worsens with walking  Associated Signs/Symptoms: drainage and pain       2022  Patient tells me that he saw his orthotist, the custom made insert was modified  Right foot wounds look improved    2022  Right foot wound slowly improving, patient is wearing the modified custom made orthotic of the right foot  The ankle-brachial index that was done was reviewed, overall adequate flow for tissue perfusion, 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, with adequate collateral flow to the foot   and the remaining toes based upon the pulse volume recordings       On the left side, normal ankle arm index, with triphasic ankle Doppler   tracings and good arterial flow to the foot and the toes based upon   the pulse volume recordings       Overall, no significant change noted compared the study that was in   April 2022 12/27/2022  Ulcers of the right foot, slowly improving, continue to offload as much as possible  1/9/2023  Ulcers right foot improving, mainly skin only  1/23/2023  Ulcer underneath the first metatarsal head improving, skin only, with callus and small eschar debrided, ulcer underneath the heel, minimal fat layer exposed, discussed the patient again regarding importance of offloading  2/6/2023  Black scabs noted with minimal eschar not debrided the importance of offloading was rediscussed with the patient  2/27/2023  Right proximal wound underneath the metatarsal head, healed  Still has a skin ulcer with eschar formation over the plantar surface of the heel, debrided, mainly skin, recommended try to offload to the best he can  3/13/2023  Patient, also with a plantar ulcer right heel, worse, with drainage, on further probing the wound, fat layer exposed with some mild tunneling, overlying skin eschar was partially debrided, deep tissue cultures were taken, patient was advised to read discussed with this orthotist regarding potentially modifying the custom made insert so that he can better offload the right heel      Ulcer Identification:  Ulcer Type: arterial, diabetic, pressure, and non-healing/non-surgical  Contributing Factors: diabetes, chronic pressure, decreased mobility, shear force, and arterial insufficiency     Diabetic/Pressure/Non Pressure Ulcers only:  Ulcer: Diabetic ulcer, fat layer exposed     If patient has diabetic lower extremity wounds  Dale Classification of diabetic lower extremity wounds:     Grade Description   []  0 No open wound   []  1 Superficial ulcer involving the full skin thickness   []  2 Deep ulcer involves ligament, tendon, joint capsule, or fascia  No bone involvement or abscess presence   []  3 Deep Ulcer with abcess formation and/or osteomyelitis   []  4 Localized gangrene   []  5 Extensive gangrene of the foot      Wound: Patient does have diabetic foot ulcers, pressure ulcers, trophic ulcers, with a fat layer exposed underneath the first metatarsal phalangeal joint as well as the right heel overlying the plantar surface, patient recently did have a new set of inserts made to call the patient, patient was instructed make sure he contacts his orthotist and modify them to alleviate pressure overlying the ulcer area     Other pertinent information:     1.   The last lower extremity arterial Doppler study, done in April 2022 was personally reviewed by me        Narrative   On the right side, ankle-brachial index, 0.93 with biphasic plus ankle   Doppler tracing over the anterior tibial artery, with good arterial   flow to the right foot and the remaining toes, except over the right   fourth toe where the pulse volume recordings diminished       On the left side, ankle-brachial index within normal range of 0.99,   with biphasic plus ankle Doppler tracings and good arterial flow to   the foot and the remaining toes based upon the pulse volume recordings         12/5/2022  Discussed the patient regarding all options, risks benefits and alternatives, was recommend complete work-up including CBC, CMP, x-ray of the right foot, without osteomyelitis, leg and artery Doppler study to assess distal arterial perfusion  He was also given history, to call immediately extremities worsening of the ulcers  Patient also was instructed to contact his orthotist to make additional modifications of the inserts to alleviate pressure over the ulcerated areas of the right foot  All his questions were answered                      PAST MEDICAL HISTORY      Diagnosis Date    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, limited to breakdown of skin ([address] 1/9/2023    Femoral-popliteal atherosclerosis ([address] 1/1/2017    Heel ulcer, right, with fat layer exposed ([address] 5/6/2019    Hypertension     Osteomyelitis of third toe of right foot ([address] 12/6/2019    S/P peripheral artery angioplasty 01/23/2020    bilateral legs -[place]    Skin ulcer of right foot with fat layer exposed ([address] 12/9/2019    Ulcer of right leg, with fat layer exposed ([address] 5/6/2019    Varicose veins of leg with edema, 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     Past Surgical History:   Procedure Laterality Date    COLONOSCOPY      CYSTOSCOPY N/A 5/18/2020    SUPRAPUBIC TUBE PLACEMENT performed by Mojgan Rico MD at [address] [place] [address]    lense implants bilaterally    FOOT DEBRIDEMENT Left 01/04/2017    wound debridement and delayed primary closure    FRACTURE SURGERY      L femur fracture surgery    HIP FRACTURE SURGERY Left 9/23/2020    LEFT HIP OPEN REDUCTION INTERNAL FIXATION performed by Jorgito Mckeon MD at [facility] OR    HIP SURGERY      HIP SURGERY Right 2021    RIGHT HIP HEMIARTHROPLASTY performed by Soco Morales DO at [address]  2019    Dr. Rena Nagy- PTA ant tibial    OTHER SURGICAL HISTORY  2019    Dr Rena Nagy - PCI Right popliteal and Anterior tibial    PROSTATE BIOPSY  2016    SKIN BIOPSY   last time    head and ears    TOE AMPUTATION Left 2016    2nd    TOE AMPUTATION Right 2019    AMPUTATION RIGHT SECOND TOE, FOOT DEBRIDEMENT performed by Wendi Fan DPM at [address] Right 12/10/2019    AMPUTATION RIGHT 3rd DIGIT WITH PARTIAL RESECTION OF THIRD METATARSAL performed by Wendi Fan DPM at [address]    TURP N/A 2020    CYSTOSCOPY PLASMA LOOP TRANSURETHRAL RESECTION PROSTATE performed by Baltazar Marcos MD at [facility]       Family History   Problem Relation Age of Onset    Heart Disease Mother         CHF    Heart Disease Father     Heart Attack Father      Social History     Tobacco Use    Smoking status: Former     Packs/day: 0.50     Years: 2.00     Pack years: 1.00     Types: Cigarettes     Quit date:      Years since quittin.2    Smokeless tobacco: Never    Tobacco comments:     quit   Vaping Use    Vaping Use: Never used   Substance Use Topics    Alcohol use: Not Currently    Drug use: Not Currently     Allergies   Allergen Reactions    Latex Other (See Comments)     Pt unsure of reaction    Ciprofloxacin In D5w Itching    Food     Pcn [Penicillins] Other (See Comments)     \"I just know my body doesn't like it. \" \"I had a reaction a long time ago, I know it affects my kidneys. \"    Aspirin Other (See Comments)     \"It affects my kidneys. \"  Other reaction(s): Renal reactions    Other Rash     \"I get a rash on just my face if I eat Cumin or Chili. \"    Peanut-Containing Drug Products Itching     Current Outpatient Medications on File Prior to Encounter   Medication Sig Dispense Refill ferrous sulfate 300 (60 Fe) MG/5ML syrup Take 300 mg by mouth daily      vitamin D (CHOLECALCIFEROL) 125 MCG (5000 UT) CAPS capsule Take 5,000 Units by mouth daily      guaiFENesin-dextromethorphan (ROBITUSSIN DM) 100-10 MG/5ML syrup Take 10 mLs by mouth 3 times daily as needed for Cough      metFORMIN (GLUCOPHAGE) 500 MG tablet Take 500 mg by mouth at bedtime      magnesium hydroxide (MILK OF MAGNESIA) 400 MG/5ML suspension Take 30 mLs by mouth daily as needed for Constipation      zinc gluconate 50 MG tablet Take 50 mg by mouth daily      zinc sulfate (ZINCATE) 220 (50 Zn) MG capsule Take 50 mg by mouth daily      acetaminophen (TYLENOL) 325 MG tablet Take 650 mg by mouth every 4 hours as needed for Pain      miconazole nitrate 2 % OINT Please apply to the toes, in between the toes, both legs up to the upper calf, twice a day for 1 month 1 each 10    insulin glargine (LANTUS) 100 UNIT/ML injection vial Inject 10 Units into the skin nightly      rivastigmine (EXELON) 1.5 mg capsule Take 1.5 mg by mouth 3 times daily      aspirin 81 MG chewable tablet Take 81 mg by mouth daily      sodium chloride 1 g tablet Take 1 g by mouth in the morning and at bedtime      zinc sulfate (ZINCATE) 220 (50 Zn) MG capsule Take 50 mg by mouth daily      empagliflozin (JARDIANCE) 25 MG tablet Take 25 mg by mouth daily      insulin lispro (HUMALOG) 100 UNIT/ML injection vial Inject into the skin 4 times daily per sliding scale: 150-199 = 2 units;    200-249 = 4;    250-299 = 6;    300-349 = 8;    > 350 = notify MD      SITagliptin (JANUVIA) 100 MG tablet Take 100 mg by mouth daily      metFORMIN (GLUCOPHAGE) 1000 MG tablet Take 1,000 mg by mouth daily (with breakfast)      metoprolol succinate (TOPROL XL) 25 MG extended release tablet Take 1 tablet by mouth daily 30 tablet 3    amLODIPine (NORVASC) 5 MG tablet Take 2 tablets by mouth daily 30 tablet 3    Garlic 9879 MG TABS Take 1,250 mg by mouth daily       Multiple Vitamins-Minerals (THERAPEUTIC MULTIVITAMIN-MINERALS) tablet Take 1 tablet by mouth daily       No current facility-administered medications on file prior to encounter.        REVIEW OF SYSTEMS See HPI    Objective:    /66   Pulse 72   Temp 97.4 °F (36.3 °C) (Temporal)   Resp 16   Ht 5' 9\" (1.753 m)   Wt 140 lb (63.5 kg)   BMI 20.67 kg/m²   Wt Readings from Last 3 Encounters:   03/13/23 140 lb (63.5 kg)   02/06/23 140 lb (63.5 kg)   01/23/23 140 lb (63.5 kg)     PHYSICAL EXAM  CONSTITUTIONAL:   Awake, alert, cooperative   EYES:  lids and lashes normal   ENT: external ears and nose without lesions   NECK:  supple, symmetrical, trachea midline   SKIN:  Open wound Present    Assessment:     Problem List Items Addressed This Visit          High    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 2 diabetes mellitus, with fat layer exposed ([address]    Relevant Orders    Initiate Outpatient Wound Care Protocol       Medium    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 due to underlying condition, with fat layer exposed ([address] - Primary    Relevant Orders    Initiate Outpatient Wound Care Protocol       Pre Debridement Measurements:  Are located in the [place]  Documentation Flow Sheet  Post Debridement Measurements:  Wound/Ulcer Descriptions are Pre Debridement except measurements:    Wound 04/29/22 Foot Left;Plantar (Active)   Number of days: 317       Wound 12/05/22 Foot Right;Plantar;Proximal new wound, #1 right plantar foot (Active)   Wound Image   03/13/23 0830   Dressing Status New dressing applied 02/27/23 1456   Wound Cleansed Cleansed with saline 02/27/23 1456   Dressing/Treatment Alginate with Ag;Silicone border 59/67/91 1456   Offloading for Diabetic Foot Ulcers Diabetic shoes/inserts 02/27/23 1456   Wound Length (cm) 0 cm 03/13/23 0830   Wound Width (cm) 0 cm 03/13/23 0830   Wound Depth (cm) 0 cm 03/13/23 0830   Wound Surface Area (cm^2) 0 cm^2 03/13/23 0830   Change in Wound Size % (l*w) 100 03/13/23 0830   Wound Volume (cm^3) 0 cm^3 03/13/23 0830   Wound Healing % 100 03/13/23 0830   Post-Procedure Length (cm) 0 cm 03/13/23 0905   Post-Procedure Width (cm) 0 cm 03/13/23 0905   Post-Procedure Depth (cm) 0 cm 03/13/23 0905   Post-Procedure Surface Area (cm^2) 0 cm^2 03/13/23 0905   Post-Procedure Volume (cm^3) 0 cm^3 03/13/23 0905   Wound Assessment Epithelialization 03/13/23 0905   Drainage Amount None 03/13/23 0905   Drainage Description Other (Comment) 03/13/23 0905   Odor None 03/13/23 0905   Latonya-wound Assessment Fragile 03/13/23 0905   Number of days: 97       Wound 12/05/22 Heel Right;Plantar wound #2 right plantar heel (Active)   Wound Image   02/27/23 1403   Wound Etiology Pressure Unstageable 03/13/23 0905   Dressing Status New dressing applied 02/27/23 1456   Wound Cleansed Cleansed with saline 02/27/23 1456   Dressing/Treatment Alginate with Ag;Silicone border 36/65/42 1456   Offloading for Diabetic Foot Ulcers Diabetic shoes/inserts 02/27/23 1456   Wound Length (cm) 1.1 cm 03/13/23 0830   Wound Width (cm) 1.4 cm 03/13/23 0830   Wound Depth (cm) 0 cm 03/13/23 0830   Wound Surface Area (cm^2) 1.54 cm^2 03/13/23 0830   Change in Wound Size % (l*w) 59.47 03/13/23 0830   Wound Volume (cm^3) 0 cm^3 03/13/23 0830   Wound Healing % 100 03/13/23 0830   Post-Procedure Length (cm) 1.5 cm 03/13/23 0905   Post-Procedure Width (cm) 1.3 cm 03/13/23 0905   Post-Procedure Depth (cm) 0.3 cm 03/13/23 0905   Post-Procedure Surface Area (cm^2) 1.95 cm^2 03/13/23 0905   Post-Procedure Volume (cm^3) 0.585 cm^3 03/13/23 0905   Wound Assessment Eschar dry 03/13/23 0905   Drainage Amount Moderate 03/13/23 0905   Drainage Description Thin;Serosanguinous; Serous 03/13/23 0905   Odor None 03/13/23 0905   Latonya-wound Assessment Dry/flaky; Hyperkeratosis (callous) 03/13/23 0905   Margins Attached edges 03/13/23 0905   Wound Thickness Description not for Pressure Injury Full thickness 03/13/23 0905   Number of days: 97 Procedure Note  Indications:  Based on my examination of this patient's wound(s)/ulcer(s) today, debridement is required to promote healing and evaluate the wound base. Performed by: Jake Dupree MD    Consent obtained:  Yes    Time out taken:  Yes    Pain Control: Anesthetic  Anesthetic: 4% Lidocaine Liquid Topical     Debridement: Wound #2  debrided today, epidermis, dermis and subcu, total square area 1.5 cm  Response to treatment:  Well tolerated by patient. Plan:   Treatment Note please see attached Discharge Instructions    Written patient dismissal instructions given to patient and signed by patient or POA. Discharge Instructions         Visit Discharge/Physician Orders     Discharge condition: Stable  Assessment of pain at discharge: minimal  Anesthetic used: 4% lidocaine external solution  Discharge to: ECF  Left via:Private automobile  Accompanied by: accompanied by self  ECF/A: [address] [place] [address]     Dressing Orders: To right heel wound, cleanse with normal saline solution and apply alginate Ag and cover with a dry dressing. Change daily. Treatment Orders:     CBC and CMP and x-ray- REVIEWED  Vasculars scheduled for 12/13/22- REVIEWED  Continue using diabetic shoe inserts, keep pressure off of wound sites     FOLLOW NUTRITIOUS DIET. CHOOSE FOODS HIGH IN PROTEIN -CHICKEN- FISH-AND EGGS,  CHOOSE FOODS HIGH IN VITAMIN C.   MULTIVITAMIN DAILY. [facility] followup visit _____1 week Monday __________________  (Please note your next appointment above and if you are unable to keep, kindly give a 24 hour notice.  Thank you.)     Physician signature:__________________________        If you experience any of the following, please call the [address] [place] [address] Road during business hours:     * Increase in Pain  * Temperature over 101  * Increase in drainage from your wound  * Drainage with a foul odor  * Bleeding  * Increase in swelling  * Need for compression bandage changes due to slippage, breakthrough drainage. If you need medical attention outside of the business hours of the [address] [place] [address] please contact your PCP or go to the nearest emergency room.       Electronically signed by Elke Martines MD on 3/13/2023 at 9:11 AM

## 2023-03-15 LAB
BACTERIA SPEC ANAEROBE CULT: NORMAL
BACTERIA WND AEROBE CULT: ABNORMAL
BACTERIA WND AEROBE CULT: ABNORMAL
GRAM STN SPEC: ABNORMAL
ORGANISM: ABNORMAL
ORGANISM: ABNORMAL

## 2023-03-17 NOTE — PROGRESS NOTES
[facility] in [place], Dr. Anupama Amaya reviewed wound culture results. New order noted for Doxycycline 100mg 1 tab BID x 10 days. Patients facility called and nurse, Christina Angelo RN, notified of antibiotic order. Results of culture and order of antibiotic faxed to facility. [phone]. [facility] in [place], [facility] in [place], SHAI Apple, dictating for Annette Sanders MD.       SHAI Apple  WF:elizabeth  D:   02/27/2017  17:23:23  T:   02/28/2017 17:33:29  Job ID:   [id number]  Document ID:   [id number]  cc:   Danyelle Abrams M.D.

## 2023-03-20 ENCOUNTER — HOSPITAL ENCOUNTER (OUTPATIENT)
Dept: WOUND CARE | Age: 83
Discharge: HOME | End: 2023-03-20
Payer: MEDICARE

## 2023-03-20 VITALS
SYSTOLIC BLOOD PRESSURE: 153 MMHG | HEART RATE: 71 BPM | WEIGHT: 140 LBS | BODY MASS INDEX: 20.73 KG/M2 | TEMPERATURE: 97.6 F | DIASTOLIC BLOOD PRESSURE: 64 MMHG | RESPIRATION RATE: 18 BRPM | HEIGHT: 69 IN

## 2023-03-20 DIAGNOSIS — L97.412 DIABETIC ULCER OF RIGHT HEEL ASSOCIATED WITH TYPE 2 DIABETES MELLITUS, WITH FAT LAYER EXPOSED (HCC): ICD-10-CM

## 2023-03-20 DIAGNOSIS — L97.412 DIABETIC ULCER OF RIGHT MIDFOOT ASSOCIATED WITH DIABETES MELLITUS DUE TO UNDERLYING CONDITION, WITH FAT LAYER EXPOSED (HCC): Primary | ICD-10-CM

## 2023-03-20 DIAGNOSIS — E11.621 DIABETIC ULCER OF RIGHT HEEL ASSOCIATED WITH TYPE 2 DIABETES MELLITUS, WITH FAT LAYER EXPOSED (HCC): ICD-10-CM

## 2023-03-20 DIAGNOSIS — E08.621 DIABETIC ULCER OF RIGHT MIDFOOT ASSOCIATED WITH DIABETES MELLITUS DUE TO UNDERLYING CONDITION, WITH FAT LAYER EXPOSED (HCC): Primary | ICD-10-CM

## 2023-03-20 PROCEDURE — 11042 DBRDMT SUBQ TIS 1ST 20SQCM/<: CPT | Performed by: SURGERY

## 2023-03-20 PROCEDURE — 11042 DBRDMT SUBQ TIS 1ST 20SQCM/<: CPT

## 2023-03-20 RX ORDER — GINSENG 100 MG
CAPSULE ORAL ONCE
OUTPATIENT
Start: 2023-03-20 | End: 2023-03-20

## 2023-03-20 RX ORDER — BACITRACIN ZINC AND POLYMYXIN B SULFATE 500; 1000 [USP'U]/G; [USP'U]/G
OINTMENT TOPICAL ONCE
OUTPATIENT
Start: 2023-03-20 | End: 2023-03-20

## 2023-03-20 RX ORDER — LIDOCAINE HYDROCHLORIDE 40 MG/ML
SOLUTION TOPICAL ONCE
Status: COMPLETED | OUTPATIENT
Start: 2023-03-20 | End: 2023-03-20

## 2023-03-20 RX ORDER — BETAMETHASONE DIPROPIONATE 0.05 %
OINTMENT (GRAM) TOPICAL ONCE
OUTPATIENT
Start: 2023-03-20 | End: 2023-03-20

## 2023-03-20 RX ORDER — LIDOCAINE HYDROCHLORIDE 20 MG/ML
JELLY TOPICAL ONCE
OUTPATIENT
Start: 2023-03-20 | End: 2023-03-20

## 2023-03-20 RX ORDER — LIDOCAINE 40 MG/G
CREAM TOPICAL ONCE
OUTPATIENT
Start: 2023-03-20 | End: 2023-03-20

## 2023-03-20 RX ORDER — LIDOCAINE HYDROCHLORIDE 40 MG/ML
SOLUTION TOPICAL ONCE
OUTPATIENT
Start: 2023-03-20 | End: 2023-03-20

## 2023-03-20 RX ORDER — LIDOCAINE 50 MG/G
OINTMENT TOPICAL ONCE
OUTPATIENT
Start: 2023-03-20 | End: 2023-03-20

## 2023-03-20 RX ORDER — GENTAMICIN SULFATE 1 MG/G
OINTMENT TOPICAL ONCE
OUTPATIENT
Start: 2023-03-20 | End: 2023-03-20

## 2023-03-20 RX ORDER — BACITRACIN, NEOMYCIN, POLYMYXIN B 400; 3.5; 5 [USP'U]/G; MG/G; [USP'U]/G
OINTMENT TOPICAL ONCE
OUTPATIENT
Start: 2023-03-20 | End: 2023-03-20

## 2023-03-20 RX ORDER — CLOBETASOL PROPIONATE 0.5 MG/G
OINTMENT TOPICAL ONCE
OUTPATIENT
Start: 2023-03-20 | End: 2023-03-20

## 2023-03-20 RX ADMIN — LIDOCAINE HYDROCHLORIDE 10 ML: 40 SOLUTION TOPICAL at 08:22

## 2023-03-20 ASSESSMENT — PAIN SCALES - GENERAL: PAINLEVEL_OUTOF10: 0

## 2023-03-20 NOTE — PROGRESS NOTES
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, limited to breakdown of skin ([address] 1/9/2023    Femoral-popliteal atherosclerosis ([address] 1/1/2017    Heel ulcer, right, with fat layer exposed ([address] 5/6/2019    Hypertension     Osteomyelitis of third toe of right foot ([address] 12/6/2019    S/P peripheral artery angioplasty 01/23/2020    bilateral legs -Ashley Jae    Skin ulcer of right foot with fat layer exposed ([address] 12/9/2019    Ulcer of right leg, with fat layer exposed ([address] 5/6/2019    Varicose veins of leg with edema, 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     Past Surgical History:   Procedure Laterality Date    COLONOSCOPY      CYSTOSCOPY N/A 5/18/2020    SUPRAPUBIC TUBE PLACEMENT performed by Naheed Harden MD at [address]  1990's    lense implants bilaterally    FOOT DEBRIDEMENT Left 01/04/2017

## 2023-03-20 NOTE — DISCHARGE INSTRUCTIONS
Visit Discharge/Physician Orders     Discharge condition: Stable  Assessment of pain at discharge: minimal  Anesthetic used: 4% lidocaine external solution  Discharge to: ECF  Left via:Private automobile  Accompanied by: accompanied by self  ECF/HHA: [facility]      Dressing Orders: To right heel wound, cleanse with normal saline solution and apply alginate Ag and cover with a dry dressing. Change daily. Treatment Orders: 3/20 Please see orthotics to review shoe insert for re-eval    Culture taken 3/13- Antibiotic Doxycycline 100 mg BID for 10 days called to facility on 3/17  CBC and CMP and x-ray- REVIEWED  Vasculars scheduled for 12/13/22- REVIEWED  Continue using diabetic shoe inserts, keep pressure off of wound sites  FOLLOW NUTRITIOUS DIET. CHOOSE FOODS HIGH IN PROTEIN -CHICKEN- FISH-AND EGGS,  CHOOSE FOODS HIGH IN VITAMIN C.   MULTIVITAMIN DAILY. [facility] followup visit _____1 week Monday __________________  (Please note your next appointment above and if you are unable to keep, kindly give a 24 hour notice. Thank you.)     Physician signature:__________________________        If you experience any of the following, please call the [address] during business hours:     * Increase in Pain  * Temperature over 101  * Increase in drainage from your wound  * Drainage with a foul odor  * Bleeding  * Increase in swelling  * Need for compression bandage changes due to slippage, breakthrough drainage. If you need medical attention outside of the business hours of the [address] please contact your PCP or go to the nearest emergency room.

## 2023-03-24 NOTE — DISCHARGE INSTRUCTIONS
Visit Discharge/Physician Orders     Discharge condition: Stable  Assessment of pain at discharge: minimal  Anesthetic used: 4% lidocaine external solution  Discharge to: ECF  Left via:Private automobile  Accompanied by: accompanied by self  ECF/HHA: [facility]      Dressing Orders: To right heel wound, cleanse with normal saline solution and apply alginate Ag and cover with a dry dressing. Change daily. Treatment Orders:     3/20 Please see orthotics to review shoe insert for re-eval     Culture taken 3/13- Antibiotic Doxycycline 100 mg BID for 10 days called to facility on 3/17  CBC and CMP and x-ray- REVIEWED  Vasculars scheduled for 12/13/22- REVIEWED  Continue using diabetic shoe inserts, keep pressure off of wound sites  FOLLOW NUTRITIOUS DIET. CHOOSE FOODS HIGH IN PROTEIN -CHICKEN- FISH-AND EGGS,  CHOOSE FOODS HIGH IN VITAMIN C.   MULTIVITAMIN DAILY. [address] [place] [address],[address] followup visit _____1 week Monday __________________  (Please note your next appointment above and if you are unable to keep, kindly give a 24 hour notice. Thank you.)     Physician signature:__________________________        If you experience any of the following, please call the [address] during business hours:     * Increase in Pain  * Temperature over 101  * Increase in drainage from your wound  * Drainage with a foul odor  * Bleeding  * Increase in swelling  * Need for compression bandage changes due to slippage, breakthrough drainage. If you need medical attention outside of the business hours of the [address] please contact your PCP or go to the nearest emergency room.

## 2023-03-27 ENCOUNTER — HOSPITAL ENCOUNTER (OUTPATIENT)
Dept: WOUND CARE | Age: 83
Discharge: HOME | End: 2023-03-27
Payer: MEDICARE

## 2023-03-27 VITALS
DIASTOLIC BLOOD PRESSURE: 68 MMHG | HEART RATE: 74 BPM | RESPIRATION RATE: 16 BRPM | BODY MASS INDEX: 20.73 KG/M2 | SYSTOLIC BLOOD PRESSURE: 150 MMHG | TEMPERATURE: 97.1 F | WEIGHT: 140 LBS | HEIGHT: 69 IN

## 2023-03-27 DIAGNOSIS — E08.621 DIABETIC ULCER OF RIGHT MIDFOOT ASSOCIATED WITH DIABETES MELLITUS DUE TO UNDERLYING CONDITION, WITH FAT LAYER EXPOSED (HCC): Primary | ICD-10-CM

## 2023-03-27 DIAGNOSIS — E11.621 DIABETIC ULCER OF RIGHT HEEL ASSOCIATED WITH TYPE 2 DIABETES MELLITUS, WITH FAT LAYER EXPOSED (HCC): ICD-10-CM

## 2023-03-27 DIAGNOSIS — L97.412 DIABETIC ULCER OF RIGHT HEEL ASSOCIATED WITH TYPE 2 DIABETES MELLITUS, WITH FAT LAYER EXPOSED (HCC): ICD-10-CM

## 2023-03-27 DIAGNOSIS — L97.412 DIABETIC ULCER OF RIGHT MIDFOOT ASSOCIATED WITH DIABETES MELLITUS DUE TO UNDERLYING CONDITION, WITH FAT LAYER EXPOSED (HCC): Primary | ICD-10-CM

## 2023-03-27 PROCEDURE — 11042 DBRDMT SUBQ TIS 1ST 20SQCM/<: CPT

## 2023-03-27 PROCEDURE — 11042 DBRDMT SUBQ TIS 1ST 20SQCM/<: CPT | Performed by: SURGERY

## 2023-03-27 RX ORDER — BACITRACIN, NEOMYCIN, POLYMYXIN B 400; 3.5; 5 [USP'U]/G; MG/G; [USP'U]/G
OINTMENT TOPICAL ONCE
OUTPATIENT
Start: 2023-03-27 | End: 2023-03-27

## 2023-03-27 RX ORDER — LIDOCAINE 50 MG/G
OINTMENT TOPICAL ONCE
OUTPATIENT
Start: 2023-03-27 | End: 2023-03-27

## 2023-03-27 RX ORDER — LIDOCAINE HYDROCHLORIDE 40 MG/ML
SOLUTION TOPICAL ONCE
Status: COMPLETED | OUTPATIENT
Start: 2023-03-27 | End: 2023-03-27

## 2023-03-27 RX ORDER — LIDOCAINE HYDROCHLORIDE 20 MG/ML
JELLY TOPICAL ONCE
OUTPATIENT
Start: 2023-03-27 | End: 2023-03-27

## 2023-03-27 RX ORDER — LIDOCAINE HYDROCHLORIDE 40 MG/ML
SOLUTION TOPICAL ONCE
OUTPATIENT
Start: 2023-03-27 | End: 2023-03-27

## 2023-03-27 RX ORDER — LIDOCAINE 40 MG/G
CREAM TOPICAL ONCE
OUTPATIENT
Start: 2023-03-27 | End: 2023-03-27

## 2023-03-27 RX ORDER — GENTAMICIN SULFATE 1 MG/G
OINTMENT TOPICAL ONCE
OUTPATIENT
Start: 2023-03-27 | End: 2023-03-27

## 2023-03-27 RX ORDER — CLOBETASOL PROPIONATE 0.5 MG/G
OINTMENT TOPICAL ONCE
OUTPATIENT
Start: 2023-03-27 | End: 2023-03-27

## 2023-03-27 RX ORDER — BACITRACIN ZINC AND POLYMYXIN B SULFATE 500; 1000 [USP'U]/G; [USP'U]/G
OINTMENT TOPICAL ONCE
OUTPATIENT
Start: 2023-03-27 | End: 2023-03-27

## 2023-03-27 RX ORDER — BETAMETHASONE DIPROPIONATE 0.05 %
OINTMENT (GRAM) TOPICAL ONCE
OUTPATIENT
Start: 2023-03-27 | End: 2023-03-27

## 2023-03-27 RX ORDER — GINSENG 100 MG
CAPSULE ORAL ONCE
OUTPATIENT
Start: 2023-03-27 | End: 2023-03-27

## 2023-03-27 RX ADMIN — LIDOCAINE HYDROCHLORIDE 5 ML: 40 SOLUTION TOPICAL at 13:55

## 2023-03-27 NOTE — PROGRESS NOTES
2020    SUPRAPUBIC TUBE PLACEMENT performed by Jhoan Bradford MD at [address]  [address]    lense implants bilaterally    FOOT DEBRIDEMENT Left 2017    wound debridement and delayed primary closure    FRACTURE SURGERY      L femur fracture surgery    HIP FRACTURE SURGERY Left 2020    LEFT HIP OPEN REDUCTION INTERNAL FIXATION performed by Dean Pryor MD at [address] Right 2021    RIGHT HIP HEMIARTHROPLASTY performed by Lesley Sampson DO at [facility]  2019    Dr. Rosalio Mario- PTA ant tibial    OTHER SURGICAL HISTORY  2019    Dr Rosalio Mario - PCI Right popliteal and Anterior tibial    PROSTATE BIOPSY  2016    SKIN BIOPSY   last time    head and ears    TOE AMPUTATION Left 2016    2nd    TOE AMPUTATION Right 2019    AMPUTATION RIGHT SECOND TOE, FOOT DEBRIDEMENT performed by Triston Trinh DPM at [address] Right 12/10/2019    AMPUTATION RIGHT 3rd DIGIT WITH PARTIAL RESECTION OF THIRD METATARSAL performed by Triston Trinh DPM at [address]    TURP N/A 2020    CYSTOSCOPY PLASMA LOOP TRANSURETHRAL RESECTION PROSTATE performed by Jhoan Bradford MD at [facility]       Family History   Problem Relation Age of Onset    Heart Disease Mother         CHF    Heart Disease Father     Heart Attack Father      Social History     Tobacco Use    Smoking status: Former     Packs/day: 0.50     Years: 2.00     Pack years: 1.00     Types: Cigarettes     Quit date: 1960     Years since quittin.2    Smokeless tobacco: Never    Tobacco comments:     quit   Vaping Use    Vaping Use: Never used   Substance Use Topics    Alcohol use: Not Currently    Drug use: Not Currently     Allergies   Allergen Reactions    Latex Other (See Comments)     Pt unsure of reaction    Ciprofloxacin In D5w Itching    Food     Pcn [Penicillins] Other (See Comments)     \"I just know

## 2023-03-28 NOTE — DISCHARGE INSTRUCTIONS
Visit Discharge/Physician Orders     Discharge condition: Stable  Assessment of pain at discharge: minimal  Anesthetic used: 4% lidocaine external solution  Discharge to: ECF  Left via:Private automobile  Accompanied by: accompanied by self  ECF/A: [facility]      Dressing Orders: To right heel wound, cleanse with normal saline solution and apply alginate Ag and cover with a dry dressing. Change daily. Treatment Orders:      3/20 Please see orthotics to review shoe insert for re-eval      Culture taken 3/13- Antibiotic Doxycycline 100 mg BID for 10 days called to facility on 3/17  CBC and CMP and x-ray- REVIEWED  Vasculars scheduled for 12/13/22- REVIEWED  Continue using diabetic shoe inserts, keep pressure off of wound sites  FOLLOW NUTRITIOUS DIET. CHOOSE FOODS HIGH IN PROTEIN -CHICKEN- FISH-AND EGGS,  CHOOSE FOODS HIGH IN VITAMIN C.   MULTIVITAMIN DAILY. [facility] followup visit _____1 week Monday __________________  (Please note your next appointment above and if you are unable to keep, kindly give a 24 hour notice. Thank you.)     Physician signature:__________________________        If you experience any of the following, please call the [facility] during business hours:     * Increase in Pain  * Temperature over 101  * Increase in drainage from your wound  * Drainage with a foul odor  * Bleeding  * Increase in swelling  * Need for compression bandage changes due to slippage, breakthrough drainage. If you need medical attention outside of the business hours of the [employer] please contact your PCP or go to the nearest emergency room.

## 2023-04-03 ENCOUNTER — HOSPITAL ENCOUNTER (OUTPATIENT)
Dept: WOUND CARE | Age: 83
Discharge: HOME | End: 2023-04-03
Payer: MEDICARE

## 2023-04-03 VITALS
SYSTOLIC BLOOD PRESSURE: 152 MMHG | RESPIRATION RATE: 18 BRPM | HEIGHT: 69 IN | HEART RATE: 70 BPM | BODY MASS INDEX: 20.73 KG/M2 | TEMPERATURE: 97.3 F | DIASTOLIC BLOOD PRESSURE: 70 MMHG | WEIGHT: 140 LBS

## 2023-04-03 DIAGNOSIS — L97.412 DIABETIC ULCER OF RIGHT HEEL ASSOCIATED WITH TYPE 2 DIABETES MELLITUS, WITH FAT LAYER EXPOSED (HCC): ICD-10-CM

## 2023-04-03 DIAGNOSIS — L97.412 DIABETIC ULCER OF RIGHT MIDFOOT ASSOCIATED WITH DIABETES MELLITUS DUE TO UNDERLYING CONDITION, WITH FAT LAYER EXPOSED (HCC): Primary | ICD-10-CM

## 2023-04-03 DIAGNOSIS — E08.621 DIABETIC ULCER OF RIGHT MIDFOOT ASSOCIATED WITH DIABETES MELLITUS DUE TO UNDERLYING CONDITION, WITH FAT LAYER EXPOSED (HCC): Primary | ICD-10-CM

## 2023-04-03 DIAGNOSIS — E11.621 DIABETIC ULCER OF RIGHT HEEL ASSOCIATED WITH TYPE 2 DIABETES MELLITUS, WITH FAT LAYER EXPOSED (HCC): ICD-10-CM

## 2023-04-03 PROCEDURE — 11042 DBRDMT SUBQ TIS 1ST 20SQCM/<: CPT

## 2023-04-03 PROCEDURE — 11042 DBRDMT SUBQ TIS 1ST 20SQCM/<: CPT | Performed by: SURGERY

## 2023-04-03 RX ORDER — BETAMETHASONE DIPROPIONATE 0.05 %
OINTMENT (GRAM) TOPICAL ONCE
OUTPATIENT
Start: 2023-04-03 | End: 2023-04-03

## 2023-04-03 RX ORDER — GINSENG 100 MG
CAPSULE ORAL ONCE
OUTPATIENT
Start: 2023-04-03 | End: 2023-04-03

## 2023-04-03 RX ORDER — LIDOCAINE 40 MG/G
CREAM TOPICAL ONCE
OUTPATIENT
Start: 2023-04-03 | End: 2023-04-03

## 2023-04-03 RX ORDER — BACITRACIN ZINC AND POLYMYXIN B SULFATE 500; 1000 [USP'U]/G; [USP'U]/G
OINTMENT TOPICAL ONCE
OUTPATIENT
Start: 2023-04-03 | End: 2023-04-03

## 2023-04-03 RX ORDER — CLOBETASOL PROPIONATE 0.5 MG/G
OINTMENT TOPICAL ONCE
OUTPATIENT
Start: 2023-04-03 | End: 2023-04-03

## 2023-04-03 RX ORDER — LIDOCAINE HYDROCHLORIDE 40 MG/ML
SOLUTION TOPICAL ONCE
OUTPATIENT
Start: 2023-04-03 | End: 2023-04-03

## 2023-04-03 RX ORDER — BACITRACIN, NEOMYCIN, POLYMYXIN B 400; 3.5; 5 [USP'U]/G; MG/G; [USP'U]/G
OINTMENT TOPICAL ONCE
OUTPATIENT
Start: 2023-04-03 | End: 2023-04-03

## 2023-04-03 RX ORDER — LIDOCAINE 50 MG/G
OINTMENT TOPICAL ONCE
OUTPATIENT
Start: 2023-04-03 | End: 2023-04-03

## 2023-04-03 RX ORDER — LIDOCAINE HYDROCHLORIDE 40 MG/ML
SOLUTION TOPICAL ONCE
Status: DISCONTINUED | OUTPATIENT
Start: 2023-04-03 | End: 2023-04-04 | Stop reason: HOSPADM

## 2023-04-03 RX ORDER — LIDOCAINE HYDROCHLORIDE 20 MG/ML
JELLY TOPICAL ONCE
OUTPATIENT
Start: 2023-04-03 | End: 2023-04-03

## 2023-04-03 RX ORDER — GENTAMICIN SULFATE 1 MG/G
OINTMENT TOPICAL ONCE
OUTPATIENT
Start: 2023-04-03 | End: 2023-04-03

## 2023-04-03 NOTE — PROGRESS NOTES
Measurements:  Wound/Ulcer Descriptions are Pre Debridement except measurements:    Wound 04/29/22 Foot Left;Plantar (Active)   Number of days: 339       Wound 12/05/22 Heel Right;Plantar wound #2 right plantar heel (Active)   Wound Image   03/20/23 0817   Wound Etiology Pressure Unstageable 03/13/23 0905   Dressing Status New dressing applied 03/27/23 1444   Wound Cleansed Cleansed with saline 03/27/23 1444   Dressing/Treatment Alginate with Ag;Dry dressing 03/27/23 1444   Offloading for Diabetic Foot Ulcers Diabetic shoes/inserts 03/27/23 1444   Wound Length (cm) 1 cm 04/03/23 1408   Wound Width (cm) 1.8 cm 04/03/23 1408   Wound Depth (cm) 0.1 cm 04/03/23 1408   Wound Surface Area (cm^2) 1.8 cm^2 04/03/23 1408   Change in Wound Size % (l*w) 52.63 04/03/23 1408   Wound Volume (cm^3) 0.18 cm^3 04/03/23 1408   Wound Healing % 76 04/03/23 1408   Post-Procedure Length (cm) 1.1 cm 04/03/23 1431   Post-Procedure Width (cm) 1.9 cm 04/03/23 1431   Post-Procedure Depth (cm) 0.2 cm 04/03/23 1431   Post-Procedure Surface Area (cm^2) 2.09 cm^2 04/03/23 1431   Post-Procedure Volume (cm^3) 0.418 cm^3 04/03/23 1431   Wound Assessment Eschar dry;Pink/red;Bleeding 04/03/23 1408   Drainage Amount Small 04/03/23 1408   Drainage Description Sanguinous 04/03/23 1408   Odor None 04/03/23 1408   Latonya-wound Assessment Dry/flaky 04/03/23 1408   Margins Attached edges 03/13/23 0905   Wound Thickness Description not for Pressure Injury Full thickness 03/13/23 0905   Number of days: 118          Procedure Note  Indications:  Based on my examination of this patient's wound(s)/ulcer(s) today, debridement is required to promote healing and evaluate the wound base.     Performed by: Enmanuel Garrison MD    Consent obtained:  Yes    Time out taken:  Yes    Pain Control: Anesthetic  Anesthetic: 4% Lidocaine Liquid Topical     Debridement:Excisional Debridement    Using curette the wound(s)/ulcer(s) was/were sharply debrided down through and

## 2023-04-18 ENCOUNTER — HOSPITAL ENCOUNTER (OUTPATIENT)
Dept: WOUND CARE | Age: 83
Discharge: HOME | End: 2023-04-18
Payer: MEDICARE

## 2023-04-18 VITALS
BODY MASS INDEX: 20.73 KG/M2 | HEART RATE: 72 BPM | DIASTOLIC BLOOD PRESSURE: 62 MMHG | TEMPERATURE: 96.2 F | SYSTOLIC BLOOD PRESSURE: 148 MMHG | HEIGHT: 69 IN | RESPIRATION RATE: 16 BRPM | WEIGHT: 140 LBS

## 2023-04-18 DIAGNOSIS — E08.621 DIABETIC ULCER OF RIGHT MIDFOOT ASSOCIATED WITH DIABETES MELLITUS DUE TO UNDERLYING CONDITION, WITH FAT LAYER EXPOSED (HCC): Primary | ICD-10-CM

## 2023-04-18 DIAGNOSIS — L97.412 DIABETIC ULCER OF RIGHT MIDFOOT ASSOCIATED WITH DIABETES MELLITUS DUE TO UNDERLYING CONDITION, WITH FAT LAYER EXPOSED (HCC): Primary | ICD-10-CM

## 2023-04-18 DIAGNOSIS — E11.621 DIABETIC ULCER OF RIGHT HEEL ASSOCIATED WITH TYPE 2 DIABETES MELLITUS, WITH FAT LAYER EXPOSED (HCC): ICD-10-CM

## 2023-04-18 DIAGNOSIS — L97.412 DIABETIC ULCER OF RIGHT HEEL ASSOCIATED WITH TYPE 2 DIABETES MELLITUS, WITH FAT LAYER EXPOSED (HCC): ICD-10-CM

## 2023-04-18 DIAGNOSIS — E11.621 DIABETIC ULCER OF RIGHT HEEL ASSOCIATED WITH TYPE 2 DIABETES MELLITUS, LIMITED TO BREAKDOWN OF SKIN (HCC): ICD-10-CM

## 2023-04-18 DIAGNOSIS — L97.411 DIABETIC ULCER OF RIGHT HEEL ASSOCIATED WITH TYPE 2 DIABETES MELLITUS, LIMITED TO BREAKDOWN OF SKIN (HCC): ICD-10-CM

## 2023-04-18 PROCEDURE — 97597 DBRDMT OPN WND 1ST 20 CM/<: CPT | Performed by: SURGERY

## 2023-04-18 PROCEDURE — 97597 DBRDMT OPN WND 1ST 20 CM/<: CPT

## 2023-04-18 RX ORDER — LIDOCAINE 50 MG/G
OINTMENT TOPICAL ONCE
OUTPATIENT
Start: 2023-04-18 | End: 2023-04-18

## 2023-04-18 RX ORDER — GENTAMICIN SULFATE 1 MG/G
OINTMENT TOPICAL ONCE
OUTPATIENT
Start: 2023-04-18 | End: 2023-04-18

## 2023-04-18 RX ORDER — LIDOCAINE HYDROCHLORIDE 20 MG/ML
JELLY TOPICAL ONCE
OUTPATIENT
Start: 2023-04-18 | End: 2023-04-18

## 2023-04-18 RX ORDER — BETAMETHASONE DIPROPIONATE 0.05 %
OINTMENT (GRAM) TOPICAL ONCE
OUTPATIENT
Start: 2023-04-18 | End: 2023-04-18

## 2023-04-18 RX ORDER — GINSENG 100 MG
CAPSULE ORAL ONCE
OUTPATIENT
Start: 2023-04-18 | End: 2023-04-18

## 2023-04-18 RX ORDER — LIDOCAINE 40 MG/G
CREAM TOPICAL ONCE
OUTPATIENT
Start: 2023-04-18 | End: 2023-04-18

## 2023-04-18 RX ORDER — LIDOCAINE HYDROCHLORIDE 40 MG/ML
SOLUTION TOPICAL ONCE
OUTPATIENT
Start: 2023-04-18 | End: 2023-04-18

## 2023-04-18 RX ORDER — LIDOCAINE HYDROCHLORIDE 40 MG/ML
SOLUTION TOPICAL ONCE
Status: COMPLETED | OUTPATIENT
Start: 2023-04-18 | End: 2023-04-18

## 2023-04-18 RX ORDER — BACITRACIN ZINC AND POLYMYXIN B SULFATE 500; 1000 [USP'U]/G; [USP'U]/G
OINTMENT TOPICAL ONCE
OUTPATIENT
Start: 2023-04-18 | End: 2023-04-18

## 2023-04-18 RX ORDER — CLOBETASOL PROPIONATE 0.5 MG/G
OINTMENT TOPICAL ONCE
OUTPATIENT
Start: 2023-04-18 | End: 2023-04-18

## 2023-04-18 RX ORDER — BACITRACIN, NEOMYCIN, POLYMYXIN B 400; 3.5; 5 [USP'U]/G; MG/G; [USP'U]/G
OINTMENT TOPICAL ONCE
OUTPATIENT
Start: 2023-04-18 | End: 2023-04-18

## 2023-04-18 RX ADMIN — LIDOCAINE HYDROCHLORIDE 5 ML: 40 SOLUTION TOPICAL at 08:34

## 2023-04-18 NOTE — DISCHARGE INSTRUCTIONS
Visit Discharge/Physician Orders     Discharge condition: Stable  Assessment of pain at discharge: minimal  Anesthetic used: 4% lidocaine external solution  Discharge to: ECF  Left via:Private automobile  Accompanied by: accompanied by self  ECF/HHA: [facility]      Dressing Orders: To right heel wound, cleanse with normal saline solution and apply alginate Ag and cover with a dry dressing. Change daily. Treatment Orders:      3/20 Please see orthotics to review shoe insert for re-eval      Culture taken 3/13- Antibiotic Doxycycline 100 mg BID for 10 days called to facility on 3/17  CBC and CMP and x-ray- REVIEWED  Vasculars scheduled for 12/13/22- REVIEWED  Continue using diabetic shoe inserts, keep pressure off of wound sites  FOLLOW NUTRITIOUS DIET. CHOOSE FOODS HIGH IN PROTEIN -CHICKEN- FISH-AND EGGS,  CHOOSE FOODS HIGH IN VITAMIN C.   MULTIVITAMIN DAILY. [address] [place] [address],[address] followup visit _____1 week Monday __________________  (Please note your next appointment above and if you are unable to keep, kindly give a 24 hour notice. Thank you.)     Physician signature:__________________________        If you experience any of the following, please call the [address] during business hours:     * Increase in Pain  * Temperature over 101  * Increase in drainage from your wound  * Drainage with a foul odor  * Bleeding  * Increase in swelling  * Need for compression bandage changes due to slippage, breakthrough drainage. If you need medical attention outside of the business hours of the [address] please contact your PCP or go to the nearest emergency room.

## 2023-04-18 NOTE — PROGRESS NOTES
Wound Healing Center Followup Visit Note    Referring Physician : Laci Loomis MD  [address] RECORD NUMBER:  [id number]  AGE: 79 y.o. GENDER: male  : 1940  EPISODE DATE:  2023    Subjective:     Chief Complaint   Patient presents with    Wound Check     Right heel      HISTORY of PRESENT ILLNESS HPI   Kandice Chaudhary is a 79 y.o. male who presents today in regards to follow up evaluation and treatment of wound/ulcer. That patient's past medical, family and social hx were reviewed and changes were made if present. History of Wound Context:  The patient has had a wound of right foot overlying the plantar surface of the right heel and plantar surface of the right first metatarsophalangeal joint which was first noted approximately at least 3 months ago. This has been treated by the patient in the facility, but patient did not call the wound care center as he was instructed in the past call immediately for any future ulcers. On their initial visit to the wound healing center, 22, the patient has noted that the wound has not been improving. The patient has had similar previous wounds in the past.       Patient in the past has undergone right femoral angiogram and angioplasty of right popliteal artery and tibial arteries by Dr. Michelle Dowell in 2019     Patient has a diabetes mellitus diabetic neuropathy     Pt is currently not on abx.       Wound/Ulcer Pain Timing/Severity: constant  Quality of pain: dull, aching  Severity:  3 / 10   Modifying Factors: Pain worsens with walking  Associated Signs/Symptoms: drainage and pain        2022  Patient tells me that he saw his orthotist, the custom made insert was modified  Right foot wounds look improved     2022  Right foot wound slowly improving, patient is wearing the modified custom made orthotic of the right foot  The ankle-brachial index that was done was reviewed, overall adequate flow for tissue perfusion, the

## 2023-04-24 ENCOUNTER — HOSPITAL ENCOUNTER (OUTPATIENT)
Dept: WOUND CARE | Age: 83
Discharge: HOME | End: 2023-04-24
Payer: MEDICARE

## 2023-04-24 VITALS
HEART RATE: 72 BPM | RESPIRATION RATE: 16 BRPM | HEIGHT: 69 IN | SYSTOLIC BLOOD PRESSURE: 122 MMHG | DIASTOLIC BLOOD PRESSURE: 76 MMHG | BODY MASS INDEX: 21.48 KG/M2 | WEIGHT: 145 LBS | TEMPERATURE: 96 F

## 2023-04-24 DIAGNOSIS — E11.621 DIABETIC ULCER OF RIGHT HEEL ASSOCIATED WITH TYPE 2 DIABETES MELLITUS, LIMITED TO BREAKDOWN OF SKIN (HCC): ICD-10-CM

## 2023-04-24 DIAGNOSIS — L97.412 DIABETIC ULCER OF RIGHT HEEL ASSOCIATED WITH TYPE 2 DIABETES MELLITUS, WITH FAT LAYER EXPOSED (HCC): ICD-10-CM

## 2023-04-24 DIAGNOSIS — E11.621 DIABETIC ULCER OF RIGHT HEEL ASSOCIATED WITH TYPE 2 DIABETES MELLITUS, WITH FAT LAYER EXPOSED (HCC): ICD-10-CM

## 2023-04-24 DIAGNOSIS — L97.412 DIABETIC ULCER OF RIGHT MIDFOOT ASSOCIATED WITH DIABETES MELLITUS DUE TO UNDERLYING CONDITION, WITH FAT LAYER EXPOSED (HCC): Primary | ICD-10-CM

## 2023-04-24 DIAGNOSIS — E08.621 DIABETIC ULCER OF RIGHT MIDFOOT ASSOCIATED WITH DIABETES MELLITUS DUE TO UNDERLYING CONDITION, WITH FAT LAYER EXPOSED (HCC): Primary | ICD-10-CM

## 2023-04-24 DIAGNOSIS — L97.411 DIABETIC ULCER OF RIGHT HEEL ASSOCIATED WITH TYPE 2 DIABETES MELLITUS, LIMITED TO BREAKDOWN OF SKIN (HCC): ICD-10-CM

## 2023-04-24 PROCEDURE — 97597 DBRDMT OPN WND 1ST 20 CM/<: CPT

## 2023-04-24 PROCEDURE — 97597 DBRDMT OPN WND 1ST 20 CM/<: CPT | Performed by: SURGERY

## 2023-04-24 RX ORDER — BACITRACIN, NEOMYCIN, POLYMYXIN B 400; 3.5; 5 [USP'U]/G; MG/G; [USP'U]/G
OINTMENT TOPICAL ONCE
OUTPATIENT
Start: 2023-04-24 | End: 2023-04-24

## 2023-04-24 RX ORDER — LIDOCAINE HYDROCHLORIDE 40 MG/ML
SOLUTION TOPICAL ONCE
OUTPATIENT
Start: 2023-04-24 | End: 2023-04-24

## 2023-04-24 RX ORDER — BETAMETHASONE DIPROPIONATE 0.05 %
OINTMENT (GRAM) TOPICAL ONCE
OUTPATIENT
Start: 2023-04-24 | End: 2023-04-24

## 2023-04-24 RX ORDER — BACITRACIN ZINC AND POLYMYXIN B SULFATE 500; 1000 [USP'U]/G; [USP'U]/G
OINTMENT TOPICAL ONCE
OUTPATIENT
Start: 2023-04-24 | End: 2023-04-24

## 2023-04-24 RX ORDER — LIDOCAINE 50 MG/G
OINTMENT TOPICAL ONCE
OUTPATIENT
Start: 2023-04-24 | End: 2023-04-24

## 2023-04-24 RX ORDER — CYANOCOBALAMIN 1000 UG/ML
1000 INJECTION, SOLUTION INTRAMUSCULAR; SUBCUTANEOUS
COMMUNITY

## 2023-04-24 RX ORDER — CLOBETASOL PROPIONATE 0.5 MG/G
OINTMENT TOPICAL ONCE
OUTPATIENT
Start: 2023-04-24 | End: 2023-04-24

## 2023-04-24 RX ORDER — LIDOCAINE HYDROCHLORIDE 20 MG/ML
JELLY TOPICAL ONCE
OUTPATIENT
Start: 2023-04-24 | End: 2023-04-24

## 2023-04-24 RX ORDER — GINSENG 100 MG
CAPSULE ORAL ONCE
OUTPATIENT
Start: 2023-04-24 | End: 2023-04-24

## 2023-04-24 RX ORDER — GENTAMICIN SULFATE 1 MG/G
OINTMENT TOPICAL ONCE
OUTPATIENT
Start: 2023-04-24 | End: 2023-04-24

## 2023-04-24 RX ORDER — LIDOCAINE 40 MG/G
CREAM TOPICAL ONCE
OUTPATIENT
Start: 2023-04-24 | End: 2023-04-24

## 2023-04-24 RX ORDER — LIDOCAINE HYDROCHLORIDE 40 MG/ML
SOLUTION TOPICAL ONCE
Status: COMPLETED | OUTPATIENT
Start: 2023-04-24 | End: 2023-04-24

## 2023-04-24 RX ADMIN — LIDOCAINE HYDROCHLORIDE 10 ML: 40 SOLUTION TOPICAL at 15:43

## 2023-04-24 NOTE — DISCHARGE INSTRUCTIONS
Visit Discharge/Physician Orders     Discharge condition: Stable  Assessment of pain at discharge: minimal  Anesthetic used: 4% lidocaine external solution  Discharge to: ECF  Left via:Private automobile  Accompanied by: accompanied by self  ECF/HHA: [facility]      Dressing Orders: To right heel wound, cleanse with normal saline solution and apply ROSALBA and cover with a dry dressing. Change daily. Treatment Orders:      3/20 Please see orthotics to review shoe insert for re-eval      Culture taken 3/13- Antibiotic Doxycycline 100 mg BID for 10 days called to facility on 3/17  CBC and CMP and x-ray- REVIEWED  Vasculars scheduled for 12/13/22- REVIEWED  Continue using diabetic shoe inserts, keep pressure off of wound sites  FOLLOW NUTRITIOUS DIET. CHOOSE FOODS HIGH IN PROTEIN -CHICKEN- FISH-AND EGGS,  CHOOSE FOODS HIGH IN VITAMIN C.   MULTIVITAMIN DAILY. [address] [place] [address],[address] followup visit _____2 weeks __________________  (Please note your next appointment above and if you are unable to keep, kindly give a 24 hour notice. Thank you.)     Physician signature:__________________________        If you experience any of the following, please call the [address] during business hours:     * Increase in Pain  * Temperature over 101  * Increase in drainage from your wound  * Drainage with a foul odor  * Bleeding  * Increase in swelling  * Need for compression bandage changes due to slippage, breakthrough drainage. If you need medical attention outside of the business hours of the [address] please contact your PCP or go to the nearest emergency room.

## 2023-04-24 NOTE — PROGRESS NOTES
Wound Healing Center Followup Visit Note    Referring Physician : Jessa Alvarez MD  [address] RECORD NUMBER:  [id number]  AGE: 79 y.o. GENDER: male  : 1940  EPISODE DATE:  2023    Subjective:     Chief Complaint   Patient presents with    Wound Check     Right heel      HISTORY of PRESENT ILLNESS HPI   Josh Aponte is a 79 y.o. male who presents today in regards to follow up evaluation and treatment of wound/ulcer. That patient's past medical, family and social hx were reviewed and changes were made if present. History of Wound Context:  The patient has had a wound of right foot overlying the plantar surface of the right heel and plantar surface of the right first metatarsophalangeal joint which was first noted approximately at least 3 months ago. This has been treated by the patient in the facility, but patient did not call the wound care center as he was instructed in the past call immediately for any future ulcers. On their initial visit to the wound healing center, 22, the patient has noted that the wound has not been improving. The patient has had similar previous wounds in the past.       Patient in the past has undergone right femoral angiogram and angioplasty of right popliteal artery and tibial arteries by Dr. Kimberly Jones in 2019     Patient has a diabetes mellitus diabetic neuropathy     Pt is currently not on abx.       Wound/Ulcer Pain Timing/Severity: constant  Quality of pain: dull, aching  Severity:  3 / 10   Modifying Factors: Pain worsens with walking  Associated Signs/Symptoms: drainage and pain        2022  Patient tells me that he saw his orthotist, the custom made insert was modified  Right foot wounds look improved     2022  Right foot wound slowly improving, patient is wearing the modified custom made orthotic of the right foot  The ankle-brachial index that was done was reviewed, overall adequate flow for tissue perfusion, the

## 2023-05-08 ENCOUNTER — HOSPITAL ENCOUNTER (OUTPATIENT)
Dept: WOUND CARE | Age: 83
Discharge: HOME | End: 2023-05-08
Payer: MEDICARE

## 2023-05-08 VITALS
BODY MASS INDEX: 21.41 KG/M2 | RESPIRATION RATE: 16 BRPM | HEART RATE: 67 BPM | TEMPERATURE: 96.3 F | DIASTOLIC BLOOD PRESSURE: 62 MMHG | SYSTOLIC BLOOD PRESSURE: 141 MMHG | WEIGHT: 145 LBS

## 2023-05-08 DIAGNOSIS — L97.412 DIABETIC ULCER OF RIGHT HEEL ASSOCIATED WITH TYPE 2 DIABETES MELLITUS, WITH FAT LAYER EXPOSED (HCC): ICD-10-CM

## 2023-05-08 DIAGNOSIS — E11.621 DIABETIC ULCER OF RIGHT HEEL ASSOCIATED WITH TYPE 2 DIABETES MELLITUS, WITH FAT LAYER EXPOSED (HCC): ICD-10-CM

## 2023-05-08 DIAGNOSIS — L97.412 DIABETIC ULCER OF RIGHT MIDFOOT ASSOCIATED WITH DIABETES MELLITUS DUE TO UNDERLYING CONDITION, WITH FAT LAYER EXPOSED (HCC): Primary | ICD-10-CM

## 2023-05-08 DIAGNOSIS — E08.621 DIABETIC ULCER OF RIGHT MIDFOOT ASSOCIATED WITH DIABETES MELLITUS DUE TO UNDERLYING CONDITION, WITH FAT LAYER EXPOSED (HCC): Primary | ICD-10-CM

## 2023-05-08 PROCEDURE — 97597 DBRDMT OPN WND 1ST 20 CM/<: CPT

## 2023-05-08 PROCEDURE — 97597 DBRDMT OPN WND 1ST 20 CM/<: CPT | Performed by: SURGERY

## 2023-05-08 RX ORDER — LIDOCAINE 50 MG/G
OINTMENT TOPICAL ONCE
OUTPATIENT
Start: 2023-05-08 | End: 2023-05-08

## 2023-05-08 RX ORDER — LIDOCAINE HYDROCHLORIDE 40 MG/ML
SOLUTION TOPICAL ONCE
Status: COMPLETED | OUTPATIENT
Start: 2023-05-08 | End: 2023-05-08

## 2023-05-08 RX ORDER — GINSENG 100 MG
CAPSULE ORAL ONCE
OUTPATIENT
Start: 2023-05-08 | End: 2023-05-08

## 2023-05-08 RX ORDER — LIDOCAINE HYDROCHLORIDE 20 MG/ML
JELLY TOPICAL ONCE
OUTPATIENT
Start: 2023-05-08 | End: 2023-05-08

## 2023-05-08 RX ORDER — BACITRACIN, NEOMYCIN, POLYMYXIN B 400; 3.5; 5 [USP'U]/G; MG/G; [USP'U]/G
OINTMENT TOPICAL ONCE
OUTPATIENT
Start: 2023-05-08 | End: 2023-05-08

## 2023-05-08 RX ORDER — GENTAMICIN SULFATE 1 MG/G
OINTMENT TOPICAL ONCE
OUTPATIENT
Start: 2023-05-08 | End: 2023-05-08

## 2023-05-08 RX ORDER — CLOBETASOL PROPIONATE 0.5 MG/G
OINTMENT TOPICAL ONCE
OUTPATIENT
Start: 2023-05-08 | End: 2023-05-08

## 2023-05-08 RX ORDER — BETAMETHASONE DIPROPIONATE 0.05 %
OINTMENT (GRAM) TOPICAL ONCE
OUTPATIENT
Start: 2023-05-08 | End: 2023-05-08

## 2023-05-08 RX ORDER — LIDOCAINE 40 MG/G
CREAM TOPICAL ONCE
OUTPATIENT
Start: 2023-05-08 | End: 2023-05-08

## 2023-05-08 RX ORDER — LIDOCAINE HYDROCHLORIDE 40 MG/ML
SOLUTION TOPICAL ONCE
OUTPATIENT
Start: 2023-05-08 | End: 2023-05-08

## 2023-05-08 RX ORDER — BACITRACIN ZINC AND POLYMYXIN B SULFATE 500; 1000 [USP'U]/G; [USP'U]/G
OINTMENT TOPICAL ONCE
OUTPATIENT
Start: 2023-05-08 | End: 2023-05-08

## 2023-05-08 RX ADMIN — LIDOCAINE HYDROCHLORIDE 10 ML: 40 SOLUTION TOPICAL at 08:21

## 2023-05-08 NOTE — PROGRESS NOTES
Wound Healing Center Followup Visit Note    Referring Physician : Simon Pemberton MD  [address] RECORD NUMBER:  [id number]  AGE: 79 y.o. GENDER: male  : 1940  EPISODE DATE:  2023    Subjective:     Chief Complaint   Patient presents with    Wound Check     Right foot      HISTORY of PRESENT ILLNESS HPI   Rubia Barahona is a 79 y.o. male who presents today in regards to follow up evaluation and treatment of wound/ulcer. That patient's past medical, family and social hx were reviewed and changes were made if present. History of Wound Context:  The patient has had a wound of right foot overlying the plantar surface of the right heel and plantar surface of the right first metatarsophalangeal joint which was first noted approximately at least 3 months ago. This has been treated by the patient in the facility, but patient did not call the wound care center as he was instructed in the past call immediately for any future ulcers. On their initial visit to the wound healing center, 22, the patient has noted that the wound has not been improving. The patient has had similar previous wounds in the past.       Patient in the past has undergone right femoral angiogram and angioplasty of right popliteal artery and tibial arteries by Dr. Leela Waldron in 2019     Patient has a diabetes mellitus diabetic neuropathy     Pt is currently not on abx.       Wound/Ulcer Pain Timing/Severity: constant  Quality of pain: dull, aching  Severity:  3 / 10   Modifying Factors: Pain worsens with walking  Associated Signs/Symptoms: drainage and pain        2022  Patient tells me that he saw his orthotist, the custom made insert was modified  Right foot wounds look improved     2022  Right foot wound slowly improving, patient is wearing the modified custom made orthotic of the right foot  The ankle-brachial index that was done was reviewed, overall adequate flow for tissue perfusion, the results

## 2023-05-22 ENCOUNTER — HOSPITAL ENCOUNTER (OUTPATIENT)
Dept: WOUND CARE | Age: 83
Discharge: HOME | End: 2023-05-22
Payer: MEDICARE

## 2023-05-22 VITALS
HEIGHT: 69 IN | HEART RATE: 72 BPM | SYSTOLIC BLOOD PRESSURE: 112 MMHG | WEIGHT: 145 LBS | RESPIRATION RATE: 16 BRPM | BODY MASS INDEX: 21.48 KG/M2 | DIASTOLIC BLOOD PRESSURE: 60 MMHG | TEMPERATURE: 97.1 F

## 2023-05-22 DIAGNOSIS — L97.412 DIABETIC ULCER OF RIGHT HEEL ASSOCIATED WITH TYPE 2 DIABETES MELLITUS, WITH FAT LAYER EXPOSED (HCC): ICD-10-CM

## 2023-05-22 DIAGNOSIS — L97.411 DIABETIC ULCER OF RIGHT HEEL ASSOCIATED WITH TYPE 2 DIABETES MELLITUS, LIMITED TO BREAKDOWN OF SKIN (HCC): ICD-10-CM

## 2023-05-22 DIAGNOSIS — E11.621 DIABETIC ULCER OF RIGHT HEEL ASSOCIATED WITH TYPE 2 DIABETES MELLITUS, LIMITED TO BREAKDOWN OF SKIN (HCC): ICD-10-CM

## 2023-05-22 DIAGNOSIS — L97.412 DIABETIC ULCER OF RIGHT MIDFOOT ASSOCIATED WITH DIABETES MELLITUS DUE TO UNDERLYING CONDITION, WITH FAT LAYER EXPOSED (HCC): Primary | ICD-10-CM

## 2023-05-22 DIAGNOSIS — E08.621 DIABETIC ULCER OF RIGHT MIDFOOT ASSOCIATED WITH DIABETES MELLITUS DUE TO UNDERLYING CONDITION, WITH FAT LAYER EXPOSED (HCC): Primary | ICD-10-CM

## 2023-05-22 DIAGNOSIS — E11.621 DIABETIC ULCER OF RIGHT HEEL ASSOCIATED WITH TYPE 2 DIABETES MELLITUS, WITH FAT LAYER EXPOSED (HCC): ICD-10-CM

## 2023-05-22 PROCEDURE — 97597 DBRDMT OPN WND 1ST 20 CM/<: CPT

## 2023-05-22 PROCEDURE — 97597 DBRDMT OPN WND 1ST 20 CM/<: CPT | Performed by: SURGERY

## 2023-05-22 RX ORDER — CLOBETASOL PROPIONATE 0.5 MG/G
OINTMENT TOPICAL ONCE
OUTPATIENT
Start: 2023-05-22 | End: 2023-05-22

## 2023-05-22 RX ORDER — BACITRACIN, NEOMYCIN, POLYMYXIN B 400; 3.5; 5 [USP'U]/G; MG/G; [USP'U]/G
OINTMENT TOPICAL ONCE
OUTPATIENT
Start: 2023-05-22 | End: 2023-05-22

## 2023-05-22 RX ORDER — LIDOCAINE 50 MG/G
OINTMENT TOPICAL ONCE
OUTPATIENT
Start: 2023-05-22 | End: 2023-05-22

## 2023-05-22 RX ORDER — LIDOCAINE HYDROCHLORIDE 20 MG/ML
JELLY TOPICAL ONCE
OUTPATIENT
Start: 2023-05-22 | End: 2023-05-22

## 2023-05-22 RX ORDER — BETAMETHASONE DIPROPIONATE 0.05 %
OINTMENT (GRAM) TOPICAL ONCE
OUTPATIENT
Start: 2023-05-22 | End: 2023-05-22

## 2023-05-22 RX ORDER — LIDOCAINE 40 MG/G
CREAM TOPICAL ONCE
OUTPATIENT
Start: 2023-05-22 | End: 2023-05-22

## 2023-05-22 RX ORDER — GINSENG 100 MG
CAPSULE ORAL ONCE
OUTPATIENT
Start: 2023-05-22 | End: 2023-05-22

## 2023-05-22 RX ORDER — GENTAMICIN SULFATE 1 MG/G
OINTMENT TOPICAL ONCE
OUTPATIENT
Start: 2023-05-22 | End: 2023-05-22

## 2023-05-22 RX ORDER — FERROUS SULFATE 325(65) MG
325 TABLET ORAL 2 TIMES DAILY
COMMUNITY

## 2023-05-22 RX ORDER — BACITRACIN ZINC AND POLYMYXIN B SULFATE 500; 1000 [USP'U]/G; [USP'U]/G
OINTMENT TOPICAL ONCE
OUTPATIENT
Start: 2023-05-22 | End: 2023-05-22

## 2023-05-22 RX ORDER — LIDOCAINE HYDROCHLORIDE 40 MG/ML
SOLUTION TOPICAL ONCE
OUTPATIENT
Start: 2023-05-22 | End: 2023-05-22

## 2023-05-22 RX ORDER — LIDOCAINE HYDROCHLORIDE 40 MG/ML
SOLUTION TOPICAL ONCE
Status: COMPLETED | OUTPATIENT
Start: 2023-05-22 | End: 2023-05-22

## 2023-05-22 RX ADMIN — LIDOCAINE HYDROCHLORIDE 5 ML: 40 SOLUTION TOPICAL at 08:54

## 2023-05-22 NOTE — PROGRESS NOTES
Wound Healing Center Followup Visit Note    Referring Physician : Darron Kennedy MD  [address] RECORD NUMBER:  [id number]  AGE: 79 y.o. GENDER: male  : 1940  EPISODE DATE:  2023    Subjective:     Chief Complaint   Patient presents with    Wound Check     Right heel      HISTORY of PRESENT ILLNESS HPI   Ellen Guerrier is a 79 y.o. male who presents today in regards to follow up evaluation and treatment of wound/ulcer. That patient's past medical, family and social hx were reviewed and changes were made if present. History of Wound Context:  The patient has had a wound of right foot overlying the plantar surface of the right heel and plantar surface of the right first metatarsophalangeal joint which was first noted approximately at least 3 months ago. This has been treated by the patient in the facility, but patient did not call the wound care center as he was instructed in the past call immediately for any future ulcers. On their initial visit to the wound healing center, 22, the patient has noted that the wound has not been improving. The patient has had similar previous wounds in the past.       Patient in the past has undergone right femoral angiogram and angioplasty of right popliteal artery and tibial arteries by Dr. Vinicio Padilla in 2019     Patient has a diabetes mellitus diabetic neuropathy     Pt is currently not on abx.       Wound/Ulcer Pain Timing/Severity: constant  Quality of pain: dull, aching  Severity:  3 / 10   Modifying Factors: Pain worsens with walking  Associated Signs/Symptoms: drainage and pain        2022  Patient tells me that he saw his orthotist, the custom made insert was modified  Right foot wounds look improved     2022  Right foot wound slowly improving, patient is wearing the modified custom made orthotic of the right foot  The ankle-brachial index that was done was reviewed, overall adequate flow for tissue perfusion, the

## 2023-06-05 ENCOUNTER — HOSPITAL ENCOUNTER (OUTPATIENT)
Dept: WOUND CARE | Age: 83
Discharge: HOME | End: 2023-06-05
Payer: MEDICARE

## 2023-06-05 VITALS
TEMPERATURE: 96.7 F | HEART RATE: 68 BPM | DIASTOLIC BLOOD PRESSURE: 58 MMHG | SYSTOLIC BLOOD PRESSURE: 102 MMHG | RESPIRATION RATE: 16 BRPM | HEIGHT: 69 IN | WEIGHT: 145 LBS | BODY MASS INDEX: 21.48 KG/M2

## 2023-06-05 DIAGNOSIS — E08.621 DIABETIC ULCER OF RIGHT MIDFOOT ASSOCIATED WITH DIABETES MELLITUS DUE TO UNDERLYING CONDITION, WITH FAT LAYER EXPOSED (HCC): Primary | ICD-10-CM

## 2023-06-05 DIAGNOSIS — E11.621 DIABETIC ULCER OF RIGHT HEEL ASSOCIATED WITH TYPE 2 DIABETES MELLITUS, WITH FAT LAYER EXPOSED (HCC): ICD-10-CM

## 2023-06-05 DIAGNOSIS — L97.412 DIABETIC ULCER OF RIGHT HEEL ASSOCIATED WITH TYPE 2 DIABETES MELLITUS, WITH FAT LAYER EXPOSED (HCC): ICD-10-CM

## 2023-06-05 DIAGNOSIS — L97.412 DIABETIC ULCER OF RIGHT MIDFOOT ASSOCIATED WITH DIABETES MELLITUS DUE TO UNDERLYING CONDITION, WITH FAT LAYER EXPOSED (HCC): Primary | ICD-10-CM

## 2023-06-05 PROCEDURE — 97597 DBRDMT OPN WND 1ST 20 CM/<: CPT | Performed by: SURGERY

## 2023-06-05 PROCEDURE — 97597 DBRDMT OPN WND 1ST 20 CM/<: CPT

## 2023-06-05 RX ORDER — BETAMETHASONE DIPROPIONATE 0.05 %
OINTMENT (GRAM) TOPICAL ONCE
OUTPATIENT
Start: 2023-06-05 | End: 2023-06-05

## 2023-06-05 RX ORDER — GINSENG 100 MG
CAPSULE ORAL ONCE
OUTPATIENT
Start: 2023-06-05 | End: 2023-06-05

## 2023-06-05 RX ORDER — LIDOCAINE HYDROCHLORIDE 20 MG/ML
JELLY TOPICAL ONCE
OUTPATIENT
Start: 2023-06-05 | End: 2023-06-05

## 2023-06-05 RX ORDER — LIDOCAINE HYDROCHLORIDE 40 MG/ML
SOLUTION TOPICAL ONCE
OUTPATIENT
Start: 2023-06-05 | End: 2023-06-05

## 2023-06-05 RX ORDER — IBUPROFEN 200 MG
TABLET ORAL ONCE
OUTPATIENT
Start: 2023-06-05 | End: 2023-06-05

## 2023-06-05 RX ORDER — LIDOCAINE 50 MG/G
OINTMENT TOPICAL ONCE
OUTPATIENT
Start: 2023-06-05 | End: 2023-06-05

## 2023-06-05 RX ORDER — CLOBETASOL PROPIONATE 0.5 MG/G
OINTMENT TOPICAL ONCE
OUTPATIENT
Start: 2023-06-05 | End: 2023-06-05

## 2023-06-05 RX ORDER — LIDOCAINE HYDROCHLORIDE 40 MG/ML
SOLUTION TOPICAL ONCE
Status: COMPLETED | OUTPATIENT
Start: 2023-06-05 | End: 2023-06-05

## 2023-06-05 RX ORDER — BACITRACIN ZINC AND POLYMYXIN B SULFATE 500; 1000 [USP'U]/G; [USP'U]/G
OINTMENT TOPICAL ONCE
OUTPATIENT
Start: 2023-06-05 | End: 2023-06-05

## 2023-06-05 RX ORDER — LIDOCAINE 40 MG/G
CREAM TOPICAL ONCE
OUTPATIENT
Start: 2023-06-05 | End: 2023-06-05

## 2023-06-05 RX ORDER — GENTAMICIN SULFATE 1 MG/G
OINTMENT TOPICAL ONCE
OUTPATIENT
Start: 2023-06-05 | End: 2023-06-05

## 2023-06-05 RX ADMIN — LIDOCAINE HYDROCHLORIDE 10 ML: 40 SOLUTION TOPICAL at 08:23

## 2023-06-05 NOTE — PROGRESS NOTES
made insert so that he can better offload the right heel  3/27/2023  Wound looks improved, patient was advised again to discuss with this orthotist regarding further offloading the right heel over the plantar surface  4/3/2023  Right heel wound looks better, patient was again instructed to have his orthotics look at his custom made insert  4/11/2023  Right heel improving  4/18/2023  Right heel better, skin involvement only at the present time  4/24/2023  Ulcer healing, skin only, instructed him to offload the heel area of the right side wound not debrided today no  5/8/2023  Right heel ulcer over the plantar surface looks stable, patient tells me his orthotist saw him and revised the insert to offload the right heel  5/22/2023  Patient's right heel ulcer over the plantar surface, stable with scab and eschar formation, tells me he is waiting for his new orthotics and diabetic shoe  6/5/2023  Ulcer of the right heel, stable to improving, patient waiting for new diabetic shoes        Ulcer Identification:  Ulcer Type: arterial, diabetic, pressure, and non-healing/non-surgical  Contributing Factors: diabetes, chronic pressure, decreased mobility, shear force, and arterial insufficiency     Diabetic/Pressure/Non Pressure Ulcers only:  Ulcer: Diabetic ulcer, fat layer exposed     If patient has diabetic lower extremity wounds  Dale Classification of diabetic lower extremity wounds:     Grade Description   []  0 No open wound   []  1 Superficial ulcer involving the full skin thickness   []  2 Deep ulcer involves ligament, tendon, joint capsule, or fascia  No bone involvement or abscess presence   []  3 Deep Ulcer with abcess formation and/or osteomyelitis   []  4 Localized gangrene   []  5 Extensive gangrene of the foot      Wound: Patient does have diabetic foot ulcers, pressure ulcers, trophic ulcers, with a fat layer exposed underneath the first metatarsal phalangeal joint as well as the right heel overlying the

## 2023-06-19 ENCOUNTER — HOSPITAL ENCOUNTER (OUTPATIENT)
Dept: WOUND CARE | Age: 83
Discharge: HOME | End: 2023-06-19
Payer: MEDICARE

## 2023-06-19 VITALS
HEIGHT: 69 IN | HEART RATE: 71 BPM | WEIGHT: 145 LBS | TEMPERATURE: 96.4 F | SYSTOLIC BLOOD PRESSURE: 137 MMHG | RESPIRATION RATE: 16 BRPM | BODY MASS INDEX: 21.48 KG/M2 | DIASTOLIC BLOOD PRESSURE: 70 MMHG

## 2023-06-19 DIAGNOSIS — L97.412 DIABETIC ULCER OF RIGHT HEEL ASSOCIATED WITH TYPE 2 DIABETES MELLITUS, WITH FAT LAYER EXPOSED (HCC): ICD-10-CM

## 2023-06-19 DIAGNOSIS — E11.621 DIABETIC ULCER OF RIGHT HEEL ASSOCIATED WITH TYPE 2 DIABETES MELLITUS, WITH FAT LAYER EXPOSED (HCC): ICD-10-CM

## 2023-06-19 DIAGNOSIS — L97.411 DIABETIC ULCER OF RIGHT HEEL ASSOCIATED WITH TYPE 2 DIABETES MELLITUS, LIMITED TO BREAKDOWN OF SKIN (HCC): ICD-10-CM

## 2023-06-19 DIAGNOSIS — E11.621 DIABETIC ULCER OF RIGHT HEEL ASSOCIATED WITH TYPE 2 DIABETES MELLITUS, LIMITED TO BREAKDOWN OF SKIN (HCC): ICD-10-CM

## 2023-06-19 DIAGNOSIS — L97.412 DIABETIC ULCER OF RIGHT MIDFOOT ASSOCIATED WITH DIABETES MELLITUS DUE TO UNDERLYING CONDITION, WITH FAT LAYER EXPOSED (HCC): Primary | ICD-10-CM

## 2023-06-19 DIAGNOSIS — E08.621 DIABETIC ULCER OF RIGHT MIDFOOT ASSOCIATED WITH DIABETES MELLITUS DUE TO UNDERLYING CONDITION, WITH FAT LAYER EXPOSED (HCC): Primary | ICD-10-CM

## 2023-06-19 PROCEDURE — 97597 DBRDMT OPN WND 1ST 20 CM/<: CPT | Performed by: SURGERY

## 2023-06-19 PROCEDURE — 97597 DBRDMT OPN WND 1ST 20 CM/<: CPT

## 2023-06-19 RX ORDER — IBUPROFEN 200 MG
TABLET ORAL ONCE
OUTPATIENT
Start: 2023-06-19 | End: 2023-06-19

## 2023-06-19 RX ORDER — BETAMETHASONE DIPROPIONATE 0.05 %
OINTMENT (GRAM) TOPICAL ONCE
OUTPATIENT
Start: 2023-06-19 | End: 2023-06-19

## 2023-06-19 RX ORDER — LIDOCAINE HYDROCHLORIDE 20 MG/ML
JELLY TOPICAL ONCE
OUTPATIENT
Start: 2023-06-19 | End: 2023-06-19

## 2023-06-19 RX ORDER — LIDOCAINE HYDROCHLORIDE 40 MG/ML
SOLUTION TOPICAL ONCE
OUTPATIENT
Start: 2023-06-19 | End: 2023-06-19

## 2023-06-19 RX ORDER — LIDOCAINE HYDROCHLORIDE 40 MG/ML
SOLUTION TOPICAL ONCE
Status: COMPLETED | OUTPATIENT
Start: 2023-06-19 | End: 2023-06-19

## 2023-06-19 RX ORDER — LIDOCAINE 40 MG/G
CREAM TOPICAL ONCE
OUTPATIENT
Start: 2023-06-19 | End: 2023-06-19

## 2023-06-19 RX ORDER — GENTAMICIN SULFATE 1 MG/G
OINTMENT TOPICAL ONCE
OUTPATIENT
Start: 2023-06-19 | End: 2023-06-19

## 2023-06-19 RX ORDER — LIDOCAINE 50 MG/G
OINTMENT TOPICAL ONCE
OUTPATIENT
Start: 2023-06-19 | End: 2023-06-19

## 2023-06-19 RX ORDER — BACITRACIN ZINC AND POLYMYXIN B SULFATE 500; 1000 [USP'U]/G; [USP'U]/G
OINTMENT TOPICAL ONCE
OUTPATIENT
Start: 2023-06-19 | End: 2023-06-19

## 2023-06-19 RX ORDER — CLOBETASOL PROPIONATE 0.5 MG/G
OINTMENT TOPICAL ONCE
OUTPATIENT
Start: 2023-06-19 | End: 2023-06-19

## 2023-06-19 RX ORDER — GINSENG 100 MG
CAPSULE ORAL ONCE
OUTPATIENT
Start: 2023-06-19 | End: 2023-06-19

## 2023-06-19 RX ADMIN — LIDOCAINE HYDROCHLORIDE 8 ML: 40 SOLUTION TOPICAL at 08:32

## 2023-06-19 NOTE — PROGRESS NOTES
0.24 cm^2 06/19/23 0854   Post-Procedure Volume (cm^3) 0.024 cm^3 06/19/23 0854   Wound Assessment Eschar dry 06/19/23 0823   Drainage Amount None 06/19/23 0823   Drainage Description Thin;Sanguinous 04/11/23 0827   Odor None 06/19/23 0823   Latonya-wound Assessment Hyperkeratosis (callous) 06/19/23 0823   Margins Attached edges 03/13/23 0905   Wound Thickness Description not for Pressure Injury Full thickness 03/13/23 0905   Number of days: 195          Procedure Note  Indications:  Based on my examination of this patient's wound(s)/ulcer(s) today, debridement is required to promote healing and evaluate the wound base. Performed by: Abhi Delgado MD    Consent obtained:  Yes    Time out taken:  Yes    Pain Control: Anesthetic  Anesthetic: 4% Lidocaine Liquid Topical     Debridement:Excisional Debridement    Using curette the wound(s)/ulcer(s) was/were sharply debrided down through and including the removal of epidermis and dermis. Devitalized Tissue Debrided:  fibrin and slough to stimulate bleeding to promote healing, post debridement good bleeding base and wound edges noted    Wound/Ulcer #: 1    Percent of Wound/Ulcer Debrided: 60%    Total Surface Area Debrided:  1 sq cm     Estimated Blood Loss:  Minimal  Hemostasis Achieved:  by pressure    Procedural Pain:  3  / 10   Post Procedural Pain:  2 / 10     Response to treatment:  Well tolerated by patient. Plan:   Treatment Note please see attached Discharge Instructions    Written patient dismissal instructions given to patient and signed by patient or POA. Discharge Instructions         Visit Discharge/Physician Orders     Discharge condition: Stable  Assessment of pain at discharge: minimal  Anesthetic used: 4% lidocaine external solution  Discharge to: ECF  Left via:Private automobile  Accompanied by: accompanied by self  ECF/HHA: [facility]      Dressing Orders:   To right heel wound, cleanse with normal saline solution and apply

## 2023-06-25 ENCOUNTER — APPOINTMENT (OUTPATIENT)
Dept: GENERAL RADIOLOGY | Age: 83
End: 2023-06-25
Payer: MEDICARE

## 2023-06-25 ENCOUNTER — APPOINTMENT (OUTPATIENT)
Dept: CT IMAGING | Age: 83
End: 2023-06-25
Payer: MEDICARE

## 2023-06-25 ENCOUNTER — HOSPITAL ENCOUNTER (EMERGENCY)
Age: 83
Discharge: HOME | End: 2023-06-25
Payer: MEDICARE

## 2023-06-25 VITALS
RESPIRATION RATE: 16 BRPM | DIASTOLIC BLOOD PRESSURE: 78 MMHG | HEART RATE: 80 BPM | SYSTOLIC BLOOD PRESSURE: 168 MMHG | TEMPERATURE: 97.5 F | OXYGEN SATURATION: 97 %

## 2023-06-25 DIAGNOSIS — S60.511A ABRASION OF RIGHT HAND, INITIAL ENCOUNTER: ICD-10-CM

## 2023-06-25 DIAGNOSIS — S09.90XA CLOSED HEAD INJURY, INITIAL ENCOUNTER: ICD-10-CM
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
## 2023-06-25 DIAGNOSIS — S00.83XA CONTUSION OF FACE, INITIAL ENCOUNTER: ICD-10-CM

## 2023-06-25 PROCEDURE — 70450 CT HEAD/BRAIN W/O DYE: CPT

## 2023-06-25 PROCEDURE — 99284 EMERGENCY DEPT VISIT MOD MDM: CPT

## 2023-06-25 PROCEDURE — 73130 X-RAY EXAM OF HAND: CPT

## 2023-06-25 PROCEDURE — 70486 CT MAXILLOFACIAL W/O DYE: CPT

## 2023-06-25 PROCEDURE — 72125 CT NECK SPINE W/O DYE: CPT

## 2023-06-30 ENCOUNTER — APPOINTMENT (OUTPATIENT)
Dept: CT IMAGING | Age: 83
DRG: 193 | End: 2023-06-30
Payer: MEDICARE

## 2023-06-30 ENCOUNTER — HOSPITAL ENCOUNTER (INPATIENT)
Age: 83
LOS: 9 days | Discharge: HOME | DRG: 193 | End: 2023-07-09
Attending: EMERGENCY MEDICINE | Admitting: INTERNAL MEDICINE
Payer: MEDICARE

## 2023-06-30 ENCOUNTER — APPOINTMENT (OUTPATIENT)
Dept: GENERAL RADIOLOGY | Age: 83
DRG: 193 | End: 2023-06-30
Payer: MEDICARE

## 2023-06-30 DIAGNOSIS — B34.8 RHINOVIRUS INFECTION: ICD-10-CM

## 2023-06-30 DIAGNOSIS — J96.01 ACUTE RESPIRATORY FAILURE WITH HYPOXIA (HCC): Primary | ICD-10-CM

## 2023-06-30 DIAGNOSIS — B34.1 ENTEROVIRUS INFECTION: ICD-10-CM

## 2023-06-30 DIAGNOSIS — J18.9 PNEUMONIA DUE TO INFECTIOUS ORGANISM, UNSPECIFIED LATERALITY, UNSPECIFIED PART OF LUNG: ICD-10-CM

## 2023-06-30 LAB
ALBUMIN SERPL-MCNC: 4.1 G/DL (ref 3.5–5.2)
ALP SERPL-CCNC: 47 U/L (ref 40–129)
ALT SERPL-CCNC: 26 U/L (ref 0–40)
ANION GAP SERPL CALCULATED.3IONS-SCNC: 12 MMOL/L (ref 7–16)
AST SERPL-CCNC: 28 U/L (ref 0–39)
B PARAP IS1001 DNA NPH QL NAA+NON-PROBE: NOT DETECTED
B PERT.PT PRMT NPH QL NAA+NON-PROBE: NOT DETECTED
BACTERIA URNS QL MICRO: ABNORMAL /HPF
BASOPHILS # BLD: 0.05 E9/L (ref 0–0.2)
BASOPHILS NFR BLD: 0.8 % (ref 0–2)
BILIRUB SERPL-MCNC: 0.7 MG/DL (ref 0–1.2)
BILIRUB UR QL STRIP: ABNORMAL
BUN SERPL-MCNC: 25 MG/DL (ref 6–23)
C PNEUM DNA NPH QL NAA+NON-PROBE: NOT DETECTED
CALCIUM SERPL-MCNC: 9.5 MG/DL (ref 8.6–10.2)
CHLORIDE SERPL-SCNC: 106 MMOL/L (ref 98–107)
CLARITY UR: CLEAR
CO2 SERPL-SCNC: 25 MMOL/L (ref 22–29)
COLOR UR: YELLOW
CREAT SERPL-MCNC: 1.1 MG/DL (ref 0.7–1.2)
EOSINOPHIL # BLD: 0.19 E9/L (ref 0.05–0.5)
EOSINOPHIL NFR BLD: 3 % (ref 0–6)
EPI CELLS #/AREA URNS HPF: ABNORMAL /HPF
ERYTHROCYTE [DISTWIDTH] IN BLOOD BY AUTOMATED COUNT: 13.5 FL (ref 11.5–15)
FLUAV RNA NPH QL NAA+NON-PROBE: NOT DETECTED
FLUBV RNA NPH QL NAA+NON-PROBE: NOT DETECTED
GLUCOSE SERPL-MCNC: 109 MG/DL (ref 74–99)
GLUCOSE UR STRIP-MCNC: NEGATIVE MG/DL
HADV DNA NPH QL NAA+NON-PROBE: NOT DETECTED
HCOV 229E RNA NPH QL NAA+NON-PROBE: NOT DETECTED
HCOV HKU1 RNA NPH QL NAA+NON-PROBE: NOT DETECTED
HCOV NL63 RNA NPH QL NAA+NON-PROBE: NOT DETECTED
HCOV OC43 RNA NPH QL NAA+NON-PROBE: NOT DETECTED
HCT VFR BLD AUTO: 36.2 % (ref 37–54)
HGB BLD-MCNC: 12.1 G/DL (ref 12.5–16.5)
HGB UR QL STRIP: NEGATIVE
HMPV RNA NPH QL NAA+NON-PROBE: NOT DETECTED
HPIV1 RNA NPH QL NAA+NON-PROBE: NOT DETECTED
HPIV2 RNA NPH QL NAA+NON-PROBE: NOT DETECTED
HPIV3 RNA NPH QL NAA+NON-PROBE: NOT DETECTED
HPIV4 RNA NPH QL NAA+NON-PROBE: NOT DETECTED
IMM GRANULOCYTES # BLD: 0.02 E9/L
IMM GRANULOCYTES NFR BLD: 0.3 % (ref 0–5)
KETONES UR STRIP-MCNC: NEGATIVE MG/DL
LACTATE BLDV-SCNC: 1.6 MMOL/L (ref 0.5–1.9)
LACTATE BLDV-SCNC: 1.7 MMOL/L (ref 0.5–1.9)
LEUKOCYTE ESTERASE UR QL STRIP: ABNORMAL
LYMPHOCYTES # BLD: 1.08 E9/L (ref 1.5–4)
LYMPHOCYTES NFR BLD: 16.8 % (ref 20–42)
M PNEUMO DNA NPH QL NAA+NON-PROBE: NOT DETECTED
MCH RBC QN AUTO: 31.7 PG (ref 26–35)
MCHC RBC AUTO-ENTMCNC: 33.4 % (ref 32–34.5)
MCV RBC AUTO: 94.8 FL (ref 80–99.9)
MONOCYTES # BLD: 0.74 E9/L (ref 0.1–0.95)
MONOCYTES NFR BLD: 11.5 % (ref 2–12)
NEUTROPHILS # BLD: 4.35 E9/L (ref 1.8–7.3)
NEUTS SEG NFR BLD: 67.6 % (ref 43–80)
NITRITE UR QL STRIP: NEGATIVE
PH UR STRIP: 5.5 [PH] (ref 5–9)
PLATELET # BLD AUTO: 144 E9/L (ref 130–450)
PMV BLD AUTO: 11 FL (ref 7–12)
POTASSIUM SERPL-SCNC: 3.9 MMOL/L (ref 3.5–5)
PROT SERPL-MCNC: 7.4 G/DL (ref 6.4–8.3)
PROT UR STRIP-MCNC: ABNORMAL MG/DL
RBC # BLD AUTO: 3.82 E12/L (ref 3.8–5.8)
RBC #/AREA URNS HPF: ABNORMAL /HPF (ref 0–2)
RSV RNA NPH QL NAA+NON-PROBE: NOT DETECTED
RV+EV RNA NPH QL NAA+NON-PROBE: DETECTED
SARS-COV-2 RNA NPH QL NAA+NON-PROBE: NOT DETECTED
SODIUM SERPL-SCNC: 143 MMOL/L (ref 132–146)
SP GR UR STRIP: >=1.03 (ref 1–1.03)
TROPONIN, HIGH SENSITIVITY: 21 NG/L (ref 0–11)
TROPONIN, HIGH SENSITIVITY: 27 NG/L (ref 0–11)
UROBILINOGEN UR STRIP-ACNC: 2 E.U./DL
WBC # BLD: 6.4 E9/L (ref 4.5–11.5)
WBC #/AREA URNS HPF: ABNORMAL /HPF (ref 0–5)

## 2023-06-30 PROCEDURE — 84484 ASSAY OF TROPONIN QUANT: CPT

## 2023-06-30 PROCEDURE — 6360000002 HC RX W HCPCS

## 2023-06-30 PROCEDURE — 81001 URINALYSIS AUTO W/SCOPE: CPT

## 2023-06-30 PROCEDURE — 2580000003 HC RX 258: Performed by: STUDENT IN AN ORGANIZED HEALTH CARE EDUCATION/TRAINING PROGRAM

## 2023-06-30 PROCEDURE — 87040 BLOOD CULTURE FOR BACTERIA: CPT

## 2023-06-30 PROCEDURE — 2060000000 HC ICU INTERMEDIATE R&B

## 2023-06-30 PROCEDURE — 2700000000 HC OXYGEN THERAPY PER DAY

## 2023-06-30 PROCEDURE — 6360000004 HC RX CONTRAST MEDICATION: Performed by: RADIOLOGY

## 2023-06-30 PROCEDURE — 93005 ELECTROCARDIOGRAM TRACING: CPT | Performed by: STUDENT IN AN ORGANIZED HEALTH CARE EDUCATION/TRAINING PROGRAM

## 2023-06-30 PROCEDURE — 0202U NFCT DS 22 TRGT SARS-COV-2: CPT

## 2023-06-30 PROCEDURE — 83605 ASSAY OF LACTIC ACID: CPT

## 2023-06-30 PROCEDURE — 71275 CT ANGIOGRAPHY CHEST: CPT

## 2023-06-30 PROCEDURE — 71045 X-RAY EXAM CHEST 1 VIEW: CPT

## 2023-06-30 PROCEDURE — 2580000003 HC RX 258

## 2023-06-30 PROCEDURE — 80053 COMPREHEN METABOLIC PANEL: CPT

## 2023-06-30 PROCEDURE — 99285 EMERGENCY DEPT VISIT HI MDM: CPT

## 2023-06-30 PROCEDURE — 85025 COMPLETE CBC W/AUTO DIFF WBC: CPT

## 2023-06-30 PROCEDURE — 2500000003 HC RX 250 WO HCPCS

## 2023-06-30 PROCEDURE — 36415 COLL VENOUS BLD VENIPUNCTURE: CPT

## 2023-06-30 RX ORDER — 0.9 % SODIUM CHLORIDE 0.9 %
1000 INTRAVENOUS SOLUTION INTRAVENOUS ONCE
Status: COMPLETED | OUTPATIENT
Start: 2023-06-30 | End: 2023-06-30

## 2023-06-30 RX ADMIN — SODIUM CHLORIDE 1000 ML: 9 INJECTION, SOLUTION INTRAVENOUS at 18:02

## 2023-06-30 RX ADMIN — WATER 1000 MG: 1 INJECTION INTRAMUSCULAR; INTRAVENOUS; SUBCUTANEOUS at 23:04

## 2023-06-30 RX ADMIN — IOPAMIDOL 75 ML: 755 INJECTION, SOLUTION INTRAVENOUS at 20:35

## 2023-06-30 RX ADMIN — DOXYCYCLINE 100 MG: 100 INJECTION, POWDER, LYOPHILIZED, FOR SOLUTION INTRAVENOUS at 23:07

## 2023-06-30 ASSESSMENT — PAIN - FUNCTIONAL ASSESSMENT: PAIN_FUNCTIONAL_ASSESSMENT: NONE - DENIES PAIN

## 2023-07-01 LAB
ANION GAP SERPL CALCULATED.3IONS-SCNC: 10 MMOL/L (ref 7–16)
BASOPHILS # BLD: 0.05 E9/L (ref 0–0.2)
BASOPHILS NFR BLD: 0.7 % (ref 0–2)
BUN SERPL-MCNC: 20 MG/DL (ref 6–23)
CALCIUM SERPL-MCNC: 8.5 MG/DL (ref 8.6–10.2)
CHLORIDE SERPL-SCNC: 107 MMOL/L (ref 98–107)
CHP ED QC CHECK: NORMAL
CO2 SERPL-SCNC: 25 MMOL/L (ref 22–29)
CREAT SERPL-MCNC: 0.9 MG/DL (ref 0.7–1.2)
EOSINOPHIL # BLD: 0.26 E9/L (ref 0.05–0.5)
EOSINOPHIL NFR BLD: 3.6 % (ref 0–6)
ERYTHROCYTE [DISTWIDTH] IN BLOOD BY AUTOMATED COUNT: 13.5 FL (ref 11.5–15)
GLUCOSE BLD-MCNC: 101 MG/DL
GLUCOSE BLD-MCNC: 123 MG/DL
GLUCOSE SERPL-MCNC: 108 MG/DL (ref 74–99)
HCT VFR BLD AUTO: 32.8 % (ref 37–54)
HGB BLD-MCNC: 10.9 G/DL (ref 12.5–16.5)
IMM GRANULOCYTES # BLD: 0.03 E9/L
IMM GRANULOCYTES NFR BLD: 0.4 % (ref 0–5)
LYMPHOCYTES # BLD: 1.45 E9/L (ref 1.5–4)
LYMPHOCYTES NFR BLD: 20.3 % (ref 20–42)
MCH RBC QN AUTO: 31.4 PG (ref 26–35)
MCHC RBC AUTO-ENTMCNC: 33.2 % (ref 32–34.5)
MCV RBC AUTO: 94.5 FL (ref 80–99.9)
METER GLUCOSE: 101 MG/DL (ref 74–99)
METER GLUCOSE: 123 MG/DL (ref 74–99)
METER GLUCOSE: 197 MG/DL (ref 74–99)
MONOCYTES # BLD: 0.78 E9/L (ref 0.1–0.95)
MONOCYTES NFR BLD: 10.9 % (ref 2–12)
NEUTROPHILS # BLD: 4.58 E9/L (ref 1.8–7.3)
NEUTS SEG NFR BLD: 64.1 % (ref 43–80)
PLATELET # BLD AUTO: 131 E9/L (ref 130–450)
PMV BLD AUTO: 10.6 FL (ref 7–12)
POTASSIUM SERPL-SCNC: 4.3 MMOL/L (ref 3.5–5)
PROCALCITONIN: 0.09 NG/ML (ref 0–0.08)
RBC # BLD AUTO: 3.47 E12/L (ref 3.8–5.8)
SODIUM SERPL-SCNC: 142 MMOL/L (ref 132–146)
TROPONIN, HIGH SENSITIVITY: 20 NG/L (ref 0–11)
WBC # BLD: 7.2 E9/L (ref 4.5–11.5)

## 2023-07-01 PROCEDURE — 2700000000 HC OXYGEN THERAPY PER DAY

## 2023-07-01 PROCEDURE — S5553 INSULIN LONG ACTING 5 U: HCPCS | Performed by: NURSE PRACTITIONER

## 2023-07-01 PROCEDURE — 2500000003 HC RX 250 WO HCPCS: Performed by: NURSE PRACTITIONER

## 2023-07-01 PROCEDURE — 2060000000 HC ICU INTERMEDIATE R&B

## 2023-07-01 PROCEDURE — 80048 BASIC METABOLIC PNL TOTAL CA: CPT

## 2023-07-01 PROCEDURE — 84484 ASSAY OF TROPONIN QUANT: CPT

## 2023-07-01 PROCEDURE — 6370000000 HC RX 637 (ALT 250 FOR IP): Performed by: NURSE PRACTITIONER

## 2023-07-01 PROCEDURE — 82962 GLUCOSE BLOOD TEST: CPT

## 2023-07-01 PROCEDURE — 87449 NOS EACH ORGANISM AG IA: CPT

## 2023-07-01 PROCEDURE — 6360000002 HC RX W HCPCS: Performed by: NURSE PRACTITIONER

## 2023-07-01 PROCEDURE — 85025 COMPLETE CBC W/AUTO DIFF WBC: CPT

## 2023-07-01 PROCEDURE — 84145 PROCALCITONIN (PCT): CPT

## 2023-07-01 PROCEDURE — 2580000003 HC RX 258: Performed by: NURSE PRACTITIONER

## 2023-07-01 RX ORDER — AMLODIPINE BESYLATE 10 MG/1
10 TABLET ORAL DAILY
Status: DISCONTINUED | OUTPATIENT
Start: 2023-07-01 | End: 2023-07-09 | Stop reason: HOSPADM

## 2023-07-01 RX ORDER — DEXTROSE MONOHYDRATE 100 MG/ML
INJECTION, SOLUTION INTRAVENOUS CONTINUOUS PRN
Status: DISCONTINUED | OUTPATIENT
Start: 2023-07-01 | End: 2023-07-09 | Stop reason: HOSPADM

## 2023-07-01 RX ORDER — SODIUM CHLORIDE 0.9 % (FLUSH) 0.9 %
5-40 SYRINGE (ML) INJECTION PRN
Status: DISCONTINUED | OUTPATIENT
Start: 2023-07-01 | End: 2023-07-09 | Stop reason: HOSPADM

## 2023-07-01 RX ORDER — ONDANSETRON 2 MG/ML
4 INJECTION INTRAMUSCULAR; INTRAVENOUS EVERY 6 HOURS PRN
Status: DISCONTINUED | OUTPATIENT
Start: 2023-07-01 | End: 2023-07-09 | Stop reason: HOSPADM

## 2023-07-01 RX ORDER — GUAIFENESIN/DEXTROMETHORPHAN 100-10MG/5
5 SYRUP ORAL EVERY 6 HOURS PRN
Status: DISCONTINUED | OUTPATIENT
Start: 2023-07-01 | End: 2023-07-09 | Stop reason: HOSPADM

## 2023-07-01 RX ORDER — AMLODIPINE BESYLATE 10 MG/1
10 TABLET ORAL DAILY
COMMUNITY

## 2023-07-01 RX ORDER — SODIUM CHLORIDE 0.9 % (FLUSH) 0.9 %
5-40 SYRINGE (ML) INJECTION EVERY 12 HOURS SCHEDULED
Status: DISCONTINUED | OUTPATIENT
Start: 2023-07-01 | End: 2023-07-09 | Stop reason: HOSPADM

## 2023-07-01 RX ORDER — POLYETHYLENE GLYCOL 3350 17 G/17G
17 POWDER, FOR SOLUTION ORAL DAILY PRN
Status: DISCONTINUED | OUTPATIENT
Start: 2023-07-01 | End: 2023-07-09 | Stop reason: HOSPADM

## 2023-07-01 RX ORDER — METOPROLOL SUCCINATE 25 MG/1
25 TABLET, EXTENDED RELEASE ORAL DAILY
Status: DISCONTINUED | OUTPATIENT
Start: 2023-07-01 | End: 2023-07-09 | Stop reason: HOSPADM

## 2023-07-01 RX ORDER — ACETAMINOPHEN 650 MG/1
650 SUPPOSITORY RECTAL EVERY 6 HOURS PRN
Status: DISCONTINUED | OUTPATIENT
Start: 2023-07-01 | End: 2023-07-09 | Stop reason: HOSPADM

## 2023-07-01 RX ORDER — INSULIN GLARGINE-YFGN 100 [IU]/ML
10 INJECTION, SOLUTION SUBCUTANEOUS NIGHTLY
Status: DISCONTINUED | OUTPATIENT
Start: 2023-07-01 | End: 2023-07-09 | Stop reason: HOSPADM

## 2023-07-01 RX ORDER — ONDANSETRON 4 MG/1
4 TABLET, ORALLY DISINTEGRATING ORAL EVERY 8 HOURS PRN
Status: DISCONTINUED | OUTPATIENT
Start: 2023-07-01 | End: 2023-07-09 | Stop reason: HOSPADM

## 2023-07-01 RX ORDER — SODIUM CHLORIDE 9 MG/ML
INJECTION, SOLUTION INTRAVENOUS CONTINUOUS
Status: DISCONTINUED | OUTPATIENT
Start: 2023-07-01 | End: 2023-07-03

## 2023-07-01 RX ORDER — CYANOCOBALAMIN 1000 UG/ML
1000 INJECTION, SOLUTION INTRAMUSCULAR; SUBCUTANEOUS
Status: DISCONTINUED | OUTPATIENT
Start: 2023-07-14 | End: 2023-07-09 | Stop reason: HOSPADM

## 2023-07-01 RX ORDER — CHLORHEXIDINE GLUCONATE 0.12 MG/ML
15 RINSE ORAL 2 TIMES DAILY
COMMUNITY

## 2023-07-01 RX ORDER — INSULIN LISPRO 100 [IU]/ML
INJECTION, SOLUTION INTRAVENOUS; SUBCUTANEOUS
COMMUNITY

## 2023-07-01 RX ORDER — IBUPROFEN 800 MG/1
800 TABLET ORAL EVERY 8 HOURS PRN
COMMUNITY

## 2023-07-01 RX ORDER — SODIUM CHLORIDE 1 G/1
1 TABLET ORAL 2 TIMES DAILY
Status: DISCONTINUED | OUTPATIENT
Start: 2023-07-01 | End: 2023-07-09 | Stop reason: HOSPADM

## 2023-07-01 RX ORDER — M-VIT,TX,IRON,MINS/CALC/FOLIC 27MG-0.4MG
1 TABLET ORAL DAILY
Status: DISCONTINUED | OUTPATIENT
Start: 2023-07-01 | End: 2023-07-09 | Stop reason: HOSPADM

## 2023-07-01 RX ORDER — INSULIN LISPRO 100 [IU]/ML
0-8 INJECTION, SOLUTION INTRAVENOUS; SUBCUTANEOUS
Status: DISCONTINUED | OUTPATIENT
Start: 2023-07-01 | End: 2023-07-09 | Stop reason: HOSPADM

## 2023-07-01 RX ORDER — ENOXAPARIN SODIUM 100 MG/ML
40 INJECTION SUBCUTANEOUS DAILY
Status: DISCONTINUED | OUTPATIENT
Start: 2023-07-01 | End: 2023-07-09 | Stop reason: HOSPADM

## 2023-07-01 RX ORDER — ACETAMINOPHEN 325 MG/1
650 TABLET ORAL EVERY 6 HOURS PRN
Status: DISCONTINUED | OUTPATIENT
Start: 2023-07-01 | End: 2023-07-09 | Stop reason: HOSPADM

## 2023-07-01 RX ORDER — GUAIFENESIN 200 MG/10ML
200 LIQUID ORAL EVERY 4 HOURS PRN
COMMUNITY

## 2023-07-01 RX ORDER — ZINC SULFATE 50(220)MG
50 CAPSULE ORAL DAILY
Status: DISCONTINUED | OUTPATIENT
Start: 2023-07-01 | End: 2023-07-09 | Stop reason: HOSPADM

## 2023-07-01 RX ORDER — INSULIN GLARGINE 100 [IU]/ML
10 INJECTION, SOLUTION SUBCUTANEOUS NIGHTLY
COMMUNITY

## 2023-07-01 RX ORDER — IPRATROPIUM BROMIDE AND ALBUTEROL SULFATE 2.5; .5 MG/3ML; MG/3ML
1 SOLUTION RESPIRATORY (INHALATION) EVERY 6 HOURS PRN
Status: DISCONTINUED | OUTPATIENT
Start: 2023-07-01 | End: 2023-07-09 | Stop reason: HOSPADM

## 2023-07-01 RX ORDER — RIVASTIGMINE TARTRATE 1.5 MG/1
1.5 CAPSULE ORAL 3 TIMES DAILY
Status: DISCONTINUED | OUTPATIENT
Start: 2023-07-01 | End: 2023-07-09 | Stop reason: HOSPADM

## 2023-07-01 RX ORDER — SODIUM CHLORIDE 9 MG/ML
INJECTION, SOLUTION INTRAVENOUS PRN
Status: DISCONTINUED | OUTPATIENT
Start: 2023-07-01 | End: 2023-07-09 | Stop reason: HOSPADM

## 2023-07-01 RX ADMIN — INSULIN LISPRO 2 UNITS: 100 INJECTION, SOLUTION INTRAVENOUS; SUBCUTANEOUS at 23:15

## 2023-07-01 RX ADMIN — Medication 50 MG: at 10:55

## 2023-07-01 RX ADMIN — SODIUM CHLORIDE: 9 INJECTION, SOLUTION INTRAVENOUS at 05:08

## 2023-07-01 RX ADMIN — Medication 5000 UNITS: at 17:42

## 2023-07-01 RX ADMIN — SODIUM CHLORIDE 1 G: 1 TABLET ORAL at 13:53

## 2023-07-01 RX ADMIN — METOPROLOL SUCCINATE 25 MG: 25 TABLET, EXTENDED RELEASE ORAL at 10:56

## 2023-07-01 RX ADMIN — RIVASTIGMINE TARTRATE 1.5 MG: 1.5 CAPSULE ORAL at 22:59

## 2023-07-01 RX ADMIN — DOXYCYCLINE 100 MG: 100 INJECTION, POWDER, LYOPHILIZED, FOR SOLUTION INTRAVENOUS at 10:54

## 2023-07-01 RX ADMIN — INSULIN GLARGINE-YFGN 10 UNITS: 100 INJECTION, SOLUTION SUBCUTANEOUS at 22:54

## 2023-07-01 RX ADMIN — ENOXAPARIN SODIUM 40 MG: 100 INJECTION SUBCUTANEOUS at 17:40

## 2023-07-01 RX ADMIN — AMLODIPINE BESYLATE 10 MG: 10 TABLET ORAL at 10:56

## 2023-07-01 RX ADMIN — MULTIPLE VITAMINS W/ MINERALS TAB 1 TABLET: TAB at 10:56

## 2023-07-01 ASSESSMENT — PAIN - FUNCTIONAL ASSESSMENT: PAIN_FUNCTIONAL_ASSESSMENT: NONE - DENIES PAIN

## 2023-07-02 LAB
ANION GAP SERPL CALCULATED.3IONS-SCNC: 12 MMOL/L (ref 7–16)
BUN SERPL-MCNC: 17 MG/DL (ref 6–23)
CALCIUM SERPL-MCNC: 8.6 MG/DL (ref 8.6–10.2)
CHLORIDE SERPL-SCNC: 106 MMOL/L (ref 98–107)
CO2 SERPL-SCNC: 16 MMOL/L (ref 22–29)
CREAT SERPL-MCNC: 0.7 MG/DL (ref 0.7–1.2)
ERYTHROCYTE [DISTWIDTH] IN BLOOD BY AUTOMATED COUNT: 13.4 FL (ref 11.5–15)
GLUCOSE SERPL-MCNC: 174 MG/DL (ref 74–99)
HCT VFR BLD AUTO: 36.9 % (ref 37–54)
HGB BLD-MCNC: 12.2 G/DL (ref 12.5–16.5)
LEGIONELLA AG UR QL: NORMAL
MCH RBC QN AUTO: 31.1 PG (ref 26–35)
MCHC RBC AUTO-ENTMCNC: 33.1 % (ref 32–34.5)
MCV RBC AUTO: 94.1 FL (ref 80–99.9)
METER GLUCOSE: 146 MG/DL (ref 74–99)
METER GLUCOSE: 185 MG/DL (ref 74–99)
METER GLUCOSE: 193 MG/DL (ref 74–99)
METER GLUCOSE: 220 MG/DL (ref 74–99)
PLATELET # BLD AUTO: 149 E9/L (ref 130–450)
PMV BLD AUTO: 11 FL (ref 7–12)
POTASSIUM SERPL-SCNC: 4.2 MMOL/L (ref 3.5–5)
RBC # BLD AUTO: 3.92 E12/L (ref 3.8–5.8)
S PNEUM AG SPEC QL: NORMAL
SODIUM SERPL-SCNC: 134 MMOL/L (ref 132–146)
WBC # BLD: 8.8 E9/L (ref 4.5–11.5)

## 2023-07-02 PROCEDURE — 6370000000 HC RX 637 (ALT 250 FOR IP): Performed by: NURSE PRACTITIONER

## 2023-07-02 PROCEDURE — 36415 COLL VENOUS BLD VENIPUNCTURE: CPT

## 2023-07-02 PROCEDURE — 6360000002 HC RX W HCPCS: Performed by: NURSE PRACTITIONER

## 2023-07-02 PROCEDURE — S5553 INSULIN LONG ACTING 5 U: HCPCS | Performed by: NURSE PRACTITIONER

## 2023-07-02 PROCEDURE — 2580000003 HC RX 258: Performed by: NURSE PRACTITIONER

## 2023-07-02 PROCEDURE — 2500000003 HC RX 250 WO HCPCS: Performed by: NURSE PRACTITIONER

## 2023-07-02 PROCEDURE — 85027 COMPLETE CBC AUTOMATED: CPT

## 2023-07-02 PROCEDURE — 97165 OT EVAL LOW COMPLEX 30 MIN: CPT

## 2023-07-02 PROCEDURE — 2060000000 HC ICU INTERMEDIATE R&B

## 2023-07-02 PROCEDURE — 97535 SELF CARE MNGMENT TRAINING: CPT

## 2023-07-02 PROCEDURE — 80048 BASIC METABOLIC PNL TOTAL CA: CPT

## 2023-07-02 PROCEDURE — 97530 THERAPEUTIC ACTIVITIES: CPT

## 2023-07-02 PROCEDURE — 82962 GLUCOSE BLOOD TEST: CPT

## 2023-07-02 PROCEDURE — 2700000000 HC OXYGEN THERAPY PER DAY

## 2023-07-02 RX ADMIN — RIVASTIGMINE TARTRATE 1.5 MG: 1.5 CAPSULE ORAL at 14:30

## 2023-07-02 RX ADMIN — Medication 10 ML: at 00:49

## 2023-07-02 RX ADMIN — SODIUM CHLORIDE 1 G: 1 TABLET ORAL at 00:50

## 2023-07-02 RX ADMIN — ENOXAPARIN SODIUM 40 MG: 100 INJECTION SUBCUTANEOUS at 17:17

## 2023-07-02 RX ADMIN — SODIUM CHLORIDE 1 G: 1 TABLET ORAL at 08:52

## 2023-07-02 RX ADMIN — DOXYCYCLINE 100 MG: 100 INJECTION, POWDER, LYOPHILIZED, FOR SOLUTION INTRAVENOUS at 09:01

## 2023-07-02 RX ADMIN — RIVASTIGMINE TARTRATE 1.5 MG: 1.5 CAPSULE ORAL at 08:52

## 2023-07-02 RX ADMIN — SODIUM CHLORIDE: 9 INJECTION, SOLUTION INTRAVENOUS at 06:27

## 2023-07-02 RX ADMIN — Medication 50 MG: at 08:52

## 2023-07-02 RX ADMIN — Medication 5000 UNITS: at 08:52

## 2023-07-02 RX ADMIN — DOXYCYCLINE 100 MG: 100 INJECTION, POWDER, LYOPHILIZED, FOR SOLUTION INTRAVENOUS at 00:49

## 2023-07-02 RX ADMIN — WATER 1000 MG: 1 INJECTION INTRAMUSCULAR; INTRAVENOUS; SUBCUTANEOUS at 00:43

## 2023-07-02 RX ADMIN — WATER 1000 MG: 1 INJECTION INTRAMUSCULAR; INTRAVENOUS; SUBCUTANEOUS at 23:00

## 2023-07-02 RX ADMIN — METOPROLOL SUCCINATE 25 MG: 25 TABLET, EXTENDED RELEASE ORAL at 08:52

## 2023-07-02 RX ADMIN — INSULIN LISPRO 4 UNITS: 100 INJECTION, SOLUTION INTRAVENOUS; SUBCUTANEOUS at 13:35

## 2023-07-02 RX ADMIN — AMLODIPINE BESYLATE 10 MG: 10 TABLET ORAL at 08:52

## 2023-07-02 RX ADMIN — INSULIN LISPRO 2 UNITS: 100 INJECTION, SOLUTION INTRAVENOUS; SUBCUTANEOUS at 23:14

## 2023-07-02 RX ADMIN — INSULIN LISPRO 2 UNITS: 100 INJECTION, SOLUTION INTRAVENOUS; SUBCUTANEOUS at 08:59

## 2023-07-02 RX ADMIN — MULTIPLE VITAMINS W/ MINERALS TAB 1 TABLET: TAB at 08:52

## 2023-07-02 RX ADMIN — Medication 10 ML: at 08:53

## 2023-07-02 RX ADMIN — RIVASTIGMINE TARTRATE 1.5 MG: 1.5 CAPSULE ORAL at 22:59

## 2023-07-02 RX ADMIN — INSULIN GLARGINE-YFGN 10 UNITS: 100 INJECTION, SOLUTION SUBCUTANEOUS at 23:08

## 2023-07-02 RX ADMIN — SODIUM CHLORIDE 1 G: 1 TABLET ORAL at 22:59

## 2023-07-02 RX ADMIN — DOXYCYCLINE 100 MG: 100 INJECTION, POWDER, LYOPHILIZED, FOR SOLUTION INTRAVENOUS at 23:11

## 2023-07-02 RX ADMIN — Medication 10 ML: at 23:00

## 2023-07-03 LAB
ERYTHROCYTE [DISTWIDTH] IN BLOOD BY AUTOMATED COUNT: 13.1 FL (ref 11.5–15)
HCT VFR BLD AUTO: 34.5 % (ref 37–54)
HGB BLD-MCNC: 11.7 G/DL (ref 12.5–16.5)
MCH RBC QN AUTO: 31.5 PG (ref 26–35)
MCHC RBC AUTO-ENTMCNC: 33.9 % (ref 32–34.5)
MCV RBC AUTO: 93 FL (ref 80–99.9)
METER GLUCOSE: 134 MG/DL (ref 74–99)
METER GLUCOSE: 157 MG/DL (ref 74–99)
METER GLUCOSE: 169 MG/DL (ref 74–99)
METER GLUCOSE: 198 MG/DL (ref 74–99)
PLATELET # BLD AUTO: 156 E9/L (ref 130–450)
PMV BLD AUTO: 10.6 FL (ref 7–12)
RBC # BLD AUTO: 3.71 E12/L (ref 3.8–5.8)
WBC # BLD: 7.7 E9/L (ref 4.5–11.5)

## 2023-07-03 PROCEDURE — 2700000000 HC OXYGEN THERAPY PER DAY

## 2023-07-03 PROCEDURE — 2060000000 HC ICU INTERMEDIATE R&B

## 2023-07-03 PROCEDURE — 6360000002 HC RX W HCPCS: Performed by: NURSE PRACTITIONER

## 2023-07-03 PROCEDURE — 2500000003 HC RX 250 WO HCPCS: Performed by: NURSE PRACTITIONER

## 2023-07-03 PROCEDURE — 2580000003 HC RX 258: Performed by: NURSE PRACTITIONER

## 2023-07-03 PROCEDURE — 6370000000 HC RX 637 (ALT 250 FOR IP): Performed by: NURSE PRACTITIONER

## 2023-07-03 PROCEDURE — 85027 COMPLETE CBC AUTOMATED: CPT

## 2023-07-03 PROCEDURE — 6360000002 HC RX W HCPCS: Performed by: INTERNAL MEDICINE

## 2023-07-03 PROCEDURE — 82962 GLUCOSE BLOOD TEST: CPT

## 2023-07-03 PROCEDURE — S5553 INSULIN LONG ACTING 5 U: HCPCS | Performed by: NURSE PRACTITIONER

## 2023-07-03 PROCEDURE — 36415 COLL VENOUS BLD VENIPUNCTURE: CPT

## 2023-07-03 PROCEDURE — 2580000003 HC RX 258: Performed by: INTERNAL MEDICINE

## 2023-07-03 RX ORDER — IPRATROPIUM BROMIDE AND ALBUTEROL SULFATE 2.5; .5 MG/3ML; MG/3ML
1 SOLUTION RESPIRATORY (INHALATION)
Status: DISCONTINUED | OUTPATIENT
Start: 2023-07-03 | End: 2023-07-03

## 2023-07-03 RX ADMIN — RIVASTIGMINE TARTRATE 1.5 MG: 1.5 CAPSULE ORAL at 08:50

## 2023-07-03 RX ADMIN — DOXYCYCLINE 100 MG: 100 INJECTION, POWDER, LYOPHILIZED, FOR SOLUTION INTRAVENOUS at 20:33

## 2023-07-03 RX ADMIN — RIVASTIGMINE TARTRATE 1.5 MG: 1.5 CAPSULE ORAL at 14:18

## 2023-07-03 RX ADMIN — INSULIN GLARGINE-YFGN 10 UNITS: 100 INJECTION, SOLUTION SUBCUTANEOUS at 20:29

## 2023-07-03 RX ADMIN — AMLODIPINE BESYLATE 10 MG: 10 TABLET ORAL at 08:49

## 2023-07-03 RX ADMIN — SODIUM CHLORIDE 1 G: 1 TABLET ORAL at 08:49

## 2023-07-03 RX ADMIN — RIVASTIGMINE TARTRATE 1.5 MG: 1.5 CAPSULE ORAL at 20:29

## 2023-07-03 RX ADMIN — SODIUM CHLORIDE: 9 INJECTION, SOLUTION INTRAVENOUS at 08:56

## 2023-07-03 RX ADMIN — INSULIN LISPRO 2 UNITS: 100 INJECTION, SOLUTION INTRAVENOUS; SUBCUTANEOUS at 18:05

## 2023-07-03 RX ADMIN — Medication 10 ML: at 20:34

## 2023-07-03 RX ADMIN — SODIUM CHLORIDE 1 G: 1 TABLET ORAL at 20:30

## 2023-07-03 RX ADMIN — DOXYCYCLINE 100 MG: 100 INJECTION, POWDER, LYOPHILIZED, FOR SOLUTION INTRAVENOUS at 08:59

## 2023-07-03 RX ADMIN — Medication 10 ML: at 08:50

## 2023-07-03 RX ADMIN — Medication 50 MG: at 08:50

## 2023-07-03 RX ADMIN — MULTIPLE VITAMINS W/ MINERALS TAB 1 TABLET: TAB at 08:49

## 2023-07-03 RX ADMIN — CEFEPIME 2000 MG: 2 INJECTION, POWDER, FOR SOLUTION INTRAVENOUS at 16:01

## 2023-07-03 RX ADMIN — Medication 5000 UNITS: at 08:50

## 2023-07-03 RX ADMIN — METOPROLOL SUCCINATE 25 MG: 25 TABLET, EXTENDED RELEASE ORAL at 08:49

## 2023-07-03 RX ADMIN — ENOXAPARIN SODIUM 40 MG: 100 INJECTION SUBCUTANEOUS at 18:04

## 2023-07-04 LAB
EKG ATRIAL RATE: 75 BPM
EKG P AXIS: 40 DEGREES
EKG P-R INTERVAL: 164 MS
EKG Q-T INTERVAL: 346 MS
EKG QRS DURATION: 72 MS
EKG QTC CALCULATION (BAZETT): 386 MS
EKG R AXIS: 1 DEGREES
EKG T AXIS: -44 DEGREES
EKG VENTRICULAR RATE: 75 BPM
ERYTHROCYTE [DISTWIDTH] IN BLOOD BY AUTOMATED COUNT: 13.2 FL (ref 11.5–15)
HCT VFR BLD AUTO: 32.7 % (ref 37–54)
HGB BLD-MCNC: 11.1 G/DL (ref 12.5–16.5)
MCH RBC QN AUTO: 31.3 PG (ref 26–35)
MCHC RBC AUTO-ENTMCNC: 33.9 % (ref 32–34.5)
MCV RBC AUTO: 92.1 FL (ref 80–99.9)
METER GLUCOSE: 157 MG/DL (ref 74–99)
METER GLUCOSE: 234 MG/DL (ref 74–99)
METER GLUCOSE: 261 MG/DL (ref 74–99)
METER GLUCOSE: 282 MG/DL (ref 74–99)
PLATELET # BLD AUTO: 148 E9/L (ref 130–450)
PMV BLD AUTO: 10.3 FL (ref 7–12)
RBC # BLD AUTO: 3.55 E12/L (ref 3.8–5.8)
WBC # BLD: 8.7 E9/L (ref 4.5–11.5)

## 2023-07-04 PROCEDURE — 82962 GLUCOSE BLOOD TEST: CPT

## 2023-07-04 PROCEDURE — 2700000000 HC OXYGEN THERAPY PER DAY

## 2023-07-04 PROCEDURE — 6370000000 HC RX 637 (ALT 250 FOR IP): Performed by: NURSE PRACTITIONER

## 2023-07-04 PROCEDURE — 97530 THERAPEUTIC ACTIVITIES: CPT

## 2023-07-04 PROCEDURE — S5553 INSULIN LONG ACTING 5 U: HCPCS | Performed by: NURSE PRACTITIONER

## 2023-07-04 PROCEDURE — 85027 COMPLETE CBC AUTOMATED: CPT

## 2023-07-04 PROCEDURE — 93010 ELECTROCARDIOGRAM REPORT: CPT | Performed by: INTERNAL MEDICINE

## 2023-07-04 PROCEDURE — 6360000002 HC RX W HCPCS: Performed by: INTERNAL MEDICINE

## 2023-07-04 PROCEDURE — 36415 COLL VENOUS BLD VENIPUNCTURE: CPT

## 2023-07-04 PROCEDURE — 2580000003 HC RX 258: Performed by: INTERNAL MEDICINE

## 2023-07-04 PROCEDURE — 2060000000 HC ICU INTERMEDIATE R&B

## 2023-07-04 PROCEDURE — 97161 PT EVAL LOW COMPLEX 20 MIN: CPT

## 2023-07-04 PROCEDURE — 2580000003 HC RX 258: Performed by: NURSE PRACTITIONER

## 2023-07-04 PROCEDURE — 87070 CULTURE OTHR SPECIMN AEROBIC: CPT

## 2023-07-04 PROCEDURE — 2500000003 HC RX 250 WO HCPCS: Performed by: NURSE PRACTITIONER

## 2023-07-04 RX ADMIN — MULTIPLE VITAMINS W/ MINERALS TAB 1 TABLET: TAB at 09:46

## 2023-07-04 RX ADMIN — SODIUM CHLORIDE 1 G: 1 TABLET ORAL at 09:47

## 2023-07-04 RX ADMIN — SODIUM CHLORIDE 1 G: 1 TABLET ORAL at 22:31

## 2023-07-04 RX ADMIN — Medication 5000 UNITS: at 09:47

## 2023-07-04 RX ADMIN — Medication 10 ML: at 22:32

## 2023-07-04 RX ADMIN — INSULIN LISPRO 6 UNITS: 100 INJECTION, SOLUTION INTRAVENOUS; SUBCUTANEOUS at 13:01

## 2023-07-04 RX ADMIN — RIVASTIGMINE TARTRATE 1.5 MG: 1.5 CAPSULE ORAL at 13:01

## 2023-07-04 RX ADMIN — Medication 50 MG: at 09:46

## 2023-07-04 RX ADMIN — AMLODIPINE BESYLATE 10 MG: 10 TABLET ORAL at 09:46

## 2023-07-04 RX ADMIN — INSULIN LISPRO 4 UNITS: 100 INJECTION, SOLUTION INTRAVENOUS; SUBCUTANEOUS at 22:31

## 2023-07-04 RX ADMIN — DOXYCYCLINE 100 MG: 100 INJECTION, POWDER, LYOPHILIZED, FOR SOLUTION INTRAVENOUS at 23:01

## 2023-07-04 RX ADMIN — DOXYCYCLINE 100 MG: 100 INJECTION, POWDER, LYOPHILIZED, FOR SOLUTION INTRAVENOUS at 09:47

## 2023-07-04 RX ADMIN — RIVASTIGMINE TARTRATE 1.5 MG: 1.5 CAPSULE ORAL at 09:47

## 2023-07-04 RX ADMIN — Medication 10 ML: at 09:47

## 2023-07-04 RX ADMIN — CEFEPIME 2000 MG: 2 INJECTION, POWDER, FOR SOLUTION INTRAVENOUS at 16:58

## 2023-07-04 RX ADMIN — RIVASTIGMINE TARTRATE 1.5 MG: 1.5 CAPSULE ORAL at 22:31

## 2023-07-04 RX ADMIN — INSULIN GLARGINE-YFGN 10 UNITS: 100 INJECTION, SOLUTION SUBCUTANEOUS at 22:31

## 2023-07-04 RX ADMIN — CEFEPIME 2000 MG: 2 INJECTION, POWDER, FOR SOLUTION INTRAVENOUS at 04:47

## 2023-07-04 RX ADMIN — METOPROLOL SUCCINATE 25 MG: 25 TABLET, EXTENDED RELEASE ORAL at 09:46

## 2023-07-05 LAB
BACTERIA BLD CULT ORG #2: NORMAL
BACTERIA BLD CULT: NORMAL
METER GLUCOSE: 167 MG/DL (ref 74–99)
METER GLUCOSE: 264 MG/DL (ref 74–99)
METER GLUCOSE: 277 MG/DL (ref 74–99)

## 2023-07-05 PROCEDURE — 82962 GLUCOSE BLOOD TEST: CPT

## 2023-07-05 PROCEDURE — S5553 INSULIN LONG ACTING 5 U: HCPCS | Performed by: NURSE PRACTITIONER

## 2023-07-05 PROCEDURE — 2700000000 HC OXYGEN THERAPY PER DAY

## 2023-07-05 PROCEDURE — 2580000003 HC RX 258: Performed by: INTERNAL MEDICINE

## 2023-07-05 PROCEDURE — 2580000003 HC RX 258: Performed by: NURSE PRACTITIONER

## 2023-07-05 PROCEDURE — 6360000002 HC RX W HCPCS: Performed by: INTERNAL MEDICINE

## 2023-07-05 PROCEDURE — 6370000000 HC RX 637 (ALT 250 FOR IP): Performed by: NURSE PRACTITIONER

## 2023-07-05 PROCEDURE — 2500000003 HC RX 250 WO HCPCS: Performed by: NURSE PRACTITIONER

## 2023-07-05 PROCEDURE — 2060000000 HC ICU INTERMEDIATE R&B

## 2023-07-05 RX ADMIN — Medication 50 MG: at 09:50

## 2023-07-05 RX ADMIN — SODIUM CHLORIDE 1 G: 1 TABLET ORAL at 09:50

## 2023-07-05 RX ADMIN — RIVASTIGMINE TARTRATE 1.5 MG: 1.5 CAPSULE ORAL at 13:48

## 2023-07-05 RX ADMIN — INSULIN LISPRO 6 UNITS: 100 INJECTION, SOLUTION INTRAVENOUS; SUBCUTANEOUS at 21:05

## 2023-07-05 RX ADMIN — Medication 5000 UNITS: at 09:50

## 2023-07-05 RX ADMIN — Medication 10 ML: at 09:51

## 2023-07-05 RX ADMIN — METOPROLOL SUCCINATE 25 MG: 25 TABLET, EXTENDED RELEASE ORAL at 09:50

## 2023-07-05 RX ADMIN — RIVASTIGMINE TARTRATE 1.5 MG: 1.5 CAPSULE ORAL at 09:50

## 2023-07-05 RX ADMIN — MULTIPLE VITAMINS W/ MINERALS TAB 1 TABLET: TAB at 09:50

## 2023-07-05 RX ADMIN — AMLODIPINE BESYLATE 10 MG: 10 TABLET ORAL at 09:50

## 2023-07-05 RX ADMIN — INSULIN GLARGINE-YFGN 10 UNITS: 100 INJECTION, SOLUTION SUBCUTANEOUS at 21:06

## 2023-07-05 RX ADMIN — CEFEPIME 2000 MG: 2 INJECTION, POWDER, FOR SOLUTION INTRAVENOUS at 16:16

## 2023-07-05 RX ADMIN — CEFEPIME 2000 MG: 2 INJECTION, POWDER, FOR SOLUTION INTRAVENOUS at 05:04

## 2023-07-05 RX ADMIN — SODIUM CHLORIDE 1 G: 1 TABLET ORAL at 21:05

## 2023-07-05 RX ADMIN — RIVASTIGMINE TARTRATE 1.5 MG: 1.5 CAPSULE ORAL at 21:05

## 2023-07-05 RX ADMIN — DOXYCYCLINE 100 MG: 100 INJECTION, POWDER, LYOPHILIZED, FOR SOLUTION INTRAVENOUS at 09:53

## 2023-07-05 ASSESSMENT — PAIN SCALES - GENERAL: PAINLEVEL_OUTOF10: 0

## 2023-07-06 ENCOUNTER — APPOINTMENT (OUTPATIENT)
Dept: GENERAL RADIOLOGY | Age: 83
DRG: 193 | End: 2023-07-06
Payer: MEDICARE

## 2023-07-06 LAB
ANION GAP SERPL CALCULATED.3IONS-SCNC: 11 MMOL/L (ref 7–16)
BNP BLD-MCNC: 807 PG/ML (ref 0–450)
BUN SERPL-MCNC: 17 MG/DL (ref 6–23)
CALCIUM SERPL-MCNC: 9.3 MG/DL (ref 8.6–10.2)
CHLORIDE SERPL-SCNC: 106 MMOL/L (ref 98–107)
CO2 SERPL-SCNC: 26 MMOL/L (ref 22–29)
CREAT SERPL-MCNC: 0.8 MG/DL (ref 0.7–1.2)
CRP SERPL HS-MCNC: 9.4 MG/DL (ref 0–0.4)
GLUCOSE SERPL-MCNC: 135 MG/DL (ref 74–99)
METER GLUCOSE: 140 MG/DL (ref 74–99)
METER GLUCOSE: 144 MG/DL (ref 74–99)
METER GLUCOSE: 321 MG/DL (ref 74–99)
METER GLUCOSE: 329 MG/DL (ref 74–99)
METER GLUCOSE: 342 MG/DL (ref 74–99)
METER GLUCOSE: 353 MG/DL (ref 74–99)
POTASSIUM SERPL-SCNC: 3.3 MMOL/L (ref 3.5–5)
PROCALCITONIN: 0.08 NG/ML (ref 0–0.08)
SODIUM SERPL-SCNC: 143 MMOL/L (ref 132–146)

## 2023-07-06 PROCEDURE — 2580000003 HC RX 258: Performed by: NURSE PRACTITIONER

## 2023-07-06 PROCEDURE — 6370000000 HC RX 637 (ALT 250 FOR IP): Performed by: INTERNAL MEDICINE

## 2023-07-06 PROCEDURE — 2700000000 HC OXYGEN THERAPY PER DAY

## 2023-07-06 PROCEDURE — 80048 BASIC METABOLIC PNL TOTAL CA: CPT

## 2023-07-06 PROCEDURE — 6360000002 HC RX W HCPCS: Performed by: INTERNAL MEDICINE

## 2023-07-06 PROCEDURE — 94640 AIRWAY INHALATION TREATMENT: CPT

## 2023-07-06 PROCEDURE — 6370000000 HC RX 637 (ALT 250 FOR IP): Performed by: NURSE PRACTITIONER

## 2023-07-06 PROCEDURE — 82962 GLUCOSE BLOOD TEST: CPT

## 2023-07-06 PROCEDURE — 71045 X-RAY EXAM CHEST 1 VIEW: CPT

## 2023-07-06 PROCEDURE — 2580000003 HC RX 258: Performed by: INTERNAL MEDICINE

## 2023-07-06 PROCEDURE — 94664 DEMO&/EVAL PT USE INHALER: CPT

## 2023-07-06 PROCEDURE — 86140 C-REACTIVE PROTEIN: CPT

## 2023-07-06 PROCEDURE — 2060000000 HC ICU INTERMEDIATE R&B

## 2023-07-06 PROCEDURE — 36415 COLL VENOUS BLD VENIPUNCTURE: CPT

## 2023-07-06 PROCEDURE — 6360000002 HC RX W HCPCS: Performed by: NURSE PRACTITIONER

## 2023-07-06 PROCEDURE — S5553 INSULIN LONG ACTING 5 U: HCPCS | Performed by: NURSE PRACTITIONER

## 2023-07-06 PROCEDURE — 83880 ASSAY OF NATRIURETIC PEPTIDE: CPT

## 2023-07-06 PROCEDURE — 84145 PROCALCITONIN (PCT): CPT

## 2023-07-06 RX ORDER — ALBUTEROL SULFATE 2.5 MG/3ML
2.5 SOLUTION RESPIRATORY (INHALATION) 4 TIMES DAILY
Status: DISCONTINUED | OUTPATIENT
Start: 2023-07-06 | End: 2023-07-09 | Stop reason: HOSPADM

## 2023-07-06 RX ORDER — GUAIFENESIN 400 MG/1
400 TABLET ORAL 3 TIMES DAILY
Status: DISCONTINUED | OUTPATIENT
Start: 2023-07-06 | End: 2023-07-09 | Stop reason: HOSPADM

## 2023-07-06 RX ORDER — POTASSIUM CHLORIDE 20 MEQ/1
40 TABLET, EXTENDED RELEASE ORAL 2 TIMES DAILY WITH MEALS
Status: COMPLETED | OUTPATIENT
Start: 2023-07-06 | End: 2023-07-06

## 2023-07-06 RX ADMIN — GUAIFENESIN 400 MG: 400 TABLET ORAL at 17:47

## 2023-07-06 RX ADMIN — METOPROLOL SUCCINATE 25 MG: 25 TABLET, EXTENDED RELEASE ORAL at 09:09

## 2023-07-06 RX ADMIN — RIVASTIGMINE TARTRATE 1.5 MG: 1.5 CAPSULE ORAL at 21:44

## 2023-07-06 RX ADMIN — ENOXAPARIN SODIUM 40 MG: 100 INJECTION SUBCUTANEOUS at 17:48

## 2023-07-06 RX ADMIN — POTASSIUM CHLORIDE 40 MEQ: 1500 TABLET, EXTENDED RELEASE ORAL at 17:47

## 2023-07-06 RX ADMIN — INSULIN GLARGINE-YFGN 10 UNITS: 100 INJECTION, SOLUTION SUBCUTANEOUS at 21:57

## 2023-07-06 RX ADMIN — SODIUM CHLORIDE 1 G: 1 TABLET ORAL at 21:45

## 2023-07-06 RX ADMIN — POTASSIUM CHLORIDE 40 MEQ: 1500 TABLET, EXTENDED RELEASE ORAL at 12:37

## 2023-07-06 RX ADMIN — RIVASTIGMINE TARTRATE 1.5 MG: 1.5 CAPSULE ORAL at 09:08

## 2023-07-06 RX ADMIN — Medication 10 ML: at 09:09

## 2023-07-06 RX ADMIN — Medication 5000 UNITS: at 09:08

## 2023-07-06 RX ADMIN — GUAIFENESIN 400 MG: 400 TABLET ORAL at 21:43

## 2023-07-06 RX ADMIN — INSULIN LISPRO 8 UNITS: 100 INJECTION, SOLUTION INTRAVENOUS; SUBCUTANEOUS at 18:38

## 2023-07-06 RX ADMIN — CEFEPIME 2000 MG: 2 INJECTION, POWDER, FOR SOLUTION INTRAVENOUS at 04:11

## 2023-07-06 RX ADMIN — AMLODIPINE BESYLATE 10 MG: 10 TABLET ORAL at 09:09

## 2023-07-06 RX ADMIN — MULTIPLE VITAMINS W/ MINERALS TAB 1 TABLET: TAB at 09:08

## 2023-07-06 RX ADMIN — Medication 50 MG: at 09:09

## 2023-07-06 RX ADMIN — RIVASTIGMINE TARTRATE 1.5 MG: 1.5 CAPSULE ORAL at 14:11

## 2023-07-06 RX ADMIN — INSULIN LISPRO 8 UNITS: 100 INJECTION, SOLUTION INTRAVENOUS; SUBCUTANEOUS at 21:57

## 2023-07-06 RX ADMIN — ALBUTEROL SULFATE 2.5 MG: 2.5 SOLUTION RESPIRATORY (INHALATION) at 19:28

## 2023-07-06 RX ADMIN — Medication 10 ML: at 21:44

## 2023-07-06 RX ADMIN — SODIUM CHLORIDE 1 G: 1 TABLET ORAL at 09:08

## 2023-07-06 RX ADMIN — ALBUTEROL SULFATE 2.5 MG: 2.5 SOLUTION RESPIRATORY (INHALATION) at 16:18

## 2023-07-06 RX ADMIN — CEFEPIME 2000 MG: 2 INJECTION, POWDER, FOR SOLUTION INTRAVENOUS at 17:47

## 2023-07-06 ASSESSMENT — PAIN SCALES - GENERAL
PAINLEVEL_OUTOF10: 0

## 2023-07-07 LAB
ANION GAP SERPL CALCULATED.3IONS-SCNC: 9 MMOL/L (ref 7–16)
BUN SERPL-MCNC: 16 MG/DL (ref 6–23)
CALCIUM SERPL-MCNC: 9 MG/DL (ref 8.6–10.2)
CHLORIDE SERPL-SCNC: 109 MMOL/L (ref 98–107)
CO2 SERPL-SCNC: 26 MMOL/L (ref 22–29)
CREAT SERPL-MCNC: 0.8 MG/DL (ref 0.7–1.2)
GLUCOSE SERPL-MCNC: 88 MG/DL (ref 74–99)
METER GLUCOSE: 162 MG/DL (ref 74–99)
METER GLUCOSE: 184 MG/DL (ref 74–99)
METER GLUCOSE: 187 MG/DL (ref 74–99)
METER GLUCOSE: 92 MG/DL (ref 74–99)
POTASSIUM SERPL-SCNC: 3.6 MMOL/L (ref 3.5–5)
SODIUM SERPL-SCNC: 144 MMOL/L (ref 132–146)

## 2023-07-07 PROCEDURE — 2060000000 HC ICU INTERMEDIATE R&B

## 2023-07-07 PROCEDURE — 6360000002 HC RX W HCPCS: Performed by: INTERNAL MEDICINE

## 2023-07-07 PROCEDURE — 6370000000 HC RX 637 (ALT 250 FOR IP): Performed by: INTERNAL MEDICINE

## 2023-07-07 PROCEDURE — 92610 EVALUATE SWALLOWING FUNCTION: CPT

## 2023-07-07 PROCEDURE — 92526 ORAL FUNCTION THERAPY: CPT

## 2023-07-07 PROCEDURE — 6370000000 HC RX 637 (ALT 250 FOR IP): Performed by: NURSE PRACTITIONER

## 2023-07-07 PROCEDURE — 36415 COLL VENOUS BLD VENIPUNCTURE: CPT

## 2023-07-07 PROCEDURE — 2700000000 HC OXYGEN THERAPY PER DAY

## 2023-07-07 PROCEDURE — 82962 GLUCOSE BLOOD TEST: CPT

## 2023-07-07 PROCEDURE — 6360000002 HC RX W HCPCS: Performed by: NURSE PRACTITIONER

## 2023-07-07 PROCEDURE — S5553 INSULIN LONG ACTING 5 U: HCPCS | Performed by: NURSE PRACTITIONER

## 2023-07-07 PROCEDURE — 2580000003 HC RX 258: Performed by: INTERNAL MEDICINE

## 2023-07-07 PROCEDURE — 94640 AIRWAY INHALATION TREATMENT: CPT

## 2023-07-07 PROCEDURE — 2580000003 HC RX 258: Performed by: NURSE PRACTITIONER

## 2023-07-07 PROCEDURE — 94669 MECHANICAL CHEST WALL OSCILL: CPT

## 2023-07-07 PROCEDURE — 80048 BASIC METABOLIC PNL TOTAL CA: CPT

## 2023-07-07 RX ORDER — POTASSIUM CHLORIDE 20 MEQ/1
40 TABLET, EXTENDED RELEASE ORAL ONCE
Status: COMPLETED | OUTPATIENT
Start: 2023-07-07 | End: 2023-07-07

## 2023-07-07 RX ADMIN — INSULIN LISPRO 2 UNITS: 100 INJECTION, SOLUTION INTRAVENOUS; SUBCUTANEOUS at 18:06

## 2023-07-07 RX ADMIN — ALBUTEROL SULFATE 2.5 MG: 2.5 SOLUTION RESPIRATORY (INHALATION) at 08:35

## 2023-07-07 RX ADMIN — SODIUM CHLORIDE 1 G: 1 TABLET ORAL at 20:50

## 2023-07-07 RX ADMIN — GUAIFENESIN 400 MG: 400 TABLET ORAL at 16:24

## 2023-07-07 RX ADMIN — ALBUTEROL SULFATE 2.5 MG: 2.5 SOLUTION RESPIRATORY (INHALATION) at 20:56

## 2023-07-07 RX ADMIN — Medication 5000 UNITS: at 09:02

## 2023-07-07 RX ADMIN — RIVASTIGMINE TARTRATE 1.5 MG: 1.5 CAPSULE ORAL at 20:50

## 2023-07-07 RX ADMIN — SODIUM CHLORIDE 1 G: 1 TABLET ORAL at 09:02

## 2023-07-07 RX ADMIN — INSULIN LISPRO 2 UNITS: 100 INJECTION, SOLUTION INTRAVENOUS; SUBCUTANEOUS at 20:50

## 2023-07-07 RX ADMIN — Medication 10 ML: at 09:02

## 2023-07-07 RX ADMIN — Medication 10 ML: at 20:50

## 2023-07-07 RX ADMIN — ALBUTEROL SULFATE 2.5 MG: 2.5 SOLUTION RESPIRATORY (INHALATION) at 16:33

## 2023-07-07 RX ADMIN — ALBUTEROL SULFATE 2.5 MG: 2.5 SOLUTION RESPIRATORY (INHALATION) at 12:59

## 2023-07-07 RX ADMIN — RIVASTIGMINE TARTRATE 1.5 MG: 1.5 CAPSULE ORAL at 12:52

## 2023-07-07 RX ADMIN — MULTIPLE VITAMINS W/ MINERALS TAB 1 TABLET: TAB at 09:01

## 2023-07-07 RX ADMIN — POTASSIUM CHLORIDE 40 MEQ: 1500 TABLET, EXTENDED RELEASE ORAL at 12:53

## 2023-07-07 RX ADMIN — METOPROLOL SUCCINATE 25 MG: 25 TABLET, EXTENDED RELEASE ORAL at 09:02

## 2023-07-07 RX ADMIN — CEFEPIME 2000 MG: 2 INJECTION, POWDER, FOR SOLUTION INTRAVENOUS at 04:43

## 2023-07-07 RX ADMIN — GUAIFENESIN 400 MG: 400 TABLET ORAL at 09:02

## 2023-07-07 RX ADMIN — ENOXAPARIN SODIUM 40 MG: 100 INJECTION SUBCUTANEOUS at 18:06

## 2023-07-07 RX ADMIN — Medication 50 MG: at 09:02

## 2023-07-07 RX ADMIN — RIVASTIGMINE TARTRATE 1.5 MG: 1.5 CAPSULE ORAL at 09:02

## 2023-07-07 RX ADMIN — CEFEPIME 2000 MG: 2 INJECTION, POWDER, FOR SOLUTION INTRAVENOUS at 16:31

## 2023-07-07 RX ADMIN — INSULIN LISPRO 2 UNITS: 100 INJECTION, SOLUTION INTRAVENOUS; SUBCUTANEOUS at 12:53

## 2023-07-07 RX ADMIN — INSULIN GLARGINE-YFGN 10 UNITS: 100 INJECTION, SOLUTION SUBCUTANEOUS at 20:50

## 2023-07-07 RX ADMIN — GUAIFENESIN 400 MG: 400 TABLET ORAL at 20:50

## 2023-07-07 RX ADMIN — AMLODIPINE BESYLATE 10 MG: 10 TABLET ORAL at 09:02

## 2023-07-07 ASSESSMENT — PAIN SCALES - GENERAL
PAINLEVEL_OUTOF10: 0

## 2023-07-08 LAB
ANION GAP SERPL CALCULATED.3IONS-SCNC: 7 MMOL/L (ref 7–16)
BUN SERPL-MCNC: 13 MG/DL (ref 6–23)
CALCIUM SERPL-MCNC: 9 MG/DL (ref 8.6–10.2)
CHLORIDE SERPL-SCNC: 104 MMOL/L (ref 98–107)
CO2 SERPL-SCNC: 26 MMOL/L (ref 22–29)
CREAT SERPL-MCNC: 0.8 MG/DL (ref 0.7–1.2)
GLUCOSE SERPL-MCNC: 200 MG/DL (ref 74–99)
METER GLUCOSE: 136 MG/DL (ref 74–99)
METER GLUCOSE: 194 MG/DL (ref 74–99)
METER GLUCOSE: 254 MG/DL (ref 74–99)
METER GLUCOSE: 315 MG/DL (ref 74–99)
POTASSIUM SERPL-SCNC: 4.4 MMOL/L (ref 3.5–5)
SODIUM SERPL-SCNC: 137 MMOL/L (ref 132–146)

## 2023-07-08 PROCEDURE — S5553 INSULIN LONG ACTING 5 U: HCPCS | Performed by: NURSE PRACTITIONER

## 2023-07-08 PROCEDURE — 6360000002 HC RX W HCPCS: Performed by: INTERNAL MEDICINE

## 2023-07-08 PROCEDURE — 6370000000 HC RX 637 (ALT 250 FOR IP): Performed by: NURSE PRACTITIONER

## 2023-07-08 PROCEDURE — 36415 COLL VENOUS BLD VENIPUNCTURE: CPT

## 2023-07-08 PROCEDURE — 6370000000 HC RX 637 (ALT 250 FOR IP): Performed by: INTERNAL MEDICINE

## 2023-07-08 PROCEDURE — 94640 AIRWAY INHALATION TREATMENT: CPT

## 2023-07-08 PROCEDURE — 80048 BASIC METABOLIC PNL TOTAL CA: CPT

## 2023-07-08 PROCEDURE — 2060000000 HC ICU INTERMEDIATE R&B

## 2023-07-08 PROCEDURE — 2580000003 HC RX 258: Performed by: NURSE PRACTITIONER

## 2023-07-08 PROCEDURE — 2580000003 HC RX 258: Performed by: INTERNAL MEDICINE

## 2023-07-08 PROCEDURE — 6360000002 HC RX W HCPCS: Performed by: NURSE PRACTITIONER

## 2023-07-08 PROCEDURE — 2700000000 HC OXYGEN THERAPY PER DAY

## 2023-07-08 PROCEDURE — 82962 GLUCOSE BLOOD TEST: CPT

## 2023-07-08 RX ADMIN — Medication 5000 UNITS: at 09:07

## 2023-07-08 RX ADMIN — RIVASTIGMINE TARTRATE 1.5 MG: 1.5 CAPSULE ORAL at 20:16

## 2023-07-08 RX ADMIN — CEFEPIME 2000 MG: 2 INJECTION, POWDER, FOR SOLUTION INTRAVENOUS at 04:12

## 2023-07-08 RX ADMIN — Medication 10 ML: at 09:09

## 2023-07-08 RX ADMIN — GUAIFENESIN 400 MG: 400 TABLET ORAL at 20:16

## 2023-07-08 RX ADMIN — GUAIFENESIN 400 MG: 400 TABLET ORAL at 09:07

## 2023-07-08 RX ADMIN — INSULIN LISPRO 2 UNITS: 100 INJECTION, SOLUTION INTRAVENOUS; SUBCUTANEOUS at 09:08

## 2023-07-08 RX ADMIN — ALBUTEROL SULFATE 2.5 MG: 2.5 SOLUTION RESPIRATORY (INHALATION) at 19:08

## 2023-07-08 RX ADMIN — Medication 50 MG: at 09:07

## 2023-07-08 RX ADMIN — INSULIN GLARGINE-YFGN 10 UNITS: 100 INJECTION, SOLUTION SUBCUTANEOUS at 20:16

## 2023-07-08 RX ADMIN — RIVASTIGMINE TARTRATE 1.5 MG: 1.5 CAPSULE ORAL at 13:49

## 2023-07-08 RX ADMIN — AMLODIPINE BESYLATE 10 MG: 10 TABLET ORAL at 09:08

## 2023-07-08 RX ADMIN — ENOXAPARIN SODIUM 40 MG: 100 INJECTION SUBCUTANEOUS at 18:36

## 2023-07-08 RX ADMIN — ALBUTEROL SULFATE 2.5 MG: 2.5 SOLUTION RESPIRATORY (INHALATION) at 15:39

## 2023-07-08 RX ADMIN — METOPROLOL SUCCINATE 25 MG: 25 TABLET, EXTENDED RELEASE ORAL at 09:06

## 2023-07-08 RX ADMIN — RIVASTIGMINE TARTRATE 1.5 MG: 1.5 CAPSULE ORAL at 09:06

## 2023-07-08 RX ADMIN — ALBUTEROL SULFATE 2.5 MG: 2.5 SOLUTION RESPIRATORY (INHALATION) at 08:58

## 2023-07-08 RX ADMIN — Medication 10 ML: at 20:16

## 2023-07-08 RX ADMIN — INSULIN LISPRO 6 UNITS: 100 INJECTION, SOLUTION INTRAVENOUS; SUBCUTANEOUS at 18:35

## 2023-07-08 RX ADMIN — SODIUM CHLORIDE 1 G: 1 TABLET ORAL at 20:16

## 2023-07-08 RX ADMIN — GUAIFENESIN 400 MG: 400 TABLET ORAL at 13:49

## 2023-07-08 RX ADMIN — MULTIPLE VITAMINS W/ MINERALS TAB 1 TABLET: TAB at 09:06

## 2023-07-08 RX ADMIN — ALBUTEROL SULFATE 2.5 MG: 2.5 SOLUTION RESPIRATORY (INHALATION) at 12:51

## 2023-07-08 RX ADMIN — CEFEPIME 2000 MG: 2 INJECTION, POWDER, FOR SOLUTION INTRAVENOUS at 17:06

## 2023-07-08 RX ADMIN — INSULIN LISPRO 8 UNITS: 100 INJECTION, SOLUTION INTRAVENOUS; SUBCUTANEOUS at 20:16

## 2023-07-08 RX ADMIN — SODIUM CHLORIDE 1 G: 1 TABLET ORAL at 09:07

## 2023-07-08 ASSESSMENT — PAIN SCALES - GENERAL
PAINLEVEL_OUTOF10: 0

## 2023-07-09 ENCOUNTER — APPOINTMENT (OUTPATIENT)
Dept: GENERAL RADIOLOGY | Age: 83
DRG: 193 | End: 2023-07-09
Payer: MEDICARE

## 2023-07-09 VITALS
TEMPERATURE: 97 F | SYSTOLIC BLOOD PRESSURE: 122 MMHG | WEIGHT: 153.2 LBS | OXYGEN SATURATION: 96 % | BODY MASS INDEX: 21.45 KG/M2 | RESPIRATION RATE: 18 BRPM | HEIGHT: 71 IN | DIASTOLIC BLOOD PRESSURE: 61 MMHG | HEART RATE: 70 BPM

## 2023-07-09 LAB
ANION GAP SERPL CALCULATED.3IONS-SCNC: 6 MMOL/L (ref 7–16)
BUN SERPL-MCNC: 12 MG/DL (ref 6–23)
CALCIUM SERPL-MCNC: 8.9 MG/DL (ref 8.6–10.2)
CHLORIDE SERPL-SCNC: 104 MMOL/L (ref 98–107)
CO2 SERPL-SCNC: 29 MMOL/L (ref 22–29)
CREAT SERPL-MCNC: 0.8 MG/DL (ref 0.7–1.2)
GLUCOSE SERPL-MCNC: 97 MG/DL (ref 74–99)
METER GLUCOSE: 100 MG/DL (ref 74–99)
METER GLUCOSE: 114 MG/DL (ref 74–99)
POTASSIUM SERPL-SCNC: 4.1 MMOL/L (ref 3.5–5)
SODIUM SERPL-SCNC: 139 MMOL/L (ref 132–146)

## 2023-07-09 PROCEDURE — 80048 BASIC METABOLIC PNL TOTAL CA: CPT

## 2023-07-09 PROCEDURE — 71045 X-RAY EXAM CHEST 1 VIEW: CPT

## 2023-07-09 PROCEDURE — 94640 AIRWAY INHALATION TREATMENT: CPT

## 2023-07-09 PROCEDURE — 2580000003 HC RX 258: Performed by: NURSE PRACTITIONER

## 2023-07-09 PROCEDURE — 82962 GLUCOSE BLOOD TEST: CPT

## 2023-07-09 PROCEDURE — 6360000002 HC RX W HCPCS: Performed by: INTERNAL MEDICINE

## 2023-07-09 PROCEDURE — 6370000000 HC RX 637 (ALT 250 FOR IP): Performed by: INTERNAL MEDICINE

## 2023-07-09 PROCEDURE — 6370000000 HC RX 637 (ALT 250 FOR IP): Performed by: NURSE PRACTITIONER

## 2023-07-09 PROCEDURE — 36415 COLL VENOUS BLD VENIPUNCTURE: CPT

## 2023-07-09 PROCEDURE — 2700000000 HC OXYGEN THERAPY PER DAY

## 2023-07-09 PROCEDURE — 2580000003 HC RX 258: Performed by: INTERNAL MEDICINE

## 2023-07-09 RX ORDER — FUROSEMIDE 10 MG/ML
20 INJECTION INTRAMUSCULAR; INTRAVENOUS ONCE
Status: COMPLETED | OUTPATIENT
Start: 2023-07-09 | End: 2023-07-09

## 2023-07-09 RX ADMIN — RIVASTIGMINE TARTRATE 1.5 MG: 1.5 CAPSULE ORAL at 13:14

## 2023-07-09 RX ADMIN — ALBUTEROL SULFATE 2.5 MG: 2.5 SOLUTION RESPIRATORY (INHALATION) at 09:00

## 2023-07-09 RX ADMIN — MULTIPLE VITAMINS W/ MINERALS TAB 1 TABLET: TAB at 08:31

## 2023-07-09 RX ADMIN — GUAIFENESIN 400 MG: 400 TABLET ORAL at 13:14

## 2023-07-09 RX ADMIN — Medication 10 ML: at 08:33

## 2023-07-09 RX ADMIN — RIVASTIGMINE TARTRATE 1.5 MG: 1.5 CAPSULE ORAL at 08:31

## 2023-07-09 RX ADMIN — CEFEPIME 2000 MG: 2 INJECTION, POWDER, FOR SOLUTION INTRAVENOUS at 04:21

## 2023-07-09 RX ADMIN — METOPROLOL SUCCINATE 25 MG: 25 TABLET, EXTENDED RELEASE ORAL at 08:31

## 2023-07-09 RX ADMIN — GUAIFENESIN 400 MG: 400 TABLET ORAL at 08:31

## 2023-07-09 RX ADMIN — AMLODIPINE BESYLATE 10 MG: 10 TABLET ORAL at 08:31

## 2023-07-09 RX ADMIN — FUROSEMIDE 20 MG: 10 INJECTION, SOLUTION INTRAMUSCULAR; INTRAVENOUS at 13:14

## 2023-07-09 RX ADMIN — Medication 5000 UNITS: at 08:31

## 2023-07-09 RX ADMIN — ALBUTEROL SULFATE 2.5 MG: 2.5 SOLUTION RESPIRATORY (INHALATION) at 13:23

## 2023-07-09 RX ADMIN — Medication 50 MG: at 08:31

## 2023-07-09 RX ADMIN — SODIUM CHLORIDE 1 G: 1 TABLET ORAL at 08:31

## 2023-07-09 ASSESSMENT — PAIN SCALES - GENERAL
PAINLEVEL_OUTOF10: 0
PAINLEVEL_OUTOF10: 0

## 2023-07-10 ENCOUNTER — HOSPITAL ENCOUNTER (OUTPATIENT)
Dept: WOUND CARE | Age: 83
Discharge: HOME | End: 2023-07-10

## 2023-08-14 ENCOUNTER — HOSPITAL ENCOUNTER (OUTPATIENT)
Dept: WOUND CARE | Age: 83
Discharge: HOME | End: 2023-08-14
Payer: MEDICARE

## 2023-08-14 VITALS
HEIGHT: 69 IN | RESPIRATION RATE: 18 BRPM | BODY MASS INDEX: 21.48 KG/M2 | TEMPERATURE: 97 F | WEIGHT: 145 LBS | HEART RATE: 75 BPM | DIASTOLIC BLOOD PRESSURE: 70 MMHG | SYSTOLIC BLOOD PRESSURE: 142 MMHG

## 2023-08-14 DIAGNOSIS — L97.412 DIABETIC ULCER OF RIGHT MIDFOOT ASSOCIATED WITH DIABETES MELLITUS DUE TO UNDERLYING CONDITION, WITH FAT LAYER EXPOSED (HCC): Primary | ICD-10-CM

## 2023-08-14 DIAGNOSIS — E11.621 DIABETIC ULCER OF RIGHT HEEL ASSOCIATED WITH TYPE 2 DIABETES MELLITUS, WITH FAT LAYER EXPOSED (HCC): ICD-10-CM

## 2023-08-14 DIAGNOSIS — L97.411 DIABETIC ULCER OF RIGHT HEEL ASSOCIATED WITH TYPE 2 DIABETES MELLITUS, LIMITED TO BREAKDOWN OF SKIN (HCC): ICD-10-CM

## 2023-08-14 DIAGNOSIS — E08.621 DIABETIC ULCER OF RIGHT MIDFOOT ASSOCIATED WITH DIABETES MELLITUS DUE TO UNDERLYING CONDITION, WITH FAT LAYER EXPOSED (HCC): Primary | ICD-10-CM

## 2023-08-14 DIAGNOSIS — L97.412 DIABETIC ULCER OF RIGHT HEEL ASSOCIATED WITH TYPE 2 DIABETES MELLITUS, WITH FAT LAYER EXPOSED (HCC): ICD-10-CM

## 2023-08-14 DIAGNOSIS — E11.621 DIABETIC ULCER OF RIGHT HEEL ASSOCIATED WITH TYPE 2 DIABETES MELLITUS, LIMITED TO BREAKDOWN OF SKIN (HCC): ICD-10-CM

## 2023-08-14 PROCEDURE — 97597 DBRDMT OPN WND 1ST 20 CM/<: CPT

## 2023-08-14 RX ORDER — LIDOCAINE HYDROCHLORIDE 40 MG/ML
SOLUTION TOPICAL ONCE
Status: COMPLETED | OUTPATIENT
Start: 2023-08-14 | End: 2023-08-14

## 2023-08-14 RX ORDER — LIDOCAINE HYDROCHLORIDE 20 MG/ML
JELLY TOPICAL ONCE
OUTPATIENT
Start: 2023-08-14 | End: 2023-08-14

## 2023-08-14 RX ORDER — CLOBETASOL PROPIONATE 0.5 MG/G
OINTMENT TOPICAL ONCE
OUTPATIENT
Start: 2023-08-14 | End: 2023-08-14

## 2023-08-14 RX ORDER — LIDOCAINE HYDROCHLORIDE 40 MG/ML
SOLUTION TOPICAL ONCE
OUTPATIENT
Start: 2023-08-14 | End: 2023-08-14

## 2023-08-14 RX ORDER — BETAMETHASONE DIPROPIONATE 0.05 %
OINTMENT (GRAM) TOPICAL ONCE
OUTPATIENT
Start: 2023-08-14 | End: 2023-08-14

## 2023-08-14 RX ORDER — BACITRACIN ZINC AND POLYMYXIN B SULFATE 500; 1000 [USP'U]/G; [USP'U]/G
OINTMENT TOPICAL ONCE
OUTPATIENT
Start: 2023-08-14 | End: 2023-08-14

## 2023-08-14 RX ORDER — GENTAMICIN SULFATE 1 MG/G
OINTMENT TOPICAL ONCE
OUTPATIENT
Start: 2023-08-14 | End: 2023-08-14

## 2023-08-14 RX ORDER — LIDOCAINE 40 MG/G
CREAM TOPICAL ONCE
OUTPATIENT
Start: 2023-08-14 | End: 2023-08-14

## 2023-08-14 RX ORDER — GINSENG 100 MG
CAPSULE ORAL ONCE
OUTPATIENT
Start: 2023-08-14 | End: 2023-08-14

## 2023-08-14 RX ORDER — LIDOCAINE 50 MG/G
OINTMENT TOPICAL ONCE
OUTPATIENT
Start: 2023-08-14 | End: 2023-08-14

## 2023-08-14 RX ORDER — IBUPROFEN 200 MG
TABLET ORAL ONCE
OUTPATIENT
Start: 2023-08-14 | End: 2023-08-14

## 2023-08-14 RX ADMIN — LIDOCAINE HYDROCHLORIDE 10 ML: 40 SOLUTION TOPICAL at 08:19

## 2023-08-14 ASSESSMENT — PAIN SCALES - GENERAL: PAINLEVEL_OUTOF10: 0

## 2023-08-14 NOTE — DISCHARGE INSTRUCTIONS
Visit Discharge/Physician Orders     Discharge condition: Stable  Assessment of pain at discharge: minimal  Anesthetic used: 4% lidocaine external solution  Discharge to: ECF  Left via:Private automobile  Accompanied by: accompanied by self  ECF/HHA: [address] [place] [address]     Dressing Orders: To right heel wound, cleanse with normal saline solution and apply ROSALBA and cover with a dry dressing. Change daily. Treatment Orders:    8/14 Please schedule to see podiatrist for nail trimming and evaluation of callus on the bottom of heel   CBC and CMP and x-ray- REVIEWED  Vasculars scheduled for 12/13/22- REVIEWED  Continue using diabetic shoe inserts, keep pressure off of wound sites  FOLLOW NUTRITIOUS DIET. CHOOSE FOODS HIGH IN PROTEIN -CHICKEN- FISH-AND EGGS,  CHOOSE FOODS HIGH IN VITAMIN C.   MULTIVITAMIN DAILY. [address] [place] [address] followup visit _____3 weeks __________________  (Please note your next appointment above and if you are unable to keep, kindly give a 24 hour notice. Thank you.)     Physician signature:__________________________        If you experience any of the following, please call the [facility] during business hours:     * Increase in Pain  * Temperature over 101  * Increase in drainage from your wound  * Drainage with a foul odor  * Bleeding  * Increase in swelling  * Need for compression bandage changes due to slippage, breakthrough drainage. If you need medical attention outside of the business hours of the [facility] please contact your PCP or go to the nearest emergency room.

## 2023-08-14 NOTE — PROGRESS NOTES
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, with adequate collateral flow to the foot   and the remaining toes based upon the pulse volume recordings       On the left side, normal ankle arm index, with triphasic ankle Doppler   tracings and good arterial flow to the foot and the toes based upon   the pulse volume recordings       Overall, no significant change noted compared the study that was in   April 2022 12/27/2022  Ulcers of the right foot, slowly improving, continue to offload as much as possible  1/9/2023  Ulcers right foot improving, mainly skin only  1/23/2023  Ulcer underneath the first metatarsal head improving, skin only, with callus and small eschar debrided, ulcer underneath the heel, minimal fat layer exposed, discussed the patient again regarding importance of offloading  2/6/2023  Black scabs noted with minimal eschar not debrided the importance of offloading was rediscussed with the patient  2/27/2023  Right proximal wound underneath the metatarsal head, healed  Still has a skin ulcer with eschar formation over the plantar surface of the heel, debrided, mainly skin, recommended try to offload to the best he can  3/13/2023  Patient, also with a plantar ulcer right heel, worse, with drainage, on further probing the wound, fat layer exposed with some mild tunneling, overlying skin eschar was partially debrided, deep tissue cultures were taken, patient was advised to read discussed with this orthotist regarding potentially modifying the custom made insert so that he can better offload the right heel  3/20/2023  Right heel ulcer improved, on doxycycline based upon wound cultures, patient was again advised to see his orthotist regarding potentially modifying the

## 2023-09-19 ENCOUNTER — HOSPITAL ENCOUNTER (OUTPATIENT)
Dept: WOUND CARE | Age: 83
Discharge: HOME | End: 2023-09-19
Payer: MEDICARE

## 2023-09-19 VITALS
DIASTOLIC BLOOD PRESSURE: 62 MMHG | TEMPERATURE: 98 F | HEART RATE: 74 BPM | RESPIRATION RATE: 16 BRPM | SYSTOLIC BLOOD PRESSURE: 136 MMHG

## 2023-09-19 DIAGNOSIS — E11.621 DIABETIC ULCER OF RIGHT HEEL ASSOCIATED WITH TYPE 2 DIABETES MELLITUS, WITH FAT LAYER EXPOSED (HCC): ICD-10-CM

## 2023-09-19 DIAGNOSIS — L97.412 DIABETIC ULCER OF RIGHT MIDFOOT ASSOCIATED WITH DIABETES MELLITUS DUE TO UNDERLYING CONDITION, WITH FAT LAYER EXPOSED (HCC): Primary | ICD-10-CM

## 2023-09-19 DIAGNOSIS — E08.621 DIABETIC ULCER OF RIGHT MIDFOOT ASSOCIATED WITH DIABETES MELLITUS DUE TO UNDERLYING CONDITION, WITH FAT LAYER EXPOSED (HCC): Primary | ICD-10-CM

## 2023-09-19 DIAGNOSIS — L97.412 DIABETIC ULCER OF RIGHT HEEL ASSOCIATED WITH TYPE 2 DIABETES MELLITUS, WITH FAT LAYER EXPOSED (HCC): ICD-10-CM

## 2023-09-19 PROCEDURE — 97597 DBRDMT OPN WND 1ST 20 CM/<: CPT

## 2023-09-19 NOTE — PROGRESS NOTES
Wound Healing Center Followup Visit Note    Referring Physician : Severo Pal MD  [address] [facility] [address] RECORD NUMBER:  [id number]  AGE: 79 y.o. GENDER: male  : 1940  EPISODE DATE:  2023    Subjective:     Chief Complaint   Patient presents with    Wound Check     Wound right heel      HISTORY of PRESENT ILLNESS HPI   Jarrell Cornlel is a 79 y.o. male who presents today in regards to follow up evaluation and treatment of wound/ulcer. That patient's past medical, family and social hx were reviewed and changes were made if present. History of Wound Context:  The patient has had a wound of right foot overlying the plantar surface of the right heel and plantar surface of the right first metatarsophalangeal joint which was first noted approximately at least 3 months ago. This has been treated by the patient in the facility, but patient did not call the wound care center as he was instructed in the past call immediately for any future ulcers. On their initial visit to the wound healing center, 22, the patient has noted that the wound has not been improving. The patient has had similar previous wounds in the past.       Patient in the past has undergone right femoral angiogram and angioplasty of right popliteal artery and tibial arteries by Dr. Yolette Tracy in 2019     Patient has a diabetes mellitus diabetic neuropathy     Pt is currently not on abx.       Wound/Ulcer Pain Timing/Severity: constant  Quality of pain: dull, aching  Severity:  3 / 10   Modifying Factors: Pain worsens with walking  Associated Signs/Symptoms: drainage and pain        2022  Patient tells me that he saw his orthotist, the custom made insert was modified  Right foot wounds look improved     2022  Right foot wound slowly improving, patient is wearing the modified custom made orthotic of the right foot  The ankle-brachial index that was done was reviewed, overall adequate flow for tissue perfusion,

## 2023-10-04 NOTE — DISCHARGE INSTRUCTIONS
Visit Discharge/Physician Orders     Discharge condition: Stable  Assessment of pain at discharge: minimal  Anesthetic used: 4% lidocaine external solution  Discharge to: ECF  Left via:Private automobile  Accompanied by: accompanied by self  ECF/HHA: [address] [place] [address] [place]     Dressing Orders:  Right heel- keep clean and dry     Treatment Orders: repeat vasculars after one your from prior test    9/18 Please schedule to see podiatrist for nail trimming and evaluation of callus on the bottom of heel   CBC and CMP and x-ray- REVIEWED  Vasculars scheduled for 12/13/22- REVIEWED  Continue using diabetic shoe inserts, keep pressure off of wound sites  FOLLOW NUTRITIOUS DIET. CHOOSE FOODS HIGH IN PROTEIN -CHICKEN- FISH-AND EGGS,  CHOOSE FOODS HIGH IN VITAMIN C.   MULTIVITAMIN DAILY. [facility] followup visit ____call as needed __________________  (Please note your next appointment above and if you are unable to keep, kindly give a 24 hour notice. Thank you.)     Physician signature:__________________________        If you experience any of the following, please call the [facility] during business hours:     * Increase in Pain  * Temperature over 101  * Increase in drainage from your wound  * Drainage with a foul odor  * Bleeding  * Increase in swelling  * Need for compression bandage changes due to slippage, breakthrough drainage. If you need medical attention outside of the business hours of the [facility] please contact your PCP or go to the nearest emergency room.

## 2023-10-10 ENCOUNTER — HOSPITAL ENCOUNTER (OUTPATIENT)
Dept: WOUND CARE | Age: 83
Discharge: HOME | End: 2023-10-10
Payer: MEDICARE

## 2023-10-10 VITALS
SYSTOLIC BLOOD PRESSURE: 102 MMHG | BODY MASS INDEX: 21.48 KG/M2 | DIASTOLIC BLOOD PRESSURE: 64 MMHG | RESPIRATION RATE: 16 BRPM | WEIGHT: 145 LBS | TEMPERATURE: 97.3 F | HEIGHT: 69 IN | HEART RATE: 76 BPM

## 2023-10-10 PROCEDURE — 99212 OFFICE O/P EST SF 10 MIN: CPT | Performed by: SURGERY

## 2023-10-10 PROCEDURE — 99212 OFFICE O/P EST SF 10 MIN: CPT

## 2023-10-10 RX ORDER — BACITRACIN ZINC AND POLYMYXIN B SULFATE 500; 1000 [USP'U]/G; [USP'U]/G
OINTMENT TOPICAL ONCE
Status: CANCELLED | OUTPATIENT
Start: 2023-10-10 | End: 2023-10-10

## 2023-10-10 RX ORDER — GINSENG 100 MG
CAPSULE ORAL ONCE
Status: CANCELLED | OUTPATIENT
Start: 2023-10-10 | End: 2023-10-10

## 2023-10-10 RX ORDER — GENTAMICIN SULFATE 1 MG/G
OINTMENT TOPICAL ONCE
Status: CANCELLED | OUTPATIENT
Start: 2023-10-10 | End: 2023-10-10

## 2023-10-10 RX ORDER — IBUPROFEN 200 MG
TABLET ORAL ONCE
Status: CANCELLED | OUTPATIENT
Start: 2023-10-10 | End: 2023-10-10

## 2023-10-10 RX ORDER — TRIAMCINOLONE ACETONIDE 1 MG/G
OINTMENT TOPICAL ONCE
Status: CANCELLED | OUTPATIENT
Start: 2023-10-10 | End: 2023-10-10

## 2023-10-10 RX ORDER — CLOBETASOL PROPIONATE 0.5 MG/G
OINTMENT TOPICAL ONCE
Status: CANCELLED | OUTPATIENT
Start: 2023-10-10 | End: 2023-10-10

## 2023-10-10 RX ORDER — BETAMETHASONE DIPROPIONATE 0.05 %
OINTMENT (GRAM) TOPICAL ONCE
Status: CANCELLED | OUTPATIENT
Start: 2023-10-10 | End: 2023-10-10

## 2023-10-10 NOTE — PLAN OF CARE
Problem: Chronic Conditions and Co-morbidities  Goal: Patient's chronic conditions and co-morbidity symptoms are monitored and maintained or improved  10/10/2023 0912 by Ania Mosquera RN  Outcome: Adequate for Discharge  10/10/2023 0803 by Ania Mosquera RN  Outcome: Progressing     Problem: Cognitive:  Goal: Knowledge of wound care  Description: Knowledge of wound care  10/10/2023 0912 by Ania Mosquera RN  Outcome: Adequate for Discharge  10/10/2023 0803 by Ania Mosquera RN  Outcome: Progressing  Goal: Understands risk factors for wounds  Description: Understands risk factors for wounds  10/10/2023 0912 by Ania Mosquera RN  Outcome: Adequate for Discharge  10/10/2023 0803 by Ania Mosquera RN  Outcome: Progressing     Problem: Wound:  Goal: Will show signs of wound healing; wound closure and no evidence of infection  Description: Will show signs of wound healing; wound closure and no evidence of infection  10/10/2023 0912 by Ania Mosquera RN  Outcome: Adequate for Discharge  10/10/2023 0803 by Ania Mosquera RN  Outcome: Progressing

## 2023-10-10 NOTE — PROGRESS NOTES
Wound Healing Center Followup Visit Note    Referring Physician : Burke Beltran MD  [facility] RECORD NUMBER:  [id number]  AGE: 79 y.o. GENDER: male  : 1940  EPISODE DATE:  10/10/2023    Subjective:     Chief Complaint   Patient presents with    Wound Check     Right heel      HISTORY of PRESENT ILLNESS HPI   Zack Mcconnell is a 79 y.o. male who presents today in regards to follow up evaluation and treatment of wound/ulcer. That patient's past medical, family and social hx were reviewed and changes were made if present. History of Wound Context:  The patient has had a wound of right foot overlying the plantar surface of the right heel and plantar surface of the right first metatarsophalangeal joint which was first noted approximately at least 3 months ago. This has been treated by the patient in the facility, but patient did not call the wound care center as he was instructed in the past call immediately for any future ulcers. On their initial visit to the wound healing center, 22, the patient has noted that the wound has not been improving. The patient has had similar previous wounds in the past.       Patient in the past has undergone right femoral angiogram and angioplasty of right popliteal artery and tibial arteries by Dr. Angel Cardoso in 2019     Patient has a diabetes mellitus diabetic neuropathy     Pt is currently not on abx.       Wound/Ulcer Pain Timing/Severity: constant  Quality of pain: dull, aching  Severity:  3 / 10   Modifying Factors: Pain worsens with walking  Associated Signs/Symptoms: drainage and pain        2022  Patient tells me that he saw his orthotist, the custom made insert was modified  Right foot wounds look improved     2022  Right foot wound slowly improving, patient is wearing the modified custom made orthotic of the right foot  The ankle-brachial index that was done was reviewed, overall adequate flow for tissue perfusion, the

## 2023-10-11 ENCOUNTER — TELEPHONE (OUTPATIENT)
Dept: VASCULAR SURGERY | Age: 83
End: 2023-10-11

## 2023-10-11 NOTE — TELEPHONE ENCOUNTER
Left message on voicemail for follow up appointment with Dr. Juanis Adam. 1-24-24 1:00 pm.  Mailed appt card, also. Also, notified [place] from [place] [facility] [employer] ([phone]) of the same.

## 2023-10-31 NOTE — DISCHARGE INSTRUCTIONS
October 31, 2023     Patient: Kevon Quijano   YOB: 1966   Date of Visit: 10/31/2023       To Whom It May Concern:    It is my medical opinion that Kevon Quijano {Work release (duty restriction):43341}.    If you have any questions or concerns, please don't hesitate to call.         Sincerely,        Weston Dawson, PT    CC: No Recipients Visit Discharge/Physician Orders     Discharge condition: Stable  Assessment of pain at discharge: minimal  Anesthetic used: 4% lidocaine external solution  Discharge to: ECF  Left via:Private automobile  Accompanied by: accompanied by self  ECF/HHA: [address] [place] [address]     Dressing Orders:  Right heel- keep clean and dry     Treatment Orders:    9/18 Please schedule to see podiatrist for nail trimming and evaluation of callus on the bottom of heel   CBC and CMP and x-ray- REVIEWED  Vasculars scheduled for 12/13/22- REVIEWED  Continue using diabetic shoe inserts, keep pressure off of wound sites  FOLLOW NUTRITIOUS DIET. CHOOSE FOODS HIGH IN PROTEIN -CHICKEN- FISH-AND EGGS,  CHOOSE FOODS HIGH IN VITAMIN C.   MULTIVITAMIN DAILY. [address] [place] [address] followup visit _____3 weeks __________________  (Please note your next appointment above and if you are unable to keep, kindly give a 24 hour notice. Thank you.)     Physician signature:__________________________        If you experience any of the following, please call the [facility] during business hours:     * Increase in Pain  * Temperature over 101  * Increase in drainage from your wound  * Drainage with a foul odor  * Bleeding  * Increase in swelling  * Need for compression bandage changes due to slippage, breakthrough drainage. If you need medical attention outside of the business hours of the [facility] please contact your PCP or go to the nearest emergency room.

## 2024-01-24 ENCOUNTER — OFFICE VISIT (OUTPATIENT)
Dept: VASCULAR SURGERY | Age: 84
End: 2024-01-24
Payer: MEDICARE

## 2024-01-24 VITALS — WEIGHT: 140 LBS | BODY MASS INDEX: 20.67 KG/M2

## 2024-01-24 DIAGNOSIS — L84 CALLUS OF FOOT: ICD-10-CM

## 2024-01-24 DIAGNOSIS — I73.9 PVD (PERIPHERAL VASCULAR DISEASE) WITH CLAUDICATION (HCC): Primary | ICD-10-CM

## 2024-01-24 DIAGNOSIS — Z98.62 S/P PERIPHERAL ARTERY ANGIOPLASTY: ICD-10-CM

## 2024-01-24 PROBLEM — E11.621 DIABETIC ULCER OF RIGHT HEEL ASSOCIATED WITH TYPE 2 DIABETES MELLITUS, LIMITED TO BREAKDOWN OF SKIN (HCC): Status: RESOLVED | Noted: 2023-01-09 | Resolved: 2024-01-24

## 2024-01-24 PROBLEM — L97.412 DIABETIC ULCER OF RIGHT HEEL ASSOCIATED WITH TYPE 2 DIABETES MELLITUS, WITH FAT LAYER EXPOSED (HCC): Status: RESOLVED | Noted: 2021-08-09 | Resolved: 2024-01-24

## 2024-01-24 PROBLEM — L97.411 DIABETIC ULCER OF RIGHT HEEL ASSOCIATED WITH TYPE 2 DIABETES MELLITUS, LIMITED TO BREAKDOWN OF SKIN (HCC): Status: RESOLVED | Noted: 2023-01-09 | Resolved: 2024-01-24

## 2024-01-24 PROBLEM — L97.412 DIABETIC ULCER OF RIGHT MIDFOOT ASSOCIATED WITH DIABETES MELLITUS DUE TO UNDERLYING CONDITION, WITH FAT LAYER EXPOSED (HCC): Status: RESOLVED | Noted: 2022-06-06 | Resolved: 2024-01-24

## 2024-01-24 PROBLEM — E11.621 DIABETIC ULCER OF RIGHT HEEL ASSOCIATED WITH TYPE 2 DIABETES MELLITUS, WITH FAT LAYER EXPOSED (HCC): Status: RESOLVED | Noted: 2021-08-09 | Resolved: 2024-01-24

## 2024-01-24 PROBLEM — J18.9 PNEUMONIA DUE TO INFECTIOUS ORGANISM: Status: RESOLVED | Noted: 2023-06-30 | Resolved: 2024-01-24

## 2024-01-24 PROBLEM — E44.0 MODERATE PROTEIN-CALORIE MALNUTRITION (HCC): Chronic | Status: RESOLVED | Noted: 2019-04-23 | Resolved: 2024-01-24

## 2024-01-24 PROBLEM — E08.621 DIABETIC ULCER OF RIGHT MIDFOOT ASSOCIATED WITH DIABETES MELLITUS DUE TO UNDERLYING CONDITION, WITH FAT LAYER EXPOSED (HCC): Status: RESOLVED | Noted: 2022-06-06 | Resolved: 2024-01-24

## 2024-01-24 PROCEDURE — G8484 FLU IMMUNIZE NO ADMIN: HCPCS | Performed by: SURGERY

## 2024-01-24 PROCEDURE — G8420 CALC BMI NORM PARAMETERS: HCPCS | Performed by: SURGERY

## 2024-01-24 PROCEDURE — 1123F ACP DISCUSS/DSCN MKR DOCD: CPT | Performed by: SURGERY

## 2024-01-24 PROCEDURE — 1036F TOBACCO NON-USER: CPT | Performed by: SURGERY

## 2024-01-24 PROCEDURE — 99213 OFFICE O/P EST LOW 20 MIN: CPT | Performed by: SURGERY

## 2024-01-24 PROCEDURE — G8427 DOCREV CUR MEDS BY ELIG CLIN: HCPCS | Performed by: SURGERY

## 2024-01-24 NOTE — PROGRESS NOTES
Chief Complaint:   Chief Complaint   Patient presents with    Circulatory Problem     PVD          HPI: Patient came for follow-up evaluation, of peripheral vascular disease post angioplasty of the right lower extremity vessels, for nonhealing ulcers of the foot, also has diabetes mellitus with diabetic neuropathy, underwent last arterial Doppler study more than a year ago, tells me overall he is doing well    He is awaiting custom made orthotics    His medical issues including diabetes mellitus and hypertension are stable      Patient denies any focal lateralizing neurological symptoms like loss of speech, vision or loss of function of extremity    Patient can walk short distances around the facility without any difficulty, and denies any symptoms of rest pain    Allergies   Allergen Reactions    Latex Other (See Comments)     Pt unsure of reaction    Ciprofloxacin In D5w Itching    Food     Pcn [Penicillins] Other (See Comments)     \"I just know my body doesn't like it.\" \"I had a reaction a long time ago, I know it affects my kidneys.\"    Aspirin Other (See Comments)     \"It affects my kidneys.\"  Other reaction(s): Renal reactions    Other Rash     \"I get a rash on just my face if I eat Cumin or Chili.\"    Peanut-Containing Drug Products Itching       Current Outpatient Medications   Medication Sig Dispense Refill    chlorhexidine (PERIDEX) 0.12 % solution Swish and spit 15 mLs 2 times daily      guaiFENesin (ROBITUSSIN) 100 MG/5ML liquid Take 10 mLs by mouth every 4 hours as needed for Cough      insulin lispro, 1 Unit Dial, (HUMALOG/ADMELOG) 100 UNIT/ML SOPN Inject into the skin 4 times daily (before meals and nightly) Per sliding scale:  131-180: 4 units  181-240: 8 units  241-300: 10 units  301-350: 12 units  351-400: 16 units  400+: 20 units      ibuprofen (ADVIL;MOTRIN) 800 MG tablet Take 1 tablet by mouth every 8 hours as needed for Pain      insulin glargine (LANTUS SOLOSTAR) 100 UNIT/ML injection pen

## 2024-02-07 ENCOUNTER — HOSPITAL ENCOUNTER (OUTPATIENT)
Dept: CARDIOLOGY | Age: 84
Discharge: HOME | End: 2024-02-09
Attending: FAMILY MEDICINE | Admitting: FAMILY MEDICINE
Payer: MEDICARE

## 2024-02-07 DIAGNOSIS — Z98.62 S/P PERIPHERAL ARTERY ANGIOPLASTY: ICD-10-CM

## 2024-02-07 DIAGNOSIS — I73.9 PVD (PERIPHERAL VASCULAR DISEASE) WITH CLAUDICATION (HCC): ICD-10-CM

## 2024-02-07 PROCEDURE — 93923 UPR/LXTR ART STDY 3+ LVLS: CPT

## 2024-02-08 LAB
VAS LEFT ABI: 0.97
VAS LEFT ANKLE BP: 135 MMHG
VAS LEFT ARM BP: 139 MMHG
VAS LEFT CALF PRESSURE: 141 MMHG
VAS LEFT DORSALIS PEDIS BP: 135 MMHG
VAS LEFT PTA BP: 132 MMHG
VAS LEFT TBI: 0.86
VAS LEFT THIGH PRESSURE: 166 MMHG
VAS LEFT TOE PRESSURE: 119 MMHG
VAS RIGHT ABI: 0.96
VAS RIGHT ANKLE BP: 134 MMHG
VAS RIGHT ARM BP: 137 MMHG
VAS RIGHT CALF PRESSURE: 151 MMHG
VAS RIGHT DORSALIS PEDIS BP: 134 MMHG
VAS RIGHT PTA BP: 133 MMHG
VAS RIGHT TBI: 0.77
VAS RIGHT THIGH PRESSURE: 164 MMHG
VAS RIGHT TOE PRESSURE: 107 MMHG

## 2024-02-16 ENCOUNTER — TELEPHONE (OUTPATIENT)
Dept: VASCULAR SURGERY | Age: 84
End: 2024-02-16

## 2024-04-08 NOTE — PROGRESS NOTES
2024    Ernst Hanna (:  2021) is a 2 y.o. male, Established patient, here for evaluation of the following chief complaint(s):  New Patient (Ears)      Vitals:    24 1545   Temp: 97.7 °F (36.5 °C)   Weight: 16.7 kg (36 lb 12.8 oz)       Wt Readings from Last 3 Encounters:   24 16.7 kg (36 lb 12.8 oz) (78 %, Z= 0.76)*     * Growth percentiles are based on CDC (Boys, 2-20 Years) data.       BP Readings from Last 3 Encounters:   No data found for BP         SUBJECTIVE/OBJECTIVE:    Patient seen today for his ears.  Mom says he is somewhat speech delayed and this concerned about his hearing.  They attempted to flush his ears out at [facility] and now is very sensitive to his ears.  No recent ear infections.        Review of Systems   Constitutional: Negative.    HENT: Negative.     Eyes: Negative.    Respiratory: Negative.     Cardiovascular: Negative.    Gastrointestinal: Negative.    Endocrine: Negative.    Musculoskeletal: Negative.    Skin: Negative.    Allergic/Immunologic: Negative.    Neurological: Negative.    Hematological: Negative.    Psychiatric/Behavioral: Negative.          Physical Exam  Vitals reviewed.   Constitutional:       General: He is active.      Appearance: Normal appearance. He is well-developed.   HENT:      Head: Normocephalic and atraumatic.      Right Ear: Tympanic membrane, ear canal and external ear normal. There is impacted cerumen.      Left Ear: Tympanic membrane, ear canal and external ear normal. There is impacted cerumen.      Nose: Nose normal.      Mouth/Throat:      Mouth: Mucous membranes are moist.      Pharynx: Oropharynx is clear.      Tonsils: No tonsillar exudate.   Eyes:      Extraocular Movements: Extraocular movements intact.      Pupils: Pupils are equal, round, and reactive to light.   Cardiovascular:      Rate and Rhythm: Normal rate and regular rhythm.   Pulmonary:      Effort: Pulmonary effort is normal.      Breath sounds: Normal  Nurse to nurse called to Guy Garcia at [facility].  Pt  scheduled for 6:00 pm.

## 2024-08-30 NOTE — CARE COORDINATION
Endoscopy Discharge Instructions    Call [facility] with any questions or concerns.    You may be drowsy or lightheaded after receiving sedation.  DO NOT operate  a vehicle (automobile, bicycle, motorcycle, machinery, or power tools), no  alcoholic beverages, and do not make any important decisions today.                 Plan on bed rest or quiet relaxation today.  Resume normal activities in the morning.     Resume normal activity tomorrow unless otherwise advised by your physician.            Eat a light first meal, avoiding spicy and fatty foods, then resume normal diet unless  you are told otherwise by your physician.    If the intravenous medication site is painful, apply warm compresses on the site until the soreness is relieved and elevate the arm above the heart. Call your physician if no improvement  in 2-3 days.       POSSIBLE SYMPTOMS TO WATCH:     1. fever (greater than 100) 5. increased abdominal bloating   2. severe pain   6. excessive bleeding   3. nausea and vomiting  7. chest pain   4. chills    8. shortness of breath       [facility] if these problems occur     Expected as normal and remedies:  Sore throat: use over the counter throat lozenges or gargle with warm salt water.  Redness or soreness at the IV site: apply warm compress  Gaseous discomfort: belching or passing flatus (gas).    Follow up as needed.     Kal Mtat MD    With questions or concerns call Dr Matt at .                      Social Work / Discharge planning : SW attempted to meet with patient due to aware of plan from podiatry: amputation of toe and wound vac. ID on board as well. However, patient is getting on the cart and going to vascular studies. Patient is familiar with SW from previous admits to hospital. Patient resides with his spouse. Patient does not use a device and independent. Patient has a hx at [facility] and with [facility]. Patient PCP is DR Nila St and he uses [facility] in [place]. SW to follow up with patient when he returns to the unit and discuss goals at discharge.  Electronically signed by SILVIA Sanz on 4/22/19 at 10:42 AM

## 2024-12-02 ENCOUNTER — HOSPITAL ENCOUNTER (OUTPATIENT)
Dept: WOUND CARE | Age: 84
Discharge: HOME | End: 2024-12-02
Payer: MEDICARE

## 2024-12-02 VITALS
TEMPERATURE: 97 F | DIASTOLIC BLOOD PRESSURE: 67 MMHG | RESPIRATION RATE: 18 BRPM | SYSTOLIC BLOOD PRESSURE: 126 MMHG | HEART RATE: 73 BPM

## 2024-12-02 DIAGNOSIS — Z79.4 TYPE 2 DIABETES MELLITUS WITH DIABETIC PERIPHERAL ANGIOPATHY WITHOUT GANGRENE, WITH LONG-TERM CURRENT USE OF INSULIN (HCC): ICD-10-CM

## 2024-12-02 DIAGNOSIS — E11.621 DIABETIC ULCER OF RIGHT HEEL ASSOCIATED WITH TYPE 2 DIABETES MELLITUS, WITH FAT LAYER EXPOSED (HCC): Primary | ICD-10-CM

## 2024-12-02 DIAGNOSIS — Z98.62 S/P PERIPHERAL ARTERY ANGIOPLASTY: ICD-10-CM

## 2024-12-02 DIAGNOSIS — B35.9 DERMATOPHYTOSIS: ICD-10-CM

## 2024-12-02 DIAGNOSIS — L97.412 DIABETIC ULCER OF RIGHT HEEL ASSOCIATED WITH TYPE 2 DIABETES MELLITUS, WITH FAT LAYER EXPOSED (HCC): Primary | ICD-10-CM

## 2024-12-02 DIAGNOSIS — E11.51 TYPE 2 DIABETES MELLITUS WITH DIABETIC PERIPHERAL ANGIOPATHY WITHOUT GANGRENE, WITH LONG-TERM CURRENT USE OF INSULIN (HCC): ICD-10-CM

## 2024-12-02 DIAGNOSIS — I73.9 PVD (PERIPHERAL VASCULAR DISEASE) WITH CLAUDICATION (HCC): ICD-10-CM

## 2024-12-02 DIAGNOSIS — I70.234 ATHEROSCLEROSIS OF NATIVE ARTERY OF RIGHT LOWER EXTREMITY WITH ULCERATION OF HEEL (HCC): ICD-10-CM

## 2024-12-02 PROBLEM — L84 CALLUS OF FOOT: Status: RESOLVED | Noted: 2024-01-24 | Resolved: 2024-12-02

## 2024-12-02 PROCEDURE — 87205 SMEAR GRAM STAIN: CPT

## 2024-12-02 PROCEDURE — 87070 CULTURE OTHR SPECIMN AEROBIC: CPT

## 2024-12-02 PROCEDURE — 99213 OFFICE O/P EST LOW 20 MIN: CPT

## 2024-12-02 PROCEDURE — 99215 OFFICE O/P EST HI 40 MIN: CPT | Performed by: SURGERY

## 2024-12-02 PROCEDURE — 86403 PARTICLE AGGLUT ANTBDY SCRN: CPT

## 2024-12-02 PROCEDURE — 11042 DBRDMT SUBQ TIS 1ST 20SQCM/<: CPT | Performed by: SURGERY

## 2024-12-02 PROCEDURE — 11042 DBRDMT SUBQ TIS 1ST 20SQCM/<: CPT

## 2024-12-02 RX ORDER — BACITRACIN ZINC 500 [USP'U]/G
OINTMENT TOPICAL ONCE
OUTPATIENT
Start: 2024-12-02 | End: 2024-12-02

## 2024-12-02 RX ORDER — BETAMETHASONE DIPROPIONATE 0.5 MG/G
CREAM TOPICAL ONCE
OUTPATIENT
Start: 2024-12-02 | End: 2024-12-02

## 2024-12-02 RX ORDER — TERBINAFINE HYDROCHLORIDE 250 MG/1
250 TABLET ORAL DAILY
Qty: 30 TABLET | Refills: 0 | Status: SHIPPED | OUTPATIENT
Start: 2024-12-02 | End: 2025-01-01

## 2024-12-02 RX ORDER — TRIAMCINOLONE ACETONIDE 1 MG/G
OINTMENT TOPICAL ONCE
OUTPATIENT
Start: 2024-12-02 | End: 2024-12-02

## 2024-12-02 RX ORDER — SILVER SULFADIAZINE 10 MG/G
CREAM TOPICAL ONCE
OUTPATIENT
Start: 2024-12-02 | End: 2024-12-02

## 2024-12-02 RX ORDER — NEOMYCIN/BACITRACIN/POLYMYXINB 3.5-400-5K
OINTMENT (GRAM) TOPICAL ONCE
OUTPATIENT
Start: 2024-12-02 | End: 2024-12-02

## 2024-12-02 RX ORDER — CLOBETASOL PROPIONATE 0.5 MG/G
OINTMENT TOPICAL ONCE
OUTPATIENT
Start: 2024-12-02 | End: 2024-12-02

## 2024-12-02 RX ORDER — LIDOCAINE HYDROCHLORIDE 40 MG/ML
SOLUTION TOPICAL ONCE
OUTPATIENT
Start: 2024-12-02 | End: 2024-12-02

## 2024-12-02 RX ORDER — LIDOCAINE HYDROCHLORIDE 20 MG/ML
JELLY TOPICAL ONCE
OUTPATIENT
Start: 2024-12-02 | End: 2024-12-02

## 2024-12-02 RX ORDER — GENTAMICIN SULFATE 1 MG/G
OINTMENT TOPICAL ONCE
OUTPATIENT
Start: 2024-12-02 | End: 2024-12-02

## 2024-12-02 RX ORDER — MUPIROCIN 20 MG/G
OINTMENT TOPICAL ONCE
OUTPATIENT
Start: 2024-12-02 | End: 2024-12-02

## 2024-12-02 RX ORDER — LIDOCAINE 50 MG/G
OINTMENT TOPICAL ONCE
OUTPATIENT
Start: 2024-12-02 | End: 2024-12-02

## 2024-12-02 RX ORDER — SODIUM CHLOR/HYPOCHLOROUS ACID 0.033 %
SOLUTION, IRRIGATION IRRIGATION ONCE
OUTPATIENT
Start: 2024-12-02 | End: 2024-12-02

## 2024-12-02 RX ORDER — BACITRACIN ZINC AND POLYMYXIN B SULFATE 500; 1000 [USP'U]/G; [USP'U]/G
OINTMENT TOPICAL ONCE
OUTPATIENT
Start: 2024-12-02 | End: 2024-12-02

## 2024-12-02 RX ORDER — LIDOCAINE 40 MG/G
CREAM TOPICAL ONCE
OUTPATIENT
Start: 2024-12-02 | End: 2024-12-02

## 2024-12-02 RX ORDER — LIDOCAINE HYDROCHLORIDE 40 MG/ML
SOLUTION TOPICAL ONCE
Status: COMPLETED | OUTPATIENT
Start: 2024-12-02 | End: 2024-12-02

## 2024-12-02 RX ADMIN — LIDOCAINE HYDROCHLORIDE 10 ML: 40 SOLUTION TOPICAL at 08:29

## 2024-12-02 ASSESSMENT — PAIN SCALES - GENERAL: PAINLEVEL_OUTOF10: 1

## 2024-12-02 ASSESSMENT — PAIN DESCRIPTION - LOCATION: LOCATION: FOOT

## 2024-12-02 ASSESSMENT — PAIN DESCRIPTION - ORIENTATION: ORIENTATION: RIGHT

## 2024-12-02 NOTE — DISCHARGE INSTRUCTIONS
Visit Discharge/Physician Orders    Discharge condition: Stable    Assessment of pain at discharge: none    Anesthetic used: lidocaine 4%     Discharge to: Home    Left via:public transportation    Accompanied by: accompanied by self     ECF/HHA: [facility]     Dressing Orders: Cleanse wound to right heel  with normal saline, apply ALGINATE AG to wound bed and cover with Foam border dressing change daily.    Treatment Orders: 12/2 no pressure to wound- get prevalon boot for while in bed- offloading surgical shoe for walking (or custom orthotic patient was already prescribed)   12/2 Vasculars to be scheduled- to be done at [facility]   12/2  Culture taken  12/2  Lab work ordered  - please get and fax to office and send to next appt   EAT DIET WITH PROTEINS AND VITAMIN C  TAKE A MULTIVITAMIN DAILY IF NOT CONTRAINDICATED      [facility] followup visit ________1 week_____________________  (Please note your next appointment above and if you are unable to keep, kindly give a 24 hour notice. Thank you.)    Physician signature:__________________________      If you experience any of the following, please call the Wound Care Center during business hours:    * Increase in Pain  * Temperature over 101  * Increase in drainage from your wound  * Drainage with a foul odor  * Bleeding  * Increase in swelling  * Need for compression bandage changes due to slippage, breakthrough drainage.    If you need medical attention outside of the business hours of the Wound Care Centers please contact your PCP or go to the nearest emergency room.

## 2024-12-02 NOTE — PLAN OF CARE
Problem: Pain  Goal: Verbalizes/displays adequate comfort level or baseline comfort level  Outcome: Progressing     Problem: Wound:  Goal: Will show signs of wound healing; wound closure and no evidence of infection  Description: Will show signs of wound healing; wound closure and no evidence of infection  Outcome: Progressing     Problem: Pressure Ulcer:  Goal: Signs of wound healing will improve  Description: Signs of wound healing will improve  Outcome: Progressing  Goal: Absence of new pressure ulcer  Description: Absence of new pressure ulcer  Outcome: Progressing  Goal: Will show no infection signs and symptoms  Description: Will show no infection signs and symptoms  Outcome: Progressing

## 2024-12-02 NOTE — DISCHARGE INSTR - COC
Continuity of Care Form    Patient Name: Antonio Locke   :  1940  MRN:  [id number]    date:  2024    Wound Care Documentation and Therapy:  Wound 22 Foot Left;Plantar (Active)   Number of days: 947       Wound 24 Heel Right #1 (Active)   Wound Image   24   Wound Etiology Pressure Unstageable 24   Wound Length (cm) 3.5 cm 24   Wound Width (cm) 3.5 cm 24   Wound Depth (cm) 0.1 cm 24   Wound Surface Area (cm^2) 12.25 cm^2 24   Wound Volume (cm^3) 1.225 cm^3 24   Post-Procedure Length (cm) 3.7 cm 24   Post-Procedure Width (cm) 4 cm 24   Post-Procedure Depth (cm) 0.2 cm 24   Post-Procedure Surface Area (cm^2) 14.8 cm^2 24   Post-Procedure Volume (cm^3) 2.96 cm^3 24   Wound Assessment Fibrin;Pink/red;Granulation tissue 24   Drainage Amount Moderate (25-50%) 24   Drainage Description Serosanguinous 24   Odor None 24   Latonya-wound Assessment Dry/flaky 24   Wound Thickness Description not for Pressure Injury Full thickness 24   Number of days: 0       Wound 24 Pretibial Right #2 (Active)   Wound Image   24 0820   Wound Length (cm) 0.5 cm 24   Wound Width (cm) 0.1 cm 24   Wound Depth (cm) 0.1 cm 24   Wound Surface Area (cm^2) 0.05 cm^2 24   Wound Volume (cm^3) 0.005 cm^3 24   Wound Assessment Fibrin;Dry 24   Drainage Amount None (dry) 24   Odor None 24   Latonya-wound Assessment Dry/flaky 24   Number of days: 0

## 2024-12-02 NOTE — PROGRESS NOTES
Gastrointestinal   [] Shortness of breath with exertion [] Abdominal Pain   Ulcer on the plantar surface right heel with extensive tinea pedis dermatophytosis, peripheral vascular disease status post right antitibial artery and popliteal balloon angioplasty with DCB by Dr. Larsen in 2019, diabetes mellitus type 2, hypertension [] Melena       [] Hematochezia               Objective:    /67   Pulse 73   Temp 97 °F (36.1 °C)   Resp 18   Wt Readings from Last 3 Encounters:   01/24/24 63.5 kg (140 lb)   10/10/23 65.8 kg (145 lb)   08/14/23 65.8 kg (145 lb)       PHYSICAL EXAM  CONSTITUTIONAL:   Awake, alert, cooperative  PSYCHIATRIC :  Oriented to time, place and person      normal insight to disease process  ENT:  External ears and nose without lesions    Hearing deficits is not noted  NECK: Supple, symmetrical, trachea midline    Thyroid goiter not appreciated    Carotid bruit is not noted bilaterally  LUNGS:  No increased work of breathing                  Clear to auscultation bilaterally   CARDIOVASCULAR:  regular rate and rhythm   ABDOMEN:  soft, non-distended, non-tender    Hernias is not noted   Aorta is not palpable   Lymphatics : Cervical lymphadenopathy is not noted     Femoral lymphadenopathy is not noted  SKIN:   Skin color is normal   Texture and turgor is  normal   Induration is not noted  EXTREMITIES:   R UE Edema is not noted  L UE Edema is not noted  R LE Edema is not noted  L LE Edema is not noted  R femoral 2 L femoral 2   R dorsalis pedis 1 L dorsalis pedis 1   R posterior tibial 1 L posterior tibial 1     Assessment:     Problem List Items Addressed This Visit       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

## 2024-12-03 NOTE — DISCHARGE INSTRUCTIONS
Visit Discharge/Physician Orders     Discharge condition: Stable     Assessment of pain at discharge: none     Anesthetic used: lidocaine 4%      Discharge to: Home     Left via:public transportation     Accompanied by: accompanied by self      ECF/HHA: [facility]      Dressing Orders: Cleanse wound to right heel  with normal saline, apply ALGINATE AG to wound bed and cover with Foam border dressing change daily.     Treatment Orders: 12/2 no pressure to wound- get prevalon boot for while in bed- offloading surgical shoe for walking (or custom orthotic was already prescribed)   12/23 Vasculars reviewed   12/2  Culture taken- 12/23 Doxycyline should be complete   12/2  Lab work ordered  - please get and fax to office and send to next appt [phone]     EAT DIET WITH PROTEINS AND VITAMIN C  TAKE A MULTIVITAMIN DAILY IF NOT CONTRAINDICATED       [facility] followup visit ________1 week_____________________  (Please note your next appointment above and if you are unable to keep, kindly give a 24 hour notice. Thank you.)     Physician signature:__________________________        If you experience any of the following, please call the Wound Care Center during business hours:     * Increase in Pain  * Temperature over 101  * Increase in drainage from your wound  * Drainage with a foul odor  * Bleeding  * Increase in swelling  * Need for compression bandage changes due to slippage, breakthrough drainage.     If you need medical attention outside of the business hours of the Wound Care Centers please contact your PCP or go to the nearest emergency room.

## 2024-12-04 ENCOUNTER — TELEPHONE (OUTPATIENT)
Dept: WOUND CARE | Age: 84
End: 2024-12-04

## 2024-12-04 DIAGNOSIS — L97.412 DIABETIC ULCER OF RIGHT HEEL ASSOCIATED WITH TYPE 2 DIABETES MELLITUS, WITH FAT LAYER EXPOSED (HCC): Primary | ICD-10-CM

## 2024-12-04 DIAGNOSIS — E11.621 DIABETIC ULCER OF RIGHT HEEL ASSOCIATED WITH TYPE 2 DIABETES MELLITUS, WITH FAT LAYER EXPOSED (HCC): Primary | ICD-10-CM

## 2024-12-04 LAB
MICROORGANISM SPEC CULT: ABNORMAL
MICROORGANISM SPEC CULT: ABNORMAL
MICROORGANISM/AGENT SPEC: ABNORMAL
SERVICE CMNT-IMP: ABNORMAL
SPECIMEN DESCRIPTION: ABNORMAL

## 2024-12-04 RX ORDER — DOXYCYCLINE HYCLATE 100 MG
100 TABLET ORAL 2 TIMES DAILY
Qty: 20 TABLET | Refills: 0 | Status: SHIPPED | OUTPATIENT
Start: 2024-12-04 | End: 2024-12-14

## 2024-12-04 NOTE — TELEPHONE ENCOUNTER
Dr. Felix reviewed wound culture results. New order noted for Doxycycline 100 mg twice a day for 10 days. [facility] nursing home[facility], spoke with his nurse Jonna and notified of antibiotic order to start. Nurse read back order. Also stated patient is to be getting lab work that was ordered tomorrow, will review when patient comes Monday.

## 2024-12-18 ENCOUNTER — HOSPITAL ENCOUNTER (OUTPATIENT)
Dept: INTERVENTIONAL RADIOLOGY/VASCULAR | Age: 84
Discharge: HOME | End: 2024-12-20
Attending: SURGERY
Payer: MEDICARE

## 2024-12-18 DIAGNOSIS — I70.234 ATHEROSCLEROSIS OF NATIVE ARTERY OF RIGHT LOWER EXTREMITY WITH ULCERATION OF HEEL (HCC): ICD-10-CM

## 2024-12-18 DIAGNOSIS — I73.9 PVD (PERIPHERAL VASCULAR DISEASE) WITH CLAUDICATION (HCC): ICD-10-CM

## 2024-12-18 DIAGNOSIS — Z98.62 S/P PERIPHERAL ARTERY ANGIOPLASTY: ICD-10-CM

## 2024-12-18 DIAGNOSIS — B35.9 DERMATOPHYTOSIS: ICD-10-CM

## 2024-12-18 PROCEDURE — 93923 UPR/LXTR ART STDY 3+ LVLS: CPT | Performed by: SURGERY

## 2024-12-18 PROCEDURE — 93923 UPR/LXTR ART STDY 3+ LVLS: CPT

## 2024-12-20 ENCOUNTER — CLINICAL DOCUMENTATION (OUTPATIENT)
Dept: VASCULAR SURGERY | Age: 84
End: 2024-12-20

## 2024-12-20 DIAGNOSIS — I73.9 PVD (PERIPHERAL VASCULAR DISEASE) WITH CLAUDICATION (HCC): Primary | ICD-10-CM

## 2024-12-20 NOTE — PROGRESS NOTES
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     Bilaterally satisfactory arterial flow noted to both feet based upon the pulse  volume recordings over the metatarsals and the remaining toes    Will discuss with the patient the testicles when he comes to see me at the wound care center

## 2024-12-23 ENCOUNTER — HOSPITAL ENCOUNTER (OUTPATIENT)
Dept: WOUND CARE | Age: 84
Discharge: HOME | End: 2024-12-23
Payer: MEDICARE

## 2024-12-23 VITALS
HEART RATE: 70 BPM | SYSTOLIC BLOOD PRESSURE: 131 MMHG | RESPIRATION RATE: 18 BRPM | TEMPERATURE: 96.7 F | DIASTOLIC BLOOD PRESSURE: 96 MMHG

## 2024-12-23 DIAGNOSIS — L97.412 DIABETIC ULCER OF RIGHT HEEL ASSOCIATED WITH TYPE 2 DIABETES MELLITUS, WITH FAT LAYER EXPOSED (HCC): Primary | ICD-10-CM

## 2024-12-23 DIAGNOSIS — B35.9 DERMATOPHYTOSIS: ICD-10-CM

## 2024-12-23 DIAGNOSIS — Z98.62 S/P PERIPHERAL ARTERY ANGIOPLASTY: ICD-10-CM

## 2024-12-23 DIAGNOSIS — I73.9 PVD (PERIPHERAL VASCULAR DISEASE) WITH CLAUDICATION (HCC): ICD-10-CM

## 2024-12-23 DIAGNOSIS — I70.234 ATHEROSCLEROSIS OF NATIVE ARTERY OF RIGHT LOWER EXTREMITY WITH ULCERATION OF HEEL (HCC): ICD-10-CM

## 2024-12-23 DIAGNOSIS — E11.621 DIABETIC ULCER OF RIGHT HEEL ASSOCIATED WITH TYPE 2 DIABETES MELLITUS, WITH FAT LAYER EXPOSED (HCC): Primary | ICD-10-CM

## 2024-12-23 PROCEDURE — 11042 DBRDMT SUBQ TIS 1ST 20SQCM/<: CPT | Performed by: SURGERY

## 2024-12-23 PROCEDURE — 11042 DBRDMT SUBQ TIS 1ST 20SQCM/<: CPT

## 2024-12-23 PROCEDURE — 99212 OFFICE O/P EST SF 10 MIN: CPT | Performed by: SURGERY

## 2024-12-23 RX ORDER — BACITRACIN ZINC AND POLYMYXIN B SULFATE 500; 1000 [USP'U]/G; [USP'U]/G
OINTMENT TOPICAL ONCE
OUTPATIENT
Start: 2024-12-23 | End: 2024-12-23

## 2024-12-23 RX ORDER — LIDOCAINE HYDROCHLORIDE 40 MG/ML
SOLUTION TOPICAL ONCE
Status: COMPLETED | OUTPATIENT
Start: 2024-12-23 | End: 2024-12-23

## 2024-12-23 RX ORDER — TRIAMCINOLONE ACETONIDE 1 MG/G
OINTMENT TOPICAL ONCE
OUTPATIENT
Start: 2024-12-23 | End: 2024-12-23

## 2024-12-23 RX ORDER — CLOBETASOL PROPIONATE 0.5 MG/G
OINTMENT TOPICAL ONCE
OUTPATIENT
Start: 2024-12-23 | End: 2024-12-23

## 2024-12-23 RX ORDER — LIDOCAINE HYDROCHLORIDE 20 MG/ML
JELLY TOPICAL ONCE
OUTPATIENT
Start: 2024-12-23 | End: 2024-12-23

## 2024-12-23 RX ORDER — LIDOCAINE 50 MG/G
OINTMENT TOPICAL ONCE
OUTPATIENT
Start: 2024-12-23 | End: 2024-12-23

## 2024-12-23 RX ORDER — BETAMETHASONE DIPROPIONATE 0.5 MG/G
CREAM TOPICAL ONCE
OUTPATIENT
Start: 2024-12-23 | End: 2024-12-23

## 2024-12-23 RX ORDER — SODIUM CHLOR/HYPOCHLOROUS ACID 0.033 %
SOLUTION, IRRIGATION IRRIGATION ONCE
OUTPATIENT
Start: 2024-12-23 | End: 2024-12-23

## 2024-12-23 RX ORDER — SILVER SULFADIAZINE 10 MG/G
CREAM TOPICAL ONCE
OUTPATIENT
Start: 2024-12-23 | End: 2024-12-23

## 2024-12-23 RX ORDER — LIDOCAINE HYDROCHLORIDE 40 MG/ML
SOLUTION TOPICAL ONCE
OUTPATIENT
Start: 2024-12-23 | End: 2024-12-23

## 2024-12-23 RX ORDER — MUPIROCIN 20 MG/G
OINTMENT TOPICAL ONCE
OUTPATIENT
Start: 2024-12-23 | End: 2024-12-23

## 2024-12-23 RX ORDER — LIDOCAINE 40 MG/G
CREAM TOPICAL ONCE
OUTPATIENT
Start: 2024-12-23 | End: 2024-12-23

## 2024-12-23 RX ORDER — NEOMYCIN/BACITRACIN/POLYMYXINB 3.5-400-5K
OINTMENT (GRAM) TOPICAL ONCE
OUTPATIENT
Start: 2024-12-23 | End: 2024-12-23

## 2024-12-23 RX ORDER — BACITRACIN ZINC 500 [USP'U]/G
OINTMENT TOPICAL ONCE
OUTPATIENT
Start: 2024-12-23 | End: 2024-12-23

## 2024-12-23 RX ORDER — GENTAMICIN SULFATE 1 MG/G
OINTMENT TOPICAL ONCE
OUTPATIENT
Start: 2024-12-23 | End: 2024-12-23

## 2024-12-23 RX ADMIN — LIDOCAINE HYDROCHLORIDE 10 ML: 40 SOLUTION TOPICAL at 08:19

## 2024-12-23 NOTE — PROGRESS NOTES
Wound Healing Center Followup Visit Note    Referring Physician : Farhad Hadley MD  Antonio Locke  MEDICAL RECORD NUMBER:  [id number]  AGE: 84 y.o.   GENDER: male  : 1940  EPISODE DATE:  2024    Subjective:     Chief Complaint   Patient presents with    Wound Check      HISTORY of PRESENT ILLNESS HPI   Antonio Locke is a 84 y.o. male who presents today in regards to follow up evaluation and treatment of wound/ulcer.  That patient's past medical, family and social hx were reviewed and changes were made if present.      History of Wound Context:  The patient has had a wound of plantar surface of right heel which was first noted approximately 2024 according the patient.  This has been treated by the nursing staff at the facility.  On their initial visit to the wound healing center, 24, the patient has noted that the wound has not been improving.  The patient has had similar previous wounds in the past.          This patient is known to have diabetes mellitus, diabetic neuropathy with the distal tibial disease has undergone angioplasty by Dr. Larsen in 2019, including angioplasty of the anterior tibial artery and the popliteal artery with DCB in the past, does have offloading custom made inserts for bilaterally, currently not wearing on the right side, tells me the ulcer started at least a month ago being treated at the facility     Denies any history of fever or chills     Pt is currently not on abx.         2024  Right heel ulcer stable, surrounding skin necrosis etc. sloughed off came out easily      Discussed with patient regarding the lower extremity artery Doppler study as outlined below    The lower extremity arterial Doppler study was personally reviewed by me     Normal ankle arm index, with biphasic right ankle Doppler tracings and almost  triphasic left ankle Doppler tracings, indicating of mild femoral-popliteal  arterial occlusive disease on the right side.

## 2024-12-23 NOTE — PROGRESS NOTES
Orthotic prescription, face sheet and recent notes sent to [facility] for new orthotics with confirmation back. ([phone])

## 2024-12-26 NOTE — DISCHARGE INSTRUCTIONS
Visit Discharge/Physician Orders     Discharge condition: Stable     Assessment of pain at discharge: none     Anesthetic used: lidocaine 4%      Discharge to: Home     Left via:public transportation     Accompanied by: accompanied by self      ECF/HHA: [facility]      Dressing Orders: Cleanse wound to right heel  with normal saline, apply ALGINATE AG to wound bed and cover with Foam border dressing change daily.     Treatment Orders: 12/2 no pressure to wound- get prevalon boot for while in bed- offloading surgical shoe for walking (or custom orthotic was already prescribed)   12/30 Orthotic Prescription sent to [facility]   12/23 Vasculars reviewed   12/2  Culture taken- 12/23 Doxycyline should be complete   12/2  Lab work ordered  - please get and fax to office and send to next appt [phone]   EAT DIET WITH PROTEINS AND VITAMIN C  TAKE A MULTIVITAMIN DAILY IF NOT CONTRAINDICATED       [facility] followup visit ________1 week_____________________  (Please note your next appointment above and if you are unable to keep, kindly give a 24 hour notice. Thank you.)     Physician signature:__________________________        If you experience any of the following, please call the Wound Care Center during business hours:     * Increase in Pain  * Temperature over 101  * Increase in drainage from your wound  * Drainage with a foul odor  * Bleeding  * Increase in swelling  * Need for compression bandage changes due to slippage, breakthrough drainage.     If you need medical attention outside of the business hours of the Wound Care Centers please contact your PCP or go to the nearest emergency room.

## 2024-12-30 ENCOUNTER — HOSPITAL ENCOUNTER (OUTPATIENT)
Dept: WOUND CARE | Age: 84
Discharge: HOME | End: 2024-12-30
Payer: MEDICARE

## 2024-12-30 VITALS
SYSTOLIC BLOOD PRESSURE: 118 MMHG | TEMPERATURE: 96.8 F | RESPIRATION RATE: 16 BRPM | BODY MASS INDEX: 19.26 KG/M2 | WEIGHT: 130 LBS | HEIGHT: 69 IN | HEART RATE: 63 BPM | DIASTOLIC BLOOD PRESSURE: 62 MMHG

## 2024-12-30 DIAGNOSIS — L97.412 DIABETIC ULCER OF RIGHT HEEL ASSOCIATED WITH TYPE 2 DIABETES MELLITUS, WITH FAT LAYER EXPOSED (HCC): Primary | ICD-10-CM

## 2024-12-30 DIAGNOSIS — E11.621 DIABETIC ULCER OF RIGHT HEEL ASSOCIATED WITH TYPE 2 DIABETES MELLITUS, WITH FAT LAYER EXPOSED (HCC): Primary | ICD-10-CM

## 2024-12-30 PROCEDURE — 11042 DBRDMT SUBQ TIS 1ST 20SQCM/<: CPT | Performed by: SURGERY

## 2024-12-30 PROCEDURE — 11042 DBRDMT SUBQ TIS 1ST 20SQCM/<: CPT

## 2024-12-30 RX ORDER — LIDOCAINE HYDROCHLORIDE 20 MG/ML
JELLY TOPICAL ONCE
OUTPATIENT
Start: 2024-12-30 | End: 2024-12-30

## 2024-12-30 RX ORDER — SILVER SULFADIAZINE 10 MG/G
CREAM TOPICAL ONCE
OUTPATIENT
Start: 2024-12-30 | End: 2024-12-30

## 2024-12-30 RX ORDER — TRIAMCINOLONE ACETONIDE 1 MG/G
OINTMENT TOPICAL ONCE
OUTPATIENT
Start: 2024-12-30 | End: 2024-12-30

## 2024-12-30 RX ORDER — BACITRACIN ZINC AND POLYMYXIN B SULFATE 500; 1000 [USP'U]/G; [USP'U]/G
OINTMENT TOPICAL ONCE
OUTPATIENT
Start: 2024-12-30 | End: 2024-12-30

## 2024-12-30 RX ORDER — CLOBETASOL PROPIONATE 0.5 MG/G
OINTMENT TOPICAL ONCE
OUTPATIENT
Start: 2024-12-30 | End: 2024-12-30

## 2024-12-30 RX ORDER — LIDOCAINE 50 MG/G
OINTMENT TOPICAL ONCE
OUTPATIENT
Start: 2024-12-30 | End: 2024-12-30

## 2024-12-30 RX ORDER — NEOMYCIN/BACITRACIN/POLYMYXINB 3.5-400-5K
OINTMENT (GRAM) TOPICAL ONCE
OUTPATIENT
Start: 2024-12-30 | End: 2024-12-30

## 2024-12-30 RX ORDER — BACITRACIN ZINC 500 [USP'U]/G
OINTMENT TOPICAL ONCE
OUTPATIENT
Start: 2024-12-30 | End: 2024-12-30

## 2024-12-30 RX ORDER — LIDOCAINE HYDROCHLORIDE 40 MG/ML
SOLUTION TOPICAL ONCE
Status: DISCONTINUED | OUTPATIENT
Start: 2024-12-30 | End: 2024-12-31 | Stop reason: HOSPADM

## 2024-12-30 RX ORDER — LIDOCAINE 40 MG/G
CREAM TOPICAL ONCE
OUTPATIENT
Start: 2024-12-30 | End: 2024-12-30

## 2024-12-30 RX ORDER — GENTAMICIN SULFATE 1 MG/G
OINTMENT TOPICAL ONCE
OUTPATIENT
Start: 2024-12-30 | End: 2024-12-30

## 2024-12-30 RX ORDER — MUPIROCIN 20 MG/G
OINTMENT TOPICAL ONCE
OUTPATIENT
Start: 2024-12-30 | End: 2024-12-30

## 2024-12-30 RX ORDER — SODIUM CHLOR/HYPOCHLOROUS ACID 0.033 %
SOLUTION, IRRIGATION IRRIGATION ONCE
OUTPATIENT
Start: 2024-12-30 | End: 2024-12-30

## 2024-12-30 RX ORDER — BETAMETHASONE DIPROPIONATE 0.5 MG/G
CREAM TOPICAL ONCE
OUTPATIENT
Start: 2024-12-30 | End: 2024-12-30

## 2024-12-30 RX ORDER — LIDOCAINE HYDROCHLORIDE 40 MG/ML
SOLUTION TOPICAL ONCE
OUTPATIENT
Start: 2024-12-30 | End: 2024-12-30

## 2024-12-30 NOTE — PROGRESS NOTES
0843   Latonya-wound Assessment Dry/flaky 12/30/24 0843   Number of days: 28          Procedure Note  Indications:  Based on my examination of this patient's wound(s)/ulcer(s) today, debridement is required to promote healing and evaluate the wound base.    Performed by: Jacques Felix MD    Consent obtained:  Yes    Time out taken:  Yes    Pain Control: Anesthetic  Anesthetic: 4% Lidocaine Liquid Topical     Debridement:Excisional Debridement    Using curette the wound(s)/ulcer(s) was/were sharply debrided down through and including the removal of epidermis, dermis, and subcutaneous tissue.        Devitalized Tissue Debrided:  fibrin and slough to stimulate bleeding to promote healing, post debridement good bleeding base and wound edges noted    Wound/Ulcer #: 1    Percent of Wound/Ulcer Debrided: 90%    Total Surface Area Debrided:  1.5 sq cm     Estimated Blood Loss:  Minimal  Hemostasis Achieved:  by pressure    Procedural Pain:  5  / 10   Post Procedural Pain:  4 / 10     Response to treatment:  Well tolerated by patient.     Plan:   Treatment Note please see attached Discharge Instructions    Written patient dismissal instructions given to patient and signed by patient or POA.         Discharge Instructions         Visit Discharge/Physician Orders     Discharge condition: Stable     Assessment of pain at discharge: none     Anesthetic used: lidocaine 4%      Discharge to: Home     Left via:public transportation     Accompanied by: accompanied by self      [facility]: [facility]      Dressing Orders: Cleanse wound to right heel  with normal saline, apply ALGINATE AG to wound bed and cover with Foam border dressing change daily.     Treatment Orders: 12/2 no pressure to wound- get prevalon boot for while in bed- offloading surgical shoe for walking (or custom orthotic was already prescribed)   12/30 Orthotic Prescription sent to [facility]   12/23 Vasculars reviewed   12/2  Culture taken- 12/23

## 2025-01-13 ENCOUNTER — HOSPITAL ENCOUNTER (OUTPATIENT)
Dept: WOUND CARE | Age: 85
Discharge: HOME | End: 2025-01-13
Payer: MEDICARE

## 2025-01-13 VITALS
DIASTOLIC BLOOD PRESSURE: 83 MMHG | RESPIRATION RATE: 18 BRPM | BODY MASS INDEX: 19.26 KG/M2 | TEMPERATURE: 97.8 F | WEIGHT: 130 LBS | HEIGHT: 69 IN | HEART RATE: 68 BPM | SYSTOLIC BLOOD PRESSURE: 150 MMHG

## 2025-01-13 DIAGNOSIS — E11.621 DIABETIC ULCER OF RIGHT HEEL ASSOCIATED WITH TYPE 2 DIABETES MELLITUS, WITH FAT LAYER EXPOSED (HCC): Primary | ICD-10-CM

## 2025-01-13 DIAGNOSIS — L97.412 DIABETIC ULCER OF RIGHT HEEL ASSOCIATED WITH TYPE 2 DIABETES MELLITUS, WITH FAT LAYER EXPOSED (HCC): Primary | ICD-10-CM

## 2025-01-13 PROCEDURE — 11042 DBRDMT SUBQ TIS 1ST 20SQCM/<: CPT | Performed by: SURGERY

## 2025-01-13 PROCEDURE — 11042 DBRDMT SUBQ TIS 1ST 20SQCM/<: CPT

## 2025-01-13 RX ORDER — LIDOCAINE 50 MG/G
OINTMENT TOPICAL ONCE
OUTPATIENT
Start: 2025-01-13 | End: 2025-01-13

## 2025-01-13 RX ORDER — BETAMETHASONE DIPROPIONATE 0.5 MG/G
CREAM TOPICAL ONCE
OUTPATIENT
Start: 2025-01-13 | End: 2025-01-13

## 2025-01-13 RX ORDER — CLOBETASOL PROPIONATE 0.5 MG/G
OINTMENT TOPICAL ONCE
OUTPATIENT
Start: 2025-01-13 | End: 2025-01-13

## 2025-01-13 RX ORDER — LIDOCAINE HYDROCHLORIDE 40 MG/ML
SOLUTION TOPICAL ONCE
OUTPATIENT
Start: 2025-01-13 | End: 2025-01-13

## 2025-01-13 RX ORDER — SODIUM CHLOR/HYPOCHLOROUS ACID 0.033 %
SOLUTION, IRRIGATION IRRIGATION ONCE
OUTPATIENT
Start: 2025-01-13 | End: 2025-01-13

## 2025-01-13 RX ORDER — BACITRACIN ZINC AND POLYMYXIN B SULFATE 500; 1000 [USP'U]/G; [USP'U]/G
OINTMENT TOPICAL ONCE
OUTPATIENT
Start: 2025-01-13 | End: 2025-01-13

## 2025-01-13 RX ORDER — MEGESTROL ACETATE 40 MG/ML
200 SUSPENSION ORAL DAILY
COMMUNITY

## 2025-01-13 RX ORDER — SILVER SULFADIAZINE 10 MG/G
CREAM TOPICAL ONCE
OUTPATIENT
Start: 2025-01-13 | End: 2025-01-13

## 2025-01-13 RX ORDER — LIDOCAINE 40 MG/G
CREAM TOPICAL ONCE
OUTPATIENT
Start: 2025-01-13 | End: 2025-01-13

## 2025-01-13 RX ORDER — LIDOCAINE HYDROCHLORIDE 20 MG/ML
JELLY TOPICAL ONCE
OUTPATIENT
Start: 2025-01-13 | End: 2025-01-13

## 2025-01-13 RX ORDER — LIDOCAINE HYDROCHLORIDE 40 MG/ML
SOLUTION TOPICAL ONCE
Status: COMPLETED | OUTPATIENT
Start: 2025-01-13 | End: 2025-01-13

## 2025-01-13 RX ORDER — MUPIROCIN 20 MG/G
OINTMENT TOPICAL ONCE
OUTPATIENT
Start: 2025-01-13 | End: 2025-01-13

## 2025-01-13 RX ORDER — NEOMYCIN/BACITRACIN/POLYMYXINB 3.5-400-5K
OINTMENT (GRAM) TOPICAL ONCE
OUTPATIENT
Start: 2025-01-13 | End: 2025-01-13

## 2025-01-13 RX ORDER — GENTAMICIN SULFATE 1 MG/G
OINTMENT TOPICAL ONCE
OUTPATIENT
Start: 2025-01-13 | End: 2025-01-13

## 2025-01-13 RX ORDER — TRIAMCINOLONE ACETONIDE 1 MG/G
OINTMENT TOPICAL ONCE
OUTPATIENT
Start: 2025-01-13 | End: 2025-01-13

## 2025-01-13 RX ORDER — BACITRACIN ZINC 500 [USP'U]/G
OINTMENT TOPICAL ONCE
OUTPATIENT
Start: 2025-01-13 | End: 2025-01-13

## 2025-01-13 RX ADMIN — LIDOCAINE HYDROCHLORIDE 10 ML: 40 SOLUTION TOPICAL at 11:08

## 2025-01-13 NOTE — DISCHARGE INSTRUCTIONS
Visit Discharge/Physician Orders     Discharge condition: Stable     Assessment of pain at discharge: none     Anesthetic used: lidocaine 4%      Discharge to: Home     Left via:public transportation     Accompanied by: accompanied by self      ECF/HHA: [facility]      Dressing Orders: Cleanse wound to right heel  with normal saline, apply ALGINATE AG to wound bed and cover with Foam border dressing change daily.     Treatment Orders: 1/13 Patient to wear a prevalon boot while in bed to offload pressure to heel   1/13 Apply Aquaphor to legs daily- avoiding wounds   12/2 no pressure to wound- get prevalon boot for while in bed- offloading surgical shoe for walking (or custom orthotic was already prescribed)   12/30 Orthotic Prescription sent to [facility] set for 1/24 at 815am   12/23 Vasculars reviewed   EAT DIET WITH PROTEINS AND VITAMIN C  TAKE A MULTIVITAMIN DAILY IF NOT CONTRAINDICATED       [facility] followup visit ________1 week_____________________  (Please note your next appointment above and if you are unable to keep, kindly give a 24 hour notice. Thank you.)     Physician signature:__________________________        If you experience any of the following, please call the Wound Care Center during business hours:     * Increase in Pain  * Temperature over 101  * Increase in drainage from your wound  * Drainage with a foul odor  * Bleeding  * Increase in swelling  * Need for compression bandage changes due to slippage, breakthrough drainage.     If you need medical attention outside of the business hours of the Wound Care Centers please contact your PCP or go to the nearest emergency room.

## 2025-01-13 NOTE — PROGRESS NOTES
01/13/25 1105   Wound Surface Area (cm^2) 0.24 cm^2 01/13/25 1105   Change in Wound Size % (l*w) -380 01/13/25 1105   Wound Volume (cm^3) 0.024 cm^3 01/13/25 1105   Wound Healing % -380 01/13/25 1105   Wound Assessment Dry;Fibrin 01/13/25 1105   Drainage Amount None (dry) 01/13/25 1105   Odor None 01/13/25 1105   Latonya-wound Assessment Dry/flaky 01/13/25 1105   Number of days: 42          Procedure Note  Indications:  Based on my examination of this patient's wound(s)/ulcer(s) today, debridement is required to promote healing and evaluate the wound base.    Performed by: Jacques Felix MD    Consent obtained:  Yes    Time out taken:  Yes    Pain Control: Anesthetic  Anesthetic: 4% Lidocaine Liquid Topical     Debridement:Excisional Debridement    Using curette the wound(s)/ulcer(s) was/were sharply debrided down through and including the removal of epidermis, dermis, and subcutaneous tissue.        Devitalized Tissue Debrided:  fibrin and slough to stimulate bleeding to promote healing, post debridement good bleeding base and wound edges noted    Wound/Ulcer #: 1, 2    Percent of Wound/Ulcer Debrided: 90%    Total Surface Area Debrided:  2.5 sq cm     Estimated Blood Loss:  Minimal  Hemostasis Achieved:  by pressure    Procedural Pain:  5  / 10   Post Procedural Pain:  4 / 10     Response to treatment:  Well tolerated by patient.     Plan:   Treatment Note please see attached Discharge Instructions    Written patient dismissal instructions given to patient and signed by patient or POA.         Discharge Instructions         Visit Discharge/Physician Orders     Discharge condition: Stable     Assessment of pain at discharge: none     Anesthetic used: lidocaine 4%      Discharge to: Home     Left via:public transportation     Accompanied by: accompanied by self      ECF/HHA: [facility] at [facility]      Dressing Orders: Cleanse wound to right heel  with normal saline, apply ALGINATE AG to wound bed and cover

## 2025-01-13 NOTE — DISCHARGE INSTR - COC
(cm^3) 0.024 cm^3 01/13/25 1105   Wound Healing % -380 01/13/25 1105   Wound Assessment Dry;Fibrin 01/13/25 1105   Drainage Amount None (dry) 01/13/25 1105   Odor None 01/13/25 1105   Latonya-wound Assessment Dry/flaky 01/13/25 1105   Number of days: 42

## 2025-01-15 NOTE — DISCHARGE INSTRUCTIONS
Visit Discharge/Physician Orders     Discharge condition: Stable     Assessment of pain at discharge: none     Anesthetic used: lidocaine 4%      Discharge to: Home     Left via:public transportation     Accompanied by: accompanied by self      ECF/HHA: [facility] at [facility]      Dressing Orders: Cleanse wound to right heel  with normal saline, apply ALGINATE AG to wound bed and cover with Foam border dressing change daily.     Treatment Orders: 1/13 Patient to wear a prevalon boot while in bed to offload pressure to heel   1/13 Apply Aquaphor to legs daily- avoiding wounds   12/2 no pressure to wound- get prevalon boot for while in bed- offloading surgical shoe for walking (or custom orthotic was already prescribed)   12/30 Orthotic Prescription sent to [facility] set for 1/24 at 815am   12/23 Vasculars reviewed   EAT DIET WITH PROTEINS AND VITAMIN C  TAKE A MULTIVITAMIN DAILY IF NOT CONTRAINDICATED       [facility] followup visit ________1 week_____________________  (Please note your next appointment above and if you are unable to keep, kindly give a 24 hour notice. Thank you.)     Physician signature:__________________________        If you experience any of the following, please call the Wound Care Center during business hours:     * Increase in Pain  * Temperature over 101  * Increase in drainage from your wound  * Drainage with a foul odor  * Bleeding  * Increase in swelling  * Need for compression bandage changes due to slippage, breakthrough drainage.     If you need medical attention outside of the business hours of the Wound Care Centers please contact your PCP or go to the nearest emergency room.

## 2025-01-20 ENCOUNTER — HOSPITAL ENCOUNTER (OUTPATIENT)
Dept: WOUND CARE | Age: 85
Discharge: HOME | End: 2025-01-20
Attending: SURGERY

## 2025-02-21 NOTE — PROGRESS NOTES
Airway    Performed by: Doug Sanchez CRNA  Authorized by: Doug Sanchez CRNA    Final Airway Type:  Supraglottic airway  Final Supraglottic Airway:  Classic  SGA Size*:  5  Attempts*:  1  Number of Other Approaches Attempted:  0   Patient Identified, Procedure confirmed, Emergency equipment available and Safety protocols followed  Location:  OR  Urgency:  Elective  Difficult Airway: No    Indications for Airway Management:  Anesthesia  Spontaneous Ventilation: absent    Sedation Level:  Anesthetized  MILS Maintained Throughout: No    Mask Difficulty Assessment:  1 - vent by mask  Start Time: 2/21/2025 9:28 AM       [facility] here to transport pt with belongings to [facility].

## 2025-04-18 ENCOUNTER — APPOINTMENT (OUTPATIENT)
Dept: GENERAL RADIOLOGY | Age: 85
DRG: 481 | End: 2025-04-18
Payer: MEDICARE

## 2025-04-18 ENCOUNTER — HOSPITAL ENCOUNTER (INPATIENT)
Age: 85
LOS: 6 days | Discharge: NURSING, REHAB OR LONG-TERM CARE | DRG: 481 | End: 2025-04-24
Attending: EMERGENCY MEDICINE | Admitting: INTERNAL MEDICINE
Payer: MEDICARE

## 2025-04-18 ENCOUNTER — APPOINTMENT (OUTPATIENT)
Dept: CT IMAGING | Age: 85
DRG: 481 | End: 2025-04-18
Payer: MEDICARE

## 2025-04-18 DIAGNOSIS — M97.8XXA PERIPROSTHETIC FRACTURE OF SHAFT OF FEMUR: Primary | ICD-10-CM

## 2025-04-18 DIAGNOSIS — Z96.649 PERIPROSTHETIC FRACTURE OF SHAFT OF FEMUR: Primary | ICD-10-CM

## 2025-04-18 DIAGNOSIS — G89.18 POST-OP PAIN: ICD-10-CM

## 2025-04-18 PROBLEM — S72.001A CLOSED RIGHT HIP FRACTURE, INITIAL ENCOUNTER (HCC): Status: ACTIVE | Noted: 2025-04-18

## 2025-04-18 LAB
ANION GAP SERPL CALCULATED.3IONS-SCNC: 12 MMOL/L (ref 7–16)
BASOPHILS # BLD: 0.05 K/UL (ref 0–0.2)
BASOPHILS NFR BLD: 0 % (ref 0–2)
BUN SERPL-MCNC: 32 MG/DL (ref 8–23)
CALCIUM SERPL-MCNC: 10.3 MG/DL (ref 8.8–10.2)
CHLORIDE SERPL-SCNC: 104 MMOL/L (ref 98–107)
CO2 SERPL-SCNC: 21 MMOL/L (ref 22–29)
CREAT SERPL-MCNC: 1.2 MG/DL (ref 0.7–1.2)
EOSINOPHIL # BLD: 0.06 K/UL (ref 0.05–0.5)
EOSINOPHILS RELATIVE PERCENT: 0 % (ref 0–6)
ERYTHROCYTE [DISTWIDTH] IN BLOOD BY AUTOMATED COUNT: 13.3 % (ref 11.5–15)
GFR, ESTIMATED: 59 ML/MIN/1.73M2
GLUCOSE BLD-MCNC: 294 MG/DL (ref 74–99)
GLUCOSE SERPL-MCNC: 225 MG/DL (ref 74–99)
HCT VFR BLD AUTO: 31.8 % (ref 37–54)
HGB BLD-MCNC: 11.2 G/DL (ref 12.5–16.5)
IMM GRANULOCYTES # BLD AUTO: 0.08 K/UL (ref 0–0.58)
IMM GRANULOCYTES NFR BLD: 1 % (ref 0–5)
INR PPP: 1.2
LYMPHOCYTES NFR BLD: 1.11 K/UL (ref 1.5–4)
LYMPHOCYTES RELATIVE PERCENT: 8 % (ref 20–42)
MCH RBC QN AUTO: 31.6 PG (ref 26–35)
MCHC RBC AUTO-ENTMCNC: 35.2 G/DL (ref 32–34.5)
MCV RBC AUTO: 89.8 FL (ref 80–99.9)
MONOCYTES NFR BLD: 0.93 K/UL (ref 0.1–0.95)
MONOCYTES NFR BLD: 7 % (ref 2–12)
NEUTROPHILS NFR BLD: 84 % (ref 43–80)
NEUTS SEG NFR BLD: 11.65 K/UL (ref 1.8–7.3)
PARTIAL THROMBOPLASTIN TIME: 27.5 SEC (ref 24.5–35.1)
PLATELET # BLD AUTO: 181 K/UL (ref 130–450)
PMV BLD AUTO: 10.4 FL (ref 7–12)
POTASSIUM SERPL-SCNC: 4.6 MMOL/L (ref 3.5–5.1)
PROTHROMBIN TIME: 12.8 SEC (ref 9.3–12.4)
RBC # BLD AUTO: 3.54 M/UL (ref 3.8–5.8)
SODIUM SERPL-SCNC: 137 MMOL/L (ref 136–145)
WBC OTHER # BLD: 13.9 K/UL (ref 4.5–11.5)

## 2025-04-18 PROCEDURE — 6370000000 HC RX 637 (ALT 250 FOR IP): Performed by: NURSE PRACTITIONER

## 2025-04-18 PROCEDURE — 6360000002 HC RX W HCPCS: Performed by: NURSE PRACTITIONER

## 2025-04-18 PROCEDURE — 82962 GLUCOSE BLOOD TEST: CPT

## 2025-04-18 PROCEDURE — 73552 X-RAY EXAM OF FEMUR 2/>: CPT

## 2025-04-18 PROCEDURE — 80048 BASIC METABOLIC PNL TOTAL CA: CPT

## 2025-04-18 PROCEDURE — 2060000000 HC ICU INTERMEDIATE R&B

## 2025-04-18 PROCEDURE — 99285 EMERGENCY DEPT VISIT HI MDM: CPT

## 2025-04-18 PROCEDURE — 73030 X-RAY EXAM OF SHOULDER: CPT

## 2025-04-18 PROCEDURE — 86901 BLOOD TYPING SEROLOGIC RH(D): CPT

## 2025-04-18 PROCEDURE — 73700 CT LOWER EXTREMITY W/O DYE: CPT

## 2025-04-18 PROCEDURE — 85025 COMPLETE CBC W/AUTO DIFF WBC: CPT

## 2025-04-18 PROCEDURE — 2580000003 HC RX 258: Performed by: NURSE PRACTITIONER

## 2025-04-18 PROCEDURE — 99223 1ST HOSP IP/OBS HIGH 75: CPT | Performed by: STUDENT IN AN ORGANIZED HEALTH CARE EDUCATION/TRAINING PROGRAM

## 2025-04-18 PROCEDURE — 93005 ELECTROCARDIOGRAM TRACING: CPT | Performed by: EMERGENCY MEDICINE

## 2025-04-18 PROCEDURE — 99222 1ST HOSP IP/OBS MODERATE 55: CPT

## 2025-04-18 PROCEDURE — 73502 X-RAY EXAM HIP UNI 2-3 VIEWS: CPT

## 2025-04-18 PROCEDURE — 86850 RBC ANTIBODY SCREEN: CPT

## 2025-04-18 PROCEDURE — 86900 BLOOD TYPING SEROLOGIC ABO: CPT

## 2025-04-18 PROCEDURE — 86923 COMPATIBILITY TEST ELECTRIC: CPT

## 2025-04-18 PROCEDURE — 85730 THROMBOPLASTIN TIME PARTIAL: CPT

## 2025-04-18 PROCEDURE — 85610 PROTHROMBIN TIME: CPT

## 2025-04-18 RX ORDER — POTASSIUM CHLORIDE 1500 MG/1
40 TABLET, EXTENDED RELEASE ORAL PRN
Status: DISCONTINUED | OUTPATIENT
Start: 2025-04-18 | End: 2025-04-24 | Stop reason: HOSPADM

## 2025-04-18 RX ORDER — MAGNESIUM SULFATE IN WATER 40 MG/ML
2000 INJECTION, SOLUTION INTRAVENOUS PRN
Status: DISCONTINUED | OUTPATIENT
Start: 2025-04-18 | End: 2025-04-24 | Stop reason: HOSPADM

## 2025-04-18 RX ORDER — GUAIFENESIN 200 MG/10ML
200 LIQUID ORAL EVERY 4 HOURS PRN
Status: DISCONTINUED | OUTPATIENT
Start: 2025-04-18 | End: 2025-04-24 | Stop reason: HOSPADM

## 2025-04-18 RX ORDER — MEGESTROL ACETATE 40 MG/ML
200 SUSPENSION ORAL DAILY
Status: DISCONTINUED | OUTPATIENT
Start: 2025-04-19 | End: 2025-04-24 | Stop reason: HOSPADM

## 2025-04-18 RX ORDER — SODIUM CHLORIDE 9 MG/ML
INJECTION, SOLUTION INTRAVENOUS CONTINUOUS
Status: DISCONTINUED | OUTPATIENT
Start: 2025-04-18 | End: 2025-04-24 | Stop reason: HOSPADM

## 2025-04-18 RX ORDER — ACETAMINOPHEN 325 MG/1
650 TABLET ORAL EVERY 6 HOURS PRN
Status: DISCONTINUED | OUTPATIENT
Start: 2025-04-18 | End: 2025-04-24 | Stop reason: HOSPADM

## 2025-04-18 RX ORDER — POLYETHYLENE GLYCOL 3350 17 G/17G
17 POWDER, FOR SOLUTION ORAL DAILY PRN
Status: DISCONTINUED | OUTPATIENT
Start: 2025-04-18 | End: 2025-04-24 | Stop reason: HOSPADM

## 2025-04-18 RX ORDER — SODIUM CHLORIDE 9 MG/ML
INJECTION, SOLUTION INTRAVENOUS PRN
Status: DISCONTINUED | OUTPATIENT
Start: 2025-04-18 | End: 2025-04-24 | Stop reason: HOSPADM

## 2025-04-18 RX ORDER — GLUCAGON 1 MG/ML
1 KIT INJECTION PRN
Status: DISCONTINUED | OUTPATIENT
Start: 2025-04-18 | End: 2025-04-24 | Stop reason: HOSPADM

## 2025-04-18 RX ORDER — ONDANSETRON 2 MG/ML
4 INJECTION INTRAMUSCULAR; INTRAVENOUS EVERY 6 HOURS PRN
Status: DISCONTINUED | OUTPATIENT
Start: 2025-04-18 | End: 2025-04-24 | Stop reason: HOSPADM

## 2025-04-18 RX ORDER — ASPIRIN 81 MG/1
81 TABLET, CHEWABLE ORAL DAILY
Status: DISCONTINUED | OUTPATIENT
Start: 2025-04-18 | End: 2025-04-23

## 2025-04-18 RX ORDER — INSULIN GLARGINE 100 [IU]/ML
10 INJECTION, SOLUTION SUBCUTANEOUS NIGHTLY
Status: DISCONTINUED | OUTPATIENT
Start: 2025-04-18 | End: 2025-04-24 | Stop reason: HOSPADM

## 2025-04-18 RX ORDER — MINERAL OIL/HYDROPHIL PETROLAT
OINTMENT (GRAM) TOPICAL 2 TIMES DAILY PRN
COMMUNITY

## 2025-04-18 RX ORDER — SODIUM CHLORIDE 0.9 % (FLUSH) 0.9 %
10 SYRINGE (ML) INJECTION PRN
Status: DISCONTINUED | OUTPATIENT
Start: 2025-04-18 | End: 2025-04-24 | Stop reason: HOSPADM

## 2025-04-18 RX ORDER — CYANOCOBALAMIN 1000 UG/ML
1000 INJECTION, SOLUTION INTRAMUSCULAR; SUBCUTANEOUS
Status: DISCONTINUED | OUTPATIENT
Start: 2025-04-18 | End: 2025-04-18 | Stop reason: ALTCHOICE

## 2025-04-18 RX ORDER — M-VIT,TX,IRON,MINS/CALC/FOLIC 27MG-0.4MG
1 TABLET ORAL DAILY
Status: DISCONTINUED | OUTPATIENT
Start: 2025-04-18 | End: 2025-04-18

## 2025-04-18 RX ORDER — SODIUM CHLORIDE 0.9 % (FLUSH) 0.9 %
5-40 SYRINGE (ML) INJECTION EVERY 12 HOURS SCHEDULED
Status: DISCONTINUED | OUTPATIENT
Start: 2025-04-18 | End: 2025-04-24 | Stop reason: HOSPADM

## 2025-04-18 RX ORDER — ZINC SULFATE 50(220)MG
50 CAPSULE ORAL DAILY
Status: DISCONTINUED | OUTPATIENT
Start: 2025-04-18 | End: 2025-04-18

## 2025-04-18 RX ORDER — INSULIN LISPRO 100 [IU]/ML
0-4 INJECTION, SOLUTION INTRAVENOUS; SUBCUTANEOUS
Status: DISCONTINUED | OUTPATIENT
Start: 2025-04-18 | End: 2025-04-22

## 2025-04-18 RX ORDER — ENOXAPARIN SODIUM 100 MG/ML
40 INJECTION SUBCUTANEOUS DAILY
Status: DISCONTINUED | OUTPATIENT
Start: 2025-04-18 | End: 2025-04-23

## 2025-04-18 RX ORDER — METOPROLOL SUCCINATE 25 MG/1
25 TABLET, EXTENDED RELEASE ORAL DAILY
Status: DISCONTINUED | OUTPATIENT
Start: 2025-04-18 | End: 2025-04-24 | Stop reason: HOSPADM

## 2025-04-18 RX ORDER — FERROUS SULFATE 325(65) MG
325 TABLET ORAL 2 TIMES DAILY
Status: DISCONTINUED | OUTPATIENT
Start: 2025-04-18 | End: 2025-04-18 | Stop reason: ALTCHOICE

## 2025-04-18 RX ORDER — CHOLECALCIFEROL (VITAMIN D3) 50 MCG
5000 TABLET ORAL DAILY
Status: DISCONTINUED | OUTPATIENT
Start: 2025-04-18 | End: 2025-04-18

## 2025-04-18 RX ORDER — ACETAMINOPHEN 650 MG/1
650 SUPPOSITORY RECTAL EVERY 6 HOURS PRN
Status: DISCONTINUED | OUTPATIENT
Start: 2025-04-18 | End: 2025-04-24 | Stop reason: HOSPADM

## 2025-04-18 RX ORDER — POTASSIUM CHLORIDE 7.45 MG/ML
10 INJECTION INTRAVENOUS PRN
Status: DISCONTINUED | OUTPATIENT
Start: 2025-04-18 | End: 2025-04-24 | Stop reason: HOSPADM

## 2025-04-18 RX ORDER — DEXTROSE MONOHYDRATE 100 MG/ML
INJECTION, SOLUTION INTRAVENOUS CONTINUOUS PRN
Status: DISCONTINUED | OUTPATIENT
Start: 2025-04-18 | End: 2025-04-24 | Stop reason: HOSPADM

## 2025-04-18 RX ORDER — FENTANYL CITRATE 50 UG/ML
25 INJECTION, SOLUTION INTRAMUSCULAR; INTRAVENOUS EVERY 4 HOURS PRN
Status: DISCONTINUED | OUTPATIENT
Start: 2025-04-18 | End: 2025-04-24 | Stop reason: HOSPADM

## 2025-04-18 RX ORDER — RIVASTIGMINE TARTRATE 1.5 MG/1
1.5 CAPSULE ORAL 2 TIMES DAILY
Status: DISCONTINUED | OUTPATIENT
Start: 2025-04-18 | End: 2025-04-18

## 2025-04-18 RX ORDER — ONDANSETRON 4 MG/1
4 TABLET, ORALLY DISINTEGRATING ORAL EVERY 8 HOURS PRN
Status: DISCONTINUED | OUTPATIENT
Start: 2025-04-18 | End: 2025-04-24 | Stop reason: HOSPADM

## 2025-04-18 RX ORDER — AMLODIPINE BESYLATE 10 MG/1
10 TABLET ORAL DAILY
Status: DISCONTINUED | OUTPATIENT
Start: 2025-04-18 | End: 2025-04-24 | Stop reason: HOSPADM

## 2025-04-18 RX ORDER — SODIUM CHLORIDE 1 G/1
1 TABLET ORAL 2 TIMES DAILY
Status: DISCONTINUED | OUTPATIENT
Start: 2025-04-18 | End: 2025-04-18

## 2025-04-18 RX ADMIN — AMLODIPINE BESYLATE 10 MG: 10 TABLET ORAL at 15:13

## 2025-04-18 RX ADMIN — ASPIRIN 81 MG CHEWABLE TABLET 81 MG: 81 TABLET CHEWABLE at 15:13

## 2025-04-18 RX ADMIN — INSULIN GLARGINE 10 UNITS: 100 INJECTION, SOLUTION SUBCUTANEOUS at 21:01

## 2025-04-18 RX ADMIN — ENOXAPARIN SODIUM 40 MG: 100 INJECTION SUBCUTANEOUS at 15:13

## 2025-04-18 RX ADMIN — FENTANYL CITRATE 25 MCG: 50 INJECTION INTRAMUSCULAR; INTRAVENOUS at 21:04

## 2025-04-18 RX ADMIN — SODIUM CHLORIDE: 9 INJECTION, SOLUTION INTRAVENOUS at 21:17

## 2025-04-18 RX ADMIN — INSULIN LISPRO 2 UNITS: 100 INJECTION, SOLUTION INTRAVENOUS; SUBCUTANEOUS at 21:01

## 2025-04-18 ASSESSMENT — PAIN DESCRIPTION - DESCRIPTORS: DESCRIPTORS: ACHING

## 2025-04-18 ASSESSMENT — PAIN DESCRIPTION - ORIENTATION
ORIENTATION: RIGHT
ORIENTATION: RIGHT

## 2025-04-18 ASSESSMENT — PAIN SCALES - GENERAL
PAINLEVEL_OUTOF10: 10
PAINLEVEL_OUTOF10: 3
PAINLEVEL_OUTOF10: 0

## 2025-04-18 ASSESSMENT — PAIN DESCRIPTION - PAIN TYPE: TYPE: ACUTE PAIN

## 2025-04-18 ASSESSMENT — PAIN DESCRIPTION - LOCATION
LOCATION: HIP;LEG
LOCATION: LEG

## 2025-04-18 ASSESSMENT — PAIN DESCRIPTION - ONSET: ONSET: ON-GOING

## 2025-04-18 ASSESSMENT — PAIN - FUNCTIONAL ASSESSMENT: PAIN_FUNCTIONAL_ASSESSMENT: 0-10

## 2025-04-18 ASSESSMENT — PAIN DESCRIPTION - FREQUENCY: FREQUENCY: CONTINUOUS

## 2025-04-18 ASSESSMENT — LIFESTYLE VARIABLES: HOW MANY STANDARD DRINKS CONTAINING ALCOHOL DO YOU HAVE ON A TYPICAL DAY: PATIENT DOES NOT DRINK

## 2025-04-18 NOTE — PROGRESS NOTES
Spiritual Health History and Assessment/Progress Note  [facility]    Initial Encounter, Spiritual/Emotional Needs,  ,  ,      Name: Antonio Locke MRN: [id number]    Age: 84 y.o.     Sex: male   Language: English   Faith: Anabaptism   Closed right hip fracture, initial encounter ([facility])     Date: 4/18/2025                           Spiritual Assessment began in [facility] EMERGENCY DEPARTMENT        Referral/Consult From: Rounding   Encounter Overview/Reason: Initial Encounter, Spiritual/Emotional Needs  Service Provided For: Patient    Aide, Belief, Meaning:   Patient identifies as spiritual  Family/Friends identify as spiritual      Importance and Influence:  Patient has spiritual/personal beliefs that influence decisions regarding their health  Family/Friends have spiritual/personal beliefs that influence decisions regarding the patient's health    Community:  Patient is connected with a spiritual community  Family/Friends No family/friends present    Assessment and Plan of Care:     Patient Interventions include: Provided sacramental/Latter day ritual  Family/Friends Interventions include: Provided sacramental/Latter day ritual    Patient Plan of Care: Spiritual Care available upon further referral  Family/Friends Plan of Care: Spiritual Care available upon further referral    Electronically signed by Chaplain Delbert on 4/18/2025 at 12:40 PM

## 2025-04-18 NOTE — ED PROVIDER NOTES
HPI:  4/18/25, Time: 9:54 AM EDT         Antonio Locke is a 84 y.o. male presenting to the ED for fall.  Patient mechanical fall.  Had x-rays at the outside facility,, found to have a femur fracture.  Denies hitting his head or loss of conscious.  No with symptoms in the arms or legs.  No paresthesias.  No other symptoms or complaints.      Review of Systems:   A complete review of systems was performed and pertinent positives and negatives are stated within HPI, all other systems reviewed and are negative.          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), Blood circulation, collateral, Cellulitis of foot, left, Chronic venous insufficiency, Dermatophytosis, Diabetes mellitus (HCC), 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 2 diabetes mellitus, limited to breakdown of skin (HCC), 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 due to underlying condition, with fat layer exposed (HCC), 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

## 2025-04-18 NOTE — CARE COORDINATION
Social Work/ Case Management Transition of Care Planning (SILVIA Eddy [phone]):     Pt presented to the ED after a fall. Pt is from [facility]. SW spoke with facility liaison who is getting information and will return call.   SILVIA Eddy  4/18/2025    Update: Pt was active with [facility] but revoked due to femur fracture, they want the fracture treated and then they plan to return to [facility] and will restart Hospice with [facility].   SILVIA Eddy  4/18/2025

## 2025-04-18 NOTE — H&P
[facility]  History and Physical      CHIEF COMPLAINT:    Chief Complaint   Patient presents with    Fall     Yesterday, +head -LOC, NH called today after mobile xray fx R femur, 50 fentanyl, was on Hospice but D/C for hospital        Patient of Farhad Hadley MD presents with:  Closed right hip fracture, initial encounter ([facility])    History of Present Illness:   Patient is a 84-year-old male with a past medical history of atherosclerosis of native artery of right lower extremity with ulceration of the heel, cellulitis, diabetes, femoropopliteal arthrosclerosis osteomyelitis, hypertension.  Patient experienced a mechanical fall at facility.  Patient had x-rays at the outlying facility and was found to have a femur fracture.  Patient did not hit his head nor did he lose consciousness.  Patient was on hospice at the facility.  Patient does have a history of a right hemiarthroplasty in 2021.  Patient is minimally ambulatory.  ER workup reviewed slightly elevated glucose 225, leukocytosis 13.9, imaging revealed closed right hip fracture.  Orthopedic surgery's been consulted and patient is admitted to telemetry unit for further treatment.  On evaluation resting comfortably in no acute distress-he is extremely confused which I am not sure if that is his normal baseline.  He is not able to answer any questions appropriately.  He is not sure how he sustained a fall and fractured his right hip.  Apparently he was under the care of hospice but his DNR status has been revoked for possible surgical intervention    REVIEW OF SYSTEMS:  Pertinent negatives are above in HPI.  10 point ROS otherwise negative.      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

## 2025-04-18 NOTE — PROGRESS NOTES
Pharmacy Note    Antonio Locke was ordered garlic.  As per the [facility] Formulary Committee Policy, herbals and certain dietary supplements will be discontinued.  The herbal or dietary supplement may be continued after discharge from the hospital.

## 2025-04-18 NOTE — CONSULTS
Department of Orthopedic Surgery  Resident Consult Note      Reason for Consult:  right femur fracture    HISTORY OF PRESENT ILLNESS:       Patient is a 84 y.o. male who presents with right hip pain.  Patient alert to self, place but not year.  Reports he fell at facility yesterday, was taken off hospice by wife who is medical decision-maker for evaluation regarding right femur fracture.  Patient had right hip hemiarthroplasty completed by Dr. Macedo in 2021.  Patient and wife report he is minimally ambulatory, such as going to bathroom and back.  Wife reports patient is on hospice secondary to mental decline.  Denies any other acute orthopedic complaints.    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 2 diabetes mellitus, limited to breakdown of skin ([facility]) 01/09/2023    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 due to underlying condition, with fat layer exposed ([facility]) 06/06/2022    Diabetic ulcer of right midfoot associated with type 2 diabetes mellitus, limited to breakdown of skin ([facility]) 01/09/2023    Femoral-popliteal atherosclerosis 01/01/2017    Heel ulcer, right, with fat layer exposed ([facility]) 05/06/2019    Hypertension     Osteomyelitis of third toe of right foot ([facility]) 12/06/2019    S/P peripheral artery angioplasty 01/23/2020    bilateral legs -[place]    Skin ulcer of right foot with fat layer exposed ([facility]) 12/09/2019    Ulcer of right leg, with fat layer exposed ([facility]) 05/06/2019    Varicose

## 2025-04-18 NOTE — CONSULTS
Palliative Care Department  [phone]  Palliative Care Initial Consult  Provider JUNE Duenas CNP      PATIENT: Antonio Locke  : 1940  MRN: [id number]  ADMISSION DATE: 2025  9:08 AM  Referring Provider: Elba Solorzano APRN - CNP     Palliative Medicine was consulted on hospital day 0 for assistance with Goals of care    HPI:     Clinical Summary:Antonio Locke is a 84 y.o. y/o male with a history of arterial sclerosis of the artery of the right lower extremity with ulceration, arthrosclerosis of native artery of the right lower extremity with ulceration to the heel, blood circulation, collateral, cellulitis of foot, Chronic venous insufficiency, diabetes, diabetic foot ulcer, femoral-popliteal arthrosclerosis, osteomyelitis, hypertension, who presented to [facility] on 2025 with close right periprosthetic femur fracture post fall.  X-ray shows right hip status post left dynamic hip screw, right hip hemiarthroplasty demonstrated right femur periprosthetic fracture, fracture extending proximally medially to distal and laterally about the tip of the steam, stem subsidence and shortening.  Orthopedic surgery consulted    ASSESSMENT/PLAN:     Pertinent Hospital Diagnoses     Close right periprosthetic femur fracture      Palliative Care Encounter / Counseling Regarding Goals of Care  Please see detailed goals of care discussion as below  At this time, Antonio Locke, Does Not have capacity for medical decision-making.  Capacity is time limited and situation/question specific  During encounter Mickie was surrogate medical decision-maker  Outcome of goals of care meeting:  Our waiting orthopedic surgery's recommendation  Goal is to pursue treatment, and resume hospice services at discharge  Continue full code for now  Code status Full Code  Advanced Directives: no POA or living will in [facility]  Surrogate/Legal NOK:  Mickie Locke (Spouse) [phone]    Spiritual assessment: no  accuracy; however, inadvertent computerized transcription errors may be present.

## 2025-04-19 LAB
ALBUMIN SERPL-MCNC: 3.5 G/DL (ref 3.5–5.2)
ALP SERPL-CCNC: 51 U/L (ref 40–129)
ALT SERPL-CCNC: 11 U/L (ref 0–50)
ANION GAP SERPL CALCULATED.3IONS-SCNC: 10 MMOL/L (ref 7–16)
AST SERPL-CCNC: 19 U/L (ref 0–50)
BASOPHILS # BLD: 0.05 K/UL (ref 0–0.2)
BASOPHILS NFR BLD: 0 % (ref 0–2)
BILIRUB SERPL-MCNC: 0.7 MG/DL (ref 0–1.2)
BUN SERPL-MCNC: 33 MG/DL (ref 8–23)
CALCIUM SERPL-MCNC: 9.8 MG/DL (ref 8.8–10.2)
CHLORIDE SERPL-SCNC: 109 MMOL/L (ref 98–107)
CO2 SERPL-SCNC: 22 MMOL/L (ref 22–29)
CREAT SERPL-MCNC: 1.2 MG/DL (ref 0.7–1.2)
EOSINOPHIL # BLD: 0.15 K/UL (ref 0.05–0.5)
EOSINOPHILS RELATIVE PERCENT: 1 % (ref 0–6)
ERYTHROCYTE [DISTWIDTH] IN BLOOD BY AUTOMATED COUNT: 13.3 % (ref 11.5–15)
GFR, ESTIMATED: 61 ML/MIN/1.73M2
GLUCOSE BLD-MCNC: 233 MG/DL (ref 74–99)
GLUCOSE BLD-MCNC: 263 MG/DL (ref 74–99)
GLUCOSE BLD-MCNC: 299 MG/DL (ref 74–99)
GLUCOSE BLD-MCNC: 334 MG/DL (ref 74–99)
GLUCOSE SERPL-MCNC: 276 MG/DL (ref 74–99)
HCT VFR BLD AUTO: 27.2 % (ref 37–54)
HGB BLD-MCNC: 9.3 G/DL (ref 12.5–16.5)
IMM GRANULOCYTES # BLD AUTO: 0.04 K/UL (ref 0–0.58)
IMM GRANULOCYTES NFR BLD: 0 % (ref 0–5)
LYMPHOCYTES NFR BLD: 1.55 K/UL (ref 1.5–4)
LYMPHOCYTES RELATIVE PERCENT: 13 % (ref 20–42)
MCH RBC QN AUTO: 31.2 PG (ref 26–35)
MCHC RBC AUTO-ENTMCNC: 34.2 G/DL (ref 32–34.5)
MCV RBC AUTO: 91.3 FL (ref 80–99.9)
MONOCYTES NFR BLD: 1 K/UL (ref 0.1–0.95)
MONOCYTES NFR BLD: 8 % (ref 2–12)
NEUTROPHILS NFR BLD: 77 % (ref 43–80)
NEUTS SEG NFR BLD: 9.14 K/UL (ref 1.8–7.3)
PLATELET # BLD AUTO: 157 K/UL (ref 130–450)
PMV BLD AUTO: 10.5 FL (ref 7–12)
POTASSIUM SERPL-SCNC: 4.8 MMOL/L (ref 3.5–5.1)
PROT SERPL-MCNC: 6.5 G/DL (ref 6.4–8.3)
RBC # BLD AUTO: 2.98 M/UL (ref 3.8–5.8)
SODIUM SERPL-SCNC: 141 MMOL/L (ref 136–145)
WBC OTHER # BLD: 11.9 K/UL (ref 4.5–11.5)

## 2025-04-19 PROCEDURE — 6360000002 HC RX W HCPCS: Performed by: NURSE PRACTITIONER

## 2025-04-19 PROCEDURE — 51702 INSERT TEMP BLADDER CATH: CPT

## 2025-04-19 PROCEDURE — 82962 GLUCOSE BLOOD TEST: CPT

## 2025-04-19 PROCEDURE — 51701 INSERT BLADDER CATHETER: CPT

## 2025-04-19 PROCEDURE — 2500000003 HC RX 250 WO HCPCS: Performed by: NURSE PRACTITIONER

## 2025-04-19 PROCEDURE — 6370000000 HC RX 637 (ALT 250 FOR IP): Performed by: NURSE PRACTITIONER

## 2025-04-19 PROCEDURE — 80053 COMPREHEN METABOLIC PANEL: CPT

## 2025-04-19 PROCEDURE — 36415 COLL VENOUS BLD VENIPUNCTURE: CPT

## 2025-04-19 PROCEDURE — 85025 COMPLETE CBC W/AUTO DIFF WBC: CPT

## 2025-04-19 PROCEDURE — 51798 US URINE CAPACITY MEASURE: CPT

## 2025-04-19 PROCEDURE — 2060000000 HC ICU INTERMEDIATE R&B

## 2025-04-19 PROCEDURE — 2580000003 HC RX 258: Performed by: NURSE PRACTITIONER

## 2025-04-19 RX ADMIN — SODIUM CHLORIDE, PRESERVATIVE FREE 5 ML: 5 INJECTION INTRAVENOUS at 19:50

## 2025-04-19 RX ADMIN — AMLODIPINE BESYLATE 10 MG: 10 TABLET ORAL at 09:19

## 2025-04-19 RX ADMIN — SODIUM CHLORIDE: 9 INJECTION, SOLUTION INTRAVENOUS at 10:15

## 2025-04-19 RX ADMIN — MEGESTROL ACETATE 200 MG: 40 SUSPENSION ORAL at 09:17

## 2025-04-19 RX ADMIN — RIVASTIGMINE TARTRATE 4.5 MG: 3 CAPSULE ORAL at 20:56

## 2025-04-19 RX ADMIN — METOPROLOL SUCCINATE 25 MG: 25 TABLET, EXTENDED RELEASE ORAL at 09:19

## 2025-04-19 RX ADMIN — INSULIN LISPRO 1 UNITS: 100 INJECTION, SOLUTION INTRAVENOUS; SUBCUTANEOUS at 20:56

## 2025-04-19 RX ADMIN — RIVASTIGMINE TARTRATE 4.5 MG: 3 CAPSULE ORAL at 09:18

## 2025-04-19 RX ADMIN — INSULIN LISPRO 3 UNITS: 100 INJECTION, SOLUTION INTRAVENOUS; SUBCUTANEOUS at 05:56

## 2025-04-19 RX ADMIN — ENOXAPARIN SODIUM 40 MG: 100 INJECTION SUBCUTANEOUS at 09:21

## 2025-04-19 RX ADMIN — SODIUM CHLORIDE, PRESERVATIVE FREE 10 ML: 5 INJECTION INTRAVENOUS at 09:15

## 2025-04-19 RX ADMIN — ASPIRIN 81 MG CHEWABLE TABLET 81 MG: 81 TABLET CHEWABLE at 09:19

## 2025-04-19 RX ADMIN — INSULIN LISPRO 2 UNITS: 100 INJECTION, SOLUTION INTRAVENOUS; SUBCUTANEOUS at 16:48

## 2025-04-19 RX ADMIN — INSULIN GLARGINE 10 UNITS: 100 INJECTION, SOLUTION SUBCUTANEOUS at 20:56

## 2025-04-19 RX ADMIN — INSULIN LISPRO 2 UNITS: 100 INJECTION, SOLUTION INTRAVENOUS; SUBCUTANEOUS at 11:31

## 2025-04-19 NOTE — PROGRESS NOTES
Spiritual Health History and Assessment/Progress Note  [facility]    Spiritual/Emotional Needs, Follow-up,  ,  ,      Name: Antonio Locke MRN: [id number]    Age: 84 y.o.     Sex: male   Language: English   Yazidism: Worship   Closed right hip fracture, initial encounter ([facility])     Date: 4/19/2025                           Spiritual Assessment continued in [facility]        Referral/Consult From: Rounding   Encounter Overview/Reason: Spiritual/Emotional Needs, Follow-up  Service Provided For: Patient    Aide, Belief, Meaning:   Patient identifies as spiritual  Family/Friends identify as spiritual      Importance and Influence:  Patient has spiritual/personal beliefs that influence decisions regarding their health  Family/Friends have spiritual/personal beliefs that influence decisions regarding the patient's health    Community:  Patient is connected with a spiritual community  Family/Friends No family/friends present    Assessment and Plan of Care:     Patient Interventions include: Provided sacramental/Restoration ritual  Family/Friends Interventions include: Provided sacramental/Restoration ritual    Patient Plan of Care: No spiritual needs identified for follow-up and Spiritual Care available upon further referral  Family/Friends Plan of Care: No spiritual needs identified for follow-up and Spiritual Care available upon further referral    Electronically signed by Chaplain Delbert on 4/19/2025 at 3:05 PM

## 2025-04-19 NOTE — PROGRESS NOTES
Imaging right shoulder reveals right proximal humerus fracture relatively nondisplaced.  Plan will be for nonoperative management with sling immobilization and nonweightbearing.    Electronically signed by Corona Green DO on 4/19/2025 at 7:43 AM

## 2025-04-19 NOTE — PLAN OF CARE
Problem: Chronic Conditions and Co-morbidities  Goal: Patient's chronic conditions and co-morbidity symptoms are monitored and maintained or improved  Outcome: Progressing     Problem: Discharge Planning  Goal: Discharge to home or other facility with appropriate resources  Outcome: Progressing     Problem: Safety - Adult  Goal: Free from fall injury  Outcome: Progressing     Problem: Skin/Tissue Integrity  Goal: Skin integrity remains intact  Description: 1.  Monitor for areas of redness and/or skin breakdown2.  Assess vascular access sites hourly3.  Every 4-6 hours minimum:  Change oxygen saturation probe site4.  Every 4-6 hours:  If on nasal continuous positive airway pressure, respiratory therapy assess nares and determine need for appliance change or resting period  Outcome: Progressing     Problem: Confusion  Goal: Confusion, delirium, dementia, or psychosis is improved or at baseline  Description: INTERVENTIONS:1. Assess for possible contributors to thought disturbance, including medications, impaired vision or hearing, underlying metabolic abnormalities, dehydration, psychiatric diagnoses, and notify attending LIP2. [place] high risk fall precautions, as indicated3. Provide frequent short contacts to provide reality reorientation, refocusing and direction4. Decrease environmental stimuli, including noise as appropriate5. Monitor and intervene to maintain adequate nutrition, hydration, elimination, sleep and activity6. If unable to ensure safety without constant attention obtain sitter and review sitter guidelines with assigned personnel7. Initiate Psychosocial CNS and Spiritual Care consult, as indicated  Outcome: Progressing     Problem: ABCDS Injury Assessment  Goal: Absence of physical injury  Outcome: Progressing

## 2025-04-19 NOTE — PROGRESS NOTES
Patient unable to void since last straight cath. Patient bladder scanned for 480 mL. Patient straight cathed per protocol.

## 2025-04-19 NOTE — PROGRESS NOTES
[facility]                                Progress note    Subjective:    The patient is awake and alert.  Sitting up in bed speaking to family on phone.  Patient refused to get off the phone difficult to assess.  No acute events overnight.    Denies chest pain, angina, SOB     Objective:    BP (!) 141/68   Pulse 84   Temp 97.9 °F (36.6 °C) (Temporal)   Resp 17   Wt 59 kg (130 lb)   SpO2 96%   BMI 19.20 kg/m²     In: -   Out: 2500   In: -   Out: 2500 [Urine:2500]    General appearance: NAD, conversant pleasant  HEENT: AT/NC, MMM  Neck: FROM, supple  Lungs: Clear to auscultation  CV: RRR, no MRGs  Vasc: Radial pulses 2+  Abdomen: Soft, non-tender; no masses or HSM  Extremities: No peripheral edema or digital cyanosis, limited range of motion upper and lower extremities  Skin: no rash, lesions or ulcers  Psych: Alert and oriented to person, place and time  Neuro: Alert and interactive     Recent Labs     04/18/25  0921 04/19/25  0800   WBC 13.9* 11.9*   HGB 11.2* 9.3*   HCT 31.8* 27.2*    157       Recent Labs     04/18/25  0921 04/19/25  0800    141   K 4.6 4.8    109*   CO2 21* 22   BUN 32* 33*   CREATININE 1.2 1.2   CALCIUM 10.3* 9.8       Assessment:    Principal Problem:    Closed right hip fracture, initial encounter ([facility])  Active Problems:    Periprosthetic fracture of shaft of femur  Resolved Problems:    * No resolved hospital problems. *      Plan:  84-year-old male with a history of diabetes and hypertension presents to the ED with complaints of mechanical fall resulting in a     Closed right hip fracture  Pain control  Hold anticoagulation/antiplatelet agents/chemical DVT prophylaxis  Orthopedic surgery consult  Keep n.p.o. until cleared by orthopedic surgery for surgical procedure  Encourage ISP  Bowel regimen  From medicine standpoint he is okay to proceed with surgical intervention with low to moderate risk of perioperative cardiovascular event given advanced

## 2025-04-19 NOTE — PROGRESS NOTES
4 Eyes Skin Assessment     NAME:  Antonio Locke  YOB: 1940  MEDICAL RECORD NUMBER:  [id number]    The patient is being assessed for  Admission    I agree that at least one RN has performed a thorough Head to Toe Skin Assessment on the patient. ALL assessment sites listed below have been assessed.      Areas assessed by both nurses:    Head, Face, Ears, Shoulders, Back, Chest, Arms, Elbows, Hands, Sacrum. Buttock, Coccyx, Ischium, and Legs. Feet and Heels        Does the Patient have a Wound? Yes wound(s) were present on assessment. LDA wound assessment was Initiated and completed by RN       Renaldo Prevention initiated by RN: Yes  Wound Care Orders initiated by RN: Yes    Pressure Injury (Stage 3,4, Unstageable, DTI, NWPT, and Complex wounds) if present, place Wound referral order by RN under : Yes    New Ostomies, if present place, Ostomy referral order under : No     Nurse 1 eSignature: Electronically signed by Reshma Fletcher RN on 4/18/25 at 10:52 PM EDT    **SHARE this note so that the co-signing nurse can place an eSignature**    Nurse 2 eSignature: Electronically signed by Cyndy Ba RN on 4/19/25 at 9:59 AM EDT

## 2025-04-19 NOTE — PLAN OF CARE
Problem: Chronic Conditions and Co-morbidities  Goal: Patient's chronic conditions and co-morbidity symptoms are monitored and maintained or improved  4/19/2025 0959 by Cyndy Ba RN  Outcome: Progressing  4/18/2025 2209 by Reshma Fletcher RN  Outcome: Progressing     Problem: Discharge Planning  Goal: Discharge to home or other facility with appropriate resources  4/19/2025 0959 by Cyndy Ba RN  Outcome: Progressing  4/18/2025 2209 by Reshma Fletcher RN  Outcome: Progressing     Problem: Safety - Adult  Goal: Free from fall injury  4/19/2025 0959 by Cyndy Ba RN  Outcome: Progressing  4/18/2025 2209 by Reshma Fletcher RN  Outcome: Progressing     Problem: Skin/Tissue Integrity  Goal: Skin integrity remains intact  Description: 1.  Monitor for areas of redness and/or skin breakdown2.  Assess vascular access sites hourly3.  Every 4-6 hours minimum:  Change oxygen saturation probe site4.  Every 4-6 hours:  If on nasal continuous positive airway pressure, respiratory therapy assess nares and determine need for appliance change or resting period  4/19/2025 0959 by Cyndy Ba RN  Outcome: Progressing  4/18/2025 2209 by Reshma Fletcher RN  Outcome: Progressing     Problem: Confusion  Goal: Confusion, delirium, dementia, or psychosis is improved or at baseline  Description: INTERVENTIONS:1. Assess for possible contributors to thought disturbance, including medications, impaired vision or hearing, underlying metabolic abnormalities, dehydration, psychiatric diagnoses, and notify attending LIP2. [place] high risk fall precautions, as indicated3. Provide frequent short contacts to provide reality reorientation, refocusing and direction4. Decrease environmental stimuli, including noise as appropriate5. Monitor and intervene to maintain adequate nutrition, hydration, elimination, sleep and activity6. If unable to ensure safety without constant attention obtain sitter and review sitter  Purse String (Intermediate) Text: Given the location of the defect and the characteristics of the surrounding skin a purse string intermediate closure was deemed most appropriate.  Undermining was performed circumfirentially around the surgical defect.  A purse string suture was then placed and tightened.

## 2025-04-19 NOTE — PROGRESS NOTES
Physical Therapy    Patient received for PT evaluation. Pt evaluation held this date, pt with possible orthopedic intervention regarding right periprosthetic femur fracture. Per ortho, \"Will have discussion with wife as well as attending regarding operative indications for right femur fracture and update plan accordingly\".    Will follow up as appropriate.     Rajani Rivas, PT, DPT  [id number]

## 2025-04-19 NOTE — PROGRESS NOTES
Discussed with pt. Wife treatment moving forward. We will plan for ORIF and revision hip arthroplasty on Monday 4/21/25.    Electronically signed by Corona Green DO on 4/19/2025 at 4:28 PM

## 2025-04-19 NOTE — PROGRESS NOTES
Occupational Therapy  OT SESSION ATTEMPT     Date:2025  Patient Name: Antonio Locke  MRN: [id number]  : 1940  Room: [address] [place] [address]A     Attempted OT session this date:    [] unavailable due to other medical staff currently with pt   [x] on hold - right periprosthetic femur fracture. per orthopedic surgery note (dated ), await family decision on surgery vs non-operative treatment.    [] on hold per nursing staff secondary to lab / radiology results    [] declined treatment  this date due to ____.  Benefits of participation in therapy reviewed with pt.    [] off unit   [] Other:     Will reattempt OT eval at a later time.    Inga Pelaez, OTR/L #[id number]

## 2025-04-20 LAB
ALBUMIN SERPL-MCNC: 3.4 G/DL (ref 3.5–5.2)
ALP SERPL-CCNC: 49 U/L (ref 40–129)
ALT SERPL-CCNC: 11 U/L (ref 0–50)
ANION GAP SERPL CALCULATED.3IONS-SCNC: 10 MMOL/L (ref 7–16)
AST SERPL-CCNC: 18 U/L (ref 0–50)
BASOPHILS # BLD: 0.04 K/UL (ref 0–0.2)
BASOPHILS NFR BLD: 0 % (ref 0–2)
BILIRUB SERPL-MCNC: 0.7 MG/DL (ref 0–1.2)
BUN SERPL-MCNC: 23 MG/DL (ref 8–23)
CALCIUM SERPL-MCNC: 9.4 MG/DL (ref 8.8–10.2)
CHLORIDE SERPL-SCNC: 113 MMOL/L (ref 98–107)
CO2 SERPL-SCNC: 23 MMOL/L (ref 22–29)
CREAT SERPL-MCNC: 1 MG/DL (ref 0.7–1.2)
EKG ATRIAL RATE: 77 BPM
EKG P AXIS: 68 DEGREES
EKG P-R INTERVAL: 152 MS
EKG Q-T INTERVAL: 352 MS
EKG QRS DURATION: 70 MS
EKG QTC CALCULATION (BAZETT): 398 MS
EKG R AXIS: -2 DEGREES
EKG T AXIS: 74 DEGREES
EKG VENTRICULAR RATE: 77 BPM
EOSINOPHIL # BLD: 0.37 K/UL (ref 0.05–0.5)
EOSINOPHILS RELATIVE PERCENT: 4 % (ref 0–6)
ERYTHROCYTE [DISTWIDTH] IN BLOOD BY AUTOMATED COUNT: 13.5 % (ref 11.5–15)
GFR, ESTIMATED: 78 ML/MIN/1.73M2
GLUCOSE BLD-MCNC: 116 MG/DL (ref 74–99)
GLUCOSE BLD-MCNC: 177 MG/DL (ref 74–99)
GLUCOSE BLD-MCNC: 241 MG/DL (ref 74–99)
GLUCOSE BLD-MCNC: 275 MG/DL (ref 74–99)
GLUCOSE SERPL-MCNC: 102 MG/DL (ref 74–99)
HCT VFR BLD AUTO: 25 % (ref 37–54)
HGB BLD-MCNC: 8.5 G/DL (ref 12.5–16.5)
IMM GRANULOCYTES # BLD AUTO: 0.03 K/UL (ref 0–0.58)
IMM GRANULOCYTES NFR BLD: 0 % (ref 0–5)
LYMPHOCYTES NFR BLD: 1.67 K/UL (ref 1.5–4)
LYMPHOCYTES RELATIVE PERCENT: 16 % (ref 20–42)
MCH RBC QN AUTO: 31.6 PG (ref 26–35)
MCHC RBC AUTO-ENTMCNC: 34 G/DL (ref 32–34.5)
MCV RBC AUTO: 92.9 FL (ref 80–99.9)
MONOCYTES NFR BLD: 0.77 K/UL (ref 0.1–0.95)
MONOCYTES NFR BLD: 7 % (ref 2–12)
NEUTROPHILS NFR BLD: 72 % (ref 43–80)
NEUTS SEG NFR BLD: 7.52 K/UL (ref 1.8–7.3)
PLATELET # BLD AUTO: 137 K/UL (ref 130–450)
PMV BLD AUTO: 10.6 FL (ref 7–12)
POTASSIUM SERPL-SCNC: 3.9 MMOL/L (ref 3.5–5.1)
PROT SERPL-MCNC: 6.4 G/DL (ref 6.4–8.3)
RBC # BLD AUTO: 2.69 M/UL (ref 3.8–5.8)
SODIUM SERPL-SCNC: 145 MMOL/L (ref 136–145)
WBC OTHER # BLD: 10.4 K/UL (ref 4.5–11.5)

## 2025-04-20 PROCEDURE — 93010 ELECTROCARDIOGRAM REPORT: CPT | Performed by: INTERNAL MEDICINE

## 2025-04-20 PROCEDURE — 6360000002 HC RX W HCPCS: Performed by: NURSE PRACTITIONER

## 2025-04-20 PROCEDURE — 36415 COLL VENOUS BLD VENIPUNCTURE: CPT

## 2025-04-20 PROCEDURE — 82962 GLUCOSE BLOOD TEST: CPT

## 2025-04-20 PROCEDURE — 2060000000 HC ICU INTERMEDIATE R&B

## 2025-04-20 PROCEDURE — 6370000000 HC RX 637 (ALT 250 FOR IP): Performed by: NURSE PRACTITIONER

## 2025-04-20 PROCEDURE — 85025 COMPLETE CBC W/AUTO DIFF WBC: CPT

## 2025-04-20 PROCEDURE — 80053 COMPREHEN METABOLIC PANEL: CPT

## 2025-04-20 PROCEDURE — 2500000003 HC RX 250 WO HCPCS: Performed by: NURSE PRACTITIONER

## 2025-04-20 PROCEDURE — 2580000003 HC RX 258: Performed by: NURSE PRACTITIONER

## 2025-04-20 RX ADMIN — RIVASTIGMINE TARTRATE 4.5 MG: 3 CAPSULE ORAL at 08:10

## 2025-04-20 RX ADMIN — METOPROLOL SUCCINATE 25 MG: 25 TABLET, EXTENDED RELEASE ORAL at 08:08

## 2025-04-20 RX ADMIN — RIVASTIGMINE TARTRATE 4.5 MG: 3 CAPSULE ORAL at 20:44

## 2025-04-20 RX ADMIN — AMLODIPINE BESYLATE 10 MG: 10 TABLET ORAL at 08:09

## 2025-04-20 RX ADMIN — SODIUM CHLORIDE, PRESERVATIVE FREE 10 ML: 5 INJECTION INTRAVENOUS at 20:45

## 2025-04-20 RX ADMIN — SODIUM CHLORIDE, PRESERVATIVE FREE 10 ML: 5 INJECTION INTRAVENOUS at 08:13

## 2025-04-20 RX ADMIN — SODIUM CHLORIDE: 9 INJECTION, SOLUTION INTRAVENOUS at 23:42

## 2025-04-20 RX ADMIN — MEGESTROL ACETATE 200 MG: 40 SUSPENSION ORAL at 08:11

## 2025-04-20 RX ADMIN — ENOXAPARIN SODIUM 40 MG: 100 INJECTION SUBCUTANEOUS at 08:13

## 2025-04-20 RX ADMIN — INSULIN LISPRO 2 UNITS: 100 INJECTION, SOLUTION INTRAVENOUS; SUBCUTANEOUS at 21:30

## 2025-04-20 RX ADMIN — INSULIN GLARGINE 10 UNITS: 100 INJECTION, SOLUTION SUBCUTANEOUS at 20:47

## 2025-04-20 RX ADMIN — SODIUM CHLORIDE: 9 INJECTION, SOLUTION INTRAVENOUS at 04:24

## 2025-04-20 RX ADMIN — INSULIN LISPRO 1 UNITS: 100 INJECTION, SOLUTION INTRAVENOUS; SUBCUTANEOUS at 17:32

## 2025-04-20 RX ADMIN — ASPIRIN 81 MG CHEWABLE TABLET 81 MG: 81 TABLET CHEWABLE at 08:09

## 2025-04-20 NOTE — PLAN OF CARE
Problem: Chronic Conditions and Co-morbidities  Goal: Patient's chronic conditions and co-morbidity symptoms are monitored and maintained or improved  4/20/2025 0741 by Cyndy Ba RN  Outcome: Progressing  4/19/2025 2003 by Yolie Herrera RN  Outcome: Progressing     Problem: Discharge Planning  Goal: Discharge to home or other facility with appropriate resources  4/20/2025 0741 by Cyndy Ba RN  Outcome: Progressing  4/19/2025 2003 by Yolie Herrera RN  Outcome: Progressing     Problem: Safety - Adult  Goal: Free from fall injury  4/20/2025 0741 by Cyndy Ba RN  Outcome: Progressing  4/19/2025 2003 by Yolie Herrera RN  Outcome: Progressing     Problem: Skin/Tissue Integrity  Goal: Skin integrity remains intact  Description: 1.  Monitor for areas of redness and/or skin breakdown2.  Assess vascular access sites hourly3.  Every 4-6 hours minimum:  Change oxygen saturation probe site4.  Every 4-6 hours:  If on nasal continuous positive airway pressure, respiratory therapy assess nares and determine need for appliance change or resting period  4/20/2025 0741 by Cyndy Ba RN  Outcome: Progressing  4/19/2025 2003 by Yolie Herrera RN  Outcome: Progressing     Problem: Confusion  Goal: Confusion, delirium, dementia, or psychosis is improved or at baseline  Description: INTERVENTIONS:1. Assess for possible contributors to thought disturbance, including medications, impaired vision or hearing, underlying metabolic abnormalities, dehydration, psychiatric diagnoses, and notify attending LIP2. [place] high risk fall precautions, as indicated3. Provide frequent short contacts to provide reality reorientation, refocusing and direction4. Decrease environmental stimuli, including noise as appropriate5. Monitor and intervene to maintain adequate nutrition, hydration, elimination, sleep and activity6. If unable to ensure safety without constant attention obtain sitter and review sitter

## 2025-04-20 NOTE — PROGRESS NOTES
Occupational Therapy  OT SESSION ATTEMPT     Date:2025  Patient Name: Antonio Locke  MRN: [id number]  : 1940  Room: [address] [place] [address]A     Attempted OT session this date:    [] unavailable due to other medical staff currently with pt   [x] on hold  - per orthopedic surgery, plan for ORIF and revision hip arthroplasty on .    [] on hold per nursing staff secondary to lab / radiology results    [] declined treatment  this date due to ____.  Benefits of participation in therapy reviewed with pt.    [] off unit   [] Other:     Will reattempt OT eval at a later time.    Inga Pelaez, OTR/L #[id number]

## 2025-04-20 NOTE — PROGRESS NOTES
Date: 2025       Patient Name: Antonio Locke  : 1940      MRN: [id number]    PT order received and chart reviewed. PT evaluation held, pt pending surgical intervention.     Leonidas Contreras PT, DPT  [id number]

## 2025-04-20 NOTE — PLAN OF CARE
Problem: Chronic Conditions and Co-morbidities  Goal: Patient's chronic conditions and co-morbidity symptoms are monitored and maintained or improved  4/19/2025 2003 by Yolie Herrera RN  Outcome: Progressing  4/19/2025 0959 by Cyndy Ba RN  Outcome: Progressing     Problem: Discharge Planning  Goal: Discharge to home or other facility with appropriate resources  4/19/2025 2003 by Yolie Herrera RN  Outcome: Progressing  4/19/2025 0959 by Cyndy Ba RN  Outcome: Progressing     Problem: Safety - Adult  Goal: Free from fall injury  4/19/2025 2003 by Yolie Herrera RN  Outcome: Progressing  4/19/2025 0959 by Cyndy Ba RN  Outcome: Progressing     Problem: Skin/Tissue Integrity  Goal: Skin integrity remains intact  Description: 1.  Monitor for areas of redness and/or skin breakdown2.  Assess vascular access sites hourly3.  Every 4-6 hours minimum:  Change oxygen saturation probe site4.  Every 4-6 hours:  If on nasal continuous positive airway pressure, respiratory therapy assess nares and determine need for appliance change or resting period  4/19/2025 2003 by Yolie Herrera RN  Outcome: Progressing  4/19/2025 0959 by Cyndy Ba RN  Outcome: Progressing     Problem: Confusion  Goal: Confusion, delirium, dementia, or psychosis is improved or at baseline  Description: INTERVENTIONS:1. Assess for possible contributors to thought disturbance, including medications, impaired vision or hearing, underlying metabolic abnormalities, dehydration, psychiatric diagnoses, and notify attending LIP2. [place] high risk fall precautions, as indicated3. Provide frequent short contacts to provide reality reorientation, refocusing and direction4. Decrease environmental stimuli, including noise as appropriate5. Monitor and intervene to maintain adequate nutrition, hydration, elimination, sleep and activity6. If unable to ensure safety without constant attention obtain sitter and review sitter

## 2025-04-20 NOTE — PROGRESS NOTES
Department of Orthopedic Surgery  Resident Progress Note       Patient seen and examined, resting comfortably in bed.    VITALS:  BP (!) 136/99   Pulse 78   Temp 97 °F (36.1 °C) (Temporal)   Resp 18   Ht 1.753 m (5' 9.02\")   Wt 59.4 kg (131 lb)   SpO2 96%   BMI 19.34 kg/m²     General: alert and in no acute distress    MUSCULOSKELETAL:   right lower extremity:  Skin intact circumferentially about thigh  Thigh tender but soft and compressible  Sensation intact grossly to right lower extremity including superficial and deep peroneal, tibial, sural, saphenous nerves  Patient is rigid upon exam, resting with flexed upper extremity postures and dorsiflex the ankle.  Has minimal dorsi and plantarflexion.  Second toe is amputated but wiggles great toe  Foot warm and well-perfused    CBC:   Lab Results   Component Value Date/Time    WBC 11.9 04/19/2025 08:00 AM    HGB 9.3 04/19/2025 08:00 AM    HCT 27.2 04/19/2025 08:00 AM     04/19/2025 08:00 AM       ASSESSMENT  Right proximal femur periprosthetic fracture  Right proximal humerus fracture    PLAN      Continue physical therapy and protocol: NWB -right LE  Continue physical therapy and protocol: NWB -right UE -sling  Will plan for OR 4/21/2025  N.p.o. at midnight  D/C Plan: Pending      Electronically signed by Brian Gonzalez DO on 4/20/2025 at 1:18 AM

## 2025-04-20 NOTE — PROGRESS NOTES
[facility]                                Progress note    Subjective:    The patient is awake and alert. Patient is confused  No acute events overnight.    Denies chest pain, angina, SOB     Objective:    BP (!) 118/43   Pulse 78   Temp 97.7 °F (36.5 °C) (Temporal)   Resp 19   Ht 1.753 m (5' 9.02\")   Wt 59.4 kg (131 lb)   SpO2 97%   BMI 19.34 kg/m²     In: -   Out: 1500   In: -   Out: 1500 [Urine:1500]    General appearance: NAD, pleasantly confused  HEENT: AT/NC, MMM  Neck: FROM, supple  Lungs: Clear to auscultation  CV: RRR, no MRGs  Vasc: Radial pulses 2+  Abdomen: Soft, non-tender; no masses or HSM  Extremities: No peripheral edema or digital cyanosis, limited range of motion upper and lower extremities  Skin: no rash, lesions or ulcers  Psych: Alert and oriented to person, place and time  Neuro: Alert and interactive     Recent Labs     04/18/25  0921 04/19/25  0800 04/20/25  0758   WBC 13.9* 11.9* 10.4   HGB 11.2* 9.3* 8.5*   HCT 31.8* 27.2* 25.0*    157 137       Recent Labs     04/18/25  0921 04/19/25  0800 04/20/25  0758    141 145   K 4.6 4.8 3.9    109* 113*   CO2 21* 22 23   BUN 32* 33* 23   CREATININE 1.2 1.2 1.0   CALCIUM 10.3* 9.8 9.4       Assessment:    Principal Problem:    Closed right hip fracture, initial encounter (HCC)  Active Problems:    Periprosthetic fracture of shaft of femur  Resolved Problems:    * No resolved hospital problems. *      Plan:  84-year-old male with a history of diabetes and hypertension presents to the ED with complaints of mechanical fall resulting in a     Closed right hip fracture  Pain control  Hold anticoagulation/antiplatelet agents/chemical DVT prophylaxis  Orthopedic surgery consult  Keep n.p.o. until cleared by orthopedic surgery for surgical procedure  Encourage ISP  Bowel regimen  From medicine standpoint he is okay to proceed with surgical intervention with low to moderate risk of perioperative cardiovascular

## 2025-04-21 ENCOUNTER — APPOINTMENT (OUTPATIENT)
Dept: GENERAL RADIOLOGY | Age: 85
DRG: 481 | End: 2025-04-21
Payer: MEDICARE

## 2025-04-21 ENCOUNTER — ANESTHESIA EVENT (OUTPATIENT)
Dept: OPERATING ROOM | Age: 85
DRG: 481 | End: 2025-04-21
Payer: MEDICARE

## 2025-04-21 ENCOUNTER — ANESTHESIA (OUTPATIENT)
Dept: OPERATING ROOM | Age: 85
DRG: 481 | End: 2025-04-21
Payer: MEDICARE

## 2025-04-21 LAB
ALBUMIN SERPL-MCNC: 3.1 G/DL (ref 3.5–5.2)
ALP SERPL-CCNC: 52 U/L (ref 40–129)
ALT SERPL-CCNC: 8 U/L (ref 0–50)
ANION GAP SERPL CALCULATED.3IONS-SCNC: 10 MMOL/L (ref 7–16)
AST SERPL-CCNC: 17 U/L (ref 0–50)
BASOPHILS # BLD: 0.05 K/UL (ref 0–0.2)
BASOPHILS NFR BLD: 1 % (ref 0–2)
BILIRUB SERPL-MCNC: 0.7 MG/DL (ref 0–1.2)
BUN SERPL-MCNC: 16 MG/DL (ref 8–23)
CALCIUM SERPL-MCNC: 9 MG/DL (ref 8.8–10.2)
CHLORIDE SERPL-SCNC: 110 MMOL/L (ref 98–107)
CO2 SERPL-SCNC: 21 MMOL/L (ref 22–29)
CREAT SERPL-MCNC: 0.8 MG/DL (ref 0.7–1.2)
EOSINOPHIL # BLD: 0.31 K/UL (ref 0.05–0.5)
EOSINOPHILS RELATIVE PERCENT: 4 % (ref 0–6)
ERYTHROCYTE [DISTWIDTH] IN BLOOD BY AUTOMATED COUNT: 13.2 % (ref 11.5–15)
GFR, ESTIMATED: 85 ML/MIN/1.73M2
GLUCOSE BLD-MCNC: 205 MG/DL (ref 74–99)
GLUCOSE BLD-MCNC: 207 MG/DL (ref 74–99)
GLUCOSE BLD-MCNC: 243 MG/DL (ref 74–99)
GLUCOSE BLD-MCNC: 260 MG/DL (ref 74–99)
GLUCOSE SERPL-MCNC: 189 MG/DL (ref 74–99)
HCT VFR BLD AUTO: 24.1 % (ref 37–54)
HGB BLD-MCNC: 8.4 G/DL (ref 12.5–16.5)
IMM GRANULOCYTES # BLD AUTO: <0.03 K/UL (ref 0–0.58)
IMM GRANULOCYTES NFR BLD: 0 % (ref 0–5)
LYMPHOCYTES NFR BLD: 1.22 K/UL (ref 1.5–4)
LYMPHOCYTES RELATIVE PERCENT: 16 % (ref 20–42)
MCH RBC QN AUTO: 31.8 PG (ref 26–35)
MCHC RBC AUTO-ENTMCNC: 34.9 G/DL (ref 32–34.5)
MCV RBC AUTO: 91.3 FL (ref 80–99.9)
MONOCYTES NFR BLD: 0.59 K/UL (ref 0.1–0.95)
MONOCYTES NFR BLD: 8 % (ref 2–12)
NEUTROPHILS NFR BLD: 72 % (ref 43–80)
NEUTS SEG NFR BLD: 5.61 K/UL (ref 1.8–7.3)
PLATELET # BLD AUTO: 140 K/UL (ref 130–450)
PMV BLD AUTO: 10.6 FL (ref 7–12)
POTASSIUM SERPL-SCNC: 3.8 MMOL/L (ref 3.5–5.1)
PROT SERPL-MCNC: 6.1 G/DL (ref 6.4–8.3)
RBC # BLD AUTO: 2.64 M/UL (ref 3.8–5.8)
SODIUM SERPL-SCNC: 141 MMOL/L (ref 136–145)
WBC OTHER # BLD: 7.8 K/UL (ref 4.5–11.5)

## 2025-04-21 PROCEDURE — 2060000000 HC ICU INTERMEDIATE R&B

## 2025-04-21 PROCEDURE — 2709999900 HC NON-CHARGEABLE SUPPLY: Performed by: STUDENT IN AN ORGANIZED HEALTH CARE EDUCATION/TRAINING PROGRAM

## 2025-04-21 PROCEDURE — 36415 COLL VENOUS BLD VENIPUNCTURE: CPT

## 2025-04-21 PROCEDURE — 3700000000 HC ANESTHESIA ATTENDED CARE: Performed by: STUDENT IN AN ORGANIZED HEALTH CARE EDUCATION/TRAINING PROGRAM

## 2025-04-21 PROCEDURE — 82962 GLUCOSE BLOOD TEST: CPT

## 2025-04-21 PROCEDURE — 2700000000 HC OXYGEN THERAPY PER DAY

## 2025-04-21 PROCEDURE — 2720000010 HC SURG SUPPLY STERILE: Performed by: STUDENT IN AN ORGANIZED HEALTH CARE EDUCATION/TRAINING PROGRAM

## 2025-04-21 PROCEDURE — 6370000000 HC RX 637 (ALT 250 FOR IP): Performed by: NURSE PRACTITIONER

## 2025-04-21 PROCEDURE — 2500000003 HC RX 250 WO HCPCS: Performed by: NURSE ANESTHETIST, CERTIFIED REGISTERED

## 2025-04-21 PROCEDURE — 2580000003 HC RX 258: Performed by: NURSE ANESTHETIST, CERTIFIED REGISTERED

## 2025-04-21 PROCEDURE — 6360000002 HC RX W HCPCS: Performed by: NURSE ANESTHETIST, CERTIFIED REGISTERED

## 2025-04-21 PROCEDURE — 73552 X-RAY EXAM OF FEMUR 2/>: CPT

## 2025-04-21 PROCEDURE — 7100000000 HC PACU RECOVERY - FIRST 15 MIN: Performed by: STUDENT IN AN ORGANIZED HEALTH CARE EDUCATION/TRAINING PROGRAM

## 2025-04-21 PROCEDURE — 80053 COMPREHEN METABOLIC PANEL: CPT

## 2025-04-21 PROCEDURE — 6360000002 HC RX W HCPCS

## 2025-04-21 PROCEDURE — 85025 COMPLETE CBC W/AUTO DIFF WBC: CPT

## 2025-04-21 PROCEDURE — 0QS604Z REPOSITION RIGHT UPPER FEMUR WITH INTERNAL FIXATION DEVICE, OPEN APPROACH: ICD-10-PCS | Performed by: STUDENT IN AN ORGANIZED HEALTH CARE EDUCATION/TRAINING PROGRAM

## 2025-04-21 PROCEDURE — 27244 TREAT THIGH FRACTURE: CPT | Performed by: STUDENT IN AN ORGANIZED HEALTH CARE EDUCATION/TRAINING PROGRAM

## 2025-04-21 PROCEDURE — C1769 GUIDE WIRE: HCPCS | Performed by: STUDENT IN AN ORGANIZED HEALTH CARE EDUCATION/TRAINING PROGRAM

## 2025-04-21 PROCEDURE — 3600000013 HC SURGERY LEVEL 3 ADDTL 15MIN: Performed by: STUDENT IN AN ORGANIZED HEALTH CARE EDUCATION/TRAINING PROGRAM

## 2025-04-21 PROCEDURE — C1776 JOINT DEVICE (IMPLANTABLE): HCPCS | Performed by: STUDENT IN AN ORGANIZED HEALTH CARE EDUCATION/TRAINING PROGRAM

## 2025-04-21 PROCEDURE — C1713 ANCHOR/SCREW BN/BN,TIS/BN: HCPCS | Performed by: STUDENT IN AN ORGANIZED HEALTH CARE EDUCATION/TRAINING PROGRAM

## 2025-04-21 PROCEDURE — 6360000002 HC RX W HCPCS: Performed by: STUDENT IN AN ORGANIZED HEALTH CARE EDUCATION/TRAINING PROGRAM

## 2025-04-21 PROCEDURE — 3600000003 HC SURGERY LEVEL 3 BASE: Performed by: STUDENT IN AN ORGANIZED HEALTH CARE EDUCATION/TRAINING PROGRAM

## 2025-04-21 PROCEDURE — 6360000002 HC RX W HCPCS: Performed by: NURSE PRACTITIONER

## 2025-04-21 PROCEDURE — 3700000001 HC ADD 15 MINUTES (ANESTHESIA): Performed by: STUDENT IN AN ORGANIZED HEALTH CARE EDUCATION/TRAINING PROGRAM

## 2025-04-21 PROCEDURE — 30233N1 TRANSFUSION OF NONAUTOLOGOUS RED BLOOD CELLS INTO PERIPHERAL VEIN, PERCUTANEOUS APPROACH: ICD-10-PCS | Performed by: STUDENT IN AN ORGANIZED HEALTH CARE EDUCATION/TRAINING PROGRAM

## 2025-04-21 PROCEDURE — 7100000001 HC PACU RECOVERY - ADDTL 15 MIN: Performed by: STUDENT IN AN ORGANIZED HEALTH CARE EDUCATION/TRAINING PROGRAM

## 2025-04-21 PROCEDURE — 2500000003 HC RX 250 WO HCPCS

## 2025-04-21 DEVICE — SCREW BNE L40MM DIA4.5MM PROX CORT TIB S STL ST LOK FULL: Type: IMPLANTABLE DEVICE | Site: HIP | Status: FUNCTIONAL

## 2025-04-21 DEVICE — CABLE SURG L750MM DIA1MM S STL SMOOTH W/ CRMP: Type: IMPLANTABLE DEVICE | Site: HIP | Status: FUNCTIONAL

## 2025-04-21 DEVICE — SCREW BNE L40MM DIA3.5MM PROX TIB S STL ST FULL THRD W/ T15: Type: IMPLANTABLE DEVICE | Site: HIP | Status: FUNCTIONAL

## 2025-04-21 DEVICE — SCREW BNE L36MM DIA3.5MM PROX TIB S STL ST FULL THRD T15: Type: IMPLANTABLE DEVICE | Site: HIP | Status: FUNCTIONAL

## 2025-04-21 DEVICE — SCREW BNE L34MM DIA3.5MM PROX TIB S STL ST FULL THRD W/ T15: Type: IMPLANTABLE DEVICE | Site: HIP | Status: FUNCTIONAL

## 2025-04-21 DEVICE — SCREW BNE L38MM DIA4.5MM PROX CORT TIB S STL ST LOK FULL: Type: IMPLANTABLE DEVICE | Site: HIP | Status: FUNCTIONAL

## 2025-04-21 DEVICE — SCREW BNE L38MM DIA3.5MM PROX TIB S STL ST FULL THRD W/ T15: Type: IMPLANTABLE DEVICE | Site: HIP | Status: FUNCTIONAL

## 2025-04-21 DEVICE — SCREW BNE L32MM DIA3.5MM PROX TIB S STL ST FULL THRD T15: Type: IMPLANTABLE DEVICE | Site: HIP | Status: FUNCTIONAL

## 2025-04-21 DEVICE — SCREW BNE L46MM DIA3.5MM PROX TIB S STL ST FULL THRD W/ T15: Type: IMPLANTABLE DEVICE | Site: HIP | Status: FUNCTIONAL

## 2025-04-21 DEVICE — IMPLANTABLE DEVICE: Type: IMPLANTABLE DEVICE | Site: HIP | Status: FUNCTIONAL

## 2025-04-21 DEVICE — SCREW BNE L30MM DIA3.5MM PROX TIB S STL ST FULL THRD T15: Type: IMPLANTABLE DEVICE | Site: HIP | Status: FUNCTIONAL

## 2025-04-21 RX ORDER — SODIUM CHLORIDE 9 MG/ML
INJECTION, SOLUTION INTRAVENOUS PRN
Status: DISCONTINUED | OUTPATIENT
Start: 2025-04-21 | End: 2025-04-21 | Stop reason: HOSPADM

## 2025-04-21 RX ORDER — OXYCODONE HYDROCHLORIDE 5 MG/1
5 TABLET ORAL EVERY 4 HOURS PRN
Refills: 0 | Status: DISCONTINUED | OUTPATIENT
Start: 2025-04-21 | End: 2025-04-24 | Stop reason: HOSPADM

## 2025-04-21 RX ORDER — TOBRAMYCIN 1.2 G/30ML
INJECTION, POWDER, LYOPHILIZED, FOR SOLUTION INTRAVENOUS PRN
Status: DISCONTINUED | OUTPATIENT
Start: 2025-04-21 | End: 2025-04-21 | Stop reason: ALTCHOICE

## 2025-04-21 RX ORDER — TRANEXAMIC ACID 10 MG/ML
INJECTION, SOLUTION INTRAVENOUS
Status: DISCONTINUED | OUTPATIENT
Start: 2025-04-21 | End: 2025-04-21 | Stop reason: SDUPTHER

## 2025-04-21 RX ORDER — PROPOFOL 10 MG/ML
INJECTION, EMULSION INTRAVENOUS
Status: DISCONTINUED | OUTPATIENT
Start: 2025-04-21 | End: 2025-04-21 | Stop reason: SDUPTHER

## 2025-04-21 RX ORDER — FENTANYL CITRATE 50 UG/ML
INJECTION, SOLUTION INTRAMUSCULAR; INTRAVENOUS
Status: DISCONTINUED | OUTPATIENT
Start: 2025-04-21 | End: 2025-04-21 | Stop reason: SDUPTHER

## 2025-04-21 RX ORDER — CEFAZOLIN SODIUM 1 G/3ML
INJECTION, POWDER, FOR SOLUTION INTRAMUSCULAR; INTRAVENOUS
Status: DISCONTINUED | OUTPATIENT
Start: 2025-04-21 | End: 2025-04-21 | Stop reason: SDUPTHER

## 2025-04-21 RX ORDER — SODIUM CHLORIDE 9 MG/ML
INJECTION, SOLUTION INTRAVENOUS PRN
Status: DISCONTINUED | OUTPATIENT
Start: 2025-04-21 | End: 2025-04-24 | Stop reason: HOSPADM

## 2025-04-21 RX ORDER — ROCURONIUM BROMIDE 10 MG/ML
INJECTION, SOLUTION INTRAVENOUS
Status: DISCONTINUED | OUTPATIENT
Start: 2025-04-21 | End: 2025-04-21 | Stop reason: SDUPTHER

## 2025-04-21 RX ORDER — SODIUM CHLORIDE 0.9 % (FLUSH) 0.9 %
5-40 SYRINGE (ML) INJECTION EVERY 12 HOURS SCHEDULED
Status: DISCONTINUED | OUTPATIENT
Start: 2025-04-21 | End: 2025-04-21 | Stop reason: HOSPADM

## 2025-04-21 RX ORDER — ASPIRIN 81 MG/1
81 TABLET ORAL 2 TIMES DAILY
Qty: 60 TABLET | Refills: 0 | Status: SHIPPED | OUTPATIENT
Start: 2025-04-21 | End: 2025-04-21

## 2025-04-21 RX ORDER — SODIUM CHLORIDE, SODIUM LACTATE, POTASSIUM CHLORIDE, CALCIUM CHLORIDE 600; 310; 30; 20 MG/100ML; MG/100ML; MG/100ML; MG/100ML
INJECTION, SOLUTION INTRAVENOUS
Status: DISCONTINUED | OUTPATIENT
Start: 2025-04-21 | End: 2025-04-21 | Stop reason: SDUPTHER

## 2025-04-21 RX ORDER — LABETALOL HYDROCHLORIDE 5 MG/ML
5 INJECTION, SOLUTION INTRAVENOUS
Status: DISCONTINUED | OUTPATIENT
Start: 2025-04-21 | End: 2025-04-21 | Stop reason: HOSPADM

## 2025-04-21 RX ORDER — DROPERIDOL 2.5 MG/ML
0.62 INJECTION, SOLUTION INTRAMUSCULAR; INTRAVENOUS
Status: DISCONTINUED | OUTPATIENT
Start: 2025-04-21 | End: 2025-04-21 | Stop reason: HOSPADM

## 2025-04-21 RX ORDER — HYDRALAZINE HYDROCHLORIDE 20 MG/ML
5 INJECTION INTRAMUSCULAR; INTRAVENOUS
Status: DISCONTINUED | OUTPATIENT
Start: 2025-04-21 | End: 2025-04-21 | Stop reason: HOSPADM

## 2025-04-21 RX ORDER — HYDROMORPHONE HYDROCHLORIDE 2 MG/ML
INJECTION, SOLUTION INTRAMUSCULAR; INTRAVENOUS; SUBCUTANEOUS
Status: DISCONTINUED | OUTPATIENT
Start: 2025-04-21 | End: 2025-04-21 | Stop reason: SDUPTHER

## 2025-04-21 RX ORDER — OXYCODONE HYDROCHLORIDE 10 MG/1
10 TABLET ORAL EVERY 4 HOURS PRN
Refills: 0 | Status: DISCONTINUED | OUTPATIENT
Start: 2025-04-21 | End: 2025-04-24 | Stop reason: HOSPADM

## 2025-04-21 RX ORDER — MEPERIDINE HYDROCHLORIDE 25 MG/ML
12.5 INJECTION INTRAMUSCULAR; INTRAVENOUS; SUBCUTANEOUS EVERY 5 MIN PRN
Status: DISCONTINUED | OUTPATIENT
Start: 2025-04-21 | End: 2025-04-21 | Stop reason: HOSPADM

## 2025-04-21 RX ORDER — SODIUM CHLORIDE 0.9 % (FLUSH) 0.9 %
5-40 SYRINGE (ML) INJECTION PRN
Status: DISCONTINUED | OUTPATIENT
Start: 2025-04-21 | End: 2025-04-21 | Stop reason: HOSPADM

## 2025-04-21 RX ORDER — LIDOCAINE HYDROCHLORIDE 20 MG/ML
INJECTION, SOLUTION INTRAVENOUS
Status: DISCONTINUED | OUTPATIENT
Start: 2025-04-21 | End: 2025-04-21 | Stop reason: SDUPTHER

## 2025-04-21 RX ORDER — MIDAZOLAM HYDROCHLORIDE 2 MG/2ML
2 INJECTION, SOLUTION INTRAMUSCULAR; INTRAVENOUS
Status: DISCONTINUED | OUTPATIENT
Start: 2025-04-21 | End: 2025-04-21 | Stop reason: HOSPADM

## 2025-04-21 RX ORDER — OXYCODONE AND ACETAMINOPHEN 5; 325 MG/1; MG/1
1 TABLET ORAL EVERY 6 HOURS PRN
Qty: 28 TABLET | Refills: 0 | Status: SHIPPED | OUTPATIENT
Start: 2025-04-21 | End: 2025-04-28

## 2025-04-21 RX ORDER — ONDANSETRON 2 MG/ML
INJECTION INTRAMUSCULAR; INTRAVENOUS
Status: DISCONTINUED | OUTPATIENT
Start: 2025-04-21 | End: 2025-04-21 | Stop reason: SDUPTHER

## 2025-04-21 RX ORDER — ONDANSETRON 2 MG/ML
4 INJECTION INTRAMUSCULAR; INTRAVENOUS
Status: DISCONTINUED | OUTPATIENT
Start: 2025-04-21 | End: 2025-04-21 | Stop reason: HOSPADM

## 2025-04-21 RX ORDER — ASPIRIN 81 MG/1
81 TABLET ORAL 2 TIMES DAILY
Qty: 60 TABLET | Refills: 0 | Status: SHIPPED | OUTPATIENT
Start: 2025-04-21 | End: 2025-05-21

## 2025-04-21 RX ORDER — VANCOMYCIN HYDROCHLORIDE 1 G/20ML
INJECTION, POWDER, LYOPHILIZED, FOR SOLUTION INTRAVENOUS PRN
Status: DISCONTINUED | OUTPATIENT
Start: 2025-04-21 | End: 2025-04-21 | Stop reason: ALTCHOICE

## 2025-04-21 RX ORDER — OXYCODONE AND ACETAMINOPHEN 5; 325 MG/1; MG/1
1 TABLET ORAL EVERY 6 HOURS PRN
Qty: 28 TABLET | Refills: 0 | Status: SHIPPED | OUTPATIENT
Start: 2025-04-21 | End: 2025-04-21

## 2025-04-21 RX ORDER — HYDROMORPHONE HYDROCHLORIDE 1 MG/ML
0.5 INJECTION, SOLUTION INTRAMUSCULAR; INTRAVENOUS; SUBCUTANEOUS EVERY 5 MIN PRN
Status: DISCONTINUED | OUTPATIENT
Start: 2025-04-21 | End: 2025-04-21 | Stop reason: HOSPADM

## 2025-04-21 RX ORDER — NALOXONE HYDROCHLORIDE 0.4 MG/ML
INJECTION, SOLUTION INTRAMUSCULAR; INTRAVENOUS; SUBCUTANEOUS PRN
Status: DISCONTINUED | OUTPATIENT
Start: 2025-04-21 | End: 2025-04-21 | Stop reason: HOSPADM

## 2025-04-21 RX ORDER — HYDROMORPHONE HYDROCHLORIDE 1 MG/ML
0.25 INJECTION, SOLUTION INTRAMUSCULAR; INTRAVENOUS; SUBCUTANEOUS EVERY 5 MIN PRN
Status: DISCONTINUED | OUTPATIENT
Start: 2025-04-21 | End: 2025-04-21 | Stop reason: HOSPADM

## 2025-04-21 RX ORDER — DIPHENHYDRAMINE HYDROCHLORIDE 50 MG/ML
12.5 INJECTION, SOLUTION INTRAMUSCULAR; INTRAVENOUS
Status: DISCONTINUED | OUTPATIENT
Start: 2025-04-21 | End: 2025-04-21 | Stop reason: HOSPADM

## 2025-04-21 RX ORDER — IPRATROPIUM BROMIDE AND ALBUTEROL SULFATE 2.5; .5 MG/3ML; MG/3ML
1 SOLUTION RESPIRATORY (INHALATION)
Status: DISCONTINUED | OUTPATIENT
Start: 2025-04-21 | End: 2025-04-21 | Stop reason: HOSPADM

## 2025-04-21 RX ORDER — ACETAMINOPHEN 325 MG/1
650 TABLET ORAL
Status: DISCONTINUED | OUTPATIENT
Start: 2025-04-21 | End: 2025-04-21 | Stop reason: HOSPADM

## 2025-04-21 RX ADMIN — HYDROMORPHONE HYDROCHLORIDE 0.6 MG: 2 INJECTION, SOLUTION INTRAMUSCULAR; INTRAVENOUS; SUBCUTANEOUS at 10:44

## 2025-04-21 RX ADMIN — PROPOFOL 125 MG: 10 INJECTION, EMULSION INTRAVENOUS at 09:10

## 2025-04-21 RX ADMIN — ONDANSETRON HYDROCHLORIDE 4 MG: 2 SOLUTION INTRAMUSCULAR; INTRAVENOUS at 09:10

## 2025-04-21 RX ADMIN — FENTANYL CITRATE 50 MCG: 50 INJECTION, SOLUTION INTRAMUSCULAR; INTRAVENOUS at 09:36

## 2025-04-21 RX ADMIN — TRANEXAMIC ACID 1 G: 10 INJECTION, SOLUTION INTRAVENOUS at 09:17

## 2025-04-21 RX ADMIN — RIVASTIGMINE TARTRATE 4.5 MG: 3 CAPSULE ORAL at 21:16

## 2025-04-21 RX ADMIN — AMLODIPINE BESYLATE 10 MG: 10 TABLET ORAL at 13:55

## 2025-04-21 RX ADMIN — LIDOCAINE HYDROCHLORIDE 100 MG: 20 INJECTION, SOLUTION INTRAVENOUS at 09:10

## 2025-04-21 RX ADMIN — METOPROLOL SUCCINATE 25 MG: 25 TABLET, EXTENDED RELEASE ORAL at 13:55

## 2025-04-21 RX ADMIN — CEFAZOLIN 2 G: 1 INJECTION, POWDER, FOR SOLUTION INTRAMUSCULAR; INTRAVENOUS at 09:14

## 2025-04-21 RX ADMIN — ROCURONIUM BROMIDE 30 MG: 10 INJECTION, SOLUTION INTRAVENOUS at 09:56

## 2025-04-21 RX ADMIN — ASPIRIN 81 MG CHEWABLE TABLET 81 MG: 81 TABLET CHEWABLE at 13:54

## 2025-04-21 RX ADMIN — INSULIN LISPRO 1 UNITS: 100 INJECTION, SOLUTION INTRAVENOUS; SUBCUTANEOUS at 21:16

## 2025-04-21 RX ADMIN — WATER 2000 MG: 1 INJECTION INTRAMUSCULAR; INTRAVENOUS; SUBCUTANEOUS at 16:22

## 2025-04-21 RX ADMIN — RIVASTIGMINE TARTRATE 4.5 MG: 3 CAPSULE ORAL at 14:25

## 2025-04-21 RX ADMIN — PHENYLEPHRINE HYDROCHLORIDE 200 MCG: 10 INJECTION INTRAVENOUS at 10:48

## 2025-04-21 RX ADMIN — MEGESTROL ACETATE 200 MG: 40 SUSPENSION ORAL at 14:26

## 2025-04-21 RX ADMIN — SUGAMMADEX 200 MG: 100 INJECTION, SOLUTION INTRAVENOUS at 11:01

## 2025-04-21 RX ADMIN — ENOXAPARIN SODIUM 40 MG: 100 INJECTION SUBCUTANEOUS at 13:55

## 2025-04-21 RX ADMIN — INSULIN GLARGINE 10 UNITS: 100 INJECTION, SOLUTION SUBCUTANEOUS at 21:16

## 2025-04-21 RX ADMIN — INSULIN LISPRO 1 UNITS: 100 INJECTION, SOLUTION INTRAVENOUS; SUBCUTANEOUS at 17:10

## 2025-04-21 RX ADMIN — FENTANYL CITRATE 50 MCG: 50 INJECTION, SOLUTION INTRAMUSCULAR; INTRAVENOUS at 09:40

## 2025-04-21 RX ADMIN — INSULIN LISPRO 4 UNITS: 100 INJECTION, SOLUTION INTRAVENOUS; SUBCUTANEOUS at 14:25

## 2025-04-21 RX ADMIN — ROCURONIUM BROMIDE 50 MG: 10 INJECTION, SOLUTION INTRAVENOUS at 09:10

## 2025-04-21 RX ADMIN — SODIUM CHLORIDE, POTASSIUM CHLORIDE, SODIUM LACTATE AND CALCIUM CHLORIDE: 600; 310; 30; 20 INJECTION, SOLUTION INTRAVENOUS at 09:04

## 2025-04-21 ASSESSMENT — PAIN SCALES - GENERAL: PAINLEVEL_OUTOF10: 0

## 2025-04-21 ASSESSMENT — PAIN - FUNCTIONAL ASSESSMENT
PAIN_FUNCTIONAL_ASSESSMENT: 0-10
PAIN_FUNCTIONAL_ASSESSMENT: 0-10

## 2025-04-21 NOTE — ANESTHESIA POSTPROCEDURE EVALUATION
Department of Anesthesiology  Postprocedure Note    Patient: Antonio Locke  MRN: [id number]  YOB: 1940  Date of evaluation: 4/21/2025    Procedure Summary       Date: 04/21/25 Room / Location: [address]    Anesthesia Start: 0904 Anesthesia Stop: 1119    Procedure: RIGHT HIP OPEN REDUCTION INTERNAL FIXATION (Right) Diagnosis:       Periprosthetic fracture around internal prosthetic hip joint, initial encounter      (Periprosthetic fracture around internal prosthetic hip joint, initial encounter [M97.8XXA, Z96.649])    Surgeons: Corona Green DO Responsible Provider: Libertad Allan MD    Anesthesia Type: general ASA Status: 3            Anesthesia Type: No value filed.    Abelino Phase I: Abelino Score: 8    Abelino Phase II:      Anesthesia Post Evaluation    Patient location during evaluation: PACU  Patient participation: complete - patient participated  Level of consciousness: awake and alert  Airway patency: patent  Nausea & Vomiting: no nausea and no vomiting  Cardiovascular status: blood pressure returned to baseline and hemodynamically stable  Respiratory status: acceptable and spontaneous ventilation  Hydration status: euvolemic  Multimodal analgesia pain management approach  Pain management: adequate    No notable events [facility].

## 2025-04-21 NOTE — PROGRESS NOTES
[facility]                                Progress note    Subjective:    Resting comfortably in bed  No acute issues overnight    Objective:    /60   Pulse 85   Temp 97.1 °F (36.2 °C)   Resp 14   Ht 1.753 m (5' 9.02\")   Wt 59.4 kg (131 lb)   SpO2 93%   BMI 19.34 kg/m²     In: 600 [I.V.:600]  Out: 1350   In: 600   Out: 1350 [Urine:1350]    General appearance: NAD, pleasantly confused  HEENT: AT/NC, MMM  Neck: FROM, supple  Lungs: Clear to auscultation  CV: RRR, no MRGs  Vasc: Radial pulses 2+  Abdomen: Soft, non-tender; no masses or HSM  Extremities: No peripheral edema or digital cyanosis, RLE surgical dressing  Skin: no rash, lesions or ulcers  Psych: Alert and oriented to person, place, confused at times   Neuro: Alert and interactive     Recent Labs     04/19/25  0800 04/20/25  0758 04/21/25  0712   WBC 11.9* 10.4 7.8   HGB 9.3* 8.5* 8.4*   HCT 27.2* 25.0* 24.1*    137 140       Recent Labs     04/19/25  0800 04/20/25  0758 04/21/25  0712    145 141   K 4.8 3.9 3.8   * 113* 110*   CO2 22 23 21*   BUN 33* 23 16   CREATININE 1.2 1.0 0.8   CALCIUM 9.8 9.4 9.0       Assessment:    Principal Problem:    Closed right hip fracture, initial encounter ([facility])  Active Problems:    Periprosthetic fracture of shaft of femur  Resolved Problems:    * No resolved hospital problems. *      Plan:  84-year-old male with a history of diabetes and hypertension presents to the ED with complaints of mechanical fall resulting in a     Closed right hip fracture  Pain control  Hold anticoagulation/antiplatelet agents/chemical DVT prophylaxis  Orthopedic surgery consult  Keep n.p.o. until cleared by orthopedic surgery for surgical procedure  Encourage ISP  Bowel regimen  From medicine standpoint he is okay to proceed with surgical intervention with low to moderate risk of perioperative cardiovascular event given advanced age     Reactive leukocytosis  Monitor for fevers  Repeat in a.m.

## 2025-04-21 NOTE — DISCHARGE INSTRUCTIONS
tender skin around the catheter.  Be careful with your drainage bag  Always keep the drainage bag below the level of your bladder. This will help keep urine from flowing back into your bladder.  Check often to see that urine is flowing through the catheter into the drainage bag.  Empty the drainage bag when it is half full. This will keep it from overflowing or backing up.  When you empty the drainage bag, do not let the tubing or drain spout touch anything.  Keep the cap that comes with the tubing, and cover the tip of the tubing when not in use.  Be careful with your catheter  Do not unhook the catheter from the drain tube until you are ready to change the tubing and bag. That could let germs get into the tube.  Make sure that the catheter tubing does not get twisted or kinked.  Do not tug or pull on the catheter. And make sure that the drainage bag does not drag or pull on the catheter.  Do not put powder or lotion on the skin around the catheter.  Talk with your doctor about your options for sexual intercourse while wearing a catheter.  How do you empty the bag?  If your doctor has asked you to keep a record, write down the amount of urine in the bag before you empty it.  Wash your hands before and after you touch the bag.  Remove the drain spout from its sleeve at the bottom of the drainage bag.  Open the valve on the drain spout. Let the urine flow out into the toilet or a container. Be careful not to let the tubing or drain spout touch anything.  After you empty the bag, close the valve. Then put the drain spout back into its sleeve at the bottom of the collection bag.  How do you switch to a bedside bag for overnight use?  Wash your hands before and after you handle the bags.  Empty the leg bag that is attached to the tubing and catheter.  Put a clean towel under the tubing attached to the leg bag.  Use an alcohol wipe to clean the tip of the tubing attached to the bedside bag.  To stop the flow of urine,  pinch the catheter with your fingers just above the tubing connection.  Use a twisting motion to disconnect the leg bag tubing from the catheter.  Then securely connect the catheter to the tubing from the bedside bag.  How do you clean a bedside bag?  Many people clean their bedside bag in the morning if they switch to a leg bag.  To clean a bedside drainage bag:  Remove the bedside bag and attach the leg bag.  Fill the bedside bag with 2 parts vinegar and 3 parts water. Let it stand for 20 minutes.  Empty the bag, and let it air dry.  When should you call for help?   Call your doctor now or seek immediate medical care if:    You have symptoms of a urinary infection. These may include:  Pain or burning when you urinate.  A frequent need to urinate without being able to pass much urine.  Pain in the flank, which is just below the rib cage and above the waist on either side of the back.  Blood in your urine.  A fever.     Your urine smells bad.     You see large blood clots in your urine.     No urine or very little urine is flowing into the bag for 4 or more hours.   Watch closely for changes in your health, and be sure to contact your doctor if:    The area around the catheter becomes irritated, swollen, red, or tender, or there is pus draining from it.     Urine is leaking from the place where the catheter enters your body.   Follow-up care is a key part of your treatment and safety. Be sure to make and go to all appointments, and call your doctor if you are having problems. It's also a good idea to know your test results and keep a list of the medicines you take.  Where can you learn more?  Go to [email or web address] and sign in to your [employer] account. Enter U010 in the Search Health Information box to learn more about \"Learning About Indwelling Urinary Catheter Care to Prevent Infection.\"     If you do not have an account, please click on the \"Sign Up Now\" link.  Current as of: February 10,

## 2025-04-21 NOTE — PROGRESS NOTES
PATIENT CAME TO THE SURGICAL AREA WITH A GOLD RING ON HIS FINGER, REMOVED RING PLACED IN A SECURED BAG WITH PATIENT STICKERS ON THE BAG, PLACED IN CHART AND WILL BE SENDING TO PACU WITH PATIENT

## 2025-04-21 NOTE — PROGRESS NOTES
PATIENT CAME TO SURGICAL AREA WITH TELE MONITOR FOR ROOM 7416, REMOVED TELE PACK SECURED DURING SURGERY, SENDING TO PACU WITH PATIENT

## 2025-04-21 NOTE — PROGRESS NOTES
Priya GO messaged via perfect serve regarding if she would like a VT since the rubio was placed for retention on 4/19

## 2025-04-21 NOTE — CARE COORDINATION
Reviewed chart, spoke with Isadora at [facility] today, will need therapy for precert to return to [facility]. Envelope and ambulance form in soft chart.Elba Carr, MSW, LSW

## 2025-04-21 NOTE — OP NOTE
Operative Note      Patient: Antonio Locke  YOB: 1940  MRN: [id number]    Date of Procedure: 4/21/2025    Pre-Op Diagnosis Codes:      * Periprosthetic fracture around internal prosthetic hip joint, initial encounter [M97.8XXA, Z96.649]    Post-Op Diagnosis:  Periprosthetic femur fracture around an internal prosthetic hip that starts at the level of the lesser trochanter and extends down into the subtrochanteric region femoral shaft       Procedure(s):  RIGHT HIP OPEN REDUCTION INTERNAL FIXATION    Surgeon(s):  Corona Green DO    Assistant:   Resident: Eber Laguna DO; Brian Gonzalez DO    Anesthesia: General    Estimated Blood Loss (mL): 100    Complications: None    Specimens:   * No specimens in log *    Implants:  Implant Name Type Inv. Item Serial No.  Lot No. LRB No. Used Action   CABLE SURG L750MM DIA1MM S STL SMOOTH W/ CRMP - [id number]  CABLE SURG L750MM DIA1MM S STL SMOOTH W/ CRMP  [employer]WD  Right 1 Implanted   CABLE SURG L750MM DIA1MM S STL SMOOTH W/ CRMP - [id number]  CABLE SURG L750MM DIA1MM S STL SMOOTH W/ CRMP  [employer]WD  Right 1 Implanted   CABLE SURG L750MM DIA1MM S STL SMOOTH W/ CRMP - [id number]  CABLE SURG L750MM DIA1MM S STL SMOOTH W/ CRMP  [employer]-  Right 1 Implanted   PLATE BNE FEM 3.5/4.5X251 MM RT PROX 8 HOLE NS VA-L[facility] - [id number]  PLATE BNE FEM 3.5/4.5X251 MM RT PROX 8 HOLE NS VA-LCP  [employer]WD  Right 1 Implanted   SCREW BNE L30MM DIA3.5MM PROX TIB S STL ST FULL THRD T15 - [id number]  SCREW BNE L30MM DIA3.5MM PROX TIB S STL ST FULL THRD T15  [employer]-  Right 2 Implanted   SCREW BNE L32MM DIA3.5MM PROX TIB S STL ST FULL THRD T15 - [id number]  SCREW BNE L32MM DIA3.5MM PROX TIB S STL ST FULL THRD T15  [employer]  Right 1 Implanted   SCREW BNE L34MM DIA3.5MM PROX TIB S STL ST FULL THRD W/ T15 - [id number]  SCREW BNE L34MM DIA3.5MM PROX TIB S STL ST FULL THRD W/ T15  DEPUY SYNTHES [facility]-WD

## 2025-04-21 NOTE — ANESTHESIA PRE PROCEDURE
Department of Anesthesiology  Preprocedure Note       Name:  Antonio Locke   Age:  84 y.o.  :  1940                                          MRN:  [id number]         Date:  2025      Surgeon: Surgeon(s):  Corona Green DO    Procedure: Procedure(s):  RIGHT HIP OPEN REDUCTION INTERNAL FIXATION    Medications prior to admission:   Prior to Admission medications    Medication Sig Start Date End Date Taking? Authorizing Provider   mineral oil-hydrophilic petrolatum (AQUAPHOR) ointment Apply topically 2 times daily as needed for Dry Skin (apply to legs)    Britany Miner MD   megestrol (MEGACE) 40 MG/ML suspension Take 5 mLs by mouth daily    Britany Miner MD   miconazole nitrate 2 % OINT Apply topically 2 times daily Please apply to the toes, in between the toes, both feet and legs up to the upper calf twice a day for 1 month  Patient not taking: Reported on 2025   Jacques Felix MD   guaiFENesin (ROBITUSSIN) 100 MG/5ML liquid Take 10 mLs by mouth every 4 hours as needed for Cough    Britany Miner MD   insulin lispro, 1 Unit Dial, (HUMALOG/ADMELOG) 100 UNIT/ML SOPN Inject 0-15 Units into the skin 3 times daily (before meals) Per sliding scale:  131-180: 4 units  181-240: 8 units  241-300: 10 units  301-350: 12 units  351-400: 16 units  400+: 20 units    Britany Miner MD   ibuprofen (ADVIL;MOTRIN) 800 MG tablet Take 1 tablet by mouth every 8 hours as needed for Pain  Patient not taking: Reported on 2025    Britany Miner MD   insulin glargine (LANTUS SOLOSTAR) 100 UNIT/ML injection pen Inject 10 Units into the skin nightly    Britany Miner MD   amLODIPine (NORVASC) 10 MG tablet Take 1 tablet by mouth daily    Britany Miner MD   ferrous sulfate (IRON 325) 325 (65 Fe) MG tablet Take 1 tablet by mouth 2 times daily  Patient not taking: Reported on 2025    Britany Miner MD   cyanocobalamin 1000 MCG/ML injection Inject

## 2025-04-22 LAB
ABO/RH: NORMAL
ALBUMIN SERPL-MCNC: 3.2 G/DL (ref 3.5–5.2)
ALP SERPL-CCNC: 53 U/L (ref 40–129)
ALT SERPL-CCNC: 11 U/L (ref 0–50)
ANION GAP SERPL CALCULATED.3IONS-SCNC: 13 MMOL/L (ref 7–16)
ANTIBODY SCREEN: NEGATIVE
ARM BAND NUMBER: NORMAL
AST SERPL-CCNC: 41 U/L (ref 0–50)
BASOPHILS # BLD: 0.02 K/UL (ref 0–0.2)
BASOPHILS NFR BLD: 0 % (ref 0–2)
BILIRUB SERPL-MCNC: 0.7 MG/DL (ref 0–1.2)
BLOOD BANK DISPENSE STATUS: NORMAL
BLOOD BANK SAMPLE EXPIRATION: NORMAL
BPU ID: NORMAL
BUN SERPL-MCNC: 19 MG/DL (ref 8–23)
CALCIUM SERPL-MCNC: 8.9 MG/DL (ref 8.8–10.2)
CHLORIDE SERPL-SCNC: 107 MMOL/L (ref 98–107)
CO2 SERPL-SCNC: 20 MMOL/L (ref 22–29)
COMPONENT: NORMAL
CREAT SERPL-MCNC: 1 MG/DL (ref 0.7–1.2)
CROSSMATCH RESULT: NORMAL
EOSINOPHIL # BLD: 0.01 K/UL (ref 0.05–0.5)
EOSINOPHILS RELATIVE PERCENT: 0 % (ref 0–6)
ERYTHROCYTE [DISTWIDTH] IN BLOOD BY AUTOMATED COUNT: 13.4 % (ref 11.5–15)
GFR, ESTIMATED: 74 ML/MIN/1.73M2
GLUCOSE BLD-MCNC: 160 MG/DL (ref 74–99)
GLUCOSE BLD-MCNC: 277 MG/DL (ref 74–99)
GLUCOSE BLD-MCNC: 284 MG/DL (ref 74–99)
GLUCOSE BLD-MCNC: 433 MG/DL (ref 74–99)
GLUCOSE SERPL-MCNC: 258 MG/DL (ref 74–99)
HCT VFR BLD AUTO: 22.1 % (ref 37–54)
HGB BLD-MCNC: 7.7 G/DL (ref 12.5–16.5)
IMM GRANULOCYTES # BLD AUTO: 0.07 K/UL (ref 0–0.58)
IMM GRANULOCYTES NFR BLD: 1 % (ref 0–5)
LYMPHOCYTES NFR BLD: 0.96 K/UL (ref 1.5–4)
LYMPHOCYTES RELATIVE PERCENT: 8 % (ref 20–42)
MCH RBC QN AUTO: 32 PG (ref 26–35)
MCHC RBC AUTO-ENTMCNC: 34.8 G/DL (ref 32–34.5)
MCV RBC AUTO: 91.7 FL (ref 80–99.9)
MONOCYTES NFR BLD: 0.85 K/UL (ref 0.1–0.95)
MONOCYTES NFR BLD: 7 % (ref 2–12)
NEUTROPHILS NFR BLD: 84 % (ref 43–80)
NEUTS SEG NFR BLD: 10.33 K/UL (ref 1.8–7.3)
PLATELET # BLD AUTO: 186 K/UL (ref 130–450)
PMV BLD AUTO: 10.6 FL (ref 7–12)
POTASSIUM SERPL-SCNC: 3.8 MMOL/L (ref 3.5–5.1)
PROT SERPL-MCNC: 6 G/DL (ref 6.4–8.3)
RBC # BLD AUTO: 2.41 M/UL (ref 3.8–5.8)
SODIUM SERPL-SCNC: 139 MMOL/L (ref 136–145)
TRANSFUSION STATUS: NORMAL
UNIT DIVISION: 0
WBC OTHER # BLD: 12.2 K/UL (ref 4.5–11.5)

## 2025-04-22 PROCEDURE — 82962 GLUCOSE BLOOD TEST: CPT

## 2025-04-22 PROCEDURE — 2500000003 HC RX 250 WO HCPCS: Performed by: NURSE PRACTITIONER

## 2025-04-22 PROCEDURE — 97161 PT EVAL LOW COMPLEX 20 MIN: CPT

## 2025-04-22 PROCEDURE — 51798 US URINE CAPACITY MEASURE: CPT

## 2025-04-22 PROCEDURE — 6360000002 HC RX W HCPCS: Performed by: NURSE PRACTITIONER

## 2025-04-22 PROCEDURE — 51701 INSERT BLADDER CATHETER: CPT

## 2025-04-22 PROCEDURE — 2500000003 HC RX 250 WO HCPCS

## 2025-04-22 PROCEDURE — 97165 OT EVAL LOW COMPLEX 30 MIN: CPT

## 2025-04-22 PROCEDURE — 6360000002 HC RX W HCPCS

## 2025-04-22 PROCEDURE — 97530 THERAPEUTIC ACTIVITIES: CPT

## 2025-04-22 PROCEDURE — 51702 INSERT TEMP BLADDER CATH: CPT

## 2025-04-22 PROCEDURE — 97535 SELF CARE MNGMENT TRAINING: CPT

## 2025-04-22 PROCEDURE — 1200000000 HC SEMI PRIVATE

## 2025-04-22 PROCEDURE — 85025 COMPLETE CBC W/AUTO DIFF WBC: CPT

## 2025-04-22 PROCEDURE — 6370000000 HC RX 637 (ALT 250 FOR IP): Performed by: NURSE PRACTITIONER

## 2025-04-22 PROCEDURE — 2580000003 HC RX 258: Performed by: NURSE PRACTITIONER

## 2025-04-22 PROCEDURE — 36415 COLL VENOUS BLD VENIPUNCTURE: CPT

## 2025-04-22 PROCEDURE — 99232 SBSQ HOSP IP/OBS MODERATE 35: CPT

## 2025-04-22 PROCEDURE — 80053 COMPREHEN METABOLIC PANEL: CPT

## 2025-04-22 RX ORDER — INSULIN LISPRO 100 [IU]/ML
0-16 INJECTION, SOLUTION INTRAVENOUS; SUBCUTANEOUS
Status: DISCONTINUED | OUTPATIENT
Start: 2025-04-22 | End: 2025-04-24 | Stop reason: HOSPADM

## 2025-04-22 RX ADMIN — RIVASTIGMINE TARTRATE 4.5 MG: 3 CAPSULE ORAL at 21:47

## 2025-04-22 RX ADMIN — METOPROLOL SUCCINATE 25 MG: 25 TABLET, EXTENDED RELEASE ORAL at 09:24

## 2025-04-22 RX ADMIN — AMLODIPINE BESYLATE 10 MG: 10 TABLET ORAL at 09:27

## 2025-04-22 RX ADMIN — RIVASTIGMINE TARTRATE 4.5 MG: 3 CAPSULE ORAL at 09:24

## 2025-04-22 RX ADMIN — ASPIRIN 81 MG CHEWABLE TABLET 81 MG: 81 TABLET CHEWABLE at 09:23

## 2025-04-22 RX ADMIN — INSULIN LISPRO 16 UNITS: 100 INJECTION, SOLUTION INTRAVENOUS; SUBCUTANEOUS at 12:11

## 2025-04-22 RX ADMIN — SODIUM CHLORIDE: 9 INJECTION, SOLUTION INTRAVENOUS at 21:25

## 2025-04-22 RX ADMIN — SODIUM CHLORIDE: 9 INJECTION, SOLUTION INTRAVENOUS at 06:35

## 2025-04-22 RX ADMIN — INSULIN LISPRO 1 UNITS: 100 INJECTION, SOLUTION INTRAVENOUS; SUBCUTANEOUS at 06:34

## 2025-04-22 RX ADMIN — SODIUM CHLORIDE, PRESERVATIVE FREE 10 ML: 5 INJECTION INTRAVENOUS at 09:26

## 2025-04-22 RX ADMIN — INSULIN LISPRO 8 UNITS: 100 INJECTION, SOLUTION INTRAVENOUS; SUBCUTANEOUS at 16:56

## 2025-04-22 RX ADMIN — INSULIN GLARGINE 10 UNITS: 100 INJECTION, SOLUTION SUBCUTANEOUS at 21:45

## 2025-04-22 RX ADMIN — ENOXAPARIN SODIUM 40 MG: 100 INJECTION SUBCUTANEOUS at 09:22

## 2025-04-22 RX ADMIN — WATER 2000 MG: 1 INJECTION INTRAMUSCULAR; INTRAVENOUS; SUBCUTANEOUS at 00:34

## 2025-04-22 RX ADMIN — MEGESTROL ACETATE 200 MG: 40 SUSPENSION ORAL at 09:26

## 2025-04-22 ASSESSMENT — PAIN SCALES - GENERAL: PAINLEVEL_OUTOF10: 0

## 2025-04-22 NOTE — PROGRESS NOTES
Patients rubio catheter removed today between 1600 - 1700. Patient has not yet voided. Bladder scan reading 330 mL. Reached out to primary via perfect serve, left voicemail.       Straight cath ordered. Completed. 425 mL out.

## 2025-04-22 NOTE — PROGRESS NOTES
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 of heel, Blood circulation, collateral, Cellulitis of foot, left, Chronic venous insufficiency, Dermatophytosis, Diabetes mellitus ([facility]), 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 2 diabetes mellitus, limited to breakdown of skin, 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 due to underlying condition, with fat layer exposed, Diabetic ulcer of right midfoot associated with type 2 diabetes mellitus, limited to breakdown of skin, Femoral-popliteal atherosclerosis, Heel ulcer, right, with fat layer exposed ([facility]), Hypertension, Osteomyelitis of third toe of right foot ([facility]), S/P peripheral artery angioplasty, Skin ulcer of right foot with fat layer exposed ([facility]), Ulcer of right leg, with fat layer exposed ([facility]), and Varicose veins of leg with edema, right.  Procedure/Surgery:  Right hip ORIF   Precautions:  Fall risk, NWB RLE, NWB RUE (proximal humerus fracture), RUE sling for OOB, cognition, alarms  Equipment Needs:  TBD    SUBJECTIVE:    Pt is a questionable/poor historian. Per chart, pt admitted from [facility]. Pt reports he was active with therapy walking short distances using a walker.    OBJECTIVE:   Initial Evaluation  Date: 25 Treatment Date: Short Term/ Long Term   Goals   AM-PAC 6 Clicks      Was pt agreeable to Eval/treatment? Yes     Does pt have pain? RUE and RLE pain with light movement throughout the session     Bed Mobility   risk  [x] Endurance Training to improve activity tolerance during functional mobility   [x] Transfer Training to improve safety and independence with all functional transfers   [x] Gait Training to improve gait mechanics, endurance and assess need for appropriate assistive device  [] Stair Training in preparation for safe discharge home and/or into the community   [x] Positioning to prevent skin breakdown and contractures  [x] Safety and Education Training   [x] Patient/Caregiver Education   [] HEP  [] Other     PT long term treatment goals are located in above grid    Frequency of treatments: 2-5x/week x 1-2 weeks.    Time in  854  Time out  924    Total Treatment Time  23 minutes     Evaluation Time includes thorough review of current medical information, gathering information on past medical history/social history and prior level of function, completion of standardized testing/informal observation of tasks, assessment of data and education on plan of care and goals.    CPT codes:  [x] Low Complexity PT evaluation 45524  [] Moderate Complexity PT evaluation 09099  [] High Complexity PT evaluation 06859  [] PT Re-evaluation 47467  [] Gait training 96411 0 minutes  [] Manual therapy 84032 0 minutes  [x] Therapeutic activities 31777 23 minutes  [] Therapeutic exercises 56304 0 minutes  [] Neuromuscular reeducation 15127 0 minutes     Chente Kennedy, PT, DPT  [id number]    Vee Grimm, SPT

## 2025-04-22 NOTE — PROGRESS NOTES
OCCUPATIONAL THERAPY INITIAL EVALUATION    [facility]  [address] [place], [place]        Date:2025                                                  Patient Name: Antonio Locke    MRN: [id number]    : 1940    Room: [address] [place] [address]-A      Evaluating OT: Marcelle Martinez OTR/L; [id number]       Referring Provider: Elba Solorzano APRN - CNP     Specific Provider Orders/Date: OT Eval and Treat 25 1300       Diagnosis: Per chart, pt fell at nursing facility -  Closed R periprosthetic femur fracture &  R proximal humerus fracture relatively nondisplaced.    Surgery: 25 RIGHT HIP OPEN REDUCTION INTERNAL FIXATION.     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, Blood circulation, collateral, Cellulitis of foot, left, Chronic venous insufficiency, Dermatophytosis, Diabetes mellitus (HCC), 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 2 diabetes mellitus, limited to breakdown of skin, 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 due to underlying condition, with fat layer exposed, Diabetic ulcer of right midfoot associated with type 2 diabetes mellitus, limited to breakdown of skin, Femoral-popliteal atherosclerosis, Heel ulcer, right, with fat layer exposed (HCC), Hypertension, 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     Recommended Adaptive Equipment: TBD     Precautions:  Fall Risk, +alarms, NWB RLE, NWB RUE - sling, cognition, external catheter, contact isolation     Assessment of current deficits    [x] Functional mobility  [x]ADLs  [x] Strength               throughout session to maintain skin/joint integrity & proper body mechanics/NWB RUE & RLE.    Rehab Potential: good  for established goals     LTG: maximize independence with ADLs to return to PLOF    Patient and/or family were instructed on functional diagnosis, prognosis/goals and OT plan of care. Demonstrated poor understanding.     Eval Complexity: Low  History: Expanded chart review of medical records and additional review of physical, cognitive, or psychosocial history related to current functional performance  Exam: 3+ performance deficits  Assistance/Modification: Min/mod assistance or modifications required to perform tasks. May have comorbidities that affect occupational performance.    Time In: 8:44a  Time Out: 9:22a  Total Treatment Time: 23 minutes    Min Units   OT Eval Low 33865  x     OT Eval Medium 91618      OT Eval High 07658      OT Re-Eval 69848       Therapeutic Ex 08831       Therapeutic Activities 85878  8 1    ADL/Self Care 06997  15 1    Orthotic Management 02760       Splint Fitting/Training 68565     Manual 71234     Neuro Re-Ed 53435       Non-Billable Time          Evaluation time additionally includes thorough review of current medical information, gathering information on PMH/social history & PLOF, administration/interpretation of standardized testing/informal observation of tasks, assessment of data, consultation within the interdisciplinary team, & development of POC/goals.            Marcelle Martinez OTR/L; [id number]

## 2025-04-22 NOTE — PROGRESS NOTES
Messaged Maricruz Forbes NP to request downgrade to ortho floor    Electronically signed by Sandy Feliz RN on 4/22/2025 at 9:30 AM

## 2025-04-22 NOTE — PLAN OF CARE
Problem: Chronic Conditions and Co-morbidities  Goal: Patient's chronic conditions and co-morbidity symptoms are monitored and maintained or improved  4/22/2025 1147 by Sandy Feliz RN  Outcome: Progressing     Problem: Discharge Planning  Goal: Discharge to home or other facility with appropriate resources  4/22/2025 1147 by Sandy Feliz RN  Outcome: Progressing     Problem: Safety - Adult  Goal: Free from fall injury  4/22/2025 1147 by Sandy Feliz RN  Outcome: Progressing     Problem: Skin/Tissue Integrity  Goal: Skin integrity remains intact  Description: 1.  Monitor for areas of redness and/or skin breakdown2.  Assess vascular access sites hourly3.  Every 4-6 hours minimum:  Change oxygen saturation probe site4.  Every 4-6 hours:  If on nasal continuous positive airway pressure, respiratory therapy assess nares and determine need for appliance change or resting period  Outcome: Progressing     Problem: Confusion  Goal: Confusion, delirium, dementia, or psychosis is improved or at baseline  Description: INTERVENTIONS:1. Assess for possible contributors to thought disturbance, including medications, impaired vision or hearing, underlying metabolic abnormalities, dehydration, psychiatric diagnoses, and notify attending LIP2. [place] high risk fall precautions, as indicated3. Provide frequent short contacts to provide reality reorientation, refocusing and direction4. Decrease environmental stimuli, including noise as appropriate5. Monitor and intervene to maintain adequate nutrition, hydration, elimination, sleep and activity6. If unable to ensure safety without constant attention obtain sitter and review sitter guidelines with assigned personnel7. Initiate Psychosocial CNS and Spiritual Care consult, as indicated  INTERVENTIONS:1. Assess for possible contributors to thought disturbance, including medications, impaired vision or hearing, underlying metabolic abnormalities, dehydration,  psychiatric diagnoses, and notify attending LIP2. [place] high risk fall precautions, as indicated3. Provide frequent short contacts to provide reality reorientation, refocusing and direction4. Decrease environmental stimuli, including noise as appropriate5. Monitor and intervene to maintain adequate nutrition, hydration, elimination, sleep and activity6. If unable to ensure safety without constant attention obtain sitter and review sitter guidelines with assigned personnel7. Initiate Psychosocial CNS and Spiritual Care consult, as indicated  Outcome: Progressing     Problem: ABCDS Injury Assessment  Goal: Absence of physical injury  Outcome: Progressing     Problem: Pain  Goal: Verbalizes/displays adequate comfort level or baseline comfort level  Outcome: Progressing

## 2025-04-22 NOTE — PROGRESS NOTES
Palliative Care Department  [phone]  Palliative Care Progress Note  Provider JUNE Duenas CNP      PATIENT: Antonio Locke  : 1940  MRN: [id number]  ADMISSION DATE: 2025  9:08 AM  Referring Provider: Elba Solorzano APRN - CNP     Palliative Medicine was consulted on hospital day 4 for assistance with Goals of care    HPI:     Clinical Summary:Antonio Locke is a 84 y.o. y/o male with a history of arterial sclerosis of the artery of the right lower extremity with ulceration, arthrosclerosis of native artery of the right lower extremity with ulceration to the heel, blood circulation, collateral, cellulitis of foot, Chronic venous insufficiency, diabetes, diabetic foot ulcer, femoral-popliteal arthrosclerosis, osteomyelitis, hypertension, who presented to [facility] on 2025 with close right periprosthetic femur fracture post fall.  X-ray shows right hip status post left dynamic hip screw, right hip hemiarthroplasty demonstrated right femur periprosthetic fracture, fracture extending proximally medially to distal and laterally about the tip of the steam, stem subsidence and shortening.  Orthopedic surgery consulted    ASSESSMENT/PLAN:     Pertinent Hospital Diagnoses     Close right periprosthetic femur fracture      Palliative Care Encounter / Counseling Regarding Goals of Care  Please see detailed goals of care discussion as below  At this time, Antonio Locke, Does Not have capacity for medical decision-making.  Capacity is time limited and situation/question specific  During encounter Mickie was surrogate medical decision-maker  Outcome of goals of care meeting:  Our waiting orthopedic surgery's recommendation  Goal is to pursue treatment, and resume hospice services at discharge  Continue full code for now  Code status Full Code  Advanced Directives: no POA or living will in [facility]  Surrogate/Legal NOK:  Mickie Locke (Spouse) [phone]    Spiritual assessment: no

## 2025-04-22 NOTE — PROGRESS NOTES
Nurse to nurse called to Emeli on 5WE    Electronically signed by Sandy Feliz RN on 4/22/2025 at 6:43 PM

## 2025-04-22 NOTE — DISCHARGE INSTR - COC
Continuity of Care Form    Patient Name: Antonio Locke   :  1940  MRN:  [id number]    Admit date:  2025  Discharge date:  2025    Code Status Order: Limited   Advance Directives:     Admitting Physician:  Nancy Posada MD  PCP: Farhad Hadley MD    Discharging Nurse: Aliyah Tong RN  Discharging Hospital Unit/Room#: 7416/7416-A  Discharging Unit Phone Number: [phone]    Emergency Contact:   Extended Emergency Contact Information  Primary Emergency Contact: Mickie Locke S.  Address: [address] [place] [address] of [facility]  Home Phone: [phone]  Mobile Phone: [phone]  Relation: Spouse   needed? No  Secondary Emergency Contact: Armand Locke E.  Home Phone: [phone]  Relation: Brother/Sister   needed? No    Past Surgical History:  Past Surgical History:   Procedure Laterality Date    COLONOSCOPY      CYSTOSCOPY N/A 2020    SUPRAPUBIC TUBE PLACEMENT performed by Brian Bustamante MD at [facility] OR    EYE SURGERY  's    lense implants bilaterally    FOOT DEBRIDEMENT Left 2017    wound debridement and delayed primary closure    FRACTURE SURGERY      L femur fracture surgery    HIP FRACTURE SURGERY Left 2020    LEFT HIP OPEN REDUCTION INTERNAL FIXATION performed [facility] Chilango Dias MD [facility] [place]    HIP SURGERY      HIP SURGERY Right 2021    RIGHT HIP HEMIARTHROPLASTY performed by Jose Macedo DO at [facility] [place]    HIP SURGERY Right 2025    RIGHT HIP OPEN REDUCTION INTERNAL FIXATION performed by Corona Green DO at [facility] [place]    OTHER SURGICAL HISTORY  2019    Dr. Larsen- PTA ant tibial    OTHER SURGICAL HISTORY  2019    Dr Larsen - PCI Right popliteal and Anterior tibial    PROSTATE BIOPSY  2016    SKIN BIOPSY   last time    head and ears    TOE AMPUTATION Left 2016    2nd    TOE AMPUTATION Right 2019    AMPUTATION RIGHT SECOND TOE, FOOT DEBRIDEMENT

## 2025-04-22 NOTE — PROGRESS NOTES
Department of Orthopedic Surgery  Resident Progress Note      1 Day Post-Op right proximal femur ORIF.  Patient seen and examined, resting comfortably in bed.    VITALS:  BP (!) 129/58   Pulse 93   Temp 97.8 °F (36.6 °C) (Temporal)   Resp 20   Ht 1.753 m (5' 9.02\")   Wt 59.1 kg (130 lb 6.4 oz)   SpO2 99%   BMI 19.25 kg/m²     General: in no acute distress and disoriented    MUSCULOSKELETAL:   right lower extremity:  Dressing saturated and partially removed at inferior aspect.  Thigh/lower leg compartments soft and compressible  Calf soft and nontender  + Plantarflexion, dorsiflexion, great toe dorsiflexion   2+/4 DP and PT pulses.  Foot warm and well-perfused  Distal sensation grossly intact to superficial and deep peroneal, tibial, sural, saphenous nerves     CBC:   Lab Results   Component Value Date/Time    WBC 12.2 04/22/2025 05:13 AM    HGB 7.7 04/22/2025 05:13 AM    HCT 22.1 04/22/2025 05:13 AM     04/22/2025 05:13 AM       ASSESSMENT  Post-Op Diagnosis Codes:     * Periprosthetic fracture around internal prosthetic hip joint, initial encounter [M97.8XXA, Z96.649]     PLAN      Continue physical therapy and protocol: NWB - rightLE  Will replace dressing today.  Reinforce currently  24 hour abx coverage -complete  Deep venous thrombosis prophylaxis -aspirin and Lovenox inpatient, early mobilization  PT/OT  Pain Control: IV and PO, wean to p.o.  Monitor H&H  D/C Plan: Pending PT OT, primary.      Electronically signed by Brian Gonzalez DO on 4/22/2025 at 8:00 AM

## 2025-04-22 NOTE — PROGRESS NOTES
Urology consult called to Dr Davison     Electronically signed by Sandy Feliz RN on 4/22/2025 at 6:48 PM

## 2025-04-22 NOTE — PROGRESS NOTES
Spiritual Health History and Assessment/Progress Note  [facility]    (P) Spiritual/Emotional Needs, Follow-up, Palliative Care,  ,  ,      Name: Antonio Locke MRN: [id number]    Age: 84 y.o.     Sex: male   Language: English   Mormonism: Quaker   Closed right hip fracture, initial encounter ([facility])     Date: 4/22/2025                           Spiritual Assessment began in [facility]        Referral/Consult From: (P) Rounding, Palliative Care   Encounter Overview/Reason: (P) Spiritual/Emotional Needs, Follow-up, Palliative Care  Service Provided For: (P) Patient    Aide, Belief, Meaning:   Patient identifies as spiritual and has beliefs or practices that help with coping during difficult times  Family/Friends No family/friends present      Importance and Influence:  Patient has spiritual/personal beliefs that influence decisions regarding their health  Family/Friends No family/friends present    Community:  Patient feels well-supported. Support system includes: Spouse/Partner and Extended family  Family/Friends No family/friends present    Assessment and Plan of Care:     Patient Interventions include: Facilitated expression of thoughts and feelings, Explored spiritual coping/struggle/distress, Engaged in theological reflection, and Affirmed coping skills/support systems  Family/Friends Interventions include: No family/friends present    Patient Plan of Care: Spiritual Care available upon further referral  Family/Friends Plan of Care: No family/friends present    Electronically signed by CHRISTA Meyer on 4/22/2025 at 10:30 AM

## 2025-04-22 NOTE — PLAN OF CARE
Problem: Chronic Conditions and Co-morbidities  Goal: Patient's chronic conditions and co-morbidity symptoms are monitored and maintained or improved  4/22/2025 0214 by Candis Devine RN  Outcome: Progressing  4/21/2025 1646 by Denita Ontiveros RN  Outcome: Progressing     Problem: Discharge Planning  Goal: Discharge to home or other facility with appropriate resources  4/22/2025 0214 by Candis Devine RN  Outcome: Progressing  4/21/2025 1646 by Denita Ontiveros RN  Outcome: Progressing     Problem: Safety - Adult  Goal: Free from fall injury  4/22/2025 0214 by Candis Devine RN  Outcome: Progressing  4/21/2025 1646 by Denita Ontiveros RN  Outcome: Progressing     Problem: Skin/Tissue Integrity  Goal: Skin integrity remains intact  Description: 1.  Monitor for areas of redness and/or skin breakdown2.  Assess vascular access sites hourly3.  Every 4-6 hours minimum:  Change oxygen saturation probe site4.  Every 4-6 hours:  If on nasal continuous positive airway pressure, respiratory therapy assess nares and determine need for appliance change or resting period  4/21/2025 1646 by Denita Ontiveros RN  Outcome: Progressing     Problem: Confusion  Goal: Confusion, delirium, dementia, or psychosis is improved or at baseline  Description: INTERVENTIONS:1. Assess for possible contributors to thought disturbance, including medications, impaired vision or hearing, underlying metabolic abnormalities, dehydration, psychiatric diagnoses, and notify attending LIP2. [place] high risk fall precautions, as indicated3. Provide frequent short contacts to provide reality reorientation, refocusing and direction4. Decrease environmental stimuli, including noise as appropriate5. Monitor and intervene to maintain adequate nutrition, hydration, elimination, sleep and activity6. If unable to ensure safety without constant attention obtain sitter and review sitter guidelines with assigned personnel7. Initiate Psychosocial CNS and Spiritual

## 2025-04-22 NOTE — PROGRESS NOTES
[facility]                                Progress note    Subjective:    Resting comfortably in bed  No acute issues overnight    Objective:    BP (!) 137/96   Pulse 90   Temp 97.8 °F (36.6 °C) (Temporal)   Resp 20   Ht 1.753 m (5' 9.02\")   Wt 59.1 kg (130 lb 6.4 oz)   SpO2 96%   BMI 19.25 kg/m²     In: 3270.2 [P.O.:360; I.V.:2910.2]  Out: 1325   In: 3270.2   Out: 1325 [Urine:1325]    General appearance: NAD, pleasantly confused  HEENT: AT/NC, MMM  Neck: FROM, supple  Lungs: Clear to auscultation  CV: RRR, no MRGs  Vasc: Radial pulses 2+  Abdomen: Soft, non-tender; no masses or HSM  Extremities: No peripheral edema or digital cyanosis, RLE surgical dressing  Skin: no rash, lesions or ulcers  Psych: Alert and oriented to person, place, confused at times   Neuro: Alert and interactive     Recent Labs     04/20/25  0758 04/21/25  0712 04/22/25  0513   WBC 10.4 7.8 12.2*   HGB 8.5* 8.4* 7.7*   HCT 25.0* 24.1* 22.1*    140 186       Recent Labs     04/20/25  0758 04/21/25  0712 04/22/25  0513    141 139   K 3.9 3.8 3.8   * 110* 107   CO2 23 21* 20*   BUN 23 16 19   CREATININE 1.0 0.8 1.0   CALCIUM 9.4 9.0 8.9       Assessment:    Principal Problem:    Closed right hip fracture, initial encounter ([facility])  Active Problems:    Periprosthetic fracture of shaft of femur  Resolved Problems:    * No resolved hospital problems. *      Plan:  84-year-old male with a history of diabetes and hypertension presents to the ED with complaints of mechanical fall resulting in a     Closed right hip fracture  Pain control  Hold anticoagulation/antiplatelet agents/chemical DVT prophylaxis  Orthopedic surgery consult  Keep n.p.o. until cleared by orthopedic surgery for surgical procedure  Encourage ISP  Bowel regimen  From medicine standpoint he is okay to proceed with surgical intervention with low to moderate risk of perioperative cardiovascular event given advanced age     Reactive  leukocytosis  Monitor for fevers  Repeat in a.m.     Diabetes Mellitus  -Monitor labs  -ISS glucose control  -Hypoglycemia protocol initiated    4/19/2025  Vital signs stable  No surgical procedure at this time for the right proximal humerus fracture.  Continue comfort measures  Orthopedic surgery waiting to discuss with family in reference to his right hip fracture.  Continue to hold anticoagulation  Leukocytosis improving-11.9  Encourage ISP  Bowel regimen    4/20/2025  Vital signs stable  Continue to hold anticoagulation  And patient for surgical procedure tomorrow for his right hip  Check PT/INR  Encourage ISP  Monitor H&H-currently 8.5/25.0  No family at bedside    4/21/25  - OR today for right hip ORIF  - Monitor labs following surgery  - Await PT OT evaluations  - Discontinue Bocanegra and attempt voiding trial after surgery  - Continue IVF NS at 75 for now  - Surgical prophylactic antibiotics ordered by Ortho  - Await discharge disposition planning    4/22/25  - Mild leukocytosis today, 12.2 likely reactive from surgery  - Continue drop in H&H 7.7/22.1, continue to monitor  - Adjust diet to carb control for tighter glucose control  - Increase ISS to high scale  - Patient to return to facility on discharge once medically stable  - Okay to transfer to [facility]         Code status:  Full   Consultants:  orthopedic surgery    DVT Prophylaxis   PT/OT  Discharge planning         JUNE Hartman - CNP,  2:37 PM  4/22/2025

## 2025-04-22 NOTE — PROGRESS NOTES
Patient straight cathed per [facility] Protocols  Will continue to monitor     Electronically signed by Sandy Feliz RN on 4/22/2025 at 11:00 AM

## 2025-04-22 NOTE — CARE COORDINATION
Reviewed chart, pt from [facility], pt was under hospice care, fell ORIF 4/21. Awaiting PT/OT montse. Called wife and left a message. Called Jazz at [facility], will nee precert started. Envelope and ambulance form in soft chart. Received call back from wife, she expressed concern about [facility] therapy and that pt didn't get enough when she asked for in past. Explained that under hospice they don't see as much, but since pt would return skilled that they are to see pt ast last 5 days a week. Wife expressed understanding and [facility]. Spoke with Marcia at [facility], updated her on wife's concerns and asked for precert to be started.LION Tafoya, SILVIA  .Case Management Assessment  Initial Evaluation    Date/Time of Evaluation: 4/22/2025 12:29 PM  Assessment Completed by: LION Tafoya, SILVIA    If patient is discharged prior to next notation, then this note serves as note for discharge by case management.    Patient Name: Antonio Locke                   YOB: 1940  Diagnosis: Closed right hip fracture, initial encounter ([facility]) [S72.001A]  Periprosthetic fracture of shaft of femur [M97.8XXA, Z96.649]                   Date / Time: 4/18/2025  9:08 AM    Patient Admission Status: Inpatient   Readmission Risk (Low < 19, Mod (19-27), High > 27): Readmission Risk Score: 15.5    Current PCP: Farhad Hadley MD  PCP verified by CM? Yes    Chart Reviewed: Yes      History Provided by: Spouse  Patient Orientation: Person    Patient Cognition: Dementia / Early Alzheimer's    Hospitalization in the last 30 days (Readmission):  No    If yes, Readmission Assessment in  Navigator will be completed.    Advance Directives:      Code Status: Full Code   Patient's Primary Decision Maker is: Legal Next of Kin    Primary Decision Maker: Mickie Locke S. - Spouse - [phone]    Discharge Planning:    Patient lives with: Other (Comment) Type of Home:

## 2025-04-22 NOTE — PROGRESS NOTES
Orthopedic surgery interval update:    Patient seen and examined. Dressing taken down with no appreciable active drainage. Skin cleansed and dressing reapplied without issues. Patient appropriate for discharge, will continue to monitor as he remains inpatient.    DS 4/22 1601

## 2025-04-23 LAB
ALBUMIN SERPL-MCNC: 2.6 G/DL (ref 3.5–5.2)
ALP SERPL-CCNC: 66 U/L (ref 40–129)
ALT SERPL-CCNC: 14 U/L (ref 0–50)
ANION GAP SERPL CALCULATED.3IONS-SCNC: 10 MMOL/L (ref 7–16)
AST SERPL-CCNC: 33 U/L (ref 0–50)
BASOPHILS # BLD: 0.02 K/UL (ref 0–0.2)
BASOPHILS NFR BLD: 0 % (ref 0–2)
BILIRUB SERPL-MCNC: 0.7 MG/DL (ref 0–1.2)
BUN SERPL-MCNC: 20 MG/DL (ref 8–23)
CALCIUM SERPL-MCNC: 8.6 MG/DL (ref 8.8–10.2)
CHLORIDE SERPL-SCNC: 108 MMOL/L (ref 98–107)
CO2 SERPL-SCNC: 21 MMOL/L (ref 22–29)
CREAT SERPL-MCNC: 1 MG/DL (ref 0.7–1.2)
EOSINOPHIL # BLD: 0.14 K/UL (ref 0.05–0.5)
EOSINOPHILS RELATIVE PERCENT: 1 % (ref 0–6)
ERYTHROCYTE [DISTWIDTH] IN BLOOD BY AUTOMATED COUNT: 14.1 % (ref 11.5–15)
GFR, ESTIMATED: 74 ML/MIN/1.73M2
GLUCOSE BLD-MCNC: 164 MG/DL (ref 74–99)
GLUCOSE BLD-MCNC: 297 MG/DL (ref 74–99)
GLUCOSE BLD-MCNC: 366 MG/DL (ref 74–99)
GLUCOSE BLD-MCNC: 374 MG/DL (ref 74–99)
GLUCOSE SERPL-MCNC: 171 MG/DL (ref 74–99)
HCT VFR BLD AUTO: 18.3 % (ref 37–54)
HCT VFR BLD AUTO: 25.1 % (ref 37–54)
HGB BLD-MCNC: 6.5 G/DL (ref 12.5–16.5)
HGB BLD-MCNC: 8.5 G/DL (ref 12.5–16.5)
IMM GRANULOCYTES # BLD AUTO: 0.05 K/UL (ref 0–0.58)
IMM GRANULOCYTES NFR BLD: 1 % (ref 0–5)
LYMPHOCYTES NFR BLD: 1.37 K/UL (ref 1.5–4)
LYMPHOCYTES RELATIVE PERCENT: 13 % (ref 20–42)
MAGNESIUM SERPL-MCNC: 1.5 MG/DL (ref 1.6–2.4)
MCH RBC QN AUTO: 32.7 PG (ref 26–35)
MCHC RBC AUTO-ENTMCNC: 35.5 G/DL (ref 32–34.5)
MCV RBC AUTO: 92 FL (ref 80–99.9)
MONOCYTES NFR BLD: 0.83 K/UL (ref 0.1–0.95)
MONOCYTES NFR BLD: 8 % (ref 2–12)
NEUTROPHILS NFR BLD: 78 % (ref 43–80)
NEUTS SEG NFR BLD: 8.55 K/UL (ref 1.8–7.3)
PLATELET # BLD AUTO: 169 K/UL (ref 130–450)
PMV BLD AUTO: 10.4 FL (ref 7–12)
POTASSIUM SERPL-SCNC: 3.5 MMOL/L (ref 3.5–5.1)
PROT SERPL-MCNC: 5.4 G/DL (ref 6.4–8.3)
RBC # BLD AUTO: 1.99 M/UL (ref 3.8–5.8)
SODIUM SERPL-SCNC: 139 MMOL/L (ref 136–145)
WBC OTHER # BLD: 11 K/UL (ref 4.5–11.5)

## 2025-04-23 PROCEDURE — P9016 RBC LEUKOCYTES REDUCED: HCPCS

## 2025-04-23 PROCEDURE — 86900 BLOOD TYPING SEROLOGIC ABO: CPT

## 2025-04-23 PROCEDURE — 6370000000 HC RX 637 (ALT 250 FOR IP): Performed by: NURSE PRACTITIONER

## 2025-04-23 PROCEDURE — 2500000003 HC RX 250 WO HCPCS: Performed by: NURSE PRACTITIONER

## 2025-04-23 PROCEDURE — 80053 COMPREHEN METABOLIC PANEL: CPT

## 2025-04-23 PROCEDURE — 82962 GLUCOSE BLOOD TEST: CPT

## 2025-04-23 PROCEDURE — 1200000000 HC SEMI PRIVATE

## 2025-04-23 PROCEDURE — 6360000002 HC RX W HCPCS: Performed by: NURSE PRACTITIONER

## 2025-04-23 PROCEDURE — 85025 COMPLETE CBC W/AUTO DIFF WBC: CPT

## 2025-04-23 PROCEDURE — 86901 BLOOD TYPING SEROLOGIC RH(D): CPT

## 2025-04-23 PROCEDURE — 83735 ASSAY OF MAGNESIUM: CPT

## 2025-04-23 PROCEDURE — 97530 THERAPEUTIC ACTIVITIES: CPT

## 2025-04-23 PROCEDURE — 97535 SELF CARE MNGMENT TRAINING: CPT

## 2025-04-23 PROCEDURE — 6370000000 HC RX 637 (ALT 250 FOR IP): Performed by: INTERNAL MEDICINE

## 2025-04-23 PROCEDURE — 36430 TRANSFUSION BLD/BLD COMPNT: CPT

## 2025-04-23 PROCEDURE — 85014 HEMATOCRIT: CPT

## 2025-04-23 PROCEDURE — 36415 COLL VENOUS BLD VENIPUNCTURE: CPT

## 2025-04-23 PROCEDURE — 86923 COMPATIBILITY TEST ELECTRIC: CPT

## 2025-04-23 PROCEDURE — 85018 HEMOGLOBIN: CPT

## 2025-04-23 PROCEDURE — 86850 RBC ANTIBODY SCREEN: CPT

## 2025-04-23 RX ORDER — ASPIRIN 81 MG/1
81 TABLET, CHEWABLE ORAL 2 TIMES DAILY
Status: DISCONTINUED | OUTPATIENT
Start: 2025-04-23 | End: 2025-04-24 | Stop reason: HOSPADM

## 2025-04-23 RX ORDER — SODIUM CHLORIDE 9 MG/ML
INJECTION, SOLUTION INTRAVENOUS PRN
Status: DISCONTINUED | OUTPATIENT
Start: 2025-04-23 | End: 2025-04-24 | Stop reason: HOSPADM

## 2025-04-23 RX ORDER — ASPIRIN 81 MG/1
81 TABLET, CHEWABLE ORAL EVERY 12 HOURS
Status: DISCONTINUED | OUTPATIENT
Start: 2025-04-23 | End: 2025-04-24 | Stop reason: HOSPADM

## 2025-04-23 RX ADMIN — RIVASTIGMINE TARTRATE 4.5 MG: 3 CAPSULE ORAL at 22:03

## 2025-04-23 RX ADMIN — ASPIRIN 81 MG CHEWABLE TABLET 81 MG: 81 TABLET CHEWABLE at 18:29

## 2025-04-23 RX ADMIN — RIVASTIGMINE TARTRATE 4.5 MG: 3 CAPSULE ORAL at 10:29

## 2025-04-23 RX ADMIN — SODIUM CHLORIDE, PRESERVATIVE FREE 10 ML: 5 INJECTION INTRAVENOUS at 11:09

## 2025-04-23 RX ADMIN — INSULIN LISPRO 8 UNITS: 100 INJECTION, SOLUTION INTRAVENOUS; SUBCUTANEOUS at 22:03

## 2025-04-23 RX ADMIN — INSULIN LISPRO 16 UNITS: 100 INJECTION, SOLUTION INTRAVENOUS; SUBCUTANEOUS at 18:29

## 2025-04-23 RX ADMIN — MEGESTROL ACETATE 200 MG: 40 SUSPENSION ORAL at 10:30

## 2025-04-23 RX ADMIN — ACETAMINOPHEN 650 MG: 325 TABLET ORAL at 11:50

## 2025-04-23 RX ADMIN — INSULIN GLARGINE 10 UNITS: 100 INJECTION, SOLUTION SUBCUTANEOUS at 22:03

## 2025-04-23 RX ADMIN — MAGNESIUM SULFATE HEPTAHYDRATE 2000 MG: 40 INJECTION, SOLUTION INTRAVENOUS at 08:46

## 2025-04-23 RX ADMIN — METOPROLOL SUCCINATE 25 MG: 25 TABLET, EXTENDED RELEASE ORAL at 10:29

## 2025-04-23 ASSESSMENT — PAIN - FUNCTIONAL ASSESSMENT: PAIN_FUNCTIONAL_ASSESSMENT: PREVENTS OR INTERFERES SOME ACTIVE ACTIVITIES AND ADLS

## 2025-04-23 ASSESSMENT — PAIN DESCRIPTION - FREQUENCY: FREQUENCY: CONTINUOUS

## 2025-04-23 ASSESSMENT — PAIN DESCRIPTION - LOCATION: LOCATION: HIP

## 2025-04-23 ASSESSMENT — PAIN SCALES - GENERAL
PAINLEVEL_OUTOF10: 3
PAINLEVEL_OUTOF10: 0
PAINLEVEL_OUTOF10: 6

## 2025-04-23 ASSESSMENT — PAIN DESCRIPTION - ONSET: ONSET: ON-GOING

## 2025-04-23 ASSESSMENT — PAIN DESCRIPTION - ORIENTATION: ORIENTATION: RIGHT

## 2025-04-23 ASSESSMENT — PAIN DESCRIPTION - DESCRIPTORS: DESCRIPTORS: ACHING

## 2025-04-23 ASSESSMENT — PAIN DESCRIPTION - PAIN TYPE: TYPE: ACUTE PAIN;SURGICAL PAIN

## 2025-04-23 NOTE — PROGRESS NOTES
Chart reviewed.  Goals of care and CODE STATUS has been established with patient's spouse Mickie, plan is to continue with current medical treatment, at discharge goal is skilled nursing, and possible resuming hospice afterward.  CODE STATUS established limited DNI.  No further PM needs has been identified.  Going to sign off for now.  Please reconsult if new PM needs arises.

## 2025-04-23 NOTE — PROGRESS NOTES
Department of Orthopedic Surgery  Resident Progress Note      2 Days Post-Op right proximal femur ORIF.  Patient seen and examined, resting comfortably in bed.  Confused.    VITALS:  BP (!) 125/42   Pulse 86   Temp 99.2 °F (37.3 °C) (Temporal)   Resp 16   Ht 1.753 m (5' 9.02\")   Wt 59.1 kg (130 lb 6.4 oz)   SpO2 98%   BMI 19.25 kg/m²     General: in no acute distress and disoriented    MUSCULOSKELETAL:   right lower extremity:  Dressing CDI  Thigh/lower leg compartments soft and compressible  Calf soft and nontender  + Plantarflexion, dorsiflexion, great toe dorsiflexion   2+/4 DP and PT pulses.  Foot warm and well-perfused  Distal sensation grossly intact to superficial and deep peroneal, tibial, sural, saphenous nerves     CBC:   Lab Results   Component Value Date/Time    WBC 11.0 04/23/2025 04:27 AM    HGB 6.5 04/23/2025 04:27 AM    HCT 18.3 04/23/2025 04:27 AM     04/23/2025 04:27 AM       ASSESSMENT  Post-Op Diagnosis Codes:     * Periprosthetic fracture around internal prosthetic hip joint, initial encounter [M97.8XXA, Z96.649]     PLAN      Continue physical therapy and protocol: NWB - rightLE  24 hour abx coverage -complete  Deep venous thrombosis prophylaxis -aspirin and Lovenox inpatient, early mobilization.  Orthopedic surgery recommend aspirin 81 mg twice daily as outpatient, will defer to primary  PT/OT  Pain Control: IV and PO, wean to p.o.  Monitor H&H. 6.5 this morning.  Okay to transfuse less than 7, will defer to primary.  No acute concerns related to surgical site  D/C Plan: Pending PT OT, primary.  Orthopedic surgery will follow from periphery for remainder of visit, please contact with questions or concerns       Electronically signed by Brian Gonzalez DO on 4/23/2025 at 7:19 AM

## 2025-04-23 NOTE — PROGRESS NOTES
4 Eyes Skin Assessment     NAME:  Antonio Locke  YOB: 1940  MEDICAL RECORD NUMBER:  [id number]    The patient is being assessed for  Transfer to New Unit    I agree that at least one RN has performed a thorough Head to Toe Skin Assessment on the patient. ALL assessment sites listed below have been assessed.      Areas assessed by both nurses:    Head, Face, Ears, Shoulders, Back, Chest, Arms, Elbows, Hands, Sacrum. Buttock, Coccyx, Ischium, Legs. Feet and Heels, and Under Medical Devices         Does the Patient have a Wound? No noted wound(s)       Renaldo Prevention initiated by RN: Yes  Wound Care Orders initiated by RN: No    Pressure Injury (Stage 3,4, Unstageable, DTI, NWPT, and Complex wounds) if present, place Wound referral order by RN under : No    New Ostomies, if present place, Ostomy referral order under : No     Nurse 1 eSignature: Electronically signed by Sunny Rojas RN on 4/23/25 at 12:48 AM EDT    **SHARE this note so that the co-signing nurse can place an eSignature**    Nurse 2 eSignature: Electronically signed by Fadia Cobian RN on 4/23/25 at 12:49 AM EDT

## 2025-04-23 NOTE — PROGRESS NOTES
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 of heel, Blood circulation, collateral, Cellulitis of foot, left, Chronic venous insufficiency, Dermatophytosis, Diabetes mellitus ([facility]), 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 2 diabetes mellitus, limited to breakdown of skin, 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 due to underlying condition, with fat layer exposed, Diabetic ulcer of right midfoot associated with type 2 diabetes mellitus, limited to breakdown of skin, Femoral-popliteal atherosclerosis, Heel ulcer, right, with fat layer exposed ([facility]), Hypertension, 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  Procedure/Surgery:  Right hip ORIF   Precautions:  Fall risk, NWB RLE, NWB RUE (proximal humerus fracture), RUE sling for OOB, cognition, alarms, Contact  Equipment Needs:  TBD    SUBJECTIVE:    Pt is a questionable/poor historian. Per chart, pt admitted from [facility]. Pt reports he was active with therapy walking short distances using a walker.    OBJECTIVE:   Initial Evaluation  Date: 25 Treatment Date: 25 Short Term/ Long Term   Goals   AM-PAC 6 Clicks     Was pt agreeable to Eval/treatment? Yes yes    Does pt have pain? RUE and RLE pain with light movement throughout the session

## 2025-04-23 NOTE — PROGRESS NOTES
[facility]                                Progress note    Subjective:    Resting comfortably in bed  No acute issues overnight  Confused at his baseline  Objective:    /63   Pulse 83   Temp 99.4 °F (37.4 °C)   Resp 16   Ht 1.753 m (5' 9.02\")   Wt 59.1 kg (130 lb 6.4 oz)   SpO2 97%   BMI 19.25 kg/m²     In: 1060 [P.O.:1060]  Out: 1400   In: 1060   Out: 1400 [Urine:1400]    General appearance: NAD, pleasantly confused  HEENT: AT/NC, MMM  Neck: FROM, supple  Lungs: Clear to auscultation  CV: RRR, no MRGs  Vasc: Radial pulses 2+  Abdomen: Soft, non-tender; no masses or HSM  Extremities: No peripheral edema or digital cyanosis, RLE surgical dressing-no seepage, dressing clean  Skin: no rash, lesions or ulcers  Psych: Alert and oriented to person, place, confused at times   Neuro: Alert and interactive     Recent Labs     04/21/25  0712 04/22/25  0513 04/23/25  0427   WBC 7.8 12.2* 11.0   HGB 8.4* 7.7* 6.5*   HCT 24.1* 22.1* 18.3*    186 169       Recent Labs     04/21/25  0712 04/22/25  0513 04/23/25  0427    139 139   K 3.8 3.8 3.5   * 107 108*   CO2 21* 20* 21*   BUN 16 19 20   CREATININE 0.8 1.0 1.0   CALCIUM 9.0 8.9 8.6*       Assessment:    Principal Problem:    Closed right hip fracture, initial encounter ([facility])  Active Problems:    Periprosthetic fracture of shaft of femur  Resolved Problems:    * No resolved hospital problems. *      Plan:  84-year-old male with a history of diabetes and hypertension presents to the ED with complaints of mechanical fall resulting in a     Closed right hip fracture  Pain control  Hold anticoagulation/antiplatelet agents/chemical DVT prophylaxis  Orthopedic surgery consult  Keep n.p.o. until cleared by orthopedic surgery for surgical procedure  Encourage ISP  Bowel regimen  From medicine standpoint he is okay to proceed with surgical intervention with low to moderate risk of perioperative cardiovascular event given advanced age

## 2025-04-23 NOTE — CONSULTS
4/23/2025 3:08 PM  Antonio Locke  [id number]     Chief Complaint:    Urinary retention    History of Present Illness:      The patient is a 84 y.o. male patient for whom urology was consulted for the above.    He is currently hospitalized after a hip fracture.  He has developed urinary retention this admission necessitating Bocanegra catheter placement.  He seems pleasantly confused today and cannot provide me with much history.      Past Medical History:   Diagnosis Date    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 04/25/2019    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 of heel 12/02/2024    Blood circulation, collateral     Cellulitis of foot, left 01/01/2017    Chronic venous insufficiency 12/06/2019    Dermatophytosis 06/20/2022    Diabetes mellitus ([facility])     Pt states he checks glucoses once a week and keeps in check by diet.     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 2 diabetes mellitus, limited to breakdown of skin 01/09/2023    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 due to underlying condition, with fat layer exposed 06/06/2022    Diabetic ulcer of right midfoot associated with type 2 diabetes mellitus, limited to breakdown of skin 01/09/2023    Femoral-popliteal atherosclerosis 01/01/2017    Heel ulcer, right, with fat layer exposed ([facility]) 05/06/2019    Hypertension     Osteomyelitis of third toe of right foot ([facility]) 12/06/2019    S/P peripheral artery angioplasty 01/23/2020    bilateral legs -Kollipara    Skin ulcer of right foot with fat layer exposed ([facility]) 12/09/2019    Ulcer of right leg, with fat layer exposed ([facility]) 05/06/2019    Varicose veins of leg with edema, right 12/06/2019         Past Surgical History:   Procedure Laterality Date    COLONOSCOPY      CYSTOSCOPY N/A 5/18/2020    SUPRAPUBIC TUBE PLACEMENT performed by Brian Bustamante MD at [facility] OR    EYE SURGERY  1990's    lense implants  bilaterally    FOOT DEBRIDEMENT Left 01/04/2017    wound debridement and delayed primary closure    FRACTURE SURGERY      L femur fracture surgery    HIP FRACTURE SURGERY Left 9/23/2020    LEFT HIP OPEN REDUCTION INTERNAL FIXATION performed by Chilango Dias MD at [facility] OR    HIP SURGERY      HIP SURGERY Right 7/16/2021    RIGHT HIP HEMIARTHROPLASTY performed by Jose Macedo DO at [facility] OR    HIP SURGERY Right 4/21/2025    RIGHT HIP OPEN REDUCTION INTERNAL FIXATION performed by Corona Green DO at [facility] OR    OTHER SURGICAL HISTORY  04/23/2019    Dr. Larsen- LEILA ant tibial    OTHER SURGICAL HISTORY  12/17/2019    Dr Larsen - PCI Right popliteal and Anterior tibial    PROSTATE BIOPSY  04/2016    SKIN BIOPSY  sept of 2018 last time    head and ears    TOE AMPUTATION Left 12/31/2016    2nd    TOE AMPUTATION Right 4/26/2019    AMPUTATION RIGHT SECOND TOE, FOOT DEBRIDEMENT performed by Antonio Whitfield DPM at [facility] OR    TOE AMPUTATION Right 12/10/2019    AMPUTATION RIGHT 3rd DIGIT WITH PARTIAL RESECTION OF THIRD METATARSAL performed by Antonio Whitfield DPM at [facility] OR    TURP N/A 5/18/2020    CYSTOSCOPY PLASMA LOOP TRANSURETHRAL RESECTION PROSTATE performed by Brian Bustamante MD at [facility] OR    VASCULAR SURGERY         Medications Prior to Admission:    Medications Prior to Admission: mineral oil-hydrophilic petrolatum (AQUAPHOR) ointment, Apply topically 2 times daily as needed for Dry Skin (apply to legs)  megestrol (MEGACE) 40 MG/ML suspension, Take 5 mLs by mouth daily  miconazole nitrate 2 % OINT, Apply topically 2 times daily Please apply to the toes, in between the toes, both feet and legs up to the upper calf twice a day for 1 month (Patient not taking: Reported on 4/18/2025)  guaiFENesin (ROBITUSSIN) 100 MG/5ML liquid, Take 10 mLs by mouth every 4 hours as needed for Cough  insulin lispro, 1 Unit Dial, (HUMALOG/ADMELOG) 100 UNIT/ML SOPN, Inject 0-15 Units into the skin 3 times daily (before

## 2025-04-23 NOTE — PLAN OF CARE
Problem: Chronic Conditions and Co-morbidities  Goal: Patient's chronic conditions and co-morbidity symptoms are monitored and maintained or improved  4/23/2025 0222 by Sunny Rojas RN  Outcome: Progressing  4/22/2025 2204 by Davie Mcallister RN  Outcome: Progressing  Flowsheets (Taken 4/22/2025 2204)  Care Plan - Patient's Chronic Conditions and Co-Morbidity Symptoms are Monitored and Maintained or Improved: Monitor and assess patient's chronic conditions and comorbid symptoms for stability, deterioration, or improvement     Problem: Discharge Planning  Goal: Discharge to home or other facility with appropriate resources  4/23/2025 0222 by Sunny Rojas RN  Outcome: Progressing  4/22/2025 2204 by Davie Mcallister RN  Outcome: Progressing  Flowsheets (Taken 4/22/2025 2204)  Discharge to home or other facility with appropriate resources:   Identify barriers to discharge with patient and caregiver   Arrange for needed discharge resources and transportation as appropriate     Problem: Safety - Adult  Goal: Free from fall injury  4/23/2025 0222 by Sunny Rojas RN  Outcome: Progressing  4/22/2025 2204 by Davie Mcallister RN  Outcome: Progressing  Flowsheets (Taken 4/22/2025 2204)  Free From Fall Injury: Instruct family/caregiver on patient safety     Problem: Skin/Tissue Integrity  Goal: Skin integrity remains intact  Description: 1.  Monitor for areas of redness and/or skin breakdown2.  Assess vascular access sites hourly3.  Every 4-6 hours minimum:  Change oxygen saturation probe site4.  Every 4-6 hours:  If on nasal continuous positive airway pressure, respiratory therapy assess nares and determine need for appliance change or resting period  4/23/2025 0222 by Sunny Rojas RN  Outcome: Progressing  4/22/2025 2204 by Davie Mcallister RN  Outcome: Progressing  Flowsheets (Taken 4/22/2025 2204)  Skin Integrity Remains Intact: Monitor for areas of redness and/or skin breakdown     Problem: Confusion  Goal: Confusion,  psychiatric diagnoses, notify LIP     Problem: ABCDS Injury Assessment  Goal: Absence of physical injury  4/23/2025 0222 by Sunny Rojas, RN  Outcome: Progressing  4/22/2025 2204 by Davie Mcallister RN  Outcome: Progressing  Flowsheets (Taken 4/22/2025 2204)  Absence of Physical Injury: Implement safety measures based on patient assessment     Problem: Pain  Goal: Verbalizes/displays adequate comfort level or baseline comfort level  4/23/2025 0222 by Sunny Rojas RN  Outcome: Progressing  Flowsheets (Taken 4/22/2025 2345)  Verbalizes/displays adequate comfort level or baseline comfort level: Encourage patient to monitor pain and request assistance  4/22/2025 2204 by Davie Mcallister RN  Outcome: Progressing  Flowsheets (Taken 4/22/2025 2204)  Verbalizes/displays adequate comfort level or baseline comfort level:   Encourage patient to monitor pain and request assistance   Assess pain using appropriate pain scale

## 2025-04-23 NOTE — CONSENT
Informed Consent for Blood Component Transfusion Note    An order has been written for RN to complete the following:     RN to discuss the rationale for blood component transfusion; its benefits in treating or preventing fatigue, organ damage, or death; and its risk which includes mild transfusion reactions, rare risk of blood borne infection, or more serious but rare reactions. RN to discuss the alternatives to transfusion, including the risk and consequences of not receiving transfusion. RN to discuss and document that the patient had an opportunity to ask questions and had agreed to proceed with transfusion of blood components. Further will be managed by and deferred to Attending Provider.     Electronically signed by JUNE Thompson CNP on 4/23/25 at 5:55 AM EDT   Patient \"no showed\" for recent device check and office visit with Dr Varghese.  Please call patient to reschedule both appt, thanks!

## 2025-04-23 NOTE — PROGRESS NOTES
Occupational Therapy  OT BEDSIDE TREATMENT NOTE   [facility]  [address], [place]       Date:2025  Patient Name: Antonio Locke  MRN: [id number]  : 1940  Room: [address] [place] [address]-A     Per OT Eval:  Evaluating OT: Marcelle Martinez OTR/L; [id number]        Referring Provider: Elba Solorzano APRN - CNP     Specific Provider Orders/Date: OT Eval and Treat 25 1300        Diagnosis: Per chart, pt fell at nursing facility -  Closed R periprosthetic femur fracture &  R proximal humerus fracture relatively nondisplaced.    Surgery: 25 RIGHT HIP OPEN REDUCTION INTERNAL FIXATION.     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, Blood circulation, collateral, Cellulitis of foot, left, Chronic venous insufficiency, Dermatophytosis, Diabetes mellitus (HCC), 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 2 diabetes mellitus, limited to breakdown of skin, 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 due to underlying condition, with fat layer exposed, Diabetic ulcer of right midfoot associated with type 2 diabetes mellitus, limited to breakdown of skin, Femoral-popliteal atherosclerosis, Heel ulcer, right, with fat layer exposed (HCC), Hypertension, 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      Recommended Adaptive Equipment: TBD      Precautions:  Fall Risk, +alarms, NWB RLE, NWB RUE - sling, cognition, external catheter, contact isolation      Assessment of current deficits    [x] Functional mobility            [x]ADLs           [x] Strength                   Supervision     Dynamic:Supervision      Standing:    Static:  Moderate Assist     Dynamic:Moderate Assist    Activity Tolerance fair  w/ light activity seated EOB /57  HR 80  good  w/ light to mod activity   Visual/  Perceptual WFL WFL      Safety poor  F- fair +  during ADL completion following NWB RUE & RLE precautions      Hand Dominance Right    AROM (PROM) Strength Additional Info:  Goal: (PRN)   RUE  Elbow flexion ~90 degrees, digits WFL however latent. Deferred 2/2 NWB RUE. fair -  and fair - FMC/dexterity noted during ADL tasks Pt will maintain NWB RUE IND throughout ADLs for joint/bone integrity.      LUE Shoulder flexion ~90 degrees, distally WFL Shoulder 3-/5, distally 4-/5 grossly tested fair   and fair  FMC/dexterity noted during ADL tasks Improve overall LUE strength WFL for participation in functional tasks             Education:  Pt education on proper tech and body mechanics/ LUE placement during activities    Comments: Collaboration with nursing pt ok for therapy/ Upon arrival pt sup in bed. At end of session pt sup in bed/bed alarm on / NWB for RLE/RUE in sling maintained/ all lines and tubes intact, call light within reach. Pt received blood per RN due to low Hgb prior to treatment     Pt has made fair  progress towards set goals.   Continue with current plan of care      Treatment Time In:1400            Treatment Time Out: 1426           Treatment Charges: Mins Units   Ther Ex  99505     Manual Therapy 17670     Thera Activities 00030 17 1   ADL/Home Mgt 70705 9 1   Neuro Re-ed 28699     Group Therapy      Orthotic manage/training  00877     Non-Billable Time     Total Timed Treatment 26 2       John NEW/SANJIV [id number]

## 2025-04-23 NOTE — CARE COORDINATION
Care Coordination  The plan is for the patient to return to [facility] and precert to be started this am for return. The patient was a fall and orif right hip fracture on 4/21. His h/h this am is 6.5/18.3 and he is to receive 1 units of blood this am. Return envelope completed.

## 2025-04-23 NOTE — PLAN OF CARE
Problem: Chronic Conditions and Co-morbidities  Goal: Patient's chronic conditions and co-morbidity symptoms are monitored and maintained or improved  4/22/2025 2204 by Davie Mcallister RN  Outcome: Progressing  Flowsheets (Taken 4/22/2025 2204)  Care Plan - Patient's Chronic Conditions and Co-Morbidity Symptoms are Monitored and Maintained or Improved: Monitor and assess patient's chronic conditions and comorbid symptoms for stability, deterioration, or improvement  4/22/2025 1147 by Sandy Feliz RN  Outcome: Progressing     Problem: Discharge Planning  Goal: Discharge to home or other facility with appropriate resources  4/22/2025 2204 by Davie Mcallister RN  Outcome: Progressing  Flowsheets (Taken 4/22/2025 2204)  Discharge to home or other facility with appropriate resources:   Identify barriers to discharge with patient and caregiver   Arrange for needed discharge resources and transportation as appropriate  4/22/2025 1147 by Sandy Feliz RN  Outcome: Progressing     Problem: Safety - Adult  Goal: Free from fall injury  4/22/2025 2204 by Davie Mcallister RN  Outcome: Progressing  Flowsheets (Taken 4/22/2025 2204)  Free From Fall Injury: Instruct family/caregiver on patient safety  4/22/2025 1147 by Sandy Feliz RN  Outcome: Progressing     Problem: Skin/Tissue Integrity  Goal: Skin integrity remains intact  Description: 1.  Monitor for areas of redness and/or skin breakdown2.  Assess vascular access sites hourly3.  Every 4-6 hours minimum:  Change oxygen saturation probe site4.  Every 4-6 hours:  If on nasal continuous positive airway pressure, respiratory therapy assess nares and determine need for appliance change or resting period  4/22/2025 2204 by Davie Mcallister RN  Outcome: Progressing  Flowsheets (Taken 4/22/2025 2204)  Skin Integrity Remains Intact: Monitor for areas of redness and/or skin breakdown  4/22/2025 1147 by Sandy Feliz RN  Outcome: Progressing     Problem: Confusion  Goal:

## 2025-04-24 VITALS
TEMPERATURE: 96.7 F | RESPIRATION RATE: 17 BRPM | HEIGHT: 69 IN | HEART RATE: 87 BPM | WEIGHT: 130.4 LBS | OXYGEN SATURATION: 98 % | BODY MASS INDEX: 19.31 KG/M2 | DIASTOLIC BLOOD PRESSURE: 75 MMHG | SYSTOLIC BLOOD PRESSURE: 123 MMHG

## 2025-04-24 LAB
ABO/RH: NORMAL
ALBUMIN SERPL-MCNC: 2.5 G/DL (ref 3.5–5.2)
ALP SERPL-CCNC: 73 U/L (ref 40–129)
ALT SERPL-CCNC: 20 U/L (ref 0–50)
ANION GAP SERPL CALCULATED.3IONS-SCNC: 8 MMOL/L (ref 7–16)
ANTIBODY SCREEN: NEGATIVE
ARM BAND NUMBER: NORMAL
AST SERPL-CCNC: 39 U/L (ref 0–50)
BASOPHILS # BLD: 0.03 K/UL (ref 0–0.2)
BASOPHILS NFR BLD: 0 % (ref 0–2)
BILIRUB SERPL-MCNC: 0.8 MG/DL (ref 0–1.2)
BLOOD BANK BLOOD PRODUCT EXPIRATION DATE: NORMAL
BLOOD BANK DISPENSE STATUS: NORMAL
BLOOD BANK ISBT PRODUCT BLOOD TYPE: 7300
BLOOD BANK PRODUCT CODE: NORMAL
BLOOD BANK SAMPLE EXPIRATION: NORMAL
BLOOD BANK UNIT TYPE AND RH: NORMAL
BPU ID: NORMAL
BUN SERPL-MCNC: 18 MG/DL (ref 8–23)
CALCIUM SERPL-MCNC: 8.5 MG/DL (ref 8.8–10.2)
CHLORIDE SERPL-SCNC: 108 MMOL/L (ref 98–107)
CO2 SERPL-SCNC: 23 MMOL/L (ref 22–29)
COMPONENT: NORMAL
CREAT SERPL-MCNC: 0.9 MG/DL (ref 0.7–1.2)
CROSSMATCH RESULT: NORMAL
EOSINOPHIL # BLD: 0.35 K/UL (ref 0.05–0.5)
EOSINOPHILS RELATIVE PERCENT: 4 % (ref 0–6)
ERYTHROCYTE [DISTWIDTH] IN BLOOD BY AUTOMATED COUNT: 17.7 % (ref 11.5–15)
GFR, ESTIMATED: 84 ML/MIN/1.73M2
GLUCOSE BLD-MCNC: 119 MG/DL (ref 74–99)
GLUCOSE BLD-MCNC: 185 MG/DL (ref 74–99)
GLUCOSE BLD-MCNC: 285 MG/DL (ref 74–99)
GLUCOSE BLD-MCNC: 308 MG/DL (ref 74–99)
GLUCOSE SERPL-MCNC: 104 MG/DL (ref 74–99)
HCT VFR BLD AUTO: 22 % (ref 37–54)
HGB BLD-MCNC: 7.5 G/DL (ref 12.5–16.5)
IMM GRANULOCYTES # BLD AUTO: 0.03 K/UL (ref 0–0.58)
IMM GRANULOCYTES NFR BLD: 0 % (ref 0–5)
LYMPHOCYTES NFR BLD: 1.45 K/UL (ref 1.5–4)
LYMPHOCYTES RELATIVE PERCENT: 15 % (ref 20–42)
MCH RBC QN AUTO: 29.5 PG (ref 26–35)
MCHC RBC AUTO-ENTMCNC: 34.1 G/DL (ref 32–34.5)
MCV RBC AUTO: 86.6 FL (ref 80–99.9)
MONOCYTES NFR BLD: 0.58 K/UL (ref 0.1–0.95)
MONOCYTES NFR BLD: 6 % (ref 2–12)
NEUTROPHILS NFR BLD: 75 % (ref 43–80)
NEUTS SEG NFR BLD: 7.23 K/UL (ref 1.8–7.3)
PLATELET # BLD AUTO: 181 K/UL (ref 130–450)
PMV BLD AUTO: 10.5 FL (ref 7–12)
POTASSIUM SERPL-SCNC: 3.6 MMOL/L (ref 3.5–5.1)
PROT SERPL-MCNC: 5.2 G/DL (ref 6.4–8.3)
RBC # BLD AUTO: 2.54 M/UL (ref 3.8–5.8)
SODIUM SERPL-SCNC: 139 MMOL/L (ref 136–145)
TRANSFUSION STATUS: NORMAL
UNIT DIVISION: 0
UNIT ISSUE DATE/TIME: NORMAL
WBC OTHER # BLD: 9.7 K/UL (ref 4.5–11.5)

## 2025-04-24 PROCEDURE — 97535 SELF CARE MNGMENT TRAINING: CPT

## 2025-04-24 PROCEDURE — 85025 COMPLETE CBC W/AUTO DIFF WBC: CPT

## 2025-04-24 PROCEDURE — 6370000000 HC RX 637 (ALT 250 FOR IP): Performed by: INTERNAL MEDICINE

## 2025-04-24 PROCEDURE — 97530 THERAPEUTIC ACTIVITIES: CPT

## 2025-04-24 PROCEDURE — 51702 INSERT TEMP BLADDER CATH: CPT

## 2025-04-24 PROCEDURE — 6370000000 HC RX 637 (ALT 250 FOR IP): Performed by: NURSE PRACTITIONER

## 2025-04-24 PROCEDURE — 36415 COLL VENOUS BLD VENIPUNCTURE: CPT

## 2025-04-24 PROCEDURE — 82962 GLUCOSE BLOOD TEST: CPT

## 2025-04-24 PROCEDURE — 80053 COMPREHEN METABOLIC PANEL: CPT

## 2025-04-24 PROCEDURE — 2500000003 HC RX 250 WO HCPCS: Performed by: NURSE PRACTITIONER

## 2025-04-24 RX ADMIN — METOPROLOL SUCCINATE 25 MG: 25 TABLET, EXTENDED RELEASE ORAL at 10:29

## 2025-04-24 RX ADMIN — INSULIN LISPRO 12 UNITS: 100 INJECTION, SOLUTION INTRAVENOUS; SUBCUTANEOUS at 16:25

## 2025-04-24 RX ADMIN — INSULIN LISPRO 4 UNITS: 100 INJECTION, SOLUTION INTRAVENOUS; SUBCUTANEOUS at 10:56

## 2025-04-24 RX ADMIN — ASPIRIN 81 MG CHEWABLE TABLET 81 MG: 81 TABLET CHEWABLE at 10:29

## 2025-04-24 RX ADMIN — MEGESTROL ACETATE 200 MG: 40 SUSPENSION ORAL at 10:30

## 2025-04-24 RX ADMIN — AMLODIPINE BESYLATE 10 MG: 10 TABLET ORAL at 10:29

## 2025-04-24 RX ADMIN — SODIUM CHLORIDE, PRESERVATIVE FREE 10 ML: 5 INJECTION INTRAVENOUS at 10:31

## 2025-04-24 RX ADMIN — ASPIRIN 81 MG: 81 TABLET, CHEWABLE ORAL at 10:44

## 2025-04-24 RX ADMIN — RIVASTIGMINE TARTRATE 4.5 MG: 3 CAPSULE ORAL at 10:31

## 2025-04-24 NOTE — DISCHARGE SUMMARY
[facility]   Discharge summary   Patient ID:  Antonio Locke  [id number]  84 y.o.  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 of heel (HCC)    Closed right hip fracture, initial encounter ([facility])    Periprosthetic fracture of shaft of femur       Discharge Diagnoses:   Patient Active Problem List   Diagnosis    Type 2 diabetes mellitus (HCC)    Essential hypertension    PVD (peripheral vascular disease) with claudication    S/P peripheral artery angioplasty    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 2 diabetes mellitus, with fat layer exposed (HCC)    Dermatophytosis    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 of heel (HCC)    Closed right hip fracture, initial encounter ([facility])    Periprosthetic fracture of shaft of femur       Consults: Urology, Palliative Care, Orthopedic Surgery    Procedures: See Below    Hospital Course: The patient is a 84 y.o. male of Farhad Hadley MD with significant past medical history of  atherosclerosis of native artery of right lower extremity with ulceration of the heel, cellulitis, diabetes, femoropopliteal arthrosclerosis osteomyelitis, hypertension.  Patient experienced a mechanical fall at facility.  Patient had x-rays at the outlying facility and was found to have a femur fracture.  Patient did not hit his head nor did he lose consciousness.  Patient was on hospice at the facility.  Patient does have a history of a right hemiarthroplasty in 2021.  Patient is minimally ambulatory.  ER  PROVIDED HISTORY: fall, pain, r/o fx TECHNOLOGIST PROVIDED HISTORY: Reason for exam:->fall, pain, r/o fx FINDINGS: The patient is status post right hip hemiarthroplasty.  Concurrent right femur radiographs demonstrate fracture through the femoral diaphysis just below the level of the prosthesis tip.  There is acute cortical angulation along the lateral margin of the femur at the greater trochanter base which was not demonstrated previously and probably is the result of fracture although fracture lucency is not optimally evaluated at that site.  No dislocation.  Osseous mineralization is decreased.  There is left femoral neck screw partially visualized.  Soft tissues are unremarkable.     1. Acute right femoral fracture probably extending into the base of the greater trochanter in this patient status post right hip hemiarthroplasty.     XR FEMUR RIGHT (MIN 2 VIEWS)  Result Date: 4/18/2025  EXAMINATION: 2 XRAY VIEWS OF THE RIGHT FEMUR 4/18/2025 11:02 am COMPARISON: None. HISTORY: ORDERING SYSTEM PROVIDED HISTORY: trauma TECHNOLOGIST PROVIDED HISTORY: Reason for exam:->trauma FINDINGS: The patient is status post right hip hemiarthroplasty.  There is spiral fracture involving the right femoral diaphysis beginning just below the prosthesis tip.  The proximal aspect of the fracture is not well visualized but may be located at the base of the greater trochanter.  There is 5 mm lateral displacement of the distal fracture fragment tip.  Osseous mineralization is decreased.  No dislocation.  There are mild degenerative changes at the knee.  There are vascular calcifications.  Soft tissues are otherwise unremarkable.     Spiral fracture involving the right femoral diaphysis beginning just below the prosthesis tip. The proximal aspect of the fracture is not well visualized but may be located at the base of the greater trochanter.       Discharge Exam:  General appearance: NAD, pleasantly confused  HEENT: AT/NC, MMM  Neck: FROM,

## 2025-04-24 NOTE — PLAN OF CARE
Problem: Chronic Conditions and Co-morbidities  Goal: Patient's chronic conditions and co-morbidity symptoms are monitored and maintained or improved  4/24/2025 1311 by Aliyah Tong RN  Outcome: Progressing  4/24/2025 0037 by Marlen Sanchez RN  Outcome: Progressing     Problem: Discharge Planning  Goal: Discharge to home or other facility with appropriate resources  4/24/2025 1311 by Aliyah Tong RN  Outcome: Progressing  4/24/2025 0037 by Marlen Sanchez RN  Outcome: Progressing     Problem: Safety - Adult  Goal: Free from fall injury  4/24/2025 1311 by Aliyah Tong RN  Outcome: Progressing  4/24/2025 0037 by Marlen Sanchez RN  Outcome: Progressing     Problem: Skin/Tissue Integrity  Goal: Skin integrity remains intact  Description: 1.  Monitor for areas of redness and/or skin breakdown2.  Assess vascular access sites hourly3.  Every 4-6 hours minimum:  Change oxygen saturation probe site4.  Every 4-6 hours:  If on nasal continuous positive airway pressure, respiratory therapy assess nares and determine need for appliance change or resting period  4/24/2025 1311 by Aliyah Tong RN  Outcome: Progressing  4/24/2025 0037 by Marlen Sanchez RN  Outcome: Progressing     Problem: Confusion  Goal: Confusion, delirium, dementia, or psychosis is improved or at baseline  Description: INTERVENTIONS:1. Assess for possible contributors to thought disturbance, including medications, impaired vision or hearing, underlying metabolic abnormalities, dehydration, psychiatric diagnoses, and notify attending LIP2. [place] high risk fall precautions, as indicated3. Provide frequent short contacts to provide reality reorientation, refocusing and direction4. Decrease environmental stimuli, including noise as appropriate5. Monitor and intervene to maintain adequate nutrition, hydration, elimination, sleep and activity6. If unable to ensure safety without constant attention

## 2025-04-24 NOTE — PROGRESS NOTES
Called nurse to nurse to Amos at [facility] . Spoke with Cyndy SHEARER

## 2025-04-24 NOTE — PROGRESS NOTES
Patient is going back to [facility] today. We had a nice visit. Very happy for the care he received here at the hospital. We had prayer and was able to answer some questions to help him process some things he was concerned about.

## 2025-04-24 NOTE — PLAN OF CARE
Problem: Chronic Conditions and Co-morbidities  Goal: Patient's chronic conditions and co-morbidity symptoms are monitored and maintained or improved  Outcome: Progressing     Problem: Discharge Planning  Goal: Discharge to home or other facility with appropriate resources  Outcome: Progressing     Problem: Safety - Adult  Goal: Free from fall injury  Outcome: Progressing     Problem: Skin/Tissue Integrity  Goal: Skin integrity remains intact  Description: 1.  Monitor for areas of redness and/or skin breakdown2.  Assess vascular access sites hourly3.  Every 4-6 hours minimum:  Change oxygen saturation probe site4.  Every 4-6 hours:  If on nasal continuous positive airway pressure, respiratory therapy assess nares and determine need for appliance change or resting period  Outcome: Progressing     Problem: Confusion  Goal: Confusion, delirium, dementia, or psychosis is improved or at baseline  Description: INTERVENTIONS:1. Assess for possible contributors to thought disturbance, including medications, impaired vision or hearing, underlying metabolic abnormalities, dehydration, psychiatric diagnoses, and notify attending LIP2. [place] high risk fall precautions, as indicated3. Provide frequent short contacts to provide reality reorientation, refocusing and direction4. Decrease environmental stimuli, including noise as appropriate5. Monitor and intervene to maintain adequate nutrition, hydration, elimination, sleep and activity6. If unable to ensure safety without constant attention obtain sitter and review sitter guidelines with assigned personnel7. Initiate Psychosocial CNS and Spiritual Care consult, as indicated  INTERVENTIONS:1. Assess for possible contributors to thought disturbance, including medications, impaired vision or hearing, underlying metabolic abnormalities, dehydration, psychiatric diagnoses, and notify attending LIP2. [place] high risk fall precautions, as indicated3. Provide frequent short

## 2025-04-24 NOTE — FLOWSHEET NOTE
Inpatient Wound Care(initial evaluation)  5410    Admit Date: 4/18/2025  9:08 AM    Reason for consult:  buttocks, right heel    Significant history:  per H & P  Patient presents with    Fall       Yesterday, +head -LOC, NH called today after mobile xray fx R femur, 50 fentanyl, was on Hospice but D/C for hospital   presents with: Closed right hip fracture, initial encounter ([facility])   Procedure(s): 4/21  RIGHT HIP OPEN REDUCTION INTERNAL FIXATION    Wound history:  admitted with right heel wound from facility    Findings:     04/24/25 1420   Skin Integumentary    Skin Integrity Ecchymosis  (dried scabs, dried skin tears)   Location BUE   Skin Integrity Site 2   Skin Integrity Location 2 Vascular discoloration  (dry flaky skin, dried scabs)   Location 2 BLE   Skin Integrity Site 3   Skin Integrity Location 3   (dry abraded areas)    Location 3 dorsal toes   Skin Integrity Site 4   Skin Integrity Location 4   (dried scabbed area)   Location 4 right plantar foot   Wound 04/18/25 Heel Right   Date First Assessed/Time First Assessed: 04/18/25 2000   Present on Original Admission: Yes  Primary Wound Type: Pressure Injury  Location: Heel  Wound Location Orientation: Right   Wound Image    Wound Etiology Pressure Stage 3   Dressing Status New dressing applied   Wound Cleansed Cleansed with saline   Dressing/Treatment Alginate;Dry dressing;Roll gauze   Wound Length (cm) 2.6 cm   Wound Width (cm) 2.4 cm   Wound Depth (cm) 0.1 cm   Wound Surface Area (cm^2) 6.24 cm^2   Wound Volume (cm^3) 0.624 cm^3   Wound Assessment [place]/red;[place]   Drainage Amount Scant (moist but unmeasurable)   Drainage Description Serosanguinous   Odor None   Latonya-wound Assessment Dry/flaky   Wound 04/24/25 Buttocks Left   Date First Assessed/Time First Assessed: 04/24/25 1420   Present on Original Admission: No  Primary Wound Type: Pressure Injury  Location: Buttocks  Wound Location Orientation: Left   Wound Image    Wound Etiology Deep tissue/Injury    Dressing/Treatment Pharmaceutical agent (see MAR)   Wound Length (cm) 2 cm   Wound Width (cm) 0.6 cm   Wound Depth (cm) 0.1 cm   Wound Surface Area (cm^2) 1.2 cm^2   Wound Volume (cm^3) 0.12 cm^3   Wound Assessment Purple/maroon   Drainage Amount None (dry)   Latonya-wound Assessment Intact   Wound 04/24/25 Buttocks Right   Date First Assessed/Time First Assessed: 04/24/25 1420   Present on Original Admission: No  Primary Wound Type: Pressure Injury  Location: Buttocks  Wound Location Orientation: Right   Wound Image   (see left)   Wound Etiology Pressure Stage 2   Dressing/Treatment Pharmaceutical agent (see MAR)   Wound Length (cm) 0.4 cm   Wound Width (cm) 1 cm   Wound Depth (cm) 0.1 cm   Wound Surface Area (cm^2) 0.4 cm^2   Wound Volume (cm^3) 0.04 cm^3   Wound Assessment Pink/red   Drainage Amount Scant (moist but unmeasurable)   Drainage Description Serosanguinous   Odor None   Latonya-wound Assessment Intact     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   All Images taken on 4/24/25 of patient name: Antonio Locke were transmitted and stored on secured Epic  Site located within Media Folder Tab by a registered Epic-[employer] Mobile Application Device.      Impression:  left buttocks DTI  Right buttocks stage 2    Plan:  Aquaphor to buttocks, dry skin  Opticell right heel  Heelmedix boots  Will need continued preventative care      Nicole Murray RN 4/24/2025 5:46 PM

## 2025-04-24 NOTE — CARE COORDINATION
Care Coordination  Spoke to Nilam at [facility] and she has received the precert to return if medically stable. Have messaged the physician to see if the patient is stable for discharge today. Return envelope done.      1234 pm Noted discharge order. The plan is for the patient to return to [facility]. Have messaged Nilam to set up a  time with her  and he will be here at 330 pm today to  the patient. Called and left a detailed voicemail for the patients wife Mickie Locke @ [phone]. The patients nurse was notified of this as well.   The plan now is for physicians ambulance to  the patient at 7 pm the nurse and the patients wife was notified of this.

## 2025-04-24 NOTE — PROGRESS NOTES
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 of heel ([facility]), Blood circulation, collateral, Cellulitis of foot, left, Chronic venous insufficiency, Dermatophytosis, Diabetes mellitus ([facility]), 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 2 diabetes mellitus, limited to breakdown of skin ([facility]), 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 due to underlying condition, with fat layer exposed ([facility]), Diabetic ulcer of right midfoot associated with type 2 diabetes mellitus, limited to breakdown of skin ([facility]), Femoral-popliteal atherosclerosis, Heel ulcer, right, with fat layer exposed ([facility]), Hypertension, 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  Procedure/Surgery:  Right hip ORIF   Precautions:  Fall risk, NWB RLE, NWB RUE (proximal humerus fracture), RUE sling for OOB, cognition, alarms, Contact  Equipment Needs:  TBD    SUBJECTIVE:    Pt is a questionable/poor historian. Per chart, pt admitted from [facility]. Pt reports he was active with therapy walking short distances using a walker.    OBJECTIVE:   Initial Evaluation  Date: 25 Treatment Date: 25 Short Term/ Long Term   Goals   AM-PAC 6 Clicks 7/24 10/24    Was pt agreeable to Eval/treatment? Yes yes    Does pt have pain? RUE and RLE pain with light  stable.  Pt was supine in bed upon arrival, agreeable to treatment session.  Heavy assistance was provided for pt to complete bed mobility 2/2 pain, deconditioning, and current WB statuses. Poor trunk control was noted while pt participated in ~20 minutes of balance activities at EOB with dynamic use of BUEs and BLEs. Sling was donned at EOB. STSx2 and pivot to chair required heavy assistance and manual assistance to maintain NWB RLE. Fatigue limited activity.  After session, pt was left in recliner with all needs met, chair alarm on, and call light in reach.  All lines remained intact.  Educated pt on safety and need for assistance prior to mobilizing; pt understood and agreed to use call light.    Later in the AM, pt was assisted back to bed via stand pivot while maintaining NWB RUE and RLE. Improved trunk control was noted when pt was assisted back to bed. Pian continued to limit pt's mobility. After session, pt was left skillfully positioned with all needs met, call light within reach, bed alarm on, and questions answered.    Treatment:  Patient practiced and was instructed in the following treatment:    Bed mobility training - pt given verbal and tactile cues to facilitate proper sequencing and safety during rolling and supine>sit as well as provided with physical assistance.  Sitting EOB for >30 minutes (total) for upright tolerance, postural awareness and BLE ROM  Transfer training - pt was given verbal and tactile cues to facilitate proper hand placement, technique and safety during sit to stand, stand to sit and stand pivot transfers as well as provided with physical assistance.  Gait training- pt was given verbal and tactile cues to facilitate limb advancement (unsuccessful) during ambulation as well as provided with physical assistance.  Skilled positioning to prevent skin breakdown and contractures.  Education - On WB statuses, the interpretation of vitals, the benefits of increasing tolerance to upright

## 2025-04-24 NOTE — PROGRESS NOTES
Occupational Therapy  OT BEDSIDE TREATMENT NOTE   [facility]  [address], [place]       Date:2025  Patient Name: Antonio Locke  MRN: [id number]  : 1940  Room: [address] [place] [address]-A     Per OT Eval:  Evaluating OT: Marcelle Martinez OTR/L; [id number]        Referring Provider: Elba Solorzano APRN - CNP     Specific Provider Orders/Date: OT Eval and Treat 25 1300        Diagnosis: Per chart, pt fell at nursing facility -  Closed R periprosthetic femur fracture &  R proximal humerus fracture relatively nondisplaced.    Surgery: 25 RIGHT HIP OPEN REDUCTION INTERNAL FIXATION.     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, Blood circulation, collateral, Cellulitis of foot, left, Chronic venous insufficiency, Dermatophytosis, Diabetes mellitus (HCC), 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 2 diabetes mellitus, limited to breakdown of skin, 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 due to underlying condition, with fat layer exposed, Diabetic ulcer of right midfoot associated with type 2 diabetes mellitus, limited to breakdown of skin, Femoral-popliteal atherosclerosis, Heel ulcer, right, with fat layer exposed (HCC), Hypertension, 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      Recommended Adaptive Equipment: TBD      Precautions:  Fall Risk, +alarms, NWB RLE, NWB RUE - sling, cognition, external catheter, contact isolation      Assessment of current deficits    [x] Functional mobility            [x]ADLs           [x] Strength

## 2025-04-29 NOTE — PROGRESS NOTES
Physician Progress Note      PATIENT:               LEYDA BAIRES  CSN #:                  [id number]  :                       1940  ADMIT DATE:       2025 9:08 AM  DISCH DATE:        2025 9:17 PM  RESPONDING  PROVIDER #:        Nancy Posada MD          QUERY TEXT:    Based on your medical judgment, please clarify these findings and document if   any of the following are being evaluated and/or treated:    The clinical indicators include:  -progress note  Monitor labs following surgery  -progress note  Continue drop in H&H 7.7/22.1, continue to monitor  -Carlos progress note  Monitor H&H. 6.5 this morning.  Okay to transfuse   less than 7, will defer to primary.  -STAT prepare/tranfuse RBC  -Lab findings H&H 9.3/27.2   8.5/25.0  7.7/22.1 6.5/18.3  Options provided:  -- Acute blood loss anemia  -- Acute on chronic blood loss  -- Postoperative acute blood loss anemia  -- Other - I will add my own diagnosis  -- Disagree - Not applicable / Not valid  -- Disagree - Clinically unable to determine / Unknown  -- Refer to Clinical Documentation Reviewer    PROVIDER RESPONSE TEXT:    This patient has acute blood loss anemia.    Query created by: Chelle Goel on 2025 9:18 AM      Electronically signed by:  Nancy Posada MD 2025 3:44 PM

## 2025-05-09 DIAGNOSIS — Z96.649 PERIPROSTHETIC FRACTURE OF SHAFT OF FEMUR: Primary | ICD-10-CM

## 2025-05-09 DIAGNOSIS — M97.8XXA PERIPROSTHETIC FRACTURE OF SHAFT OF FEMUR: Primary | ICD-10-CM

## 2025-05-12 ENCOUNTER — OFFICE VISIT (OUTPATIENT)
Dept: ORTHOPEDIC SURGERY | Age: 85
End: 2025-05-12
Payer: MEDICARE

## 2025-05-12 ENCOUNTER — HOSPITAL ENCOUNTER (OUTPATIENT)
Dept: GENERAL RADIOLOGY | Age: 85
Discharge: HOME | End: 2025-05-14
Payer: MEDICARE

## 2025-05-12 VITALS
DIASTOLIC BLOOD PRESSURE: 68 MMHG | RESPIRATION RATE: 20 BRPM | SYSTOLIC BLOOD PRESSURE: 130 MMHG | OXYGEN SATURATION: 93 % | HEART RATE: 73 BPM | TEMPERATURE: 97.6 F

## 2025-05-12 DIAGNOSIS — S42.201D CLOSED FRACTURE OF PROXIMAL END OF RIGHT HUMERUS WITH ROUTINE HEALING, UNSPECIFIED FRACTURE MORPHOLOGY, SUBSEQUENT ENCOUNTER: ICD-10-CM

## 2025-05-12 DIAGNOSIS — Z96.649 PERIPROSTHETIC FRACTURE OF SHAFT OF FEMUR: ICD-10-CM

## 2025-05-12 DIAGNOSIS — M97.8XXA PERIPROSTHETIC FRACTURE OF SHAFT OF FEMUR: ICD-10-CM

## 2025-05-12 DIAGNOSIS — Z87.81 S/P ORIF (OPEN REDUCTION INTERNAL FIXATION) FRACTURE: Primary | ICD-10-CM

## 2025-05-12 DIAGNOSIS — Z98.890 S/P ORIF (OPEN REDUCTION INTERNAL FIXATION) FRACTURE: Primary | ICD-10-CM

## 2025-05-12 PROCEDURE — 73552 X-RAY EXAM OF FEMUR 2/>: CPT

## 2025-05-12 PROCEDURE — 73060 X-RAY EXAM OF HUMERUS: CPT

## 2025-05-12 PROCEDURE — 99213 OFFICE O/P EST LOW 20 MIN: CPT

## 2025-05-12 PROCEDURE — 99024 POSTOP FOLLOW-UP VISIT: CPT

## 2025-05-12 NOTE — PATIENT INSTRUCTIONS
INSTRUCTIONS FOR FACILITY      Weight Bearing: Touch down weight bearing to right lower extremity for transfers only.   Non weight bearing to Right upper extremity.   Use assistive devices when needed.    *Sling to right upper extremity as needed for comfort    Therapy: Continue PT/OT  *Okay for gentle passive ROM to right shoulder, okay for pendulums    Pain control: per facility physician    Incisional Care: staples removed today in office. Steri strips placed. Steri strips can be removed in 7-10 days if they do not fall off sooner. Continue to monitor for signs or symptoms of infection until skin has completely healed. Okay to shower. No scrubbing near incision until skin has completely healed. Thoroughly pat dry with clean towel.     DVT Prophylaxis: Continue with aspirin 81mg BID x 2 more weeks    Follow up: 4 weeks    No future appointments.    Call office with any questions or concerns.

## 2025-05-12 NOTE — PROGRESS NOTES
Chief Complaint   Patient presents with    Follow-up      2wk p/o Right Hip ORIF Periprosthetic fracture hip joint 4/21/25; AC/JVG (*GP 7/20/25). Patient states pain lvl 2         OP:SURGEON:  Dr. Corona Green DO  DATE OF PROCEDURE: 4/21/2025  PROCEDURE:RIGHT HIP OPEN REDUCTION INTERNAL FIXATION     Subjective:  Antonio R [place] is approximately 3 weeks follow-up from the above surgery. Patient also being seen for non operative treatment of proximal humerus fracture. Patient doing well; recovering at SNF. Patient has remained NWB to [facility]. Patient transfers via darryl lift at facility. Continues taking aspirin 81mg BID for DVT prophylaxis. Denies calf pain, CP, SOB, fever, chills, myalgias.    Review of Systems -  all pertinent positives and negatives in HPI.      Objective:    General: Alert and oriented X 3 with intermittent confusion, normocephalic atraumatic, external ears and eye normal, sclera clear, no acute distress, respirations easy and unlabored with no audible wheezes, skin warm and dry, speech and dress appropriate for noted age, affect euthymic.    Extremity:  Right Lower Extremity  Skin clean dry and intact, without signs of infection  Incisions well approximated with mild redness/irritation noted around staples, no warmth or drainage- staples intact  Mild edema noted  Compartments supple throughout thigh and leg  Calf supple and nontender  Demonstrates weak but active knee flexion/extension, ankle plantar/dorsiflexion/great toe extension.  No groin pain with internal/external rotation of hip   States sensation intact to touch in sural/deep peroneal/superficial peroneal/saphenous/posterior tibial nerve distributions to foot/ankle.   Palpable dorsalis pedis and posterior tibialis pulses, cap refill brisk in toes, foot warm/perfused.       Right Upper Extremity  Skin is clean dry and intact  Mild edema noted  Mild TTP over proximal humerus; -TTP elsewhere throughout the UE  Radial pulse palpable,

## 2025-06-17 DIAGNOSIS — S42.201D CLOSED FRACTURE OF PROXIMAL END OF RIGHT HUMERUS WITH ROUTINE HEALING, UNSPECIFIED FRACTURE MORPHOLOGY, SUBSEQUENT ENCOUNTER: Primary | ICD-10-CM

## 2025-06-17 DIAGNOSIS — Z96.649 PERIPROSTHETIC FRACTURE OF SHAFT OF FEMUR: ICD-10-CM

## 2025-06-17 DIAGNOSIS — M97.8XXA PERIPROSTHETIC FRACTURE OF SHAFT OF FEMUR: ICD-10-CM

## (undated) DEVICE — SOLUTION IRRIG 3000ML 0.9% SOD CHL USP UROMATIC PLAS CONT

## (undated) DEVICE — PAD,NON-ADHERENT,2X3,STERILE,LF,1/PK: Brand: MEDLINE

## (undated) DEVICE — GUIDEWIRE ORTH DIA2.5MM LOK SMOOTH DRL TIP FLX THRD FOR

## (undated) DEVICE — TOWEL,OR,DSP,ST,BLUE,DLX,10/PK,8PK/CS: Brand: MEDLINE

## (undated) DEVICE — 4-PORT MANIFOLD: Brand: NEPTUNE 2

## (undated) DEVICE — GOWN,AURORA,BRTHSLV,2XL,18/CS: Brand: MEDLINE

## (undated) DEVICE — DRIP REDUCTION MANIFOLD

## (undated) DEVICE — PAD,ABDOMINAL,5"X9",ST,LF,25/BX: Brand: MEDLINE INDUSTRIES, INC.

## (undated) DEVICE — GLOVE SURG SZ 7 L12IN FNGR THK94MIL TRNSLUC YEL LTX HYDRGEL

## (undated) DEVICE — APPLICATOR PREP 26ML 0.7% IOD POVACRYLEX 74% ISO ALC ST

## (undated) DEVICE — SOLUTION IV IRRIG POUR BRL 0.9% SODIUM CHL 2F7124

## (undated) DEVICE — GAUZE,SPONGE,4"X4",8PLY,STRL,LF,10/TRAY: Brand: MEDLINE

## (undated) DEVICE — CATHETER URETH 20FR BLLN 5CC SIL F INDWL 2 W SHT LEN STD

## (undated) DEVICE — INTENDED FOR TISSUE SEPARATION, AND OTHER PROCEDURES THAT REQUIRE A SHARP SURGICAL BLADE TO PUNCTURE OR CUT.: Brand: BARD-PARKER ® STAINLESS STEEL BLADES

## (undated) DEVICE — 3M™ STERI-DRAPE™ U-DRAPE 1015: Brand: STERI-DRAPE™

## (undated) DEVICE — SPONGE LAP W18XL18IN WHT COT 4 PLY FLD STRUNG RADPQ DISP ST 2 PER PACK

## (undated) DEVICE — SET ORTHO STD STORTSTD2

## (undated) DEVICE — 1000 S-DRAPE TOWEL DRAPE 10/BX: Brand: STERI-DRAPE™

## (undated) DEVICE — PILLOW POS W15XH6XL22IN RASPBERRY FOAM ABD W/ STRP DISP FOR

## (undated) DEVICE — NEEDLE HYPO 18GA L1.5IN PNK POLYPR HUB S STL REG BVL STR

## (undated) DEVICE — PATIENT RETURN ELECTRODE, SINGLE-USE, CONTACT QUALITY MONITORING, ADULT, WITH 9FT CORD, FOR PATIENTS WEIGING OVER 33LBS. (15KG): Brand: MEGADYNE

## (undated) DEVICE — SET ORTHO ACCOLADE HEMI HIP BROACH

## (undated) DEVICE — HF-RESECTION ELECTRODE PLASMALOOP LOOP, LARGE, 24 FR., 12°/16°, ESG TURIS: Brand: OLYMPUS

## (undated) DEVICE — DRAINBAG,ANTI-REFLUX TOWER,L/F,2000ML,LL: Brand: MEDLINE

## (undated) DEVICE — BIT DRL QC 2.5X170 MM 80 MM CALIB NS

## (undated) DEVICE — BIT DRL QC 2.8X170 MM 80 MM CALIB NS

## (undated) DEVICE — TOWEL,OR,DSP,ST,BLUE,STD,6/PK,12PK/CS: Brand: MEDLINE

## (undated) DEVICE — SURGICAL PROCEDURE PACK BASIC

## (undated) DEVICE — MEDIA CONTRAST ISOVUE  300 10X50ML

## (undated) DEVICE — IMPLANTABLE DEVICE
Type: IMPLANTABLE DEVICE | Site: HIP | Status: NON-FUNCTIONAL
Removed: 2025-04-21

## (undated) DEVICE — JELLY LIDOCAINE 2% UROJET PFS 25X5ML

## (undated) DEVICE — DRAPE,EXTREMITY,89X128,STERILE: Brand: MEDLINE

## (undated) DEVICE — TRAP,MUCUS SPECIMEN,40CC: Brand: MEDLINE

## (undated) DEVICE — GOWN,BREATHABLE SLV,AURORA,XLG,STRL: Brand: MEDLINE

## (undated) DEVICE — SET ORTHO ACCOLADE HEMI HIP INSRT

## (undated) DEVICE — DRESSING,GAUZE,XEROFORM,CURAD,1"X8",ST: Brand: CURAD

## (undated) DEVICE — DRESSING,GAUZE,XEROFORM,CURAD,5"X9",ST: Brand: CURAD

## (undated) DEVICE — RACK TUBE CURETTE

## (undated) DEVICE — GLOVE ORTHO 8   MSG9480

## (undated) DEVICE — LABEL MED 4 IN SURG PANEL W/ PEN STRL

## (undated) DEVICE — Device

## (undated) DEVICE — DRILL SYSTEM 7

## (undated) DEVICE — E-Z CLEAN, NON-STICK, PTFE COATED, ELECTROSURGICAL BLADE ELECTRODE, 6.5 INCH (16.5 CM): Brand: MEGADYNE

## (undated) DEVICE — ELECTRODE PT RET AD L9FT HI MOIST COND ADH HYDRGEL CORDED

## (undated) DEVICE — SET ORTHO CENTRAX CUPS

## (undated) DEVICE — SET ORTHO STD STORTSTD1

## (undated) DEVICE — CLOTH SURG PREP PREOPERATIVE CHLORHEXIDINE GLUC 2% READYPREP

## (undated) DEVICE — SOLUTION IV IRRIG WATER 1000ML POUR BRL 2F7114

## (undated) DEVICE — DRAPE PATIENT ISOL IRRIG POUCH

## (undated) DEVICE — SYRINGE 20ML LL S/C 50

## (undated) DEVICE — GLOVE ORANGE PI 8   MSG9080

## (undated) DEVICE — GOWN,SIRUS,FABRNF,XL,20/CS: Brand: MEDLINE

## (undated) DEVICE — SOLUTION SURG PREP ANTIMICROBIAL 4 OZ SKIN WND EXIDINE

## (undated) DEVICE — TOTAL TRAY, DB, 100% SILI FOLEY, 16FR 10: Brand: MEDLINE

## (undated) DEVICE — GLOVE SURG SZ 8 L12IN FNGR THK79MIL GRN LTX FREE

## (undated) DEVICE — SYRINGE,TOOMEY,IRRIGATION,70CC,STERILE: Brand: MEDLINE

## (undated) DEVICE — COUNTER NDL 30 COUNT DBL MAG

## (undated) DEVICE — HANDPIECE SET WITH BONE CLEANING TIP AND SUCTION TUBE: Brand: INTERPULSE

## (undated) DEVICE — SYRINGE MED 50ML LUERLOCK TIP

## (undated) DEVICE — Z DISCONTINUED PER MEDLINE USE 2741943 DRESSING AQUACEL 10 IN ALG W9XL25CM SIL CVR WTRPRF VIR BACT BARR ANTIMIC

## (undated) DEVICE — STRYKER PERFORMANCE SERIES SAGITTAL BLADE: Brand: STRYKER PERFORMANCE SERIES

## (undated) DEVICE — DRESSING PETRO W3XL8IN N ADH KNIT CELOS ACETT ADPTC

## (undated) DEVICE — SYRINGE MED 10ML TRNSLUC BRL PLUNG BLK MRK POLYPR CTRL

## (undated) DEVICE — STANDARD HYPODERMIC NEEDLE,ALUMINUM HUB: Brand: MONOJECT

## (undated) DEVICE — 3M™ IOBAN™ 2 ANTIMICROBIAL INCISE DRAPE 6640EZ: Brand: IOBAN™ 2

## (undated) DEVICE — BIT DRL L145MM DIA3.2MM ST QUIK CPL W/O STP REUSE

## (undated) DEVICE — SWABSTICK SURG PREP BENZOIN TINCTURE SINGLE ST

## (undated) DEVICE — DRESSING PETRO W3XL8IN OIL EMUL N ADH GZ KNIT IMPREG CELOS

## (undated) DEVICE — NEEDLE SPNL 22GA L3.5IN BLK WHTACR PNCL PNT HI FLO DISP

## (undated) DEVICE — TOTAL HIP PK

## (undated) DEVICE — Z INACTIVE USE 2660664 SOLUTION IRRIG 3000ML 0.9% SOD CHL USP UROMATIC PLAS CONT

## (undated) DEVICE — CYSTO PACK: Brand: MEDLINE INDUSTRIES, INC.

## (undated) DEVICE — COVER,TABLE,60X90,STERILE: Brand: MEDLINE

## (undated) DEVICE — CONTROL SYRINGE LUER-LOCK TIP: Brand: MONOJECT

## (undated) DEVICE — BLADE CLIPPER GEN PURP NS

## (undated) DEVICE — CATHETER URETH 24FR L16IN BLLN 30CC SIL COUVELAIRE TIP 3 W

## (undated) DEVICE — SET ORTHO MINI STD

## (undated) DEVICE — DRAPE C ARM W41XL74IN UNIV MOB W RUBBERBAND CLP

## (undated) DEVICE — Z DISCONTINUED PER MEDLINE USE 2741942 DRESSING AQUACEL 6 IN ALG W9XL15CM SIL CVR WTRPRF VIR BACT BARR ANTIMIC

## (undated) DEVICE — BNDG,ELSTC,MATRIX,STRL,3"X5YD,LF,HOOK&LP: Brand: MEDLINE

## (undated) DEVICE — HOSE CONN FOR WST MGMT SYS NEPTUNE 2

## (undated) DEVICE — NEEDLE HYPO 21GA L1.5IN GRN POLYPR HUB S STL REG BVL STR

## (undated) DEVICE — PEEL-AWAY HOOD: Brand: FLYTE, SURGICOOL

## (undated) DEVICE — BIT DRL L5IN DIA2.8MM STD ST S STL TWST BUSA

## (undated) DEVICE — BAG DRNGE COMB PK

## (undated) DEVICE — SHEET,DRAPE,53X77,STERILE: Brand: MEDLINE

## (undated) DEVICE — 3M™ COBAN™ NL STERILE NON-LATEX SELF-ADHERENT WRAP, 2084S, 4 IN X 5 YD (10 CM X 4,5 M), 18 ROLLS/CASE: Brand: 3M™ COBAN™

## (undated) DEVICE — BIT DRL L145MM DIA3.2MM QUIK CPL W/O STP REUSE

## (undated) DEVICE — GOWN,SIRUS,FABRNF,L,20/CS: Brand: MEDLINE

## (undated) DEVICE — 3M™ IOBAN™ 2 ANTIMICROBIAL INCISE DRAPE 6650EZ: Brand: IOBAN™ 2

## (undated) DEVICE — GUIDEWIRE ORTH L230MM DIA2.5MM S STL SPADE PNT TIP

## (undated) DEVICE — PADDING CAST W6INXL4YD COT LO LINTING WYTEX

## (undated) DEVICE — BIT DRL L180MM DIA4.3MM QUIK CPL W/O STP REUSE

## (undated) DEVICE — Device: Brand: LEVEL 1

## (undated) DEVICE — BANDAGE,GAUZE,CONFORMING,4"X75",STRL,LF: Brand: MEDLINE

## (undated) DEVICE — PLUG,CATHETER,DRAINAGE PROTECTOR,TUBE: Brand: MEDLINE

## (undated) DEVICE — DRESSING HYDROFIBER AQUACEL AG ADVANTAGE 3.5X14 IN

## (undated) DEVICE — SYRINGE MED 20ML STD CLR PLAS LUERLOCK TIP N CTRL DISP